# Patient Record
Sex: MALE | Race: WHITE | NOT HISPANIC OR LATINO | Employment: UNEMPLOYED | URBAN - METROPOLITAN AREA
[De-identification: names, ages, dates, MRNs, and addresses within clinical notes are randomized per-mention and may not be internally consistent; named-entity substitution may affect disease eponyms.]

---

## 2020-06-18 ENCOUNTER — COMMUNICATION - HEALTHEAST (OUTPATIENT)
Dept: BEHAVIORAL HEALTH | Facility: CLINIC | Age: 35
End: 2020-06-18

## 2020-07-01 ENCOUNTER — AMBULATORY - HEALTHEAST (OUTPATIENT)
Dept: BEHAVIORAL HEALTH | Facility: CLINIC | Age: 35
End: 2020-07-01

## 2020-07-28 ENCOUNTER — AMBULATORY - HEALTHEAST (OUTPATIENT)
Dept: BEHAVIORAL HEALTH | Facility: CLINIC | Age: 35
End: 2020-07-28

## 2020-08-01 ENCOUNTER — COMMUNICATION - HEALTHEAST (OUTPATIENT)
Dept: SCHEDULING | Facility: CLINIC | Age: 35
End: 2020-08-01

## 2020-08-24 ENCOUNTER — RECORDS - HEALTHEAST (OUTPATIENT)
Dept: ADMINISTRATIVE | Facility: OTHER | Age: 35
End: 2020-08-24

## 2020-09-30 ENCOUNTER — COMMUNICATION - HEALTHEAST (OUTPATIENT)
Dept: BEHAVIORAL HEALTH | Facility: CLINIC | Age: 35
End: 2020-09-30

## 2020-09-30 DIAGNOSIS — F29 PSYCHOSIS, UNSPECIFIED PSYCHOSIS TYPE (H): ICD-10-CM

## 2021-02-02 ENCOUNTER — HOSPITAL ENCOUNTER (EMERGENCY)
Facility: CLINIC | Age: 36
Discharge: ANOTHER HOSPITAL | End: 2021-02-03
Attending: FAMILY MEDICINE | Admitting: FAMILY MEDICINE
Payer: MEDICAID

## 2021-02-02 ENCOUNTER — HOSPITAL ENCOUNTER (EMERGENCY)
Facility: CLINIC | Age: 36
Discharge: HOME | End: 2021-02-02
Attending: FAMILY MEDICINE

## 2021-02-02 DIAGNOSIS — F15.10 METHAMPHETAMINE ABUSE (H): ICD-10-CM

## 2021-02-02 DIAGNOSIS — R44.3 HALLUCINATIONS: ICD-10-CM

## 2021-02-02 DIAGNOSIS — F20.0 PARANOID SCHIZOPHRENIA (H): ICD-10-CM

## 2021-02-02 DIAGNOSIS — R45.851 SUICIDAL IDEATION: ICD-10-CM

## 2021-02-02 LAB
AMPHETAMINES UR QL SCN: POSITIVE
BARBITURATES UR QL: NEGATIVE
BENZODIAZ UR QL: NEGATIVE
CANNABINOIDS UR QL SCN: POSITIVE
COCAINE UR QL: NEGATIVE
ETHANOL UR QL SCN: NEGATIVE
OPIATES UR QL SCN: NEGATIVE

## 2021-02-02 PROCEDURE — 99291 CRITICAL CARE FIRST HOUR: CPT | Performed by: FAMILY MEDICINE

## 2021-02-02 PROCEDURE — C9803 HOPD COVID-19 SPEC COLLECT: HCPCS | Performed by: FAMILY MEDICINE

## 2021-02-02 PROCEDURE — 99285 EMERGENCY DEPT VISIT HI MDM: CPT | Mod: 25 | Performed by: FAMILY MEDICINE

## 2021-02-02 PROCEDURE — 96372 THER/PROPH/DIAG INJ SC/IM: CPT | Performed by: FAMILY MEDICINE

## 2021-02-02 PROCEDURE — 250N000011 HC RX IP 250 OP 636: Performed by: FAMILY MEDICINE

## 2021-02-02 PROCEDURE — 90791 PSYCH DIAGNOSTIC EVALUATION: CPT

## 2021-02-02 PROCEDURE — 80307 DRUG TEST PRSMV CHEM ANLYZR: CPT | Performed by: FAMILY MEDICINE

## 2021-02-02 PROCEDURE — 80320 DRUG SCREEN QUANTALCOHOLS: CPT | Performed by: FAMILY MEDICINE

## 2021-02-02 RX ORDER — DIPHENHYDRAMINE HYDROCHLORIDE 50 MG/ML
50 INJECTION INTRAMUSCULAR; INTRAVENOUS ONCE
Status: COMPLETED | OUTPATIENT
Start: 2021-02-02 | End: 2021-02-02

## 2021-02-02 RX ORDER — OLANZAPINE 10 MG/2ML
10 INJECTION, POWDER, FOR SOLUTION INTRAMUSCULAR ONCE
Status: DISCONTINUED | OUTPATIENT
Start: 2021-02-02 | End: 2021-02-02

## 2021-02-02 RX ORDER — LORAZEPAM 2 MG/ML
1 INJECTION INTRAMUSCULAR ONCE
Status: COMPLETED | OUTPATIENT
Start: 2021-02-02 | End: 2021-02-02

## 2021-02-02 RX ADMIN — LORAZEPAM 1 MG: 2 INJECTION INTRAMUSCULAR; INTRAVENOUS at 20:39

## 2021-02-02 RX ADMIN — DIPHENHYDRAMINE HYDROCHLORIDE 50 MG: 50 INJECTION, SOLUTION INTRAMUSCULAR; INTRAVENOUS at 20:39

## 2021-02-02 ASSESSMENT — MIFFLIN-ST. JEOR: SCORE: 1057.85

## 2021-02-03 ENCOUNTER — AMBULATORY - HEALTHEAST (OUTPATIENT)
Dept: CARE COORDINATION | Facility: HOSPITAL | Age: 36
End: 2021-02-03

## 2021-02-03 VITALS
RESPIRATION RATE: 18 BRPM | OXYGEN SATURATION: 96 % | TEMPERATURE: 97.7 F | WEIGHT: 145 LBS | DIASTOLIC BLOOD PRESSURE: 80 MMHG | BODY MASS INDEX: 25.69 KG/M2 | SYSTOLIC BLOOD PRESSURE: 120 MMHG | HEIGHT: 63 IN | HEART RATE: 92 BPM

## 2021-02-03 LAB
LABORATORY COMMENT REPORT: NORMAL
SARS-COV-2 RNA RESP QL NAA+PROBE: NEGATIVE
SPECIMEN SOURCE: NORMAL

## 2021-02-03 PROCEDURE — 96372 THER/PROPH/DIAG INJ SC/IM: CPT | Performed by: FAMILY MEDICINE

## 2021-02-03 PROCEDURE — 250N000011 HC RX IP 250 OP 636: Performed by: FAMILY MEDICINE

## 2021-02-03 PROCEDURE — 87635 SARS-COV-2 COVID-19 AMP PRB: CPT | Performed by: EMERGENCY MEDICINE

## 2021-02-03 RX ORDER — LORAZEPAM 2 MG/ML
2 INJECTION INTRAMUSCULAR ONCE
Status: COMPLETED | OUTPATIENT
Start: 2021-02-03 | End: 2021-02-03

## 2021-02-03 RX ORDER — LORAZEPAM 1 MG/1
1 TABLET ORAL
Status: DISCONTINUED | OUTPATIENT
Start: 2021-02-03 | End: 2021-02-03 | Stop reason: HOSPADM

## 2021-02-03 RX ORDER — DIPHENHYDRAMINE HYDROCHLORIDE 50 MG/ML
50 INJECTION INTRAMUSCULAR; INTRAVENOUS ONCE
Status: COMPLETED | OUTPATIENT
Start: 2021-02-03 | End: 2021-02-03

## 2021-02-03 RX ADMIN — DIPHENHYDRAMINE HYDROCHLORIDE 50 MG: 50 INJECTION, SOLUTION INTRAMUSCULAR; INTRAVENOUS at 12:49

## 2021-02-03 RX ADMIN — LORAZEPAM 2 MG: 2 INJECTION INTRAMUSCULAR; INTRAVENOUS at 12:49

## 2021-02-03 NOTE — ED NOTES
Pt is currently boarding in the ED. Pt agitated when pt came to ED. Code called for pt. Pt has been calm after he received the medication  Pt was offered hygiene supplies: No   Pt was offered to ambulate on unit with staff: No  Meal tray ordered for pt: Yes.

## 2021-02-03 NOTE — ED NOTES
Within an hour after restraint an in person face to face assessment was completed at 1242, including an evaluation of the patient's immediate reaction to the intervention, behavioral assessment and review/assessment of history, drugs and medications, recent labs, etc., and behavioral condition.  The patient experienced: No adverse physical outcome from seclusion/restraint initiation.  The intervention of restraint or seclusion needs to continue.     Damian Meehan MD  02/03/21 9520

## 2021-02-03 NOTE — ED NOTES
Within an hour after restraint an in person face to face assessment was completed at 2045, including an evaluation of the patient's immediate reaction to the intervention, behavioral assessment and review/assessment of history, drugs and medications, recent labs, etc., and behavioral condition.  The patient experienced: No adverse physical outcome from seclusion/restraint initiation.  The intervention of restraint or seclusion needs to terminate.       Cristofer Gray MD  02/02/21 3777

## 2021-02-03 NOTE — ED NOTES
Writer attempted to do do Covid swab but patient refused at the moment. Will try again once the patient is awake.

## 2021-02-03 NOTE — ED PROVIDER NOTES
ED Provider Note  [facility]      History     Chief Complaint   Patient presents with     Hallucinations     Auditory hallucinations. Family states he tried to jump out of the car. Pt denies SI/HI. Writer noticed patient talking to sister on ear piece but no ear piece seen.      HPI  Anonymous Melinda Ron is a 89 year old male who presents the ER dropped off by family without other history initially noted.  Family initially stated the patient was attempting to jump out of the car coming here.  Patient reported talking to his sister via earpiece but there is no earpiece and also.  Patient does have history of tics and Tourette's but this information otherwise was not available.  Patient now presented here for evaluation agitated.  With concerns of psychoses we felt patient should at least be held here as he wanted to leave at least for further evaluation.    Further information later which was reported by patient's sister by the name of Angie with phone #[phone] offered a lot of information concern regarding patient.  She states the patient has history of schizophrenia diagnoses possibly drug-induced has history of methamphetamine use etc.  Has been hospitalized at [facility] in [place] in the past but has never been hospitalized here at [facility].  Patient is felt to not have used drugs over the last 72 hours but prior previous to that.  Patient has been noticing especially of the last 48 hours increasing agitated describing visual and auditory hallucinations.  Patient describing chainsaws along with yelling and screaming.  Continues to report suicidal ideations to his sister.  Patient is becoming more paranoid feels he drug cartel is out to get him.  He has been stopping cars up in [place] where he has been at a gas station for 48 hours extremely paranoid.  Patient continues to have auditory loose [place] feels that he is listening to people talking and  "earpiece which she does not have.  Apparently reported that he tried to jump out of the car while coming here sister state that he needed to be restrained several times for concern of jumping out etc.  With this ongoing agitated behavior she is concerned about his safety.  She wants to make sure that he is hospitalized and then afterwards they contact her to help with long-term placement issues.  She also notes increasing agitation that he continue to try to call her several/multiple times on the phone continually in an obsessive-like fashion.    Past Medical and Surgical History, Medications, Allergies, and Social History were reviewed in the electronic medical record. Review with the patient was attempted but limited due to altered mental status.       Review of Systems   Unable to perform ROS: Mental status change     A complete review of systems was attempted but limited due to altered mental status.    Physical Exam   BP: 106/83  Pulse: 114  Temp: 99.9  F (37.7  C)  Resp: 18  Height: 160 cm (5' 3\")  Weight: 65.8 kg (145 lb)  SpO2: 97 %  Physical Exam  Vitals signs and nursing note reviewed.   Constitutional:       General: He is in acute distress.      Appearance: He is well-developed. He is ill-appearing. He is not diaphoretic.      Comments: Patient agitated here in the ER does have history of tics and Tourette's per sister history.  Patient displaying some psychotic symptoms some auditory hallucinations.  Somewhat argumentative wanted to leave.  Being held here currently on a health officer hold for further evaluation.   HENT:      Head: Normocephalic and atraumatic.      Mouth/Throat:      Mouth: Mucous membranes are moist.   Eyes:      General: No scleral icterus.     Extraocular Movements: Extraocular movements intact.      Conjunctiva/sclera: Conjunctivae normal.      Pupils: Pupils are equal, round, and reactive to light.   Neck:      Musculoskeletal: Normal range of motion and neck supple. "   Cardiovascular:      Rate and Rhythm: Regular rhythm. Tachycardia present.   Pulmonary:      Effort: No respiratory distress.   Abdominal:      General: There is no distension.      Tenderness: There is no guarding.   Musculoskeletal:         General: No swelling or tenderness.   Skin:     General: Skin is warm and dry.      Capillary Refill: Capillary refill takes less than 2 seconds.      Coloration: Skin is not pale.      Findings: No rash.      Comments: Patient with multiple tattoos   Neurological:      General: No focal deficit present.      Mental Status: He is alert.      Comments: No focal findings identified.   Psychiatric:      Comments: Patient with some delusional thought pattern auditory loose [place] with underlying tics/Tourette's.  Increasing agitation noted here in the ER difficult following commands and cooperating.  Is not physically aggressive.           ED Course       Upon arrival as noted we had minimal history.  Patient agitated wanting to leave but felt at this point health officer hold is indicated.  Patient was advised to sit down he was not compliant with recommendations with security here.  Patient needed to be restrained because of increasing agitation and yelling etc.  A code 21 was called.  Patient assessed at this point patient initially was getting be given Zyprexa but states he has an allergy to this.  Patient then given 50 of Benadryl along with 1 mg Ativan IM.  Patient placed in restraints initially and then reassessed these have been removed upon further reexamination.  Able to gain more history from patient does state that he is supposed be on medications but is not currently taking them.  States has had episodes before like this patient feels very paranoid anxious etc.  No major injury identified here in the ER.    As noted patient sister then did call if you look in the HPI she gives a history concerns of his increasing paranoia agitated behavior need to be restrained some  suicidal ideations tried to jump out of the car coming here.  History of schizophrenia having auditory and visual hallucinations she feels she is unsafe at this point.  Patient's been previously admitted to [facility] in [facility] in the past.    Patient stable in the ER  Eval by our mental health .  Plan is to admit patient to MH unit when available.  72 hour hold placed.    Due to patients acute mental health decompensation requiring restraint and assessment and medication intervention and reassessment involved note Critical Care 30 minutes.      Procedures                        Results for orders placed or performed during the hospital encounter of 02/02/21   Drug abuse screen 6 urine (chem dep) ([facility])     Status: Abnormal   Result Value Ref Range    Amphetamine Qual Urine Positive (A) NEG^Negative    Barbiturates Qual Urine Negative NEG^Negative    Benzodiazepine Qual Urine Negative NEG^Negative    Cannabinoids Qual Urine Positive (A) NEG^Negative    Cocaine Qual Urine Negative NEG^Negative    Ethanol Qual Urine Negative NEG^Negative    Opiates Qualitative Urine Negative NEG^Negative     Medications   LORazepam (ATIVAN) tablet 1 mg (has no administration in time range)   diphenhydrAMINE (BENADRYL) injection 50 mg (50 mg Intramuscular Given 2/2/21 2039)   LORazepam (ATIVAN) injection 1 mg (1 mg Intramuscular Given 2/2/21 2039)        Assessments & Plan (with Medical Decision Making)  25-year-old male came to the ER by sister initially without much other information.  Patient no history known initially but was having some delusional hallucinations agitated required initial restraint as patient would not follow directions and could be redirected.  Patient given mild chemical sedation was able to get out of four-point restraints without complications.  Further formation obtained from sister very concerned patient with methamphetamine drug use history of schizophrenia not taking medications increasing  agitation suicidal ideations increasing auditory visual hallucinations at this point concern of safety issues.  Patient placed on a 72-hour hold.  Drug screen noted with THC and amphetamine.  Patient seen by her mental health  also.  Plan at this point to admit to mental health unit for decompensation of schizophrenia with underlying drug use and more clear and conde evaluation is needed.  Sister agrees patient needs to be hospitalized.           I have reviewed the nursing notes. I have reviewed the findings, diagnosis, plan and need for follow up with the patient.    New Prescriptions    No medications on file       Final diagnoses:   Paranoid schizophrenia (H)   Methamphetamine abuse (H)   Suicidal ideation   Hallucinations       --  Cristofer Gray    [facility] EMERGENCY DEPARTMENT  2/2/2021    This note was created at least in part by the use of dragon voice dictation system. Inadvertent typographical errors may still exist.  Cristofer Gray MD.    Patient evaluated in the emergency department during the COVID-19 pandemic period. Careful attention to patients safety was addressed throughout the evaluation. Evaluation and treatment management was initiated with disposition made efficiently and appropriate as possible to minimize any risk of potential exposure to patient during this evaluation.       Cristofer Gray MD  02/03/21 4934

## 2021-02-03 NOTE — ED NOTES
02/03/21 1311   Education   Discontinuation Criteria Cessation of behavior that precipitated seclusion or restraint   Criteria Explained Yes   Patient's Response VU   Pt agreed to safe behaviors once restraints are removed and was julita to safely transfer to [facility] for transport to [facility]. Pt did not want to process events leading up to seclusion.

## 2021-02-03 NOTE — ED NOTES
Patient signed out to me by previous physician.  Currently waiting placement to the floor or transfer to another facility if we cannot locate a bed here.  No issues during my shift.  Patient will be signed out to oncoming physician     Reg Brannon DO  02/03/21 0603

## 2021-02-03 NOTE — ED NOTES
Sign out Provider: [place]      Sign out Plan: Patient seen on earlier shift.  Cleared medically.  Deemed in need of psychiatric admission and placed on a 72-hour hold.  Awaiting bed placement.      Reassessment: This morning the patient is sleeping.  We will continue to monitor and treat as needed.      Disposition: Inpatient psychiatric admission.  Awaiting bed placement.       Damian Meehan MD  02/03/21 0741

## 2021-02-03 NOTE — ED NOTES
Patient going to [facility], tried to wake patient t inform about the plan of admission and have him sign the EMTALA form, but patient refused to wake up and turned back to writer. Will relay to incoming shift

## 2021-02-03 NOTE — ED NOTES
Woke patient up and informed him of pending admission to [facility]. Patient is calm and cooperative.

## 2021-02-03 NOTE — ED NOTES
02/03/21 1220   Justification   Clinical Justification All   Pt did not want to be transferred to [facility], requested to make phone call to his sister and daughter, staff did allow pt to call his sister. Pt refused to get on cart for transport. He then suddenly lunged at . Pt was taken down to ground, code 21 called. Pt was placed in 5 points restraints due to punching wall and risk for injuring himself and others.

## 2021-02-03 NOTE — SAFE
"[facility]  February 3, 2021    Hamzah denies wanting to kill himself but states his life \"sucks\" and he does not want to live as a drug addict any longer.  He says he needs treatment but if he's discharged waiting for inpatient treatment, he fears relapse.  He reports having two children, girls 15 and 16.  He believes his mental health issues are secondary to his meth addiction but he says he has done damage to himself and his relationships.   Hamzah reports doing well on his meds but stops taking them when he relapses.  He reports being clean for 8 months.  He has a girlfriend with whom he lives that is \"toxic.\"  He says he will be leaving that relationship to seek sobriety.     Hamzah states he has a court ordered intake at 9:30 am on Wednesday 2/3 at [facility]  Address: [address] [place] [address] Phone: [phone].  I am hoping that appointment can be confirmed or denied for him.  I called tonight but the  is only present in the daytime.          Current Suicidal Ideation/Self-Injurious Concerns/Methods: None - N/A    Inappropriate Sexual Behavior: Unknown    Aggression/Homicidal Ideation: None - N/A      For additional details see full DEC assessment.       ANANDA Beck      "

## 2021-02-03 NOTE — ED NOTES
Patient was dropped off by sister due to hallucinations. Patient has been restless, not tracking, refused to be seen, appears to be under influence of something. Multiple attempts to de-escalate the patient but patient adamant on leaving. Patient refused to cooperate with staff. Patient started to be more agitated and tried to runaway. Patient was pinned down on the floor, Code 21 was called, patient was placed on a backboard, moved to ED 14. Zyprexa 10 mg IM verbal order given, when writer was about to give the medicine, patient reported allergic reaction towards the medicine and requested Benadryl and Ativan instead. MD informed and agreeable to pt's request. PA's were able to de-escalate the patient and taken out of the backboard. Patient agreed to give the medicine on left and right deltoid. Patient has been cooperative since so patient was not placed on a seclusion, but kept him on a 1:1 for couple of minutes until the patient calms down. Few minutes after the medication, patient has been cooperative and able to answer triage questions. MD talked to patient, and pt was informed that he will be talking to the DEC .

## 2021-02-03 NOTE — ED NOTES
Patient with a history of decompensated schizophrenia and drug abuse, is on a 72-hour hold with a plan to be admitted to an inpatient unit.  Unfortunately were no beds available here, but a bed was located at the [facility] inpatient mental health unit.  Upon arrival of the paramedics the patient got angry, aggressive, started fighting with the guards and tried to elope.  He had to be physically restrained and was given IM Ativan and Benadryl.  We will continue to plan for inpatient admission, however the patient cannot be transported until he is more stable.     Damian Meehan MD  02/03/21 1003  Shortly after receiving IM Ativan and Benadryl the patient began some generalized twitching and shaking which appeared to be volitional.  He was able to follow commands and answer some questions while this was occurring.  When redirected the symptoms stopped, there was no postictal confusion no incontinence.  This appears to be a nonepileptic spell, likely due to the patient attempting to manipulate the situation as he has been trying to put off transferred to [facility].  He is now resting comfortably, tells me he is not injured, and we will transfer him to [facility] as per the original plan.  He appears stable for transfer.  He is on a 72-hour hold.     Damian Meehan MD  02/03/21 4595

## 2021-02-03 NOTE — ED NOTES
Pt unable to review care plan with staff. Pt was acting out d/t pts unwillingness to cooperate with a transfer to another facility. Pt is on a 72 hour hold and is being transferred because there are no beds available for him here.

## 2021-02-03 NOTE — ED NOTES
While pt is in restraints after medication administration, pt was making voluntary, twitching movement and was not verbally responding to staff. Pt was seen by MD. VS BP-120/72, P101, R16, O2sats 98% on RA. Shortly after pt talked to staff when asked if he would comply with transfer to [facility], if taken out of restraints. Pt agreed to be transferred to [facility]. Writer called [facility] and updated charge nurse, Aleah and pt's nurse, Verona about restraints.

## 2021-02-13 ENCOUNTER — COMMUNICATION - HEALTHEAST (OUTPATIENT)
Dept: SCHEDULING | Facility: CLINIC | Age: 36
End: 2021-02-13

## 2021-05-09 ENCOUNTER — TELEPHONE (OUTPATIENT)
Dept: BEHAVIORAL HEALTH | Facility: CLINIC | Age: 36
End: 2021-05-09

## 2021-05-09 ENCOUNTER — HOSPITAL ENCOUNTER (EMERGENCY)
Facility: CLINIC | Age: 36
Discharge: PSYCHIATRIC OR SUBSTANCE USE FACILITY | End: 2021-05-10
Attending: EMERGENCY MEDICINE | Admitting: EMERGENCY MEDICINE
Payer: MEDICAID

## 2021-05-09 DIAGNOSIS — R45.851 SUICIDAL IDEATION: ICD-10-CM

## 2021-05-09 DIAGNOSIS — F15.159: ICD-10-CM

## 2021-05-09 DIAGNOSIS — F06.2 PSYCHOTIC DISORDER WITH DELUSIONS DUE TO KNOWN PHYSIOLOGICAL CONDITION: ICD-10-CM

## 2021-05-09 DIAGNOSIS — Z20.822 CONTACT WITH AND (SUSPECTED) EXPOSURE TO COVID-19: ICD-10-CM

## 2021-05-09 DIAGNOSIS — F19.10 SUBSTANCE ABUSE (H): ICD-10-CM

## 2021-05-09 LAB
ALBUMIN SERPL-MCNC: 3.5 G/DL (ref 3.4–5)
ALP SERPL-CCNC: 88 U/L (ref 40–150)
ALT SERPL W P-5'-P-CCNC: 19 U/L (ref 0–70)
AMPHETAMINES UR QL SCN: POSITIVE
ANION GAP SERPL CALCULATED.3IONS-SCNC: 7 MMOL/L (ref 3–14)
AST SERPL W P-5'-P-CCNC: 17 U/L (ref 0–45)
BARBITURATES UR QL: NEGATIVE
BASOPHILS # BLD AUTO: 0 10E9/L (ref 0–0.2)
BASOPHILS NFR BLD AUTO: 0.4 %
BENZODIAZ UR QL: NEGATIVE
BILIRUB SERPL-MCNC: 0.4 MG/DL (ref 0.2–1.3)
BUN SERPL-MCNC: 18 MG/DL (ref 7–30)
CALCIUM SERPL-MCNC: 9 MG/DL (ref 8.5–10.1)
CANNABINOIDS UR QL SCN: POSITIVE
CHLORIDE SERPL-SCNC: 105 MMOL/L (ref 94–109)
CO2 SERPL-SCNC: 26 MMOL/L (ref 20–32)
COCAINE UR QL: NEGATIVE
CREAT SERPL-MCNC: 0.79 MG/DL (ref 0.66–1.25)
DIFFERENTIAL METHOD BLD: NORMAL
EOSINOPHIL # BLD AUTO: 0.1 10E9/L (ref 0–0.7)
EOSINOPHIL NFR BLD AUTO: 1.3 %
ERYTHROCYTE [DISTWIDTH] IN BLOOD BY AUTOMATED COUNT: 11.9 % (ref 10–15)
ETHANOL UR QL SCN: NEGATIVE
GFR SERPL CREATININE-BSD FRML MDRD: >90 ML/MIN/{1.73_M2}
GLUCOSE SERPL-MCNC: 125 MG/DL (ref 70–99)
HCT VFR BLD AUTO: 41.3 % (ref 40–53)
HGB BLD-MCNC: 14.6 G/DL (ref 13.3–17.7)
IMM GRANULOCYTES # BLD: 0 10E9/L (ref 0–0.4)
IMM GRANULOCYTES NFR BLD: 0.4 %
LABORATORY COMMENT REPORT: NORMAL
LYMPHOCYTES # BLD AUTO: 2 10E9/L (ref 0.8–5.3)
LYMPHOCYTES NFR BLD AUTO: 20.1 %
MCH RBC QN AUTO: 31.9 PG (ref 26.5–33)
MCHC RBC AUTO-ENTMCNC: 35.4 G/DL (ref 31.5–36.5)
MCV RBC AUTO: 90 FL (ref 78–100)
MONOCYTES # BLD AUTO: 1.2 10E9/L (ref 0–1.3)
MONOCYTES NFR BLD AUTO: 12 %
NEUTROPHILS # BLD AUTO: 6.7 10E9/L (ref 1.6–8.3)
NEUTROPHILS NFR BLD AUTO: 65.8 %
NRBC # BLD AUTO: 0 10*3/UL
NRBC BLD AUTO-RTO: 0 /100
OPIATES UR QL SCN: NEGATIVE
PLATELET # BLD AUTO: 263 10E9/L (ref 150–450)
POTASSIUM SERPL-SCNC: 3.5 MMOL/L (ref 3.4–5.3)
PROT SERPL-MCNC: 7.2 G/DL (ref 6.8–8.8)
RBC # BLD AUTO: 4.58 10E12/L (ref 4.4–5.9)
SARS-COV-2 RNA RESP QL NAA+PROBE: NEGATIVE
SODIUM SERPL-SCNC: 138 MMOL/L (ref 133–144)
SPECIMEN SOURCE: NORMAL
WBC # BLD AUTO: 10.1 10E9/L (ref 4–11)

## 2021-05-09 PROCEDURE — 250N000013 HC RX MED GY IP 250 OP 250 PS 637: Performed by: EMERGENCY MEDICINE

## 2021-05-09 PROCEDURE — 80307 DRUG TEST PRSMV CHEM ANLYZR: CPT | Performed by: EMERGENCY MEDICINE

## 2021-05-09 PROCEDURE — 99284 EMERGENCY DEPT VISIT MOD MDM: CPT | Performed by: EMERGENCY MEDICINE

## 2021-05-09 PROCEDURE — 90791 PSYCH DIAGNOSTIC EVALUATION: CPT

## 2021-05-09 PROCEDURE — 80320 DRUG SCREEN QUANTALCOHOLS: CPT | Performed by: EMERGENCY MEDICINE

## 2021-05-09 PROCEDURE — 87635 SARS-COV-2 COVID-19 AMP PRB: CPT | Performed by: EMERGENCY MEDICINE

## 2021-05-09 PROCEDURE — 99285 EMERGENCY DEPT VISIT HI MDM: CPT | Mod: 25 | Performed by: EMERGENCY MEDICINE

## 2021-05-09 PROCEDURE — 85025 COMPLETE CBC W/AUTO DIFF WBC: CPT | Performed by: EMERGENCY MEDICINE

## 2021-05-09 PROCEDURE — 80053 COMPREHEN METABOLIC PANEL: CPT | Performed by: EMERGENCY MEDICINE

## 2021-05-09 PROCEDURE — 120N000001 HC R&B MED SURG/OB

## 2021-05-09 RX ORDER — OLANZAPINE 5 MG/1
5 TABLET, ORALLY DISINTEGRATING ORAL ONCE
Status: COMPLETED | OUTPATIENT
Start: 2021-05-09 | End: 2021-05-09

## 2021-05-09 RX ADMIN — OLANZAPINE 5 MG: 5 TABLET, ORALLY DISINTEGRATING ORAL at 16:25

## 2021-05-09 ASSESSMENT — ENCOUNTER SYMPTOMS
NERVOUS/ANXIOUS: 1
ACTIVITY CHANGE: 1
SLEEP DISTURBANCE: 1
FATIGUE: 1
DECREASED CONCENTRATION: 1
DYSPHORIC MOOD: 1
MYALGIAS: 1
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
## 2021-05-09 NOTE — TELEPHONE ENCOUNTER
"S: [facility] called at 1722 to place a 36 y/o male for inpatient mental health treatment.    B: Hamzah Roberts is a 35 year old male with a PMH of paranoid schizophrenia, ADHD, psychosis, hallucinations, methamphetamine abuse and suicidal ideation who presents to the ED today complaining of methamphetamine abuse and insomnia.  Pt reports has been in and out of the hospital for a while due to his methamphetamine use.  He states he would like to get back into treatment.  He states he was recently at [facility] for 30 days before leaving a few weeks ago.  He states he has had a rule 25 evaluation done in the past and states that the treatment center he just left from would accept him back. He states he is under treatment a total of 2 times in the past. Pt states he is currently homeless and has been walking from the bus stop the bus stop to sleep.  Pt believes that the \"drug cartel\" is following him and attempting to kill him. He has, at times, believed that the drug cartel is also involved with his sister, Lissette. Pt is paranoid and describes auditory hallucinations. Pt believes that hospital physicians are also involved with the drug cartel. He endorses left lateral anterior calf pain that is worse with weightbearing.  Pt also endorses marijuana use.  He states he would like to be held here in the hospital until he can get back into his treatment center because \"I cannot do this on my own\". Pt was recently admitted to [facility] from 2/3/2021 to 2/17/2021 for psychosis and insomnia due to methamphetamine abuse.  Pt has a history of needing restraints due to violent behavior. Utox is positive for amphetamines and cannabinoids. He is negative for COVID-19. Other labs appear unremarkable. Medically cleared.    A: Voluntary.    R: Identifying placement options. Placed on work list.  Patient cleared and ready for behavioral bed placement: Yes    "
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
## 2021-05-10 ENCOUNTER — AMBULATORY - HEALTHEAST (OUTPATIENT)
Dept: CARE COORDINATION | Facility: HOSPITAL | Age: 36
End: 2021-05-10

## 2021-05-10 VITALS
TEMPERATURE: 98.4 F | RESPIRATION RATE: 16 BRPM | OXYGEN SATURATION: 95 % | HEART RATE: 77 BPM | DIASTOLIC BLOOD PRESSURE: 53 MMHG | SYSTOLIC BLOOD PRESSURE: 97 MMHG

## 2021-05-10 NOTE — ED NOTES
"ED to Behavioral Floor Handoff    SITUATION  Hamzah Roberts is a 35 year old male who speaks English and lives is homeless alone The patient arrived in the ED by walking from streets with a complaint of Addiction Problem (Used meth a few days ago, smokes. ) and Insomnia (\"I haven't slept for like 6 or 7 days and I've just been walking around.\")  .The patient's current symptoms started/worsened \"long time\" ago and during this time the symptoms have increased.   In the ED, pt was diagnosed with   Final diagnoses:   Substance abuse (H)   Suicidal ideation        Initial vitals were: BP: 132/87  Pulse: 106  Temp: 98.4  F (36.9  C)  Resp: 18  SpO2: 99 %   --------  Is the patient diabetic? No   If yes, last blood glucose? --     If yes, was this treated in the ED? --  --------  Is the patient inebriated (ETOH) No or Impaired on other substances? Yes  MSSA done? N/A  Last MSSA score: --    Were withdrawal symptoms treated? N/A  Does the patient have a seizure history? No. If yes, date of most recent seizure--  --------  Is the patient patient experiencing suicidal ideation? denies current or recent suicidal ideation     Homicidal ideation? denies current or recent homicidal ideation or behaviors.    Self-injurious behavior/urges? denies current or recent self injurious behavior or ideation.  ------  Was pt aggressive in the ED No  Was a code called No  Is the pt now cooperative? Yes  -------  Meds given in ED:   Medications   OLANZapine zydis (zyPREXA) ODT tab 5 mg (5 mg Oral Given 5/9/21 2829)      Family present during ED course? No  Family currently present? No    BACKGROUND  Does the patient have a cognitive impairment or developmental disability? No  Allergies: No Known Allergies.   Social demographics are   Social History     Socioeconomic History     Marital status: Single     Spouse name: Not on file     Number of children: Not on file     Years of education: Not on file     Highest education level: Not on file "   Occupational History     Not on file   Social Needs     Financial resource strain: Not on file     Food insecurity     Worry: Not on file     Inability: Not on file     Transportation needs     Medical: Not on file     Non-medical: Not on file   Tobacco Use     Smoking status: Current Every Day Smoker     Packs/day: 1.00     Types: Cigarettes     Smokeless tobacco: Never Used   Substance and Sexual Activity     Alcohol use: Not Currently     Drug use: Yes     Types: Methamphetamines     Sexual activity: Not Currently   Lifestyle     Physical activity     Days per week: Not on file     Minutes per session: Not on file     Stress: Not on file   Relationships     Social connections     Talks on phone: Not on file     Gets together: Not on file     Attends Gnosticist service: Not on file     Active member of club or organization: Not on file     Attends meetings of clubs or organizations: Not on file     Relationship status: Not on file     Intimate partner violence     Fear of current or ex partner: Not on file     Emotionally abused: Not on file     Physically abused: Not on file     Forced sexual activity: Not on file   Other Topics Concern     Not on file   Social History Narrative     Not on file        ASSESSMENT  Labs results   Labs Ordered and Resulted from Time of ED Arrival Up to the Time of Departure from the ED   DRUG ABUSE SCREEN 6 CHEM DEP URINE ([facility]) - Abnormal; Notable for the following components:       Result Value    Amphetamine Qual Urine Positive (*)     Cannabinoids Qual Urine Positive (*)     All other components within normal limits   COMPREHENSIVE METABOLIC PANEL - Abnormal; Notable for the following components:    Glucose 125 (*)     All other components within normal limits   SARS-COV-2 (COVID-19) VIRUS RT-PCR   CBC WITH PLATELETS DIFFERENTIAL      Imaging Studies: No results found for this or any previous visit (from the past 24 hour(s)).   Most recent vital signs /87   Pulse 106    Temp 98.4  F (36.9  C) (Oral)   Resp 18   SpO2 99%    Abnormal labs/tests/findings requiring intervention:---   Pain control: fair    Nausea control: pt had none    RECOMMENDATION  Are any infection precautions needed (MRSA, VRE, etc.)? No If yes, what infection? --  ---  Does the patient have mobility issues? independently. If yes, what device does the pt use? ---  ---  Is patient on 72 hour hold or commitment? No If on 72 hour hold, have hold and rights been given to patient? N/A  Are admitting orders written if after 10 p.m. ?N/A  Tasks needing to be completed:---     Chanda Machado RN   [facility]-- [phone] [place] [facility]   [phone] [facility]

## 2021-05-10 NOTE — TELEPHONE ENCOUNTER
R:10/cristiane    1039am-Intake spoke with provider. Provider will call intake back regarding pt placement.   1047am-Unit can not cohort pts due to pt presentation.    Intake continuing to identify placement options. Pt on worklist    R:4500/Angel  400pm: Provider accepted  418pm: Pt placed in que, unit notified, can report anytime  423pm: ED notified.

## 2021-05-11 ENCOUNTER — COMMUNICATION - HEALTHEAST (OUTPATIENT)
Dept: SCHEDULING | Facility: CLINIC | Age: 36
End: 2021-05-11

## 2021-05-12 ENCOUNTER — COMMUNICATION - HEALTHEAST (OUTPATIENT)
Dept: SCHEDULING | Facility: CLINIC | Age: 36
End: 2021-05-12

## 2021-05-29 ENCOUNTER — RECORDS - HEALTHEAST (OUTPATIENT)
Dept: ADMINISTRATIVE | Facility: CLINIC | Age: 36
End: 2021-05-29

## 2021-05-30 ENCOUNTER — RECORDS - HEALTHEAST (OUTPATIENT)
Dept: ADMINISTRATIVE | Facility: CLINIC | Age: 36
End: 2021-05-30

## 2021-05-31 ENCOUNTER — RECORDS - HEALTHEAST (OUTPATIENT)
Dept: ADMINISTRATIVE | Facility: CLINIC | Age: 36
End: 2021-05-31

## 2021-06-01 ENCOUNTER — RECORDS - HEALTHEAST (OUTPATIENT)
Dept: ADMINISTRATIVE | Facility: CLINIC | Age: 36
End: 2021-06-01

## 2021-06-04 ENCOUNTER — HOSPITAL ENCOUNTER (INPATIENT)
Facility: CLINIC | Age: 36
LOS: 1 days | Discharge: HOME | End: 2021-06-05
Attending: PSYCHIATRY & NEUROLOGY | Admitting: PSYCHIATRY & NEUROLOGY
Payer: MEDICAID

## 2021-06-04 DIAGNOSIS — F15.10 METHAMPHETAMINE ABUSE (H): ICD-10-CM

## 2021-06-04 DIAGNOSIS — F06.0 PSYCHOTIC DISORDER WITH HALLUCINATIONS DUE TO KNOWN PHYSIOLOGICAL CONDITION: ICD-10-CM

## 2021-06-04 DIAGNOSIS — F15.259 OTH STIMULANT DEPEND W STIM-INDUCE PSYCHOTIC DISORDER, UNSP (H): ICD-10-CM

## 2021-06-04 DIAGNOSIS — F60.89 OTHER SPECIFIC PERSONALITY DISORDERS (H): ICD-10-CM

## 2021-06-04 DIAGNOSIS — Z20.822 CONTACT WITH AND (SUSPECTED) EXPOSURE TO COVID-19: ICD-10-CM

## 2021-06-04 DIAGNOSIS — F22 PARANOIA (H): ICD-10-CM

## 2021-06-04 DIAGNOSIS — F20.0 PARANOID SCHIZOPHRENIA (H): ICD-10-CM

## 2021-06-04 LAB
ALCOHOL BREATH TEST: 0 (ref 0–0.01)
LABORATORY COMMENT REPORT: NORMAL
SARS-COV-2 RNA RESP QL NAA+PROBE: NEGATIVE
SPECIMEN SOURCE: NORMAL

## 2021-06-04 PROCEDURE — 99285 EMERGENCY DEPT VISIT HI MDM: CPT | Performed by: EMERGENCY MEDICINE

## 2021-06-04 PROCEDURE — 87635 SARS-COV-2 COVID-19 AMP PRB: CPT | Performed by: EMERGENCY MEDICINE

## 2021-06-04 PROCEDURE — 124N000002 HC R&B MH UMMC

## 2021-06-04 PROCEDURE — 90791 PSYCH DIAGNOSTIC EVALUATION: CPT

## 2021-06-04 PROCEDURE — 250N000013 HC RX MED GY IP 250 OP 250 PS 637: Performed by: EMERGENCY MEDICINE

## 2021-06-04 PROCEDURE — 99285 EMERGENCY DEPT VISIT HI MDM: CPT | Mod: 25 | Performed by: EMERGENCY MEDICINE

## 2021-06-04 PROCEDURE — C9803 HOPD COVID-19 SPEC COLLECT: HCPCS | Performed by: EMERGENCY MEDICINE

## 2021-06-04 PROCEDURE — 82075 ASSAY OF BREATH ETHANOL: CPT | Performed by: EMERGENCY MEDICINE

## 2021-06-04 RX ORDER — RISPERIDONE 1 MG/1
1 TABLET ORAL AT BEDTIME
COMMUNITY
End: 2021-08-17

## 2021-06-04 RX ORDER — QUETIAPINE FUMARATE 200 MG/1
200 TABLET, FILM COATED ORAL
Status: DISCONTINUED | OUTPATIENT
Start: 2021-06-04 | End: 2021-06-05 | Stop reason: HOSPADM

## 2021-06-04 RX ORDER — MAGNESIUM HYDROXIDE/ALUMINUM HYDROXICE/SIMETHICONE 120; 1200; 1200 MG/30ML; MG/30ML; MG/30ML
30 SUSPENSION ORAL EVERY 4 HOURS PRN
Status: DISCONTINUED | OUTPATIENT
Start: 2021-06-04 | End: 2021-06-05 | Stop reason: HOSPADM

## 2021-06-04 RX ORDER — BUSPIRONE HYDROCHLORIDE 5 MG/1
5 TABLET ORAL 2 TIMES DAILY
COMMUNITY
End: 2021-08-17

## 2021-06-04 RX ORDER — HYDROXYZINE HYDROCHLORIDE 50 MG/1
50 TABLET, FILM COATED ORAL 3 TIMES DAILY PRN
COMMUNITY
End: 2021-08-17

## 2021-06-04 RX ORDER — GABAPENTIN 100 MG/1
100 CAPSULE ORAL EVERY 6 HOURS PRN
Status: DISCONTINUED | OUTPATIENT
Start: 2021-06-04 | End: 2021-06-05 | Stop reason: HOSPADM

## 2021-06-04 RX ORDER — GABAPENTIN 400 MG/1
400 CAPSULE ORAL 3 TIMES DAILY
Status: DISCONTINUED | OUTPATIENT
Start: 2021-06-05 | End: 2021-06-05 | Stop reason: HOSPADM

## 2021-06-04 RX ORDER — ACETAMINOPHEN 325 MG/1
650 TABLET ORAL EVERY 4 HOURS PRN
Status: DISCONTINUED | OUTPATIENT
Start: 2021-06-04 | End: 2021-06-05 | Stop reason: HOSPADM

## 2021-06-04 RX ORDER — HYDROXYZINE HYDROCHLORIDE 50 MG/1
50 TABLET, FILM COATED ORAL 3 TIMES DAILY PRN
Status: DISCONTINUED | OUTPATIENT
Start: 2021-06-04 | End: 2021-06-05 | Stop reason: HOSPADM

## 2021-06-04 RX ORDER — GABAPENTIN 400 MG/1
400 CAPSULE ORAL 3 TIMES DAILY
COMMUNITY
End: 2021-08-17

## 2021-06-04 RX ORDER — RISPERIDONE 1 MG/1
1 TABLET ORAL AT BEDTIME
Status: DISCONTINUED | OUTPATIENT
Start: 2021-06-04 | End: 2021-06-05 | Stop reason: HOSPADM

## 2021-06-04 RX ORDER — OLANZAPINE 5 MG/1
5-10 TABLET ORAL 3 TIMES DAILY PRN
Status: DISCONTINUED | OUTPATIENT
Start: 2021-06-04 | End: 2021-06-05 | Stop reason: HOSPADM

## 2021-06-04 RX ORDER — QUETIAPINE FUMARATE 200 MG/1
200 TABLET, FILM COATED ORAL
COMMUNITY
End: 2021-08-17

## 2021-06-04 RX ORDER — BUSPIRONE HYDROCHLORIDE 5 MG/1
5 TABLET ORAL 2 TIMES DAILY
Status: DISCONTINUED | OUTPATIENT
Start: 2021-06-04 | End: 2021-06-05 | Stop reason: HOSPADM

## 2021-06-04 RX ORDER — TRAZODONE HYDROCHLORIDE 100 MG/1
100 TABLET ORAL
COMMUNITY
End: 2021-08-17

## 2021-06-04 RX ORDER — LANOLIN ALCOHOL/MO/W.PET/CERES
3 CREAM (GRAM) TOPICAL
Status: DISCONTINUED | OUTPATIENT
Start: 2021-06-04 | End: 2021-06-05 | Stop reason: HOSPADM

## 2021-06-04 RX ORDER — OLANZAPINE 10 MG/1
10 TABLET, ORALLY DISINTEGRATING ORAL ONCE
Status: COMPLETED | OUTPATIENT
Start: 2021-06-04 | End: 2021-06-04

## 2021-06-04 RX ORDER — OLANZAPINE 10 MG/2ML
5-10 INJECTION, POWDER, FOR SOLUTION INTRAMUSCULAR 3 TIMES DAILY PRN
Status: DISCONTINUED | OUTPATIENT
Start: 2021-06-04 | End: 2021-06-05 | Stop reason: HOSPADM

## 2021-06-04 RX ADMIN — OLANZAPINE 10 MG: 10 TABLET, ORALLY DISINTEGRATING ORAL at 17:24

## 2021-06-04 ASSESSMENT — ACTIVITIES OF DAILY LIVING (ADL)
HYGIENE/GROOMING: INDEPENDENT
LAUNDRY: WITH SUPERVISION
DRESS: INDEPENDENT
ORAL_HYGIENE: INDEPENDENT

## 2021-06-04 ASSESSMENT — MIFFLIN-ST. JEOR: SCORE: 1522.79

## 2021-06-04 NOTE — ED TRIAGE NOTES
Pt has been using meth and is having psychosis and wants to get into treatment to get rid of the voices.

## 2021-06-04 NOTE — ED NOTES
"Pt came to [facility], but was too paranoid to come into the doors. He became hysterical, stating \"I can't go in there, I don't want to be in there, please don't make me go through those doors.\" This RN explained that he would have a private room in [facility] compared to the main ED, but he expressed that he wouldn't be able to walk through the doors. At this time patient began to pace the hallway and was looking around where nobody was standing.This RN felt it was best to bring patient to the main ED to prevent the patient from escalating in his paranoia.   "

## 2021-06-04 NOTE — ED NOTES
Pt upon arrival at the [facility] area and refused to go in and due to the concerns of Pt talking about the voices in his head and having schiz.  Pt was placed on a hold so that he could be evaluated.

## 2021-06-04 NOTE — ED NOTES
ED to Behavioral Floor Handoff    SITUATION  Hamzah Roberts is a 35 year old male who speaks English and lives in a home with family members The patient arrived in the ED by private car from home with a complaint of Drug / Alcohol Assessment  .The patient's current symptoms started/worsened 3 month(s) ago and during this time the symptoms have increased.   In the ED, pt was diagnosed with   Final diagnoses:   Methamphetamine abuse (H)   Paranoia (H)   Paranoid schizophrenia (H)        Initial vitals were: BP: (!) 159/105  Pulse: 124  Temp: 98  F (36.7  C)  Resp: 16  SpO2: 97 %   --------  Is the patient diabetic? No   If yes, last blood glucose? --     If yes, was this treated in the ED? --  --------  Is the patient inebriated (ETOH) No or Impaired on other substances? Yes  MSSA done? N/A  Last MSSA score: --    Were withdrawal symptoms treated? N/A  Does the patient have a seizure history? No. If yes, date of most recent seizure--  --------  Is the patient patient experiencing suicidal ideation? denies current or recent suicidal ideation     Homicidal ideation? denies current or recent homicidal ideation or behaviors.    Self-injurious behavior/urges? denies current or recent self injurious behavior or ideation.  ------  Was pt aggressive in the ED No  Was a code called No  Is the pt now cooperative? Yes  -------  Meds given in ED:   Medications   OLANZapine zydis (zyPREXA) ODT tab 10 mg (10 mg Oral Given 6/4/21 1724)      Family present during ED course? No  Family currently present? No    BACKGROUND  Does the patient have a cognitive impairment or developmental disability? No  Allergies: No Known Allergies.   Social demographics are   Social History     Socioeconomic History     Marital status: Single     Spouse name: Not on file     Number of children: Not on file     Years of education: Not on file     Highest education level: Not on file   Occupational History     Not on file   Social Needs     Financial  resource strain: Not on file     Food insecurity     Worry: Not on file     Inability: Not on file     Transportation needs     Medical: Not on file     Non-medical: Not on file   Tobacco Use     Smoking status: Current Every Day Smoker     Packs/day: 1.00     Types: Cigarettes     Smokeless tobacco: Never Used   Substance and Sexual Activity     Alcohol use: Not Currently     Drug use: Yes     Types: Methamphetamines     Sexual activity: Not Currently   Lifestyle     Physical activity     Days per week: Not on file     Minutes per session: Not on file     Stress: Not on file   Relationships     Social connections     Talks on phone: Not on file     Gets together: Not on file     Attends Yarsani service: Not on file     Active member of club or organization: Not on file     Attends meetings of clubs or organizations: Not on file     Relationship status: Not on file     Intimate partner violence     Fear of current or ex partner: Not on file     Emotionally abused: Not on file     Physically abused: Not on file     Forced sexual activity: Not on file   Other Topics Concern     Not on file   Social History Narrative     Not on file        ASSESSMENT  Labs results   Labs Ordered and Resulted from Time of ED Arrival Up to the Time of Departure from the ED   ALCOHOL BREATH TEST POCT - Normal   DRUG ABUSE SCREEN 6 CHEM DEP URINE ([facility])   SARS-COV-2 (COVID-19) VIRUS RT-PCR   DOCUMENT IN LEGAL HOLD NAVIGATOR      Imaging Studies: No results found for this or any previous visit (from the past 24 hour(s)).   Most recent vital signs BP (!) 159/105   Pulse 124   Temp 98  F (36.7  C) (Oral)   Resp 16   SpO2 97%    Abnormal labs/tests/findings requiring intervention:---   Pain control: pt had none  Nausea control: pt had none    RECOMMENDATION  Are any infection precautions needed (MRSA, VRE, etc.)? No If yes, what infection? --  ---  Does the patient have mobility issues? independently. If yes, what device does the pt use?  ---  ---  Is patient on 72 hour hold or commitment? No If on 72 hour hold, have hold and rights been given to patient? N/A  Are admitting orders written if after 10 p.m. ?N/A  Tasks needing to be completed:---     Lyubov Palm, RN     [phone] [facility]

## 2021-06-04 NOTE — ED NOTES
Pt after a great deal of deliberation on his part finally was willing to go to [facility] to be seen.
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
## 2021-06-04 NOTE — ED PROVIDER NOTES
"    [facility] ([facility])  6/04/21    History     Chief Complaint   Patient presents with     Drug / Alcohol Assessment     The history is provided by the patient and medical records.   Drug / Alcohol Assessment      Hamzah Roberts is a 35 year old male medical history significant for methamphetamine abuse and schizophrenia who presents to the emergency department for psychiatric evaluation.  Patient states he has been using meth and endorses psychosis and auditory hallucinations secondary to this.  Patient states he would like to get into treatment to \"get rid of the voices\".  He states that he hears voices screaming constantly and this is causing him stress.  He denies suicidal or homicidal ideation.  He is prescribed Seroquel Resporal and has not taken this for the last 3 or 4 days.  He states that he had been staying with his brother on the couch and that other friends but then for the last couple of days was on the street and did not want to take Seroquel at night while he was sleeping at a bus stop.  He was recently admitted to [facility] with plan to go to treatment in [place] on discharge on 521 however unfortunately there was an issue with his funding not going through through [facility] and thus was discharged.  He does have history of suicidal ideation related to meth use but states he is not feeling suicidal currently.  He states he did get very paranoid when trying to go to the the behavioral health emergency department related to the voices but that he is feeling better now and is voluntary and wants to stay in the hospital to get help and go to treatment.  He states he last used meth yesterday.  He denies IV use.  He denies any chest pain, shortness of breath, fever, cough, abdominal pain, nausea, vomiting, diarrhea, or other complaints.  He denies any other illicit drug use or alcohol use.    Patient was also seen by the behavioral health , please see their " note for further details.    No past medical history on file.    No past surgical history on file.    No family history on file.    Social History     Tobacco Use     Smoking status: Current Every Day Smoker     Packs/day: 1.00     Types: Cigarettes     Smokeless tobacco: Never Used   Substance Use Topics     Alcohol use: Not Currently       No current facility-administered medications for this encounter.      Current Outpatient Medications   Medication     busPIRone (BUSPAR) 5 MG tablet     gabapentin (NEURONTIN) 400 MG capsule     hydrOXYzine (ATARAX) 50 MG tablet     QUEtiapine (SEROQUEL) 200 MG tablet     risperiDONE (RISPERDAL) 1 MG tablet     traZODone (DESYREL) 100 MG tablet      No Known Allergies      Past Medical History  No past medical history on file.  No past surgical history on file.  busPIRone (BUSPAR) 5 MG tablet  gabapentin (NEURONTIN) 400 MG capsule  hydrOXYzine (ATARAX) 50 MG tablet  QUEtiapine (SEROQUEL) 200 MG tablet  risperiDONE (RISPERDAL) 1 MG tablet  traZODone (DESYREL) 100 MG tablet      No Known Allergies  Family History  No family history on file.  Social History   Social History     Tobacco Use     Smoking status: Current Every Day Smoker     Packs/day: 1.00     Types: Cigarettes     Smokeless tobacco: Never Used   Substance Use Topics     Alcohol use: Not Currently     Drug use: Yes     Types: Methamphetamines      Past medical history, past surgical history, medications, allergies, family history, and social history were reviewed with the patient.  Has history of methamphetamine abuse as well as schizophrenia.    Review of Systems  A complete review of systems was performed with pertinent positives and negatives noted in the HPI, and all other systems negative.    Physical Exam   BP: (!) 159/105  Pulse: 124  Temp: 98  F (36.7  C)  Resp: 16  SpO2: 97 %  Physical Exam  Vitals signs reviewed.   Constitutional:       General: He is not in acute distress.     Appearance: He is  well-developed.   HENT:      Head: Normocephalic and atraumatic.      Mouth/Throat:      Mouth: Mucous membranes are moist.      Pharynx: No oropharyngeal exudate.   Eyes:      Extraocular Movements: Extraocular movements intact.      Pupils: Pupils are equal, round, and reactive to light.   Neck:      Musculoskeletal: Normal range of motion and neck supple. No neck rigidity.   Cardiovascular:      Rate and Rhythm: Regular rhythm. Tachycardia present.      Pulses: Normal pulses.      Heart sounds: Normal heart sounds. No murmur.      Comments: Mildly tachycardic  Pulmonary:      Effort: Pulmonary effort is normal. No respiratory distress.      Breath sounds: Normal breath sounds. No stridor. No wheezing or rales.   Abdominal:      General: Bowel sounds are normal. There is no distension.      Palpations: Abdomen is soft. There is no mass.      Tenderness: There is no abdominal tenderness. There is no guarding or rebound.   Musculoskeletal: Normal range of motion.   Skin:     General: Skin is warm and dry.      Capillary Refill: Capillary refill takes less than 2 seconds.   Neurological:      General: No focal deficit present.      Mental Status: He is alert and oriented to person, place, and time.      GCS: GCS eye subscore is 4. GCS verbal subscore is 5. GCS motor subscore is 6.      Cranial Nerves: No cranial nerve deficit.      Sensory: No sensory deficit.      Motor: No weakness or abnormal muscle tone.   Psychiatric:      Comments: Mildly anxious but calm and cooperative on my exam.  Had received Zyprexa and does not appear to be responding to external stimuli at this time.         ED Course      Procedures        The medical record was reviewed and interpreted.  Current labs reviewed and interpreted.       Results for orders placed or performed during the hospital encounter of 06/04/21   Asymptomatic SARS-CoV-2 COVID-19 Virus (Coronavirus) by PCR     Status: None    Specimen: Nasopharyngeal   Result Value Ref  Range    SARS-CoV-2 Virus Specimen Source Nasopharyngeal     SARS-CoV-2 PCR Result NEGATIVE     SARS-CoV-2 PCR Comment (Note)    Alcohol breath test POCT     Status: Normal   Result Value Ref Range    Alcohol Breath Test 0.00 0.00 - 0.01     Medications   OLANZapine zydis (zyPREXA) ODT tab 10 mg (10 mg Oral Given 6/4/21 1724)        Assessments & Plan (with Medical Decision Making)   Patient presents with methamphetamine abuse and history of schizophrenia not currently taking medications requesting psychiatric evaluation and methamphetamine abuse treatment.  Patient was recently admitted at [facility] and discharged on 21 May with initial plan of going into treatment for his abuse in [facility] however apparently [facility] had denied his financial assistance and thus he was discharged.  He presents here requesting help as he has been unable to stop using methamphetamine and is having worsening auditory hallucinations that are screaming and causing him distress.  He currently denies suicidal or homicidal ideation.  He was initially going to go to the behavioral emergency center however he became paranoid and anxious and did not want to walk through those doors and thus came back here.  He was feeling more comfortable here and voluntarily took Zyprexa to assist with his hallucinations and was not aggressive or disruptive here.  He was calm and cooperative and only mildly anxious on my evaluation.  He is entirely voluntary and requesting to stay in the hospital.  He was seen by the behavioral health  who agreed that he warranted inpatient psychiatric stabilization.  Again patient was voluntary and in agreement with this plan.  Asymptomatic COVID-19 PCR was negative.  Breathalyzer was 0.  Urine drug screen is pending.  He was accepted to the inpatient psychiatric floor here at [place] and was transferred to his inpatient bed in stable condition.  He denied any medical complaints and was medically stable  for admission to psychiatric services.    I have reviewed the nursing notes. I have reviewed the findings, diagnosis, plan and need for follow up with the patient.    New Prescriptions    No medications on file       Final diagnoses:   Methamphetamine abuse (H)   Paranoia (H)   Paranoid schizophrenia (H)       --  Nadira Lopez MD  [facility] EMERGENCY DEPARTMENT  6/4/2021     Nadira Lopez MD  06/04/21 2058

## 2021-06-05 VITALS
TEMPERATURE: 97.3 F | HEART RATE: 84 BPM | RESPIRATION RATE: 16 BRPM | DIASTOLIC BLOOD PRESSURE: 71 MMHG | BODY MASS INDEX: 26.67 KG/M2 | OXYGEN SATURATION: 98 % | WEIGHT: 150.5 LBS | HEIGHT: 63 IN | SYSTOLIC BLOOD PRESSURE: 109 MMHG

## 2021-06-05 PROCEDURE — 250N000013 HC RX MED GY IP 250 OP 250 PS 637: Performed by: PSYCHIATRY & NEUROLOGY

## 2021-06-05 PROCEDURE — 99236 HOSP IP/OBS SAME DATE HI 85: CPT | Performed by: NURSE PRACTITIONER

## 2021-06-05 RX ORDER — QUETIAPINE FUMARATE 50 MG/1
200 TABLET, EXTENDED RELEASE ORAL AT BEDTIME
Status: DISCONTINUED | OUTPATIENT
Start: 2021-06-05 | End: 2021-06-05 | Stop reason: HOSPADM

## 2021-06-05 RX ADMIN — BUSPIRONE HYDROCHLORIDE 5 MG: 5 TABLET ORAL at 09:08

## 2021-06-05 RX ADMIN — GABAPENTIN 400 MG: 400 CAPSULE ORAL at 09:08

## 2021-06-05 RX ADMIN — GABAPENTIN 400 MG: 400 CAPSULE ORAL at 14:04

## 2021-06-05 ASSESSMENT — ACTIVITIES OF DAILY LIVING (ADL)
HYGIENE/GROOMING: INDEPENDENT
LAUNDRY: WITH SUPERVISION
ORAL_HYGIENE: INDEPENDENT
DRESS: INDEPENDENT

## 2021-06-05 NOTE — PLAN OF CARE
Initial Psychosocial Assessment    I have reviewed the chart, met with the patient, and developed Care Plan.  Information for assessment was obtained from chart notes.  Patient was not able to be awakened for interview.    Presenting Problem:  Patient was admitted on a voluntary basis with meth use, auditory and visual hallucinations.  Also off prescribed medications for a few days prior to admission.  Per notes patient recently attempted to get funding through [facility] for CD treatment but was denied.  He was subsequently discharged from [facility] and began using again.     History of Mental Health and Chemical Dependency:  Schizophrenia, meth abuse, Tourette's syndrome.  History of admissions at [facility]. Recent admit at [facility] 5/9 to 5/21. CD treatment at [facility].     Family Description (Constellation, Family Psychiatric History):  Patient is one of 9 children.  Family history positive for substance abuse. Cousin bipolar.    Significant Life Events (Illness, Abuse, Trauma, Death):  Unable to assess    Living Situation:  Homeless, has been staying with friends/family/on the street.    Educational Background:  GED    Occupational History:  Not employed    Financial Status:  Unable to assess    Legal Issues:  History prison time; current probation [facility].    Ethnic/Cultural Issues:  [place]    Spiritual Orientation:  Taoist     Service History:  None     Social Functioning (organization, interests):  Unable to assess    Current Treatment Providers are:  LID[facility] [place]  No mental health providers.     Social Service Assessment/Plan:  Patient will be seen by the on-call psychiatric provider.  Medications will be evaluated and adjusted as appropriate.  Patient will meet with the treatment team on Monday to further coordinate plan of care.  CTC available to assist as needed to ensure appropriate aftercare is in place prior to discharge.

## 2021-06-05 NOTE — PLAN OF CARE
Problem: Mood Impairment (Psychotic Signs/Symptoms)  Goal: Improved Mood Symptoms (Psychotic Signs/Symptoms)  Outcome: No Change  In room sleeping most of day, this staff was able to get him to take medication, and brought his breakfast tray into him, but he declined any conversation.  He just now came out of room at 2;30 and requested cereal and milk.  Md notified that he was awake so they could come by to see him.  Will continue to monitor mood and safety.   Patient has become agitated and is demanding to go, he talked to MD and the plan is to discharge.  He has his phone in his pocket and is refusing to give it back. Staff is close by until we get him off the unit.

## 2021-06-05 NOTE — PROGRESS NOTES
"Patient admitted to [facility] at approximately 2100  from ER where he was seen for evaluation of psychosis. Upon arrival to the unit patient presents as pleasant and cooprative. Affect presents as blunted. Mood presents as calm. Speech presents as clear and cohearant. Endorsees \"some\" anxiety. Endorses suicidal ideation but did not specify a plan. Denies depression, SIB, and Homicidal Ideation. Denies auditory/visual hallucinations.Was cooperative with the admission process.     Medical: Patient has a history of Paranoid Schizophrenia, ADHD, psychosis, Hallucinations, Methamphetamine abuse, and suicidal ideation. Patient is Full Code. Regular Diet.     Plan: Patient is here Voluntary and on Suicide and Elopement Precautions. Admission packet given. Pharmacy med req completed (see pharmacy note for details). Care plan established. Education not established. Admission process is in process yet not competed due to patient's unwillingness to complete due to being tired. Will monitor.  "

## 2021-06-05 NOTE — PROGRESS NOTES
06/04/21 2113   Patient Belongings   Did you bring any home meds/supplements to the hospital?  No   Patient Belongings locker;sent to security per site process   Patient Belongings Put in Hospital Secure Location (Security or Locker, etc.) other (see comments)   Belongings Search Yes   Clothing Search Yes   Second Staff Robert W[place]     Locker #[id number]:  Red hat, red shoes, black shorts, black/white leather belt, Samsung smart phone, black socks    Security Envelope #[id number]:  $142.00 cash    ..A               Admission:  I am responsible for any personal items that are not sent to the safe or pharmacy.  [facility] is not responsible for loss, theft or damage of any property in my possession.    Signature:  _________________________________ Date: _______  Time: _____                                              Staff Signature:  ____________________________ Date: ________  Time: _____      2nd Staff person, if patient is unable/unwilling to sign:    Signature: ________________________________ Date: ________  Time: _____     Discharge:  [facility] has returned all of my personal belongings:    Signature: _________________________________ Date: ________  Time: _____                                          Staff Signature:  ____________________________ Date: ________  Time: _____

## 2021-06-05 NOTE — DISCHARGE INSTRUCTIONS
Behavioral Discharge Planning and Instructions    Summary: You were admitted on 6/4/2021  due to suicidal ideation and paranoia .  You were treated by provider Bambi and discharged on 6/5/2021 from station [facility]. You requested to leave and are being discharged. No follow-up appointments were made. You are getting picked up by your girlfriend.     Main Diagnosis: Paranoia, Meth Use and Schziophrenia    Health Care Follow     Attend all scheduled appointments with your outpatient providers. Call at least 24 hours in advance if you need to reschedule an appointment to ensure continued access to your outpatient providers.     Major Treatments, Procedures and Findings:  You were provided with: {Major Treatments:466102}    Symptoms to Report: {symptoms:419390}kljlkjj    Early warning signs can include: { Warning Signs:939754}    Safety and Wellness:  {Age Group Safety Wellness:100540}    Resources:   { RESOURCES MB:380137}    General Medication Instructions:   See your medication sheet(s) for instructions.   Take all medicines as directed.  Make no changes unless your doctor suggests them.   Go to all your doctor visits.  Be sure to have all your required lab tests. This way, your medicines can be refilled on time.  Do not use any drugs not prescribed by your doctor.  Avoid alcohol.    Advance Directives:   Scanned document on file with [facility]? No scanned doc  Is document scanned?    Honoring Choices Your Rights Handout:    Was more information offered?      The Treatment team has appreciated the opportunity to work with you. If you have any questions or concerns about your recent admission, you can contact the unit which can receive your call 24 hours a day, 7 days a week. They will be able to get in touch with a Provider if needed. The unit number is *** .

## 2021-06-05 NOTE — ED NOTES
Patient was reminded to provide urine for Drug Screen. Patient stated currently he has no urge to void. Drinking water provided.

## 2021-06-05 NOTE — H&P
"DATE OF ADMISSION: 6/4/2021                                     PATIENT'S [id number]   DATE OF SERVICE: 6/5/2021                                           PATIENT'S: 1985  ADMITTING PROVIDER: Brandon Reyes MD  ATTENDING PROVIDER: Tigre CACERES CNP  LEGAL STATUS:  [facility]  SOURCES OF INFORMATION: Information was obtained from the patient and available records.  CHIEF COMPLAIN:   HISTORY F PRESENT ILLNESS: Per ED note: Hamzah Roberts is a 35 year old male medical history significant for methamphetamine abuse and schizophrenia who presents to the emergency department for psychiatric evaluation.  Patient states he has been using meth and endorses psychosis and auditory hallucinations secondary to this.  Patient states he would like to get into treatment to \"get rid of the voices\".  He states that he hears voices screaming constantly and this is causing him stress.  He denies suicidal or homicidal ideation.  He is prescribed Seroquel Resporal and has not taken this for the last 3 or 4 days.  He states that he had been staying with his brother on the couch and that other friends but then for the last couple of days was on the street and did not want to take Seroquel at night while he was sleeping at a bus stop.  He was recently admitted to [facility] with plan to go to treatment in [place] on discharge on 521 however unfortunately there was an issue with his funding not going through through [facility] and thus was discharged.  He does have history of suicidal ideation related to meth use but states he is not feeling suicidal currently.  He states he did get very paranoid when trying to go to the the behavioral health emergency department related to the voices but that he is feeling better now and is voluntary and wants to stay in the hospital to get help and go to treatment.  He states he last used meth yesterday.  He denies IV use.  He denies any chest pain, shortness of breath, fever, cough, " abdominal pain, nausea, vomiting, diarrhea, or other complaints.  He denies any other illicit drug use or alcohol use. Patient was also seen by the behavioral health , please see their note for further details.   Per chart review, the patient was hospitalized twice this year in February and May.  In February, he was discharged to a  treatment in [place] but did not follow-up with further recommendations afterwards.  In May, the [facility] tried to send him to treatment again however, there was a some type of a confusion with financing the treatment and he was discharged to the streets.  He has a history of for frequent ED and admission visits.  Hamzah Roberts is a 35 year old male with history of schizophrenia and methamphetamine abuse.  Attempted to meet with the patient early in the morning however, he was completely out of it and declined to answer any questions.  I was notified that the patient is awake in early afternoon and requesting to discharge.  The patient was seen briefly as he did not want to respond to most of the questions.  States that the hospital environment is making him more anxious and agitated.  He is no longer hearing voices.  He is homeless however, will be able to stay with his girlfriend.  Denies feeling suicidal or homicidal.  Denies paranoia.  He is not depressed.  Anxiety is only related to being in the hospital.  The patient reports that he has a 3-month supply of his medications in his backpack and declined any refills.  The patient is not confused and does not appear responding to internal stimuli.  SUBSTANCE USE HISTORY:   History of abusing multiple substances however, methamphetamines appeared to be the drug of choice.  He has been in treatment in detox multiple times.  Last treatment was [place] in February of this year.    PSYCHIATRIC HISTORY:   The patient has a history of schizophrenia and methamphetamine abuse.  He has been hospitalized multiple  times.  He has been stabilized Seroquel, and risperidone.  He was taking Seroquel, risperidone, trazodone, gabapentin, BuSpar, and hydroxyzine the last time he was hospitalized which was in May of this year.  No obvious history of suicide attempts or self injury behaviors.  PAST MEDICAL HISTORY:   No past medical history on file.    No past surgical history on file.    ALLERGIES:  No Known Allergies  FAMILY HISTORY:  Declined to answer these questions.  No family history on file.    SOCIAL HISTORY: The patient is homeless.  Declined to answer my questions.  MEDICAL REVIEW OF SYSTEM: Please refer to the review of systems done by Nadira Lopez MD on 6/4/2021, which I reviewed and confirmed. The remaining 10-point systems review was negative based on my exam.   MEDICATIONS PRIOR TO ADMISSION:   Prior to Admission medications    Medication Sig Start Date End Date Taking? Authorizing Provider   busPIRone (BUSPAR) 5 MG tablet Take 5 mg by mouth 2 times daily   Yes Unknown, Entered By History   gabapentin (NEURONTIN) 400 MG capsule Take 400 mg by mouth 3 times daily   Yes Unknown, Entered By History   QUEtiapine (SEROQUEL) 200 MG tablet Take 200 mg by mouth nightly as needed   Yes Unknown, Entered By History   risperiDONE (RISPERDAL) 1 MG tablet Take 1 mg by mouth At Bedtime   Yes Unknown, Entered By History   traZODone (DESYREL) 100 MG tablet Take 100 mg by mouth nightly as needed for sleep   Yes Unknown, Entered By History   hydrOXYzine (ATARAX) 50 MG tablet Take 50 mg by mouth 3 times daily as needed for anxiety    Unknown, Entered By History     LABORATORY DATA:   Recent Results (from the past 672 hour(s))   Drug abuse screen 6 urine (chem dep)    Collection Time: 05/09/21  4:26 PM   Result Value Ref Range    Amphetamine Qual Urine Positive (A) NEG^Negative    Barbiturates Qual Urine Negative NEG^Negative    Benzodiazepine Qual Urine Negative NEG^Negative    Cannabinoids Qual Urine Positive (A) NEG^Negative     Cocaine Qual Urine Negative NEG^Negative    Ethanol Qual Urine Negative NEG^Negative    Opiates Qualitative Urine Negative NEG^Negative   CBC with platelets differential    Collection Time: 05/09/21  6:19 PM   Result Value Ref Range    WBC 10.1 4.0 - 11.0 10e9/L    RBC Count 4.58 4.4 - 5.9 10e12/L    Hemoglobin 14.6 13.3 - 17.7 g/dL    Hematocrit 41.3 40.0 - 53.0 %    MCV 90 78 - 100 fl    MCH 31.9 26.5 - 33.0 pg    MCHC 35.4 31.5 - 36.5 g/dL    RDW 11.9 10.0 - 15.0 %    Platelet Count 263 150 - 450 10e9/L    Diff Method Automated Method     % Neutrophils 65.8 %    % Lymphocytes 20.1 %    % Monocytes 12.0 %    % Eosinophils 1.3 %    % Basophils 0.4 %    % Immature Granulocytes 0.4 %    Nucleated RBCs 0 0 /100    Absolute Neutrophil 6.7 1.6 - 8.3 10e9/L    Absolute Lymphocytes 2.0 0.8 - 5.3 10e9/L    Absolute Monocytes 1.2 0.0 - 1.3 10e9/L    Absolute Eosinophils 0.1 0.0 - 0.7 10e9/L    Absolute Basophils 0.0 0.0 - 0.2 10e9/L    Abs Immature Granulocytes 0.0 0 - 0.4 10e9/L    Absolute Nucleated RBC 0.0    Comprehensive metabolic panel    Collection Time: 05/09/21  6:19 PM   Result Value Ref Range    Sodium 138 133 - 144 mmol/L    Potassium 3.5 3.4 - 5.3 mmol/L    Chloride 105 94 - 109 mmol/L    Carbon Dioxide 26 20 - 32 mmol/L    Anion Gap 7 3 - 14 mmol/L    Glucose 125 (H) 70 - 99 mg/dL    Urea Nitrogen 18 7 - 30 mg/dL    Creatinine 0.79 0.66 - 1.25 mg/dL    GFR Estimate >90 >60 mL/min/[1.73_m2]    GFR Estimate If Black >90 >60 mL/min/[1.73_m2]    Calcium 9.0 8.5 - 10.1 mg/dL    Bilirubin Total 0.4 0.2 - 1.3 mg/dL    Albumin 3.5 3.4 - 5.0 g/dL    Protein Total 7.2 6.8 - 8.8 g/dL    Alkaline Phosphatase 88 40 - 150 U/L    ALT 19 0 - 70 U/L    AST 17 0 - 45 U/L   Asymptomatic SARS-CoV-2 COVID-19 Virus (Coronavirus) by PCR    Collection Time: 05/09/21  6:20 PM    Specimen: Nasopharyngeal   Result Value Ref Range    SARS-CoV-2 Virus Specimen Source Nasopharyngeal     SARS-CoV-2 PCR Result NEGATIVE     SARS-CoV-2 PCR  "Comment (Note)    Alcohol breath test POCT    Collection Time: 06/04/21  5:37 PM   Result Value Ref Range    Alcohol Breath Test 0.00 0.00 - 0.01   Asymptomatic SARS-CoV-2 COVID-19 Virus (Coronavirus) by PCR    Collection Time: 06/04/21  5:59 PM    Specimen: Nasopharyngeal   Result Value Ref Range    SARS-CoV-2 Virus Specimen Source Nasopharyngeal     SARS-CoV-2 PCR Result NEGATIVE     SARS-CoV-2 PCR Comment (Note)      PHYSICAL EXAMINATON:   Temp: 97.3  F (36.3  C) Temp src: Tympanic BP: 109/71 Pulse: 84   Resp: 16 SpO2: 98 % O2 Device: None (Room air)    5' 3\" 150 lbs 8 oz Body mass index is 26.66 kg/m .  MENTAL STATUS EXAM: The patient is a 35 years old, petite,  male with various tattoos on his neck and arms.  He is anxious.  Wants to be discharged.  He is marginally cooperating with this interview.  Eye contact is intense, mood is anxious related to being in the hospital, speech is pressured, psychomotor behavior is positive for agitation, thought process is linear, no loose associations, thought content is negative for suicidal homicidal ideation, paranoia, delusions, auditory visual hallucinations, insight and judgment are fair, he is oriented to self, date, place, situation, attention span and concentration are fair, recent and remote memory are fair, he has no problems expressing himself, fund of knowledge is adequate for the level of education and training.      DIAGNOSIS:  Schizophrenia, per history  Rule out substance-induced mood disorder  Methamphetamine abuse, severe  PLAN AND RECOMMENDATIONS: The patient is a 35 years old, petite,  male who was admitted with psychosis in the form of auditory hallucinations, voices screaming at him.  He has been using methamphetamine daily.  The patient was initially very tired and did not want to talk to this provider however, later in the afternoon he woke up and requested to be discharged.  The patient is denying auditory visual hallucinations, " paranoia, delusions.  Denies suicidal homicidal ideation.  Denies depression.  Reports anxiety related to being in the hospital.  The patient is insisting that he discharges today.  He will stay with his girlfriend.  Reports having 3-month supply of the medications in his backpack.  There is no way to verify this information.  Again patient is marginally cooperating.  At this point, he does not meet criteria is for involuntary hospitalization and therefore will be discharged.  The patient was consulted on nature of illness and treatment options. Care was coordinated with the treatment team.  Attestation: Patient has been seen and evaluated by amanda CACERES CNP  6/5/2021  9:19 AM  This note was created with the help of Dragon dictation system. All grammatical/typing errors or context distortion are unintentional and inherent to software.

## 2021-06-05 NOTE — PHARMACY-ADMISSION MEDICATION HISTORY
Admission Medication History Completed by Pharmacy    See [facility] Admission Navigator for allergy information, preferred outpatient pharmacy, prior to admission medications and immunization status.     Medication history sources:  Discharge Summary 5/21/21 ([facility] Everywhere)    Changes made to PTA medication list (reason)  Added:   - all meds  Deleted: N/A  Changed: N/A    Additional medication history information:   - Patient not interviewed as part of this medication history. Please discuss any OTC/non-prescription medication use and last doses with the patient that may not be reflected in the list below.    Prior to Admission medications    Medication Sig Last Dose Taking? Auth Provider   busPIRone (BUSPAR) 5 MG tablet Take 5 mg by mouth 2 times daily  Yes Unknown, Entered By History   gabapentin (NEURONTIN) 400 MG capsule Take 400 mg by mouth 3 times daily  Yes Unknown, Entered By History   hydrOXYzine (ATARAX) 50 MG tablet Take 50 mg by mouth 3 times daily as needed for anxiety  Yes Unknown, Entered By History   QUEtiapine (SEROQUEL) 200 MG tablet Take 200 mg by mouth nightly as needed  Yes Unknown, Entered By History   risperiDONE (RISPERDAL) 1 MG tablet Take 1 mg by mouth At Bedtime  Yes Unknown, Entered By History   traZODone (DESYREL) 100 MG tablet Take 100 mg by mouth nightly as needed for sleep  Yes Unknown, Entered By History     Date completed: 06/04/21    Medication history completed by:   Wesley Kirby, PharmD, BCPS  General acute hospital  Emergency Department: Ascom *[id number]

## 2021-06-08 ENCOUNTER — HOSPITAL ENCOUNTER (EMERGENCY)
Facility: CLINIC | Age: 36
Discharge: HOME | End: 2021-06-08
Attending: EMERGENCY MEDICINE | Admitting: EMERGENCY MEDICINE
Payer: MEDICAID

## 2021-06-08 VITALS
DIASTOLIC BLOOD PRESSURE: 78 MMHG | TEMPERATURE: 98.3 F | HEART RATE: 76 BPM | OXYGEN SATURATION: 99 % | SYSTOLIC BLOOD PRESSURE: 114 MMHG | RESPIRATION RATE: 16 BRPM

## 2021-06-08 DIAGNOSIS — F15.10 METHAMPHETAMINE ABUSE (H): ICD-10-CM

## 2021-06-08 DIAGNOSIS — F20.0 PARANOID SCHIZOPHRENIA (H): ICD-10-CM

## 2021-06-08 PROCEDURE — 99284 EMERGENCY DEPT VISIT MOD MDM: CPT | Performed by: EMERGENCY MEDICINE

## 2021-06-08 PROCEDURE — 90791 PSYCH DIAGNOSTIC EVALUATION: CPT

## 2021-06-08 PROCEDURE — 99285 EMERGENCY DEPT VISIT HI MDM: CPT | Mod: 25 | Performed by: EMERGENCY MEDICINE

## 2021-06-08 NOTE — ED NOTES
[facility] ED Mental Health Handoff Note:       Brief HPI:  This is a 35 year old male signed out to me by Dr. Jade Dietrich.  See initial ED Provider note for full details of the presentation. Interval history is pertinent for DEC eval.   BEC asessement--Pt was seen by the DEC . No reasons for admission at this time. Recommends outpatietn rule 25.       Evaluated by mental health: Yes. The recommendation is for outpatient mental health treatment. Resources and plan given to patient.    Safety concerns: At the time I received sign out, there were no safety concerns.    Hold Status:  Active Orders   N/A            Exam:   Patient Vitals for the past 24 hrs:   BP Temp Temp src Pulse Resp SpO2   06/08/21 0904 114/78 98.3  F (36.8  C) Oral 76 16 99 %   06/08/21 0543 109/74 98  F (36.7  C) Oral 69 18 97 %           ED Course:    Medications - No data to display         There were no significant events during my shift.          Impression:    ICD-10-CM    1. Methamphetamine abuse (H)  F15.10    2. Paranoid schizophrenia (H)  F20.0        Plan:    1. Discharged.      RESULTS:   No results found for this visit on 06/08/21 (from the past 24 hour(s)).          MD Vicki Brenner Kruti Tripathi, MD  06/08/21 2172

## 2021-06-08 NOTE — ED PROVIDER NOTES
ED Provider Note  [facility]      History     Chief Complaint   Patient presents with     Mental Health Problem     has history of Meth use, pt was admitted to station 30 and wanted to go back to mental treatment.      HPI  Hamzah Roberts is a 35 year old male medical history significant for methamphetamine abuse and schizophrenia who presents to the emergency department for psychiatric evaluation.  Patient reports that he was at a detox center in [place] however was unable to sleep and was unable to eat there so left.  Patient called 911 and states that he wanted to be admitted to station 30.  Patient reports history of methamphetamine use and last use was 2 to 3 days ago.  Patient reports that he does get hallucinations on occasion, denies any specific complaints at this time.  Patient denies suicide ideation, homicide ration, or intent of self-harm.  History is limited as patient is noncooperative and declined answering any of my questions. The patient is requesting go upstairs, sleep, and will not respond to any of my questions.    Past Medical History  History reviewed. No pertinent past medical history.  History reviewed. No pertinent surgical history.  busPIRone (BUSPAR) 5 MG tablet  gabapentin (NEURONTIN) 400 MG capsule  hydrOXYzine (ATARAX) 50 MG tablet  QUEtiapine (SEROQUEL) 200 MG tablet  risperiDONE (RISPERDAL) 1 MG tablet  traZODone (DESYREL) 100 MG tablet      No Known Allergies  Family History  History reviewed. No pertinent family history.  Social History   Social History     Tobacco Use     Smoking status: Current Every Day Smoker     Packs/day: 1.00     Types: Cigarettes     Smokeless tobacco: Never Used   Substance Use Topics     Alcohol use: Not Currently     Drug use: Yes     Types: Methamphetamines      Past medical history, past surgical history, medications, allergies, family history, and social history were reviewed with the patient. No additional pertinent  items.       Review of Systems  A complete review of systems was attempted but limited due to altered mental status. Patient not willing to participate or answer questions    Physical Exam   BP: 109/74  Pulse: 69  Temp: 98  F (36.7  C)  Resp: 18  SpO2: 97 %  Physical Exam  General: Afebrile, no acute distress   HEENT: Normocephalic, atraumatic, conjunctivae normal. MMM  Neck: non-tender, supple  Cardio: regular rate. regular rhythm   Resp: Normal work of breathing, no respiratory distress, lungs clear bilaterally, no wheezing, rhonchi, rales  Chest/Back: no visual signs of trauma, no CVA tenderness   Abdomen: soft, non distension, no tenderness, no peritoneal signs   Neuro: alert and fully oriented. CN II-XII grossly intact. Grossly normal strength and sensation in all extremities.   MSK: no deformities. Normal range of motion  Integumentary/Skin: no rash visualized, normal color  Psych: anxious, irritated, does not appear to be responding to external stimuli, denies suicide ideation     ED Course      Procedures       No results found for any visits on 06/08/21.  Medications - No data to display     Assessments & Plan (with Medical Decision Making)   Hamzah Roberts is a 35 year old male medical history significant for methamphetamine abuse and schizophrenia who presents to the emergency department for psychiatric evaluation.  Upon arrival patient is anxious, mildly irritated, but otherwise well-appearing, no distress.  Patient is declining to answer any of my questions and requesting to go to station 30 and to sleep.  Does not appear to be responding to any external stimuli at this time.  Patient denies suicidal ideation.  Patient does have a history of methamphetamine use however reports her last use was 2 to 3 days ago.  At this time will continue close monitor patient, will plan for behavioral health to assess patient this morning.    Patient signed out to morning pending behavioral health assessment,  re-evaluation, and disposition.    I have reviewed the nursing notes. I have reviewed the findings, diagnosis, plan and need for follow up with the patient.    New Prescriptions    No medications on file       Final diagnoses:   Methamphetamine abuse (H)   Paranoid schizophrenia (H)       --  Jade Dietrich MD  [facility] EMERGENCY DEPARTMENT  6/8/2021     Jade Dietrich MD  06/08/21 0601

## 2021-06-08 NOTE — ED NOTES
Patient was brought in here by ambulance. During the triage, patient won't tell why he is seeking medical attention, he keeps telling he wants to go back to the psyche unit where he was last time he was admitted here. Patient denied SI and HI, neither hallucinations nor withdrawal symptoms from Meth. Patient admits last intake of Meth was 3 days ago. Patient is verbally rude to staff, yelling onto staff to get stuffs he wanted like asking for phone, demanding to get a room with an actual bed when there is no available. Offered mattress that he can lay down but he refused. Couple minutes after he is yelling to writer again for not giving him the mattress. Writer asked patient to atleast be respectful and asked nicely instead of yelling, and patient became more agitated.

## 2021-06-08 NOTE — ED NOTES
Bed: ED16B  Expected date: 6/8/21  Expected time: 5:12 AM  Means of arrival:   Comments:  [id number]  35 F  Meth use

## 2021-06-09 NOTE — PROGRESS NOTES
S: Chrissie, [facility] ED, 34/M, psychosis     B: 3rd ED visit in the last couple days - drug induced psychosis   Pt hysterically crying, people are making hand gestures/signals at him that they are after him, family is after him, crying, reports pt is feeling paranoid  Brother reports pt was hiding in closet hiding from people   Brother reports that the pt is using meth daily for the last couple years, but even when the pt is not high he is acting very paranoid   AH and VH - pt wont specify   Pt not aggressive towards anyone, but was paranoid about staying in the room, pt was restrained in the ED - pt was very distressed with multiple staff standing around him  Pt was guarded with the   Hx on 4500     Medically cleared, eating, drinking, ambulating independently   No covid symptoms, swab in process     A: 72 hour hold - already in the chart/electronic     R: 4500/Angel    519pm - Angel accepts for herself  Pt placed in que   524pm - unit charge notified, 7pm  527pm - ED notified   528pm - RISHI Dickens notified

## 2021-06-09 NOTE — TELEPHONE ENCOUNTER
S: A&R calling with a 34 yr old male @ [facility] ED with SI with intent and disorganization     B: pt is a 34 yr old male with no documented hx of MH. Bipolar does run in the family, but no interventions per . Pt was BIB sister when pt was found laying in the street trying to be hit by a car. Pt relapsed on substances and is distressed by things. Pt is tearful, tangential, disorganized and upset by his relapse. Pt is endorsing AV/VH but is not able to describe them to the . Pt is not aggressive. Pt is redirectable but disorganized per . COVID ordered. Asymptomatic.  Utox in progress    Potassium 3.3      A: emergency medical hold started 6/18/2020 @ 1104    R:  LVM @ 12:15 pm  4500/Edgar  Notified unit @ 12:20 pm  Emergency hold uploaded into epic  Disposition @ 12:35 pm

## 2021-06-10 NOTE — PROGRESS NOTES
S: Pt is a 34 yr old male in [facility] ED for psychosis report by Jannet GOMES:  reports pt is agitated and paranoid.   reports pt was crying in the ED and pacing.  Pt reports they are using things to record his thoughts and he is refusing to give a drug screen.   reports pt was given Zyprexa and Ativan and is now sleeping in the ED.  COVID ordered.     A: vol     R: 4500 / Angel

## 2021-06-14 NOTE — PROGRESS NOTES
Patient cleared and ready for behavioral bed placement: Yes  S: 35/M, [place] ED, hallucinations. DEC at 1122PM.    B: Hx of Schizophrenia and meth use. Pt reports he started using meth again 8 months ago. JOSE 72 hours ago. Pt reports AH and VH that are non-commanding. Pt paranoid and reports the drug cartel are after him. Pt reports he ran out of his meds for the last few days. Pt reports he was released from prison 2 days ago; however pt's sister reports pt got out a few weeks ago. Pt reportedly fell off his bed twice in the ED d/t his tics. Pt denies SI and HI. Pt does not want to be an addict and wants to get help. Hx of IP MH admissions.     A: 72 emergency medical hold  Pt reportedly has tics and fell off bed. Pt is ambulatory.   Asymptomatic for Covid - test ordered, not collected. Pt refusing to do Covid test.   Drug screen positive for amphetamines and cannabinoids.     R: 4500 / Angel  0222 - ED called to report that pt has been refusing Covid swab. 0442 - ED reports pt is able to ambulate independently. 0513 - On call requesting repeat of temperature d/t pt refusing Covid swab. Pt second temp readin.4. 0548 - Pt placed in queue and unit notified. Unit concerned that pt refusing covid swab and wants to speak to their manager. 0602 - charge RN called back. Unit will take reports after 8 AM. 0604 - ED notified. 0617 - Faxed clinical to unit.

## 2021-06-17 NOTE — PROGRESS NOTES
"S: [facility] called at 1722 to place a 36 y/o male for inpatient mental health treatment.     B: Hamzah Roberts is a 35 year old male with a PMH of paranoid schizophrenia, ADHD, psychosis, hallucinations, methamphetamine abuse and suicidal ideation who presents to the ED today complaining of methamphetamine abuse and insomnia.  Pt reports has been in and out of the hospital for a while due to his methamphetamine use.  He states he would like to get back into treatment.  He states he was recently at [facility] for 30 days before leaving a few weeks ago.  He states he has had a rule 25 evaluation done in the past and states that the treatment center he just left from would accept him back. He states he is under treatment a total of 2 times in the past. Pt states he is currently homeless and has been walking from the bus stop the bus stop to sleep.  Pt believes that the \"drug cartel\" is following him and attempting to kill him. He has, at times, believed that the drug cartel is also involved with his sister, Lissette. Pt is paranoid and describes auditory hallucinations. Pt believes that hospital physicians are also involved with the drug cartel. He endorses left lateral anterior calf pain that is worse with weightbearing.  Pt also endorses marijuana use.  He states he would like to be held here in the hospital until he can get back into his treatment center because \"I cannot do this on my own\". Pt was recently admitted to [facility] from 2/3/2021 to 2/17/2021 for psychosis and insomnia due to methamphetamine abuse.  Pt has a history of needing restraints due to violent behavior. Utox is positive for amphetamines and cannabinoids. He is negative for COVID-19. Other labs appear unremarkable. Medically cleared.     A: Voluntary.     R:4500/Angel  400pm-Oncall accepted  418pm-pt placed in que, unit notified, anytime   423pm-ED notified  "

## 2021-06-19 ENCOUNTER — HOSPITAL ENCOUNTER (EMERGENCY)
Dept: EMERGENCY MEDICINE | Facility: HOSPITAL | Age: 36
Discharge: ANOTHER HOSPITAL | End: 2021-06-19
Attending: EMERGENCY MEDICINE
Payer: MEDICAID

## 2021-06-19 DIAGNOSIS — F29 PSYCHOSIS, UNSPECIFIED PSYCHOSIS TYPE (H): ICD-10-CM

## 2021-06-19 LAB
ALBUMIN SERPL-MCNC: 4.6 G/DL (ref 3.5–5)
ALP SERPL-CCNC: 78 U/L (ref 45–120)
ALT SERPL W P-5'-P-CCNC: 14 U/L (ref 0–45)
ANION GAP SERPL CALCULATED.3IONS-SCNC: 11 MMOL/L (ref 5–18)
AST SERPL W P-5'-P-CCNC: 44 U/L (ref 0–40)
BASOPHILS # BLD AUTO: 0.1 THOU/UL (ref 0–0.2)
BASOPHILS NFR BLD AUTO: 1 % (ref 0–2)
BILIRUB SERPL-MCNC: 1.4 MG/DL (ref 0–1)
BUN SERPL-MCNC: 18 MG/DL (ref 8–22)
CALCIUM SERPL-MCNC: 9 MG/DL (ref 8.5–10.5)
CHLORIDE BLD-SCNC: 105 MMOL/L (ref 98–107)
CO2 SERPL-SCNC: 23 MMOL/L (ref 22–31)
CREAT SERPL-MCNC: 1.21 MG/DL (ref 0.7–1.3)
EOSINOPHIL # BLD AUTO: 0.2 THOU/UL (ref 0–0.4)
EOSINOPHIL NFR BLD AUTO: 2 % (ref 0–6)
ERYTHROCYTE [DISTWIDTH] IN BLOOD BY AUTOMATED COUNT: 12.5 % (ref 11–14.5)
GFR SERPL CREATININE-BSD FRML MDRD: >60 ML/MIN/1.73M2
GLUCOSE BLD-MCNC: 82 MG/DL (ref 70–125)
HCT VFR BLD AUTO: 44.3 % (ref 40–54)
HGB BLD-MCNC: 15.3 G/DL (ref 14–18)
IMM GRANULOCYTES # BLD: 0 THOU/UL
IMM GRANULOCYTES NFR BLD: 0 %
LYMPHOCYTES # BLD AUTO: 2.2 THOU/UL (ref 0.8–4.4)
LYMPHOCYTES NFR BLD AUTO: 25 % (ref 20–40)
MCH RBC QN AUTO: 31.4 PG (ref 27–34)
MCHC RBC AUTO-ENTMCNC: 34.5 G/DL (ref 32–36)
MCV RBC AUTO: 91 FL (ref 80–100)
MONOCYTES # BLD AUTO: 1 THOU/UL (ref 0–0.9)
MONOCYTES NFR BLD AUTO: 11 % (ref 2–10)
NEUTROPHILS # BLD AUTO: 5.5 THOU/UL (ref 2–7.7)
NEUTROPHILS NFR BLD AUTO: 62 % (ref 50–70)
PLATELET # BLD AUTO: 248 THOU/UL (ref 140–440)
PMV BLD AUTO: 9.8 FL (ref 8.5–12.5)
POTASSIUM BLD-SCNC: 3.3 MMOL/L (ref 3.5–5)
PROT SERPL-MCNC: 7.6 G/DL (ref 6–8)
RBC # BLD AUTO: 4.88 MILL/UL (ref 4.4–6.2)
SARS-COV-2 PCR RESULT-HE - HISTORICAL: NEGATIVE
SODIUM SERPL-SCNC: 139 MMOL/L (ref 136–145)
WBC: 8.9 THOU/UL (ref 4–11)

## 2021-06-20 ENCOUNTER — COMMUNICATION - HEALTHEAST (OUTPATIENT)
Dept: SCHEDULING | Facility: CLINIC | Age: 36
End: 2021-06-20

## 2021-06-20 ENCOUNTER — MEDICAL CORRESPONDENCE (OUTPATIENT)
Dept: HEALTH INFORMATION MANAGEMENT | Facility: CLINIC | Age: 36
End: 2021-06-20

## 2021-06-20 ENCOUNTER — HEALTH MAINTENANCE LETTER (OUTPATIENT)
Age: 36
End: 2021-06-20

## 2021-06-22 ENCOUNTER — COMMUNICATION - HEALTHEAST (OUTPATIENT)
Dept: SCHEDULING | Facility: CLINIC | Age: 36
End: 2021-06-22

## 2021-06-22 LAB
ATRIAL RATE - MUSE: 103 BPM
DIASTOLIC BLOOD PRESSURE - MUSE: NORMAL
INTERPRETATION ECG - MUSE: NORMAL
P AXIS - MUSE: 76 DEGREES
PR INTERVAL - MUSE: 124 MS
QRS DURATION - MUSE: 86 MS
QT - MUSE: 354 MS
QTC - MUSE: 463 MS
R AXIS - MUSE: 54 DEGREES
SYSTOLIC BLOOD PRESSURE - MUSE: NORMAL
T AXIS - MUSE: 36 DEGREES
VENTRICULAR RATE- MUSE: 103 BPM

## 2021-06-23 ENCOUNTER — MEDICAL CORRESPONDENCE (OUTPATIENT)
Dept: HEALTH INFORMATION MANAGEMENT | Facility: CLINIC | Age: 36
End: 2021-06-23

## 2021-06-24 ENCOUNTER — MEDICAL CORRESPONDENCE (OUTPATIENT)
Dept: HEALTH INFORMATION MANAGEMENT | Facility: CLINIC | Age: 36
End: 2021-06-24

## 2021-06-25 NOTE — ED NOTES
"Patient standing in room yelling at security and not following commands. Multiple verbal threats to staff \"give me one call and I will have this motherfucker full of people\". Patient took oral seroquel then punched the wall. Patient then put into 4 point restraints and given IM medication.   "

## 2021-06-25 NOTE — ED NOTES
Writer found an un-identifiable medication stuck to patient's back when transferring patient from bed to stretcher.  Medication flushed by Doris Pal RN.

## 2021-06-25 NOTE — ED TRIAGE NOTES
Patient arrives via [facility] due to overdose.  EMS reports patient found outside a home which patient has a restraining order against him.  Patient admitted to EMS he smoked meth prior to EMS arrival and aerosol can found next to him.  Patient was awake with EMS and incomprehensible speech upon arrival, but has since become alert and oriented x3.  Blood glucose  73mg/dL, sinus tachycardia, blood pressure 140/90 and 98% on RA.  
91

## 2021-06-25 NOTE — ED NOTES
Writer attempted to call report to Maria Ines Bowie RN is unavailable at this time and will call back for report.

## 2021-06-25 NOTE — ED NOTES
Pt informed of being on 72 hour hold by Dr. Black.  Pt changed into scrubs and belongings once again removed from room which pt was cooperative with.  1:1 initiated d/t pt wanting to  the hallway.  Pt easily redirectable by 1:1

## 2021-06-25 NOTE — ED NOTES
"Pt yelling and becoming more aggressive with sister in room.  I entered the room and stated that since his sister's presence was escalating his behavior we would have her leave the room.  Pt's sister started screaming and yelling at this nurse \"Its your fault he is like this, you stupid bitch, people like you are the reason he is like this.\" Pt followed sister out of the room, attempted to speak with patient about staying in room to receive help.  Pt, MD, Primary RN, Charge RN spoke with pt at ED entrance for approx 15 minutes regarding whether he wanted to stay or not.  Pt agreed to comeback in.  MD asked pt's family to leave as they were only escalating his behavior. Pt's family has left at this time.  Pt is currently back in the room and is agreeable to staying and talking to the SW.  Pt is not holdable, Security informed that if pt wants to leave again he is free to leave.    "

## 2021-06-25 NOTE — ED NOTES
Writer provided update to sister, Sharda, to notify regarding transfer to [facility]

## 2021-06-25 NOTE — ED NOTES
Patient moved to room 8, per Dr. Black, patient ok to not be on vital monitor at this time.  Patient is being cooperative, dressed in orange scrubs and belongings given to security.  Medications brought in by EMS were given to security and pharmacist notified.

## 2021-06-25 NOTE — ED NOTES
Per Dr. Black patient ok to have food and drink.  Patient given sandwich and water.  Patient requested phone in room and Dr. Black ok with him having it.  Phone placed back in room.

## 2021-06-26 NOTE — ED PROVIDER NOTES
EMERGENCY DEPARTMENT NOTE     Name: Hamzah Roberts    Age/Sex: 35 y.o. male   MRN: [id number]   Evaluation Date & Time:  6/19/2021  4:24 PM    PCP:    Provider, No Primary Care   ED Provider: Yared Black D.O.       CHIEF COMPLAINT      Chief Complaint   Patient presents with     Ingestion       DIAGNOSIS & DISPOSITION     1. Psychosis, unspecified psychosis type (H)      DISPOSITION: Transfer to [facility]/behavioral health    At the conclusion of the encounter I discussed the results of all of the tests and the disposition. The questions were answered. The patient or family acknowledged understanding and was agreeable with the care plan.    TOTAL CRITICAL CARE TIME (EXCLUDING PROCEDURES): Not applicable    PROCEDURES:       EMERGENCY DEPARTMENT COURSE/MEDICAL DECISION MAKING   4:43 PM I met with the patient to gather history and to perform my initial exam.  We discussed treatment options and the plan for care while in the Emergency Department.  4:50 PM I spoke with social work about patients case.   5:53 PM I spoke with social work about patient.   6:30 PM I spoke with social work about patient.   7:41 PM Patient refused furher oral medication, is having hallucinations, are moving him to the safe room, will administer medications as needed.   7:53 PM Patient was transferred to psychiatric room, took oral Seroquel, is reacting to internal stimuli.   7:54 PM Patient punched wall, had to be restrained, remains alert. Administered  5  Mg haldol, 50 mg benadryl, 1 ativan IM , code green conducted with physical restraints applied.  8:41 PM patient is sedated resting on the stretcher remains in four-point restraints.  Heart rate is 90 and O2 sats are 97%.  Patient has been accepted for transfer to [facility] for inpatient psychiatric care.  Patient reports auditory and possible visual hallucinations. Crisis social workers ha sinterviewed the patient and  Has been able to obtain collateral  "information from the patient's family. Patient has reportedly made statements that times he feels overwhelmed and plan was to lay down in front of traffic. Family also reports that he had auditory hallucinations to the point where he heard voices coming from the pipes and attempted to dismantle piping within the family's house.  Patient does admit to using substances but family reports that during periods of sobriety psychotic symptoms persist.  After discussion with the crisis  patient is felt to be potential danger to himself. With  this information and I placed the patient on 72-hour hold.  Patient currently remains guarded and suspicious. He has been willing to take oral Ativan, his own risperidone and will give previously prescribed Seroquel as well as gabapentin.  Patient has required redirection. I explained to the patient that were concerned about his symptoms and that he would not be able to leave and that he is on a 72-hour hold until we get formal psychiatric evaluation.  Patient remains goal oriented to receiving care and while intermittently suspicious and seeming to be responding to some internal stimuli he is currently agreeable to inpatient psychiatric admission once we find a facility for transfer.  35 y.o. male with relevant past history of psychosis and substance abuse with methamphetamine who presents to the emergency department for evaluation after patient was noted to be outside of his father's home and poorly responsive.  Vital signs:/57 (Patient Position: Lying)   Pulse 90   Temp 98.2  F (36.8  C) (Oral)   Resp 14   Ht 5' 2\" (1.575 m)   SpO2 97%   BMI 26.81 kg/m    Pertinent physical exam findings:  General: Alert, oriented to person place and time.   Cardiac: Regular rate and rhythm S1-S2,no murmur or rub  Pulmonary: Lungs are clear to auscultation bilaterally with equal breath sounds  Neuro: Cranial nerves III through XII intact, 5 out of 5 motor strength upper and " lower extremity  Psychiatric: Patient has moderate psychomotor agitation.  He reports auditory hallucinations.  On initial exam he was cooperative but somewhat guarded and suspicious.  Diagnostic studies:  Imaging:None  Lab: Comprehensive metabolic profile and CBC within normal limits.  Screening COVID-19 negative  Interventions: Oral Lorazepam, Seroquel.  During with physical restraints, IM Haldol, Benadryl, lorazepam  Medical decision making: Crisis  was able to interview the patient as above.  Family had serious concerns about the patient's escalating psychosis.  Patient was placed on a 72-hour hold and this was discussed with the patient.  The patient was initially able to be redirected and take oral meds but progressed to responding to visual and auditory hallucinations to the point where he was refusing redirection.  After continued attempts to give the patient oral medications he had one outburst where he struck a wall and a code green was called and he was placed in four-point restraints and received IM medications.  Since that time the patient has been resting on the stretcher.  Patient's vital signs remained stable.  He has been accepted for transfer to [facility] inpatient psychiatric  ED INTERVENTIONS     Medications   nicotine 21 mg/24 hr 1 patch (NICODERM CQ) (1 patch Transdermal Patch/Med Removed 6/20/21 1900)   LORazepam tablet 1 mg (ATIVAN) (1 mg Oral Given 6/19/21 1749)   QUEtiapine tablet 100 mg (SEROquel) (100 mg Oral Given 6/19/21 1951)   gabapentin capsule 600 mg (NEURONTIN) (600 mg Oral Given 6/19/21 1911)   haloperidol lactate injection 5 mg (HALDOL) (5 mg Intramuscular Given 6/19/21 1955)   LORazepam injection 1 mg (ATIVAN) (1 mg Intramuscular Given 6/19/21 1955)   diphenhydrAMINE injection 50 mg (BENADRYL) (50 mg Intramuscular Given 6/19/21 1955)       DISCHARGE MEDICATIONS        Medication List      ASK your doctor about these medications    busPIRone 5 MG  tablet  Commonly known as: BUSPAR  Take 1 tablet (5 mg total) by mouth 2 (two) times a day.     gabapentin 400 MG capsule  Commonly known as: NEURONTIN  Take 1 capsule (400 mg total) by mouth 3 (three) times a day.     hydrOXYzine pamoate 50 MG capsule  Commonly known as: VISTARIL  Take 1 capsule (50 mg total) by mouth 3 (three) times a day as needed for anxiety.     QUEtiapine 200 MG tablet  Commonly known as: SEROquel  Take 1 tablet (200 mg total) by mouth at bedtime as needed.     risperiDONE 1 MG tablet  Commonly known as: RisperDAL  Take 1 tablet (1 mg total) by mouth at bedtime.     traZODone 100 MG tablet  Commonly known as: DESYREL  Take 1 tablet (100 mg total) by mouth at bedtime as needed, may repeat once for sleep.              INFORMATION SOURCE AND LIMITATIONS    History/Exam limitations: N/A  Patient information was obtained from: Patient and sister  Use of : N/A    HISTORY OF PRESENT ILLNESS   Hamzah Roberts male with a relevant past history of methamphetamine abuse, paranoia, drug induced schizophrenia, substance abuse, suicidal ideation, and psychosis, who presents to this ED by EMS for evaluation of overdose.    Patient reports that this is the same thing that keeps happening to him as he has a history of hospitalizations for this. Patient reports using methamphetamine today, but no huffing or anything else but meth. He notes that he is hearing voices and noises. He was at his dads today when this happened, he has no where to live at the moment. He used to be at a treatment and he wants to go back. He is not trying to hurt himself today. Patient denies having any fever, cough, vomiting, diarrhea, or any other complaints at this time.         REVIEW OF SYSTEMS:   Constitutional: Negative for  fever.   HENT: Negative for URI symptoms or sore throat.    Eyes: Negative for visual disturbance.   Cardiac: Negative for  chest pain,palpitations, near syncope or syncope  Respiratory: Negative for  cough and shortness of breath.    Gastrointestinal: Negative for abdominal pain, nausea, vomiting, constipation, diarrhea, rectal bleeding or melena.  Genitourinary: Negative for dysuria, flank pain and hematuria.   Musculoskeletal: Negative for back pain.   Skin: Negative for  rash  Neurological: Negative for dizziness, headache, syncope, speech difficulty, unilateral weakness or imbalance with walking.   Hematological: Negative for adenopathy. Does not bruise/bleed easily.   Psychiatric/Behavioral: Negative for confusion. Positive for hearing voices      PATIENT HISTORY   History reviewed. No pertinent past medical history.  Patient Active Problem List   Diagnosis     Substance-induced psychotic disorder (H)     Psychosis, atypical (H)     Methamphetamine dependence, continuous (H)     Psychosis, unspecified psychosis type (H)     Schizoaffective disorder, depressive type (H)     Left ankle pain, unspecified chronicity     Schizoaffective disorder, bipolar type (H)     History reviewed. No pertinent surgical history.  History reviewed. No pertinent family history.  Social History     Socioeconomic History     Marital status: Single     Spouse name: None     Number of children: None     Years of education: None     Highest education level: None   Occupational History     None   Social Needs     Financial resource strain: None     Food insecurity     Worry: None     Inability: None     Transportation needs     Medical: None     Non-medical: None   Tobacco Use     Smoking status: Unknown If Ever Smoked   Substance and Sexual Activity     Alcohol use: Never     Frequency: Never     Drug use: Yes     Types: Methamphetamines, Marijuana     Sexual activity: None   Lifestyle     Physical activity     Days per week: None     Minutes per session: None     Stress: None   Relationships     Social connections     Talks on phone: None     Gets together: None     Attends Gnosticist service: None     Active member of club or  organization: None     Attends meetings of clubs or organizations: None     Relationship status: None     Intimate partner violence     Fear of current or ex partner: None     Emotionally abused: None     Physically abused: None     Forced sexual activity: None   Other Topics Concern     None   Social History Narrative    Aug 2020: Patient admits to meth use.     Allergies   Allergen Reactions     Morphine Sulfate Shortness Of Breath         OUTPATIENT MEDICATIONS     No current facility-administered medications on file prior to encounter.      Current Outpatient Medications on File Prior to Encounter   Medication Sig     busPIRone (BUSPAR) 5 MG tablet Take 1 tablet (5 mg total) by mouth 2 (two) times a day.     gabapentin (NEURONTIN) 400 MG capsule Take 1 capsule (400 mg total) by mouth 3 (three) times a day.     hydrOXYzine pamoate (VISTARIL) 50 MG capsule Take 1 capsule (50 mg total) by mouth 3 (three) times a day as needed for anxiety.     QUEtiapine (SEROQUEL) 200 MG tablet Take 1 tablet (200 mg total) by mouth at bedtime as needed.     risperiDONE (RISPERDAL) 1 MG tablet Take 1 tablet (1 mg total) by mouth at bedtime.     traZODone (DESYREL) 100 MG tablet Take 1 tablet (100 mg total) by mouth at bedtime as needed, may repeat once for sleep.         PHYSICAL EXAM     Vitals:    06/19/21 1854 06/19/21 1855 06/19/21 2030 06/19/21 2031   BP:  121/65 105/57 105/57   Patient Position:    Lying   Pulse: (!) 101  90    Resp:       Temp:       TempSrc:       SpO2: 98%  97%    Height:           Physical Exam   Constitutional: Oriented to person, place, and time. Appears well-developed and well-nourished.   HEENT:    Head: Atraumatic.    Nose: Nose normal.    Mouth/Throat: Oropharynx is clear and moist.    Eyes: EOM are normal. Pupils are equal, round, and reactive to light.   Neck: Normal range of motion. Neck supple.   Cardiovascular: Normal rate, regular rhythm and normal heart sounds.    Pulmonary/Chest: Normal  effort  and breath sounds normal.   Abdominal: Soft. Bowel sounds are normal.   Musculoskeletal: Normal range of motion.   Neurological: Alert and oriented to person, place, and time. Normal strength.No sensory deficit. No cranial nerve deficit . Coordination serial fingers, finger-to-nose normal and gait normal.   Skin: Skin is warm and dry.   Psychiatric: Alert and oriented x3.  Patient reports auditory hallucinations and during ER course in the ER also having visual hallucinations.  Patient had moderate psychomotor agitation throughout ED stay which escalated.  She had verbalized potential suicidal ideation to family and has been intermittently threatening    DIAGNOSTICS    LABORATORY FINDINGS (REVIEWED AND INTERPRETED):  Labs Reviewed   COMPREHENSIVE METABOLIC PANEL - Abnormal; Notable for the following components:       Result Value    Potassium 3.3 (*)     Bilirubin, Total 1.4 (*)     AST 44 (*)     All other components within normal limits    Narrative:     Fasting Glucose reference range is 70-99 mg/dL per  American Diabetes Association (ADA) guidelines.   HM1 (CBC WITH DIFF) - Abnormal; Notable for the following components:    Monocytes % 11 (*)     Monocytes Absolute 1.0 (*)     All other components within normal limits   SARS-COV-2 (COVID-19)-PCR - Normal   DRUG ABUSE 1+, URINE   HM1(CBC WITH DIFFERENTIAL)    Narrative:     The following orders were created for panel order HM1(CBC and Differential).  Procedure                               Abnormality         Status                     ---------                               -----------         ------                     HM1 (CBC with Diff)[[id number]]          Abnormal            Final result                 Please view results for these tests on the individual orders.   RAINBOW DRAW    Narrative:     The following orders were created for panel order [place] Draw.  Procedure                               Abnormality         Status                      ---------                               -----------         ------                     Light Blue Top[[id number]]                                                              Light Green Top[[id number]]                                  In process                 Red Top[[id number]]                                                                     Lavender Top[[id number]]                                     In process                 Green LA[[id number]]                                                                      Please view results for these tests on the individual orders.   GREEN TOP(LI HEP 4ML)   LAVENDER TOP(EDTA 4ML)         IMAGING (REVIEWED AND INTERPRETED):  None        I, Isaac Jade, am serving as a scribe to document services personally performed by Yared Black D.O., based on my observation and the provider s statements to me.    I, Yared Black D.O., attest that Isaac Jade is acting in a scribe capacity, has observed my performance of the services and has documented them in accordance with my direction.    Yared Black D.O.  EMERGENCY MEDICINE   06/19/21  [facility] EMERGENCY DEPARTMENT  [address]  [facility] [address]  Dept: [phone]  Loc: [phone]     Yared Black DO  06/19/21 2050

## 2021-06-28 ENCOUNTER — MEDICAL CORRESPONDENCE (OUTPATIENT)
Dept: HEALTH INFORMATION MANAGEMENT | Facility: CLINIC | Age: 36
End: 2021-06-28

## 2021-06-29 ENCOUNTER — MEDICAL CORRESPONDENCE (OUTPATIENT)
Dept: HEALTH INFORMATION MANAGEMENT | Facility: CLINIC | Age: 36
End: 2021-06-29

## 2021-07-04 NOTE — CONSULTS
Consults by Inocente Walsh LPCC at 6/19/2021  7:00 PM     Author: Inocente Walsh LPCC Service: Behavioral Author Type: [facility]    Filed: 6/19/2021  7:29 PM Date of Service: 6/19/2021  7:00 PM Status: Cosign Needed    : Inocetne Walsh LPCC ([facility]) Cosign Required: Yes     Consult Orders    1. Inpatient consult to Psychiatry Reason for Consult? 72 hour hold; Did you contact the consulting MD? No; Consult priority: Tomorrow (routine); Communication for MD: No phone communication necessary for now [[id number]] ordered by Yared Black DO at 06/19/21 1846           Summary: DEC Assessment         Crisis Social Work Assessment         DATE OF SERVICE      6/19/2021    Pt consented to video consult.   Order received: 5:04 PM  Consult started:   5:35 PM  Patients Originating Emergency Department: [facility] ED 12       PRESENTING PROBLEM / PERTINENT HISTORY AND COLLATERAL     Hamzah Roberts is a 35 y.o. male being seen by Crisis Social Work through tele-medicine video consult. Patient is a 35-year-old male with a history of schizoaffective disorder, schizophrenia, and substance abuse. Patient arrived to the ED via EMS today after he was found near his family's (his father's home) relatively incoherent and laying on the lawn. Patient admitted to smoking methamphetamine prior to arriving to his family's home. Patient reported a history of and current psychotic symptoms, including paranoia and auditory hallucinations. Patient denied suicidal or homicidal ideation, however. Clinician also spoke with patient's sister, Angie Roberts, who stated patient exhibits paranoia and psychosis even when he is not suing substances. She stated patient recently took the pipes apart at his father's home because he believed they were speaking to him. She also stated patient often believes he is being followed by the cartel and that he or his family members will be killed by them. Per patient's sister, patient recently  "attempted to lay down in the street and mentioned that he \"is sick of it all and can't stand the voices any longer.\" Patient recently participated in a MICD program at [facility] and would ultimately like to return there as he believes it was helpful and only left \"early\" because he missed his family. Patient reported he feels messed up and stated he needs help. He was calm and cooperative during this assessment but will be placed on a psychiatric hold as he was uncooperative earlier and had eloped to the parking lot, delaying the assessment start time and other treatment in the ED.    Current Symptoms:    Depression Symptoms: Sad, depressed mood, Feelings of helplessness, Impaired concentration/decision making         Psychosis Symptoms:   Yes Patient reported current psychotic symptoms including paranoia, delusions, and auditory hallucinations    Anxiety Symptoms: Generalized    If any symptoms, how difficult have these symptoms made it for you to work, take care of things at home, or get along with other people?   Very difficult         SUBSTANCE USE HISTORY:     Is there a history of, or current substance use? Yes  Substance name(s): Current methamphetamine use. UA pending to determine other substances currently used. Multiple substance use suspected.   frequency: Unknown  Quantity: Unknown  Duration: Unknown  last use: Today  Method: Smoke/inhalation    During RN assessment patient  reports current drug use. Drugs: Methamphetamines and Marijuana. He reports that he does not drink alcohol.    Previous Treatment: Yes: April 2021 at [facility]--patient discharged himself early because he missed his family, reportedly  Drug screen/BAL/Breathalyzer completed (date/results):   pending         PSYCHIATRIC HISTORY AND CURRENT CARE:     Is there a history of mental health concerns?  Yes  History of Commitment: None reported by patient   History of Psychiatric Hospitalizations (dates):  " "Per patient's sister, he has had ED visits but no psych admissions  History of PHP/Day Treatment/Outpatient Group Therapy (dates): None reported by patient   History of ECT (dates): None reported by patient   Current psychotropic medications:  see medication list    Psychiatrist:  No  Therapist:  No  :  No  ARMHS:  No  ACT Team:  No         MEDICAL HISTORY AND CURRENT MEDICAL CARE:     Hx of falls (per patient report) : No  Hx of seizure (per patient report) : No  Health Problems/Concerns: None reported by patient   Recent Changes in medications:   no recent changes in medications  Medication Compliant (If not, is the patient off all or csome of medications; how long without;issues effecting compliance):It is unknown if patient is currently medication compliant as he was not able to report what he is prescribed and neither was his sister  Providers: Patient Care Team:  Provider, No Primary Care as PCP - General          FAMILY HISTORY:     MI/CD Family History:  Yes         SOCIAL HISTORY:     Living Situation:   Living Arrangements: Homeless  Type of Residence: Homeless               Marital Status: not   Children: Yes  Number of Children (ages): 16 and 15--Both live with their mother; patient has no custody \"because I am on drugs\"  Lives With Patient: No  Who is caring for children?: Their mother  Abuse History:  None reported by patient   Nondenominational Beliefs:   none reported by patient  Education: high school diploma / GED  Employment: Unemployed  Income: Patient reportedly receives unemployment income                       Additional Legal Issues: yes Patient is currently on probation in [facility] and may also have outstanding warrants per sister  Culture: Cultural Impact on Health and Healthcare include: Patient utilizes none reported by patient for mental and physical health needs.     Community/Family Significant Supports/Legal Guardian/POA:  Support System: Patient has supportive " family members, including his sister and his father. They are desperate to get patient professional help, however    Patient identified Strengths and Effective Coping Skills: (What is going well?)  Patient is future-oriented and wants to get help. He has health insurance and income through unemployment. Patient has support from his family    Extended Emergency Contact Information  Primary Emergency Contact: GILBERTO DOWNEY  Mobile Phone: [phone]  Relation: Sibling         MENTAL STATUS EXAM AND RISK ASSESSMENT:     Risk Assessment  Attempted suicide within last 30 days?: No         Attempting or threatening suicide/self harm?: No    Expressing suicidal/self harm thoughts without intent?: No    Do you have a plan?: No    Hx of Suicidal Attempts?: No         Current self Injurious behavior?: No    Hx of Self injurious behavior?: No    Current plan to harm another?: No         Do you have access to weapons?: No    Assaultive behavior: no problem                 Suicidal Risk Factors: Male, Depressive symptoms, Isolated/lack of support, Active substance abuse, Psychosis  Active cutting or other SIB in department: No  Aggressive/assaultive behavior: No  Sexually inappropriate behavior or sexual vulnerability: No  Elopement risk: Yes: Patient eloped to the parking lot during his ED visit, per ED MD    1A. Over the past 2 weeks, have you had thoughts of killing yourself?  No  1B. Have you ever attempted to kill yourself and, if yes, when did this last happen? No  2. Recent or current suicide plan? No  3. Recent or current intent to act on ideation? No  4. Lifetime psychiatric hospitalization? No  5. Pattern of excessive substance use?  Yes: Methamphetamine use  6. Current irritability, agitation, or aggression? Yes: Patient was agitated earlier in the ED when his sister was present    Total Score: 2/6   Critical Review items-     Current Attempt? No   Suicide Plan Present? No   Intent Present? No        Conclude risk  level based on highest category endorsed in any row (mild/moderate/High)? Mild    Mental Status Exam:  Appearance : Appropriate  Motor Activity:  Within Normal Limits  Eye Contact: Variable  Orientation Person: Yes  Orientation Situation: Yes  Orientation Place: Yes  Orientation Time: Yes  Memory Recent: Variable  Memory Remote: Variable  Mood : Anxious, Irritable  Affect: Flat  Thought Content: Hallucinations-Auditory, Paranoia  Fund of Knowledge: Appropriate  Thought Form: Alert, Delusional, Hallucinations  Attention Span: Fair  Behavior Toward Examiner/Psychomotor Behavior : Cooperative, Appropriate to situation, Fidgety  Speech Content: Fluent  Speech Rate/Production: Normal  Speech Volume: Normal  Judgement: Impairment Moderate  Estimated Intelligence:  Average   Insight: Adequate        ASSESSMENT AND DIAGNOSIS     Clinical Summary (explains the provisional diagnostic hypothesis including your justification or reasons for applying the diagnosis: Patient has historical diagnoses including schizoaffective and schizophrenia, and history of drug use (stimulant use confirmed)   Diagnosis: Othr specified schizophrenia spectrum and other psychotic disorder; Other stimulant use, with stimulant-induced psychotic disorder, with hallucinations  Diagnostic Code(s): F28; F15.951       PLAN     Level of Care Summary:  Recommended level of care: Inpatient MH, followed by inpatient MICD treatment  Does the patient agree with the recommended level of care?    Yes  ED Physician consulted Name:  Dr. Yared Black, DO  ED Physician concurs with disposition:   Yes  Psychiatrist consulted?consult not required  Central Intake Notified (time/date): Yes      Final disposition:  Inpatient   If Inpatient, is patient admitted voluntary?  No    Patient aware of potential for transfer if there are not appropriate placement: Yes    Follow-Up Plan:  Plan provided to the patient/parent/guardian: Yes  Resources provided?  No  Appointments  pending or scheduled (provider, date, time location, etc.): NA  Legal Status: 72 hour hold,   Issue 6/19/21,    Expires 6/24/21                   SOURCES     Referral Information:  Referral Source: Medics  Referral Name: Patient arrived to the ED via EMS after being found outside of a home for which he has a restraining ordier against        Patient Identified Liabilities: Substance use/Relapse, Homelessness    Psychotherapy techniques and/or interventions used:  Assess dimensions of crisis, apply solution-focused therapy to address current crisis, identify additional supports and alternative coping skills, and establish a safety plan.     Rationale for intervention/technique: Used to improve patient safety, draw on patient's strengths, assess resources, and facilitate return to normal functioning.     Collateral Information/Sources:  Collateral Information: Yes  Collateral Sources: Medical Records  Details (name and phone number of collateral contact and information provided): Angie Roberts (sister)--([phone])       SIGNATURE     Cumulative face to face time with patient in minutes: 20  Consult Duration: 25  (including time spent obtaining collateral, consulting with providers, next level of care planning and time spent with patient)  Type of Assessment: Brief  Performed and Documented by: FLAQUITO Grande   6/19/2021 7:00 PM

## 2021-07-06 VITALS — BODY MASS INDEX: 27.53 KG/M2 | HEIGHT: 62 IN

## 2021-08-17 ENCOUNTER — HOSPITAL ENCOUNTER (EMERGENCY)
Facility: HOSPITAL | Age: 36
Discharge: HOME | End: 2021-08-17
Attending: EMERGENCY MEDICINE | Admitting: EMERGENCY MEDICINE
Payer: MEDICAID

## 2021-08-17 VITALS
DIASTOLIC BLOOD PRESSURE: 77 MMHG | BODY MASS INDEX: 26.75 KG/M2 | SYSTOLIC BLOOD PRESSURE: 120 MMHG | RESPIRATION RATE: 16 BRPM | HEIGHT: 63 IN | WEIGHT: 151 LBS | TEMPERATURE: 97 F | OXYGEN SATURATION: 100 % | HEART RATE: 92 BPM

## 2021-08-17 DIAGNOSIS — F20.0 PARANOID SCHIZOPHRENIA, CHRONIC CONDITION (H): ICD-10-CM

## 2021-08-17 PROCEDURE — 99283 EMERGENCY DEPT VISIT LOW MDM: CPT

## 2021-08-17 RX ORDER — RISPERIDONE 1 MG/1
1 TABLET ORAL AT BEDTIME
Qty: 15 TABLET | Refills: 0 | Status: SHIPPED | OUTPATIENT
Start: 2021-08-17 | End: 2022-11-18

## 2021-08-17 RX ORDER — GABAPENTIN 400 MG/1
400 CAPSULE ORAL 3 TIMES DAILY
Qty: 45 CAPSULE | Refills: 0 | Status: SHIPPED | OUTPATIENT
Start: 2021-08-17 | End: 2022-11-18

## 2021-08-17 RX ORDER — RISPERIDONE 0.5 MG/1
1 TABLET, ORALLY DISINTEGRATING ORAL ONCE
Status: DISCONTINUED | OUTPATIENT
Start: 2021-08-17 | End: 2021-08-17 | Stop reason: HOSPADM

## 2021-08-17 RX ORDER — BUSPIRONE HYDROCHLORIDE 5 MG/1
5 TABLET ORAL ONCE
Status: DISCONTINUED | OUTPATIENT
Start: 2021-08-17 | End: 2021-08-17 | Stop reason: HOSPADM

## 2021-08-17 RX ORDER — QUETIAPINE FUMARATE 200 MG/1
200 TABLET, FILM COATED ORAL
Qty: 15 TABLET | Refills: 0 | Status: SHIPPED | OUTPATIENT
Start: 2021-08-17 | End: 2022-11-29

## 2021-08-17 RX ORDER — TRAZODONE HYDROCHLORIDE 100 MG/1
100 TABLET ORAL
Qty: 15 TABLET | Refills: 0 | Status: SHIPPED | OUTPATIENT
Start: 2021-08-17 | End: 2022-11-18

## 2021-08-17 RX ORDER — HYDROXYZINE HYDROCHLORIDE 50 MG/1
50 TABLET, FILM COATED ORAL 3 TIMES DAILY PRN
Qty: 15 TABLET | Refills: 0 | Status: SHIPPED | OUTPATIENT
Start: 2021-08-17 | End: 2022-11-18

## 2021-08-17 RX ORDER — BUSPIRONE HYDROCHLORIDE 5 MG/1
5 TABLET ORAL 2 TIMES DAILY
Qty: 30 TABLET | Refills: 0 | Status: SHIPPED | OUTPATIENT
Start: 2021-08-17 | End: 2022-11-18

## 2021-08-17 RX ORDER — OLANZAPINE 10 MG/1
10 TABLET, ORALLY DISINTEGRATING ORAL ONCE
Status: DISCONTINUED | OUTPATIENT
Start: 2021-08-17 | End: 2021-08-17 | Stop reason: HOSPADM

## 2021-08-17 ASSESSMENT — MIFFLIN-ST. JEOR: SCORE: 1525.06

## 2021-08-17 ASSESSMENT — ENCOUNTER SYMPTOMS: HALLUCINATIONS: 0
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
## 2021-08-17 NOTE — DISCHARGE INSTRUCTIONS
Chemical Dependency Treatment     [facility]  [email or web address]  [phone] or [phone]  [address] [place] [address]   Services include screening assessments, medicallysupervised detoxification, inpatient and outpatient evaluation and referral, combined inpatient to outpatient treatment sequences, family counseling and aftercare.      [place] [facility]   www.[place].org  [phone]  [address] [facility]   Serves adults and teens (minimum age is 16); has short-term treatment and a long-term recovery program; uses 12-step, cognitive behavioral therapy, motivational enhancement; faithbased, and recoverymanagement; high school on-site and [employer] programming available      [facility]   www.Earn [employer] Play/psy/[facility]  [phone]  [facility]  [facility]     [facility]   www.Circa  [phone]  [facility] 105[facility]     [facility] (Inpatient & Outpatient)  [email or web address]  [phone]  Phone consultation available 24/7  In-person Assessments  [address] [place] [address], [facility] [address] [address]  [id number] [place] [address] [id number] [place] [address] (Inpatient & Outpatient)  http://[email or web address].org/mental-health-addiction/about.html  [employer], [place]  Contact  for Chemical Health Evaluation, [phone]  Funded by: Rule 25 in [facility]. MedicalAssistance (MA) providers of [facility], [employer], Blue Cross, [employer], [employer], Medicare A&B. Private insurance reviewed case by case.     [facility] In[facility] & Outpatient)  http://[employer]/  [phone]  [address]  [facility]  http://[email or web address]//specialties/mental-health/adap.html  [phone]  [facility] [place]  [address] [facility], [address]  [place] [address]  Assessments are available during the day and on a walk-in basis Monday-Friday starting at 8am.     [facility]  [phone]  [email or web address]  [address] [place] [address]     [facility] (Residential & Outpatient)  [email or web address]://[email or web address]/  Women s Programs: [phone], [address], [place] [address]     [facility] (Does Rule 25 Assessments)  http://www.[email or web address].org/  [phone]  [address], [address], [place] [address]     [facility]  http://www.[employer]/  [phone]  [address] [place] [address]  [address] [place] [address]  Rule 25 Assessments, Substance Abuse Assessments, Men s & Women s Programs     [facility]  [email or web address]://[email or web address]/our-services/drug-alcohol-treatment  [phone], [address], [place] [address]  [phone], [address], [place], [place] [address] [phone], [address] [facility], [address], [place], [place] [address] [phone], [address], [address], ([facility]), [place], [place] [id number] [phone], [address] [address] [place] [address]     National[employer] on Drug Abuse  https://www.drugabuse.gov/nidamed-medical-health-professionals

## 2021-08-17 NOTE — ED TRIAGE NOTES
Pt states he left treatment, states he is committed friends say he is hearing voices. Pt states he left his meds at the treatment facility. States he needs meds, denies suicidal ideation.

## 2021-08-17 NOTE — ED NOTES
"Writer went to patient's room to discharge the patient. Patient states \"you guys cannot discharge me\" Charge nurse and md notified.patient still refused to leave the room after being discharged. Refusing to take prescriptions. Security called to escort the patient out.  "

## 2021-10-11 ENCOUNTER — HEALTH MAINTENANCE LETTER (OUTPATIENT)
Age: 36
End: 2021-10-11

## 2022-07-17 ENCOUNTER — HEALTH MAINTENANCE LETTER (OUTPATIENT)
Age: 37
End: 2022-07-17

## 2022-09-25 ENCOUNTER — HEALTH MAINTENANCE LETTER (OUTPATIENT)
Age: 37
End: 2022-09-25

## 2022-11-17 ENCOUNTER — HOSPITAL ENCOUNTER (EMERGENCY)
Facility: HOSPITAL | Age: 37
Discharge: HOME | End: 2022-11-19
Attending: STUDENT IN AN ORGANIZED HEALTH CARE EDUCATION/TRAINING PROGRAM | Admitting: STUDENT IN AN ORGANIZED HEALTH CARE EDUCATION/TRAINING PROGRAM
Payer: COMMERCIAL

## 2022-11-17 DIAGNOSIS — R44.3 HALLUCINATIONS: ICD-10-CM

## 2022-11-17 DIAGNOSIS — Z76.0 ENCOUNTER FOR MEDICATION REFILL: ICD-10-CM

## 2022-11-17 DIAGNOSIS — F19.10 POLYSUBSTANCE ABUSE (H): ICD-10-CM

## 2022-11-17 PROBLEM — M25.572 LEFT ANKLE PAIN, UNSPECIFIED CHRONICITY: Status: ACTIVE | Noted: 2022-11-17

## 2022-11-17 PROBLEM — A60.00 RECURRENT GENITAL HERPES: Status: ACTIVE | Noted: 2022-07-29

## 2022-11-17 PROBLEM — F39 EPISODIC MOOD DISORDER (H): Status: ACTIVE | Noted: 2021-06-20

## 2022-11-17 PROBLEM — F29 PSYCHOSIS, ATYPICAL (H): Status: ACTIVE | Noted: 2020-07-29

## 2022-11-17 PROBLEM — F15.94 AMPHETAMINE-INDUCED MOOD DISORDER (H): Status: ACTIVE | Noted: 2021-06-20

## 2022-11-17 PROBLEM — F41.9 ANXIETY: Status: ACTIVE | Noted: 2022-01-03

## 2022-11-17 PROBLEM — K21.9 GASTROESOPHAGEAL REFLUX DISEASE: Status: ACTIVE | Noted: 2022-01-03

## 2022-11-17 PROBLEM — F15.950 AMPHETAMINE AND PSYCHOSTIMULANT-INDUCED PSYCHOSIS WITH DELUSIONS (H): Status: ACTIVE | Noted: 2021-06-20

## 2022-11-17 PROCEDURE — 99285 EMERGENCY DEPT VISIT HI MDM: CPT | Mod: 25

## 2022-11-17 PROCEDURE — C9803 HOPD COVID-19 SPEC COLLECT: HCPCS

## 2022-11-17 PROCEDURE — 250N000013 HC RX MED GY IP 250 OP 250 PS 637: Performed by: STUDENT IN AN ORGANIZED HEALTH CARE EDUCATION/TRAINING PROGRAM

## 2022-11-17 RX ORDER — OLANZAPINE 5 MG/1
10 TABLET ORAL ONCE
Status: COMPLETED | OUTPATIENT
Start: 2022-11-17 | End: 2022-11-17

## 2022-11-17 RX ADMIN — OLANZAPINE 10 MG: 5 TABLET, FILM COATED ORAL at 14:19

## 2022-11-17 ASSESSMENT — ACTIVITIES OF DAILY LIVING (ADL)
ADLS_ACUITY_SCORE: 35

## 2022-11-17 NOTE — ED TRIAGE NOTES
"Patient presents here for evaluation of hallucinations and hearing voices over the past month. He denies suicidal thoughts or thoughts of harming others. He does have a history of  Schizophrenia. Patient states that he ran out of his medications \"a few days ago\" .      "

## 2022-11-17 NOTE — ED NOTES
Security here and removed his belongings. Pt. Allowed to keep his zip up sweatshirt on.  Pt. wanded by security.

## 2022-11-17 NOTE — ED PROVIDER NOTES
Emergency Department Encounter         FINAL IMPRESSION:  Schizophrenia        ED COURSE AND MEDICAL DECISION MAKING       ED Course as of 11/22/22 0550   Thu Nov 17, 2022   9883 37-year male history of schizophrenia, has been admitted last year for delusions and hallucinations, here with his girlfriend with concerns of persisting hallucinations and voices.  Patient here requesting medications stating that he ran out of his previous prescription and intermittently forgets.  Denies any SI or HI.  Overall is calm in the room.  Intermittently making eye contact.  Does not seem to be responding to any internal stimuli.  Does not seem to be aggressive.  Physical examination otherwise unremarkable.  Patient reports burning with urination intermittently.  States few weeks ago he had some bilateral chest discomfort after coughing however that went away and denies any chest pain or shortness of breath at this time.  Patient declining any blood work at this time.  States he will give a urine sample.  Otherwise vitals are stable.  Patient is requesting Zyprexa orally.  Medications ordered.   2209 Sign out: h/o schizophrenia. Voluntary but holdable.    -Discussed case with patient's sister who is concerned about patient's decompensation.  Currently patient does have a commitment however according to the sister's commitment paperwork was lost in transition.  And currently has no active paperwork although he should according to her.  At this point we will keep patient.  She states that patient has been unable to function as an outpatient.  Is a difficulty doing ADLs because of his worsening hallucinations.      2:09 PM I met with the patient to gather history and to perform my initial exam. I discussed the plan for care while in the Emergency Department.   2:17 PM Checked in on and updated patient.   8:10 PM Spoke with DEC.     Medical Decision Making    Supplemental history from: N/A    External Record(s) Reviewed:  "N/A    Differential Diagnosis: See [facility] charting for differential considered.     I performed an independent interpretation of the: N/A    Discussed with radiology regarding test interpretation: N/A    Discussion of management with another provider: See chart documentation, if applicable    The following testing was considered but ultimately not selected: None    I considered prescription management with: N/A    The patient's care impacted: None    Consideration of Admission/Observation: Admission considered: pending work up/clinical reassessment by oncoming ED provider    Care significantly affected by Social Determinants of Health including: N/A      At the conclusion of the encounter I discussed the results of all the tests and the disposition. The questions were answered. The patient or family acknowledged understanding and was agreeable with the care plan.      MEDICATIONS GIVEN IN THE EMERGENCY DEPARTMENT:  Medications   OLANZapine (zyPREXA) tablet 10 mg (has no administration in time range)       NEW PRESCRIPTIONS STARTED AT TODAY'S ED VISIT:  New Prescriptions    No medications on file       HPI     Patient information obtained from: Patient    Use of Interpretor: N/A     Hamzah Roberts is a 37 year old male with a pertinent history of schizophrenia who presents to this ED by walk-in for evaluation of hallucinations.     Patient endorses having hallucinations and hearing voices over the past month. He notes he normally takes risperidone, but ran out of his medication a few days ago, and also forgets to take it. He states these kinds of episodes have happened before, and he has a history of schizophrenia, but his symptoms seem worse this time. Patient notes he would like to be inpatient. Along with the hallucinations, patient endorses experience sore ribs like he \"got punched\", which have gotten better, as well as coughing a lot, and difficulty urinating. Patient denies chest pain, shortness of breath, " vomiting, or any other complaints at this time.     REVIEW OF SYSTEMS:  Review of Systems   Constitutional: Negative for fever, malaise  HEENT: Negative runny nose, sore throat, ear pain, neck pain  Respiratory: Negative for shortness of breath, cough, congestion  Cardiovascular: Negative for chest pain, leg edema  Gastrointestinal: Negative for abdominal distention, abdominal pain, constipation, vomiting, nausea, diarrhea  Genitourinary: Negative for dysuria and hematuria.   Integument: Negative for rash, skin breakdown  Neurological: Negative for paresthesias, weakness, headache.  Musculoskeletal: Negative for joint pain, joint swelling  Psychology: Positive for hallucinations      All other systems reviewed and are negative.          MEDICAL HISTORY     No past medical history on file.    No past surgical history on file.    Social History     Tobacco Use     Smoking status: Unknown   Substance Use Topics     Alcohol use: Never     Drug use: Yes     Types: Methamphetamines, Marijuana       busPIRone (BUSPAR) 5 MG tablet  gabapentin (NEURONTIN) 400 MG capsule  hydrOXYzine (ATARAX) 50 MG tablet  QUEtiapine (SEROQUEL) 200 MG tablet  risperiDONE (RISPERDAL) 1 MG tablet  traZODone (DESYREL) 100 MG tablet            PHYSICAL EXAM     /74   Pulse 103   Temp 98.4  F (36.9  C)   Resp 16   Wt 71.7 kg (158 lb)   SpO2 98%   BMI 27.99 kg/m        PHYSICAL EXAM:     General: Patient appears well, nontoxic. Overall is calm in the room.  Intermittently making eye contact.  Does not seem to be responding to any internal stimuli.  Does not seem to be aggressive.    HEENT: Moist mucous membranes, no tongue swelling.  No head trauma.  No midline neck pain.  Cardiovascular: Normal rate, normal rhythm, no extremity edema.  No appreciable murmur.  Respiratory: No signs of respiratory distress, lungs are clear to auscultation bilaterally with no wheezes rhonchi or rales.  Abdominal: Soft, nontender, nondistended, no palpable  masses, no guarding, no rebound  Musculoskeletal: Full range of motion of joints, no deformities appreciated.  Neurological: Alert and oriented, grossly neurologically intact.  Psychological: Denies any SI or HI.  Integument: No rashes appreciated      RESULTS       Labs Ordered and Resulted from Time of ED Arrival to Time of ED Departure - No data to display    No orders to display         PROCEDURES:  Procedures:  Procedures       I, Nicolasa Martinez am serving as a scribe to document services personally performed by Poncho Sanchez DO, based on my observations and the provider's statements to me.  I, Poncho Sanchez DO, attest that Nicolasa Martinez is acting in a scribe capacity, has observed my performance of the services and has documented them in accordance with my direction.    Poncho Sanchez DO  Emergency Medicine  [facility] EMERGENCY DEPARTMENT     Poncho Sanchez DO  11/22/22 0568

## 2022-11-18 ENCOUNTER — TELEPHONE (OUTPATIENT)
Dept: BEHAVIORAL HEALTH | Facility: CLINIC | Age: 37
End: 2022-11-18

## 2022-11-18 VITALS
WEIGHT: 158 LBS | RESPIRATION RATE: 18 BRPM | OXYGEN SATURATION: 99 % | HEART RATE: 89 BPM | TEMPERATURE: 98.4 F | DIASTOLIC BLOOD PRESSURE: 72 MMHG | BODY MASS INDEX: 27.99 KG/M2 | SYSTOLIC BLOOD PRESSURE: 116 MMHG

## 2022-11-18 PROBLEM — R44.3 HALLUCINATIONS: Status: ACTIVE | Noted: 2022-11-18

## 2022-11-18 PROBLEM — Z76.0 ENCOUNTER FOR MEDICATION REFILL: Status: ACTIVE | Noted: 2022-11-18

## 2022-11-18 LAB
AMPHETAMINES UR QL SCN: ABNORMAL
ANION GAP SERPL CALCULATED.3IONS-SCNC: 11 MMOL/L (ref 7–15)
BARBITURATES UR QL SCN: ABNORMAL
BASOPHILS # BLD AUTO: 0 10E3/UL (ref 0–0.2)
BASOPHILS NFR BLD AUTO: 0 %
BENZODIAZ UR QL SCN: ABNORMAL
BUN SERPL-MCNC: 11.8 MG/DL (ref 6–20)
BZE UR QL SCN: ABNORMAL
CALCIUM SERPL-MCNC: 8.8 MG/DL (ref 8.6–10)
CANNABINOIDS UR QL SCN: ABNORMAL
CHLORIDE SERPL-SCNC: 102 MMOL/L (ref 98–107)
CREAT SERPL-MCNC: 0.96 MG/DL (ref 0.67–1.17)
DEPRECATED HCO3 PLAS-SCNC: 24 MMOL/L (ref 22–29)
EOSINOPHIL # BLD AUTO: 0 10E3/UL (ref 0–0.7)
EOSINOPHIL NFR BLD AUTO: 1 %
ERYTHROCYTE [DISTWIDTH] IN BLOOD BY AUTOMATED COUNT: 11.9 % (ref 10–15)
ETHANOL SERPL-MCNC: <0.01 G/DL
GFR SERPL CREATININE-BSD FRML MDRD: >90 ML/MIN/1.73M2
GLUCOSE SERPL-MCNC: 194 MG/DL (ref 70–99)
HCT VFR BLD AUTO: 46.4 % (ref 40–53)
HGB BLD-MCNC: 16.1 G/DL (ref 13.3–17.7)
IMM GRANULOCYTES # BLD: 0 10E3/UL
IMM GRANULOCYTES NFR BLD: 0 %
LYMPHOCYTES # BLD AUTO: 1.9 10E3/UL (ref 0.8–5.3)
LYMPHOCYTES NFR BLD AUTO: 27 %
MCH RBC QN AUTO: 32.1 PG (ref 26.5–33)
MCHC RBC AUTO-ENTMCNC: 34.7 G/DL (ref 31.5–36.5)
MCV RBC AUTO: 92 FL (ref 78–100)
MONOCYTES # BLD AUTO: 0.4 10E3/UL (ref 0–1.3)
MONOCYTES NFR BLD AUTO: 5 %
NEUTROPHILS # BLD AUTO: 4.6 10E3/UL (ref 1.6–8.3)
NEUTROPHILS NFR BLD AUTO: 67 %
NRBC # BLD AUTO: 0 10E3/UL
NRBC BLD AUTO-RTO: 0 /100
OPIATES UR QL SCN: ABNORMAL
PCP QUAL URINE (ROCHE): ABNORMAL
PLATELET # BLD AUTO: 238 10E3/UL (ref 150–450)
POTASSIUM SERPL-SCNC: 3.9 MMOL/L (ref 3.4–5.3)
RBC # BLD AUTO: 5.02 10E6/UL (ref 4.4–5.9)
SARS-COV-2 RNA RESP QL NAA+PROBE: NEGATIVE
SODIUM SERPL-SCNC: 137 MMOL/L (ref 136–145)
WBC # BLD AUTO: 6.9 10E3/UL (ref 4–11)

## 2022-11-18 PROCEDURE — 250N000013 HC RX MED GY IP 250 OP 250 PS 637: Performed by: EMERGENCY MEDICINE

## 2022-11-18 PROCEDURE — U0005 INFEC AGEN DETEC AMPLI PROBE: HCPCS | Performed by: EMERGENCY MEDICINE

## 2022-11-18 PROCEDURE — 36415 COLL VENOUS BLD VENIPUNCTURE: CPT | Performed by: EMERGENCY MEDICINE

## 2022-11-18 PROCEDURE — 80307 DRUG TEST PRSMV CHEM ANLYZR: CPT | Performed by: EMERGENCY MEDICINE

## 2022-11-18 PROCEDURE — 80048 BASIC METABOLIC PNL TOTAL CA: CPT | Performed by: EMERGENCY MEDICINE

## 2022-11-18 PROCEDURE — 82077 ASSAY SPEC XCP UR&BREATH IA: CPT | Performed by: EMERGENCY MEDICINE

## 2022-11-18 PROCEDURE — 90791 PSYCH DIAGNOSTIC EVALUATION: CPT

## 2022-11-18 PROCEDURE — 85025 COMPLETE CBC W/AUTO DIFF WBC: CPT | Performed by: EMERGENCY MEDICINE

## 2022-11-18 RX ORDER — BUSPIRONE HYDROCHLORIDE 5 MG/1
5 TABLET ORAL ONCE
Status: DISCONTINUED | OUTPATIENT
Start: 2022-11-18 | End: 2022-11-19 | Stop reason: HOSPADM

## 2022-11-18 RX ORDER — QUETIAPINE FUMARATE 200 MG/1
200 TABLET, FILM COATED ORAL
Qty: 15 TABLET | Refills: 0 | Status: ON HOLD | OUTPATIENT
Start: 2022-11-18 | End: 2022-12-01

## 2022-11-18 RX ORDER — RISPERIDONE 3 MG/1
TABLET ORAL
COMMUNITY
Start: 2022-10-12 | End: 2022-11-29

## 2022-11-18 RX ORDER — OLANZAPINE 10 MG/2ML
10 INJECTION, POWDER, FOR SOLUTION INTRAMUSCULAR 2 TIMES DAILY PRN
Status: DISCONTINUED | OUTPATIENT
Start: 2022-11-18 | End: 2022-11-19 | Stop reason: HOSPADM

## 2022-11-18 RX ORDER — RISPERIDONE 3 MG/1
6 TABLET ORAL AT BEDTIME
Qty: 30 TABLET | Refills: 0 | Status: ON HOLD | OUTPATIENT
Start: 2022-11-18 | End: 2022-12-01

## 2022-11-18 RX ORDER — HYDROXYZINE HYDROCHLORIDE 25 MG/1
25 TABLET, FILM COATED ORAL
Status: DISCONTINUED | OUTPATIENT
Start: 2022-11-18 | End: 2022-11-19 | Stop reason: HOSPADM

## 2022-11-18 RX ORDER — BUSPIRONE HYDROCHLORIDE 5 MG/1
5 TABLET ORAL 2 TIMES DAILY
Status: DISCONTINUED | OUTPATIENT
Start: 2022-11-18 | End: 2022-11-18

## 2022-11-18 RX ORDER — RISPERIDONE 1 MG/1
1 TABLET ORAL ONCE
Status: COMPLETED | OUTPATIENT
Start: 2022-11-18 | End: 2022-11-18

## 2022-11-18 RX ORDER — LORAZEPAM 0.5 MG/1
1 TABLET ORAL EVERY 8 HOURS PRN
Status: DISCONTINUED | OUTPATIENT
Start: 2022-11-18 | End: 2022-11-19 | Stop reason: HOSPADM

## 2022-11-18 RX ADMIN — RISPERIDONE 1 MG: 1 TABLET ORAL at 09:23

## 2022-11-18 ASSESSMENT — ACTIVITIES OF DAILY LIVING (ADL)
ADLS_ACUITY_SCORE: 35

## 2022-11-18 NOTE — CONSULTS
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 ([facility])  [facility] connects people seeking recovery to resources that help foster and sustain long-term recovery. Whether you are seeking resources for treatment, transportation, housing, job training, education, health care or other pathways to recovery, [facility] is a great place to start.  Phone: 271.414.7158. www.minnesotaPoetica.org (Great listing of all types of recovery and non-recovery related resources)     Alcoholics Anonymous  Phone: -586-ALCOHOL  Website: HTTP://WWW.AA.ORG/  [facility] [place] (806-298-5177 or http://a[place].org)  [facility] [place] (644-182-7507 or www.aastpaul.org)     Narcotics Anonymous  Phone: 324.186.4517  Website: www.[employer]Former[employer]i).Therma-Wave.     [facility]  [address] [place] [address], [address], [place], [place],  Phone: [phone]  Drop-in Hours:  Monday-Friday 9-11:30 am. By appointment at other times.  Provides: [facility] is a drop-in center on the [place] that provides a safe space for individuals who are homeless and have a history of chemical use. Sobriety is not a requirement but drugs and alcohol are not allowed on the property.  Services: Non-clients can access drop-in services such as Recovery and Harm Reduction Groups, referrals to case management, community activities, shower facilities, and a pool table. Individuals who are homeless and have chemical health needs may be eligible for enrollment into [facility]'s case management program. Clients and  work together to access benefits, treatment, health care, shelter, and external housing resources.     For shelter tonight, start with Adult [facility]ter [facility] Overnight Shelter: [phone]  *Phone lines are closed between 5:30-7:30 pm     [address] (enter on the [address] side near the [facility] corner)  Walk-in Hours: 9 am - 5:30 pm Monday-Friday and 1 pm - 5:30 pm Saturday and Sunday     [facility]  [phone]  [address] [facility]  [phone]  [address] [facility]  [phone]  [address] [facility]  [phone]  [address] [facility]  [phone]  [address] [place] [address]     To start making a plan for a more long-term solution, contact the Coordinated Entry Homeless Assistance Program:     Coordinated Entry Homeless Assistance  [facility]  [address] [place] [address]  [phone]  Open Wednesdays and Thursdays 8 am-2 pm  The Coordinated Entry System is the [facility]'s approach to organizing and providing housing services for people experiencing homelessness in [facility].  Because housing resources are limited, this process is designed to ensure  that individuals and families with the highest vulnerability, service needs, and length of homelessness receive top priority in housing placement.     Assessment changes due to COVID-19 virus     [facility] family assessors will now be conducting assessments by phone. If you are working with a family staying in a place other than a [facility] shelter and is eligible for Coordinated Entry, continue to contact [facility]  at [phone] to set up an assessment.     For single adults, [facility] will be suspending drop-in hours at [facility]. To have a Coordinated Entry assessment completed, call the [facility]' certified assessors: Ashutosh at [phone] or Sherry at [phone] directly to set up a time to meet     Call 211 at any time on any day for questions about services and resources available to you.        Family Shelters     [facility] Shelter Team  [phone]  [address] [facility], Floors 5 & 6               Youth, families & disabled adults     Hours: Mon-Fri 8:00 am-4:30 pm.  After-Hours Shelter Team  [address]        Drop-Ins     [facility]  [phone]  [address] [place] [address] ([facility])               Showers/Laundry (8-11am)               Health Care               Employment Services               Breakfast (7-8 am)               Lunch (11:30am-12:30pm)     Hours: Mon-Sat: Summer 7am-3pm; Winter 7am-4pm.        [facility] [phone]  [address] [place] [address]  Hours: Mon, Wed and Fri 9:00-11:30 am        Clothing     [facility]  [phone]  [address] [place] [address]  Hours: M-Th 10-11:30am & 1:30-3:30pm; Sat/Sun 9:30-10:30 am        Free Meals & Showers     [facility]  [phone]  [address] [place] [address]  Dinner: Mon-Fri 5:30-6:30pm (No showers)     [facility]  [phone]  Meals ([address]): Women & Children M-F 8:30-9am; Everyone: M-F 12-1pm; Sat/Sun, 10:30-11:30  "am  Showers ([address]): Mon-Fri 9-10 am     [facility]  [phone]  [address]  Dinner: Thurs - Mon 6 pm  Showers: Daily 8 - 4:30 pm     Health Care     [facility]  [phone]  Clinics are in 11 [place] shelters/drop-in centers.  Hours: Mon-Fri at varying sites. Call for sites/times. No insurance or appts required to receive care.  Clinic sites: Adult [facility], [facility], [facility], [facility], [facility], [facility], [facility], [facility], [facility], [facility]         Crisis Lines  Crisis Text Line  Text 025370  You will be connected with a trained live crisis counselor to provide support.     Por omar, texto  FAISAL a 129197 o texto a 442-AYUDAME en WhatsApp     [facility] (LGBTQ Youth Crisis Line)  5.536.514.2661  text START to 203-706        Community Resources  Fast Tracker  Linking people to mental health and substance use disorder resources  fastLitehouseckiFulfillmentn.org      [place] [facility] Warm Line  Peer to peer support  Monday thru Saturday, 12 pm to 10 pm  559.480.7902 or 1.499.802.2800  Text \"Support\" to 15987     National [employer] Mental Illness (HERO)  712.326.4265 or 1.888.HERO.HELPS        Mental Health Apps  My3  https://myRetroficiencypp.org/     VirtualHopeBox  https://Rhythm NewMedia.org/apps/virtual-hope-box/        Additional Information  Today you were seen by a licensed mental health professional through Triage and Transition services, [facility] ([facility])  for a crisis assessment in the Emergency Department at [facility].  It is recommended that you follow up with your established providers (psychiatrist, mental health therapist, and/or primary care doctor - as relevant) as soon as possible. Coordinators from P will be calling you in the next 24-48 hours to ensure that you have the resources you need.  You can " also contact [facility] coordinators directly at [phone]. You may have been scheduled for or offered an appointment with a mental health provider. [facility] maintains an extensive network of licensed behavioral health providers to connect patients with the services they need.  We do not charge providers a fee to participate in our referral network.  We match patients with providers based on a patient's specific needs, insurance coverage, and location.  Our first effort will be to refer you to a provider within your care system, and will utilize providers outside your care system as needed.

## 2022-11-18 NOTE — ED NOTES
[facility] ED Mental Health Handoff Note:     Assuming care from: Dr Poncho Sanchez    Brief HPI: 37 year old male signed out to me by the above provider. See initial ED Provider note for full details of the presentation.   In brief, patient initially presented with persisting hallucinations and voices.  Patient was requesting medications stating that he ran out of his previous prescription and intermittently forgets.  Denies any SI or HI. Patient reported burning with urination intermittently.  Stated a few weeks ago he had some bilateral chest discomfort after coughing, however, that went away and he denies any chest pain or shortness of breath at this time.     Home meds reviewed and ordered/administered: No ---pharmacy to review  Medically stable for inpatient mental health admission: Yes.  Evaluated by mental health: No, DEC still to see  Safety concerns: At the time I received sign out, there were no safety concerns.    Hold Status:  Active Orders   N/A       Labs/Imaging:   Results for orders placed or performed during the hospital encounter of 11/17/22 (from the past 24 hour(s))   CBC with platelets differential     Status: None ()    Collection Time: 11/17/22  1:20 PM    Narrative    The following orders were created for panel order CBC with platelets differential.  Procedure                               Abnormality         Status                     ---------                               -----------         ------                     CBC with platelets and d...[[id number]]                                                   Please view results for these tests on the individual orders.   Urine Drugs of Abuse Screen     Status: None ()    Collection Time: 11/17/22  1:20 PM    Narrative    The following orders were created for panel order Urine Drugs of Abuse Screen.  Procedure                               Abnormality         Status                     ---------                               -----------          ------                     Drug abuse screen 77 uri...[[id number]]                                                   Please view results for these tests on the individual orders.         ED Meds:  Medications   OLANZapine (zyPREXA) tablet 10 mg (10 mg Oral Given 11/17/22 3198)     No orders to display       ED Course:  Met with patient and he is in agreement to meet with DEC. He is refusing lab work. Hasn't given urine sample.  There were no significant events during my shift.    Patient was signed out to the oncoming provider, Dr. Jeremy Gray at shift change    Impression:    ICD-10-CM    1. Hallucinations  R44.3           Plan:    1. Awaiting mental health evaluation/recommendations.    Rachna Chester MD  [facility] EMERGENCY DEPARTMENT  [address] [place] [id number] [phone]                   Rachna Chester MD  11/18/22 2866

## 2022-11-18 NOTE — ED NOTES
"Writer spoke to pt regarding discharge plan, pt requested to speak to provider to find out \"how that's possible\". MD informed and plans to speak to pt.  "

## 2022-11-18 NOTE — ED NOTES
Pt asked girlfriend to come visit. Writer informed pt that he is not able to have visitors at this time per MD. Pt unhappy about this. Continues to make phone calls.

## 2022-11-18 NOTE — ED NOTES
He is rousable but not waking up enough to hold a productive conversation. [facility] has returned the call and since I could not wake enough to speak to them the plan is to repage them when he is awake. He is refusing blood draw and meds at this time. I did advise the patient we need to start working on a plan for him. He needs to wake up more so we can talk abut what is going on with him today. Advised him I would come back into the room is a little bit to talk again. ED provider made aware.

## 2022-11-18 NOTE — ED NOTES
Pt became agitated on phone with girlfriend asking if she could give him a place to stay upon discharge, then told MD that he did not tell DEC that he refused admission. Now would like admission. DEC ipad set up in room for re-eval.
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
## 2022-11-18 NOTE — CONSULTS
11/17/2022  Hamazh Roberts 1985     Writer consulted with ED nurse, Mart,  on this date at 5:23 AM. It was determined that pt would not benefit from assessment at this time due to Pt unable able to participate in assessment at this time as he was given olanzapine 10 mgs and is currently sleeping. Nurse and [facility] agreed to allow pt to sleep.    ED will resubmit for DEC assessment once he is able to wake on his own and pt has been re evaluated to determine if a DEC is needed at that time.    Rachna Winslow, [facility], [facility]

## 2022-11-18 NOTE — DISCHARGE INSTRUCTIONS
Aftercare Plan  If I am feeling unsafe or I am in a crisis, I will: reach out to my girlfriend and Counts include 234 beds at the [facility] crisis line for their support.  Contact my established care providers   Call the National Suicide Prevention Lifeline: 988  Go to the nearest emergency room   Call 911     Kvngr encourage Pt to take his medications consistently as prescribed and keep all of scheduled appointments with his outpatient service providers.  Kvngr recommended Pt to engage in new outpatient psychiatry for medication management and individual therapy service to improve his coping skills.  Pt would also benefit from another CD Assessment/treatment service.   was not able to schedule Pt's new outpatient mental health appointments at this time. DEC Coordinator will contact Pt within next 1 or 2 business days to ensure coordination of care and provide assistance with appointments.      Warning signs that I or other people might notice when a crisis is developing for me: increased depression, isolation, feeling hopeless, worthless, racing thoughts, panic attacks, anxiety, paranoia, auditory hallucination, using substance, not taking medications, disrupted sleep and appetite.    Things I am able to do on my own to cope or help me feel better: taking medications, exercising, taking walks, spending time with my girlfriend, listening to music, deep breathing exercise, meditation and positive affirmations.     Things that I am able to do with others to cope or help me better: spending time with my girlfriend.    Things I can use or do for distraction: taking medications, exercising, taking walks, spending time with my girlfriend, listening to music, deep breathing exercise, meditation and positive affirmations.     Changes I can make to support my mental health and wellness: stop using meth and THC.  Being with sober people and sober environment to promote sobriety, taking psychiatric medications consistently.  Developing meaningful daily  routine and structure to stay busy.  Practicing good self-care skills, including getting adequate sleep/rest, healthy diet and regular exercise.  Joining a peer support group through [facility] [place] to increase support system.    [facility] (National Hessel on Mental Illness) improves the lives of children and adults with mental illnesses and their families by providing free classes on mental illnesses and support groups for adults with mental illnesses, parents and family members. For more information:  Phone: [phone]  Toll free: 4-702-BRGV-HELPS  Website: www.namihelps.orghttp://www.namPremier Health Miami Valley Hospital Northps.org/     People in my life that I can ask for help: my girlfriend and [facility] crisis line.    Your [facility] has a mental health crisis team you can call 24/7: [facility] Mobile Crisis  [phone] (adults)  [phone] (children)    Other things that are important when I'm in crisis: support from my girlfriend.  National Suicide Prevention Lifeline at 988  Throughout  [place]: call **CRISIS (**786209)  Crisis Text Line: is available for free, 24/7 by texting [place] to 777798    Additional resources and information: Below is a list of FREE Mental Health Options in the List of hospitals in [place] Area:    [facility] ([facility])  Serves those in emotional crisis with 24-hour, seven-day-a-week crisis counseling, assessment, referral, and medication management.   Suicidal: [phone] Consultation: [phone]  [address] [place] [address] 24/7 Crisis Intervention Center     Walk-in Counseling Center  [phone]  Serves those in need of free outpatient mental health care  Hours: Mon, Wed, Fri 1-3pm; Mon-Thurs 6:30-8:30pm    [facility]  [address] [place] [address]  [phone]     Substance Use Disorder Direct Access Resources    It is recommended that you abstain from all mood altering chemicals. Please contact the sober support hotline (908-571-2267) as needed; phones are answered 24 hours  a day, 7 days a week.    To access substance use treatment you must have a comprehensive assessment completed to begin any treatment program.     If uninsured, please contact your county of residence for eligibility screen to substance use disorder evaluation and treatment:    [place] - [phone]   [place] - [phone]   [place] - [phone]   [place] - [phone]   [place] - [phone]   [place] - [phone]   [facility] [phone]   [place] - [phone]     If you have private insurance, call the customer service number on the back of your insurance card to find an in-network substance abuse use disorder assessment. The ideal provider will be a treatment facility, licensed in the [facility].     Community NURIS Evaluations: Clients may call their county for a full list of providers - Availability and services listed belo are subject to change, please call the provider to confirm    [facility]  [phone]  [address] [place] [address], [address]  *Please call the above number to schedule a comprehensive assessment for determination of level of care needs. In person and virtual appointments available Mon-Fri.    [facility], [address], First Floor, [address], [place] [address] (next to the outpatient lab)    Phone: [phone]   Provides bridging services to people with Opiate Use Disorders (OUD) seeking care. This is a front door to Medication Assisted Treatments (MAT), ages 16+  Walk In hours: Monday-Friday 9:00am-3:00pm    [facility]  [phone]  Walk in Assessments: Mon-Friday 7a-1:45p  [address], [place], [address]    [facility] - [facility]  Central Access [phone]  [address] [place] [address], [address]  *by appointment only    [facility]  [phone] (phone consultation available 24/7)  Locations in: [place], [facility], and [place], [place]  Indian virtual IOP  [phone] or visit Gregor.[employer]/VIANEY   Also offers LGBTQ programming     [facility]  [phone]  [address] [facility]  [place], [place], [address]  *Currently only offered via telehealth - call to set up an appointment    [facility]  [address] [place], [place], [address]  [facility] Recovery Program  For more information to to make a referral call:  [phone]  Walk-in on   9-11 a.m.    [facility]  [phone]  [address] [place], [place], [address]  *available by appointments only    [facility]  [phone]  [address] [place], [place], [address]  *available by appointment only    [facility]  [phone]  [address] [place], [place], [address]  *walk in assessments available M-F starting at 7 am.    [facility]  [phone]  Locations: [facility], [facility], and [place]  *Walk in assessments availble M-F starting at 8 am -virtual only    [facility]  [phone]  [address] [place] [address]    [facility]  Virtual + Locations: [facility], [place], [place], [facility], [facility], [place], [place]   [phone]  *available by appointment only    [facility]  [phone]  [address]  [place], [address]    [facility])  [phone]  [address] [place], [place], [address]  *available by appointment    [facility]  [phone]  [address] [place], [place], [address]  *walk ins available M-TH from 9-3    [facility]  MAT program: [phone]  [address] [place], [place], [address]    [place]  [phone]  Same day substance use disorder assessments are available Monday - Friday, via walk-in or by appointment at the [place] location.  [address] [facility], [address], [place] [address]     [facility] - adolescent  and adult SUDs services  [phone]  Offer services Monday through Friday, as well as evening hours Monday through Thursday. Normally, a first appointment will be scheduled within one week  [email or web address]  Locations all over [place]    If you are intoxicated, you may be required to detox at a detox facility before starting treatment. The following are detox facilities that you can self present to. All detox facilities are able to help you complete an assessment prior to discharge if you choose:    [facility]: Arrive at a [facility] for immediate medical evaluation    [facility]: [address] [place], [place], [address].         [phone]    [facility] [place]: [address] [place], [place], [address]  [phone]     [facility] ([facility]): [address] [place], [place], [address] [phone]     [facility]: [address] [place], [place], [address], [phone]         Ways to help cope with sobriety:    -- Take prescribed medicines as scheduled  -- Keep follow-up appointments  -- Talk to others about your concerns  -- Get regular exercise  -- Practice deep breathing skills  -- Eat a healthy diet  -- Use community resources, including hotline numbers, [facility] crisis and support meetings  -- Stay sober and avoid places/people/things associated with substance use  --Maintain a daily schedule/routine  --Get at least 7-8 hours of sleep per night  --Create a list 10--20 healthy activities that you can do that are enjoyable and do not involve substance use  --Create daily goals (approx. 1-4 goals) per day and work to achieve them throughout the day.       Free Resources:    [place] Recovery Connection (MRC)  [facility] connects people seeking recovery to resources that help foster and sustain long-term recovery. Whether you are seeking resources for treatment, transportation, housing, job  training, education, health care or other pathways to recovery, [facility] is a great place to start.  Phone: 918.824.1741. www.[place]Lingvist.org (Great listing of all types of recovery and non-recovery related resources)    Alcoholics Anonymous  Phone: 3-239-ALCOHOL  Website: HTTP://WWW.AA.ORG/  AA [place] (383-835-0488 or http://aamin[place].org)  AA [place] (864-623-0820 or www.aastpaul.org)     Narcotics Anonymous  Phone: 240.467.8830  Website: www.Mob Science.Vaccinogen.    [employer] [address], [place], [place],  Phone: [phone]  Drop-in Hours: Monday-Friday 9-11:30 am. By appointment at other times.  Provides: [facility] is a drop-in center on the [place] side of [place] that provides a safe space for individuals who are homeless and have a history of chemical use. Sobriety is not a requirement but drugs and alcohol are not allowed on the property.  Services: Non-clients can access drop-in services such as Recovery and Harm Reduction Groups, referrals to case management, community activities, shower facilities, and a pool table. Individuals who are homeless and have chemical health needs may be eligible for enrollment into [facility]'s case management program. Clients and  work together to access benefits, treatment, health care, shelter, and external housing resources.    [facility], start with Adult Shelter Connect Overnight Shelter: [phone]  *Phone lines are closed between 5:30-7:30 pm    [address] (enter on the [address] side near the [address] corner)  Walk-in Hours: 9 am - 5:30 pm Monday-Friday and 1 pm - 5:30 pm Saturday and Sunday    [facility]  [phone] [address] [facility]  [phone]  [facility] Shelter  [phone]  [address] [place] [address]  [phone]  [facility]  [facility]  [phone]  [address] [place] [address]    To start making a plan for a more long-term solution, contact the Coordinated Entry Homeless Assistance Program:    Coordinated Entry Homeless Assistance  [facility]  [address] [address]  [phone]  Open Wednesdays and Thursdays 8 am-2 pm  The Coordinated Entry System is the [facility]'s approach to organizing and providing housing services for people experiencing homelessness in [facility].  Because housing resources are limited, this process is designed to ensure that individuals and families with the highest vulnerability, service needs, and length of homelessness receive top priority in housing placement.    Assessment changes due to COVID-19 virus    [facility] family assessors will now be conducting assessments by phone. If you are working with a family staying in a place other than a Counts include 234 beds at the [facility] shelter and is eligible for Coordinated Entry, continue to contact [facility]  at [phone] to set up an assessment.    For single adults, [facility] will be suspending drop-in hours at [facility]. To have a Coordinated Entry assessment completed, call the [facility] Services' certified assessors: Ashutosh at [phone] or Sherry at [phone] directly to set up a time to meet    Call 211 at any time on any day for questions about services and resources available to you.      Family Shelters    [facility] Shelter Team  [phone]  [address] [place] [address], Floors 5 & 6              Youth, families & disabled adults    Hours: Mon-Fri 8:00 am-4:30 pm.  After-Hours Shelter Team  211      Drop-Ins    [facility]  [phone]  [address] [place] [address] ([facility])              Showers/Laundry (8-11am)              Health Care              Employment Services              Breakfast (7-8 am)              Lunch  "(11:30am-12:30pm)    Hours: Mon-Sat: Summer 7am-3pm; Winter 7am-4pm.      [facility] [phone]  [address] [place] [address]  Hours: Mon, Wed and Fri 9:00-11:30 am      Clothing    [facility]  [phone]  [address]  Hours: M-Th 10-11:30am & 1:30-3:30pm; Sat/Sun 9:30-10:30 am      Free Meals & Showers    [facility]  [phone]  [address] [place] [address]  Dinner: Mon-Fri 5:30-6:30pm (No showers)    [place]  [phone]  Meals ([address]): Women & Children M-F 8:30-9am; Everyone: M-F 12-1pm; Sat/Sun, 10:30-11:30 am  Showers ([address]): Mon-Fri 9-10 am    [facility]  [phone]  [address]  Dinner: Thurs - Mon 6 pm  Showers: Daily 8 - 4:30 pm    Health Care    [facility] Care for the Homeless  [phone]  Clinics are in [place] shelters/drop-in centers.  Hours: Mon-Fri at varying sites. Call for sites/times. No insurance or appts required to receive care.  Clinic sites: Adult [facility], [facility], [facility], [facility], [facility], [facility], [facility], [facility], [facility]       Crisis Lines  Crisis Text Line  Text 847471  You will be connected with a trained live crisis counselor to provide support.    Por espanol, texto  FAISAL a 669123 o texto a 442-AYUDAME en WhatsApp    [facility] (LGBTQ Youth Crisis Line)  4.199.262.3614  text START to 158-751      Community Resources  Fast Tracker  Linking people to mental health and substance use disorder resources  fasttrackermn.org     [place] Mental Health Warm Line  Peer to peer support  Monday thru Saturday, 12 pm to 10 pm  348.106.6073 or 1.995.449.3217  Text \"Support\" to [address]    National [place] on Mental Illness (HERO)  135.986.2739 or 1.888.HERO.HELPS      Mental Health Apps  My3  https://myCollabFinderpp.org/    VirtualPipeliner CRMeBox  " https://Aspectiva/apps/virtual-hope-box/      Additional Information  Today you were seen by a licensed mental health professional through Triage and Transition services, Behavioral Healthcare Providers (P)  for a crisis assessment in the Emergency Department at [facility].  It is recommended that you follow up with your established providers (psychiatrist, mental health therapist, and/or primary care doctor - as relevant) as soon as possible. Coordinators from [facility] will be calling you in the next 24-48 hours to ensure that you have the resources you need.  You can also contact [facility] coordinators directly at [phone]. You may have been scheduled for or offered an appointment with a mental health provider. [facility] maintains an extensive network of licensed behavioral health providers to connect patients with the services they need.  We do not charge providers a fee to participate in our referral network.  We match patients with providers based on a patient's specific needs, insurance coverage, and location.  Our first effort will be to refer you to a provider within your care system, and will utilize providers outside your care system as needed.

## 2022-11-18 NOTE — ED NOTES
Pt gave permission for girlfriend to get info. Writer gave update to girlfriend re plan of care/admission.

## 2022-11-18 NOTE — ED NOTES
"[facility] attempted to meet with Patient, he was seeping and needed the RN to wake him up and ask him to sit up. He refused to remove his holloway. Patient appeared very tired and when asked about why he was so tired he reported \"I get like this when im not using\". Patient reported that stopped his MH medication 2 days and has no more left. He denied SI/SIB and reported hearing voices. He was unable to give details regarding the voices and needed to be re-woken up several times.     [facility] spoke with Doctor Ohl who agreed to let Patient sleep and request another DEC when Patient is more awake.     LYN Covarrubias, LICSW, [facility]  DEC - Triage & Transition Services  Callback: [phone]    "

## 2022-11-18 NOTE — ED NOTES
DEC paged for assessment, ipad set up in room. Pt advised DEC will call in to talk to him, did not respond verbally to this information.

## 2022-11-18 NOTE — PHARMACY-ADMISSION MEDICATION HISTORY
Pharmacy Note - Admission Medication History    Pertinent Provider Information: Reviewed patient's med list per provider's request. Reviewed fill history and updated list based on recent fill history. Tried to speak with patient- patient refused to speak with writer.      ______________________________________________________________________    Prior To Admission (PTA) med list completed and updated in EMR.       PTA Med List   Medication Sig Last Dose     QUEtiapine (SEROQUEL) 200 MG tablet Take 1 tablet (200 mg) by mouth nightly as needed Unknown     risperiDONE (RISPERDAL) 3 MG tablet TAKE 2 TABLETS BY MOUTH DAILY AT BEDTIME Unknown at -       Information source(s): [facility]  Method of interview communication: N/A, tried to speak with patient, but would not acknowledge writer    Summary of Changes to PTA Med List  New: none  Discontinued: buspar, gabapentin, hydroxyzine, trazodone  Changed: updated risperidone strength/instructions      In the past week, patient estimated taking medication this percent of the time:  Unknown. Patient refused to speak to writer.     Allergies were reviewed, assessed, and updated with the patient.      Patient does not use any multi-dose medications prior to admission.    The information provided in this note is only as accurate as the sources available at the time of the update(s).    Thank you for the opportunity to participate in the care of this patient.    LYUDMILA VALDERRAMA RPH  11/18/2022 11:48 AM

## 2022-11-18 NOTE — PLAN OF CARE
Hamzah Roberts  November 18, 2022  Plan of Care Hand-off Note     Patient Care Path: Inpatient mental health    Plan for Care:     Pt has a history of schizophrenia, depression, anxiety and substance abuse.  Pt brought to the ER yesterday by his girlfriend due to worsening of auditory hallucination and running out of his psychiatric medications.  Pt also reported he has been using meth and THC daily.  Pt spent overnight in the ER and he appeared to be at baseline as he was able to sleep, eat and take medications in the ER.  Pt endorsed increased depression, anxiety, disrupted sleep and appetite.  Pt endorsed auditory hallucination and paranoia.  Pt denied having commanding auditory hallucination and visual hallucination.  Pt denied having suicidal and homicidal ideations.  Writer recommended Pt to engage in new outpatient psychiatry for medication management and individual therapy service.  Pt would also benefit from another CD Assessment/treatment.  Pt changed his mind and refused to be discharged back to his girlfriend's place as he will likely to further decompensate with acute psychosis.  Pt requested inpatient service for further mental health stabilization, safety assessment and medication management.  Dr. Thomas reviewed and agreed with inpatient service disposition.  Extended care service will follow up with Pt while on boarding.    Critical Safety Issues: None reported.  Pt denied having suicidal, homicidal ideations and commanding auditory hallucination.    Overview:  This patient is a child/adolescent: No    This patient has additional special visitor precautions: No    Legal Status: Voluntary    Contacts:   renanfrienIrma bill: Contact [phone]    Updated Attending Provider and Pt's girlfriendIrma  regarding plan of care.    ANANDA Mcneal

## 2022-11-18 NOTE — ED NOTES
Pt back to lying down in bed, covered head to toe with blanket. Writer reapproached for bloodwork, pt continues to refuse.

## 2022-11-18 NOTE — ED NOTES
Writer spoke with girlfriend Irma on phone who was asking to speak with pt. Pt was currently speaking with DEC so writer assured Irma that her message will be passed to pt. Irma's number: [phone]

## 2022-11-18 NOTE — ED NOTES
Dr. Sanchez updated on patient refusing all comfort measures and being uncooperative and very withdrawn.  In addition, writer notified Dr. Sanchez of [facility] for home medications to be ordered.

## 2022-11-18 NOTE — ED NOTES
[facility] ED Mental Health Handoff Note:     Assuming care from: Dr Yared Thomas    Brief HPI: 37 year old male signed out to me by the above provider. See initial ED Provider note for full details of the presentation.   In brief, patient has a history of schizophrenia and reports worsening auditory and visual hallucinations over the past month.    Home meds reviewed and ordered/administered: No  Medically stable for inpatient mental health admission: Yes.  Evaluated by mental health: Yes. The recommendation is for inpatient mental health treatment. Bed search in process  Safety concerns: At the time I received sign out, there were no safety concerns.    Hold Status:  Active Orders   N/A         Labs/Imaging:   Results for orders placed or performed during the hospital encounter of 11/17/22 (from the past 24 hour(s))   Urine Drugs of Abuse Screen     Status: Abnormal    Collection Time: 11/18/22 10:25 AM    Narrative    The following orders were created for panel order Urine Drugs of Abuse Screen.  Procedure                               Abnormality         Status                     ---------                               -----------         ------                     Drug abuse screen 77 uri...[[id number]]  Abnormal            Final result                 Please view results for these tests on the individual orders.   Drug abuse screen 77 urine (FL, RH, SH)     Status: Abnormal    Collection Time: 11/18/22 10:25 AM   Result Value Ref Range    Amphetamines Urine Screen Positive (A) Screen Negative    Barbituates Urine Screen Negative Screen Negative    Benzodiazepine Urine Screen Negative Screen Negative    Cannabinoids Urine Screen Positive (A) Screen Negative    Opiates Urine Screen Negative Screen Negative    PCP Urine Screen Negative Screen Negative    Cocaine Urine Screen Negative Screen Negative   Asymptomatic COVID-19 Virus (Coronavirus) by PCR Nasopharyngeal     Status: Normal    Collection Time:  11/18/22 12:13 PM    Specimen: Nasopharyngeal; Swab   Result Value Ref Range    SARS CoV2 PCR Negative Negative    Narrative    Testing was performed using the Xpert Xpress SARS-CoV-2 Assay on the Cepheid Gene-Xpert Instrument Systems. Additional information about this Emergency Use Authorization (EUA) assay can be found via the Lab Guide. This test should be ordered for the detection of SARS-CoV-2 in individuals who meet SARS-CoV-2 clinical and/or epidemiological criteria as well as from individuals without symptoms or other reasons to suspect COVID-19. Test performance for asymptomatic patients has only been established in anterior nasal swab specimens. This test is for in vitro diagnostic use under the FDA EUA for laboratories certified under CLIA to perform high complexity testing. This test has not been FDA cleared or approved. A negative result does not rule out the presence of PCR inhibitors in the specimen or target RNA concentration below the limit of detection for the assay. The possibility of a false negative should be considered if the patient's recent exposure or clinical presentation suggests COVID-19.. This test was validated by the [facility]. This laboratory is certified under the Clinical Laboratory Improvement Amendments (CLIA) as qualified to perform high complexity laboratory testing.     CBC with platelets differential     Status: None    Collection Time: 11/18/22 12:25 PM    Narrative    The following orders were created for panel order CBC with platelets differential.  Procedure                               Abnormality         Status                     ---------                               -----------         ------                     CBC with platelets and d...[[id number]]                      Final result                 Please view results for these tests on the individual orders.   Basic metabolic panel     Status: Abnormal    Collection Time:  11/18/22 12:25 PM   Result Value Ref Range    Sodium 137 136 - 145 mmol/L    Potassium 3.9 3.4 - 5.3 mmol/L    Chloride 102 98 - 107 mmol/L    Carbon Dioxide (CO2) 24 22 - 29 mmol/L    Anion Gap 11 7 - 15 mmol/L    Urea Nitrogen 11.8 6.0 - 20.0 mg/dL    Creatinine 0.96 0.67 - 1.17 mg/dL    Calcium 8.8 8.6 - 10.0 mg/dL    Glucose 194 (H) 70 - 99 mg/dL    GFR Estimate >90 >60 mL/min/1.73m2   Ethyl Alcohol Level     Status: Normal    Collection Time: 11/18/22 12:25 PM   Result Value Ref Range    Alcohol ethyl <0.01 <=0.01 g/dL   CBC with platelets and differential     Status: None    Collection Time: 11/18/22 12:25 PM   Result Value Ref Range    WBC Count 6.9 4.0 - 11.0 10e3/uL    RBC Count 5.02 4.40 - 5.90 10e6/uL    Hemoglobin 16.1 13.3 - 17.7 g/dL    Hematocrit 46.4 40.0 - 53.0 %    MCV 92 78 - 100 fL    MCH 32.1 26.5 - 33.0 pg    MCHC 34.7 31.5 - 36.5 g/dL    RDW 11.9 10.0 - 15.0 %    Platelet Count 238 150 - 450 10e3/uL    % Neutrophils 67 %    % Lymphocytes 27 %    % Monocytes 5 %    % Eosinophils 1 %    % Basophils 0 %    % Immature Granulocytes 0 %    NRBCs per 100 WBC 0 <1 /100    Absolute Neutrophils 4.6 1.6 - 8.3 10e3/uL    Absolute Lymphocytes 1.9 0.8 - 5.3 10e3/uL    Absolute Monocytes 0.4 0.0 - 1.3 10e3/uL    Absolute Eosinophils 0.0 0.0 - 0.7 10e3/uL    Absolute Basophils 0.0 0.0 - 0.2 10e3/uL    Absolute Immature Granulocytes 0.0 <=0.4 10e3/uL    Absolute NRBCs 0.0 10e3/uL         ED Meds:  Medications   LORazepam (ATIVAN) tablet 1 mg (has no administration in time range)   OLANZapine (zyPREXA) injection 10 mg (has no administration in time range)   melatonin tablet 3 mg (has no administration in time range)   hydrOXYzine (ATARAX) tablet 25 mg (has no administration in time range)   gabapentin (NEURONTIN) capsule 400 mg (400 mg Oral Not Given 11/18/22 0926)   busPIRone (BUSPAR) tablet 5 mg (5 mg Oral Not Given 11/18/22 0926)   OLANZapine (zyPREXA) tablet 10 mg (10 mg Oral Given 11/17/22 8079)    risperiDONE (risperDAL) tablet 1 mg (1 mg Oral Given 11/18/22 0923)     No orders to display       ED Course:  There were no significant events during my shift.       Patient was signed out to the oncoming provider, Dr. Jeanette Zimmerman at shift change    Impression:    ICD-10-CM    1. Hallucinations  R44.3       2. Polysubstance abuse (H)  F19.10       3. Encounter for medication refill  Z76.0           Plan:    1. Awaiting inpatient mental health admission/transfer.      I, Marquis Jimenez, am serving as a scribe to document services personally performed by Dr. Anni Mello, based on my observations and the provider's statements to me.  I, Anni Mello DO, attest that Marquis Jimenez is acting in a scribe capacity, has observed my performance of the services and has documented them in accordance with my direction.     Anni Mello DO  [facility] EMERGENCY DEPARTMENT  [address] [place] [id number] [phone]                   Anni Mello DO  11/18/22 9084

## 2022-11-18 NOTE — ED NOTES
Patient accepted offer for food and drink.  Patient provided turkey sandwich, apple sauce, jello and orange juice.

## 2022-11-18 NOTE — ED NOTES
[facility] ED Mental Health Handoff Note:     Assuming care from: Dr Jeremy Gray    Brief HPI: 37 year old male signed out to me by the above provider. See initial ED Provider note for full details of the presentation.   In brief, patient has a history of schizophrenia and has been admitted for delusions and hallucinations in the past. He presents currently with auditory and visual hallucinations that have been ongoing for the past month. He ran out of his medication Risperidone. He is requesting a refill on his medications. Denies SI or HI.     Home meds reviewed and ordered/administered: No  Medically stable for inpatient mental health admission: No. Awaiting lab results.  Evaluated by mental health: No. Awaiting clinical sobriety before evaluation.  Safety concerns: At the time I received sign out, there were no safety concerns.    Hold Status:  Active Orders   N/A     Voluntary      Labs/Imaging:   Results for orders placed or performed during the hospital encounter of 11/17/22 (from the past 24 hour(s))   CBC with platelets differential     Status: None ()    Collection Time: 11/17/22  1:20 PM    Narrative    The following orders were created for panel order CBC with platelets differential.  Procedure                               Abnormality         Status                     ---------                               -----------         ------                     CBC with platelets and d...[[id number]]                                                   Please view results for these tests on the individual orders.   Urine Drugs of Abuse Screen     Status: None ()    Collection Time: 11/17/22  1:20 PM    Narrative    The following orders were created for panel order Urine Drugs of Abuse Screen.  Procedure                               Abnormality         Status                     ---------                               -----------         ------                     Drug abuse screen 77 uri...[[id number]]                                                    Please view results for these tests on the individual orders.         ED Meds:  Medications   LORazepam (ATIVAN) tablet 1 mg (has no administration in time range)   OLANZapine (zyPREXA) injection 10 mg (has no administration in time range)   melatonin tablet 3 mg (has no administration in time range)   hydrOXYzine (ATARAX) tablet 25 mg (has no administration in time range)   gabapentin (NEURONTIN) capsule 400 mg (has no administration in time range)   risperiDONE (risperDAL) tablet 1 mg (has no administration in time range)   busPIRone (BUSPAR) tablet 5 mg (has no administration in time range)   OLANZapine (zyPREXA) tablet 10 mg (10 mg Oral Given 11/17/22 1419)     No orders to display       ED Course:  ED Course as of 11/18/22 0717   Thu Nov 17, 2022   1415 37-year male history of schizophrenia, has been admitted last year for delusions and hallucinations, here with his girlfriend with concerns of persisting hallucinations and voices.  Patient here requesting medications stating that he ran out of his previous prescription and intermittently forgets.  Denies any SI or HI.  Overall is calm in the room.  Intermittently making eye contact.  Does not seem to be responding to any internal stimuli.  Does not seem to be aggressive.  Physical examination otherwise unremarkable.  Patient reports burning with urination intermittently.  States few weeks ago he had some bilateral chest discomfort after coughing however that went away and denies any chest pain or shortness of breath at this time.  Patient declining any blood work at this time.  States he will give a urine sample.  Otherwise vitals are stable.  Patient is requesting Zyprexa orally.  Medications ordered.   2209 Sign out: h/o schizophrenia. Voluntary but holdable.                Impression:    ICD-10-CM    1. Hallucinations  R44.3       2 substance-induced mood disorder  3 polysubstance abuse    Plan:    1. Awaiting  evaluation by DEC.  Blood work not drawn.  9:03 AM.  Nurse instructed to draw blood work.  9:30 AM.  Patient refusing blood draws.  Did take respite all.  Nurse instructed to call back for evaluation  11:10 AM.  Patient discussed with DEC.  DEC reports patient simply ran out of his medications.  Does report increased hallucinations but noncommand hallucinations.  Admits to continued methamphetamine and marijuana use.  Declines services presently.  Just wishing to have his medications refilled.  We will verify medications with pharmacy.  11:54 AM.  Patient reports he does not wish to be discharged and wishes to have inpatient therapy.  Patient was noted to have a disagreement with his girlfriend on the phone just prior to this report.  12 PM.  Discussed with DEC .  He will look for inpatient bed.  3:39 PM.  Patient discussed with Dr. Mello at end of shift.  Patient given absence of suicidality and other threats patient is entirely voluntary.  If he should decide to leave I think would be reasonable to allow him to depart with prescriptions.  Yared Tohmas MD  [facility] EMERGENCY DEPARTMENT  [address] [place] [id number] [phone]        Current Discharge Medication List      START taking these medications    Details   !! QUEtiapine (SEROQUEL) 200 MG tablet Take 1 tablet (200 mg) by mouth nightly as needed  Qty: 15 tablet, Refills: 0      !! risperiDONE (RISPERDAL) 3 MG tablet Take 2 tablets (6 mg) by mouth At Bedtime for 15 days  Qty: 30 tablet, Refills: 0       !! - Potential duplicate medications found. Please discuss with provider.                 Yared Thomas MD  11/18/22 1120       Yared Thomas MD  11/18/22 3722

## 2022-11-18 NOTE — ED NOTES
Patient offered toileting, food and water every two hours and has declined all comfort measures.  Dr. Chester updated, no new orders at this time.

## 2022-11-18 NOTE — ED NOTES
[facility] ED Mental Health Handoff Note:     Assuming care from: Dr Rachna Chester    Brief HPI: 37 year old male signed out to me by the above provider. See initial ED Provider note for full details of the presentation.   In brief, patient initially presented with persisting hallucinations and voices.  Patient was requesting medications stating that he ran out of his previous prescription and intermittently forgets.  Denies any SI or HI. Patient reported burning with urination intermittently.  Stated a few weeks ago he had some bilateral chest discomfort after coughing, however, that went away and he denies any chest pain or shortness of breath at this time.     Home meds reviewed and ordered/administered: Yes  Medically stable for inpatient mental health admission: Yes.  Evaluated by mental health: No. Awaiting clinical sobriety before evaluation.  Safety concerns: At the time I received sign out, there were no safety concerns.    Hold Status:  Active Orders   N/A       Labs/Imaging:   Results for orders placed or performed during the hospital encounter of 11/17/22 (from the past 24 hour(s))   CBC with platelets differential     Status: None ()    Collection Time: 11/17/22  1:20 PM    Narrative    The following orders were created for panel order CBC with platelets differential.  Procedure                               Abnormality         Status                     ---------                               -----------         ------                     CBC with platelets and d...[[id number]]                                                   Please view results for these tests on the individual orders.   Urine Drugs of Abuse Screen     Status: None ()    Collection Time: 11/17/22  1:20 PM    Narrative    The following orders were created for panel order Urine Drugs of Abuse Screen.  Procedure                               Abnormality         Status                     ---------                                -----------         ------                     Drug abuse screen 77 uri...[[id number]]                                                   Please view results for these tests on the individual orders.         ED Meds:  Medications   LORazepam (ATIVAN) tablet 1 mg (has no administration in time range)   OLANZapine (zyPREXA) injection 10 mg (has no administration in time range)   melatonin tablet 3 mg (has no administration in time range)   hydrOXYzine (ATARAX) tablet 25 mg (has no administration in time range)   gabapentin (NEURONTIN) capsule 400 mg (has no administration in time range)   risperiDONE (risperDAL) tablet 1 mg (has no administration in time range)   busPIRone (BUSPAR) tablet 5 mg (has no administration in time range)   OLANZapine (zyPREXA) tablet 10 mg (10 mg Oral Given 11/17/22 1419)     No orders to display       ED Course:  ED Course as of 11/18/22 0618   Thu Nov 17, 2022   1415 37-year male history of schizophrenia, has been admitted last year for delusions and hallucinations, here with his girlfriend with concerns of persisting hallucinations and voices.  Patient here requesting medications stating that he ran out of his previous prescription and intermittently forgets.  Denies any SI or HI.  Overall is calm in the room.  Intermittently making eye contact.  Does not seem to be responding to any internal stimuli.  Does not seem to be aggressive.  Physical examination otherwise unremarkable.  Patient reports burning with urination intermittently.  States few weeks ago he had some bilateral chest discomfort after coughing however that went away and denies any chest pain or shortness of breath at this time.  Patient declining any blood work at this time.  States he will give a urine sample.  Otherwise vitals are stable.  Patient is requesting Zyprexa orally.  Medications ordered.   2209 Sign out: h/o schizophrenia. Voluntary but holdable.        There were no significant events during my shift.  However  patient still very sleepy and groggy after initial medications and coming down from suspected stimulus likely.  Patient still awaiting mental health assessment with the DEC .    Patient was signed out to the oncoming provider, Dr. Yared Thomas at shift change    Impression:    ICD-10-CM    1. Hallucinations  R44.3           Plan:    1. Awaiting mental health evaluation/recommendations.    Al Gray MD  [facility] EMERGENCY DEPARTMENT  [address] [place] [id number] [phone]                   Al Gray MD  11/18/22 0618

## 2022-11-18 NOTE — PROGRESS NOTES
Spoke with nurse to inform family that they can bring in his CPAP unit from home.  RT can assist in the setup, if needed.    Hector Vega, RT  11/18/2022    
axilla

## 2022-11-18 NOTE — ED NOTES
Pt took risperdal which he says is his only home med. Refused buspirone and gabapentin. Also refused labs, states he may allow staff to draw them later. Writer advised that we also need urine sample, pt states he does not need to urinate at this time.

## 2022-11-19 ENCOUNTER — TELEPHONE (OUTPATIENT)
Dept: BEHAVIORAL HEALTH | Facility: CLINIC | Age: 37
End: 2022-11-19

## 2022-11-19 ASSESSMENT — ACTIVITIES OF DAILY LIVING (ADL)
ADLS_ACUITY_SCORE: 35
ADLS_ACUITY_SCORE: 35

## 2022-11-19 NOTE — ED NOTES
"[facility] ED Mental Health Handoff Note:     Assuming care from: Dr Anni Mello    Brief HPI: 37 year old male signed out to me by the above provider. See initial ED Provider note for full details of the presentation.   In brief, patient is schizophrenic. DEC assessed and recommended inpatient admission. Patient is not holdable.     Home meds reviewed and ordered/administered: {Yes and No:160278}  Medically stable for inpatient mental health admission: {Yes or No:991477}.  Evaluated by mental health: {Yes/No Plan:820887}  Safety concerns: At the time I received sign out, {safety:567981}.    Hold Status:  Active Orders   N/A       {Select text if pediatric:015017::\" \"}    Labs/Imaging:   Results for orders placed or performed during the hospital encounter of 11/17/22 (from the past 24 hour(s))   Urine Drugs of Abuse Screen     Status: Abnormal    Collection Time: 11/18/22 10:25 AM    Narrative    The following orders were created for panel order Urine Drugs of Abuse Screen.  Procedure                               Abnormality         Status                     ---------                               -----------         ------                     Drug abuse screen 77 uri...[[id number]]  Abnormal            Final result                 Please view results for these tests on the individual orders.   Drug abuse screen 77 urine (FL, RH, SH)     Status: Abnormal    Collection Time: 11/18/22 10:25 AM   Result Value Ref Range    Amphetamines Urine Screen Positive (A) Screen Negative    Barbituates Urine Screen Negative Screen Negative    Benzodiazepine Urine Screen Negative Screen Negative    Cannabinoids Urine Screen Positive (A) Screen Negative    Opiates Urine Screen Negative Screen Negative    PCP Urine Screen Negative Screen Negative    Cocaine Urine Screen Negative Screen Negative   Asymptomatic COVID-19 Virus (Coronavirus) by PCR Nasopharyngeal     Status: Normal    Collection Time: 11/18/22 12:13 PM    " Specimen: Nasopharyngeal; Swab   Result Value Ref Range    SARS CoV2 PCR Negative Negative    Narrative    Testing was performed using the Xpert Xpress SARS-CoV-2 Assay on the Cepheid Gene-Xpert Instrument Systems. Additional information about this Emergency Use Authorization (EUA) assay can be found via the Lab Guide. This test should be ordered for the detection of SARS-CoV-2 in individuals who meet SARS-CoV-2 clinical and/or epidemiological criteria as well as from individuals without symptoms or other reasons to suspect COVID-19. Test performance for asymptomatic patients has only been established in anterior nasal swab specimens. This test is for in vitro diagnostic use under the FDA EUA for laboratories certified under CLIA to perform high complexity testing. This test has not been FDA cleared or approved. A negative result does not rule out the presence of PCR inhibitors in the specimen or target RNA concentration below the limit of detection for the assay. The possibility of a false negative should be considered if the patient's recent exposure or clinical presentation suggests COVID-19.. This test was validated by the [facility]. This laboratory is certified under the Clinical Laboratory Improvement Amendments (CLIA) as qualified to perform high complexity laboratory testing.     CBC with platelets differential     Status: None    Collection Time: 11/18/22 12:25 PM    Narrative    The following orders were created for panel order CBC with platelets differential.  Procedure                               Abnormality         Status                     ---------                               -----------         ------                     CBC with platelets and d...[[id number]]                      Final result                 Please view results for these tests on the individual orders.   Basic metabolic panel     Status: Abnormal    Collection Time: 11/18/22 12:25 PM    Result Value Ref Range    Sodium 137 136 - 145 mmol/L    Potassium 3.9 3.4 - 5.3 mmol/L    Chloride 102 98 - 107 mmol/L    Carbon Dioxide (CO2) 24 22 - 29 mmol/L    Anion Gap 11 7 - 15 mmol/L    Urea Nitrogen 11.8 6.0 - 20.0 mg/dL    Creatinine 0.96 0.67 - 1.17 mg/dL    Calcium 8.8 8.6 - 10.0 mg/dL    Glucose 194 (H) 70 - 99 mg/dL    GFR Estimate >90 >60 mL/min/1.73m2   Ethyl Alcohol Level     Status: Normal    Collection Time: 11/18/22 12:25 PM   Result Value Ref Range    Alcohol ethyl <0.01 <=0.01 g/dL   CBC with platelets and differential     Status: None    Collection Time: 11/18/22 12:25 PM   Result Value Ref Range    WBC Count 6.9 4.0 - 11.0 10e3/uL    RBC Count 5.02 4.40 - 5.90 10e6/uL    Hemoglobin 16.1 13.3 - 17.7 g/dL    Hematocrit 46.4 40.0 - 53.0 %    MCV 92 78 - 100 fL    MCH 32.1 26.5 - 33.0 pg    MCHC 34.7 31.5 - 36.5 g/dL    RDW 11.9 10.0 - 15.0 %    Platelet Count 238 150 - 450 10e3/uL    % Neutrophils 67 %    % Lymphocytes 27 %    % Monocytes 5 %    % Eosinophils 1 %    % Basophils 0 %    % Immature Granulocytes 0 %    NRBCs per 100 WBC 0 <1 /100    Absolute Neutrophils 4.6 1.6 - 8.3 10e3/uL    Absolute Lymphocytes 1.9 0.8 - 5.3 10e3/uL    Absolute Monocytes 0.4 0.0 - 1.3 10e3/uL    Absolute Eosinophils 0.0 0.0 - 0.7 10e3/uL    Absolute Basophils 0.0 0.0 - 0.2 10e3/uL    Absolute Immature Granulocytes 0.0 <=0.4 10e3/uL    Absolute NRBCs 0.0 10e3/uL         ED Meds:  Medications   LORazepam (ATIVAN) tablet 1 mg (has no administration in time range)   OLANZapine (zyPREXA) injection 10 mg (has no administration in time range)   melatonin tablet 3 mg (has no administration in time range)   hydrOXYzine (ATARAX) tablet 25 mg (has no administration in time range)   gabapentin (NEURONTIN) capsule 400 mg (400 mg Oral Not Given 11/18/22 0926)   busPIRone (BUSPAR) tablet 5 mg (5 mg Oral Not Given 11/18/22 0926)   OLANZapine (zyPREXA) tablet 10 mg (10 mg Oral Given 11/17/22 1419)   risperiDONE (risperDAL)  "tablet 1 mg (1 mg Oral Given 11/18/22 0923)     No orders to display       ED Course:  ED Course as of 11/18/22 2358   Thu Nov 17, 2022   1415 37-year male history of schizophrenia, has been admitted last year for delusions and hallucinations, here with his girlfriend with concerns of persisting hallucinations and voices.  Patient here requesting medications stating that he ran out of his previous prescription and intermittently forgets.  Denies any SI or HI.  Overall is calm in the room.  Intermittently making eye contact.  Does not seem to be responding to any internal stimuli.  Does not seem to be aggressive.  Physical examination otherwise unremarkable.  Patient reports burning with urination intermittently.  States few weeks ago he had some bilateral chest discomfort after coughing however that went away and denies any chest pain or shortness of breath at this time.  Patient declining any blood work at this time.  States he will give a urine sample.  Otherwise vitals are stable.  Patient is requesting Zyprexa orally.  Medications ordered.   2209 Sign out: h/o schizophrenia. Voluntary but holdable.        There {were/were no:403361} significant events during my shift.    Patient was signed out to the oncoming provider,  {[facility] ED Physican:415312} at shift change    Impression:    ICD-10-CM    1. Hallucinations  R44.3       2. Polysubstance abuse (H)  F19.10       3. Encounter for medication refill  Z76.0           Plan:    1. {Plan:810628::\"Awaiting mental health evaluation/recommendations.\"}    Jeanette Zimmerman MD  [facility] EMERGENCY DEPARTMENT  [address] [place] [id number] [phone]                "

## 2022-11-19 NOTE — ED NOTES
Patient girlfriend called and stated her phone was off and that she will come pick him up. RN asked her if she felt safe doing so and she said yes.

## 2022-11-19 NOTE — ED NOTES
RN went over discharge paper work and resources for patient as well has his prescriptions. Patient states he has no questions. Patient left in stable condition, calm and cooperative.

## 2022-11-19 NOTE — TELEPHONE ENCOUNTER
Bed search update (metro) @3:30pm     [facility] @ cap  [facility]: @ cap per website      [facility]: @ cap per website      [facility]: @ cap per website      [facility]: @ cap per website      [facility]: @ cap per website      [facility]: @ cap per website  [facility]: @ cap per website     Pt remains on work list until appropriate placement is available

## 2022-11-19 NOTE — TELEPHONE ENCOUNTER
Bed search update (metro) @ 02:05:     [facility] @ cap  [facility]: @ cap per website      [facility]: @ cap per website      [facility]: @ cap per website      [facility]: @ cap per website      [facility]: @ cap per website      [facility]: @ cap per website  [facility]: @ cap per website     Pt remains on work list until appropriate placement is available

## 2022-11-19 NOTE — ED NOTES
RN got vitals on patient and asked him what was going on with his gf and he had called her a couple times. He states she is going to come get him. MD states he is free to go if he has a ride.

## 2022-11-20 NOTE — TELEPHONE ENCOUNTER
R: The pt is currently in the [facility] ER awaiting voluntary placement for metro preference.     Intake Evening Bed Search (Done at 6:19 PM)    [facility] is posting 0 beds.   [facility] is posting 0 beds.  [facility] is posting 0 beds.  [facility] is posting 0 beds.  [facility] is posting 0 beds.  [facility] is posting 0 beds.  [facility] is posting 0 beds.  [facility] is posting 0 beds. Low acuity.    [facility] and [place] location at capacity. The pt remains on the waitlist. Intake continues to identify appropriate bed placement.

## 2022-11-27 ENCOUNTER — HOSPITAL ENCOUNTER (EMERGENCY)
Facility: HOSPITAL | Age: 37
Discharge: PSYCHIATRIC OR SUBSTANCE USE FACILITY | End: 2022-11-30
Attending: EMERGENCY MEDICINE | Admitting: EMERGENCY MEDICINE
Payer: COMMERCIAL

## 2022-11-27 DIAGNOSIS — R45.851 SUICIDAL IDEATION: ICD-10-CM

## 2022-11-27 DIAGNOSIS — R44.0 AUDITORY HALLUCINATIONS: ICD-10-CM

## 2022-11-27 PROCEDURE — C9803 HOPD COVID-19 SPEC COLLECT: HCPCS

## 2022-11-27 PROCEDURE — 99285 EMERGENCY DEPT VISIT HI MDM: CPT | Mod: 25

## 2022-11-27 ASSESSMENT — COLUMBIA-SUICIDE SEVERITY RATING SCALE - C-SSRS
ATTEMPT SINCE LAST CONTACT: NO
6. HAVE YOU EVER DONE ANYTHING, STARTED TO DO ANYTHING, OR PREPARED TO DO ANYTHING TO END YOUR LIFE?: NO
5. HAVE YOU STARTED TO WORK OUT OR WORKED OUT THE DETAILS OF HOW TO KILL YOURSELF? DO YOU INTEND TO CARRY OUT THIS PLAN?: NO
TOTAL  NUMBER OF INTERRUPTED ATTEMPTS SINCE LAST CONTACT: NO
1. SINCE LAST CONTACT, HAVE YOU WISHED YOU WERE DEAD OR WISHED YOU COULD GO TO SLEEP AND NOT WAKE UP?: YES
SUICIDE, SINCE LAST CONTACT: NO
TOTAL  NUMBER OF ABORTED OR SELF INTERRUPTED ATTEMPTS SINCE LAST CONTACT: NO
2. HAVE YOU ACTUALLY HAD ANY THOUGHTS OF KILLING YOURSELF?: YES
REASONS FOR IDEATION SINCE LAST CONTACT: COMPLETELY TO END OR STOP THE PAIN (YOU COULDN'T GO ON LIVING WITH THE PAIN OR HOW YOU WERE FEELING)

## 2022-11-27 ASSESSMENT — ACTIVITIES OF DAILY LIVING (ADL)
ADLS_ACUITY_SCORE: 35
ADLS_ACUITY_SCORE: 35

## 2022-11-27 NOTE — ED PROVIDER NOTES
"  Emergency Department Encounter     Evaluation Date & Time:   No admission date for patient encounter.    CHIEF COMPLAINT:  Mental Health Problem      Triage Note:Patient is here for a mental health evaluation.  States he is having auditory hallucinations.  Has been for a year.  Juan being homicidal or suicidal. Was here a week ago.  Is taking his meds \"off and on\". Talks about getting discharged early and without the proper medications.             Impression and Plan       FINAL IMPRESSION:    ICD-10-CM    1. Auditory hallucinations  R44.0       2. Suicidal ideation  R45.851             ED COURSE & MEDICAL DECISION MAKIN:48 PM: I met with the patient to gather history and to perform my initial exam. We discussed plans for the ED course, including diagnostic testing and treatment.     37 year old male, history of paranoid schizophrenia with hallucinations, psychosis and polysubstance abuse (methamphetamine and marijuana), who presents for evaluation of auditory hallucinations that have been ongoing for \"a long time\", which he describes as sounding like someone screaming. He intermittently takes his medications and has been off of them recently as he ran out. He has not seen his psychiatrist for a few months.     He denies any suicidal or homicidal ideation to me.     Admits to methamphetamine use - last used 2 days ago. Denies alcohol use.    UA / UTox ordered, however not yet collected.     DEC consulted, however they are backed up and will not be able to assess the patient for several hours. Patient signed out to Dr. Hernandez at shift change pending DEC assessment.  Given no suicidal or homicidal ideation, I do not think the patient is holdable at this time.       At the conclusion of the encounter I discussed the results of all the tests and the disposition. The questions were answered. The patient acknowledged understanding and was agreeable with the care plan.    MEDICATIONS GIVEN IN THE EMERGENCY " "DEPARTMENT:  Medications   OLANZapine zydis (zyPREXA) ODT tab 10 mg (10 mg Oral Not Given 11/28/22 0046)       NEW PRESCRIPTIONS STARTED AT TODAY'S ED VISIT:  New Prescriptions    No medications on file       HPI     HPI     Hamzah Roberts is a 37 year old male, history of paranoid schizophrenia with hallucinations, psychosis and polysubstance abuse (methamphetamine and marijuana), who presents to this ED via walk-in for evaluation of mental health issue.     Patient reports auditory hallucinations that have been ongoing for \"a long time\" that have become progressively worse. The hallucinations are \"hard for him to describe\" but notes it sounds like screaming. Denies hearing voices telling him to do certain things. Patient has a psychiatrist whom he has saw a few months ago with no upcoming appointment. Patient endorses using meth, his last use two days ago. Currently, patient is living with girlfriend. He denies any suicidal or homicidal ideation to me.     He lives with his girlfriend.    He otherwise reports BL lower rib pain that has been intermittent for the past few weeks and occurs only after drinking cold water. He otherwise denies chest pain, shortness of breath, abdominal pain, N/V/D, cough, fever or other concerns.    SHx: Denies alcohol use.     Per chart review, patient was seen at [facility] ED for mental health issues of auditory hallucinations. Physical examination otherwise unremarkable. Given Zyprexa orally and discharged after DEC assessment.       REVIEW OF SYSTEMS:  All other systems reviewed and are negative.      Medical History     No past medical history on file.    No past surgical history on file.    No family history on file.    Social History     Tobacco Use     Smoking status: Every Day     Packs/day: 0.50     Types: Cigarettes   Substance Use Topics     Alcohol use: Never     Drug use: Yes     Types: Methamphetamines, Marijuana       QUEtiapine (SEROQUEL) 200 MG tablet  QUEtiapine " "(SEROQUEL) 200 MG tablet  risperiDONE (RISPERDAL) 3 MG tablet  risperiDONE (RISPERDAL) 3 MG tablet        Physical Exam     First Vitals:  Patient Vitals for the past 24 hrs:   BP Temp Temp src Pulse Resp SpO2 Height Weight   11/27/22 2248 -- -- -- -- -- 99 % -- --   11/27/22 2247 110/62 -- -- 85 -- 99 % -- --   11/27/22 2104 110/68 -- -- -- -- -- -- --   11/27/22 2103 -- -- -- 92 -- 97 % -- --   11/27/22 1629 132/72 97.8  F (36.6  C) Oral 92 16 98 % 1.651 m (5' 5\") 72.6 kg (160 lb)       PHYSICAL EXAM:   Physical Exam    GENERAL: Awake, alert.  In no acute distress.   HEENT: Normocephalic, atraumatic. Pupils equal, round and reactive. Conjunctiva normal.   NECK: No stridor.  PULMONARY: Symmetrical breath sounds without distress.  Lungs clear to auscultation bilaterally without wheezes, rhonchi or rales.  CARDIO: Regular rate and rhythm.  No significant murmur, rub or gallop.    ABDOMINAL: Abdomen soft, non-distended and non-tender to palpation.    EXTREMITIES: No lower extremity swelling or edema.      NEURO: Alert and oriented to person, place and time.  Cranial nerves grossly intact.  No focal motor deficit.  PSYCH: Normal mood and affect; answers questions appropriately. Makes good eye contact.   SKIN: No rashes.     I, Sabrina Pozo, am serving as a scribe to document services personally performed by Ivis Umaña MD based on my observation and the provider's statements to me. I, Ivis Umaña MD attest that Sabrina Pozo is acting in a scribe capacity, has observed my performance of the services and has documented them in accordance with my direction.    Ivis Umaña MD  Emergency Medicine  [facility] EMERGENCY DEPARTMENT           Ivis Umaña MD  11/28/22 0951    "

## 2022-11-27 NOTE — ED TRIAGE NOTES
"Patient is here for a mental health evaluation.  States he is having auditory hallucinations.  Has been for a year.  Juan being homicidal or suicidal. Was here a week ago.  Is taking his meds \"off and on\". Talks about getting discharged early and without the proper medications.       "

## 2022-11-28 LAB
AMPHETAMINES UR QL SCN: ABNORMAL
BARBITURATES UR QL SCN: ABNORMAL
BENZODIAZ UR QL SCN: ABNORMAL
BZE UR QL SCN: ABNORMAL
CANNABINOIDS UR QL SCN: ABNORMAL
OPIATES UR QL SCN: ABNORMAL
PCP QUAL URINE (ROCHE): ABNORMAL
SARS-COV-2 RNA RESP QL NAA+PROBE: NEGATIVE

## 2022-11-28 PROCEDURE — U0005 INFEC AGEN DETEC AMPLI PROBE: HCPCS | Performed by: EMERGENCY MEDICINE

## 2022-11-28 PROCEDURE — 90791 PSYCH DIAGNOSTIC EVALUATION: CPT

## 2022-11-28 PROCEDURE — 80307 DRUG TEST PRSMV CHEM ANLYZR: CPT | Performed by: EMERGENCY MEDICINE

## 2022-11-28 PROCEDURE — 250N000013 HC RX MED GY IP 250 OP 250 PS 637: Performed by: EMERGENCY MEDICINE

## 2022-11-28 RX ORDER — OLANZAPINE 10 MG/1
10 TABLET, ORALLY DISINTEGRATING ORAL AT BEDTIME
Status: DISCONTINUED | OUTPATIENT
Start: 2022-11-28 | End: 2022-11-28

## 2022-11-28 RX ORDER — LORAZEPAM 0.5 MG/1
1 TABLET ORAL ONCE
Status: COMPLETED | OUTPATIENT
Start: 2022-11-28 | End: 2022-11-28

## 2022-11-28 RX ORDER — OLANZAPINE 10 MG/1
10 TABLET, ORALLY DISINTEGRATING ORAL ONCE
Status: COMPLETED | OUTPATIENT
Start: 2022-11-28 | End: 2022-11-28

## 2022-11-28 RX ADMIN — LORAZEPAM 1 MG: 0.5 TABLET ORAL at 17:12

## 2022-11-28 RX ADMIN — OLANZAPINE 10 MG: 10 TABLET, ORALLY DISINTEGRATING ORAL at 17:12

## 2022-11-28 ASSESSMENT — ACTIVITIES OF DAILY LIVING (ADL)
ADLS_ACUITY_SCORE: 35

## 2022-11-28 NOTE — ED NOTES
Problem: Hemodialysis  Goal: Dialysis: Safe, effective, and comfortable hemodialysis treatment (Hemodialysis nurse only)  Outcome: Outcome Met, Continue evaluating goal progress toward completion  Note: Pt tolerated HD tx well. 4L removed of 2-4L ordered over 3.5 hour ordered tx time. Pt remained hemodynamically stable w/o interventions. 0 complications.   Goal: Dialysis: Free of complications related to initiation/termination of dialysis (Hemodialysis nurse only)  Outcome: Outcome Met, Continue evaluating goal progress toward completion  Note: Cannulated LUE AVG using 15 g needles w/o incident, bruit/thrill present. Ran at 400 BFR t/o tx w/o issues. Less than 10 min to hemostasis.       DEC called to check in with pt via ipad, pt refused and states he will check in later. Pt sleeping in bed.

## 2022-11-28 NOTE — ED NOTES
"Triage & Transition Services, Extended Care     Hamzah Ambrociope  November 28, 2022    Hamzah is followed related to Placement delay: 17+ hours awaiting IP MH Bed. Please see initial DEC Crisis Assessment completed for complete assessment information. Medical record is reviewed.  . Additional notes include auditory hallucination that are stressful and suicidal ideation.      There are not significant status changes.     Recommend to continue care coordination with ED staff and [facility]      Case Management:  Left message for VANESSA Strange [phone].  Pt is under commitment  [phone] - Spoke with Pete, nurse, she reports he has been sleeping most of the day, he did eat breakfast.  Will set up Ipad for writer to visit with pt.    [id number] Called back talked to nurse, Pete, pt doesn't want to talk now, call back later.         Plan:  Pt was assessed at the ED on 11/18/22 for auditory hallucinations and presents to the ED today with an increase in auditory hallucinations with suicidal ideation.  Pt reports that he is constantly hearing a voice screaming \"help\" and he said he only has relief when he is sleeping.  He denies command hallucinations. His coping strategy of listening to music has not been helping. Pt denies a plan or intent of suicide however stated that he is having suicidal ideation frequently and it is intense, and difficult to manage. Pt reports using methamphetamine almost daily. He has a diagnosis of schizophrenia and substance use disorder.  Pt states he is on a MI commitment that was extended for one year.  Pt reports that he forgets to take his medication sometimes and he also said he does not like his medication because he thinks it makes him tired. Pt reports he has not been following up with his psychiatrist and therapist because he gets scared to go places. He has a history of paranoia.  Pt has unstable housing at this time and very limited social support. Pt is " somewhat irritable.    Plan for Care reviewed with Assigned Medical Provider? No. Provider, Elver Fox MD, response: no change did not talk to MD    Extended Care will follow and meet with patient/family/care team as able or requested.     BRIJESH Nguyen  [facility], Extended Care   [phone]

## 2022-11-28 NOTE — ED NOTES
"ED Behavioral Health Patient Transition of Care Note    Assuming care from:  Britton Ruiz MD    Brief history:  Patient reports auditory hallucinations that have been ongoing for \"a long time\" that have become progressively worse. The hallucinations are \"hard for him to describe\" but notes it sounds like screaming. Denies hearing voices telling him to do certain things. Patient has a psychiatrist whom he has saw a few months ago with no upcoming appointment. Patient endorses using meth, his last use two days ago. Currently, patient is living with girlfriend. He denies any suicidal or homicidal ideation to me.      He lives with his girlfriend.     He otherwise reports BL lower rib pain that has been intermittent for the past few weeks and occurs only after drinking cold water. He otherwise denies chest pain, shortness of breath, abdominal pain, N/V/D, cough, fever or other concerns.     SHx: Denies alcohol use.      Per chart review, patient was seen at [facility] ED for mental health issues of auditory hallucinations. Physical examination otherwise unremarkable. Given Zyprexa orally and discharged after DEC assessment.     Follow up or pending management:    Pending inpatient psychiatric bed    Has SW seen the patient:  yes    Is the patient on a hold:  [facility]    Current plan for disposition:  SW attempting to find a bed    Safety concerns:    Patient given Ativan and Zydis at 512 PM    Significant events during shift:      ED Course as of 11/28/22 2306   Mon Nov 28, 2022   1622 I was notified by nursing that patient woke up and is now agitated and demanding medications.  He was upset that we allowed him to sleep.  Zydus was ordered earlier.  Per chart review he refused it.  Zydis and Ativan orally ordered     Signed out to Dr. Mello pending inpatient psychiatric transfer      1. Auditory hallucinations    2. Suicidal ideation         Elver Ortega MD  11/28/22 2306    "

## 2022-11-28 NOTE — PLAN OF CARE
"Hamzah Roberts  November 28, 2022  Plan of Care Hand-off Note     Patient Care Path: Inpatient Mental Health    Plan for Care:     Pt was assessed at the ED on 11/18/22 for auditory hallucinations and presents to the ED today with an increase in auditory hallucinations with suicidal ideation.  Pt reports that he is constantly hearing a voice screaming \"help\" and he said he only has relief when he is sleeping.  He denies command hallucinations. His coping strategy of listening to music has not been helping. Pt denies a plan or intent of suicide however stated that he is having suicidal ideation frequently and it is intense, and difficult to manage. Pt reports using methamphetamine almost daily. He has a diagnosis of schizophrenia and substance use disorder.  Pt states he is on a MI commitment that was extended for one year.  Pt reports that he forgets to take his medication sometimes and he also said he does not like his medication because he thinks it makes him tired. Pt reports he has not been following up with his psychiatrist and therapist because he gets scared to go places. He has a history of paranoia.  Pt has unstable housing at this time and very limited social support. Pt is somewhat irritable and the hallucinations are stressful.      Critical Safety Issues: auditory hallucinations that are stressful and very frequent, suicidal ideation, substance abuse, pt is not following up with outpatient providers stating he is scared to go places, history of paranoia.  Records note pt is currently on probation for an unknown reason. Pt reports he is on a MI commitment. He forgets to take his medication and also does not like his medication because he thinks it makes him tired.      Overview:  This patient is a child/adolescent: No    This patient has additional special visitor precautions: No    Legal Status: Voluntary    Contacts:   Angie Roberts (Sister)   [phone] (Mobile)    Psychiatry Consult:  Adult " Psychiatry Consult requested related to mental health symptoms . Psychiatry IP Consult Order Placed: Yes    Updated Attending Provider regarding plan of care.    ANANDA Quinn

## 2022-11-28 NOTE — ED NOTES
[facility] ED Mental Health Handoff Note:     Assuming care from: Dr Ivis Umaña    Brief HPI: 37 year old male signed out to me by the above provider. See initial ED Provider note for full details of the presentation.   In brief, patient presents with progressively worsening auditory hallucinations that sounds like screaming. Endorses using meth, most recently 2 days ago.      Home meds reviewed and ordered/administered: Yes  Medically stable for inpatient mental health admission: Yes.  Evaluated by mental health: No. Patient is clinically sober and awaiting evaluation for disposition.  Safety concerns: At the time I received sign out, there were no safety concerns.    Hold Status:  Active Orders   N/A         Labs/Imaging:   No results found for this visit on 11/27/22 (from the past 24 hour(s)).      ED Meds:  Medications   OLANZapine zydis (zyPREXA) ODT tab 10 mg (10 mg Oral Not Given 11/28/22 0046)     No orders to display       ED Course:   Patient refusing COVID swab.  Plan for admission.    12:00 AM Spoke with DEC .   12:52 AM Patient is refusing Xyprexa and COVID-19 test.     There were no significant events during my shift.    Patient was signed out to the oncoming provider, Dr. Anni Mello at shift change    Impression:    ICD-10-CM    1. Auditory hallucinations  R44.0       2. Suicidal ideation  R45.851           Plan:    1. Awaiting inpatient mental health admission/transfer.    Elizabeth Hernandez M.D.   [facility] EMERGENCY DEPARTMENT  [address] [place] [id number]  [phone]                   Elizabeth Hernandez MD  11/28/22 0127

## 2022-11-28 NOTE — ED NOTES
Pt asking for Ativan again. States he is supposed to take it but doesn't;t have any. It is not on his current med list.

## 2022-11-28 NOTE — ED NOTES
[facility] ED Mental Health Handoff Note:     Assuming care from: Dr Anni Mello    Brief HPI: 37 year old male signed out to me by the above provider. See initial ED Provider note for full details of the presentation.   In brief, patient with progressively worsening auditory hallucinations that sounds like screaming. Endorses using meth, most recently 2 days ago. Has suicidal ideation without a plan.     Home meds reviewed and ordered/administered: Yes  Medically stable for inpatient mental health admission: Yes.  Evaluated by mental health: Yes. The recommendation is for inpatient mental health treatment. Bed search in process  Safety concerns: At the time I received sign out, there were no safety concerns.    Hold Status:  Active Orders   N/A           Labs/Imaging:   Results for orders placed or performed during the hospital encounter of 11/27/22 (from the past 24 hour(s))   Drug abuse screen 77 urine ([facility] ONLY)     Status: Abnormal    Collection Time: 11/28/22 10:34 AM   Result Value Ref Range    Amphetamines Urine Screen Positive (A) Screen Negative    Barbituates Urine Screen Negative Screen Negative    Benzodiazepine Urine Screen Negative Screen Negative    Cannabinoids Urine Screen Positive (A) Screen Negative    Opiates Urine Screen Negative Screen Negative    PCP Urine Screen Negative Screen Negative    Cocaine Urine Screen Negative Screen Negative   Asymptomatic COVID-19 Virus (Coronavirus) by PCR Nasopharyngeal     Status: Normal    Collection Time: 11/28/22 10:34 AM    Specimen: Nasopharyngeal; Swab   Result Value Ref Range    SARS CoV2 PCR Negative Negative    Narrative    Testing was performed using the Xpert Xpress SARS-CoV-2 Assay on the Cepheid Gene-Xpert Instrument Systems. Additional information about this Emergency Use Authorization (EUA) assay can be found via the Lab Guide. This test should be ordered for the detection of SARS-CoV-2 in individuals who meet SARS-CoV-2 clinical and/or  epidemiological criteria as well as from individuals without symptoms or other reasons to suspect COVID-19. Test performance for asymptomatic patients has only been established in anterior nasal swab specimens. This test is for in vitro diagnostic use under the FDA EUA for laboratories certified under CLIA to perform high complexity testing. This test has not been FDA cleared or approved. A negative result does not rule out the presence of PCR inhibitors in the specimen or target RNA concentration below the limit of detection for the assay. The possibility of a false negative should be considered if the patient's recent exposure or clinical presentation suggests COVID-19.. This test was validated by the [facility]. This laboratory is certified under the Clinical Laboratory Improvement Amendments (CLIA) as qualified to perform high complexity laboratory testing.           ED Meds:  Medications   OLANZapine zydis (zyPREXA) ODT tab 10 mg (10 mg Oral Not Given 11/28/22 0046)     No orders to display       ED Course:       There were no significant events during my shift.    Patient was signed out to the oncoming provider, Dr. William Ortega at shift change    Impression:    ICD-10-CM    1. Auditory hallucinations  R44.0       2. Suicidal ideation  R45.851           Plan:    1. Awaiting inpatient mental health admission/transfer.      I, Lien Loaiza, am serving as a scribe to document services personally performed by Dr. Ruiz based on my observation and the provider's statements to me. I, Britton Ruiz MD, attest that Lien Loaiza is acting in a scribe capacity, has observed my performance of the services and has documented them in accordance with my direction.       Britton Ruiz MD  [facility] EMERGENCY DEPARTMENT  [address] [place] [id number]  [phone]                   Britton Ruiz MD  11/28/22 5305

## 2022-11-28 NOTE — ED NOTES
Patient refusing Covid swab at this time. Patient also refusing Zyprexa- and requests ativan. Dr. Hernandez updated.
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
## 2022-11-28 NOTE — ED NOTES
[facility] ED Mental Health Handoff Note:     Assuming care from: Dr Shasta Hernandez    Brief HPI: 37 year old male signed out to me by the above provider. See initial ED Provider note for full details of the presentation.   In brief, patient presents with progressively worsening auditory hallucinations that sounds like screaming. Endorses using meth, most recently 2 days ago.       I, Eunice Burt, am serving as a scribe to document services personally performed by Dr. Mello, based on my observations and the provider's statements to me.  I, Dr. Mello, attest that Eunice Burt is acting in a scribe capacity, has observed my performance of the services and has documented them in accordance with my direction.      Home meds reviewed and ordered/administered: Yes  Medically stable for inpatient mental health admission: Yes.  Evaluated by mental health: Yes. The recommendation is for inpatient mental health treatment. Bed search in process  Safety concerns: At the time I received sign out, there were no safety concerns.    Hold Status:  Active Orders   N/A         Labs/Imaging:   No results found for this visit on 11/27/22 (from the past 24 hour(s)).      ED Meds:  Medications   OLANZapine zydis (zyPREXA) ODT tab 10 mg (10 mg Oral Not Given 11/28/22 0046)     No orders to display       ED Course:       4:40 AM Patient signed out to me by Dr. Hernandez.     There were no significant events during my shift.    Patient was signed out to the oncoming provider, Dr. Channing Ruiz at shift change    Impression:    ICD-10-CM    1. Auditory hallucinations  R44.0       2. Suicidal ideation  R45.851           Plan:    1. Awaiting inpatient mental health admission/transfer.      Anni Mello DO  [facility] EMERGENCY DEPARTMENT  [facility] [address]  [phone]                   Anni Mello DO  11/28/22 0804

## 2022-11-29 ENCOUNTER — TELEPHONE (OUTPATIENT)
Dept: BEHAVIORAL HEALTH | Facility: CLINIC | Age: 37
End: 2022-11-29

## 2022-11-29 PROCEDURE — 250N000013 HC RX MED GY IP 250 OP 250 PS 637: Performed by: EMERGENCY MEDICINE

## 2022-11-29 RX ORDER — LORAZEPAM 0.5 MG/1
1 TABLET ORAL ONCE
Status: COMPLETED | OUTPATIENT
Start: 2022-11-29 | End: 2022-11-29

## 2022-11-29 RX ADMIN — LORAZEPAM 1 MG: 0.5 TABLET ORAL at 14:58

## 2022-11-29 ASSESSMENT — ACTIVITIES OF DAILY LIVING (ADL)
ADLS_ACUITY_SCORE: 35

## 2022-11-29 NOTE — ED NOTES
"  Triage & Transition Services, Extended Care     Hamzah Ambrociope  November 29, 2022    Hamzah is followed related to Placement delay: 40+ hours awaiting IP MH Bed. Please see initial DEC Crisis Assessment completed for complete assessment information. Medical record is reviewed.   Additional notes include  auditory hallucination that are stressful and suicidal ideation    There are not significant status changes.     Recommend  to continue care coordination with ED staff as needed    Case Management:    0819 Spoke with [facility] -  Cynthiaaminah Gonzalez works for  [employer] [phone] Called placed to  [employer] Cynthia Lisa left message asked to call back asap.  0911-0924 Spoke with , Cynthia Gonzalez  [phone], [email or web address]  She reports Hamzah has been off his medication for quite some time.  He needs to be stabilized back on his medication and continue with sobriety.  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  If he wants something he is very persistent, he called her yesterday, she will call him today.  He is having VH/AH can be paranoid.  His family has cut him off.    [id number] Called placed to [facility] spoke with Joycelyn and was transferred to [facility] - she reports pt is sleeping, she will get him up when his food is delivered and call writer to see him.    [id number] Spoke with [facility] nurse, she reports he is refusing, irritating and swearing.  He was accepted at [facility] and will be discharging.        Plan:  Accepted at [facility] by Dr. Fitzgerald, will be discharging today per nurseCriss    Pt was assessed at the ED on 11/18/22 for auditory hallucinations and presents to the ED today with an increase in auditory hallucinations with suicidal ideation.  Pt reports that he is constantly hearing a voice screaming \"help\" and he said he only has relief when he is sleeping.  He " denies command hallucinations. His coping strategy of listening to music has not been helping. Pt denies a plan or intent of suicide however stated that he is having suicidal ideation frequently and it is intense, and difficult to manage. Pt reports using methamphetamine almost daily. He has a diagnosis of schizophrenia and substance use disorder.  Pt states he is on a MI commitment that was extended for one year.  Pt reports that he forgets to take his medication sometimes and he also said he does not like his medication because he thinks it makes him tired. Pt reports he has not been following up with his psychiatrist and therapist because he gets scared to go places. He has a history of paranoia.  Pt has unstable housing at this time and very limited social support. Pt is somewhat irritable.     Plan for Care reviewed with Assigned Medical Provider? No. Provider,Britton Ruiz MD, response: no change did not talk to MD           Extended Care will follow and meet with patient/family/care team as able or requested.     BRIJESH Nguyen  [facility], Extended Care   [phone]

## 2022-11-29 NOTE — ED NOTES
"[facility] ED Mental Health Handoff Note:     Assuming care from: Dr William Ortega    Brief HPI: 37 year old male signed out to me by the above provider. See initial ED Provider note for full details of the presentation.   In brief, patient reported auditory hallucinations that have been ongoing for \"a long time\". Hallucinations have been progressively worsening. Patient has a hard time describing these hallucinations but notes that they sound like screaming. Denies hearing voices telling him to do certain things. He has a psychiatrist whom he last saw a few months ago with no future appointments. Patient endorses methamphetamine use ~2 days ago. Denies suicidal ideation or homicidal ideation.    Home meds reviewed and ordered/administered: Yes  Medically stable for inpatient mental health admission: Yes.  Evaluated by mental health: Yes. The recommendation is for inpatient mental health treatment. Bed search in process  Safety concerns: At the time I received sign out, there were no safety concerns.    Hold Status:  Active Orders   N/A       Labs/Imaging:   Results for orders placed or performed during the hospital encounter of 11/27/22 (from the past 24 hour(s))   Drug abuse screen 77 urine ([facility] ONLY)     Status: Abnormal    Collection Time: 11/28/22 10:34 AM   Result Value Ref Range    Amphetamines Urine Screen Positive (A) Screen Negative    Barbituates Urine Screen Negative Screen Negative    Benzodiazepine Urine Screen Negative Screen Negative    Cannabinoids Urine Screen Positive (A) Screen Negative    Opiates Urine Screen Negative Screen Negative    PCP Urine Screen Negative Screen Negative    Cocaine Urine Screen Negative Screen Negative   Asymptomatic COVID-19 Virus (Coronavirus) by PCR Nasopharyngeal     Status: Normal    Collection Time: 11/28/22 10:34 AM    Specimen: Nasopharyngeal; Swab   Result Value Ref Range    SARS CoV2 PCR Negative Negative    Narrative    Testing was performed using the Xpert " Xpress SARS-CoV-2 Assay on the Cepheid Gene[employer] Instrument Systems. Additional information about this Emergency Use Authorization (EUA) assay can be found via the Lab Guide. This test should be ordered for the detection of SARS-CoV-2 in individuals who meet SARS-CoV-2 clinical and/or epidemiological criteria as well as from individuals without symptoms or other reasons to suspect COVID-19. Test performance for asymptomatic patients has only been established in anterior nasal swab specimens. This test is for in vitro diagnostic use under the FDA EUA for laboratories certified under CLIA to perform high complexity testing. This test has not been FDA cleared or approved. A negative result does not rule out the presence of PCR inhibitors in the specimen or target RNA concentration below the limit of detection for the assay. The possibility of a false negative should be considered if the patient's recent exposure or clinical presentation suggests COVID-19.. This test was validated by the [facility]. This laboratory is certified under the Clinical Laboratory Improvement Amendments (CLIA) as qualified to perform high complexity laboratory testing.           ED Meds:  Medications   LORazepam (ATIVAN) tablet 1 mg (1 mg Oral Given 11/28/22 1712)   OLANZapine zydis (zyPREXA) ODT tab 10 mg (10 mg Oral Given 11/28/22 1712)     No orders to display       ED Course:  ED Course as of 11/29/22 0036   Mon Nov 28, 2022   1622 I was notified by nursing that patient woke up and is now agitated and demanding medications.  He was upset that we allowed him to sleep.  Zydus was ordered earlier.  Per chart review he refused it.  Zydis and Ativan orally ordered       There were no significant events during my shift.    Patient was signed out to the oncoming provider, Dr. Channing Ruiz at shift change    Impression:    ICD-10-CM    1. Auditory hallucinations  R44.0       2. Suicidal ideation  R45.851            Plan:    1. Awaiting inpatient mental health admission/transfer.      I, Romina Santiago, am serving as a scribe to document services personally performed by Anni Mello DO based on my observation and the provider's statements to me. I, Anni Mello, attest that Romina Santiago is acting in a scribe capacity, has observed my performance of the services and has documented them in accordance with my direction.    Anni Mello DO  Emergency Medicine  [facility] EMERGENCY DEPARTMENT  [address] [place] [address]  [phone]           Anni Mello DO  11/29/22 0700

## 2022-11-29 NOTE — ED NOTES
[facility] [address] [place] [address]. [phone]

## 2022-11-29 NOTE — ED NOTES
Pt sleeping off and on this am and when woke was angry and disrespectful in language. Very irritated.

## 2022-11-29 NOTE — ED NOTES
Pt calling girlfriend chun to bring his stuff up here. And per her report she has a meeting to attend and is unable to bring stuff at this time. Pt supposedly has threatened his girlfriend and is continuing to be disrespectful to staff.   Security brought back to hear pt comments and to assist if escalated. Informed pt to please not threaten people one the phone as we do remove phones from the room if needed to prevent unsafe situations.   Pt did not seem to grasp everything in the conversation. Gave pt ativan oral about 15 minutes prior.

## 2022-11-29 NOTE — ED NOTES
[facility] ED Mental Health Handoff Note:     Assuming care from: Dr Anni Mello    Brief HPI: 37 year old male signed out to me by the above provider. See initial ED Provider note for full details of the presentation. In brief, patient presenting with auditory hallucinations, intermittent suicidal ideations and command hallucinations.  Did use methamphetamine about 2 days prior to arrival.  Had denied active SI or HI on arrival.    Home meds reviewed and ordered/administered: No  Medically stable for inpatient mental health admission: Yes.  Evaluated by mental health: Yes. The recommendation is for inpatient mental health treatment. Bed search in process  Safety concerns: At the time I received sign out, there were no safety concerns.    Hold Status:  Active Orders   N/A             ED Meds:  Medications   LORazepam (ATIVAN) tablet 1 mg (1 mg Oral Given 11/28/22 1712)   OLANZapine zydis (zyPREXA) ODT tab 10 mg (10 mg Oral Given 11/28/22 1712)   LORazepam (ATIVAN) tablet 1 mg (1 mg Oral Given 11/29/22 1458)     No orders to display       ED Course:      There were no significant events during my shift.    Patient was signed out to the oncoming provider, Dr. Yared Thomas at shift change    Impression:    ICD-10-CM    1. Auditory hallucinations  R44.0       2. Suicidal ideation  R45.851           Plan:    1. Awaiting mental health evaluation/recommendations.    Britton Ruiz MD  [facility] EMERGENCY DEPARTMENT  [address] [place] [address]  [phone]                   Britton Ruiz MD  11/29/22 4314

## 2022-11-29 NOTE — TELEPHONE ENCOUNTER
10:14a Intake received call from [facility] (Jan) informing pt has been accepted by Provider ELISA (Eboni).

## 2022-11-29 NOTE — ED NOTES
[facility] ED Mental Health Handoff Note:     Assuming care from: Dr Channing Ruiz    Brief HPI: 37 year old male signed out to me by the above provider. See initial ED Provider note for full details of the presentation.   In brief, patient presenting with auditory hallucinations, intermittent suicidal ideations and command hallucinations. Did use methamphetamine about 2 days prior to arrival. Had denied active SI or HI on arrival.     Home meds reviewed and ordered/administered: No  Medically stable for inpatient mental health admission: Yes.  Evaluated by mental health: Yes. The recommendation is for inpatient mental health treatment. Bed search in process  Safety concerns: At the time I received sign out, there were no safety concerns.    Hold Status:  Active Orders   N/A            Labs/Imaging:   No results found for this visit on 11/27/22 (from the past 24 hour(s)).      ED Meds:  Medications   LORazepam (ATIVAN) tablet 1 mg (1 mg Oral Given 11/28/22 1712)   OLANZapine zydis (zyPREXA) ODT tab 10 mg (10 mg Oral Given 11/28/22 1712)   LORazepam (ATIVAN) tablet 1 mg (1 mg Oral Given 11/29/22 1458)     No orders to display       ED Course:  ED Course as of 11/29/22 1529   Mon Nov 28, 2022   1622 I was notified by nursing that patient woke up and is now agitated and demanding medications.  He was upset that we allowed him to sleep.  Zydus was ordered earlier.  Per chart review he refused it.  Zydis and Ativan orally ordered       There were significant events during my shift.  11 PM.  Patient transferred from 1 room to another getting very agitated.  Able to be calm and now resting quietly.  Still awaiting transfer.  Transfer likely delayed secondary to weather issues.  Patient was signed out to the oncoming provider, Dr. Eric Cortés at shift change    Impression:    ICD-10-CM    1. Auditory hallucinations  R44.0       2. Suicidal ideation  R45.851       3.  Substance-induced mood disorder    Plan:     1. Awaiting inpatient mental health admission/transfer.    Yared Thomas MD  [facility] EMERGENCY DEPARTMENT  [facility] [address] [phone]                   Yared Thomas MD  11/29/22 2237

## 2022-11-30 ENCOUNTER — HOSPITAL ENCOUNTER (INPATIENT)
Facility: HOSPITAL | Age: 37
LOS: 6 days | Discharge: NURSING, REHAB OR LONG-TERM CARE | End: 2022-12-06
Attending: STUDENT IN AN ORGANIZED HEALTH CARE EDUCATION/TRAINING PROGRAM | Admitting: STUDENT IN AN ORGANIZED HEALTH CARE EDUCATION/TRAINING PROGRAM
Payer: COMMERCIAL

## 2022-11-30 VITALS
BODY MASS INDEX: 26.66 KG/M2 | OXYGEN SATURATION: 100 % | DIASTOLIC BLOOD PRESSURE: 81 MMHG | HEART RATE: 100 BPM | WEIGHT: 160 LBS | SYSTOLIC BLOOD PRESSURE: 119 MMHG | HEIGHT: 65 IN | TEMPERATURE: 98.8 F | RESPIRATION RATE: 16 BRPM

## 2022-11-30 DIAGNOSIS — F25.1 SCHIZOAFFECTIVE DISORDER, DEPRESSIVE TYPE (H): Primary | ICD-10-CM

## 2022-11-30 PROBLEM — F15.951 AMPHETAMINE-INDUCED PSYCHOTIC DISORDER WITH HALLUCINATIONS (H): Status: ACTIVE | Noted: 2021-06-20

## 2022-11-30 PROBLEM — F15.21: Status: ACTIVE | Noted: 2021-11-03

## 2022-11-30 LAB
HOLD SPECIMEN: NORMAL
TROPONIN T SERPL HS-MCNC: <6 NG/L

## 2022-11-30 PROCEDURE — 250N000013 HC RX MED GY IP 250 OP 250 PS 637: Performed by: STUDENT IN AN ORGANIZED HEALTH CARE EDUCATION/TRAINING PROGRAM

## 2022-11-30 PROCEDURE — 93010 ELECTROCARDIOGRAM REPORT: CPT | Performed by: INTERNAL MEDICINE

## 2022-11-30 PROCEDURE — 250N000013 HC RX MED GY IP 250 OP 250 PS 637: Performed by: NURSE PRACTITIONER

## 2022-11-30 PROCEDURE — 99223 1ST HOSP IP/OBS HIGH 75: CPT | Performed by: NURSE PRACTITIONER

## 2022-11-30 PROCEDURE — 36415 COLL VENOUS BLD VENIPUNCTURE: CPT | Performed by: NURSE PRACTITIONER

## 2022-11-30 PROCEDURE — 93005 ELECTROCARDIOGRAM TRACING: CPT

## 2022-11-30 PROCEDURE — 124N000001 HC R&B MH

## 2022-11-30 PROCEDURE — 999N000157 HC STATISTIC RCP TIME EA 10 MIN

## 2022-11-30 PROCEDURE — 84484 ASSAY OF TROPONIN QUANT: CPT | Performed by: NURSE PRACTITIONER

## 2022-11-30 RX ORDER — OLANZAPINE 10 MG/2ML
10 INJECTION, POWDER, FOR SOLUTION INTRAMUSCULAR 3 TIMES DAILY PRN
Status: DISCONTINUED | OUTPATIENT
Start: 2022-11-30 | End: 2022-12-06 | Stop reason: HOSPADM

## 2022-11-30 RX ORDER — LORAZEPAM 1 MG/1
2 TABLET ORAL ONCE
Status: COMPLETED | OUTPATIENT
Start: 2022-11-30 | End: 2022-11-30

## 2022-11-30 RX ORDER — MAGNESIUM HYDROXIDE/ALUMINUM HYDROXICE/SIMETHICONE 120; 1200; 1200 MG/30ML; MG/30ML; MG/30ML
30 SUSPENSION ORAL EVERY 4 HOURS PRN
Status: DISCONTINUED | OUTPATIENT
Start: 2022-11-30 | End: 2022-12-06 | Stop reason: HOSPADM

## 2022-11-30 RX ORDER — LANOLIN ALCOHOL/MO/W.PET/CERES
3 CREAM (GRAM) TOPICAL
Status: DISCONTINUED | OUTPATIENT
Start: 2022-11-30 | End: 2022-12-06 | Stop reason: HOSPADM

## 2022-11-30 RX ORDER — HYDROXYZINE HYDROCHLORIDE 25 MG/1
25 TABLET, FILM COATED ORAL EVERY 4 HOURS PRN
Status: DISCONTINUED | OUTPATIENT
Start: 2022-11-30 | End: 2022-12-01

## 2022-11-30 RX ORDER — ACETAMINOPHEN 325 MG/1
650 TABLET ORAL EVERY 4 HOURS PRN
Status: DISCONTINUED | OUTPATIENT
Start: 2022-11-30 | End: 2022-12-06 | Stop reason: HOSPADM

## 2022-11-30 RX ORDER — LORAZEPAM 0.5 MG/1
1 TABLET ORAL ONCE
Status: COMPLETED | OUTPATIENT
Start: 2022-11-30 | End: 2022-11-30

## 2022-11-30 RX ORDER — OLANZAPINE 10 MG/1
10 TABLET ORAL 3 TIMES DAILY PRN
Status: DISCONTINUED | OUTPATIENT
Start: 2022-11-30 | End: 2022-12-06 | Stop reason: HOSPADM

## 2022-11-30 RX ADMIN — HYDROXYZINE HYDROCHLORIDE 25 MG: 25 TABLET ORAL at 16:39

## 2022-11-30 RX ADMIN — OLANZAPINE 10 MG: 10 TABLET, FILM COATED ORAL at 16:39

## 2022-11-30 RX ADMIN — LORAZEPAM 1 MG: 0.5 TABLET ORAL at 12:34

## 2022-11-30 RX ADMIN — LORAZEPAM 2 MG: 1 TABLET ORAL at 16:51

## 2022-11-30 ASSESSMENT — ACTIVITIES OF DAILY LIVING (ADL)
DIFFICULTY_EATING/SWALLOWING: NO
ADLS_ACUITY_SCORE: 35
ADLS_ACUITY_SCORE: 28
ADLS_ACUITY_SCORE: 29
FALL_HISTORY_WITHIN_LAST_SIX_MONTHS: NO
ADLS_ACUITY_SCORE: 35
ADLS_ACUITY_SCORE: 45
CONCENTRATING,_REMEMBERING_OR_MAKING_DECISIONS_DIFFICULTY: NO
ADLS_ACUITY_SCORE: 29
ADLS_ACUITY_SCORE: 35
TOILETING_ISSUES: NO
CHANGE_IN_FUNCTIONAL_STATUS_SINCE_ONSET_OF_CURRENT_ILLNESS/INJURY: NO
ADLS_ACUITY_SCORE: 35
WALKING_OR_CLIMBING_STAIRS_DIFFICULTY: NO
ADLS_ACUITY_SCORE: 35
DRESSING/BATHING_DIFFICULTY: NO
DOING_ERRANDS_INDEPENDENTLY_DIFFICULTY: NO
WEAR_GLASSES_OR_BLIND: NO
ADLS_ACUITY_SCORE: 35

## 2022-11-30 NOTE — LETTER
[facility]  [address] [place] [address]  Phone: [phone]  Fax: [phone]    12/02/22    Hamzah Roberts  [address] [place] [address] [address]  [facility] [address]      To whom it may concern:     To help improve Mr. Roberts's current condition, I am asking that olanzapine and invega be added to the current Son order. The current Son order has two medications: risperdal and seroquel. I would like to keep those two medications and add the olanzapine and invega    Sincerely,    Joanne Dial NP

## 2022-11-30 NOTE — ED NOTES
"Triage & Transition Services, Extended Care     Hamzah REESE Armando  November 30, 2022    Hamzah is followed related to Placement delay: 63+ hours awaiting IP MH Bed, pt was accepted at [facility] 11/29 due to weather conditions still awaiting EMS for transport. Please see initial DEC Crisis Assessment completed for complete assessment information. Medical record is reviewed.   Additional notes include:  auditory hallucination that are stressful and suicidal ideation    There are not significant status changes.     Recommend  to continue care coordination with ED Staff and SW as needed.      Case Management:    Cynthia BLACKWELL  [phone], [email or web address]  [phone] Spoke with nurse, Aaron, pt continues to be irritable and refusing services, nurse will call if pt is willing to talk to writer.    1216 Aaron from intake - transport should be there between  today.      Plan:  Accepted at [facility] by Dr. Fitzgerald, awaiting EMS to transfer, delay due to weather.       Pt was assessed at the ED on 11/18/22 for auditory hallucinations and presents to the ED today with an increase in auditory hallucinations with suicidal ideation.  Pt reports that he is constantly hearing a voice screaming \"help\" and he said he only has relief when he is sleeping.  He denies command hallucinations. His coping strategy of listening to music has not been helping. Pt denies a plan or intent of suicide however stated that he is having suicidal ideation frequently and it is intense, and difficult to manage. Pt reports using methamphetamine almost daily. He has a diagnosis of schizophrenia and substance use disorder.  Pt states he is on a MI commitment that was extended for one year.  Pt reports that he forgets to take his medication sometimes and he also said he does not like his medication because he thinks it makes him tired. Pt reports he has not been following up with his psychiatrist and therapist because he gets scared to go places. He " has a history of paranoia.  Pt has unstable housing at this time and very limited social support. Pt is somewhat irritable      Extended Care will follow and meet with patient/family/care team as able or requested.     BRIJESH Nguyen  [facility], Extended Care   [phone]/Direct [phone]

## 2022-11-30 NOTE — ED NOTES
Pt woke up to let him know EMS was on their way for transport. Pt got up and sat at edge of bed. Pt verbalized being upset about how he has been treated. Pt walked to the bathroom. Soda and water given to pt. Pts belongings obtained from security. Pt allowed RN to get a set of vitals prior to leaving.     [place] and snacks sent with medics for pt during long transport.     As pt was getting ready to leave and was sitting on stretcher, he verbalized feeling anxious and requested ativan. MD alerted and order for PO ativan placed.

## 2022-11-30 NOTE — PHARMACY-ADMISSION MEDICATION HISTORY
Pharmacy Note - Admission Medication History    Pertinent Provider Information: Pt sleeping upon interview and didn't open eyes while talking. The list below has been filled recently through insurance, however pt also mentions taking Zyprexa at bedtime but is unable to tell a dose, nor is there any fills through insurance. He also mentions that he takes Ativan prn however the last prescription was filled in May for 14 day supply.     ______________________________________________________________________    Prior To Admission (PTA) med list completed and updated in EMR.       PTA Med List   Medication Sig Last Dose     QUEtiapine (SEROQUEL) 200 MG tablet Take 1 tablet (200 mg) by mouth nightly as needed Unknown     risperiDONE (RISPERDAL) 3 MG tablet Take 2 tablets (6 mg) by mouth At Bedtime for 15 days Unknown       [facility](s[facility]  Method of interview communication: in-person    Summary of Changes to PTA Med List  New: -  Discontinued: -  Changed: -    Patient was asked about OTC/herbal products specifically.  PTA med list reflects this.    In the past week, patient estimated taking medication this percent of the time:  greater than 90%.    Allergies were reviewed, assessed, and updated with the patient.      Patient does not use any multi-dose medications prior to admission.    The information provided in this note is only as accurate as the sources available at the time of the update(s).    Thank you for the opportunity to participate in the care of this patient.    Aarti Lazo, PharmD  11/29/2022 8:41 PM

## 2022-11-30 NOTE — PLAN OF CARE
ADMISSION NOTE    Reason for admission SI voices.  Safety concerns Pt tried to hide a contraband Vape pen on admit.  Risk for or history of violence no.   Full skin assessment: yes    Patient arrived on unit from [facility] accompanied by [place] EMT's on 11/30/2022  1607  Status on arrival: Argumentative and not compliant with wanding.  /97   Pulse 108   Temp 99.1  F (37.3  C) (Temporal)   Resp 18   SpO2 98%   Patient given tour of unit and Welcome to  unit papers given to patient, wanding completed, belongings inventoried, and admission assessment completed.   Patient's legal status on arrival is [place]entary. Appropriate legal rights discussed with and copy given to patient. Patient Bill of Rights discussed with and copy given to patient.   Patient denies SI, HI, and thoughts of self harm and contracts for safety while on unit.      Maycol Wilson RN  11/30/2022  5:41 PM

## 2022-11-30 NOTE — ED NOTES
Transport called to inform that the road conditions are not adequate for transport at this time. They will reassess road conditions in the morning at about 6am and decide from there. We should expect a call back with an update on transport sometime after 6am.

## 2022-11-30 NOTE — ED NOTES
[facility] ED Mental Health Handoff Note:     Assuming care from: Dr Eric Cortés    Brief HPI: 37 year old male signed out to me by the above provider. See initial ED Provider note for full details of the presentation.   In brief, patient presenting with auditory hallucinations, intermittent suicidal ideations and command hallucinations. Did use methamphetamine about 2 days prior to arrival. Had denied active SI or HI on arrival.     Home meds reviewed and ordered/administered: Yes  Medically stable for inpatient mental health admission: Yes.  Evaluated by mental health: Yes. The recommendation is for inpatient mental health treatment. Bed search in process  Safety concerns: At the time I received sign out, there were no safety concerns.    Hold Status:  Active Orders   N/A     Patient transferred to [facility].  Given 1 mg of Ativan for some anxiety prior to the trip    Labs/Imaging:   No results found for this visit on 11/27/22 (from the past 24 hour(s)).      ED Meds:  Medications   LORazepam (ATIVAN) tablet 1 mg (1 mg Oral Given 11/28/22 1712)   OLANZapine zydis (zyPREXA) ODT tab 10 mg (10 mg Oral Given 11/28/22 1712)   LORazepam (ATIVAN) tablet 1 mg (1 mg Oral Given 11/29/22 1458)     No orders to display           There were no significant events during my shift.        Impression:    ICD-10-CM    1. Auditory hallucinations  R44.0       2. Suicidal ideation  R45.851           Plan:    1. Admitted/transferred to inpatient mental health bed.    I, Lien Loaiza, am serving as a scribe to document services personally performed by Dr. Sanchez based on my observation and the provider's statements to me. I, Poncho Sanchez, DO, attest that Lien Loaiza is acting in a scribe capacity, has observed my performance of the services and has documented them in accordance with my direction.       Lien FONTENOT [facility] EMERGENCY DEPARTMENT  [facility] [facility]  [address]  [phone]                   Ohl, Poncho Hutton,   11/30/22 1234

## 2022-12-01 PROCEDURE — 124N000001 HC R&B MH

## 2022-12-01 PROCEDURE — 250N000013 HC RX MED GY IP 250 OP 250 PS 637: Performed by: NURSE PRACTITIONER

## 2022-12-01 PROCEDURE — 99223 1ST HOSP IP/OBS HIGH 75: CPT | Mod: AI | Performed by: NURSE PRACTITIONER

## 2022-12-01 RX ORDER — LORAZEPAM 0.5 MG/1
0.5 TABLET ORAL DAILY PRN
Status: ON HOLD | COMMUNITY
End: 2022-12-05

## 2022-12-01 RX ORDER — VALACYCLOVIR HYDROCHLORIDE 500 MG/1
500 TABLET, FILM COATED ORAL DAILY
Status: ON HOLD | COMMUNITY
End: 2022-12-05

## 2022-12-01 RX ORDER — LORAZEPAM 1 MG/1
1 TABLET ORAL 2 TIMES DAILY PRN
Status: DISCONTINUED | OUTPATIENT
Start: 2022-12-01 | End: 2022-12-01

## 2022-12-01 RX ORDER — LORAZEPAM 1 MG/1
1-2 TABLET ORAL 3 TIMES DAILY PRN
Status: DISCONTINUED | OUTPATIENT
Start: 2022-12-01 | End: 2022-12-06 | Stop reason: HOSPADM

## 2022-12-01 RX ORDER — OLANZAPINE 7.5 MG/1
15 TABLET, FILM COATED ORAL AT BEDTIME
Status: DISCONTINUED | OUTPATIENT
Start: 2022-12-01 | End: 2022-12-02

## 2022-12-01 RX ORDER — LORAZEPAM 1 MG/1
2 TABLET ORAL ONCE
Status: COMPLETED | OUTPATIENT
Start: 2022-12-01 | End: 2022-12-01

## 2022-12-01 RX ORDER — GABAPENTIN 600 MG/1
600 TABLET ORAL 3 TIMES DAILY
Status: ON HOLD | COMMUNITY
End: 2022-12-05

## 2022-12-01 RX ORDER — QUETIAPINE FUMARATE 200 MG/1
200 TABLET, FILM COATED ORAL AT BEDTIME
Status: ON HOLD | COMMUNITY
End: 2022-12-05

## 2022-12-01 RX ORDER — GABAPENTIN 600 MG/1
600 TABLET ORAL 3 TIMES DAILY
Status: ON HOLD | COMMUNITY
End: 2022-12-01

## 2022-12-01 RX ORDER — LORAZEPAM 2 MG/ML
1-2 INJECTION INTRAMUSCULAR 3 TIMES DAILY PRN
Status: DISCONTINUED | OUTPATIENT
Start: 2022-12-01 | End: 2022-12-06 | Stop reason: HOSPADM

## 2022-12-01 RX ORDER — RISPERIDONE 3 MG/1
6 TABLET ORAL AT BEDTIME
Status: ON HOLD | COMMUNITY
End: 2022-12-05

## 2022-12-01 RX ADMIN — LORAZEPAM 1 MG: 1 TABLET ORAL at 12:24

## 2022-12-01 RX ADMIN — LORAZEPAM 2 MG: 1 TABLET ORAL at 13:21

## 2022-12-01 RX ADMIN — MELATONIN 3 MG: 3 TAB ORAL at 19:43

## 2022-12-01 RX ADMIN — OLANZAPINE 10 MG: 10 TABLET, FILM COATED ORAL at 16:30

## 2022-12-01 RX ADMIN — LORAZEPAM 2 MG: 1 TABLET ORAL at 17:32

## 2022-12-01 RX ADMIN — HYDROXYZINE HYDROCHLORIDE 25 MG: 25 TABLET ORAL at 16:30

## 2022-12-01 RX ADMIN — OLANZAPINE 15 MG: 7.5 TABLET, FILM COATED ORAL at 19:42

## 2022-12-01 ASSESSMENT — ACTIVITIES OF DAILY LIVING (ADL)
ADLS_ACUITY_SCORE: 29
ADLS_ACUITY_SCORE: 29
ORAL_HYGIENE: INDEPENDENT
ADLS_ACUITY_SCORE: 29
HYGIENE/GROOMING: INDEPENDENT
ADLS_ACUITY_SCORE: 29
ADLS_ACUITY_SCORE: 29
LAUNDRY: UNABLE TO COMPLETE
ADLS_ACUITY_SCORE: 29
DRESS: INDEPENDENT;SCRUBS (BEHAVIORAL HEALTH)
ADLS_ACUITY_SCORE: 29

## 2022-12-01 NOTE — H&P
"[facility] PSYCHIATRY   HISTORY AND PHYSICAL     ADMISSION DATA     Hamzah Roberts MRN# [id number]   Age: 37 year old YOB: 1985     Date of Admission: 11/30/2022  Primary Physician: No Ref-Primary, Physician   : Cynthia Gonzalez [facility]     CHIEF COMPLAINT   \" I do not be here, I wanted to go to treatment.\"       HISTORY OF PRESENT ILLNESS     He was brought to the emergency room by his girlfriend due to increased auditory hallucinations.  He told the emergency room he ran out of his medications though he tells me that he has been taking Risperdal and Ativan and has only not been taking the Seroquel.  He states he does not like how  tired the Seroquel makes him feel the next day.  He states he would prefer to take Zyprexa over Risperdal or Seroquel and that it helps him feel much calmer.  He states that he is under a civil commitment for mental health and tells me that he follows up with his medication prescriber regularly though I have heard that he does not follow-up well with his medication provider and has not seen him since May.  He appeared a bit fearful and preoccupied when I met with him he was also bit agitated that he was on a locked psychiatric unit and felt he did not need to be here.  After our  spoke with his county  it was found that his county  is revoking his commitment.  I did tell him that his  was revoking his commitment and he did not become severely agitated and threatening though quite irritable though did request as needed medications.  I was not aware until later this evening that he was on a Son petition which only includes Risperdal and Seroquel.  He was not on a long-acting Risperdal injection from my understanding    He does tell me that he has been experiencing hallucinations for a couple of years.  He states that even when he has had periods of being clean the hallucinations are still " "present though not near as bad.  He does admit to smoking methamphetamine recently and states he wants to stop and is wanting to check himself into treatment.  He denies having any thoughts to harm himself or others.  He did use meth 2 days prior to arrival. Ativan and zyprxa were given in the ER.  He had been in the emergency room a week ago for hallucinations and discharged.  He reported to the emergency room having auditory hallucinations for at least 1 year which he states sound like screaming though denies any command type of hallucinations.  He does hear voices screaming help.  He was not having any suicidal or homicidal ideation upon admission or while in the emergency room.  He did tell the DEC  that the voices were so bad he is \"getting sick of being alive\"    Pt has a [facility]  and is on a MI commitment.  He reports being on a MI commitment for over a year, stating that his commitment was extended.  He said he last talked with his  a few days ago on the phone.   Pt reports he has not completed a rule 25 assessment recently and said his last one was 6 months ago.        PSYCHIATRIC HISTORY      He has had 7 DEC assessment since February 2021.  He is under a civil commitment which was extended for 1 year.  His  is through [facility]       SUBSTANCE USE HISTORY     He has been to chemical dependency treatment multiple times.  His last chemical dependency treatment was last year at [facility].  I do believe he completed that treatment    His commitment is only for mental illness not chemical dependency.  Unsure what legal problems he has or has had in the past though I understand he is on probation I am not sure for what           SOCIAL HISTORY         Lives with his girlfriend in [place] area.  Unsure how long they have been together.       FAMILY HISTORY   No family history on file.       No family history of mental illness "     PAST MEDICAL HISTORY   No past medical history on file.    No past surgical history on file.    Morphine sulfate [morphine]     MEDICATIONS   Prior to Admission medications    Medication Sig Start Date End Date Taking? Authorizing Provider   QUEtiapine (SEROQUEL) 200 MG tablet Take 1 tablet (200 mg) by mouth nightly as needed 11/18/22   Yared Thomas MD   risperiDONE (RISPERDAL) 3 MG tablet Take 2 tablets (6 mg) by mouth At Bedtime for 15 days 11/18/22 12/3/22  Yared Thomas MD        PHYSICAL EXAM/ROS     I have reviewed the physical exam as documented by Candace Traore CNP  and agree with findings and assessment and have no additional findings to add at this time. The review of systems is negative other than noted in the HPI.       LABS   Recent Results (from the past 24 hour(s))   Troponin T, High Sensitivity    Collection Time: 11/30/22  6:32 PM   Result Value Ref Range    Troponin T, High Sensitivity <6 <=22 ng/L   Extra Purple Top Tube    Collection Time: 11/30/22  6:32 PM   Result Value Ref Range    Hold Specimen OK          MENTAL STATUS EXAM   Vitals: /97   Pulse 108   Temp 99.1  F (37.3  C) (Temporal)   Resp 18   SpO2 98%   MSE/PSYCH  PSYCHIATRIC EXAM  /97   Pulse 108   Temp 99.1  F (37.3  C) (Temporal)   Resp 18   SpO2 98%   -Appearance/Behavior: flat apathetic  -Motor: intact  -Gait: intact  -Abnormal involuntary movements: none  -Mood: sad and a bit fearful  -Affect: sad and restricted  -Speech: regular though monotone somewhat delayed  -Thought process/associations: Logical and Goal directed.  -Thought content: Does have significant auditory hallucinations screaming at him  -Perceptual disturbances: No hallucinations..              -Suicidal/Homicidal Ideation: passive suicidal thoughts.  -Judgment: poor  -Insight: poor  *Orientation: time, place and person.  *Memory: intact  *Attention: fair  *Language: fluent, no aphasias, able to repeat phrases and name objects. Vocab  intact.  *Fund of information: appropriate for education  *Cognitive functioning estimate: 0 - independent.           ASSESSMENT     This is a 37 year old male with a PMH of a civil commitment for mental illness that was extended for 1 year and is now presenting to the emergency room with methamphetamine in his system and not following up with his psychiatric care.  His [facility]  is revoking his commitment.  He is a bit irritable and was hoping he could be at home while waiting for treatment as he does agree that he does need treatment.  He is willing to take a psychiatric medications though states he prefers to be on Zyprexa over Risperdal as it helps him feel much calmer and sleep better.  I will addend his Son       DIAGNOSIS     Schizophrenia       PLAN     Location: Unit 5  Legal Status: 72-hour hold: Awaiting revocation papers  Safety Assessment:    Behavioral Orders   Procedures     Code 1 - Restrict to Unit     Routine Programming     As clinically indicated     Status 15     Every 15 minutes.      PTA medications held:     Stop Risperdal and Seroquel    PTA medications continued/changed:     -Stop Risperdal and Seroquel    New medications initiated:     -Start Zyprexa 15 mg nightly   As needed Ativan 1 to 2 mg 3 times daily as needed for severe agitation    Programming: Patient will be treated in a therapeutic milieu with appropriate individual and group therapies. Education will be provided on diagnoses, medications, and treatments.     Medical diagnoses:  Per medicine    Consult: None needed today    Tests: None needed today    Anticipated LOS: 5 days or more.  Needs go [id number] to treatment  Disposition: Needs to go [id number] to treatment       ATTESTATION      Joanne Dial NP

## 2022-12-01 NOTE — PROGRESS NOTES
"SW met with the patient. The patient refused answering questions. And said \" Why do I need to answer these stupid fucking questions, I want the fuck out of here.\"     The following information was provided from past medical notes.     Social Service Psychosocial Assessment  Presenting Problem:   The patient is a 37 year old male is under a [facility]/CD commitment and was admitted for increased hallucinations and paranoia.   Patient is his own guardian.  Marital Status:   Not marred   Spouse / Children:   Not  / The patient said, \" A few.\"   Psychiatric TX HX:   The   Suicide Risk Assessment:  The patient denies SI for admit, denies SI hx, denies SI for today.   Access to Lethal Means (explain):   The patient denies access to guns.   Family Psych HX:  Unknown  A & Ox:   X 3  Medication Adherence:   Unknown - please refer to H&P  Medical Issues:   Unknown - please refer to H&P    Visual -Motor Functioning:   Good, no issues noticed at this time.   Communication Skills /Needs:   Good, no issues noticed at this time.   Ethnicity:   White     Spirituality/Mandaen Affiliation:   Unknown   Clergy Request:   No   History:   Unknown patient did not answer the question   Living Situation: Pt has a history of being homeless but he has been staying at his girlfriend's place in [place], [place]   ADL s:  Independently   Education:  Dropped out of school and earned a GED  Financial Situation:   Unknown patient did not answer the question   Occupation:  Unknown patient did not answer the question   Leisure & Recreation:  Unknown patient did not answer the question   Childhood History:   Pt shared his parents were  and has some siblings.  \"Pt is the youngest of 9 children and has only 2 sisters. Mother lives in [place] and father in .   Trauma Abuse HX:   Unknown patient did not answer the question     Relationship / Sexuality:   Unknown patient did not answer the question. Patient stated he has agirl " friend.     Substance Use/ Abuse:  He did use meth 2 days prior to arrival.  Chemical Dependency Treatment HX:     Legal Issues:    Pt reported currently on probation but declined to share further details.  Lengthy legal system involvement dating back to age 15.   Significant Life Events:   Legal issues, being under commitment.   Strengths:   Is in a safe place, under commitment, has a    Challenges /Limitation:   Current MH, not cooperating,   Patient Support Contact (Include name, relationship, number, and summary of conversation):   Has sister on FOUZ[place] Patient also added girl friend.   Interventions:       Vulnerable Adult/Child Report NA    Community-Based Programs - Needs     Medical/Dental Care Needs     Home Care NA     CD Evaluation/Rule 25/Aftercare Needs under a MICD commitment.     Medication Management - Needs     Individual Therapy Would benefit     Clergy Request NA    Housing/Placement Needs     Case Management - Cynthia Gonzalez - [phone]    Insurance Coverage - [facility]     Financial Assistance Would benefit     Commit/Son Screening Under [facility]D commitment [facility]    Suicide Risk Assessment - The patient denies SI for admit, denies SI hx, denies SI for today.     High Risk Safety Plan Talk to supports; Call crisis lines; Go to local ER if feeling suicidal.  LIANA Cannon  12/1/2022  9:26 AM

## 2022-12-01 NOTE — PLAN OF CARE
Problem: Thought Process Alteration  Goal: Optimal Thought Clarity  Description: Pt will have a reduction of voices by discharge.  Outcome: Progressing    Pt denied voices currently though is paranoid    Problem: Suicide Risk  Goal: Absence of Self-Harm  Description: Pt will have a reduction of SI by discharge.  Outcome: Progressing     Pt denies SI     Problem: Adult Behavioral Health Plan of Care  Goal: Patient-Specific Goal (Individualization)  Description: Pt will be compliant with treatment team recommendations during hospitalization.   Pt will report adequate amounts of sleep on NOC shift (5-8 hours.)  Pt will participate in greater than 50% of daily groups.  Pt may not wean from the MHICU until evaluated by treatment team Thursday 11/30/2022 2109 by Maycol Wilson RN  Outcome: Progressing     Goal Outcome Evaluation:     1530 Face to face rounding complete.  PT introduced to nursing for the shift.     Pt was very agitated on admission with having to be watched when changing into hospital scrubs.  He went into the bathroom and was observed by the UA taking an object from his pocket and holding it in his hand.  The metal detecting wand picked this object up during the scan.  PT was asked to stand up to be checked with the wand again and refused a first.  Security was present and the charge nurse.  Pt did comply after yelling and demanding to sign out of the unit.  He handed over a vape pen to security. He signed a 12 hour notice to leave.  Pt was given PRN Zyprexa and Atarax for his agitation.  Pt's provider was called and he was also given PRN Ativan 2 mg about a half hour later.  Pt had a good response from the PRN medications and calmed down a great deal about an hour later.  He was cooperative with the EKG and blood draw shortly after.  Pt told me that he came here thinking that he was going to CD treatment.  He agreed to rescind the 12 hour notice and talk to  In the morning.  He slept he rest of the  shift.     2300 Face to face end of shift report communicated to Night shift RN's along with Pt's fall risk.

## 2022-12-01 NOTE — PLAN OF CARE
"Face to face end of shift report communicated to oncoming shift.     Irma Pierre RN  12/1/2022  3:35 PM    Problem: Adult Behavioral Health Plan of Care  Goal: Patient-Specific Goal (Individualization)  Description: Pt will be compliant with treatment team recommendations during hospitalization.   Pt will report adequate amounts of sleep on NOC shift (5-8 hours.)  Pt will participate in greater than 50% of daily groups.  Pt may not wean from the MHICU until evaluated by treatment team Thursday  Outcome: Not Progressing  Note: Brought patient breakfast tray.  Asked patient to fill out lunch menu, patient replies and states \"I'm not going to be here any ways\"   Patient does not participate in nursing assessment at this time.    1140:  Patient knocking on nurses door stating \"I'd like to get out of here\"  \"can I talk to someone that can get this going?\"  Gestures to ear to request to use the phone.  Informed patient he would need to wait until after lunch, patient become angry and walks away.      Patient does not communicate with staff when asked questions regarding assessment \"what does it matter\" is response.      PRN:  Ativan 1 mg @ 1224 for increasing anxiety and agitation.   Patient refuses to fill out dinner menu.  Pacing in the MHICU hallway.    Asked patient if this writer could get him anything else at this time, patient replies \"yes out of here\"   1321: one time dose of Ativan 2 mg given for patient's agitation and provider will be informing patient of 72 hour hold. No issues with giving the medication.  1346:  Gina Barnett called to unit for security presence to inform patient he is on a 72 hour hold.   No issues when patient informed of hold.  Patient remains in bed sleeping the remainder of this shift.      Goal Outcome Evaluation:                        "

## 2022-12-01 NOTE — PLAN OF CARE
Problem: Adult Behavioral Health Plan of Care  Goal: Patient-Specific Goal (Individualization)  Description: Pt will be compliant with treatment team recommendations during hospitalization.   Pt will report adequate amounts of sleep on NOC shift (5-8 hours.)  Pt will participate in greater than 50% of daily groups.  Pt may not wean from the MHICU until evaluated by treatment team Thursday  Outcome: Progressing    Face to face end of shift report received from evening shift RN. Rounding completed. Pt observed in their own bed, with eyes closed, in right side-lying position, and with rested breathing. Will continue to support the needs of the patient. Face to face end of shift report to be given to day shift RN.     Pt slept approximately 5.5 hours.      Problem: Thought Process Alteration  Goal: Optimal Thought Clarity  Outcome: Progressing     Problem: Suicide Risk  Goal: Absence of Self-Harm  Description: Pt will have a reduction of SI by discharge.  Outcome: Progressing -- Pt remained free of self injury and harm throughout the duration of this shift.

## 2022-12-01 NOTE — PROGRESS NOTES
11/30/22 1839   Patient Belongings   Did you bring any home meds/supplements to the hospital?  No   Patient Belongings locker;sent to security per site process   Patient Belongings Put in Hospital Secure Location (Security or Locker, etc.) clothing;shoes;wallet;other (see comments)  (Running shoes, ORANGE VAPE & TORCH, hat, loofa, toiletry bag with 2 electric ian, hygiene items, nail clippers, winter jacket, belt, socks, 5 underwear, ear plugs, 13 tops, 7 bottoms.)   Belongings Search Yes   Clothing Search Yes   Second Staff Eric   List items sent to safe: Wallet, [place] id card, bam debit card, [place] ebt card, ucare card, paypal debit card.    All other belongings put in assigned cubby in belongings room.       I have reviewed my belongings list on admission and verify that it is correct.     Patient signature_______________________________    Second staff witness (if patient unable to sign) ______________________________       I have received all my belongings at discharge.    Patient signature________________________________    Marleni  11/30/2022  6:42 PM

## 2022-12-01 NOTE — DISCHARGE INSTRUCTIONS
Behavioral Discharge Planning and Instructions    Summary: The patient is a 37 year old male is under a [facility] MI/CD commitment and was admitted for increased hallucinations and paranoia.    Main Diagnosis: Increased hallucinations and paranoia.     Health Care Follow-up:     RENEE Ohara [facility] Team 3   C [phone]  Fax: [phone]  [email or web address]    [address], [place] [address]   [phone]    Attend all scheduled appointments with your outpatient providers. Call at least 24 hours in advance if you need to reschedule an appointment to ensure continued access to your outpatient providers.     Major Treatments, Procedures and Findings:  You were provided with: {Major Treatments:990007}    Symptoms to Report: {symptoms:398305}    Early warning signs can include: { Warning Signs:567272}    Safety and Wellness:  {Age Group Safety Wellness:603219}    Resources:   { RESOURCES MB:541071}    General Medication Instructions:   See your medication sheet(s) for instructions.   Take all medicines as directed.  Make no changes unless your doctor suggests them.   Go to all your doctor visits.  Be sure to have all your required lab tests. This way, your medicines can be refilled on time.  Do not use any drugs not prescribed by your doctor.  Avoid alcohol.    Advance Directives:   Scanned document on file with [employer]? No scanned doc  Is document scanned? Pt states no documents  Honoring Choices Your Rights Handout: Informed and given  Was more information offered? Materials given    The Treatment team has appreciated the opportunity to work with you. If you have any questions or concerns about your recent admission, you can contact the unit which can receive your call 24 hours a day, 7 days a week. They will be able to get in touch with a Provider if needed. The unit number is [id number].    [place] Area:  [facility]  "stabilization housing- [phone]  [facility] Crisis Line: 6-069-815-7035  [facility]: 232-8529  Sexual Assault Program of [facility]: [phone] or 1-977.741.9669  [place] Battered Women's Program: 3-820-100-0505 Ext: 279       Calls answered Mon-Fri-8:00 am--4:30 pm    [place]:  [facility]: 5-653-460-7678  [facility] - 2-391-572-2012  [facility] first call for help: 6-009-943-5755  [facility] Crisis Center:  ([phone]) [phone]    [place]:  Warm Line: 1-483.907.7841       Calls answered Tuesday--Saturday 4:00 pm--10:00 pm  [facility] Crisis Line - [phone]  [place] [facility] [phone]    [place] Statewide:  [place] Crisis and Referral Services: 3-699-978-4423  National Suicide Prevention Lifeline: 0-892-978-TALK (8467)   - cfs0xtvk- Text \"Life\" to 27217  First Call for Help: 2-1-1  [facility] Helpline- 1-614-SOTN-HELP   Crisis Text Line: Text  [place]  to 025104   "

## 2022-12-01 NOTE — CARE PLAN
Prior to Admission Medication Reconciliation:     Medications added:   [] None  [x] As listed below:    Lorazepam    Omeprazole    valtrex    Medications deleted:   [x] None    Medications marked for review/removal by attending:  [] None  [x] As listed below:    Gabapentin 600 mg TID- pt reports discontinued by health care provider, still active with behavioral health provider (pt has not seen since May) and active at [facility]. Marked for review by attending.     Changes made to existing medications:   [] None  [x] Updated time of day, strengths and frequencies to most current.     seroquel from PRN to scheduled    Updated risperdal    Pertinent notes/medications patient takes different than prescribed:     Pt reports the only 2 medications he has been consistently taking are lorazepam (last filled 5/23/22 #14 tabs- 0 refills) and risperdal. Both are still active with his behavioral health provider (but has not been seen since May)       Pt reports he doesn't take seroquel consistently because it makes him drowsy       Reports he is supposed to be on prilosec and valtrex but has not been taking them recently.     Last times/dates taken verified with patient:  [x] Yes- completed myself  [] Did not review with patient. Rx verification only. Review completed by nursing.    [] Nurse completed no changes made (double checked entries)  [] Unable to review with patient at this time:    Allergy review:    [x]Did not review: reviewed by nursing  []Allergies reviewed and updated if needed.     Medication reconciliation sources:   [x]Patient  []Patient family member/emergency contact: **  [][facility] Report Review  []Epic Chart Review  [x]Care Everywhere review: [facility]  []Pharmacy med list/phone call: **  [x]Outside meds dispense report:   []Fill dates reflect compliancy. No concerns.  [x]Fill dates do not reflect compliancy on the following medications:     Omeprazole- not taking recently- out    Gabapentin-not  taking    Valtrex- not taking recently- out    Lorazepam- only given 14 tabs but reports still has  []HomeCare medlist, Nursing home or Assisted Living MAR:  [x]Behavioral Health Provider: [facility] [phone] (Sivlerio Smith)    Hasn't been seen since May 2022    gabapentin 600 mg TID  Risperdal 2 mg daily     omeprazole 20 mg daily    seroquel 200 mg at bedtime     lorazapem 0.5 mg PRN (14 ds)    wellbutrin stopped- snorting  []Other: **    Pharmacy desired at discharge:    Is patient on coumadin?   [x]No  []Yes      Fill dates and reported compliancy:  [x] Fill dates reflect reported compliancy.    [] Not applicable. Patient is not taking any maintenance medications at this time.   [] Fill dates do not coincide with compliancy, but patient reports compliancy.    [] Did not review with patient. Cannot assess.     Historian accuracy:  [x] Excellent- alert and oriented, understands why meds were prescribed and how to take, able to answer specifics  [] Good- alert and oriented, understands why meds were prescribed and how to take, some confusion   [] Fair- alert and oriented, doesn't know medications without list, cannot answer specifics about medications, but has a decent process for which to take at home  [] Poor- does not know medications, may not have a process to take at home, may be cognitively unable to review at this time  []Medication management done by family member or facility, no concerns about historian accuracy.   [] Did not review with patient. Cannot assess.     Medication Management:  [x] Manages meds independently  [] Family member/ other party manages meds/assists:  [] Meds managed by staff at facility  [] Meds set up by home care, family/other party helps administer  [] Meds set up by home care, self administers  [] Did not review with patient. Cannot assess.     Other medications aside from PTA:  [x] Denies taking any other medications aside from those listed in PTA meds, this includes  over-the-counter vitamins, supplements and analgesics.       Tania Domingo on 12/1/2022 at 9:10 AM       Notifying appropriate party of changes/additions/discrepancies:  []No pertinent changes made, notification not necessary.   [] Notified attending provider via text page/phone call/sticky note or other:  [] Notified other:  [x] Medications have not been reconciled by a provider yet, notification not necessary  [] Pt is not admitted to floor yet or patient is boarding, PTA meds completed before admission.     Medications Prior to Admission   Medication Sig Dispense Refill Last Dose     gabapentin (NEURONTIN) 600 MG tablet Take 600 mg by mouth 3 times daily        LORazepam (ATIVAN) 0.5 MG tablet Take 0.5 mg by mouth daily as needed (severe anxiety)   Past Week     omeprazole (PRILOSEC) 20 MG DR capsule Take 20 mg by mouth daily   More than a month     QUEtiapine (SEROQUEL) 200 MG tablet Take 200 mg by mouth At Bedtime   More than a month     risperiDONE (RISPERDAL) 3 MG tablet Take 6 mg by mouth At Bedtime   Past Week     valACYclovir (VALTREX) 500 MG tablet Take 500 mg by mouth daily   More than a month

## 2022-12-01 NOTE — PROGRESS NOTES
"    RISHI called the patient's  Cynthia and left a phone message that patient was here on the unit and is requesting to leave.     RISHI called the Coverage Case [facility] line for Mental Health Resources contracted by [facility] and spoke to a Teri. Teri stated she will be messaging the  assigned and their supervisor. Teri asked, \" What is a revocation? I just see in the system that he was recommitted.\" RISHI explained that the patient wants to leave and the hospital needs to know what the  is planning. Teri again stated she will have the  Cynthia or her supervisor call SW. Teri also updated the SW that the patient has [employer] subscriber # [phone] and no longer has [place] MA.     RISHI called admitting and updated them of the patient's insurance.       The patient's  called and requested the hospital to put the patient on a 72 hour hold because they are revoking.   "

## 2022-12-01 NOTE — H&P
[facility]    History and Physical  Medical Services       Date of Admission:  11/30/2022  Date of Service (when I saw the patient): 11/30/22    Assessment & Plan     Active Medical Problems:    Methamphetamine use disorder, severe (H)- UDS positive amphetamine. Pt reports daily Methamphetamine use.     Chest pain- pt reports intermittent chest pain, especially when he wakes in the mornings x 2 months. He denies being seen for this. He denies any chest pain currently and reports he last experienced the chest pain 2 days ago. When asked where the pain was he points to his bilateral lower rib area. He denies any sob or any other symptoms. Vitals stable. Chart review reveals he did complain of the same pain the ED as well. Stat EKG showed NSR, normal EKG. Vent rate 83, QT//385. Troponin negative. Nursing to monitor for new or worsening symptoms and consult as needed.     Pt medically stable, no acute medical concerns. Chronic medical problems stable. Will sign off. Please consult for any new medical issues or concerns.           Code Status: Full Code    Jeri Traore CNP    Primary Care Physician   Physician No Ref-Primary    Chief Complaint   Psych evaluation     History is obtained from the patient and medical chart     History of Present Illness   (per ED) Hamzah Roberts is a 37 year old male, history of paranoid schizophrenia with hallucinations, psychosis and polysubstance abuse (methamphetamine and marijuana), who presents to this ED via walk-in for evaluation of mental health issue.     Past Medical History    I have reviewed this patient's medical history and updated it with pertinent information if needed.   No past medical history on file.    Past Surgical History   I have reviewed this patient's surgical history and updated it with pertinent information if needed.  No past surgical history on file.    Prior to Admission Medications   Prior to Admission Medications   Prescriptions Last Dose  Informant Patient Reported? Taking?   QUEtiapine (SEROQUEL) 200 MG tablet   No No   Sig: Take 1 tablet (200 mg) by mouth nightly as needed   risperiDONE (RISPERDAL) 3 MG tablet   No No   Sig: Take 2 tablets (6 mg) by mouth At Bedtime for 15 days      Facility-Administered Medications: None     Allergies   Allergies   Allergen Reactions     Morphine Sulfate [Morphine] Shortness Of Breath       Social History   I have reviewed this patient's social history and updated it with pertinent information if needed. Hamzah Roberts  reports that he has been smoking cigarettes. He has been smoking an average of .5 packs per day. He does not have any smokeless tobacco history on file. He reports current drug use. Drugs: Methamphetamines and Marijuana. He reports that he does not drink alcohol.    Family History   I have reviewed this patient's family history and updated it with pertinent information if needed.   No family history on file.    Review of Systems   CONSTITUTIONAL:  negative  EYES:  negative  HEENT:  negative  RESPIRATORY:  negative  CARDIOVASCULAR:  Negative except intermittent chest pain   GASTROINTESTINAL:  negative  GENITOURINARY:  negative  INTEGUMENT/BREAST:  negative  HEMATOLOGIC/LYMPHATIC:  negative  ALLERGIC/IMMUNOLOGIC:  negative  ENDOCRINE:  negative  MUSCULOSKELETAL:  negative  NEUROLOGICAL:  negative    Physical Exam   Temp: 99.1  F (37.3  C) Temp src: Temporal BP: 118/97 Pulse: 108   Resp: 18 SpO2: 98 % O2 Device: None (Room air)    Vital Signs with Ranges  Temp:  [99.1  F (37.3  C)] 99.1  F (37.3  C)  Pulse:  [100-108] 108  Resp:  [16-18] 18  BP: (118-119)/(81-97) 118/97  SpO2:  [98 %-100 %] 98 %  0 lbs 0 oz    Constitutional: awake, alert, cooperative, no apparent distress, and appears stated age, disheveled, vitals stable   Eyes: Lids and lashes normal, pupils equal, round and reactive to light, extra ocular muscles intact, sclera clear, conjunctiva normal  ENT: Normocephalic, without obvious  abnormality, atraumatic, external ears without lesions, oral pharynx with moist mucous membranes, no erythema or exudates, gums normal   Hematologic / Lymphatic: no cervical lymphadenopathy  Respiratory: No increased work of breathing, good air exchange, clear to auscultation bilaterally, no crackles or wheezing  Cardiovascular: Normal apical impulse, regular rate and rhythm, normal S1 and S2, no S3 or S4, and no murmur noted  GI: normal bowel sounds, soft, non-distended, non-tender, no masses palpated, no hepatosplenomegally  Genitounirinary: deferred  Skin: normal skin color, texture, turgor, no redness, warmth, or swelling and no rashes  Musculoskeletal: There is no redness, warmth, or swelling of the joints.  Full range of motion noted.   Neurologic: Awake, alert, oriented to name, place and time.  Neuropsychiatric: General: normal, calm and fair eye contact    Data   Data reviewed today:   No lab results found in last 7 days.    No results found for this or any previous visit (from the past 24 hour(s)).

## 2022-12-02 PROCEDURE — 124N000001 HC R&B MH

## 2022-12-02 PROCEDURE — 250N000013 HC RX MED GY IP 250 OP 250 PS 637: Performed by: NURSE PRACTITIONER

## 2022-12-02 PROCEDURE — 99232 SBSQ HOSP IP/OBS MODERATE 35: CPT | Performed by: NURSE PRACTITIONER

## 2022-12-02 RX ORDER — OLANZAPINE 10 MG/1
20 TABLET ORAL AT BEDTIME
Status: DISCONTINUED | OUTPATIENT
Start: 2022-12-02 | End: 2022-12-06 | Stop reason: HOSPADM

## 2022-12-02 RX ADMIN — MELATONIN 3 MG: 3 TAB ORAL at 21:03

## 2022-12-02 RX ADMIN — LORAZEPAM 2 MG: 1 TABLET ORAL at 13:29

## 2022-12-02 RX ADMIN — LORAZEPAM 2 MG: 1 TABLET ORAL at 17:35

## 2022-12-02 RX ADMIN — OLANZAPINE 10 MG: 10 TABLET, FILM COATED ORAL at 16:18

## 2022-12-02 RX ADMIN — OLANZAPINE 20 MG: 10 TABLET, FILM COATED ORAL at 21:03

## 2022-12-02 ASSESSMENT — ACTIVITIES OF DAILY LIVING (ADL)
ADLS_ACUITY_SCORE: 29
ADLS_ACUITY_SCORE: 29
DRESS: SCRUBS (BEHAVIORAL HEALTH);INDEPENDENT
ADLS_ACUITY_SCORE: 29
ORAL_HYGIENE: INDEPENDENT
ADLS_ACUITY_SCORE: 29
HYGIENE/GROOMING: INDEPENDENT
ADLS_ACUITY_SCORE: 29
LAUNDRY: UNABLE TO COMPLETE

## 2022-12-02 NOTE — PROGRESS NOTES
"[facility] PSYCHIATRY  PROGRESS NOTE     SUBJECTIVE      yoav did not want to speak much initally today though when I returned to speak with him later he was more cooperative. He is still upset he was sent here and not treatment. I did tell him that the next step would be to have him get a rule25 and go to treatment. He tells me that he wanted to go to [facility] through [facility] and they do not require rule 25. I did call this facility and gave him the number to call after. He was more pleasant after this discussion. He appears a bit preoccupied still and doesn't want to answer many questions other than \"theyre still there\" regarding the voices. He does state they do not go away completely.   He continues to state he feels zyprexa is more helpful than risperdal and I did tell him I would have that added to his bower.      MEDICATIONS   Scheduled Meds:    OLANZapine  15 mg Oral At Bedtime     PRN Meds:.acetaminophen, alum & mag hydroxide-simethicone, LORazepam **OR** LORazepam, melatonin, nicotine, OLANZapine **OR** OLANZapine     ALLERGIES   Allergies   Allergen Reactions     Morphine Sulfate [Morphine] Shortness Of Breath        MENTAL STATUS EXAM   Vitals: /97   Pulse 108   Temp 99.1  F (37.3  C) (Temporal)   Resp 18   SpO2 98%     Appearance: Alert, oriented, dressed in hospital scrubs  Attitude: Cooperative   Eye Contact: Fair  Mood: \"Depressed\"  Affect: Restricted range of affect, mood congruent  Speech: Slight reduction in rate. Normal rhythm   Psychomotor Behavior: No tremor, rigidity, akathisia, or psychomotor retardation    Thought Process: Logical, goal directed   Associations: No loose associations   Thought Content: Passive SI. No SIB. Denies AVH. No evidence of delusional thought  Insight: Fair   Judgment: Fair  Oriented to: Person, place, and time  Attention Span and Concentration: Intact  Recent and Remote Memory: Intact  Language: English with appropriate syntax and vocabulary  Fund of " Knowledge: Average  Muscle Strength and Tone: Grossly normal  Gait and Station: Grossly normal       LABS   No results found for this or any previous visit (from the past 24 hour(s)).      IMPRESSION     This is a 37 year old male with a PMH of a civil commitment for mental illness that was extended for 1 year and is now presenting to the emergency room with methamphetamine in his system and not following up with his psychiatric care.  His [facility]  is revoking his commitment.  He is a bit irritable and was hoping he could be at home while waiting for treatment as he does agree that he does need treatment.  He is willing to take a psychiatric medications though states he prefers to be on Zyprexa over Risperdal as it helps him feel much calmer and sleep better.  I will addend his Bower       DIAGNOSES       Schizophrenia       PLAN     Location: Unit 5  Legal Status: Orders Placed This Encounter      Voluntary      Emergency Hospitalization Hold (72 Hr Hold)    Safety Assessment:    Behavioral Orders   Procedures     Code 1 - Restrict to Unit     Routine Programming     As clinically indicated     Status 15     Every 15 minutes.      PTA medications continued/changed:     -    New medications tried and stopped:     -stopped rispedal and seroquel     New medications initiated:     zyprexa    Today's Changes:    -letter was sent to court ot addend bower  -increase hs zyprexa to 20 mg    -asked for invega and zyprexa to be added to bower.       Programming: Patient will be treated in a therapeutic milieu with appropriate individual and group therapies. Education will be provided on diagnoses, medications, and treatments.     Medical diagnoses:  Per medicine    Consult: None    Labs none needed today    Anticipated LOS: likely 5 days or more. Will need to go door to door to treatment.   Disposition: will need to go door to door to cd treatment.        TREATMENT TEAM CARE PLAN     Progress: Continued  symptoms.    Continued Stay Criteria/Rationale: Continued symptoms without sufficient improvement/resolution.    Medical/Physical: See above.    Precautions: See above.     Plan: Continue inpatient care with unit support and medication management.    Rationale for change in precautions or plan: NA due to no change.    Participants: Joanne Dial NP, Nursing, SW, OT.    The patient's care was discussed with the treatment team and chart notes were reviewed.       ATTESTATION    Joanne Dial NP

## 2022-12-02 NOTE — PLAN OF CARE
"Face to face shift report received from Kenji GARNICA. Rounding completed, pt observed in MHICU at the start of the shift.    Patient withdrawn to room majority of the day. Not weaning or attending groups at this time. Alert and making needs known. Quiet with staff during conversation but pleasant otherwise. Compliant with nursing assessment. Denies anxiety, depression, hallucinations, SI/HI, and pain this shift. Ate 25% of breakfast and 75% of lunch. Patient frustrated about being here vs treatment. Irritable in the afternoon while making phone calls. Patient became dismissive with staff stating \"You can get me paperwork to leave because that lady isn't my counselor anymore.\" Patient shortly after requested and received Ativan 2 mg at 1329 for increased anxiety and agitation. Patient appeared in a better mood when returned with medication and was polite with nursing staff. Has been laying in bed remainder of the afternoon. Revocation paperwork was faxed to the unit and placed in his chart. No paranoid statements or behaviors throughout the shift.    Problem: Adult Behavioral Health Plan of Care  Goal: Patient-Specific Goal (Individualization)  Description: Pt will be compliant with treatment team recommendations during hospitalization.   Pt will report adequate amounts of sleep on NOC shift (5-8 hours.)  Pt will participate in greater than 50% of daily groups.  Pt may not wean from the MHICU until evaluated by treatment team.  Outcome: Progressing     Problem: Thought Process Alteration  Goal: Optimal Thought Clarity  Description: Patient will have clear linear conversations by discharge.  Outcome: Progressing     Problem: Suicide Risk  Goal: Absence of Self-Harm  Description: Pt will have a reduction of SI by discharge.  Outcome: Progressing    Face to face report will be communicated to oncoming RN.    Apurva Quinones RN  12/2/2022                          "

## 2022-12-02 NOTE — PLAN OF CARE
Problem: Adult Behavioral Health Plan of Care  Goal: Patient-Specific Goal (Individualization)  Description: Pt will be compliant with treatment team recommendations during hospitalization.   Pt will report adequate amounts of sleep on NOC shift (5-8 hours.)  Pt will participate in greater than 50% of daily groups.  Pt may not wean from the MHICU until evaluated by treatment team Thursday  Outcome: Progressing   Goal Outcome Evaluation:       Face to face shift report received from RN. Rounding completed, pt observed.Client rested 7 hours with eyes closed and respirations noted.

## 2022-12-02 NOTE — PLAN OF CARE
Problem: Suicide Risk  Goal: Absence of Self-Harm  Description: Pt will have a reduction of SI by discharge.  Outcome: Progressing    Pt denies SI     Problem: Thought Process Alteration  Goal: Optimal Thought Clarity  Outcome: Progressing    Pt's thinking was logical and organized though he is impulsive and seems confused at times     Problem: Adult Behavioral Health Plan of Care  Goal: Patient-Specific Goal (Individualization)  Description: Pt will be compliant with treatment team recommendations during hospitalization.   Pt will report adequate amounts of sleep on NOC shift (5-8 hours.)  Pt will participate in greater than 50% of daily groups.  Pt may not wean from the MHICU until evaluated by treatment team Friday  Outcome: Progressing   Goal Outcome Evaluation:    Plan of Care Reviewed With: patient      1530 Face face rounding complete.   Pt introduced to nursing for the shift.    Pt was agitated at the beginning of the shift demanding Ativan and use of the cordless phone.  Pt was given Zyprexa 10 and Atarax 25 which he took with some encouragement. He responded well to soft kind encouragement of good behavior.  He wants to have good behavior so that he can get out of her and go to treatment as soon as possible. He is very quick to anger and is very demanding at times. Pt's provider was contacted and the PRN dose of Ativan was adjusted.  He was given 2 mg of Ativan at 1732 for his anxiety and agitation.  He had a good response from these PRN's.  He was very calm and polite during all interactions the rest of the evening.  Pt did not have any signs of hallucinations and though does appear paranoid at times.  His thinking seem very concrete.    2300 Face to face end of shift report communicated to Night shift RN's along with Pt's fall risk.     Maycol Wilson RN  12/1/2022

## 2022-12-03 PROCEDURE — 250N000013 HC RX MED GY IP 250 OP 250 PS 637: Performed by: NURSE PRACTITIONER

## 2022-12-03 PROCEDURE — 124N000001 HC R&B MH

## 2022-12-03 PROCEDURE — 99232 SBSQ HOSP IP/OBS MODERATE 35: CPT | Performed by: NURSE PRACTITIONER

## 2022-12-03 RX ORDER — POLYETHYLENE GLYCOL 3350 17 G
2 POWDER IN PACKET (EA) ORAL
Status: DISCONTINUED | OUTPATIENT
Start: 2022-12-03 | End: 2022-12-05

## 2022-12-03 RX ADMIN — LORAZEPAM 2 MG: 1 TABLET ORAL at 12:15

## 2022-12-03 RX ADMIN — OLANZAPINE 10 MG: 10 TABLET, FILM COATED ORAL at 14:29

## 2022-12-03 RX ADMIN — OLANZAPINE 20 MG: 10 TABLET, FILM COATED ORAL at 21:11

## 2022-12-03 RX ADMIN — NICOTINE POLACRILEX 2 MG: 2 GUM, CHEWING BUCCAL at 17:52

## 2022-12-03 RX ADMIN — LORAZEPAM 2 MG: 1 TABLET ORAL at 00:49

## 2022-12-03 RX ADMIN — MELATONIN 3 MG: 3 TAB ORAL at 21:11

## 2022-12-03 RX ADMIN — LORAZEPAM 2 MG: 1 TABLET ORAL at 17:56

## 2022-12-03 ASSESSMENT — ACTIVITIES OF DAILY LIVING (ADL)
ADLS_ACUITY_SCORE: 29

## 2022-12-03 NOTE — PLAN OF CARE
"Face to face shift report received from Amelia GARNICA. Rounding completed, pt observed sleeping at the start of the shift.    Patient in bed majority of the day. Did not wean or attend any groups this shift. Alert and responding appropriately. Pleasant during conversation with nursing staff. Ate 50% of breakfast and 100% of lunch. Compliant with nursing assessment. Denies anxiety but states he is feeling depressed today. Reports having auditory hallucinations but does not elaborate. When asked if they have improved since he's been here patient responded \"Not really.\" Requested and received Ativan 2 mg at 1215 for anxiety and feeling on edge. Patient spent remainder of afternoon withdrawn to room. Patient approached nursing staff politely requesting more medication. Offered and accepted Zyprexa 10 mg at 1429.    Problem: Adult Behavioral Health Plan of Care  Goal: Patient-Specific Goal (Individualization)  Description: Pt will be compliant with treatment team recommendations during hospitalization.   Pt will report adequate amounts of sleep on NOC shift (5-8 hours.)  Pt will participate in greater than 50% of daily groups.  Pt may not wean from the MHICU until evaluated by treatment team.  Pt may wean from the MHICU as long as his behavior is appropriate to be evaluated by treatment team daily    Outcome: Progressing     Problem: Thought Process Alteration  Goal: Optimal Thought Clarity  Description: Patient will have clear linear conversations by discharge.  Outcome: Progressing     Problem: Suicide Risk  Goal: Absence of Self-Harm  Description: Pt will have a reduction of SI by discharge.  Outcome: Progressing    Face to face report will be communicated to oncoming DANIKA.    Apurva Quinones RN  12/3/2022                   "

## 2022-12-03 NOTE — PLAN OF CARE
Problem: Thought Process Alteration  Goal: Optimal Thought Clarity  Description: Patient will have clear linear conversations by discharge.  Outcome: Progressing    Pt's thinking is logical, concrete, and organized.     Problem: Adult Behavioral Health Plan of Care  Goal: Patient-Specific Goal (Individualization)  Description: Pt will be compliant with treatment team recommendations during hospitalization.   Pt will report adequate amounts of sleep on NOC shift (5-8 hours.)  Pt will participate in greater than 50% of daily groups.  Pt may wean from the MHICU as long as his behavior is appropriate to be evaluated by treatment team daily  Outcome: Progressing   Goal Outcome Evaluation:    Plan of Care Reviewed With: patient      1530 Face to face rounding complete.  Pt introduce to nursing for the shift.    Pt was up and active about half of the shift.  Pt was given the number to his  and the [facility] in [facility].  He weaned to the open unit to make these calls and was very appropriate. He asked for PRN Ativan for feeling agitated about not hearing back from his  early in the shift and was given PRN Zyprexa due to the Ativan being given less than 3 hours before. He had a good response from the Zyprexa.  Pt was given PRN Ativan after supper for his anxiety and agitation.  He showered this evening and came out to use the phone one more time.  He told me that he still hears voices but they are not as loud or bothersome.    2300 Face to face end of shift report communicated to Night shift RN's along with Pt's fall risk.     Maycol Wilson RN  12/2/2022  10:02 PM

## 2022-12-03 NOTE — PROGRESS NOTES
"[facility] PSYCHIATRY  PROGRESS NOTE     SUBJECTIVE        yoav slept through the night.  He has been taking his medications.  I was told by nursing that he was quite tired today and has not been talking much.  I asked him if he is feeling oversedated from the Zyprexa and he shook his head no.  He states he is still upset that he is here and is just hoping to get into treatment as soon as possible.  I asked him if he felt like he would be stable enough to participate in a group program for treatment if 1 were to take him this upcoming week and he stated that he felt the hallucinations and other symptoms were stable enough to participate.  He states he does not want to do the programming through our mental health unit\" but I will participate in chemical dependency treatment\"     MEDICATIONS   Scheduled Meds:    OLANZapine  20 mg Oral At Bedtime     PRN Meds:.acetaminophen, alum & mag hydroxide-simethicone, LORazepam **OR** LORazepam, melatonin, nicotine, OLANZapine **OR** OLANZapine     ALLERGIES   Allergies   Allergen Reactions     Morphine Sulfate [Morphine] Shortness Of Breath        MENTAL STATUS EXAM   Vitals: /77   Pulse 95   Temp 98.6  F (37  C) (Temporal)   Resp 18   SpO2 97%     Appearance: Alert, oriented, dressed in hospital scrubs  Attitude: Cooperative   Eye Contact: Fair  Mood: \"Depressed\"  Affect: Restricted range of affect, mood congruent  Speech: Slight reduction in rate. Normal rhythm   Psychomotor Behavior: No tremor, rigidity, akathisia, or psychomotor retardation    Thought Process: Logical, goal directed   Associations: No loose associations   Thought Content: Passive SI. No SIB.  Still has some auditory hallucinations no evidence of delusional thought  Insight: Fair   Judgment: Fair  Oriented to: Person, place, and time  Attention Span and Concentration: Intact  Recent and Remote Memory: Intact  Language: English with appropriate syntax and vocabulary  Fund of Knowledge: " Average  Muscle Strength and Tone: Grossly normal  Gait and Station: Grossly normal       LABS   No results found for this or any previous visit (from the past 24 hour(s)).      IMPRESSION     This is a 37 year old male with a PMH of a civil commitment for mental illness that was extended for 1 year and is now presenting to the emergency room with methamphetamine in his system and not following up with his psychiatric care.  His [facility]  is revoking his commitment.  He is a bit irritable and was hoping he could be at home while waiting for treatment as he does agree that he does need treatment.  He is willing to take a psychiatric medications though states he prefers to be on Zyprexa over Risperdal as it helps him feel much calmer and sleep better.  I will addend his Son       DIAGNOSES       Schizophrenia       PLAN     Location: Unit 5  Legal Status: Orders Placed This Encounter      Voluntary      Emergency Hospitalization Hold (72 Hr Hold)    Safety Assessment:    Behavioral Orders   Procedures     Code 1 - Restrict to Unit     Routine Programming     As clinically indicated     Status 15     Every 15 minutes.      PTA medications continued/changed:     -    New medications tried and stopped:     -stopped rispedal and seroquel     New medications initiated:     zyprexa    Today's Changes:    No changes to current medications      Programming: Patient will be treated in a therapeutic milieu with appropriate individual and group therapies. Education will be provided on diagnoses, medications, and treatments.     Medical diagnoses:  Per medicine    Consult: None    Labs none needed today    Anticipated LOS: likely 5 days or more. Will need to go door to door to treatment.   Disposition: will need to go door to door to cd treatment.        TREATMENT TEAM CARE PLAN     Progress: Continued symptoms.    Continued Stay Criteria/Rationale: Continued symptoms without sufficient  improvement/resolution.    Medical/Physical: See above.    Precautions: See above.     Plan: Continue inpatient care with unit support and medication management.    Rationale for change in precautions or plan: NA due to no change.    Participants: Joanne Dial NP, Nursing, SW, OT.    The patient's care was discussed with the treatment team and chart notes were reviewed.       ATTESTATION    Joanne Dial NP

## 2022-12-03 NOTE — PLAN OF CARE
Problem: Thought Process Alteration  Goal: Optimal Thought Clarity  Description: Patient will have clear linear conversations by discharge.  Outcome: Progressing     Problem: Adult Behavioral Health Plan of Care  Goal: Patient-Specific Goal (Individualization)  Description: Pt will be compliant with treatment team recommendations during hospitalization.   Pt will report adequate amounts of sleep on NOC shift (5-8 hours.)  Pt will participate in greater than 50% of daily groups.  Pt may not wean from the MHICU until evaluated by treatment team.  Pt may wean from the MHICU as long as his behavior is appropriate to be evaluated by treatment team daily    Outcome: Progressing     Patient awake at 0045.  Attempted to change patients room but patient became irritable and threatening to elope if he had a roommate.  Patient remains in MHICU for safety.  Given 1 mg Ativan at 0049.  Patient asleep by 0130 and has remained asleep all night.  No complaints of pain.  Will continue to monitor.  Face to face end of shift report communicated to day shift RN.     Amelia Marti RN  12/3/2022  6:31 AM

## 2022-12-04 PROCEDURE — 250N000013 HC RX MED GY IP 250 OP 250 PS 637: Performed by: NURSE PRACTITIONER

## 2022-12-04 PROCEDURE — 124N000001 HC R&B MH

## 2022-12-04 RX ADMIN — NICOTINE POLACRILEX 2 MG: 2 LOZENGE ORAL at 18:12

## 2022-12-04 RX ADMIN — LORAZEPAM 2 MG: 1 TABLET ORAL at 16:55

## 2022-12-04 RX ADMIN — MELATONIN 3 MG: 3 TAB ORAL at 20:28

## 2022-12-04 RX ADMIN — OLANZAPINE 20 MG: 10 TABLET, FILM COATED ORAL at 20:28

## 2022-12-04 ASSESSMENT — ACTIVITIES OF DAILY LIVING (ADL)
ADLS_ACUITY_SCORE: 29

## 2022-12-04 NOTE — PLAN OF CARE
Problem: Suicidal Behavior  Goal: Suicidal Behavior is Absent or Managed  Outcome: Progressing    Pt denies SI     Problem: Thought Process Alteration  Goal: Optimal Thought Clarity  Description: Patient will have clear linear conversations by discharge.  Outcome: Progressing    Pt continues to hear voices and appears preoccupied at times     Problem: Adult Behavioral Health Plan of Care  Goal: Patient-Specific Goal (Individualization)  Description: Pt will be compliant with treatment team recommendations during hospitalization.   Pt will report adequate amounts of sleep on NOC shift (5-8 hours.)  Pt will participate in greater than 50% of daily groups.  Pt may not wean from the MHICU until evaluated by treatment team.  Pt may wean from the MHICU as long as his behavior is appropriate to be evaluated by treatment team daily    Outcome: Progressing   Goal Outcome Evaluation:    Plan of Care Reviewed With: (P) patient             1530 Face to face rounding complete.  Pt introduced to nursing for the shift.    Pt weaned out of the MHICU some of the evening. He had a video visit with his girlfriend after supper.  Pt was very irritable and restless at times.  He appropriately asked for PRN medications when he was feeling upset.   He says that he is struggling with being in the hospital and wants to get out of here as soon as possible to treatment. He was given PRN Ativan for his agitation at 1730.    2330 Face to face end of shift report communicated to Night shift RN's along with Pt's fall risk.     Maycol Wilson RN  12/3/2022  11:10 PM

## 2022-12-04 NOTE — PLAN OF CARE
Problem: Adult Behavioral Health Plan of Care  Goal: Patient-Specific Goal (Individualization)  Description: Pt will be compliant with treatment team recommendations during hospitalization.   Pt will report adequate amounts of sleep on NOC shift (5-8 hours.)  Pt will participate in greater than 50% of daily groups.  Pt may not wean from the MHICU until evaluated by treatment team.  Pt may wean from the MHICU as long as his behavior is appropriate to be evaluated by treatment team daily  Outcome: Progressing     Problem: Thought Process Alteration  Goal: Optimal Thought Clarity  Description: Patient will have clear linear conversations by discharge.  Outcome: Progressing     Problem: Suicide Risk  Goal: Absence of Self-Harm  Description: Pt will have a reduction of SI by discharge.  Outcome: Progressing     Problem: Suicidal Behavior  Goal: Suicidal Behavior is Absent or Managed  Outcome: Progressing     Face to face shift report received from DANIKA Severino. Rounding completed, pt observed laying in bed in MHICU, appeared to be sleeping.    Patient appeared to be sleeping for approximately 7 hours since 2315.    Patient had no reported or observed suicidal behavior or self harm this shift.      Patient had no reported or observed hallucinations.    Patient sleeping, AM vitals held at this time.     Face to face report will be communicated to oncoming RN.    Chelsea Riggs RN  12/4/2022  6:27 AM

## 2022-12-05 PROCEDURE — 124N000001 HC R&B MH

## 2022-12-05 PROCEDURE — 99232 SBSQ HOSP IP/OBS MODERATE 35: CPT | Performed by: NURSE PRACTITIONER

## 2022-12-05 PROCEDURE — 250N000013 HC RX MED GY IP 250 OP 250 PS 637: Performed by: NURSE PRACTITIONER

## 2022-12-05 RX ORDER — OLANZAPINE 20 MG/1
20 TABLET ORAL AT BEDTIME
Qty: 30 TABLET | Refills: 0 | Status: SHIPPED | OUTPATIENT
Start: 2022-12-05 | End: 2023-01-24

## 2022-12-05 RX ORDER — LORAZEPAM 1 MG/1
1 TABLET ORAL DAILY PRN
Qty: 30 TABLET | Refills: 0 | Status: SHIPPED | OUTPATIENT
Start: 2022-12-05 | End: 2023-01-25

## 2022-12-05 RX ADMIN — LORAZEPAM 2 MG: 1 TABLET ORAL at 16:13

## 2022-12-05 RX ADMIN — NICOTINE POLACRILEX 2 MG: 2 LOZENGE ORAL at 12:51

## 2022-12-05 RX ADMIN — NICOTINE POLACRILEX 4 MG: 4 LOZENGE ORAL at 18:23

## 2022-12-05 RX ADMIN — OLANZAPINE 20 MG: 10 TABLET, FILM COATED ORAL at 21:20

## 2022-12-05 RX ADMIN — ALUMINUM HYDROXIDE, MAGNESIUM HYDROXIDE, AND SIMETHICONE 30 ML: 200; 200; 20 SUSPENSION ORAL at 21:45

## 2022-12-05 RX ADMIN — NICOTINE POLACRILEX 4 MG: 4 LOZENGE ORAL at 19:33

## 2022-12-05 RX ADMIN — NICOTINE POLACRILEX 4 MG: 4 LOZENGE ORAL at 21:22

## 2022-12-05 RX ADMIN — MELATONIN 3 MG: 3 TAB ORAL at 22:41

## 2022-12-05 RX ADMIN — LORAZEPAM 2 MG: 1 TABLET ORAL at 12:39

## 2022-12-05 RX ADMIN — LORAZEPAM 2 MG: 1 TABLET ORAL at 19:36

## 2022-12-05 RX ADMIN — NICOTINE POLACRILEX 4 MG: 4 GUM, CHEWING BUCCAL at 14:25

## 2022-12-05 ASSESSMENT — ACTIVITIES OF DAILY LIVING (ADL)
ADLS_ACUITY_SCORE: 29
ADLS_ACUITY_SCORE: 31
ORAL_HYGIENE: INDEPENDENT
ADLS_ACUITY_SCORE: 29
ADLS_ACUITY_SCORE: 31
LAUNDRY: UNABLE TO COMPLETE
ADLS_ACUITY_SCORE: 29
HYGIENE/GROOMING: INDEPENDENT
ADLS_ACUITY_SCORE: 29
ADLS_ACUITY_SCORE: 31
ADLS_ACUITY_SCORE: 31
ADLS_ACUITY_SCORE: 29
ADLS_ACUITY_SCORE: 31
ADLS_ACUITY_SCORE: 29
ADLS_ACUITY_SCORE: 29
DRESS: SCRUBS (BEHAVIORAL HEALTH);INDEPENDENT

## 2022-12-05 NOTE — PLAN OF CARE
Face to face end of shift report received from Chelsea GARCIA RN. Rounding completed and patient observed lying in bed with eyes closed, breathing regular and unlabored.     Goal Outcome Evaluation: Patient slept all morning other than waking up for meals. He declined needing any PRNs.     15:15 Update: Face to face end of shift report communicated to the charge nurse. This writer will continue to provide nursing services for patient throughout the next shift. Rounding completed and patient observed.    20:00 Update: Patient denied depression and SI/HI and specifically hallucinations. He endorsed high anxiety and requested 2mg Ativan at 16:55. He was irritable and verbalized his frustration. He was upset that staff was not adequately meeting his needs. He was allowed to weaned but reminded to keep his emotions calm and he agreed he could do that. He is clean and neatly dressed. Patient attends groups and interacts with peers using appropriate boundaries. He denied pain and denied needing any other PRNs but asked for melatonin at HS to help him sleep. Patient was awake all shift. He took meds without complaint.    Face to face end of shift report communicated to oncmiko RN.            Problem: Adult Behavioral Health Plan of Care  Goal: Patient-Specific Goal (Individualization)  Description: Pt will be compliant with treatment team recommendations during hospitalization.   Pt will report adequate amounts of sleep on NOC shift (5-8 hours.)  Pt will participate in greater than 50% of daily groups.  Pt may not wean from the MHICU until evaluated by treatment team.  Pt may wean from the MHICU as long as his behavior is appropriate to be evaluated by treatment team daily    Outcome: Progressing     Problem: Thought Process Alteration  Goal: Optimal Thought Clarity  Description: Patient will have clear linear conversations by discharge.  Outcome: Progressing     Problem: Suicide Risk  Goal: Absence of Self-Harm  Description: Pt  will have a reduction of SI by discharge.  Outcome: Progressing

## 2022-12-05 NOTE — PLAN OF CARE
Face to face shift report received from nurse. Rounding completed, pt observed.      Problem: Adult Behavioral Health Plan of Care  Goal: Patient-Specific Goal (Individualization)  Description: Pt will be compliant with treatment team recommendations during hospitalization.   Pt will report adequate amounts of sleep on NOC shift (5-8 hours.)  Pt will participate in greater than 50% of daily groups.  Pt may not wean from the MHICU until evaluated by treatment team.  Pt may wean from the MHICU as long as his behavior is appropriate to be evaluated by treatment team daily  Outcome: Progressing  Patient has been in bed sleeping the most of the morning.  Patient ate lunch and came out to the lounge to make a couple of phone calls. Patient spent most of the afternoon in the lounge, patient has been happy and joking with staff. Patient has a ride at 0830 tomorrow by [employer] transit.     Problem: Suicide Risk  Goal: Absence of Self-Harm  Description: Pt will have a reduction of SI by discharge.  Outcome: Progressing     Problem: Suicidal Behavior  Goal: Suicidal Behavior is Absent or Managed  Outcome: Progressing     Face to face report will be communicated to oncoming RN.    Nikolai Whittington RN  12/5/2022

## 2022-12-05 NOTE — PROGRESS NOTES
The patient was acceped to [facility] tomorrow.    RISHI called and confirmed with [facility] that the patient was accepted.      RISHI reviewed the PD with the patient and he signed it.    RISHI called [facility] and they scheduled transport with [facility]. They will pick the patient up 12/06/2022 @ 8:30 am.     RISHI emailed a copy of PD to the patient's .

## 2022-12-05 NOTE — PLAN OF CARE
Problem: Adult Behavioral Health Plan of Care  Goal: Patient-Specific Goal (Individualization)  Description: Pt will be compliant with treatment team recommendations during hospitalization.   Pt will report adequate amounts of sleep on NOC shift (5-8 hours.)  Pt will participate in greater than 50% of daily groups.  Pt may not wean from the MHICU until evaluated by treatment team.  Pt may wean from the MHICU as long as his behavior is appropriate to be evaluated by treatment team daily  Outcome: Progressing     Problem: Thought Process Alteration  Goal: Optimal Thought Clarity  Description: Patient will have clear linear conversations by discharge.  Outcome: Progressing     Problem: Suicide Risk  Goal: Absence of Self-Harm  Description: Pt will have a reduction of SI by discharge.  Outcome: Progressing     Problem: Suicidal Behavior  Goal: Suicidal Behavior is Absent or Managed  Outcome: Progressing     Face to face shift report received from DANIKA Cifuentes. Rounding completed, pt observed laying in bed in MHICU, appeared to be sleeping.    Patient appeared to be sleeping for approximately 8.25 hours since 2200 last shift.    Patient had no reported or observed suicidal behavior or self harm this shift.      Patient refused morning vitals.    Face to face report will be communicated to oncoming RN.    Chelsea Riggs RN  12/5/2022  6:33 AM

## 2022-12-05 NOTE — PROGRESS NOTES
[facility] PSYCHIATRY  PROGRESS NOTE     SUBJECTIVE   jaris is much brighter today.  He is easy to engage with in conversation he initiates conversation.  He is laughing and smiling appropriately and quite social.  He states he feels much better on the Zyprexa.  He denies having any current hallucinations and he does not appear to be preoccupied by hallucinations any longer.  He denies any suicidal ideation.  He states he is hoping to get into treatment as soon as possible and states that this specific facility does have current openings and he is going to be calling them shortly.     MEDICATIONS   Scheduled Meds:    OLANZapine  20 mg Oral At Bedtime     PRN Meds:.acetaminophen, alum & mag hydroxide-simethicone, LORazepam **OR** LORazepam, melatonin, nicotine, OLANZapine **OR** OLANZapine     ALLERGIES   Allergies   Allergen Reactions     Morphine Sulfate [Morphine] Shortness Of Breath        MENTAL STATUS EXAM   Vitals: /77   Pulse 95   Temp 98.6  F (37  C) (Temporal)   Resp 18   SpO2 97%     Appearance: Alert, oriented, dressed in hospital scrubs  Attitude: Cooperative   Eye Contact: Intact  Mood: Brighter  Affect: Congruent with mood no longer restricted  Speech: Normal rate tone and rhythm. Normal rhythm   Psychomotor Behavior: No tremor, rigidity, akathisia, or psychomotor retardation    Thought Process: Logical, goal directed   Associations: No loose associations   Thought Content: Passive SI. No SIB.  Denies auditory hallucinations currently   Insight: Fair   Judgment: Fair  Oriented to: Person, place, and time  Attention Span and Concentration: Intact  Recent and Remote Memory: Intact  Language: English with appropriate syntax and vocabulary  Fund of Knowledge: Average  Muscle Strength and Tone: Grossly normal  Gait and Station: Grossly normal       LABS   No results found for this or any previous visit (from the past 24 hour(s)).      IMPRESSION     This is a 37 year old male with a PMH of a  civil commitment for mental illness that was extended for 1 year and is now presenting to the emergency room with methamphetamine in his system and not following up with his psychiatric care.  His county  is revoking his commitment.  He is a bit irritable and was hoping he could be at home while waiting for treatment as he does agree that he does need treatment.  He is willing to take a psychiatric medications though states he prefers to be on Zyprexa over Risperdal as it helps him feel much calmer and sleep better.  I will addend his Huy       DIAGNOSES       Schizophrenia       PLAN     Location: Unit 5  Legal Status: Orders Placed This Encounter      Voluntary      Emergency Hospitalization Hold (72 Hr Hold)    Safety Assessment:    Behavioral Orders   Procedures     Code 1 - Restrict to Unit     Routine Programming     As clinically indicated     Status 15     Every 15 minutes.      PTA medications continued/changed:     -    New medications tried and stopped:     -stopped rispedal and seroquel     New medications initiated:     zyprexa: A letter was sent to the courts to addend Huy to add Zyprexa    Today's Changes:    No changes to current medications  Continue Zyprexa    Programming: Patient will be treated in a therapeutic milieu with appropriate individual and group therapies. Education will be provided on diagnoses, medications, and treatments.     Medical diagnoses:  Per medicine    Consult: None    Labs none needed today    Anticipated LOS: likely 5 days or more. Will need to go door to door to treatment.   Disposition: will need to go door to door to cd treatment.        TREATMENT TEAM CARE PLAN     Progress: Is improving though needs to go [id number] to inpatient treatment to prevent relapse and recidivism    Continued Stay Criteria/Rationale: Continued symptoms without sufficient improvement/resolution.    Medical/Physical: See above.    Precautions: See above.     Plan: Continue  inpatient care with unit support and medication management.    Rationale for change in precautions or plan: NA due to no change.    Participants: Joanne Dial NP, Nursing, SW, OT.    The patient's care was discussed with the treatment team and chart notes were reviewed.       ATTESTATION    Joanne Dial NP

## 2022-12-06 VITALS
RESPIRATION RATE: 20 BRPM | OXYGEN SATURATION: 98 % | DIASTOLIC BLOOD PRESSURE: 81 MMHG | HEART RATE: 112 BPM | SYSTOLIC BLOOD PRESSURE: 132 MMHG | TEMPERATURE: 98.4 F

## 2022-12-06 PROCEDURE — 250N000013 HC RX MED GY IP 250 OP 250 PS 637: Performed by: NURSE PRACTITIONER

## 2022-12-06 PROCEDURE — 99239 HOSP IP/OBS DSCHRG MGMT >30: CPT | Performed by: NURSE PRACTITIONER

## 2022-12-06 RX ADMIN — LORAZEPAM 2 MG: 1 TABLET ORAL at 08:06

## 2022-12-06 ASSESSMENT — ACTIVITIES OF DAILY LIVING (ADL)
ADLS_ACUITY_SCORE: 31

## 2022-12-06 NOTE — PLAN OF CARE
Face to face shift report received from nurse. Rounding completed, pt observed.    Problem: Adult Behavioral Health Plan of Care  Goal: Plan of Care Review  Outcome: Adequate for Care Transition     Problem: Adult Behavioral Health Plan of Care  Goal: Patient-Specific Goal (Individualization)  Description: Pt will be compliant with treatment team recommendations during hospitalization.   Pt will report adequate amounts of sleep on NOC shift (5-8 hours.)  Pt will participate in greater than 50% of daily groups.  Pt may not wean from the MHICU until evaluated by treatment team.  Pt may wean from the MHICU as long as his behavior is appropriate to be evaluated by treatment team daily  Outcome: Adequate for Care Transition  Pt has been in bed with eyes closed and regular respirations observed all night. Will continue to monitor.  Patient slept 7 hours.     Problem: Adult Behavioral Health Plan of Care  Goal: Individualized Daily Interaction Plan (IDIP)  Outcome: Adequate for Care Transition     Problem: Adult Behavioral Health Plan of Care  Goal: Adheres to Safety Considerations for Self and Others  Outcome: Adequate for Care Transition     Problem: Adult Behavioral Health Plan of Care  Goal: Absence of New-Onset Illness or Injury  Outcome: Adequate for Care Transition     Problem: Adult Behavioral Health Plan of Care  Goal: Optimized Coping Skills in Response to Life Stressors  Outcome: Adequate for Care Transition     Problem: Adult Behavioral Health Plan of Care  Goal: Develops/Participates in Therapeutic [place] to Support Successful Transition  Outcome: Adequate for Care Transition     Problem: Thought Process Alteration  Goal: Optimal Thought Clarity  Description: Patient will have clear linear conversations by discharge.  Outcome: Adequate for Care Transition     Problem: Suicide Risk  Goal: Absence of Self-Harm  Description: Pt will have a reduction of SI by discharge.  Outcome: Adequate for Care Transition      Problem: Suicidal Behavior  Goal: Suicidal Behavior is Absent or Managed  Outcome: Adequate for Care Transition     Discharge Note    Patient Discharged to short-term care facility on 12/6/2022 0840 AM via [facility] accompanied by .     Patient informed of discharge instructions in AVS. patient verbalizes understanding and denies having any questions pertaining to AVS. Patient stable at time of discharge. Patient denies SI, HI, and thoughts of self harm at time of discharge. All personal belongings returned to patient. Discharge prescriptions sent to [facility] via electronic communication. Psych evaluation, history and physical, AVS, and discharge summary faxed to next level of care- [facility].     Nikolai Whittington RN  12/6/2022  10:55 AM

## 2022-12-06 NOTE — DISCHARGE SUMMARY
[facility] PSYCHIATRY  DISCHARGE SUMMARY     DISCHARGE DATA     Hamzah Roberts MRN# [id number]   Age: 37 year old YOB: 1985     Date of Admission:  11/30/2022  Date of Discharge:  December 6, 2022  Discharge Provider:  Joanne Dial NP       REASON FOR ADMISSION     He was brought to the emergency room by his girlfriend due to increased auditory hallucinations.  He told the emergency room he ran out of his medications though he tells me that he has been taking Risperdal and Ativan and has only not been taking the Seroquel.  He states he does not like how  tired the Seroquel makes him feel the next day.  He states he would prefer to take Zyprexa over Risperdal or Seroquel and that it helps him feel much calmer.  He states that he is under a civil commitment for mental health and tells me that he follows up with his medication prescriber regularly though I have heard that he does not follow-up well with his medication provider and has not seen him since May.  He appeared a bit fearful and preoccupied when I met with him he was also bit agitated that he was on a locked psychiatric unit and felt he did not need to be here.  After our  spoke with his county  it was found that his county  is revoking his commitment.  I did tell him that his  was revoking his commitment and he did not become severely agitated and threatening though quite irritable though did request as needed medications.  I was not aware until later this evening that he was on a Son petition which only includes Risperdal and Seroquel.  He was not on a long-acting Risperdal injection from my understanding     He does tell me that he has been experiencing hallucinations for a couple of years.  He states that even when he has had periods of being clean the hallucinations are still present though not near as bad.  He does admit to smoking methamphetamine recently and states he  "wants to stop and is wanting to check himself into treatment.  He denies having any thoughts to harm himself or others.  He did use meth 2 days prior to arrival. Ativan and zyprxa were given in the ER.  He had been in the emergency room a week ago for hallucinations and discharged.  He reported to the emergency room having auditory hallucinations for at least 1 year which he states sound like screaming though denies any command type of hallucinations.  He does hear voices screaming help.  He was not having any suicidal or homicidal ideation upon admission or while in the emergency room.  He did tell the DEC  that the voices were so bad he is \"getting sick of being alive\"     Pt has a [facility]  and is on a MI commitment.  He reports being on a MI commitment for over a year, stating that his commitment was extended.  He said he last talked with his  a few days ago on the phone.   Pt reports he has not completed a rule 25 assessment recently and said his last one was 6 months ago.          DISCHARGE DIAGNOSIS   Schizophrenia vs schizoaffective disorder.  Methamphetamine use disorder, severe           CONSULTS     None needed while here       HOSPITAL COURSE     Legal status: Orders Placed This Encounter      Voluntary      Emergency Hospitalization Hold (72 Hr Hold)    Patient was admitted to unit 5 due to the aforementioned presentation. The patient was placed under 15 minute checks to ensure patient safety. The patient participated in unit programming and groups as able.  Prior to admission Hamzah Roberts was taking risperdal and serpquel though was using methamphetamines and likely had limited compliance with his substance use. He had been trying to get into treatment on his own days prior to going to the ER. He was initally a bit irritable on the unit though was not aggressive. He stated he wanted to go to treatment not \"the psych wolff\".  He stated zyprexa helped " him far more than risperdal or seroquel and he was started on zyprexa. It was not on his bower so I did ask the courts to addend the bower to add zyprexa. He did very well with the zyprexa. No side effects. Slept very well. The following medication changes were made  during hospitalization:     With these changes and supports the patient noticed improvement in their symptoms and felt sufficiently ready for discharge. As a result, Hamzah Roberts was discharged. At the time of discharge, Hamzah Roberts was determined to not be a danger to self or others. At the current time of discharge, the patient does not meet criteria for involuntary hospitalization. On the day of discharge, the patient reports that they do not have suicidal or homicidal ideation. Steps taken to minimize risk include: assessing patient s behavior and thought process daily during hospital stay, discharging patient with adequate plan for follow up for mental and physical health and discussing safety plan of returning to the hospital should the patient ever have thoughts of harming themselves or others. Therefore, based on all available evidence including the factors cited above, the patient does not appear to be at imminent risk for self-harm, and is appropriate for outpatient level of care. However, if patient uses substances or is medication non-adherent, their risk of decompensation and SI will be elevated. This was discussed with the patient.        DISCHARGE MEDICATIONS     Current Discharge Medication List      START taking these medications    Details   OLANZapine (ZYPREXA) 20 MG tablet Take 1 tablet (20 mg) by mouth At Bedtime  Qty: 30 tablet, Refills: 0    Associated Diagnoses: Schizoaffective disorder, depressive type (H)         CONTINUE these medications which have CHANGED    Details   LORazepam (ATIVAN) 1 MG tablet Take 1 tablet (1 mg) by mouth daily as needed for anxiety  Qty: 30 tablet, Refills: 0    Associated Diagnoses:  "Schizoaffective disorder, depressive type (H)         STOP taking these medications       gabapentin (NEURONTIN) 600 MG tablet Comments:   Reason for Stopping:         omeprazole (PRILOSEC) 20 MG DR capsule Comments:   Reason for Stopping:         QUEtiapine (SEROQUEL) 200 MG tablet Comments:   Reason for Stopping:         risperiDONE (RISPERDAL) 3 MG tablet Comments:   Reason for Stopping:         valACYclovir (VALTREX) 500 MG tablet Comments:   Reason for Stopping:                      MENTAL STATUS EXAM     Vitals: /81   Pulse 112   Temp 98.4  F (36.9  C) (Temporal)   Resp 20   SpO2 98%     Appearance: Alert, oriented, dressed in hospital scrubs, appears stated age   Attitude: Cooperative   Eye Contact: Good  Mood: \"Better\"  Affect: Full range of affect  Speech: Normal rate and rhythm   Psychomotor Behavior: No tremor, rigidity, or psychomotor abnormality   Thought Process: Logical, goal directed   Associations: No loose associations   Thought Content: Denies SI or plan. No SIB. Denies A/V hallucinations. No evidence of delusional thought.  Insight: Good  Judgment: Good  Oriented to: Person, place, and time  Attention Span and Concentration: Intact  Recent and Remote Memory: Intact  Language: English with appropriate syntax and vocabulary  Fund of Knowledge: Average  Muscle Strength and Tone: Grossly normal  Gait and Station: Grossly normal       DISCHARGE PLAN     1.  Education given regarding diagnostic and treatment options with risks, benefits and alternatives with adequate verbalization of understanding.  2.  Discharge to mi/cd treatment. Is under commitment and Son. Upon detailed review of risk factors, patient amenable for release.   3.  Continue aforementioned medications and associated medication changes with follow-up by outpatient provider.  4.  Crisis management planning in place.    5.  Nursing and  to review further discharge recommendations.   6.  Patient is being " discharged to home with the following appointments as detailed below.           DISCHARGE SERVICES PROVIDED     40 minutes spent on discharge services, including:  Final examination of patient.  Review and discussion of hospital stay.  Instructions for continued outpatient care/goals.  Preparation of discharge records.  Preparation of medications refills and new prescriptions.  Preparation of applicable referral forms.        ATTESTATION     Joanne Dial NP    This note was created with help of Dragon dictation system. Grammatical / typing errors are not intentional.

## 2022-12-06 NOTE — PLAN OF CARE
Face to face report received from Lindsey GARNICA. Pt. Observed.     Problem: Adult Behavioral Health Plan of Care  Goal: Patient-Specific Goal (Individualization)  Description: Pt will be compliant with treatment team recommendations during hospitalization.   Pt will report adequate amounts of sleep on NOC shift (5-8 hours.)  Pt will participate in greater than 50% of daily groups.  Pt may not wean from the MHICU until evaluated by treatment team.  Pt may wean from the MHICU as long as his behavior is appropriate to be evaluated by treatment team daily  Outcome: Progressing  Pt. Denies HI, SI, depression, and hallucinations. Pt. Reporting anxiety, requesting PRN. PRN ativan 2 mg po administered @ 1613 and 1936 per pt. Request. Medication warning about administration to early. Per pharmacist medication can be given as ordered. Pt. Showing no s/s of drowsiness. VS WDL. Medication effective. Pt. In agreement to update staff to thoughts feelings of wanting to harm self or others. Pt. Cooperative with medications and nursing assessment.     Pt. Requesting to be woke @ 0700 tomorrow morning. Will pass on to next shift. Early breakfast and bag lunch ordered for discharge. Pt. Will  his medications @ [facility] at discharge.     PRN Melatonin 3 mg po administered @ 2241 per pt. Request for sleep.     PRN MAALOX administered per pt. Request for indigestion with effective results.      Face to face end of shift report communicated to oncoming RN.     Mariella Ashley RN  12/5/2022  6:36 PM

## 2022-12-29 ENCOUNTER — HOSPITAL ENCOUNTER (EMERGENCY)
Facility: HOSPITAL | Age: 37
Discharge: HOME | End: 2022-12-29
Attending: EMERGENCY MEDICINE | Admitting: EMERGENCY MEDICINE
Payer: COMMERCIAL

## 2022-12-29 VITALS
DIASTOLIC BLOOD PRESSURE: 89 MMHG | OXYGEN SATURATION: 98 % | HEART RATE: 79 BPM | BODY MASS INDEX: 26.29 KG/M2 | SYSTOLIC BLOOD PRESSURE: 133 MMHG | RESPIRATION RATE: 16 BRPM | WEIGHT: 158 LBS | TEMPERATURE: 97.9 F

## 2022-12-29 DIAGNOSIS — F19.10 DRUG ABUSE (H): ICD-10-CM

## 2022-12-29 DIAGNOSIS — Z65.8 SOCIAL DISCORD: ICD-10-CM

## 2022-12-29 PROCEDURE — 99285 EMERGENCY DEPT VISIT HI MDM: CPT

## 2022-12-29 NOTE — ED PROVIDER NOTES
EMERGENCY DEPARTMENT ENCOUNTER      NAME: Hamzah Roberts  AGE: 37 year old male  YOB: 1985  MRN: [id number]  EVALUATION DATE & TIME: No admission date for patient encounter.    PCP: No Ref-Primary, Physician    ED PROVIDER: Kate Sanches M.D.      Chief Complaint   Patient presents with     Psychiatric Evaluation         FINAL IMPRESSION:  1. Drug abuse (H)    2. Social discord          ED COURSE & MEDICAL DECISION MAKING:    ED Course as of 12/29/22 1604   Thu Dec 29, 2022   1402 Patient s/p recent drug abuse, seen by [facility] with return to [facility], called 911 and family from there because he is concernedthat in the future he could make poor choices that could eventually lead to younger death, but no suicidal or homicidal ideations reassuringly and neurovascularly intact at this time, slightly anxious when awakened, repeatedly self removing mask, but then cooperative/calmed and notes he sometimes doesn't feel like there is high quality to his current life in the setting of lifestyle choices BUT hasn't yet changed choices, amenable to going to shelter from here and given extensive resources yesterday following full evaluation yesterday. Patient discharged after being provided with extensive anticipatory guidance and given return precautions, importance of PMD follow-up emphasized.        Pertinent Labs & Imaging studies reviewed. (See chart for details)    N95 worn  A face shield was worn also  COVID PPE    Medical Decision Making    History:    Supplemental history from: Documented in HPI, if applicable    External Record(s) reviewed: Documented in HPI, if applicable.    Work Up:    Chart documentation includes differential considered and any EKGs or imaging independently interpreted by provider.    In additional to work up documented, I considered the following work up: See chart documentation, if applicable.    External consultation:    Discussion of management with another provider: See chart  documentation, if applicable    Complicating factors:    Care impacted by chronic illness: N/A    Care affected by social determinants of health: N/A    Disposition considerations: Discharge. No recommendations on prescription strength medication(s). N/A.        At the conclusion of the encounter I discussed the results of all of the tests and the disposition. The questions were answered. The patient or family acknowledged understanding and was agreeable with the care plan.     MEDICATIONS GIVEN IN THE EMERGENCY:  Medications - No data to display    NEW PRESCRIPTIONS STARTED AT TODAY'S ER VISIT  Discharge Medication List as of 12/29/2022  2:03 PM             =================================================================    HPI      Hamzah Roberts is a 37 year old male with PMHx of extensive psychiatric disorders with polysubstance abuse who presents to the ED today via EMS from [facility] Emergency Room for psychiatric evaluation.       Per Chart Review: Patient was recently hospitalized from 11/30 to 12/6 at [facility]. Since then he has not been taking any of his medications. On 12/27/2022, patient's sister brought him into [facility] as she was concerned that patient was having significant amount of manic behavior. Patient had recent meth and other drug use. Of note, patient is on a MI/CD commitment, [facility] and it appeared as if his commitment was revoked on 12/2, however, patient was hospitalized at [facility] at this time so likely was provisionally discharged on 12/6 (this was not reflected on the court web site). Patient was to remain in [facility] ED in order to contact his care manager for further discusion and evaluation before his final disposition was to be determined; however, patient decided not to stay overnight and to walk around outside.     At present: Patient presents to [facility] ED for further life choice consultations. After walking around overnight (12/28-12/29), patient walked back to  [facility] ED and expressed his frustration with the system. Upon entering the room, provider noted that the patient engaged in some conversation but did not offer much information without specific prompting. It was deemed that the patient was not an acute threat to himself/others but he does not seem to thriving. The plan was for further social work evaluation but shortly after being roomed, patient suddenly got up and walked out. There were attempts to talk with patient as he was leaving but he would not stop. It was deemed that the patient did not meet the criterias for 72 hour hold, so he was allowed to walk to the exit. Patient then decided to call 911 to look for another ED that might be able to answer how to prevent his poor choice in housing and drug abuse. Patient denies any current suicidal ideation but expresses concern over his lower quality of life due to his choices.     REVIEW OF SYSTEMS   All other systems reviewed and are negative except as noted above in HPI.    PAST MEDICAL HISTORY:  History reviewed. No pertinent past medical history.    PAST SURGICAL HISTORY:  History reviewed. No pertinent surgical history.    CURRENT MEDICATIONS:    LORazepam (ATIVAN) 1 MG tablet  OLANZapine (ZYPREXA) 20 MG tablet        ALLERGIES:  Allergies   Allergen Reactions     Morphine Sulfate [Morphine] Shortness Of Breath       FAMILY HISTORY:  History reviewed. No pertinent family history.    SOCIAL HISTORY:   Social History     Socioeconomic History     Marital status: Single   Tobacco Use     Smoking status: Every Day     Packs/day: 0.50     Types: Cigarettes   Substance and Sexual Activity     Alcohol use: Never     Drug use: Yes     Types: Methamphetamines, Marijuana     Sexual activity: Not Currently   Social History Narrative    Aug 2020: Patient admits to meth use.       VITALS:  Patient Vitals for the past 24 hrs:   BP Temp Pulse Resp SpO2 Weight   12/29/22 1147 133/89 97.9  F (36.6  C) 79 16 98 % 71.7 kg (158  lb)       PHYSICAL EXAM    GENERAL: Awake, alert.  In no acute distress.   HEENT: Normocephalic, atraumatic.  Pupils equal, round and reactive.  Conjunctiva normal.  EOMI.  NECK: No stridor or apparent deformity.  PULMONARY: Symmetrical breath sounds without distress.  Lungs clear to auscultation bilaterally without wheezes, rhonchi or rales.  CARDIO: Regular rate and rhythm.  No significant murmur, rub or gallop.  Radial pulses strong and symmetrical.  ABDOMINAL: Abdomen soft, non-distended and non-tender to palpation.  No CVAT, no palpable hepatosplenomegaly.  EXTREMITIES: No lower extremity swelling or edema.    NEURO: Alert and oriented to person, place and time.  Cranial nerves grossly intact.  No focal motor deficit.  PSYCH: Normal mood and affect  SKIN: No rashes        I, Rossi Peralta, am serving as a scribe to document services personally performed by Dr. Kate Sanches based on my observation and the provider's statements to me. Kate MEYER MD attest that Rossi Peralta is acting in a scribe capacity, has observed my performance of the services and has documented them in accordance with my direction.     Kate Sanches MD  12/29/22 2619

## 2022-12-29 NOTE — ED NOTES
"ED Triage Provider Note  [facility] EMERGENCY DEPARTMENT  Encounter Date: Dec 29, 2022    History:  No chief complaint on file.    Hamzah Roberts is a 37 year old male who presents to the ED by EMS.  Pt was at [facility] recently for \"psychosis\" with meth use.  Pt wandering along highway all last night, brought in by EMS after he went back to [facility], used phone and called sister who then had him call 911 to be brought here. Pt reports feeling hopeless/helpless and feelings of not wanting to be alive, but denies any specific plan/intent to harm himself or others.  Hears nonspecific voices screaming at him.  Admits to meth use, but not in a couple days. Pt here for \"help\".  He was seen at [facility] ED yesterday.    Review of Systems:  +depression    Exam:  There were no vitals taken for this visit.  General: No acute distress. Appears stated age.   Cardio: normal rate, extremities well perfused  Resp: Normal work of breathing, grossly normal respiratory rate  Neuro: Alert. Facial movement grossly symmetric. Grossly intact strength.   Psych: +depressed mood    Medical Decision Making:  Patient arriving to the ED with problem as above. A medical screening exam was performed. Initial differential diagnosis includes but not limited to psychosis, meth abuse, depression, homelessness.    Urine drug screen, DEC orders initiated from Triage. The patient is most appropriate to return to the waiting room. No suicidal intent/plan, ok to wait in WR until ED bed available.      Mani Leigh MD  12/29/2022 at 11:43 AM     Mani Leigh MD  12/29/22 1154    "

## 2022-12-29 NOTE — ED TRIAGE NOTES
"Pt arrives per medics who reports that pt was seen and discharged from [facility] ED last night. Pt reports that they set up a bed for him at the [facility], but when he got there , \"there was no bed\" he then went back to [facility] ED , he \"used their phone\" called his sister , who called the medics to bring him here. Her name is Angie [phone].  Pt is cam and cooperative he speaks in quiet tones and states recently lost housing  And \"walked away from treatment 2 weeks ago\" . He requests \" a regular room\"  When asked if he was suicidal he states \"I don't want to be alive anymore\" he denies a plan to harm himself , and has no thoughts of harming others.       "

## 2023-01-23 ENCOUNTER — HOSPITAL ENCOUNTER (EMERGENCY)
Facility: HOSPITAL | Age: 38
Discharge: NURSING, REHAB OR LONG-TERM CARE | End: 2023-01-25
Attending: EMERGENCY MEDICINE | Admitting: EMERGENCY MEDICINE
Payer: COMMERCIAL

## 2023-01-23 DIAGNOSIS — F20.9 SCHIZOPHRENIA, UNSPECIFIED TYPE (H): ICD-10-CM

## 2023-01-23 DIAGNOSIS — F19.20 CHEMICAL DEPENDENCY (H): ICD-10-CM

## 2023-01-23 DIAGNOSIS — F25.1 SCHIZOAFFECTIVE DISORDER, DEPRESSIVE TYPE (H): ICD-10-CM

## 2023-01-23 LAB
AMPHETAMINES UR QL SCN: ABNORMAL
BARBITURATES UR QL SCN: ABNORMAL
BENZODIAZ UR QL SCN: ABNORMAL
BZE UR QL SCN: ABNORMAL
CANNABINOIDS UR QL SCN: ABNORMAL
OPIATES UR QL SCN: ABNORMAL
PCP QUAL URINE (ROCHE): ABNORMAL

## 2023-01-23 PROCEDURE — 80307 DRUG TEST PRSMV CHEM ANLYZR: CPT | Performed by: EMERGENCY MEDICINE

## 2023-01-23 PROCEDURE — 250N000013 HC RX MED GY IP 250 OP 250 PS 637: Performed by: EMERGENCY MEDICINE

## 2023-01-23 PROCEDURE — 99285 EMERGENCY DEPT VISIT HI MDM: CPT | Mod: 25

## 2023-01-23 RX ORDER — OLANZAPINE 10 MG/1
10 TABLET, ORALLY DISINTEGRATING ORAL AT BEDTIME
Status: DISCONTINUED | OUTPATIENT
Start: 2023-01-23 | End: 2023-01-25 | Stop reason: HOSPADM

## 2023-01-23 RX ADMIN — OLANZAPINE 10 MG: 10 TABLET, ORALLY DISINTEGRATING ORAL at 23:03

## 2023-01-23 ASSESSMENT — ACTIVITIES OF DAILY LIVING (ADL): ADLS_ACUITY_SCORE: 33

## 2023-01-24 VITALS
DIASTOLIC BLOOD PRESSURE: 71 MMHG | HEART RATE: 83 BPM | WEIGHT: 166.5 LBS | RESPIRATION RATE: 18 BRPM | OXYGEN SATURATION: 99 % | TEMPERATURE: 97.9 F | BODY MASS INDEX: 27.71 KG/M2 | SYSTOLIC BLOOD PRESSURE: 119 MMHG

## 2023-01-24 PROCEDURE — 90791 PSYCH DIAGNOSTIC EVALUATION: CPT

## 2023-01-24 PROCEDURE — 250N000013 HC RX MED GY IP 250 OP 250 PS 637: Performed by: EMERGENCY MEDICINE

## 2023-01-24 RX ORDER — HALOPERIDOL 5 MG/ML
5 INJECTION INTRAMUSCULAR ONCE
Status: COMPLETED | OUTPATIENT
Start: 2023-01-24 | End: 2023-01-24

## 2023-01-24 RX ORDER — OLANZAPINE 5 MG/1
5 TABLET, ORALLY DISINTEGRATING ORAL ONCE
Status: COMPLETED | OUTPATIENT
Start: 2023-01-24 | End: 2023-01-24

## 2023-01-24 RX ORDER — OLANZAPINE 5 MG/1
5 TABLET, ORALLY DISINTEGRATING ORAL AT BEDTIME
Status: DISCONTINUED | OUTPATIENT
Start: 2023-01-24 | End: 2023-01-24

## 2023-01-24 RX ORDER — OLANZAPINE 10 MG/1
10 TABLET ORAL AT BEDTIME
COMMUNITY
End: 2023-01-25

## 2023-01-24 RX ORDER — LORAZEPAM 2 MG/ML
2 INJECTION INTRAMUSCULAR ONCE
Status: COMPLETED | OUTPATIENT
Start: 2023-01-24 | End: 2023-01-24

## 2023-01-24 RX ORDER — HYDROXYZINE HYDROCHLORIDE 25 MG/1
50 TABLET, FILM COATED ORAL ONCE
Status: COMPLETED | OUTPATIENT
Start: 2023-01-24 | End: 2023-01-24

## 2023-01-24 RX ORDER — OLANZAPINE 5 MG/1
10 TABLET ORAL AT BEDTIME
Status: DISCONTINUED | OUTPATIENT
Start: 2023-01-24 | End: 2023-01-24

## 2023-01-24 RX ORDER — LORAZEPAM 0.5 MG/1
1 TABLET ORAL DAILY PRN
Status: DISCONTINUED | OUTPATIENT
Start: 2023-01-24 | End: 2023-01-25 | Stop reason: HOSPADM

## 2023-01-24 RX ORDER — DIPHENHYDRAMINE HYDROCHLORIDE 50 MG/ML
50 INJECTION INTRAMUSCULAR; INTRAVENOUS ONCE
Status: COMPLETED | OUTPATIENT
Start: 2023-01-24 | End: 2023-01-24

## 2023-01-24 RX ADMIN — LORAZEPAM 1 MG: 0.5 TABLET ORAL at 17:54

## 2023-01-24 RX ADMIN — OLANZAPINE 10 MG: 10 TABLET, ORALLY DISINTEGRATING ORAL at 21:56

## 2023-01-24 RX ADMIN — NICOTINE POLACRILEX 2 MG: 2 GUM, CHEWING ORAL at 17:54

## 2023-01-24 RX ADMIN — HYDROXYZINE HYDROCHLORIDE 50 MG: 25 TABLET ORAL at 20:32

## 2023-01-24 RX ADMIN — NICOTINE POLACRILEX 2 MG: 2 GUM, CHEWING BUCCAL at 10:56

## 2023-01-24 RX ADMIN — OLANZAPINE 5 MG: 5 TABLET, ORALLY DISINTEGRATING ORAL at 11:52

## 2023-01-24 ASSESSMENT — ACTIVITIES OF DAILY LIVING (ADL)
ADLS_ACUITY_SCORE: 35

## 2023-01-24 ASSESSMENT — COLUMBIA-SUICIDE SEVERITY RATING SCALE - C-SSRS
6. HAVE YOU EVER DONE ANYTHING, STARTED TO DO ANYTHING, OR PREPARED TO DO ANYTHING TO END YOUR LIFE?: NO
1. IN THE PAST MONTH, HAVE YOU WISHED YOU WERE DEAD OR WISHED YOU COULD GO TO SLEEP AND NOT WAKE UP?: NO
ATTEMPT LIFETIME: NO
REASONS FOR IDEATION PAST MONTH: MOSTLY TO END OR STOP THE PAIN (YOU COULDN'T GO ON LIVING WITH THE PAIN OR HOW YOU WERE FEELING)
TOTAL  NUMBER OF ABORTED OR SELF INTERRUPTED ATTEMPTS LIFETIME: NO
2. HAVE YOU ACTUALLY HAD ANY THOUGHTS OF KILLING YOURSELF?: NO
1. HAVE YOU WISHED YOU WERE DEAD OR WISHED YOU COULD GO TO SLEEP AND NOT WAKE UP?: YES
TOTAL  NUMBER OF INTERRUPTED ATTEMPTS LIFETIME: NO
REASONS FOR IDEATION LIFETIME: MOSTLY TO END OR STOP THE PAIN (YOU COULDN'T GO ON LIVING WITH THE PAIN OR HOW YOU WERE FEELING)

## 2023-01-24 NOTE — ED TRIAGE NOTES
Patient comes in seeking help with mental health. Patient is soft spoken and sad in triage, reporting that he left treatment for meth a couple days ago and has been on the streets since. He has used once since than. He reports that he hasn't slept and is needing help. Pupils dilated in triage. Patient report no SI and no aggressive behavior or feelings.

## 2023-01-24 NOTE — ED NOTES
DEC attempted to assess patient at this time but was unable to due to patient unwilling to wake up. Patient threw the blankets over his face and his sweatshirt over his face and shook his head that he did not want to wake. Patient did say earlier he had been up for over 24hrs. An order will have to be put in again once patient is awake and no longer withdrawing.

## 2023-01-24 NOTE — ED NOTES
"Ashutosh Wood RN, charge nurse, called police department again approximately 1132 and was told \"we'll get there when we can\"   "

## 2023-01-24 NOTE — ED NOTES
Patient has remained calm and cooperative. Writer has callied Dec to inquire about Crisis bed update several times with no answer.

## 2023-01-24 NOTE — ED NOTES
Patient continues to come out of room to use phone at nurse's station. He is accusing staff of taking a paper from his room that has two phone numbers on it. He remains agitated.

## 2023-01-24 NOTE — ED NOTES
3:28 PM - Patient signed out to me by Dr. Mello at routine shift change. 37 year old male with history of meth abuse who initially presented belligerent; given Zyprexa and calm since. Medically cleared. Seen by DEC and does not require inpatient psych admission; DEC seeking crisis bed which patient is agreeable with. Not holdable from psych standpoint. Plan is discharge to crisis bed once found. Please see prior notes for details.      ED Course as of 01/24/23 2251 Tue Jan 24, 2023   0047 Sign out received: 36 y/o M--has been off of his psychiatric medications and has been using methamphetamine. Got zyprexa. Is holdable. DEC assessment pending.   0434 Patient too sleepy/not cooperating with DEC assessment, they will need to meet with him again   1955 Someone from DEC called back and stated she was unsure on what was going on. States she will contact someone who does and call back.       10:51 PM - Patient signed out to Dr. Ansari at routine shift change. Plan at this time is awaiting DEC callback about crisis bed placement.    --------------------------------------------------------------------------------   --------------------------------------------------------------------------------     IMPRESSION  1. Chemical dependency (H)    2. Schizophrenia, unspecified type (H)        PLAN  - awaiting crisis bed placement per ASHLEIGH Bejarano MD  01/24/23  Emergency Medicine  [facility] EMERGENCY DEPARTMENT  [facility] [facility] [id number] [phone]  Dept: [phone]     Aníbal Bejarano MD  01/24/23 225

## 2023-01-24 NOTE — ED PROVIDER NOTES
EMERGENCY DEPARTMENT ENCOUNTER            IMPRESSION:  Chemical dependency  Mental illness      MEDICAL DECISION MAKING:  Patient here for chemical dependency and mental health crisis.  He has a history of methamphetamine abuse and psychosis.  He reports ongoing methamphetamine abuse.  He has been noncompliant with his psychiatric medication.  He was seen earlier today at [facility] and left prior to completion.    On exam he has normal vital signs.  He is awake and alert and coherent.    Urine drug screen shows presence of THC methamphetamine    He was given therapeutic medication    I reviewed the encounter from [facility] this evening.  Patient may be under commitment.  I have placed him on a hold for now due to his substance intoxication and need for possible inpatient services    Consult for social work crisis in the morning          =================================================================  CHIEF COMPLAINT:  Chief Complaint   Patient presents with     Mental Health Problem     Insomnia         HPI  Hamzah Roberts is a 37 year old male with a history of SI, paranoid schizophrenia, poly substance abuse, methamphetamine abuse, anxiety, episodic mood disorder, hallucination, and psychosis, who presents to the ED by private vehicle for evaluation of mental health problem.    Per chart review, the patient was seen earlier today at  ED for crisis evaluation. Patient stated that he has been off his mental health medications and has an appointment with psychiatrist in 3 weeks. Patient has been trying to get a sooner appointment but hasn't been able to. He stated that he gets hallucinations whenever he is off his medications. Patient added that the voices he hears aren't commands and he denies any SI/HI or any other medical complaints at this time. He stated that he is living in a sober house and wants to go back, but requested refills for his ativan, zyprexa and risperidone and stated that the  "zyprexa is most important for him to get refilled as it helps most with hallucinations.  Patient had reassuring vitals. Labs, imaging, and further workup were not emergently indicated. Planned evaluation was  consultation, and if stable and appropriate for discharge, planned on refilling zyprexa and risperidone so patient can discharge back to sober house. Patient left before consultation.    Here in the ED, the patient reports that he is \"fine.\" The patient states that he is \"not sure what is wrong\" and that \"it's a never-ending cycle.\" Patient states that he \"wants help [through] a program and can't just leave.\" After the patient left , he was able to touch bases with his sister. He otherwise denies any other symptoms or complaints at this time.     I, Klaus Yost am serving as a scribe to document services personally performed by Dr. Eric Cortés MD, based on my observation and the provider's statements to me. I, Dr. Eric Cortés MD attest that Klaus Yost is acting in a scribe capacity, has observed my performance of the services and has documented them in accordance with my direction.      REVIEW OF SYSTEMS   Constitutional: Denies fever, chills, unintentional weight loss or fatigue   Eyes: Denies visual changes or discharge    HENT: Denies sore throat, ear pain or neck pain  Respiratory: Denies cough or shortness of breath    Cardiovascular: Denies chest pain, palpitations or leg swelling  GI: Denies abdominal pain, nausea, vomiting, or dark, bloody stools.    : Denies hematuria, dysuria, or flank pain  Musculoskeletal: Denies any new back pain or new muscle/joint pains  Skin: Denies rash or wound  Neurologic: Denies current headache, new weakness, focal weakness, or sensory changes    Lymphatic: Denies swollen glands    Psychiatric: Positive for hallucinations. Denies depression, suicidal ideation or homicidal ideation.    Remainder of systems reviewed, unless noted in HPI all others " negative.      PAST MEDICAL HISTORY:  History reviewed. No pertinent past medical history.    PAST SURGICAL HISTORY:  History reviewed. No pertinent surgical history.      CURRENT MEDICATIONS:    LORazepam (ATIVAN) 1 MG tablet  OLANZapine (ZYPREXA) 20 MG tablet        ALLERGIES:  Allergies   Allergen Reactions     Morphine Sulfate [Morphine] Shortness Of Breath       FAMILY HISTORY:  History reviewed. No pertinent family history.    SOCIAL HISTORY:   Social History     Socioeconomic History     Marital status: Single   Tobacco Use     Smoking status: Every Day     Packs/day: 0.50     Types: Cigarettes   Substance and Sexual Activity     Alcohol use: Never     Drug use: Yes     Types: Methamphetamines, Marijuana     Sexual activity: Not Currently   Social History Narrative    Aug 2020: Patient admits to meth use.       PHYSICAL EXAM:    /66   Pulse 81   Temp 97.9  F (36.6  C) (Temporal)   Resp 16   Wt 75.5 kg (166 lb 8 oz)   SpO2 98%   BMI 27.71 kg/m      Constitutional: He is awake and alert, cooperative  Head: Normocephalic, atraumatic.  ENT: Mucous membranes moist. Posterior oropharynx appears normal.  Eyes: Pupils dilated  Neck: No lymphadenopathy, no stridor, supple, no soft tissue swelling  Chest: No tenderness   Respiratory: Respirations even, unlabored. Lungs clear to ascultation bilaterally, in no acute respiratory distress.  Cardiovascular: Regular rate and rhythm.+2 radial pulses, equal bilaterally.  No murmurs.   GI: Abdomen soft, non-tender to palpation in all 4 quadrants. No guarding or rebound. Bowel sounds intact on all 4 quadrants.   Back: No CVA tenderness.    Musculoskeletal: Moves all 4 extremities equally, strength symmetrical on bilateral uppers and lowers.  No peripheral edema  Integument: Warm, dry. No rash. No bruising or petechiae.  Lymphatic: No cervical lymphadenopathy  Neurologic: Alert & oriented x 3. Normal speech.  Psychiatric: Anxious slightly agitated    ED  COURSE:    10:22 PM I met with the patient, obtained history, performed an initial exam, and discussed options and plan for diagnostics and treatment here in the ED. PPE worn: surgical mask, gloves         Medical Decision Making    History:    Supplemental history from: Documented in [facility], if applicable.    External Record(s) reviewed: External medical records care everywhere    Work Up:    Chart documentation includes differential considered and any EKGs or imaging independently interpreted by provider.  Laboratory, EKG, radiology independently reviewed        External consultation:    Discussion of management with another provider: Reviewed care everywhere notes    Complicating factors:    Care impacted by chronic illness: Mental Health     Care affected by social determinants of health: Access to care    Disposition considerations: Patient will be held overnight until social work evaluation         LAB:  Laboratory results were independently reviewed and interpreted  Results for orders placed or performed during the hospital encounter of 01/23/23   Drug abuse screen 77 urine (FL, RH, SH)   Result Value Ref Range    Amphetamines Urine Screen Positive (A) Screen Negative    Barbituates Urine Screen Negative Screen Negative    Benzodiazepine Urine Screen Negative Screen Negative    Cannabinoids Urine Screen Positive (A) Screen Negative    Opiates Urine Screen Negative Screen Negative    PCP Urine Screen Negative Screen Negative    Cocaine Urine Screen Negative Screen Negative           MEDICATIONS GIVEN IN THE EMERGENCY:  Medications   OLANZapine zydis (zyPREXA) ODT tab 10 mg (10 mg Oral Given 1/23/23 5514)           NEW PRESCRIPTIONS STARTED AT TODAY'S ER VISIT:  New Prescriptions    No medications on file          FINAL DIAGNOSIS:    ICD-10-CM    1. Chemical dependency (H)  F19.20       2. Schizophrenia, unspecified type (H)  F20.9                At the conclusion of the encounter I discussed the results of all of  the tests and the disposition. The questions were answered. The patient or family acknowledged understanding and was agreeable with the care plan.     NAME: Hamzah Roberts  AGE: 37 year old male  YOB: 1985  MRN: [id number]  EVALUATION DATE & TIME: No admission date for patient encounter.    PCP: Tierra Ref-Primary, Physician    ED PROVIDER: Eric Cortés M.D.      BETSY, Klaus Yost, am serving as a scribe to document services personally performed by Dr. Eric Cortés based on my observation and the provider's statements to me. I, Eric Cortés MD attest that Klaus Yost is acting in a scribe capacity, has observed my performance of the services and has documented them in accordance with my direction.    Eric Cortés M.D.  Emergency Medicine  [facility] EMERGENCY DEPARTMENT  [facility] [facility] [address]  [phone]  Dept: [phone]  1/23/2023       Eric Cortés MD  01/23/23 7707

## 2023-01-24 NOTE — PHARMACY-ADMISSION MEDICATION HISTORY
Pharmacy Note - Admission Medication History    Pertinent Provider Information: pt has fill hx of risperidone 6 mg at bedtime, Seroquel 200 mg at bedtime, gabapentin 600 mg three times daily and omeprazole 20 mg daily, but pt states he is not taking and only takes Zyprexa and Ativan     ______________________________________________________________________    Prior To Admission (PTA) med list completed and updated in EMR.       PTA Med List   Medication Sig Last Dose     LORazepam (ATIVAN) 1 MG tablet Take 1 tablet (1 mg) by mouth daily as needed for anxiety (Patient taking differently: Take 1 mg by mouth 3 times daily) Past Week     OLANZapine (ZYPREXA) 10 MG tablet Take 10 mg by mouth At Bedtime Past Week       Information source(s): Patient, Clinic records and [facility]  Method of interview communication: in-person    Summary of Changes to PTA Med List  New: none  Discontinued: none  Changed: Zyprexa from 20 mg to 10 mg, Ativan from daily prn to three times daily    Patient was asked about OTC/herbal products specifically.  PTA med list reflects this.    In the past week, patient estimated taking medication this percent of the time:  50-90% due to other.    Allergies were reviewed, assessed, and updated with the patient.      Patient does not use any multi-dose medications prior to admission.    The information provided in this note is only as accurate as the sources available at the time of the update(s).    Thank you for the opportunity to participate in the care of this patient.    Melissa Moreno [facility]  1/24/2023 2:35 PM

## 2023-01-24 NOTE — ED NOTES
Patient was told he can not use the phone at this time. Patient has a history of using the phone impulsively and frequently. Will reassess privileges at a later time.

## 2023-01-24 NOTE — ED NOTES
Lace up ankle brace  Continue to weight bear as tolerated  Continue range of motion  Ice and elevate as needed  Tylenol or Motrin for pain  Follow up in 8 weeks There are now four law enforcement officers in the ED lobby. DEC assessment has ended. Awaiting recommendations.

## 2023-01-24 NOTE — ED NOTES
"Patient has been more cooperative since he took the Zyprexa. He has had a couple sandwiches to eat and has been using the phone to request a crisis bed himself. Patient is aware that Dec  Has been searching for a bed as well.    Dec called and asked writer to have the patient sign a consent for records to be released to [facility]. Writer entered patients room to ask him if he was willing to sign the paper. Patient got slightly upset stating \"I don't know why I have ti sign anything. I have someone telling me they have a bed for me, I'm leaving.\" Writer told patient to \"let me know if you change your mind and want to sign it so they can review you.\"   Patient still in room. Writer is unclear of what his plan is, if he will wait or go to a center of his choice.     "

## 2023-01-24 NOTE — ED NOTES
"Writer checked patient vitals at 1023 am. Patient got up went to the bathroom and became very agitated. Writer informed MD and MD spoke with patient. Patient became very agitated towards the MD and security was called for standby. Patient was upset with two of the security officers and was being confrontational towards them.  Dre (supervirsor of security) was able to talk the patient down and has a very good rapport with the patient.      Security and extra staff outside of patients room from 11:00 am until 1245 pm        1245- Patient offered Ativan- patient became upset asking \"why would I take more medication when you just gave me some? I don't know why that is even okay.\" Writer updated patient that we are waiting for a crisis bed if he wants to wait.         1246 - 1:1 discontinued per MD   "

## 2023-01-24 NOTE — ED NOTES
"[facility] ED Mental Health Handoff Note:     Assuming care from: Dr Eric Cortés    Brief HPI: 37 year old male signed out to me by the above provider. See initial ED Provider note for full details of the presentation.   In brief, patient comes into ED for evaluation of mental health problem. Patient was seen at  ED earlier yesterday and states he has been off his mental health medications and endorses hallucinations since being off. He has been trying to schedule an appointment with his psychiatrist but the earliest is in 3 weeks. He denies suicidal ideation and homicidal ideation. Since discharge from  ED, patient states that he is feeling \"fine\" and would like to get some help through a \"program\" and \"can't just leave.\" There were no other concerns/complaints at this time.    Home meds reviewed and ordered/administered: Yes  Medically stable for inpatient mental health admission: Yes.  Evaluated by mental health: No. Patient is clinically sober and awaiting evaluation for disposition.  Safety concerns: At the time I received sign out, there were no safety concerns.    Hold Status:  Active Orders   N/A         Labs/Imaging:   Results for orders placed or performed during the hospital encounter of 01/23/23 (from the past 24 hour(s))   Urine Drugs of Abuse Screen     Status: Abnormal    Collection Time: 01/23/23 10:45 PM    Narrative    The following orders were created for panel order Urine Drugs of Abuse Screen.  Procedure                               Abnormality         Status                     ---------                               -----------         ------                     Drug abuse screen 77 uri...[[id number]]  Abnormal            Final result                 Please view results for these tests on the individual orders.   Drug abuse screen 77 urine (FL, RH, SH)     Status: Abnormal    Collection Time: 01/23/23 10:45 PM   Result Value Ref Range    Amphetamines Urine Screen Positive (A) Screen " Negative    Barbituates Urine Screen Negative Screen Negative    Benzodiazepine Urine Screen Negative Screen Negative    Cannabinoids Urine Screen Positive (A) Screen Negative    Opiates Urine Screen Negative Screen Negative    PCP Urine Screen Negative Screen Negative    Cocaine Urine Screen Negative Screen Negative         ED Meds:  Medications   OLANZapine zydis (zyPREXA) ODT tab 10 mg (10 mg Oral Given 1/23/23 2303)     No orders to display       ED Course:  ED Course as of 01/24/23 0611   Tue Jan 24, 2023   0047 Sign out received: 36 y/o M--has been off of his psychiatric medications and has been using methamphetamine. Got zyprexa. Is holdable. DEC assessment pending.   0434 Patient too sleepy/not cooperating with DEC assessment, they will need to meet with him again     12:47 Patient was signed out to me by Dr. Cortés.  4:34 AM Nursing staff updated me. Patient refuses to get up to get a DEC assessment.  7:00 AM Patient signed out to oncoming provider    Patient was signed out to the oncoming provider, Dr. Anni Mello at shift change    Impression:    ICD-10-CM    1. Chemical dependency (H)  F19.20       2. Schizophrenia, unspecified type (H)  F20.9           Plan:    1. Awaiting mental health evaluation/recommendations.    I, Cindi Peralta, am serving as a scribe to document services personally performed by Rachna Chester M.D., based on my observations and the provider's statements to me.  I, Rachna Chester M.D., attest that Cindi Peralta is acting in a scribe capacity, has observed my performance of the services and has documented them in accordance with my direction.     Rachna Chester M.D.  [facility] EMERGENCY DEPARTMENT  [address] [place] [id number]  [phone]                 Rachna Chester MD  01/24/23 0611

## 2023-01-24 NOTE — ED NOTES
Per DEC  and Dr. Mello, patient will not be placed on a hold. Law enforcement officers have left ED.

## 2023-01-24 NOTE — PROGRESS NOTES
DEC  attempted to meet with patient for an assessment.  On admission, patient reported not having slept for three days.  Unfortunately, an  was not available for several hours, during which time patient received Zydis 10 mg and fell asleep.  Writer and ED RN attempted to rouse patient for interview, however he refused.  The plan is for ED staff to notify DEC when patient wakes and is able to participate in assessment.      Kristy Drake, MS, LP, LMHP
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
## 2023-01-24 NOTE — ED NOTES
DEC assessment concluded. Awaiting recommendations. Patient remains agitated and continues to pace, entering and leaving room. Staff and security standing by for safety reasons.
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
## 2023-01-25 RX ORDER — OLANZAPINE 10 MG/1
10 TABLET ORAL AT BEDTIME
Qty: 10 TABLET | Refills: 0 | Status: SHIPPED | OUTPATIENT
Start: 2023-01-25 | End: 2023-01-26

## 2023-01-25 RX ORDER — LORAZEPAM 1 MG/1
1 TABLET ORAL 3 TIMES DAILY PRN
Qty: 30 TABLET | Refills: 0 | Status: SHIPPED | OUTPATIENT
Start: 2023-01-25 | End: 2023-02-04

## 2023-01-25 ASSESSMENT — ACTIVITIES OF DAILY LIVING (ADL)
ADLS_ACUITY_SCORE: 35

## 2023-01-25 NOTE — ED NOTES
Patient ambulatory in room.Out to nurses station requesting sandwich and pop.States that [facility] staff states he can come for admit there at 1100 am tomorrow.Plan for patient to stay in ED overnight until placement available but not holdable if opts to leave.

## 2023-01-25 NOTE — ED NOTES
Triage & Transition Services, Extended Care     Hamzah Ambrociope  January 25, 2023    Hamzah is followed related to care coordination to support with discharge to crisis residence. Please see initial DEC Crisis Assessment for complete assessment information. Medical record is reviewed.     Writer spoke with bedside nurse who confirmed pt has been accepted to [facility] with an intake scheduled for 11am. She denied any additional supports needed from extended care at this time.    Writer transferred after care plan completed by initial  Seble Thompson to [facility] and added additional substance use resources.      Plan:  Pt will discharge to [facility].     Plan for Care reviewed with bedside nurse    Extended Care will follow and meet with patient/family/care team as able or requested.     Cristina Ugalde, [facility], Extended Care   [phone]

## 2023-01-25 NOTE — ED NOTES
Pt pacing in tse frequently. Pt asked for ativan to help calm him down. Spoke with MD. Atarax ordered.

## 2023-01-25 NOTE — ED NOTES
[facility] called and confirmed that they can take pt around 1100 today and would like pt to be there around 1100. Will notify MD.

## 2023-01-25 NOTE — ED NOTES
Pt sleeping. Spoke with staff at [facility]. MD to print and will fax Rx to be filled to be delivered 10 day supply.

## 2023-01-25 NOTE — ED NOTES
"ED Behavioral Health Patient Transition of Care Note    Assuming care from:  Aníbal Bejarano MD    Brief history:  history of meth abuse who initially presented belligerent.  Given Zyprexa while in ED and she is now calm. Patient was seen earlier at [facility] for crisis evaluation.      I, Surekha [place] Israel, am serving as a scribe to document services personally performed by Lazaro Ansari MD, based on my observations and the provider's statements to me.  I, Lazaro Ansari MD, attest that Surekha Wood is acting in a scribe capacity, has observed my performance of the services and has documented them in accordance with my direction.      Any outstanding medical concerns:  {Blank multiple:501901::\"No, medically stable for BH admission\",\"awaiting lab results - ***\",\"awaiting imaging results - ***\",\"reevaluate after treatment - ***\",\"risk for withdrawal syndrome\"}    Has SW seen the patient:  {Blank single:63200::\"yes\",\"no\"}    Is the patient on a hold:  pt not holdable    Current plan for disposition:  {Blank single:09775::\"Awaiting SW evaluation\",\"SW attempting to find a bed\",\"SW unable to find a bed tonight, will try in the morning\",\"SW unable to find a bed, will try in the morning and will need psych NP consult tomorrow\",\"Bed secured, awaiting transportation\"}    Safety concerns:  {Blank multiple:225709::\"No, pt has been cooperative\",\"pt has been chemically restrained with ***\",\"pt has been in physical restraints\",\"pt has been aggressive or combative\"}    Dietary restrictions:  {Blank single:77640::\"None, pt can eat and drink ad lalitha\"}    Have daily medications been ordered:  {Blank single:06768::\"yes, these include ***\",\"no daily meds needed\",\"daily meds have not been ordered because***\"}    Significant events during shift:  ***    ED Course as of 01/25/23 0252 Tue Jan 24, 2023 0047 Sign out received: 36 y/o M--has been off of his psychiatric medications and has been using methamphetamine. Got zyprexa. Is " holdable. DEC assessment pending.   0434 Patient too sleepy/not cooperating with DEC assessment, they will need to meet with him again   1955 Someone from DEC called back and stated she was unsure on what was going on. States she will contact someone who does and call back.       1. Chemical dependency (H)    2. Schizophrenia, unspecified type (H)      Lazaro Ansari M.D.  Emergency Medicine  [facility] EMERGENCY DEPARTMENT  [facility] [facility] [id number] [phone]  Dept: [phone]  1/23/2023

## 2023-01-25 NOTE — ED NOTES
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      See initial ED Provider note for full details of the presentation.  In brief, patient comes into ED for evaluation of a mental health problem. Patient states he has been off his mental health medications and endorses hallucinations since being off. He has been trying to schedule an appointment with his psychiatrist but the earliest is in 3 weeks. He also endorses ongoing methamphetamine abuse.    Home meds reviewed and ordered/administered: Yes  Medically stable for inpatient mental health admission: Yes.  Evaluated by mental health: Yes. The recommendation is for inpatient mental health treatment. Bed search in process  Safety concerns: At the time I received sign out, the patient had been aggressive/combative/agitated, but has calmed    Hold Status:  Active Orders   N/A       Labs/Imaging:   No results found for this visit on 01/23/23 (from the past 24 hour(s)).      ED Meds:  Medications   OLANZapine zydis (zyPREXA) ODT tab 10 mg (10 mg Oral Given 1/24/23 2156)   nicotine (NICORETTE) gum 2 mg (2 mg Buccal Given 1/24/23 1754)   LORazepam (ATIVAN) tablet 1 mg (1 mg Oral Given 1/24/23 1754)   nicotine (NICORETTE) gum 2 mg (2 mg Buccal Given 1/24/23 1056)   OLANZapine zydis (zyPREXA) ODT tab 5 mg (5 mg Oral Given 1/24/23 1152)   diphenhydrAMINE (BENADRYL) injection 50 mg (50 mg Intramuscular Not Given 1/24/23 1317)   LORazepam (ATIVAN) injection 2 mg (2 mg Intramuscular Not Given 1/24/23 1318)   haloperidol lactate (HALDOL) injection 5 mg (5 mg Intramuscular Not Given 1/24/23 1318)   hydrOXYzine (ATARAX) tablet 50 mg (50 mg Oral Given 1/24/23 2032)     No orders to display       ED Course:  ED Course as of 01/25/23 1045   Tue Jan 24, 2023    0047 Sign out received: 38 y/o M--has been off of his psychiatric medications and has been using methamphetamine. Got zyprexa. Is holdable. DEC assessment pending.   0434 Patient too sleepy/not cooperating with DEC assessment, they will need to meet with him again   1955 Someone from DEC called back and stated she was unsure on what was going on. States she will contact someone who does and call back.       There were no significant events during my shift.  The patient was ultimately discharged to the ED at [facility] where he has an outpatient crisis bed waiting for him.  10 days worth of his PRN Ativan and nightly olanzapine were faxed to the facility.        Impression:    ICD-10-CM    1. Chemical dependency (H)  F19.20       2. Schizophrenia, unspecified type (H)  F20.9       3. Schizoaffective disorder, depressive type (H)  F25.1 LORazepam (ATIVAN) 1 MG tablet          Plan:    1. Discharge    Anni Mello DO  [facility] EMERGENCY DEPARTMENT  [address] [place] [id number] [phone]                   Anni Mello DO  01/25/23 0532

## 2023-01-25 NOTE — ED NOTES
[facility] Transferred call into Pt room for admit screening.

## 2023-01-25 NOTE — DISCHARGE INSTRUCTIONS
Coordinators from [facility] will be calling within two business days to ensure  that you have the resources you may need or provide assistance with scheduling (Phone number: [phone].).       Aftercare Plan  If I am feeling unsafe or I am in a crisis, I will:   Contact my established care providers   Call the National Suicide Prevention Lifeline: 225  Go to the nearest emergency room   Call 911      Warning signs that I or other people might notice when a crisis is developing for me: urges to use methamphetamine, agitated and irritable, conflict with others     Things I am able to do on my own to cope or help me feel better: smoke a cigarette, deep breaths, listen to music      Things that I am able to do with others to cope or help me better: talk to others and go for a walk. Avoid others who use.      Things I can use or do for distraction: Listen to music, sing, smoke a cigarette, watch television      Changes I can make to support my mental health and wellness: Avoid use of mood altering substances. Take medications as they are prescribed. Trust providers.     Ways to help cope with sobriety:    -- Take prescribed medicines as scheduled  -- Keep follow-up appointments  -- Talk to others about your concerns  -- Get regular exercise  -- Practice deep breathing skills  -- Eat a healthy diet  -- Use community resources, including hotline numbers, [facility] crisis and support meetings  -- Stay sober and avoid places/people/things associated with substance use  --Maintain a daily schedule/routine  --Get at least 7-8 hours of sleep per night  --Create a list 10--20 healthy activities that you can do that are enjoyable and do not involve substance use  --Create daily goals (approx. 1-4 goals) per day and work to achieve them throughout the day.     People in my life that I can ask for help: Cynthia Gonzalez       Your [facility] has a mental health crisis team you can call 24/7: Lev  "[facility] Crisis  [phone] (adults)  [phone] (children)     Other things that are important when I'm in crisis: My sobriety, my family, my future      Additional resources and information:           Crisis Lines  Crisis Text Line  Text 298922  You will be connected with a trained live crisis counselor to provide support.     Por espanol, texto  FAISAL a 464623 o texto a 442-AYUDAME en WhatsApp     The Gerard Project (LGBTQ Youth Crisis Line)  6.015.169.3662  text START to 286-958        [employer]  Fast Tracker  Linking people to mental health and substance use disorder resources  Gini.Rupeetalk      [place] Mental Health Warm Line  Peer to peer support  Monday thru Saturday, 12 pm to 10 pm  296.031.2741 or 2.191.989.2231  Text \"Support\" to 13793     National [place] on Mental Illness (HERO)  583.310.2220 or 1.888.HERO.HELPS        Mental Health Apps  My3  https://myFarmDroppp.org/     VirtualHopeBox  https://Subject Company/apps/virtual-hope-box/        Additional Information  Today you were seen by a licensed mental health professional through Triage and Transition services, [facility] ([facility])  for a crisis assessment in the Emergency Department at [facility].  It is recommended that you follow up with your established providers (psychiatrist, mental health therapist, and/or primary care doctor - as relevant) as soon as possible. Coordinators from [facility] will be calling you in the next 24-48 hours to ensure that you have the resources you need.  You can also contact [facility] coordinators directly at [phone]. You may have been scheduled for or offered an appointment with a mental health provider. [facility] maintains an extensive network of licensed behavioral health providers to connect patients with the services they need.  We do not charge providers a fee to participate in our referral network.  We match patients with providers based on a patient's specific " needs, insurance coverage, and location.  Our first effort will be to refer you to a provider within your care system, and will utilize providers outside your care system as needed.         Community NURIS Evaluations: Clients may call their county for a full list of providers - Availability and services listed belo are subject to change, please call the provider to confirm    [facility]  [phone]  [facility] [place], [place], [address]  *Please call the above number to schedule a comprehensive assessment for determination of level of care needs. In person and virtual appointments available Mon-Fri.    [facility], [address] [place] [address], First Floor, [address], [place] [address] (next to the outpatient lab)    Phone: [phone]   Provides bridging services to people with Opiate Use Disorders (OUD) seeking care. This is a front door to Medication Assisted Treatments (MAT), ages 16+  Walk In hours: Monday-Friday 9:00am-3:00pm    [facility]  [phone]  Walk in Assessments: Mon-Friday 7a-1:45p  [address], [place], [address]    [facility]  Central Access [phone]  [address] [place] [address], [address]  *by appointment only    [facility]  [phone] (phone consultation available )  Locations in: [place], [place], [facility], and [place], [place]  [place] Islander virtual IOP [phone] or visit [email or web address]   Also offers LGBTQ programming     [facility]  [phone]  [address]  [place], [place], [address]  *Currently only offered via telehealth - call to set up an appointment    [facility] Mental [facility]  [address] [place], [place], [address]  [facility]  For more information to to make a referral call:  [phone]  Walk-in on   9-11 a.m.    [facility]  [phone]  [address] [facility]  [place], [address]  *available by appointments only    [facility] [place] [facility] specific  [phone]  [address] [place], [place], [address]  *available by appointment only    [facility]  [phone]  [address] [place], [place], [address]  *walk in assessments available M-F starting at 7 am.    [facility] Addiction [facility]  [phone]  Locations: [facility], [facility], and [place]  *Walk in assessments availble M-F starting at 8 am -virtual only    [facility]  [phone]  [address] [place] [address]    [facility]  Virtual + Locations: [place], [place], [place], [place], [facility], [facility], [place], [place]   [phone]  *available by appointment only    [facility]  [phone]  [address]  [place], [place], [address]    [facility]  [phone]  [address], [place], [address]  *available by appointment    [facility]  [phone]  [address] [place], [place], [address]  *walk ins available M-TH from 9-3    [facility] program: [phone]  [address] [place], [place], [address]    [place]  [phone]  Same day substance use disorder assessments are available Monday - Friday, via walk-in or by appointment at the [place] location.  [address], [address], [place] [address]     [facility] - adolescent and adult SUDs services  [phone]  Offer services Monday through Friday, as well as evening hours Monday through Thursday. Normally, a first appointment will be scheduled within one week  https://[email or web address]/our-services/drug-alcohol-treatment  Locations all over [place]     Free Resources:    [place] Recovery Connection (MRC)  [facility] connects people seeking recovery to resources that help foster and sustain long-term recovery. Whether you are seeking resources for treatment, transportation, housing, job training,  education, health care or other pathways to recovery, [facility] is a great place to start.  Phone: 240.941.9215. www.[place]VisualShare.org (Great listing of all types of recovery and non-recovery related resources)    Alcoholics Anonymous  Phone: 8-358-ALCOHOL  Website: HTTP://WWW.AA.ORG/  [facility] [place] (221-979-2388 or http://aaShoeDazzle.org)  [facility] [place] (891-127-6250 or www.aastpaul.org)     Narcotics Anonymous  Phone: 611.748.7047  Website: www.[employer].DOCUSYS.    [employer] [address], [place], [place],  Phone: [phone]  Drop-in Hours: Monday-Friday 9-11:30 am. By appointment at other times.  Provides: [facility] is a drop-in center on the [place] side of [place] that provides a safe space for individuals who are homeless and have a history of chemical use. Sobriety is not a requirement but drugs and alcohol are not allowed on the property.  Services: Non-clients can access drop-in services such as Recovery and Harm Reduction Groups, referrals to case management, community activities, shower facilities, and a pool table. Individuals who are homeless and have chemical health needs may be eligible for enrollment into [employer]'s case management program. Clients and  work together to access benefits, treatment, health care, shelter, and external housing resources.

## 2023-01-26 ENCOUNTER — TELEPHONE (OUTPATIENT)
Dept: EMERGENCY MEDICINE | Facility: HOSPITAL | Age: 38
End: 2023-01-26
Payer: COMMERCIAL

## 2023-01-26 RX ORDER — OLANZAPINE 10 MG/1
10 TABLET ORAL AT BEDTIME
Qty: 10 TABLET | Refills: 0 | Status: ON HOLD | OUTPATIENT
Start: 2023-01-26 | End: 2023-04-07

## 2023-01-27 NOTE — ED NOTES
Was called by the patient's crisis center, patient was discharged yesterday, he did get some of his prescriptions from discharge, however they are missing his Zyprexa.  I did review the note from ED providers yesterday, we will [facility] a [facility] for the [facility] for [facility]rexa.  They do have some knee that is able to pick this prescription up for him.  Prescription should have been sent to the pharmacy.  Discussed with crisis housing staff, they are agreeable with this plan.     Etienne Hayden PA-C  01/26/23 1814

## 2023-03-21 ENCOUNTER — HOSPITAL ENCOUNTER (EMERGENCY)
Facility: HOSPITAL | Age: 38
Discharge: PSYCHIATRIC OR SUBSTANCE USE FACILITY | End: 2023-03-28
Attending: EMERGENCY MEDICINE | Admitting: EMERGENCY MEDICINE
Payer: COMMERCIAL

## 2023-03-21 ENCOUNTER — TELEPHONE (OUTPATIENT)
Dept: BEHAVIORAL HEALTH | Facility: CLINIC | Age: 38
End: 2023-03-21

## 2023-03-21 DIAGNOSIS — R45.851 SUICIDAL IDEATION: ICD-10-CM

## 2023-03-21 PROCEDURE — 99285 EMERGENCY DEPT VISIT HI MDM: CPT | Mod: 25

## 2023-03-21 PROCEDURE — 250N000013 HC RX MED GY IP 250 OP 250 PS 637: Performed by: EMERGENCY MEDICINE

## 2023-03-21 PROCEDURE — C9803 HOPD COVID-19 SPEC COLLECT: HCPCS

## 2023-03-21 PROCEDURE — 90791 PSYCH DIAGNOSTIC EVALUATION: CPT

## 2023-03-21 RX ORDER — BACLOFEN 10 MG/1
10 TABLET ORAL 3 TIMES DAILY
Status: ON HOLD | COMMUNITY
Start: 2023-02-16 | End: 2023-04-07

## 2023-03-21 RX ORDER — HYDROXYZINE HYDROCHLORIDE 25 MG/1
50 TABLET, FILM COATED ORAL ONCE
Status: COMPLETED | OUTPATIENT
Start: 2023-03-21 | End: 2023-03-21

## 2023-03-21 RX ORDER — QUETIAPINE FUMARATE 200 MG/1
200 TABLET, FILM COATED ORAL AT BEDTIME
Status: ON HOLD | COMMUNITY
Start: 2023-01-11 | End: 2023-04-07

## 2023-03-21 RX ORDER — NALTREXONE HYDROCHLORIDE 50 MG/1
50 TABLET, FILM COATED ORAL DAILY
Status: ON HOLD | COMMUNITY
Start: 2023-03-01 | End: 2023-04-07

## 2023-03-21 RX ORDER — HYDROXYZINE PAMOATE 50 MG/1
50 CAPSULE ORAL 3 TIMES DAILY PRN
Status: ON HOLD | COMMUNITY
Start: 2023-03-01 | End: 2023-04-07

## 2023-03-21 RX ORDER — BUPROPION HYDROCHLORIDE 150 MG/1
1 TABLET ORAL
COMMUNITY
Start: 2022-04-13 | End: 2023-03-25

## 2023-03-21 RX ORDER — RISPERIDONE 3 MG/1
2 TABLET ORAL AT BEDTIME
Status: ON HOLD | COMMUNITY
Start: 2022-12-19 | End: 2023-04-07

## 2023-03-21 RX ORDER — OLANZAPINE 5 MG/1
5 TABLET, ORALLY DISINTEGRATING ORAL ONCE
Status: COMPLETED | OUTPATIENT
Start: 2023-03-21 | End: 2023-03-21

## 2023-03-21 RX ORDER — ZIPRASIDONE MESYLATE 20 MG/ML
20 INJECTION, POWDER, LYOPHILIZED, FOR SOLUTION INTRAMUSCULAR ONCE
Status: COMPLETED | OUTPATIENT
Start: 2023-03-21 | End: 2023-03-22

## 2023-03-21 RX ORDER — LORAZEPAM 0.5 MG/1
1 TABLET ORAL ONCE
Status: COMPLETED | OUTPATIENT
Start: 2023-03-21 | End: 2023-03-21

## 2023-03-21 RX ADMIN — HYDROXYZINE HYDROCHLORIDE 50 MG: 25 TABLET, FILM COATED ORAL at 21:46

## 2023-03-21 RX ADMIN — LORAZEPAM 1 MG: 0.5 TABLET ORAL at 21:47

## 2023-03-21 RX ADMIN — OLANZAPINE 5 MG: 5 TABLET, ORALLY DISINTEGRATING ORAL at 21:46

## 2023-03-21 ASSESSMENT — COLUMBIA-SUICIDE SEVERITY RATING SCALE - C-SSRS
1. SINCE LAST CONTACT, HAVE YOU WISHED YOU WERE DEAD OR WISHED YOU COULD GO TO SLEEP AND NOT WAKE UP?: YES
TOTAL  NUMBER OF INTERRUPTED ATTEMPTS SINCE LAST CONTACT: NO
2. HAVE YOU ACTUALLY HAD ANY THOUGHTS OF KILLING YOURSELF?: NO
TOTAL  NUMBER OF ABORTED OR SELF INTERRUPTED ATTEMPTS SINCE LAST CONTACT: NO
6. HAVE YOU EVER DONE ANYTHING, STARTED TO DO ANYTHING, OR PREPARED TO DO ANYTHING TO END YOUR LIFE?: NO
SUICIDE, SINCE LAST CONTACT: NO
ATTEMPT SINCE LAST CONTACT: NO
REASONS FOR IDEATION SINCE LAST CONTACT: COMPLETELY TO END OR STOP THE PAIN (YOU COULDN'T GO ON LIVING WITH THE PAIN OR HOW YOU WERE FEELING)

## 2023-03-21 ASSESSMENT — ENCOUNTER SYMPTOMS: HALLUCINATIONS: 1

## 2023-03-21 ASSESSMENT — ACTIVITIES OF DAILY LIVING (ADL)
ADLS_ACUITY_SCORE: 35

## 2023-03-21 NOTE — ED TRIAGE NOTES
Patient arrives to ED states he is feeling suicidal x 2 days, reports hearing voices.  Denies plan.  Has been out of his medications x 2 weeks.

## 2023-03-21 NOTE — ED PROVIDER NOTES
EMERGENCY DEPARTMENT ENCOUNTER     NAME: Hamzah Roberts   AGE: 37 year old male   YOB: 1985   MRN: [id number]   EVALUATION DATE & TIME: No admission date for patient encounter.   PCP: No Ref-Primary, Physician     Chief Complaint   Patient presents with     Suicidal   :    FINAL IMPRESSION       1. Suicidal ideation           ED COURSE & MEDICAL DECISION MAKING      5:38 PM I met with patient for initial interview and encounter. PPE worn includes N95 mask.  8:15 PM I spoke to DEC  regarding patient evaluation. Patient is suitable for admission.     Pertinent Labs & Imaging studies reviewed. (See chart for details)   37 year old male  presents to the Emergency Department for evaluation of suicidal thoughts after he states that he has been off of his meds for approximately 2 weeks. Initial Vitals Reviewed. Initial exam notable for patient is irritable but denies any plan.  He also admits to hearing voices.  It did take a lot of discussion to get him to comply with the rules here in the ED and he is very argumentative.  I had him assessed by the DEC team and they were actually able to figure out that he is currently in the [facility] program and has been decompensating, not taking meds, and today he barricaded himself in his room and they had to call the police to get him out.  Because of all of this, they are not comfortable taking him back until he is stabilized and he is voluntary for admission and the DEC team is recommending admission.  He will remain here in the emergency department until psychiatric placement has been secured.           At the conclusion of the encounter I discussed the results of all of the tests and the disposition. The questions were answered. The patient or family acknowledged understanding and was agreeable with the care plan.     Medical Decision Making    History:    Supplemental history from: Documented in chart, if applicable and N/A, DEC    External Record(s) reviewed:  "Documented in chart, if applicable. and Outpatient Record: History, recent ED visits with similar complaints    Work Up:    Chart documentation includes differential considered and any EKGs or imaging independently interpreted by provider, where specified.    In additional to work up documented, I considered the following work up: Documented in chart, if applicable.    External consultation:    Discussion of management with another provider: Other: DEC    Complicating factors:    Care impacted by chronic illness: Mental Health    Care affected by social determinants of health: Alcohol Abuse and/or Recreational Drug Use    Disposition considerations: Admit.       0 minutes critical care time, see procedure note below for details if relevant    MEDICATIONS GIVEN IN THE EMERGENCY:   Medications - No data to display   NEW PRESCRIPTIONS STARTED AT TODAY'S ER VISIT   New Prescriptions    No medications on file     ================================================================   HISTORY OF PRESENT ILLNESS       Patient information was obtained from: Patient    Use of Intrepreter: N/A   Hamzah Roberts is a 37 year old male with history of polysubstance abuse, psychosis, paranoid schizophrenia, schizoaffective disorder, who presents via walk-in for evaluation of suicidal ideation, medication refill.    Patient reports he has been feeling suicidal and hearing voices in his head for the past few days. He denies any specific plan with this, and states he'd \"just rather not be around.\" He states that he ran out of his Zyprexa, Ativan, Hydroxyzine, and Seroquel 2 weeks ago. He notes that he was told by his pharmacy that these medications would be ready by yesterday, but this was not the case. He has not tried to contact his [facility] clinic who prescribes these medications. Patient denies any other complaints.     ================================================================    REVIEW OF SYSTEMS       Review of Systems "   Psychiatric/Behavioral: Positive for hallucinations (auditory) and suicidal ideas.   All other systems reviewed and are negative.      PAST HISTORY     PAST MEDICAL HISTORY:   History reviewed. No pertinent past medical history.   PAST SURGICAL HISTORY:   History reviewed. No pertinent surgical history.   CURRENT MEDICATIONS:   OLANZapine (ZYPREXA) 10 MG tablet      ALLERGIES:   Allergies   Allergen Reactions     Morphine Sulfate [Morphine] Shortness Of Breath      FAMILY HISTORY:   History reviewed. No pertinent family history.   SOCIAL HISTORY:   Social History     Socioeconomic History     Marital status: Single   Tobacco Use     Smoking status: Every Day     Packs/day: 0.50     Types: Cigarettes   Substance and Sexual Activity     Alcohol use: Never     Drug use: Yes     Types: Methamphetamines, Marijuana     Sexual activity: Not Currently   Social History Narrative    Aug 2020: Patient admits to meth use.        VITALS  Patient Vitals for the past 24 hrs:   BP Pulse Resp SpO2 Weight   03/21/23 1737 (!) 137/90 100 18 97 % 76.2 kg (168 lb)        ================================================================    PHYSICAL EXAM     VITAL SIGNS: BP (!) 137/90   Pulse 100   Resp 18   Wt 76.2 kg (168 lb)   SpO2 97%   BMI 27.96 kg/m     Constitutional:  Awake, no acute distress   HENT:  Atraumatic, oropharynx without exudate or erythema, membranes moist  Lymph:  No adenopathy  Eyes: EOM intact, PERRL, no injection  Neck: Supple  Respiratory:  Clear to auscultation bilaterally, no wheezes or crackles   Cardiovascular:  Regular rate and rhythm, single S1 and S2   GI:  Soft, nontender, nondistended, no rebound or guarding   Musculoskeletal:  Moves all extremities, no lower extremity edema, no deformities    Skin:  Warm, dry  Neurologic:  Alert and oriented x3, no focal deficits noted       ================================================================  LAB       All pertinent labs reviewed and interpreted.    Labs Ordered and Resulted from Time of ED Arrival to Time of ED Departure - No data to display     ===============================================================  RADIOLOGY       Reviewed all pertinent imaging. Please see official radiology report.   No orders to display         ================================================================  EKG         I have independently reviewed and interpreted the EKG(s) documented above.     ================================================================  PROCEDURES         I, Juan Carlos Mckeon, am serving as a scribe to document services personally performed by Dr. Servin based on my observation and the provider's statements to me. I, Victorina Servin MD attest that Juan Carlos Mckeon is acting in a scribe capacity, has observed my performance of the services and has documented them in accordance with my direction.   Victorina Servin M.D.   Emergency Medicine   [facility] EMERGENCY DEPARTMENT  [address] [place] [id number]  [phone]  Dept: [phone]      Victorina Servin MD  03/21/23 2030

## 2023-03-22 ENCOUNTER — TELEPHONE (OUTPATIENT)
Dept: BEHAVIORAL HEALTH | Facility: CLINIC | Age: 38
End: 2023-03-22

## 2023-03-22 PROCEDURE — 80307 DRUG TEST PRSMV CHEM ANLYZR: CPT | Performed by: EMERGENCY MEDICINE

## 2023-03-22 PROCEDURE — 250N000013 HC RX MED GY IP 250 OP 250 PS 637: Performed by: EMERGENCY MEDICINE

## 2023-03-22 RX ORDER — LORAZEPAM 0.5 MG/1
1 TABLET ORAL ONCE
Status: COMPLETED | OUTPATIENT
Start: 2023-03-22 | End: 2023-03-22

## 2023-03-22 RX ADMIN — LORAZEPAM 1 MG: 0.5 TABLET ORAL at 20:53

## 2023-03-22 ASSESSMENT — ACTIVITIES OF DAILY LIVING (ADL)
ADLS_ACUITY_SCORE: 35

## 2023-03-22 NOTE — ED NOTES
"Patient left room began walking down tse.  Writer (patient observer) approached patient who was at the ice/ water machine.  Patient says \"What are you doing?\"  Patient was informed that if he needed ice or water patient should verbally make needs known and staff could assist.  Patient then said  \"What is your fucking problem man?  I have been here 20 times and No one has ever followed me around.  DOMINGA Security stopped and spoke with patient as well as nurse and writer.  Patient was informed that he is not able to just leave room, patients door should remained closed due to the privacy of other patients, patient also informed that he will not be able to close/ lock the bathroom door fully should that arise.  Patient then focused attention on writer and stated \"Man you on a power trip, why you got a fucking problem with me.\"  Writer left the situation in effort to calm patient.   and Nurse continued speaking with patient.    "

## 2023-03-22 NOTE — ED NOTES
Pt up to BR with writer. Pt was calm/cooperative. Eating meal at bedside. DEC contacted for follow up with patient. Approx DEC time for assessment will be at 1600. Order placed

## 2023-03-22 NOTE — ED NOTES
Triage & Transition Services, Extended [facility]     Hamzah Roberts  March 22, 2023    Hamzah is followed related to Long wait time for admission: boarding for 12+ hours . Please see initial DEC Crisis Assessment completed for complete assessment information. Medical record is reviewed. Pt has a history of MI/CD concerns with possible active substance use. Pt has had concerns with ideations and psychosis, currently. While patient is in the ED, care team is working towards Learn and Demonstrate at Le[employer] One Skill Focused on Crisis Stabilization. Additional notes include Pt is currently under a Civil Commitment with [facility].  Pt was [facility] Discharged from [facility].     There are not significant status changes. Per report from nurse, pt has slept throughout the entire night and day and continues to sleep, without being able to awaken to meet with Extended Care LMHP.     Recommend for continuation of admission as there are no updates that would alter disposition at this time from ED. Plan is to continue care coordination with ED, continue to see if pt is willing to meet with EC while waiting for admission. Hopes of coordinating with [facility]  if pt is open to such coordination. FOUZIA was faxed to ED for [facility], for pt to sign when awake and willing.     Plan:  Inpatient Mental Health: Pt will continue to be placed on waitlist until further observation, collateral or reassessment should alter disposition. Nurse encouraged to call Extended [facility] back, should pt be willing to engage with  for therapeutic touch point. Provider aware to reach out for reassessment or EC needs as appropriate.     Plan for Care reviewed with Assigned Medical Provider? Yes Provider, Ashutosh Groves, response: Agreement on plan for continuation of admission unless reassessment proves otherwise. Is aware of wait times and pt not participating in offered EC Services at time of interaction.     Extended Care will follow and meet  with patient/family/care team as able or requested.     Rosangela Stewart, [facility], [facility]   [phone]

## 2023-03-22 NOTE — ED NOTES
DEC called for pt update. Attempts to wake pt. Pt rolled over in bed refusing to wake up for assessment. DEC  notified, will attempt again when pt is awake.

## 2023-03-22 NOTE — ED NOTES
Resting on cot, no visible signs of distress.  Symmetrical chest rise. 1:1 video monitoring continues

## 2023-03-22 NOTE — PHARMACY-ADMISSION MEDICATION HISTORY
Pharmacy Note - Admission Medication History    Pertinent Provider Information:   -Difficulty obtaining information from patient, fixated on if I had seen a specific person he showed me a picture of on his phone. Difficulty focusing.   -Stated he was supposed to be taking baclofen, zyprexa, omeprazole, seroquel, wellbutrin, hydroxyzine  -Unclear what dose of wellbutrin should be. Pt stated 150 mg once a day, but prescription is 60 150 mg tablets for 30 day supply.  -Stated he was not taking risperdal or naltrexone, although recent fills this month for both.   -Unable to see any progress notes on what medications should be, but likely pt has not been taking medications as prescribed and has not taken any in a few weeks.     -Spoke with facility pt has been staying at. They do handle medications, but per staff, pt had no medications at facility.  ______________________________________________________________________    Prior To Admission (PTA) med list completed and updated in EMR.       PTA Med List   Medication Sig Note Last Dose     baclofen (LIORESAL) 10 MG tablet Take 10 mg by mouth 3 times daily  Past Month     buPROPion (WELLBUTRIN XL) 150 MG 24 hr tablet Take 1 tablet by mouth daily at 2 pm 3/21/2023: Appears to be for 2 tablets daily but pt thinks he is taking 1 tablet daily More than a month     hydrOXYzine (VISTARIL) 50 MG capsule Take 50 mg by mouth 3 times daily as needed  Unknown     OLANZapine (ZYPREXA) 10 MG tablet Take 1 tablet (10 mg) by mouth At Bedtime  More than a month     omeprazole (PRILOSEC) 20 MG DR capsule Take 20 mg by mouth daily  More than a month     QUEtiapine (SEROQUEL) 200 MG tablet Take 200 mg by mouth At Bedtime  More than a month       Information source(s): Patient and CareEverywhere/SureScripts  Method of interview communication: in-person    Summary of Changes to PTA Med List  New: -  Discontinued: -  Changed: -    Patient was asked about OTC/herbal products specifically.  PTA  med list reflects this.    In the past week, patient estimated taking medication this percent of the time:  less than 50% due to running out and other.    Medication Affordability:Unable to assess     Allergies were reviewed, assessed, and updated with the patient.      Patient does not use any multi-dose medications prior to admission.    The information provided in this note is only as accurate as the sources available at the time of the update(s).    Thank you for the opportunity to participate in the care of this patient.    Jany Fritz, PharmD, BCPS 03/21/23 9:44 PM

## 2023-03-22 NOTE — ED NOTES
"[facility] ED Mental Health Handoff Note:     Assuming care from: Dr Ashutosh Grvoes    Brief HPI: 37 year old male signed out to me by the above provider. See initial ED Provider note for full details of the presentation.   In brief, patient presented with with suicidal ideation. Patient reports he has been feeling suicidal and hearing voices in his head for the past few days. He denies any specific plan with this, and states he'd \"just rather not be around.\" He states that he ran out of his Zyprexa, Ativan, Hydroxyzine, and Seroquel 2 weeks ago. He notes that he was told by his pharmacy that these medications would be ready by yesterday, but this was not the case. He has not tried to contact his [facility] clinic who prescribes these medications. Patient denies any other complaints.      Patient was asleep and unable to be assessed up until ~2:00PM this afternoon. Will have DEC assess the patient soon. Likely admit.    Medically stable for inpatient mental health admission: Yes.  Evaluated by mental health: No. Patient is clinically sober and awaiting evaluation for disposition.  Safety concerns: At the time I received sign out, there were no safety concerns.    Hold Status:  Active Orders   N/A         Labs/Imaging:   Results for orders placed or performed during the hospital encounter of 03/21/23 (from the past 24 hour(s))   [facility] (DEC) Assessment Consult Order:     Status: None ()    Collection Time: 03/22/23  2:34 PM    Marcus Segura. FLAQUITO Almanzar     3/22/2023  3:52 PM  DEC Consult Order placed. DEC assessment completed by Fern Espinosa on 3/21 at 7:02p. Consult acknowledged and completed at   that time.     Additional DEC Consult Order placed today to be closed. Extended   Care is following today; reference Rosangela Stewart's note from   3/22 at 12:02p.     Extended Care can be reached at [phone].    Jenelle Velazquez [facility]     Urine Drugs of Abuse Screen     Status: Abnormal "    Collection Time: 03/22/23  8:55 PM    Narrative    The following orders were created for panel order Urine Drugs of Abuse Screen.  Procedure                               Abnormality         Status                     ---------                               -----------         ------                     Drug abuse screen 77 uri...[[id number]]  Abnormal            Final result                 Please view results for these tests on the individual orders.   Drug abuse screen 77 urine (FL, RH, SH)     Status: Abnormal    Collection Time: 03/22/23  8:55 PM   Result Value Ref Range    Amphetamines Urine Screen Positive (A) Screen Negative    Barbituates Urine Screen Negative Screen Negative    Benzodiazepine Urine Screen Negative Screen Negative    Cannabinoids Urine Screen Negative Screen Negative    Opiates Urine Screen Negative Screen Negative    PCP Urine Screen Negative Screen Negative    Cocaine Urine Screen Negative Screen Negative         ED Meds:  Medications   OLANZapine zydis (zyPREXA) ODT tab 5 mg (5 mg Oral $Given 3/21/23 2146)   LORazepam (ATIVAN) tablet 1 mg (1 mg Oral $Given 3/21/23 2147)   hydrOXYzine (ATARAX) tablet 50 mg (50 mg Oral $Given 3/21/23 2146)   ziprasidone (GEODON) injection 20 mg (20 mg Intramuscular Not Given 3/22/23 2052)   LORazepam (ATIVAN) tablet 1 mg (1 mg Oral $Given 3/22/23 2053)     No orders to display       ED Course:  ED Course as of 03/22/23 2131   Wed Mar 22, 2023   2043 Extended care called.  There apparently are numerous people on their wait list and he will not be assessed all night tonight.  Patient requests Ativan to help relax overnight.       There were no significant events during my shift.    Patient was signed out to the oncoming provider, Dr. Rachna Chester at shift change    Impression:    ICD-10-CM    1. Suicidal ideation  R45.851           Plan:    1. Awaiting mental health evaluation/recommendations.    Sabiha Berrios MD  [facility]  EMERGENCY DEPARTMENT  [address] [place] [id number]  [phone]                   Sabiha Berrios MD  03/22/23 [id number]

## 2023-03-22 NOTE — TELEPHONE ENCOUNTER
R:  No appropriate [facility] beds available.      [facility] Access Inpatient Bed Call Log 3/22/23 @6:10 PM           Intake has called facilities that have not updated their bed status within the last 12 hours.            Adults:           [facility] is posting 0 beds.      [facility] is posting 0 [phone]      [facility] is posting 0 beds. [phone]     [facility] is posting 0 beds. [phone] - 8:15am spoke to [place]. Call after 9am.     [facility] is posting 0 beds. [phone]     [facility] is posting 0 beds. [phone]     [facility] is posting 0 beds. [phone]     [facility] is posting 0 beds. [phone]     [facility] is posting 0 beds. [phone]     Pt remains on work list pending appropriate bed availability.

## 2023-03-22 NOTE — TELEPHONE ENCOUNTER
S: [facility] ED , DEC  Fern calling at 8:15 pm  about a 37 year old/Male presenting with AH, meth use and SI        B: Pt arrived via Police. Presenting problem, stressors: Pt was bib police from his [facility] facility where he had barricaded himself in his room.  Pt reports alicia AH.  IRTS reports pt has been off his meds for several weeks.    Pt affect in ED: Calm  Pt Dx: Schizophrenia and Substance Use Disorder: meth  Previous [facility] hx? Yes: Nov 2022    Pt endorses SI, no plan   Hx of suicide attempt? No  Pt denies SIB  Pt denies HI   Pt endorses auditory hallucinations .     Hx of aggression/violence, sexual offences, legal concerns, or Epic care plan? describe: no  Current concerns for aggression this visit? No  Does pt have a history of Civil Commitment? Yes, most recent commitment Currently MICD   Is Pt their own guardian? Yes    Pt is prescribed medication. Is patient medication compliant? No  Pt endorses OP services: Psychiatrist and County   CD concerns: Actively using/consuming meth  Acute or chronic medical concerns: none  Does Pt present with specific needs, assistive devices, or exclusionary criteria? None      Pt is ambulatory  Pt is able to perform ADLs independently      A: Pt to be reviewed for IP admission. Pt is Voluntary  Preferred placement: [facility]    COVID:Ordered, not yet collected  Utox: Ordered, not yet collected   CMP: N/A  CBC: N/A  HCG: N/A    R: Patient cleared and ready for behavioral bed placement: Yes  Pt placed on IP worklist? Yes

## 2023-03-22 NOTE — ED NOTES
Pt sleeping after medications, prior to sleeping pt did ask to speak to staff to apologize for his behavior previously.

## 2023-03-22 NOTE — ED NOTES
Pts food tray delivered at this time. RN stated that pt had already eaten another tray and was asleep. Tray placed back into cabinet.

## 2023-03-22 NOTE — CONSULTS
DEC Consult Order placed. DEC assessment completed by Fern Espinosa on 3/21 at 7:02p. Consult acknowledged and completed at that time.     Additional DEC Consult Order placed today to be closed. Extended Care is following today; reference Rosangela Stewart's note from 3/22 at 12:02p.     Extended Care can be reached at [phone].    Jenelle Velazquez [facility]

## 2023-03-22 NOTE — ED NOTES
Patient report provided and care resumed for pt. Pt resting on cot, symmetrical chest rise note, pt repositions self freely in bed. Pt remains on 1:1 video monitoring per policy
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
## 2023-03-22 NOTE — TELEPHONE ENCOUNTER
[facility] Access Inpatient Bed Call Log 3/22/2023 12:48 AM    Facilities that have not updated websites in the last 12 hours have been called.       Adults:    [facility] is posting 0 beds.  Negative covid.    [facility] is posting 0 beds. Negative covid.    [facility] is posting 0 beds. 72HH preferred.     [facility] is posting 0 beds.     [facility] is posting 0 beds. Negative covid.    [facility] is posting 0 beds.     [facility] is posting 0 beds. Negative covid. *Low acuity*       [facility] is posting 2 beds. Mixed unit 12+. Low acuity only. Negative covid.     [facility] is positing 0 beds. No aggression.  Negative covid.     [facility] is posting 0 beds.  Negative covid.     [facility] is posting 0 beds. Low acuity only.  Negative covid.     [facility] is posting 0 beds. Negative covid.    [facility] is posting 0 beds. Low acuity. Negative covid.     [facility] is posting 0 beds. 72 HH hold preferred. Low acuity Only. - 12:33am Per Vandana, call back in the AM.     [facility] is posting 1 beds. Low acuity. Negative covid.     [facility] is posting 4 beds. Negative covid. Vol only, No Hx of aggression, violence, or assault. No sexual offenders. No 72 HH holds.        [facility] is posting 2 beds. Negative covid. (Must have the cognitive ability to do programming. No aggressive or violent behavior or recent HX in the last 2 yrs. MH must be primary.)      [facility] is posting 0 beds. Negative Covid. Low acuity only. Violence and aggression capped.       [facility] is posting 0 beds. Low acuity, Negative Covid. - 12:35am Per Sharmin, they are capped.     [facility] is posting 0 beds. Covid Negative. Vol only. Combined adolescent and adult unit. No aggressive or violent behavior. No registered sex offenders. - 12:27am No answer.      [facility] is posting 8 beds. No covid test required. Per  policy, Intake does not present pt s on a 72HH to [facility].      [facility] is posting 0 beds.  Negative covid. (No lines, drains, or tubes (oxygen, CPAP, IV, etc.) - 12:38am Per Ava, they are reviewing.     Pt remains on work list pending appropriate bed availability.

## 2023-03-22 NOTE — ED NOTES
Extended care contacted for patient eval. Message left for staff to call ER back with time for Ext Care consult.

## 2023-03-22 NOTE — PLAN OF CARE
Hamzah REESE Armando  March 21, 2023  Plan of Care Hand-off Note     Patient Care Path: Inpatient Mental Health    Plan for Care:     It is the recommendation of this clinician that pt admit to IP MH for safety and stabilization. Pt displays the following risk factors that support IP admission: Worsening psychotic symptoms including paranoia, auditory hallucinations with suicidal ideaiton. Pt is unable to engage in safety planning to mitigate risk level in a non-secure setting. Lower levels of care have not been successful in mitigating risk. Due to this IP is the least restrictive option of care for pt. Pt should remain in IP until deemed safe to return to the community and engage in OP MH supports. Pt will need assistance establishing OP MH services prior to discharge.  [facility] who reports they would be open to patient returning to their care when he is less symptomatic and able to engage with their programming.    Critical Safety Issues: None    Overview:  This patient is a child/adolescent: No    This patient has additional special visitor precautions: No    Legal Status: Voluntary    Contacts:   None    Psychiatry Consult:  Psychiatry Consult not requested because requires stabilization     Updated Attending Provider regarding plan of care.    Fern White, YOVANYSW
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
## 2023-03-23 ENCOUNTER — TELEPHONE (OUTPATIENT)
Dept: BEHAVIORAL HEALTH | Facility: CLINIC | Age: 38
End: 2023-03-23

## 2023-03-23 LAB — SARS-COV-2 RNA RESP QL NAA+PROBE: NEGATIVE

## 2023-03-23 PROCEDURE — U0003 INFECTIOUS AGENT DETECTION BY NUCLEIC ACID (DNA OR RNA); SEVERE ACUTE RESPIRATORY SYNDROME CORONAVIRUS 2 (SARS-COV-2) (CORONAVIRUS DISEASE [COVID-19]), AMPLIFIED PROBE TECHNIQUE, MAKING USE OF HIGH THROUGHPUT TECHNOLOGIES AS DESCRIBED BY CMS-2020-01-R: HCPCS | Performed by: EMERGENCY MEDICINE

## 2023-03-23 PROCEDURE — 250N000013 HC RX MED GY IP 250 OP 250 PS 637: Performed by: EMERGENCY MEDICINE

## 2023-03-23 PROCEDURE — 250N000013 HC RX MED GY IP 250 OP 250 PS 637: Performed by: STUDENT IN AN ORGANIZED HEALTH CARE EDUCATION/TRAINING PROGRAM

## 2023-03-23 RX ORDER — LORAZEPAM 0.5 MG/1
1 TABLET ORAL ONCE
Status: COMPLETED | OUTPATIENT
Start: 2023-03-23 | End: 2023-03-23

## 2023-03-23 RX ADMIN — NICOTINE POLACRILEX 4 MG: 4 LOZENGE ORAL at 18:31

## 2023-03-23 RX ADMIN — LORAZEPAM 1 MG: 0.5 TABLET ORAL at 19:40

## 2023-03-23 RX ADMIN — LORAZEPAM 1 MG: 0.5 TABLET ORAL at 04:18

## 2023-03-23 ASSESSMENT — ACTIVITIES OF DAILY LIVING (ADL)
ADLS_ACUITY_SCORE: 35

## 2023-03-23 NOTE — TELEPHONE ENCOUNTER
[facility] Access Inpatient Bed Call Log 3/23/2023 2:08 AM    Facilities that have not updated websites in the last 12 hours have been called.       Adults:    [facility] is posting 0 beds.  Negative covid.    [facility] is posting 0 beds. Negative covid.    [facility] is posting 0 beds. 72HH preferred. - 2:11 AM Per [facility], they are capped.      [facility] is posting 0 beds.     [facility] is posting 0 beds. Negative covid.    [facility] is posting 0 beds.     [facility] is posting 0 beds. Negative covid. *Low acuity*       [facility] is posting 2 beds. Mixed unit 12+. Low acuity only. Negative covid.     [facility] is positing 0 beds. No aggression.  Negative covid.     [facility] is posting 0 beds.  Negative covid.     [facility] is posting 1 beds. Low acuity only.  Negative covid.     [facility] is posting 0 beds. Negative covid.    [facility] is posting 0 beds. Low acuity. Negative covid.     [facility] hospital is posting 0 beds. 72 HH hold preferred. Low acuity Only. - 2:13AM Per [place], they are capped. Call after 10am.      [facility] is posting 0 beds. Low acuity. Negative covid.     [facility] is posting 4 beds. Negative covid. Vol only, No Hx of aggression, violence, or assault. No sexual offenders. No 72 HH holds.        [facility] is posting 0 beds. Negative covid. (Must have the cognitive ability to do programming. No aggressive or violent behavior or recent HX in the last 2 yrs. MH must be primary.)      [facility] is posting 0 beds. Negative Covid. Low acuity only. Violence and aggression capped.       [facility] is posting 0 beds. Low acuity, Negative Covid.    [facility] is posting 0 beds. Covid Negative. Vol only. Combined adolescent and adult unit. No aggressive or violent behavior. No registered sex offenders.     [facility] is posting 6 beds. No covid test required. Per policy,  Intake does not present pt s on a 72HH to [facility].      [facility] is posting 3 beds.  Negative covid. (No lines, drains, or tubes (oxygen, CPAP, IV, etc.)     Pt remains on work list pending appropriate bed availability.

## 2023-03-23 NOTE — ED NOTES
"[facility] ED Mental Health Handoff Note:     Assuming care from: Dr Rachna Chester    Brief HPI: 37 year old male signed out to me by the above provider. See initial ED Provider note for full details of the presentation.   In brief, patient presented with auditory hallucinations and suicidal ideation    \"Patient reports he has been feeling suicidal and hearing voices in his head for the past few days. He denies any specific plan with this, and states he'd \"just rather not be around.\" He states that he ran out of his Zyprexa, Ativan, Hydroxyzine, and Seroquel 2 weeks ago. He notes that he was told by his pharmacy that these medications would be ready by yesterday, but this was not the case. He has not tried to contact his [facility] clinic who prescribes these medications. Patient denies any other complaints.\"    Home meds reviewed and ordered/administered: Yes  Medically stable for inpatient mental health admission: Yes.  Evaluated by mental health: Yes. The recommendation is for inpatient mental health treatment. Bed search in process  Safety concerns: At the time I received sign out, there were no safety concerns.    Hold Status:  Active Orders   N/A       Labs/Imaging:   Results for orders placed or performed during the hospital encounter of 03/21/23 (from the past 24 hour(s))   Diagnostic Evaluation Center (DEC) Assessment Consult Order:     Status: None ()    Collection Time: 03/22/23  2:34 PM    Marcus Segura. FLAQUITO Almanzar     3/22/2023  3:52 PM  DEC Consult Order placed. DEC assessment completed by Fern Espinosa on 3/21 at 7:02p. Consult acknowledged and completed at   that time.     Additional DEC Consult Order placed today to be closed. Extended   Care is following today; reference Rosangela Stewart's note from   3/22 at 12:02p.     Extended Care can be reached at [phone].    FLAQUITO Santacruz     Urine Drugs of Abuse Screen     Status: Abnormal    Collection Time: 03/22/23  8:55 PM    " Narrative    The following orders were created for panel order Urine Drugs of Abuse Screen.  Procedure                               Abnormality         Status                     ---------                               -----------         ------                     Drug abuse screen 77 uri...[[id number]]  Abnormal            Final result                 Please view results for these tests on the individual orders.   Drug abuse screen 77 urine (FL, RH, SH)     Status: Abnormal    Collection Time: 03/22/23  8:55 PM   Result Value Ref Range    Amphetamines Urine Screen Positive (A) Screen Negative    Barbituates Urine Screen Negative Screen Negative    Benzodiazepine Urine Screen Negative Screen Negative    Cannabinoids Urine Screen Negative Screen Negative    Opiates Urine Screen Negative Screen Negative    PCP Urine Screen Negative Screen Negative    Cocaine Urine Screen Negative Screen Negative   Asymptomatic COVID-19 Virus (Coronavirus) by PCR Nasopharyngeal     Status: Normal    Collection Time: 03/23/23  4:09 AM    Specimen: Nasopharyngeal; Swab   Result Value Ref Range    SARS CoV2 PCR Negative Negative    Narrative    Testing was performed using the Xpert Xpress SARS-CoV-2 Assay on the Cepheid Gene-Xpert Instrument Systems. Additional information about this Emergency Use Authorization (EUA) assay can be found via the Lab Guide. This test should be ordered for the detection of SARS-CoV-2 in individuals who meet SARS-CoV-2 clinical and/or epidemiological criteria as well as from individuals without symptoms or other reasons to suspect COVID-19. Test performance for asymptomatic patients has only been established in anterior nasal swab specimens. This test is for in vitro diagnostic use under the FDA EUA for laboratories certified under CLIA to perform high complexity testing. This test has not been FDA cleared or approved. A negative result does not rule out the presence of PCR inhibitors in the specimen or  target RNA concentration below the limit of detection for the assay. The possibility of a false negative should be considered if the patient's recent exposure or clinical presentation suggests COVID-19.. This test was validated by the [facility]. This laboratory is certified under the Clinical Laboratory Improvement Amendments (CLIA) as qualified to perform high complexity laboratory testing.           ED Meds:  Medications   OLANZapine zydis (zyPREXA) ODT tab 5 mg (5 mg Oral $Given 3/21/23 2146)   LORazepam (ATIVAN) tablet 1 mg (1 mg Oral $Given 3/21/23 2147)   hydrOXYzine (ATARAX) tablet 50 mg (50 mg Oral $Given 3/21/23 2146)   ziprasidone (GEODON) injection 20 mg (20 mg Intramuscular Not Given 3/22/23 2052)   LORazepam (ATIVAN) tablet 1 mg (1 mg Oral $Given 3/22/23 2053)   LORazepam (ATIVAN) tablet 1 mg (1 mg Oral $Given 3/23/23 0418)     No orders to display       ED Course:  ED Course as of 03/23/23 0505   Wed Mar 22, 2023   2043 Extended care called.  There apparently are numerous people on their wait list and he will not be assessed all night tonight.  Patient requests Ativan to help relax overnight.       There were no significant events during my shift.    Patient was signed out to the oncoming provider,  . at shift change    Impression:    ICD-10-CM    1. Suicidal ideation  R45.851           Plan:    1. Awaiting inpatient mental health admission/transfer.    Victorina Servin MD  [facility] EMERGENCY DEPARTMENT  [address] [place] [id number]  [phone]                   Victorina Servin MD  03/23/23 0512

## 2023-03-23 NOTE — ED NOTES
"Triage & Transition Services, Extended Care     Hamzah REESE [facility]  March 23, 2023    Hamzah is followed related to Placement delay: boarding for IP MH admission. Please see initial DEC Crisis Assessment completed for complete assessment information. Medical record is reviewed. While patient is in the ED, care team is working towards Learn and Demonstrate at Least One Skill Focused on Crisis Stabilization.     There are not significant status changes.     Writer spoke to DANIKA Walker who reports due to anxiety patient was up a majority of the night only going to sleep around 4 AM. Writer will connect with patient later in the day.    2:49 PM Writer did meet with patient briefly. Patient reports, \"I already met with people, Why am I doing this again?\" Writer introduced himself and explained roll. Patient was pleasant in the interaction; however reports he continues to feel the same way as when he came in. Patient does ask writer if there is an update on placement.       Plan:  Inpatient Mental Health: Patient continues to voluntarily await [facility] admission for ongoing paranoia, SI, and AH.    Plan for Care reviewed with Assigned Medical Provider? Yes. Provider, DANIKA Walker, response: in agreement with writer checking back on patient later in the day.    Extended Care will follow and meet with patient/family/care team as able or requested.     Marcus Peoples  [facility], Extended Care   [phone]          "

## 2023-03-23 NOTE — TELEPHONE ENCOUNTER
R: The patient is currently in the [facility] ED awaiting placement. Patient is voluntary for metro only    Intake afternoon Bed Search (Done at 2:33 PM) Facilities that have not updated their [place] MH access site in the last 12 hours have been contacted for bed availability.     [facility] is posting 0 beds.   [facility] is posting 0 beds.  [facility] is posting 0 beds.  [facility] is posting 0 beds.  [facility] is posting 0 beds.  [facility] is posting 0 beds.  [facility] is posting 0 beds.  [facility] is posting 0 beds. Low acuity.    [facility] at capacity. The patient remains on the waitlist. Intake continues to identify appropriate bed placement.

## 2023-03-23 NOTE — ED NOTES
[facility] ED Mental Health Handoff Note:     Assuming care from: Dr Kate Sanches    Brief HPI: 37 year old male signed out to me by the above provider. See initial ED Provider note for full details of the presentation.   In brief, patient has been having suicidal thoughts and auditory hallucinations. He does not have a plan to follow through regarding with suicide. He ran out of his his Zyprexa, Ativan, Hydroxyzine, and Seroquel 2 weeks ago and tried to pick it up, but the prescriptions were not refilled yet.     Home meds reviewed and ordered/administered: Yes  Medically stable for inpatient mental health admission: Yes.  Evaluated by mental health: No. Patient is clinically sober and awaiting evaluation for disposition.  Safety concerns: At the time I received sign out, there were no safety concerns.    Hold Status:  Active Orders   N/A       Labs/Imaging:   Results for orders placed or performed during the hospital encounter of 03/21/23 (from the past 24 hour(s))   Diagnostic Evaluation Center (DEC) Assessment Consult Order:     Status: None ()    Collection Time: 03/22/23  2:34 PM    Narrative    Marcus FONTENOT. FLAQUITO Almanzar     3/22/2023  3:52 PM  DEC Consult Order placed. DEC assessment completed by Fern Espinosa on 3/21 at 7:02p. Consult acknowledged and completed at   that time.     Additional DEC Consult Order placed today to be closed. Extended   Care is following today; reference Rosangela Stewart's note from   3/22 at 12:02p.     Extended Care can be reached at [phone].    [facility]quez [facility]     Urine Drugs of Abuse Screen     Status: Abnormal    Collection Time: 03/22/23  8:55 PM    Narrative    The following orders were created for panel order Urine Drugs of Abuse Screen.  Procedure                               Abnormality         Status                     ---------                               -----------         ------                     Drug abuse screen 77 uri...[[id number]]  Abnormal             Final result                 Please view results for these tests on the individual orders.   Drug abuse screen 77 urine (FL, RH, SH)     Status: Abnormal    Collection Time: 03/22/23  8:55 PM   Result Value Ref Range    Amphetamines Urine Screen Positive (A) Screen Negative    Barbituates Urine Screen Negative Screen Negative    Benzodiazepine Urine Screen Negative Screen Negative    Cannabinoids Urine Screen Negative Screen Negative    Opiates Urine Screen Negative Screen Negative    PCP Urine Screen Negative Screen Negative    Cocaine Urine Screen Negative Screen Negative   Asymptomatic COVID-19 Virus (Coronavirus) by PCR Nasopharyngeal     Status: Normal    Collection Time: 03/23/23  4:09 AM    Specimen: Nasopharyngeal; Swab   Result Value Ref Range    SARS CoV2 PCR Negative Negative    Narrative    Testing was performed using the Xpert Xpress SARS-CoV-2 Assay on the Cepheid Gene-Xpert Instrument Systems. Additional information about this Emergency Use Authorization (EUA) assay can be found via the Lab Guide. This test should be ordered for the detection of SARS-CoV-2 in individuals who meet SARS-CoV-2 clinical and/or epidemiological criteria as well as from individuals without symptoms or other reasons to suspect COVID-19. Test performance for asymptomatic patients has only been established in anterior nasal swab specimens. This test is for in vitro diagnostic use under the FDA EUA for laboratories certified under CLIA to perform high complexity testing. This test has not been FDA cleared or approved. A negative result does not rule out the presence of PCR inhibitors in the specimen or target RNA concentration below the limit of detection for the assay. The possibility of a false negative should be considered if the patient's recent exposure or clinical presentation suggests COVID-19.. This test was validated by the [facility]. This laboratory is certified under the  Clinical Laboratory Improvement Amendments (CLIA) as qualified to perform high complexity laboratory testing.           ED Meds:  Medications   OLANZapine zydis (zyPREXA) ODT tab 5 mg (5 mg Oral $Given 3/21/23 2146)   LORazepam (ATIVAN) tablet 1 mg (1 mg Oral $Given 3/21/23 2147)   hydrOXYzine (ATARAX) tablet 50 mg (50 mg Oral $Given 3/21/23 2146)   ziprasidone (GEODON) injection 20 mg (20 mg Intramuscular Not Given 3/22/23 2052)   LORazepam (ATIVAN) tablet 1 mg (1 mg Oral $Given 3/22/23 2053)   LORazepam (ATIVAN) tablet 1 mg (1 mg Oral $Given 3/23/23 0418)     No orders to display       ED Course:  ED Course as of 03/23/23 1433   Wed Mar 22, 2023 2043 Extended care called.  There apparently are numerous people on their wait list and he will not be assessed all night tonight.  Patient requests Ativan to help relax overnight.       There were no significant events during my shift.    Patient was signed out to the oncoming provider  at shift change    Impression:    ICD-10-CM    1. Suicidal ideation  R45.851           Plan:    1. Awaiting inpatient mental health admission/transfer.    I, Mg Mosquera, am serving as a scribe to document services personally performed by Dr. Isiah Soliman based on my observation and the provider's statements to me. IIsiah M.D. attest that Mg Mosquera is acting in a scribe capacity, has observed my performance of the services and has documented them in accordance with my direction.        Isiah Soliman M.D.  Emergency Medicine  [facility] EMERGENCY DEPARTMENT  [facility] [facility] [id number] [phone]  Dept: [phone]                   Isiah Soliman MD  03/23/23 2035

## 2023-03-23 NOTE — ED NOTES
"[facility] ED Mental Health Handoff Note:     Assuming care from: Dr Victorina Servin    Brief HPI: 37 year old male signed out to me by the above provider. See initial ED Provider note for full details of the presentation.   In brief, patient is a 37 year old male with history of polysubstance abuse, psychosis, paranoid schizophrenia, schizoaffective disorder, who presents via walk-in for evaluation of suicidal ideation, medication refill.     Patient reports he has been feeling suicidal and hearing voices in his head for the past few days. He denies any specific plan with this, and states he'd \"just rather not be around.\" He states that he ran out of his Zyprexa, Ativan, Hydroxyzine, and Seroquel 2 weeks ago. He notes that he was told by his pharmacy that these medications would be ready by yesterday, but this was not the case. He has not tried to contact his [facility] clinic who prescribes these medications. Patient denies any other complaints.      Home meds reviewed and ordered/administered: Yes  Medically stable for inpatient mental health admission: Yes.  Evaluated by mental health: Yes. The recommendation is for inpatient mental health treatment. Bed search in process  Safety concerns: At the time I received sign out, there were no safety concerns.    Hold Status:  Active Orders   N/A     Labs/Imaging:   Results for orders placed or performed during the hospital encounter of 03/21/23 (from the past 24 hour(s))   Diagnostic Evaluation Center (DEC) Assessment Consult Order:     Status: None ()    Collection Time: 03/22/23  2:34 PM    Pauline Segura [facility]     3/22/2023  3:52 PM  DEC Consult Order placed. DEC assessment completed by Fern Espinosa on 3/21 at 7:02p. Consult acknowledged and completed at   that time.     Additional DEC Consult Order placed today to be closed. Extended   Care is following today; reference Rosangela Stewart's note from   3/22 at 12:02p.     Extended Care can be " reached at [phone].    Jenelle Velazquez, [facility]     Urine Drugs of Abuse Screen     Status: Abnormal    Collection Time: 03/22/23  8:55 PM    Narrative    The following orders were created for panel order Urine Drugs of Abuse Screen.  Procedure                               Abnormality         Status                     ---------                               -----------         ------                     Drug abuse screen 77 uri...[[id number]]  Abnormal            Final result                 Please view results for these tests on the individual orders.   Drug abuse screen 77 urine (FL, RH, SH)     Status: Abnormal    Collection Time: 03/22/23  8:55 PM   Result Value Ref Range    Amphetamines Urine Screen Positive (A) Screen Negative    Barbituates Urine Screen Negative Screen Negative    Benzodiazepine Urine Screen Negative Screen Negative    Cannabinoids Urine Screen Negative Screen Negative    Opiates Urine Screen Negative Screen Negative    PCP Urine Screen Negative Screen Negative    Cocaine Urine Screen Negative Screen Negative   Asymptomatic COVID-19 Virus (Coronavirus) by PCR Nasopharyngeal     Status: Normal    Collection Time: 03/23/23  4:09 AM    Specimen: Nasopharyngeal; Swab   Result Value Ref Range    SARS CoV2 PCR Negative Negative    Narrative    Testing was performed using the Xpert Xpress SARS-CoV-2 Assay on the Cepheid Gene-Xpert Instrument Systems. Additional information about this Emergency Use Authorization (EUA) assay can be found via the Lab Guide. This test should be ordered for the detection of SARS-CoV-2 in individuals who meet SARS-CoV-2 clinical and/or epidemiological criteria as well as from individuals without symptoms or other reasons to suspect COVID-19. Test performance for asymptomatic patients has only been established in anterior nasal swab specimens. This test is for in vitro diagnostic use under the FDA EUA for laboratories certified under CLIA to perform high complexity  testing. This test has not been FDA cleared or approved. A negative result does not rule out the presence of PCR inhibitors in the specimen or target RNA concentration below the limit of detection for the assay. The possibility of a false negative should be considered if the patient's recent exposure or clinical presentation suggests COVID-19.. This test was validated by the [facility]. This laboratory is certified under the Clinical Laboratory Improvement Amendments (CLIA) as qualified to perform high complexity laboratory testing.           ED Meds:  Medications   OLANZapine zydis (zyPREXA) ODT tab 5 mg (5 mg Oral $Given 3/21/23 2146)   LORazepam (ATIVAN) tablet 1 mg (1 mg Oral $Given 3/21/23 2147)   hydrOXYzine (ATARAX) tablet 50 mg (50 mg Oral $Given 3/21/23 2146)   ziprasidone (GEODON) injection 20 mg (20 mg Intramuscular Not Given 3/22/23 2052)   LORazepam (ATIVAN) tablet 1 mg (1 mg Oral $Given 3/22/23 2053)   LORazepam (ATIVAN) tablet 1 mg (1 mg Oral $Given 3/23/23 0418)     No orders to display       ED Course:  ED Course as of 03/23/23 0604   Wed Mar 22, 2023   2043 Extended care called.  There apparently are numerous people on their wait list and he will not be assessed all night tonight.  Patient requests Ativan to help relax overnight.       There were no significant events during my shift.    Patient was signed out to the oncoming provider, Dr. Isiah Soliman at shift change    Impression:    ICD-10-CM    1. Suicidal ideation  R45.851           Plan:    1. Awaiting inpatient mental health admission/transfer.    Kate Sanches M.D.  [facility] EMERGENCY DEPARTMENT  [facility] [facility] [id number] [phone]                   Kate Sanches MD  03/23/23 6006

## 2023-03-23 NOTE — ED NOTES
"[facility] ED Mental Health Handoff Note:     Assuming care from: Dr Sabiha Berrios    Brief HPI: 37 year old male signed out to me by the above provider. See initial ED Provider note for full details of the presentation.   In brief, patient patient presented with with suicidal ideation. Patient reports he has been feeling suicidal and hearing voices in his head for the past few days. He denies any specific plan with this, and states he'd \"just rather not be around.\"    Medically stable for inpatient mental health admission: Yes.  Evaluated by mental health: Was evaluated 3/21/23 and they recommended admission.   Safety concerns: At the time I received sign out, there were no safety concerns.    Hold Status:  Active Orders   N/A       Labs/Imaging:   Results for orders placed or performed during the hospital encounter of 03/21/23 (from the past 24 hour(s))   Diagnostic Evaluation Center (DEC) Assessment Consult Order:     Status: None ()    Collection Time: 03/22/23  2:34 PM    Narrative    Marcus FONTENOT. FLAQUITO Almanzar     3/22/2023  3:52 PM  DEC Consult Order placed. DEC assessment completed by Fern Espinosa on 3/21 at 7:02p. Consult acknowledged and completed at   that time.     Additional DEC Consult Order placed today to be closed. Extended   Care is following today; reference Rosangela Stewart's note from   3/22 at 12:02p.     Extended Care can be reached at [phone].    FLAQUITO Santacruz     Urine Drugs of Abuse Screen     Status: Abnormal    Collection Time: 03/22/23  8:55 PM    Narrative    The following orders were created for panel order Urine Drugs of Abuse Screen.  Procedure                               Abnormality         Status                     ---------                               -----------         ------                     Drug abuse screen 77 uri...[[id number]]  Abnormal            Final result                 Please view results for these tests on the individual orders.   Drug abuse " screen 77 urine (FL, RH, SH)     Status: Abnormal    Collection Time: 03/22/23  8:55 PM   Result Value Ref Range    Amphetamines Urine Screen Positive (A) Screen Negative    Barbituates Urine Screen Negative Screen Negative    Benzodiazepine Urine Screen Negative Screen Negative    Cannabinoids Urine Screen Negative Screen Negative    Opiates Urine Screen Negative Screen Negative    PCP Urine Screen Negative Screen Negative    Cocaine Urine Screen Negative Screen Negative         ED Meds:  Medications   OLANZapine zydis (zyPREXA) ODT tab 5 mg (5 mg Oral $Given 3/21/23 2146)   LORazepam (ATIVAN) tablet 1 mg (1 mg Oral $Given 3/21/23 2147)   hydrOXYzine (ATARAX) tablet 50 mg (50 mg Oral $Given 3/21/23 2146)   ziprasidone (GEODON) injection 20 mg (20 mg Intramuscular Not Given 3/22/23 2052)   LORazepam (ATIVAN) tablet 1 mg (1 mg Oral $Given 3/22/23 2053)     No orders to display       ED Course:    There were no significant events during my shift.  I was signed out and told that Extended Care needs to evaluate him. I do see DEC evaluated him on 3/21/23 and they recommended admission at that time. He is also on the list to be reevaluated.    Patient was signed out to the oncoming provider, Dr. Victorina Boss at shift change    Impression:    ICD-10-CM    1. Suicidal ideation  R45.851           Plan:    1. Awaiting mental health admission/transfer      I, Amanuel Villavicencio, am serving as a scribe to document services personally performed by Dr. Rachna Chester based on my observation and the provider's statements to me. I, Rachna Chester MD attest that Amanuel Villavicencio is acting in a scribe capacity, has observed my performance of the services and has documented them in accordance with my direction.    Rachna Chester MD  [facility] EMERGENCY DEPARTMENT  [facility] [facility] [id number] [phone]                   Rachna Chester MD  03/23/23 8597       Rachna Chester MD  03/23/23 8983

## 2023-03-24 PROCEDURE — 250N000013 HC RX MED GY IP 250 OP 250 PS 637: Performed by: EMERGENCY MEDICINE

## 2023-03-24 RX ORDER — OLANZAPINE 5 MG/1
10 TABLET ORAL AT BEDTIME
Status: DISCONTINUED | OUTPATIENT
Start: 2023-03-25 | End: 2023-03-26

## 2023-03-24 RX ORDER — BUPROPION HYDROCHLORIDE 150 MG/1
150 TABLET ORAL DAILY
Status: DISCONTINUED | OUTPATIENT
Start: 2023-03-24 | End: 2023-03-25

## 2023-03-24 RX ORDER — QUETIAPINE FUMARATE 50 MG/1
200 TABLET, EXTENDED RELEASE ORAL AT BEDTIME
Status: DISCONTINUED | OUTPATIENT
Start: 2023-03-25 | End: 2023-03-27

## 2023-03-24 RX ORDER — LORAZEPAM 0.5 MG/1
1 TABLET ORAL ONCE
Status: COMPLETED | OUTPATIENT
Start: 2023-03-24 | End: 2023-03-24

## 2023-03-24 RX ADMIN — NICOTINE POLACRILEX 4 MG: 4 LOZENGE ORAL at 15:11

## 2023-03-24 RX ADMIN — LORAZEPAM 1 MG: 0.5 TABLET ORAL at 16:03

## 2023-03-24 RX ADMIN — NICOTINE POLACRILEX 4 MG: 4 LOZENGE ORAL at 18:30

## 2023-03-24 RX ADMIN — NICOTINE POLACRILEX 4 MG: 4 LOZENGE ORAL at 22:22

## 2023-03-24 RX ADMIN — BUPROPION HYDROCHLORIDE 150 MG: 150 TABLET, FILM COATED, EXTENDED RELEASE ORAL at 20:52

## 2023-03-24 RX ADMIN — NICOTINE POLACRILEX 4 MG: 4 LOZENGE ORAL at 20:15

## 2023-03-24 ASSESSMENT — ACTIVITIES OF DAILY LIVING (ADL)
ADLS_ACUITY_SCORE: 35

## 2023-03-24 NOTE — ED NOTES
"Pt is having a visitor who he called on one of his multiple phones he has been allowed to previously have in his room. Pt also repeatedly requests a nicotine lasagne, this RN has reinforced that he can have the lasagne every 2 hours so he can have one around 1710 next. Then pt tried to argue that we only gave him 1 mg and this RN confirmed and double checked that pt has been getting 4 mg every 2 hrs since his arrival. This RN spoke with physician and charge nurse and pt is not supposed to have any cell phones in his room with him and per reports he has multiple phones in room with him at this time. Pt endorses that he \"has been able to have his phone, and wander the halls, and go to the restroom all without supervision. Again this RN apologized for the miscommunication but per policy and for the pt's safety those things are not allowed. This RN was instructed to \"let the pt calm down and let the ativan kick in, RN has also been accused of escalating pt when all this RN has done is enforce policy.   "

## 2023-03-24 NOTE — ED NOTES
"[facility] ED Mental Health Handoff Note:     Assuming care from: Dr Kate Sanches    Brief HPI: 37 year old male signed out to me by the above provider. See initial ED Provider note for full details of the presentation.     In brief, patient presented with auditory hallucinations and suicidal ideation. He denies any specific suicidal plan and states that he would \"just rather not be around.\" He reports that he ran out of his Zyprexa, Ativan, Hydroxyzine and Seroquel 2 weeks ago. He notes that he was told by his pharmacy that these medications would be ready by yesterday, but this was not the case. He has not tried to contact his [facility] clinic who prescribes these medications. Patient denies any other complaints.     Home meds reviewed and ordered/administered: No, however I did order them  Medically stable for inpatient mental health admission: Yes.  Evaluated by mental health: Yes. The recommendation is for inpatient mental health treatment. Bed search in process  Safety concerns: At the time I received sign out, there were no safety concerns.    Hold Status:  Active Orders   N/A     Voluntary, but holdable      ED Meds:  Medications   nicotine (NICORETTE) lozenge 4 mg (4 mg Buccal $Given 3/24/23 2222)   buPROPion (WELLBUTRIN XL) 24 hr tablet 150 mg (150 mg Oral $Given 3/24/23 2052)   QUEtiapine (SEROquel XR) 24 hr tablet 200 mg (has no administration in time range)   OLANZapine (zyPREXA) tablet 10 mg (has no administration in time range)   risperiDONE (risperDAL) tablet 3 mg (has no administration in time range)   OLANZapine zydis (zyPREXA) ODT tab 5 mg (5 mg Oral $Given 3/21/23 2146)   LORazepam (ATIVAN) tablet 1 mg (1 mg Oral $Given 3/21/23 2147)   hydrOXYzine (ATARAX) tablet 50 mg (50 mg Oral $Given 3/21/23 2146)   ziprasidone (GEODON) injection 20 mg (20 mg Intramuscular Not Given 3/22/23 2052)   LORazepam (ATIVAN) tablet 1 mg (1 mg Oral $Given 3/22/23 2053)   LORazepam (ATIVAN) tablet 1 mg (1 mg Oral " $Given 3/23/23 5692)   LORazepam (ATIVAN) tablet 1 mg (1 mg Oral $Given 3/23/23 1940)   LORazepam (ATIVAN) tablet 1 mg (1 mg Oral $Given 3/24/23 1603)     No orders to display       ED Course:  ED Course as of 03/25/23 0844   Wed Mar 22, 2023   2043 Extended care called.  There apparently are numerous people on their wait list and he will not be assessed all night tonight.  Patient requests Ativan to help relax overnight.       There were no significant events during my shift.    Patient was signed out to the oncoming provider, Dr. Elena Contreras at shift change    Impression:    ICD-10-CM    1. Suicidal ideation  R45.851           Plan:    1. Awaiting inpatient mental health admission/transfer.    IRichard, am serving as a scribe to document services personally performed by Dr. Umaña, Ivis Desouza, * based on my observation and the provider's statements to me. I, Ivis Umaña, * attest that Richard Jones is acting in a scribe capacity, has observed my performance of the services and has documented them in accordance with my direction.     Ivis Umaña MD   [facility] EMERGENCY DEPARTMENT  [facility] [facility] [address] [phone]                   Ivis Umaña MD  03/25/23 0844

## 2023-03-24 NOTE — ED NOTES
"[facility] ED Mental Health Handoff Note:     Assuming care from: Dr Victorina Servin    Brief HPI: 37 year old male signed out to me by the above provider. See initial ED Provider note for full details of the presentation.   In brief, patient presented with auditory hallucinations and suicidal ideation     \"Patient reports he has been feeling suicidal and hearing voices in his head for the past few days. He denies any specific plan with this, and states he'd \"just rather not be around.\" He states that he ran out of his Zyprexa, Ativan, Hydroxyzine, and Seroquel 2 weeks ago. He notes that he was told by his pharmacy that these medications would be ready by yesterday, but this was not the case. He has not tried to contact his [facility] clinic who prescribes these medications. Patient denies any other complaints.\"     Home meds reviewed and ordered/administered: Yes  Medically stable for inpatient mental health admission: Yes.  Evaluated by mental health: Yes. The recommendation is for inpatient mental health treatment. Bed search in process  Safety concerns: At the time I received sign out, there were no safety concerns.    Hold Status:  Active Orders   N/A           Labs/Imaging:   No results found for this visit on 03/21/23 (from the past 24 hour(s)).      ED Meds:  Medications   nicotine (NICORETTE) lozenge 4 mg (4 mg Buccal $Given 3/23/23 1831)   OLANZapine zydis (zyPREXA) ODT tab 5 mg (5 mg Oral $Given 3/21/23 2146)   LORazepam (ATIVAN) tablet 1 mg (1 mg Oral $Given 3/21/23 2147)   hydrOXYzine (ATARAX) tablet 50 mg (50 mg Oral $Given 3/21/23 2146)   ziprasidone (GEODON) injection 20 mg (20 mg Intramuscular Not Given 3/22/23 2052)   LORazepam (ATIVAN) tablet 1 mg (1 mg Oral $Given 3/22/23 2053)   LORazepam (ATIVAN) tablet 1 mg (1 mg Oral $Given 3/23/23 0418)   LORazepam (ATIVAN) tablet 1 mg (1 mg Oral $Given 3/23/23 1940)     No orders to display       ED Course:  ED Course as of 03/24/23 1419   Wed Mar 22, 2023 "   2043 Extended care called.  There apparently are numerous people on their wait list and he will not be assessed all night tonight.  Patient requests Ativan to help relax overnight.       There were no significant events during my shift.    Patient was signed out to the oncoming provider, Dr. Ivis Umaña at shift change    Impression:    ICD-10-CM    1. Suicidal ideation  R45.851           Plan:    1. Awaiting inpatient mental health admission/transfer.    I, Lien Loaiza, am serving as a scribe to document services personally performed by Dr. Sanches based on my observation and the provider's statements to me. I, Kate Sanches MD, attest that Lien Loaiza is acting in a scribe capacity, has observed my performance of the services and has documented them in accordance with my direction.     Kate Sanches MD  [facility] EMERGENCY DEPARTMENT  [address] [place] [id number]  [phone]                   Kate Sanches MD  03/24/23 6862

## 2023-03-24 NOTE — ED NOTES
Pt was upset as nurse provided one to one supervision to the restroom, pt then slammed the door in RN's face and wouldn't unlock the door. Rn just waited quietly outside the bathroom door. Pt expressed they have been letting him use the restroom all day by himself. This RN apologized for the miscommunication but that the supervision is required per policy. Pt then asked for a Razor to shave, RN declined for safety reasons at this time.

## 2023-03-24 NOTE — ED NOTES
[facility] ED Mental Health Handoff Note:     Assuming care from: Dr Isiah Soliman    Brief HPI: 37 year old male signed out to me by the above provider. See initial ED Provider note for full details of the presentation.   In brief, patient presented with suicidal ideation without plan and auditory hallucinations. Also ran out of his medications: Zyprexa, Ativan, hydroxyzine, and Seroquel a couple weeks ago.      Medically stable for inpatient mental health admission: Yes.  Evaluated by mental health: Yes. The recommendation is for inpatient mental health treatment. Bed search in process  Safety concerns: At the time I received sign out, there were no safety concerns.    Hold Status:  Active Orders   N/A       Labs/Imaging:   Results for orders placed or performed during the hospital encounter of 03/21/23 (from the past 24 hour(s))   Asymptomatic COVID-19 Virus (Coronavirus) by PCR Nasopharyngeal     Status: Normal    Collection Time: 03/23/23  4:09 AM    Specimen: Nasopharyngeal; Swab   Result Value Ref Range    SARS CoV2 PCR Negative Negative    Narrative    Testing was performed using the Xpert Xpress SARS-CoV-2 Assay on the Cepheid Gene-Xpert Instrument Systems. Additional information about this Emergency Use Authorization (EUA) assay can be found via the Lab Guide. This test should be ordered for the detection of SARS-CoV-2 in individuals who meet SARS-CoV-2 clinical and/or epidemiological criteria as well as from individuals without symptoms or other reasons to suspect COVID-19. Test performance for asymptomatic patients has only been established in anterior nasal swab specimens. This test is for in vitro diagnostic use under the FDA EUA for laboratories certified under CLIA to perform high complexity testing. This test has not been FDA cleared or approved. A negative result does not rule out the presence of PCR inhibitors in the specimen or target RNA concentration below the limit of detection for  the assay. The possibility of a false negative should be considered if the patient's recent exposure or clinical presentation suggests COVID-19.. This test was validated by the [facility]. This laboratory is certified under the Clinical Laboratory Improvement Amendments (CLIA) as qualified to perform high complexity laboratory testing.           ED Meds:  Medications   nicotine (NICORETTE) lozenge 4 mg (4 mg Buccal $Given 3/23/23 1831)   OLANZapine zydis (zyPREXA) ODT tab 5 mg (5 mg Oral $Given 3/21/23 2146)   LORazepam (ATIVAN) tablet 1 mg (1 mg Oral $Given 3/21/23 2147)   hydrOXYzine (ATARAX) tablet 50 mg (50 mg Oral $Given 3/21/23 2146)   ziprasidone (GEODON) injection 20 mg (20 mg Intramuscular Not Given 3/22/23 2052)   LORazepam (ATIVAN) tablet 1 mg (1 mg Oral $Given 3/22/23 2053)   LORazepam (ATIVAN) tablet 1 mg (1 mg Oral $Given 3/23/23 0418)   LORazepam (ATIVAN) tablet 1 mg (1 mg Oral $Given 3/23/23 1940)     No orders to display       ED Course:  ED Course as of 03/23/23 2314   Wed Mar 22, 2023   2043 Extended care called.  There apparently are numerous people on their wait list and he will not be assessed all night tonight.  Patient requests Ativan to help relax overnight.       There were no significant events during my shift.    Patient was signed out to the oncoming provider, Dr. Victorina Boss at shift change    Impression:    ICD-10-CM    1. Suicidal ideation  R45.851           Plan:    1. Awaiting inpatient mental health admission/transfer.    Rachna Castillo MD  [facility] EMERGENCY DEPARTMENT  [facility] [facility] [address] [phone]                   Rachna Chester MD  03/24/23 5453

## 2023-03-24 NOTE — ED NOTES
Pt has four cell phones with him, 3 are charging at the desk right now while one remains with pt in room.

## 2023-03-24 NOTE — TELEPHONE ENCOUNTER
[facility] Access Inpatient Bed Call Log 3/24/2023 2:02 AM    Facilities that have not updated websites in the last 12 hours have been called.       Adults:    [facility] is posting 0 beds.  Negative covid.    [facility] is posting 0 beds. Negative covid.    [facility] is posting 0 beds. 72HH preferred.     [facility] is posting 0 beds.     [facility] is posting 0 beds. Negative covid.    [facility] is posting 0 beds.     [facility] is posting 0 beds. Negative covid. *Low acuity*       [facility] is posting 0 beds. Mixed unit 12+. Low acuity only. Negative covid.     [facility] is positing 0 beds. No aggression.  Negative covid.     [facility] is posting 0 beds.  Negative covid.     [facility] is posting 0 beds. Low acuity only.  Negative covid.     [facility] is posting 0 beds. Negative covid.    [facility] is posting 0 beds. Low acuity. Negative covid.     [facility] is posting 0 beds. 72 HH hold preferred. Low acuity Only.     [facility] is posting 0 beds. Low acuity. Negative covid.     [facility] is posting 1 beds. Negative covid. Vol only, No Hx of aggression, violence, or assault. No sexual offenders. No 72 HH holds.        [facility] is posting 4 beds. Negative covid. (Must have the cognitive ability to do programming. No aggressive or violent behavior or recent HX in the last 2 yrs. MH must be primary.)      [facility] is posting 0 beds. Negative Covid. Low acuity only. Violence and aggression capped.       [facility] is posting 0 beds. Low acuity, Negative Covid.    [facility] is posting 0 beds. Covid Negative. Vol only. Combined adolescent and adult unit. No aggressive or violent behavior. No registered sex offenders.     [facility] is posting 8 beds. No covid test required. Per policy, Intake does not present pt s on a 72HH to [facility].      [facility] is posting 3 beds.   Negative covid. (No lines, drains, or tubes (oxygen, CPAP, IV, etc.)     Pt remains on work list pending appropriate bed availability.

## 2023-03-24 NOTE — TELEPHONE ENCOUNTER
Updated Bed Search @ 1:44 PM  Per chart review, intake can look in the metro for placement    [facility] has 0 appropriate beds available. Phone: [phone]  [facility] posting 0 available beds. Phone: [phone]  [facility] posting 0 available beds. Phone: [phone]  [facility] posting 0 available beds. Phone: [phone]  [facility] posting 0 available beds. Phone: [phone]  [facility] posting 0 available beds. Phone:[phone]  [facility] posting 0 available beds. Phone: [phone]. Negative covid required.       Pt remains on waitlist pending an available bed

## 2023-03-25 ENCOUNTER — TELEPHONE (OUTPATIENT)
Dept: BEHAVIORAL HEALTH | Facility: CLINIC | Age: 38
End: 2023-03-25

## 2023-03-25 PROCEDURE — 250N000013 HC RX MED GY IP 250 OP 250 PS 637: Performed by: EMERGENCY MEDICINE

## 2023-03-25 RX ORDER — IBUPROFEN 600 MG/1
600 TABLET, FILM COATED ORAL ONCE
Status: COMPLETED | OUTPATIENT
Start: 2023-03-25 | End: 2023-03-25

## 2023-03-25 RX ORDER — LORAZEPAM 0.5 MG/1
1 TABLET ORAL ONCE
Status: COMPLETED | OUTPATIENT
Start: 2023-03-25 | End: 2023-03-25

## 2023-03-25 RX ORDER — BUPROPION HYDROCHLORIDE 150 MG/1
150 TABLET, EXTENDED RELEASE ORAL 2 TIMES DAILY
Status: ON HOLD | COMMUNITY
End: 2023-04-07

## 2023-03-25 RX ORDER — OLANZAPINE 10 MG/1
10 TABLET, ORALLY DISINTEGRATING ORAL AT BEDTIME
Status: DISCONTINUED | OUTPATIENT
Start: 2023-03-25 | End: 2023-03-26

## 2023-03-25 RX ORDER — BUPROPION HYDROCHLORIDE 150 MG/1
150 TABLET, EXTENDED RELEASE ORAL 2 TIMES DAILY
Status: DISCONTINUED | OUTPATIENT
Start: 2023-03-25 | End: 2023-03-28 | Stop reason: HOSPADM

## 2023-03-25 RX ADMIN — NICOTINE POLACRILEX 4 MG: 4 LOZENGE ORAL at 17:43

## 2023-03-25 RX ADMIN — LORAZEPAM 1 MG: 0.5 TABLET ORAL at 17:26

## 2023-03-25 RX ADMIN — BUPROPION HYDROCHLORIDE 150 MG: 150 TABLET, FILM COATED, EXTENDED RELEASE ORAL at 12:37

## 2023-03-25 RX ADMIN — NICOTINE POLACRILEX 4 MG: 4 LOZENGE ORAL at 16:05

## 2023-03-25 RX ADMIN — IBUPROFEN 600 MG: 600 TABLET, FILM COATED ORAL at 19:37

## 2023-03-25 RX ADMIN — NICOTINE POLACRILEX 4 MG: 4 LOZENGE ORAL at 21:43

## 2023-03-25 RX ADMIN — OLANZAPINE 10 MG: 10 TABLET, ORALLY DISINTEGRATING ORAL at 19:53

## 2023-03-25 RX ADMIN — LORAZEPAM 1 MG: 0.5 TABLET ORAL at 11:54

## 2023-03-25 RX ADMIN — NICOTINE POLACRILEX 4 MG: 4 LOZENGE ORAL at 11:35

## 2023-03-25 RX ADMIN — NICOTINE POLACRILEX 4 MG: 4 LOZENGE ORAL at 14:24

## 2023-03-25 RX ADMIN — BUPROPION HYDROCHLORIDE 150 MG: 150 TABLET, FILM COATED, EXTENDED RELEASE ORAL at 19:37

## 2023-03-25 ASSESSMENT — ACTIVITIES OF DAILY LIVING (ADL)
ADLS_ACUITY_SCORE: 35

## 2023-03-25 NOTE — TELEPHONE ENCOUNTER
Updated Bed Search @ 7:31 AM  Per chart review, intake can look in the metro for placement     [facility] has 0 appropriate beds available. Phone: [phone]  [facility] posting 0 available beds. Phone: [phone] per [facility] no beds today, working on their own waitlist   [facility] posting 0 available beds. Phone: [phone]  [facility] posting 0 available beds. Phone: [phone] per [facility], no beds at this time   [facility] posting 0 available beds. Phone: [phone]  [facility] posting 0 available beds. Phone:[phone]  [facility] posting 0 available beds. Phone: [phone]. Negative covid required.     Pt remains on work list pending appropriate bed placement.

## 2023-03-25 NOTE — TELEPHONE ENCOUNTER
No appropriate beds are currently available within the  system. Bed search update @ 1AM:       [facility]: @ cap per website  [facility]: @ cap per website  [facility]: @ cap per website  [facility]: @ cap per website  [facility]: @ cap per website  [facility]: @ cap per website  [facility]: @ cap per website    Pt remains on work list until appropriate placement is available

## 2023-03-25 NOTE — ED NOTES
"[facility] ED Mental Health Handoff Note:     Assuming care from: Dr Elena Contreras   11:38 AM Nurse informed that the patient wants Antivin, will updated DEC.   11:38 AM Paged Dec  12:17 PM Spoke with DEC who reports still awaiting inpatient bed.   12:51 PM DEC formal consult for the patient.     Brief HPI: 37 year old male signed out to me by the above provider. See initial ED Provider note for full details of the presentation.   In brief, patient with suicidal ideation. \"Patient reports he has been feeling suicidal and hearing voices in his head for the past few days. He denies any specific plan with this, and states he'd \"just rather not be around.\" He states that he ran out of his Zyprexa, Ativan, Hydroxyzine, and Seroquel 2 weeks ago. He notes that he was told by his pharmacy that these medications would be ready by yesterday, but this was not the case. He has not tried to contact his [facility] clinic who prescribes these medications. Patient denies any other complaints.\"      Patient is currently voluntary, but holdable.     Home meds reviewed and ordered/administered: Yes  Medically stable for inpatient mental health admission: Yes.  Evaluated by mental health: Yes. The recommendation is for inpatient mental health treatment. Bed search in process  Safety concerns: At the time I received sign out, there were no safety concerns.    Hold Status:  Active Orders   N/A       Labs/Imaging:   No results found for this visit on 03/21/23 (from the past 24 hour(s)).      ED Meds:  Medications   nicotine (NICORETTE) lozenge 4 mg (4 mg Buccal $Given 3/25/23 8047)   buPROPion (WELLBUTRIN XL) 24 hr tablet 150 mg (150 mg Oral $Given 3/25/23 1237)   QUEtiapine (SEROquel XR) 24 hr tablet 200 mg (has no administration in time range)   OLANZapine (zyPREXA) tablet 10 mg (has no administration in time range)   risperiDONE (risperDAL) tablet 3 mg (has no administration in time range)   OLANZapine zydis (zyPREXA) ODT tab " 5 mg (5 mg Oral $Given 3/21/23 2146)   LORazepam (ATIVAN) tablet 1 mg (1 mg Oral $Given 3/21/23 2147)   hydrOXYzine (ATARAX) tablet 50 mg (50 mg Oral $Given 3/21/23 2146)   ziprasidone (GEODON) injection 20 mg (20 mg Intramuscular Not Given 3/22/23 2052)   LORazepam (ATIVAN) tablet 1 mg (1 mg Oral $Given 3/22/23 2053)   LORazepam (ATIVAN) tablet 1 mg (1 mg Oral $Given 3/23/23 0418)   LORazepam (ATIVAN) tablet 1 mg (1 mg Oral $Given 3/23/23 1940)   LORazepam (ATIVAN) tablet 1 mg (1 mg Oral $Given 3/24/23 1603)   LORazepam (ATIVAN) tablet 1 mg (1 mg Oral $Given 3/25/23 1154)   LORazepam (ATIVAN) tablet 1 mg (1 mg Oral $Given 3/25/23 1726)     No orders to display       ED Course:    There were no significant events during my shift. Patient has remained cooperative    Patient was signed out to the oncoming provider, Dr. Ivis Umaña at shift change    Impression:    ICD-10-CM    1. Suicidal ideation  R45.851           Plan:    1. Awaiting inpatient mental health admission/transfer.      I, Gail Warren, am serving as a scribe to document services personally performed by Miriam Meeks M.D, based on my observations and the provider's statements to me.  I, Miriam Meeks M.D, attest that Gail Warren is acting in a scribe capacity, has observed my performance of the services and has documented them in accordance with my direction.      Miriam Meeks M.D   [facility] EMERGENCY DEPARTMENT  [address] [place] [id number] [phone]                   Miriam Meeks MD  03/25/23 7384       Miriam Meeks MD  03/25/23 9707

## 2023-03-25 NOTE — ED NOTES
"[facility] ED Mental Health Handoff Note:     Assuming care from: Dr Miriam Meeks    Brief HPI: 37 year old male signed out to me by the above provider. See initial ED Provider note for full details of the presentation.     In brief, patient presented with auditory hallucinations and suicidal ideation. He denies any specific suicidal plan and states that he would \"just rather not be around.\" He reports that he ran out of his Zyprexa, Ativan, Hydroxyzine and Seroquel 2 weeks ago. He notes that he was told by his pharmacy that these medications would be ready by yesterday, but this was not the case. He has not tried to contact his [facility] clinic who prescribes these medications. Patient denies any other complaints.       Home meds reviewed and ordered/administered: Yes  Medically stable for inpatient mental health admission: Yes.  Evaluated by mental health: Yes. The recommendation is for inpatient mental health treatment. Bed search in process  Safety concerns: At the time I received sign out, there were no safety concerns.    Hold Status:  Active Orders   N/A       ED Meds:  Medications   nicotine (NICORETTE) lozenge 4 mg (4 mg Buccal $Given 3/25/23 1424)   buPROPion (WELLBUTRIN XL) 24 hr tablet 150 mg (150 mg Oral $Given 3/25/23 1237)   QUEtiapine (SEROquel XR) 24 hr tablet 200 mg (has no administration in time range)   OLANZapine (zyPREXA) tablet 10 mg (has no administration in time range)   risperiDONE (risperDAL) tablet 3 mg (has no administration in time range)   OLANZapine zydis (zyPREXA) ODT tab 5 mg (5 mg Oral $Given 3/21/23 2146)   LORazepam (ATIVAN) tablet 1 mg (1 mg Oral $Given 3/21/23 2147)   hydrOXYzine (ATARAX) tablet 50 mg (50 mg Oral $Given 3/21/23 2146)   ziprasidone (GEODON) injection 20 mg (20 mg Intramuscular Not Given 3/22/23 2052)   LORazepam (ATIVAN) tablet 1 mg (1 mg Oral $Given 3/22/23 2053)   LORazepam (ATIVAN) tablet 1 mg (1 mg Oral $Given 3/23/23 0418)   LORazepam (ATIVAN) tablet 1 " mg (1 mg Oral $Given 3/23/23 1940)   LORazepam (ATIVAN) tablet 1 mg (1 mg Oral $Given 3/24/23 1603)   LORazepam (ATIVAN) tablet 1 mg (1 mg Oral $Given 3/25/23 1154)     No orders to display       2:02 PM Patient was signed out to me by Dr. Meeks.  5:25 PM I reevaluated patient and told patient that I placed DEC assessment. Patient is agitated.  7:34 PM Nursing staff updated me. Patient states that he would like ibuprofen.  9:39 PM Rechecked and updated patient.    Patient intermittently agitated during shift requiring po medications and de-escalation several times, however did not necessitate a [employer] or security involvement.    Patient was signed out to the oncoming provider, Dr. Elena Contreras at shift change    Impression:    ICD-10-CM    1. Suicidal ideation  R45.851           Plan:    1. Awaiting inpatient mental health admission/transfer.      I, Cindi Peralta, am serving as a scribe to document services personally performed by Ivis Umaña MD, based on my observations and the provider's statements to me.  I, Ivis Umaña MD, attest that Cindi Peralta is acting in a scribe capacity, has observed my performance of the services and has documented them in accordance with my direction.       Ivis Umaña MD  [facility] EMERGENCY DEPARTMENT  [address] [place] [id number]  [phone]                 Ivis Umaña MD  03/26/23 0813

## 2023-03-25 NOTE — ED NOTES
He has woken up and wants his medications. He seems a bit agitated but is cooperative with in it.

## 2023-03-25 NOTE — ED NOTES
I have had Hamzah for 16 hours today. He has done very well for me. We have built a good rapport. This has been threatened a few times by other people on staff here threatening him with security officers. He is feeling the need to get out of the small room where he is confined and only allowed out for the restroom or a shower. He is terribly frustrated with the entire DEC process. They are failing him at this point. DEC assessors have made promises they have broken. Such as today saying they wanted to talk to him about placement. And he was very willing to talk to them. And then never called. Then finally calling after 30 minutes to say they would talk to him in 2-3 hours. That was (at the time of this witting) more than five hours ago.

## 2023-03-25 NOTE — ED NOTES
"[facility] ED Mental Health Handoff Note:     Assuming care from: Dr Ivis Umaña    Brief HPI: 37 year old male signed out to me by the above provider. See initial ED Provider note for full details of the presentation.   In brief, patient presents with suicidal ideation. \"Patient reports he has been feeling suicidal and hearing voices in his head for the past few days. He denies any specific plan with this, and states he'd \"just rather not be around.\" He states that he ran out of his Zyprexa, Ativan, Hydroxyzine, and Seroquel 2 weeks ago. He notes that he was told by his pharmacy that these medications would be ready by yesterday, but this was not the case. He has not tried to contact his [facility] clinic who prescribes these medications. Patient denies any other complaints.\"      Patient is currently voluntary, but is holdable.       Home meds reviewed and ordered/administered: Yes  Medically stable for inpatient mental health admission: Yes.  Evaluated by mental health: Yes. The recommendation is for inpatient mental health treatment. Bed search in process  Safety concerns: At the time I received sign out, there were no safety concerns.    Hold Status:  voluntary but holdable    Labs/Imaging:   No results found for this visit on 03/21/23 (from the past 24 hour(s)).      ED Meds:  Medications   nicotine (NICORETTE) lozenge 4 mg (4 mg Buccal $Given 3/24/23 2222)   buPROPion (WELLBUTRIN XL) 24 hr tablet 150 mg (150 mg Oral $Given 3/24/23 2052)   QUEtiapine (SEROquel XR) 24 hr tablet 200 mg (has no administration in time range)   OLANZapine (zyPREXA) tablet 10 mg (has no administration in time range)   risperiDONE (risperDAL) tablet 3 mg (has no administration in time range)   OLANZapine zydis (zyPREXA) ODT tab 5 mg (5 mg Oral $Given 3/21/23 2146)   LORazepam (ATIVAN) tablet 1 mg (1 mg Oral $Given 3/21/23 2147)   hydrOXYzine (ATARAX) tablet 50 mg (50 mg Oral $Given 3/21/23 2146)   ziprasidone (GEODON) injection " 20 mg (20 mg Intramuscular Not Given 3/22/23 2052)   LORazepam (ATIVAN) tablet 1 mg (1 mg Oral $Given 3/22/23 2053)   LORazepam (ATIVAN) tablet 1 mg (1 mg Oral $Given 3/23/23 0418)   LORazepam (ATIVAN) tablet 1 mg (1 mg Oral $Given 3/23/23 1940)   LORazepam (ATIVAN) tablet 1 mg (1 mg Oral $Given 3/24/23 1603)     No orders to display       ED Course:  ED Course as of 03/24/23 2239   Wed Mar 22, 2023   2043 Extended care called.  There apparently are numerous people on their wait list and he will not be assessed all night tonight.  Patient requests Ativan to help relax overnight.       2230 Patient is signed out to me by Dr. Salas pending bed placement.      There were no significant events during my shift.    Patient was signed out to the oncoming provider, Dr. Hernandez at shift change    Impression:    ICD-10-CM    1. Suicidal ideation  R45.851           Plan:    1. Awaiting inpatient mental health admission/transfer.    Elena Contreras MD  [facility] EMERGENCY DEPARTMENT  [address] [place] [id number]  [phone]                   Elena Contreras MD  03/25/23 0054

## 2023-03-26 ENCOUNTER — TELEPHONE (OUTPATIENT)
Dept: BEHAVIORAL HEALTH | Facility: CLINIC | Age: 38
End: 2023-03-26

## 2023-03-26 PROCEDURE — 250N000013 HC RX MED GY IP 250 OP 250 PS 637: Performed by: EMERGENCY MEDICINE

## 2023-03-26 RX ORDER — OLANZAPINE 5 MG/1
10 TABLET ORAL AT BEDTIME
Status: DISCONTINUED | OUTPATIENT
Start: 2023-03-27 | End: 2023-03-28 | Stop reason: HOSPADM

## 2023-03-26 RX ORDER — LORAZEPAM 0.5 MG/1
1 TABLET ORAL ONCE
Status: COMPLETED | OUTPATIENT
Start: 2023-03-26 | End: 2023-03-26

## 2023-03-26 RX ORDER — OLANZAPINE 10 MG/1
10 TABLET, ORALLY DISINTEGRATING ORAL AT BEDTIME
Status: DISCONTINUED | OUTPATIENT
Start: 2023-03-27 | End: 2023-03-28 | Stop reason: HOSPADM

## 2023-03-26 RX ORDER — POLYETHYLENE GLYCOL 3350 17 G
2 POWDER IN PACKET (EA) ORAL
Status: DISCONTINUED | OUTPATIENT
Start: 2023-03-26 | End: 2023-03-26 | Stop reason: ALTCHOICE

## 2023-03-26 RX ADMIN — NICOTINE POLACRILEX 4 MG: 4 LOZENGE ORAL at 15:50

## 2023-03-26 RX ADMIN — BUPROPION HYDROCHLORIDE 150 MG: 150 TABLET, FILM COATED, EXTENDED RELEASE ORAL at 20:08

## 2023-03-26 RX ADMIN — OLANZAPINE 10 MG: 10 TABLET, ORALLY DISINTEGRATING ORAL at 21:08

## 2023-03-26 RX ADMIN — BUPROPION HYDROCHLORIDE 150 MG: 150 TABLET, FILM COATED, EXTENDED RELEASE ORAL at 12:40

## 2023-03-26 RX ADMIN — LORAZEPAM 1 MG: 0.5 TABLET ORAL at 21:38

## 2023-03-26 RX ADMIN — NICOTINE POLACRILEX 4 MG: 4 LOZENGE ORAL at 20:08

## 2023-03-26 RX ADMIN — NICOTINE POLACRILEX 4 MG: 4 LOZENGE ORAL at 13:08

## 2023-03-26 RX ADMIN — QUETIAPINE FUMARATE 200 MG: 50 TABLET, EXTENDED RELEASE ORAL at 21:03

## 2023-03-26 RX ADMIN — NICOTINE POLACRILEX 4 MG: 4 LOZENGE ORAL at 22:26

## 2023-03-26 RX ADMIN — NICOTINE POLACRILEX 4 MG: 4 LOZENGE ORAL at 18:04

## 2023-03-26 RX ADMIN — LORAZEPAM 1 MG: 0.5 TABLET ORAL at 15:50

## 2023-03-26 ASSESSMENT — ACTIVITIES OF DAILY LIVING (ADL)
ADLS_ACUITY_SCORE: 35

## 2023-03-26 NOTE — TELEPHONE ENCOUNTER
R:  No Beds within [place]  Bed Search completed @ 8:30am  Pt willing only to go within the [place] to other facilities -     [facility] is  0 beds.      [facility] is posting 0 [phone]      [facility] ([place], [facility], [facility], [place], and [place]) has posting 0 beds. [phone]     [facility] is posting 0 beds. [phone] [place] - NO BEDS TODAY @ 8:34am     [facility] is posting 0 beds. [phone]     [facility] per NOEL - has 1 F Shared bed only.     Pt will remain on adult worklist pending bed availability

## 2023-03-26 NOTE — ED NOTES
Pt requesting ativan. Pt was educated that last dose was given at around 5:30pm this evening, MD updated. Provider ordered Zyprexa. Pt requesting to use shower. Pt received shower this afternoon at 1 pm. Pt still requesting to take another shower. Pt educated that attendant must be in bathroom to ensure pt safety. Pt became upset and refused to take the shower. Charge RN educated pt, pt became agitated and yelled at Charge RN. DEC called RN and and provided update that they will not talk with pt tonight. They state that pt will be seen in the morning. Pt refusing vitals at this time.

## 2023-03-26 NOTE — ED NOTES
Pt has been going in and out bathroom. This writer inform pt not to close the door but pt does not care.

## 2023-03-26 NOTE — ED NOTES
Pt still upset with staff regarding shower. Pt educated of facility policies, Charge RN spoke with pt. Provider updated.

## 2023-03-26 NOTE — ED NOTES
Pt and writer had discussion about medications. Pt stated he did not want to take Risperdal at bedtime. Sates he has taken it before and it doesn't help him. Agrees to take Seroquel. Pt is cooperative at this time.

## 2023-03-26 NOTE — TELEPHONE ENCOUNTER
R:  No appropriate beds within [place].  Bed Search update within [place] 9:20PM    [facility]: @ cap per website- Per Ravi, try back tomorrow.  [facility]: @ cap per website- Per Karma, try back tomorrow after 10AM.  [facility]: @ cap per website- Per Nadira, no beds available.  [facility]: @ cap per website- Per Karma, no beds.  [facility]: @ cap per website  [facility]: @ cap per website- Per Karma, no beds.  [facility]: @ cap per website  [facility]:  @ cap per website       Pt remains on PPS work list awaiting appropriate placement.

## 2023-03-26 NOTE — ED NOTES
"Patient getting increasingly agitated. Reports that he is frustrated that he is stuck here when \"I was told days ago that there is a place that will accept me in.\" He also stated \"I am going to call for a ride to come pick me up and I don't care if security tries to stop me. I'm getting out of here\"   Reassured patient that we are attempting to find a suitable place for him to be.   "

## 2023-03-26 NOTE — PROGRESS NOTES
"  Triage & Transition Services, Extended Care     Care Coordination Progress Note    Patient: Hamzah goes by \"Hamzah,\" uses he/him pronouns  Date of Service: March 26, 2023  Site of Service: [facility] ED    Individuals Present: Hamzah & Ksenia Singh    Session start: 4:05P  Session end: 4:11P  Session duration in minutes: 6min  Patient was seen virtually ([employer] cart or other teleconferencing device).     Hamzah is being followed by Extended Care. Please see full DEC Assessment done by Fern Espinosa on 03/21/2023 for further detail.     Details of Therapeutic Interaction:   Writer introduced self and role. Pt expressed frustration with long wait time for IP placement. Pt stated that he was bored and tired of looking at his phone. Pt requested to be placed in another room with a TV. Writer notified DANIKA Akins. Pt declined other distraction activities at this time. Pt shared feeling suicidal and stress related to his children and another one the way.    Therapeutic Goals Addressed During Session:   Provided active listening    Plan:  Recommended by [facility] for Inpatient Mental Health: Patient continues to voluntarily await inpatient mental health admission for ongoing paranoia, SI, and AH.       Ksenia Singh 3/26/2023 4:24 PM  Extended Care Coordinator- [phone]            "

## 2023-03-26 NOTE — TELEPHONE ENCOUNTER
R:  No appropriate beds within [place].  Bed Search update within [place] 6:30PM    [facility]: @ cap per website- Per Ravi, try back tomorrow.  [facility]: @ cap per website- Per Carlito, try back tomorrow after 10AM.  [facility]: @ cap per website- Per Cliff, no beds available.  [facility]: @ cap per website- Per Carlito, no beds.  [facility]: @ cap per website  [facility]: @ cap per website- Per Carlito, no beds.  [facility]: @ cap per website  [facility]:  @ cap per website- Per Carlito, no beds       Pt remains on PPS work list awaiting appropriate placement.

## 2023-03-26 NOTE — ED NOTES
"[facility] ED Mental Health Handoff Note:     Assuming care from: Dr Ivis Umaña    Brief HPI: 37 year old male signed out to me by the above provider. See initial ED Provider note for full details of the presentation.   In brief, patient presented with auditory hallucinations and suicidal ideation. He denies any specific suicidal plan and states that he would \"just rather not be around.\" He reports that he ran out of his Zyprexa, Ativan, Hydroxyzine and Seroquel 2 weeks ago. He notes that he was told by his pharmacy that these medications would be ready by yesterday, but this was not the case. He has not tried to contact his [facility] clinic who prescribes these medications. Patient denies any other complaints.      Home meds reviewed and ordered/administered: Yes  Medically stable for inpatient mental health admission: Yes.  Evaluated by mental health: Yes. The recommendation is for inpatient mental health treatment. Bed search in process  Safety concerns: At the time I received sign out, there were no safety concerns.    Hold Status:  voluntary but holdable    Labs/Imaging:   No results found for this visit on 03/21/23 (from the past 24 hour(s)).      ED Meds:  Medications   nicotine (NICORETTE) lozenge 4 mg (4 mg Buccal $Given 3/25/23 2143)   QUEtiapine (SEROquel XR) 24 hr tablet 200 mg (200 mg Oral Not Given 3/25/23 2133)   OLANZapine (zyPREXA) tablet 10 mg (10 mg Oral Not Given 3/25/23 2132)   risperiDONE (risperDAL) tablet 3 mg (3 mg Oral Not Given 3/25/23 2132)   buPROPion (WELLBUTRIN SR) 12 hr tablet 150 mg (150 mg Oral $Given 3/25/23 1937)   OLANZapine zydis (zyPREXA) ODT tab 10 mg (10 mg Oral $Given 3/25/23 1953)   OLANZapine zydis (zyPREXA) ODT tab 5 mg (5 mg Oral $Given 3/21/23 2146)   LORazepam (ATIVAN) tablet 1 mg (1 mg Oral $Given 3/21/23 2147)   hydrOXYzine (ATARAX) tablet 50 mg (50 mg Oral $Given 3/21/23 2146)   ziprasidone (GEODON) injection 20 mg (20 mg Intramuscular Not Given 3/22/23 2052) "   LORazepam (ATIVAN) tablet 1 mg (1 mg Oral $Given 3/22/23 2053)   LORazepam (ATIVAN) tablet 1 mg (1 mg Oral $Given 3/23/23 0418)   LORazepam (ATIVAN) tablet 1 mg (1 mg Oral $Given 3/23/23 1940)   LORazepam (ATIVAN) tablet 1 mg (1 mg Oral $Given 3/24/23 1603)   LORazepam (ATIVAN) tablet 1 mg (1 mg Oral $Given 3/25/23 1154)   LORazepam (ATIVAN) tablet 1 mg (1 mg Oral $Given 3/25/23 1726)   ibuprofen (ADVIL/MOTRIN) tablet 600 mg (600 mg Oral $Given 3/25/23 1937)     No orders to display       ED Course:  ED Course as of 03/25/23 2209   Wed Mar 22, 2023   2043 Extended care called.  There apparently are numerous people on their wait list and he will not be assessed all night tonight.  Patient requests Ativan to help relax overnight.       There were no significant events during my shift.    Patient was signed out to the oncoming provider, Dr. Hernandez at shift change    Impression:    ICD-10-CM    1. Suicidal ideation  R45.851           Plan:    1. Awaiting inpatient mental health admission/transfer.    Elena Contreras MD  [facility] EMERGENCY DEPARTMENT  [address] [place] [id number]  [phone]                   Elena Contreras MD  03/26/23 6062

## 2023-03-26 NOTE — CONSULTS
DEC Consult Order placed. DEC assessment completed by Fern Elizabeth on 3/21/23 at 7:02pm. Consult acknowledged and completed.     Extended Care is now following this patient and can be reached at [phone]. See most recent note from Marcus Peoples on 3/23/23 at 12:00pm.    Jenelle Velazquez, [facility]

## 2023-03-26 NOTE — ED NOTES
"Pt came out of the room and ask for something to eat. This writer give pt a turkey sandwich, ice cream sandwich and a can of lime twist. After eating pt came out of the room again asking if this writer knows \"what is going on\". This writer informed pt of 1:1 role and will check with RN. RN notify.  "

## 2023-03-26 NOTE — TELEPHONE ENCOUNTER
No appropriate beds are currently available within the  system. Bed search update ([place]) @ 12AM:        [facility]: @ cap per website  [facility]: @ cap per website  [facility]: @ cap per website. Per previous call mendy/ Armando @ 5:20PM they are full and don t expect any openings until Monday  [facility]: @ cap per website  [facility]: @ cap per website  [facility]: @ cap per website  [facility]: @ cap per website    Pt remains on work list until appropriate placement is available

## 2023-03-27 ENCOUNTER — TELEPHONE (OUTPATIENT)
Dept: BEHAVIORAL HEALTH | Facility: CLINIC | Age: 38
End: 2023-03-27

## 2023-03-27 VITALS
WEIGHT: 168 LBS | OXYGEN SATURATION: 98 % | HEART RATE: 94 BPM | SYSTOLIC BLOOD PRESSURE: 137 MMHG | RESPIRATION RATE: 16 BRPM | DIASTOLIC BLOOD PRESSURE: 76 MMHG | TEMPERATURE: 98.2 F | BODY MASS INDEX: 27.96 KG/M2

## 2023-03-27 PROCEDURE — 250N000013 HC RX MED GY IP 250 OP 250 PS 637: Performed by: EMERGENCY MEDICINE

## 2023-03-27 RX ORDER — OLANZAPINE 5 MG/1
5 TABLET, ORALLY DISINTEGRATING ORAL
Status: DISCONTINUED | OUTPATIENT
Start: 2023-03-27 | End: 2023-03-28 | Stop reason: HOSPADM

## 2023-03-27 RX ORDER — LORAZEPAM 0.5 MG/1
1 TABLET ORAL
Status: COMPLETED | OUTPATIENT
Start: 2023-03-27 | End: 2023-03-27

## 2023-03-27 RX ORDER — QUETIAPINE FUMARATE 100 MG/1
200 TABLET, FILM COATED ORAL AT BEDTIME
Status: DISCONTINUED | OUTPATIENT
Start: 2023-03-27 | End: 2023-03-28 | Stop reason: HOSPADM

## 2023-03-27 RX ADMIN — NICOTINE POLACRILEX 4 MG: 4 LOZENGE ORAL at 18:42

## 2023-03-27 RX ADMIN — OLANZAPINE 10 MG: 5 TABLET, FILM COATED ORAL at 22:12

## 2023-03-27 RX ADMIN — LORAZEPAM 1 MG: 0.5 TABLET ORAL at 16:53

## 2023-03-27 RX ADMIN — NICOTINE POLACRILEX 4 MG: 4 LOZENGE ORAL at 16:32

## 2023-03-27 RX ADMIN — BUPROPION HYDROCHLORIDE 150 MG: 150 TABLET, FILM COATED, EXTENDED RELEASE ORAL at 16:32

## 2023-03-27 RX ADMIN — NICOTINE POLACRILEX 4 MG: 4 LOZENGE ORAL at 20:30

## 2023-03-27 ASSESSMENT — ACTIVITIES OF DAILY LIVING (ADL)
ADLS_ACUITY_SCORE: 35

## 2023-03-27 NOTE — TELEPHONE ENCOUNTER
R:  No beds available within [facility] to review for IPMH.   Pt willing to also go anywhere in metro -  Bed search completed @ 7:30am  & 1pm    [facility] is posting 0 beds.  Negative covid.    [facility] [place], [place]) is posting 0 beds. Negative covid.    [facility] is posting 0 beds. [facility] preferred.     [facility] is posting 0 beds.     Pt remains on work list pending appropriate bed availability.

## 2023-03-27 NOTE — ED NOTES
"Triage & Transition Services, Extended Care     Therapy Progress Note    Patient: Hamzah goes by \"Hamzah,\" uses he/him pronouns  Date of Service: March 27, 2023  Site of Service: [facility] ED  Patient was seen virtually (Lateral SV cart or other teleconferencing device).     Presenting problem:   Hamzah is followed related to Long wait time for admission: boarding for IP MH placement. Please see initial DEC/LMHP Crisis Assessment completed by Fern Espinosa for complete assessment information. Notable concerns include Worsening psychotic symptoms including paranoia, auditory hallucinations with suicidal ideaiton.     Individuals Present: Bernydania & Marcus Peoples    Session start: 4:15 PM  Session end: 4:21 PM  Session duration in minutes: 6    Current Presentation:   Patient was polite, but minimally responsive. Patient asks writer why he is still waiting for a bed, and reports his auditory hallucinations are getting worse. He declines to talk about them at this time; however does report \"I want to get on a unit and out of this room\". Patient continues to report SI. Throughout the conversation he appears to be looking around the room at times. Patient does ask writer if and when he will have a bed, Writer did respond that we continue to search for placement.     Mental Status Exam:   Appearance: awake, alert  Behavior: Cooperative and Pleasant  Eye Contact: intense  Mood: Depressed  Affect: mood congruent  Speech: slowed short answers  Psychomotor Behavior: facial tic   Thought Process:  linear  Associations: no loose associations  Thought Content: passive suicidal ideation present and auditory hallucinations present  Insight: limited  Judgement: variable  Oriented to: time, person, and place  Attention Span and Concentration: fair  Recent and Remote Memory: fair    Diagnosis:   295.90  (F20.9) Schizophrenia - by hx  Substance-Related & Addictive Disorders Stimulant Use Disorder:  ., Specify current " severity:  Moderate  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    Treatment Objective(s) Addressed:   The focus of this session was on rapport building and orienting the patient to therapy.     Progress Towards Goals:   Patient reports worsening symptoms. Patients symptoms continue to impair his ability to engage fully in therapy.      General Recommendations:   Continue to monitor for harm. Consider: Provide the pt with options to provide a sense of control. Try to tell the pt what they can do instead of what they can't do and Allow family calls/visits    Plan:   Inpatient Mental Health: Patient continues to endorse AH, SI and worsening symptoms. Patient appears somewhat suspicious and paranoid, but continues to be in [facility].      Plan for Care reviewed with Assigned Medical Provider? Yes. Provider, Dr Yanes, response: christine Peoples   Licensed Mental Health Professional (LMHP), [facility]  [phone]

## 2023-03-27 NOTE — ED NOTES
During report from pervious nurse, she stated that the patient has 3 phones that have not been removed from him. The patient has been in the er for 122 hrs so far and the phones have not been an issue. He will be allowed to keep them as long as they do not become an issue. He has also requested a visitor. I talked with the MD and charge and it was decided that he could have a visitor provided that person is searched by security before entering the room.

## 2023-03-27 NOTE — PROGRESS NOTES
Pt ambulated to restroom, then asked for his meds, 2 ice cream sandwiches and a soda; currently talking with DEC via ipad with his phone in hand;  Pt increasingly fidgeting, using his phone    Gave pt wellbutrin and nicotine lozenge; pt requesting ativan and zyprexa; pt using phone, figeting

## 2023-03-27 NOTE — ED NOTES
[facility] ED Mental Health Handoff Note:     Assuming care from: Dr Mariusz Hernandez    Brief HPI: 37 year old male signed out to me by the above provider. See initial ED Provider note for full details of the presentation.   In brief, patient presented for suicidal ideation and auditory hallucinations a couple of days prior to presenting to the ED. No specific plan for suicide.    Home meds reviewed and ordered/administered: Yes  Medically stable for inpatient mental health admission: Yes.  Evaluated by mental health: Yes. The recommendation is for inpatient mental health treatment. Bed search in process  Safety concerns: At the time I received sign out, there were no safety concerns.    Hold Status:  Active Orders   N/A         Labs/Imaging:   No results found for this visit on 03/21/23 (from the past 24 hour(s)).      ED Meds:  Medications   nicotine (NICORETTE) lozenge 4 mg (4 mg Buccal $Given 3/26/23 2226)   QUEtiapine (SEROquel XR) 24 hr tablet 200 mg (200 mg Oral $Given 3/26/23 2103)   risperiDONE (risperDAL) tablet 3 mg (3 mg Oral Not Given 3/26/23 2106)   buPROPion (WELLBUTRIN SR) 12 hr tablet 150 mg (150 mg Oral $Given 3/26/23 2008)   OLANZapine (zyPREXA) tablet 10 mg (has no administration in time range)     Or   OLANZapine zydis (zyPREXA) ODT tab 10 mg (has no administration in time range)   OLANZapine zydis (zyPREXA) ODT tab 5 mg (5 mg Oral $Given 3/21/23 2146)   LORazepam (ATIVAN) tablet 1 mg (1 mg Oral $Given 3/21/23 2147)   hydrOXYzine (ATARAX) tablet 50 mg (50 mg Oral $Given 3/21/23 2146)   ziprasidone (GEODON) injection 20 mg (20 mg Intramuscular Not Given 3/22/23 2052)   LORazepam (ATIVAN) tablet 1 mg (1 mg Oral $Given 3/22/23 2053)   LORazepam (ATIVAN) tablet 1 mg (1 mg Oral $Given 3/23/23 0418)   LORazepam (ATIVAN) tablet 1 mg (1 mg Oral $Given 3/23/23 1940)   LORazepam (ATIVAN) tablet 1 mg (1 mg Oral $Given 3/24/23 7032)   LORazepam (ATIVAN) tablet 1 mg (1 mg Oral $Given 3/25/23 4880)    LORazepam (ATIVAN) tablet 1 mg (1 mg Oral $Given 3/25/23 1726)   ibuprofen (ADVIL/MOTRIN) tablet 600 mg (600 mg Oral $Given 3/25/23 1937)   LORazepam (ATIVAN) tablet 1 mg (1 mg Oral $Given 3/26/23 1550)   LORazepam (ATIVAN) tablet 1 mg (1 mg Oral $Given 3/26/23 2138)     No orders to display       ED Course:  There were no significant events during my shift.    Patient was signed out to the oncoming provider, Dr. Isiah Soliman at shift change    Impression:    ICD-10-CM    1. Suicidal ideation  R45.851           Plan:    1. Awaiting inpatient mental health admission/transfer.      I, Marquis Jimenez, am serving as a scribe to document services personally performed by Marquis Jimenez, based on my observations and the provider's statements to me.  I, Marquis Jimenez, attest that Marquis Jimenez is acting in a scribe capacity, has observed my performance of the services and has documented them in accordance with my direction.     Parisa Jauregui MD  [facility] EMERGENCY DEPARTMENT  [address] [place] [id number] [phone]                   Parisa Jauregui MD  03/27/23 0617

## 2023-03-27 NOTE — ED NOTES
[facility] ED Mental Health Handoff Note:     Assuming care from: Dr Parisa Jauregui    Brief HPI: 37 year old male signed out to me by the above provider. See initial ED Provider note for full details of the presentation.     In brief, patient presented for suicidal ideation and auditory hallucinations a couple of days prior to presenting to the ED. No specific plan for suicide.    Home meds reviewed and ordered/administered: Yes  Medically stable for inpatient mental health admission: Yes.  Evaluated by mental health: Yes. The recommendation is for inpatient mental health treatment. Bed search in process  Safety concerns: At the time I received sign out, there were no safety concerns.    Hold Status:  Active Orders   N/A         Labs/Imaging:   No results found for this visit on 03/21/23 (from the past 24 hour(s)).      ED Meds:  Medications   nicotine (NICORETTE) lozenge 4 mg (4 mg Buccal $Given 3/26/23 2226)   QUEtiapine (SEROquel XR) 24 hr tablet 200 mg (200 mg Oral $Given 3/26/23 2103)   risperiDONE (risperDAL) tablet 3 mg (3 mg Oral Not Given 3/26/23 2106)   buPROPion (WELLBUTRIN SR) 12 hr tablet 150 mg (150 mg Oral $Given 3/26/23 2008)   OLANZapine (zyPREXA) tablet 10 mg (has no administration in time range)     Or   OLANZapine zydis (zyPREXA) ODT tab 10 mg (has no administration in time range)   OLANZapine zydis (zyPREXA) ODT tab 5 mg (5 mg Oral $Given 3/21/23 2146)   LORazepam (ATIVAN) tablet 1 mg (1 mg Oral $Given 3/21/23 2147)   hydrOXYzine (ATARAX) tablet 50 mg (50 mg Oral $Given 3/21/23 2146)   ziprasidone (GEODON) injection 20 mg (20 mg Intramuscular Not Given 3/22/23 2052)   LORazepam (ATIVAN) tablet 1 mg (1 mg Oral $Given 3/22/23 2053)   LORazepam (ATIVAN) tablet 1 mg (1 mg Oral $Given 3/23/23 0418)   LORazepam (ATIVAN) tablet 1 mg (1 mg Oral $Given 3/23/23 1940)   LORazepam (ATIVAN) tablet 1 mg (1 mg Oral $Given 3/24/23 8422)   LORazepam (ATIVAN) tablet 1 mg (1 mg Oral $Given 3/25/23 7267)    LORazepam (ATIVAN) tablet 1 mg (1 mg Oral $Given 3/25/23 1726)   ibuprofen (ADVIL/MOTRIN) tablet 600 mg (600 mg Oral $Given 3/25/23 1937)   LORazepam (ATIVAN) tablet 1 mg (1 mg Oral $Given 3/26/23 1550)   LORazepam (ATIVAN) tablet 1 mg (1 mg Oral $Given 3/26/23 2138)     No orders to display       ED Course:  ED Course as of 03/27/23 0743   Wed Mar 22, 2023   2043 Extended care called.  There apparently are numerous people on their wait list and he will not be assessed all night tonight.  Patient requests Ativan to help relax overnight.       There were no significant events during my shift.    Patient was signed out to the oncoming provider, Dr. Sabiha Berrios at shift change    Impression:    ICD-10-CM    1. Suicidal ideation  R45.851           Plan:    1. Awaiting inpatient mental health admission/transfer.    I, Josh Lauren, am serving as a scribe to document services personally performed by Isiah Soliman MD, based on my observations and the provider's statements to me.  I, Isiah Soliman MD, attest that Josh Lauren is acting in a scribe capacity, has observed my performance of the services and has documented them in accordance with my direction.     Isiah Soliman MD  [facility] EMERGENCY DEPARTMENT  [address] [place] [id number]  [phone]                   Isiah Soliman MD  03/27/23 9330

## 2023-03-27 NOTE — ED NOTES
Patient has been calm and cooperative. He has been up to eat and to the restroom. Declined vitals and medications asking if he can do it in a little bit.

## 2023-03-27 NOTE — ED NOTES
Patient requested his medications and something to help relax. He is calm and cooperative. Vital signs obtained.

## 2023-03-27 NOTE — ED NOTES
The patient has requested to shower. He has informed me that he was allowed to shower. With the aid outside the bathroom knocking every minute. I checked with charge and this is true. A male aid has been found who will get the shower supplies and be the one to one during the shower.

## 2023-03-27 NOTE — ED NOTES
"[facility] ED Mental Health Handoff Note:     Assuming care from: Dr Isiah Soliman    Brief HPI: 37 year old male signed out to me by the above provider. See initial ED Provider note for full details of the presentation.   In brief, patient presented with auditory hallucinations and suicidal ideation. He denies any specific suicidal plan and states that he would \"just rather not be around.\" He reports that he ran out of his Zyprexa, Ativan, Hydroxyzine and Seroquel 2 weeks ago. He notes that he was told by his pharmacy that these medications would be ready by yesterday, but this was not the case. He has not tried to contact his [facility] clinic who prescribes these medications. Patient denies any other complaints.       Home meds reviewed and ordered/administered: Yes  Medically stable for inpatient mental health admission: Yes.  Evaluated by mental health: Yes. The recommendation is for inpatient mental health treatment. Bed search in process  Safety concerns: At the time I received sign out, there were no safety concerns.    Hold Status:  Active Orders   N/A       Labs/Imaging:   No results found for this visit on 03/21/23 (from the past 24 hour(s)).      ED Meds:  Medications   nicotine (NICORETTE) lozenge 4 mg (4 mg Buccal $Given 3/27/23 2030)   risperiDONE (risperDAL) tablet 3 mg (3 mg Oral Not Given 3/26/23 2106)   buPROPion (WELLBUTRIN SR) 12 hr tablet 150 mg (0 mg Oral Hold 3/27/23 1655)   OLANZapine (zyPREXA) tablet 10 mg (has no administration in time range)     Or   OLANZapine zydis (zyPREXA) ODT tab 10 mg (has no administration in time range)   QUEtiapine (SEROquel) tablet 200 mg (has no administration in time range)   OLANZapine zydis (zyPREXA) ODT tab 5 mg (has no administration in time range)   OLANZapine zydis (zyPREXA) ODT tab 5 mg (5 mg Oral $Given 3/21/23 2146)   LORazepam (ATIVAN) tablet 1 mg (1 mg Oral $Given 3/21/23 2147)   hydrOXYzine (ATARAX) tablet 50 mg (50 mg Oral $Given 3/21/23 " 2146)   ziprasidone (GEODON) injection 20 mg (20 mg Intramuscular Not Given 3/22/23 2052)   LORazepam (ATIVAN) tablet 1 mg (1 mg Oral $Given 3/22/23 2053)   LORazepam (ATIVAN) tablet 1 mg (1 mg Oral $Given 3/23/23 0418)   LORazepam (ATIVAN) tablet 1 mg (1 mg Oral $Given 3/23/23 1940)   LORazepam (ATIVAN) tablet 1 mg (1 mg Oral $Given 3/24/23 1603)   LORazepam (ATIVAN) tablet 1 mg (1 mg Oral $Given 3/25/23 1154)   LORazepam (ATIVAN) tablet 1 mg (1 mg Oral $Given 3/25/23 1726)   ibuprofen (ADVIL/MOTRIN) tablet 600 mg (600 mg Oral $Given 3/25/23 1937)   LORazepam (ATIVAN) tablet 1 mg (1 mg Oral $Given 3/26/23 1550)   LORazepam (ATIVAN) tablet 1 mg (1 mg Oral $Given 3/26/23 2138)   LORazepam (ATIVAN) tablet 1 mg (1 mg Oral $Given 3/27/23 1653)     No orders to display       ED Course:  ED Course as of 03/27/23 2203   Wed Mar 22, 2023   2043 Extended care called.  There apparently are numerous people on their wait list and he will not be assessed all night tonight.  Patient requests Ativan to help relax overnight.   Mon Mar 27, 2023   1622 Spoke w extended care - presenting him to 3A. Pt states sx worse while waiting. Still c/o internal stimuli.         There were no significant events during my shift.    Patient was signed out to the oncoming provider, Dr. Jeremy Gray at shift change    Impression:    ICD-10-CM    1. Suicidal ideation  R45.851           Plan:    1. Awaiting inpatient mental health admission/transfer.    Sabiha Berrios MD  [facility] EMERGENCY DEPARTMENT  [address] [place] [id number] [phone]                   Sabiha Berrios MD  03/27/23 2203

## 2023-03-27 NOTE — TELEPHONE ENCOUNTER
No appropriate beds are currently available within the  system. Bed search update (metro) @ 11:45PM:    [facility]: @ cap per website  [facility]: @ cap per website  [facility]: @ cap per website. Per previous call made by this writer @ 4:22PM, Sasha reported they are full tonight  [facility]: @ cap per website  [facility]: @ cap per website  [facility]: @ cap per website  [facility]: @ cap per website    Pt remains on work list until appropriate placement is available

## 2023-03-27 NOTE — ED NOTES
Patient sleeping. Non labored respirations noted.1:1 staff     Per night nurse, patient has 3 phones in his possession. He is able to keep these unless they become a problem.

## 2023-03-27 NOTE — ED NOTES
"[facility] ED Mental Health Handoff Note:     Assuming care from: Dr Miriam Meeks    Brief HPI: 37 year old male signed out to me by the above provider. See initial ED Provider note for full details of the presentation.     In brief, patient presented with auditory hallucinations and suicidal ideation. He denies any specific suicidal plan and states that he would \"just rather not be around.\" He reports that he ran out of his Zyprexa, Ativan, Hydroxyzine and Seroquel 2 weeks ago. He notes that he was told by his pharmacy that these medications would be ready by yesterday, but this was not the case. He has not tried to contact his [facility] clinic who prescribes these medications. Patient denies any other complaints.       Home meds reviewed and ordered/administered: Yes  Medically stable for inpatient mental health admission: Yes.  Evaluated by mental health: Yes. The recommendation is for inpatient mental health treatment. Bed search in process  Safety concerns: At the time I received sign out, there were no safety concerns.    Hold Status:  Active Orders   N/A       ED Meds:  Medications   nicotine (NICORETTE) lozenge 4 mg (4 mg Buccal $Given 3/25/23 1424)   buPROPion (WELLBUTRIN XL) 24 hr tablet 150 mg (150 mg Oral $Given 3/25/23 1237)   QUEtiapine (SEROquel XR) 24 hr tablet 200 mg (has no administration in time range)   OLANZapine (zyPREXA) tablet 10 mg (has no administration in time range)   risperiDONE (risperDAL) tablet 3 mg (has no administration in time range)   OLANZapine zydis (zyPREXA) ODT tab 5 mg (5 mg Oral $Given 3/21/23 2146)   LORazepam (ATIVAN) tablet 1 mg (1 mg Oral $Given 3/21/23 2147)   hydrOXYzine (ATARAX) tablet 50 mg (50 mg Oral $Given 3/21/23 2146)   ziprasidone (GEODON) injection 20 mg (20 mg Intramuscular Not Given 3/22/23 2052)   LORazepam (ATIVAN) tablet 1 mg (1 mg Oral $Given 3/22/23 2053)   LORazepam (ATIVAN) tablet 1 mg (1 mg Oral $Given 3/23/23 0418)   LORazepam (ATIVAN) tablet 1 " mg (1 mg Oral $Given 3/23/23 1940)   LORazepam (ATIVAN) tablet 1 mg (1 mg Oral $Given 3/24/23 1603)   LORazepam (ATIVAN) tablet 1 mg (1 mg Oral $Given 3/25/23 1154)     No orders to display       DEC continues to looks for inpatient mental health bed.     Patient given po Ativan and nicotine lozenge - no significant events during shift.     Patient was signed out to the oncoming provider, Dr. Mairusz Hernandez at shift change.    Impression:    ICD-10-CM    1. Suicidal ideation  R45.851           Plan:    1. Awaiting inpatient mental health admission/transfer.       Ivis Umaña MD  03/27/23 0846

## 2023-03-28 ENCOUNTER — HOSPITAL ENCOUNTER (INPATIENT)
Facility: CLINIC | Age: 38
LOS: 13 days | Discharge: PSYCHIATRIC OR SUBSTANCE USE FACILITY | End: 2023-04-10
Attending: PSYCHIATRY & NEUROLOGY | Admitting: PSYCHIATRY & NEUROLOGY
Payer: COMMERCIAL

## 2023-03-28 DIAGNOSIS — L02.419 CELLULITIS AND ABSCESS OF LEG: ICD-10-CM

## 2023-03-28 DIAGNOSIS — K21.9 GASTROESOPHAGEAL REFLUX DISEASE WITHOUT ESOPHAGITIS: ICD-10-CM

## 2023-03-28 DIAGNOSIS — F25.9 SCHIZOAFFECTIVE DISORDER, CHRONIC CONDITION WITH ACUTE EXACERBATION (H): Primary | ICD-10-CM

## 2023-03-28 DIAGNOSIS — F15.951 AMPHETAMINE-INDUCED PSYCHOTIC DISORDER WITH HALLUCINATIONS (H): ICD-10-CM

## 2023-03-28 DIAGNOSIS — L03.119 CELLULITIS AND ABSCESS OF LEG: ICD-10-CM

## 2023-03-28 DIAGNOSIS — F15.20 METHAMPHETAMINE USE DISORDER, SEVERE (H): ICD-10-CM

## 2023-03-28 PROCEDURE — 250N000013 HC RX MED GY IP 250 OP 250 PS 637: Performed by: PSYCHIATRY & NEUROLOGY

## 2023-03-28 PROCEDURE — 250N000013 HC RX MED GY IP 250 OP 250 PS 637: Performed by: EMERGENCY MEDICINE

## 2023-03-28 PROCEDURE — G0177 OPPS/PHP; TRAIN & EDUC SERV: HCPCS

## 2023-03-28 PROCEDURE — 99221 1ST HOSP IP/OBS SF/LOW 40: CPT | Mod: AI | Performed by: PSYCHIATRY & NEUROLOGY

## 2023-03-28 PROCEDURE — 124N000002 HC R&B MH UMMC

## 2023-03-28 RX ORDER — LANOLIN ALCOHOL/MO/W.PET/CERES
3 CREAM (GRAM) TOPICAL
Status: DISCONTINUED | OUTPATIENT
Start: 2023-03-28 | End: 2023-04-10 | Stop reason: HOSPADM

## 2023-03-28 RX ORDER — MAGNESIUM HYDROXIDE/ALUMINUM HYDROXICE/SIMETHICONE 120; 1200; 1200 MG/30ML; MG/30ML; MG/30ML
30 SUSPENSION ORAL EVERY 4 HOURS PRN
Status: DISCONTINUED | OUTPATIENT
Start: 2023-03-28 | End: 2023-04-10 | Stop reason: HOSPADM

## 2023-03-28 RX ORDER — ACETAMINOPHEN 325 MG/1
650 TABLET ORAL EVERY 4 HOURS PRN
Status: DISCONTINUED | OUTPATIENT
Start: 2023-03-28 | End: 2023-04-10 | Stop reason: HOSPADM

## 2023-03-28 RX ORDER — HYDROXYZINE HYDROCHLORIDE 50 MG/1
50 TABLET, FILM COATED ORAL 3 TIMES DAILY PRN
Status: DISCONTINUED | OUTPATIENT
Start: 2023-03-28 | End: 2023-04-10 | Stop reason: HOSPADM

## 2023-03-28 RX ORDER — AMOXICILLIN 250 MG
1 CAPSULE ORAL 2 TIMES DAILY PRN
Status: DISCONTINUED | OUTPATIENT
Start: 2023-03-28 | End: 2023-04-10 | Stop reason: HOSPADM

## 2023-03-28 RX ORDER — BUPROPION HYDROCHLORIDE 150 MG/1
150 TABLET, EXTENDED RELEASE ORAL 2 TIMES DAILY
Status: DISCONTINUED | OUTPATIENT
Start: 2023-03-28 | End: 2023-03-31

## 2023-03-28 RX ORDER — OLANZAPINE 10 MG/1
10 TABLET ORAL AT BEDTIME
Status: DISCONTINUED | OUTPATIENT
Start: 2023-03-28 | End: 2023-04-10 | Stop reason: HOSPADM

## 2023-03-28 RX ORDER — LORAZEPAM 1 MG/1
1 TABLET ORAL DAILY PRN
Status: DISCONTINUED | OUTPATIENT
Start: 2023-03-28 | End: 2023-03-30

## 2023-03-28 RX ORDER — OLANZAPINE 10 MG/2ML
10 INJECTION, POWDER, FOR SOLUTION INTRAMUSCULAR 3 TIMES DAILY PRN
Status: DISCONTINUED | OUTPATIENT
Start: 2023-03-28 | End: 2023-04-10 | Stop reason: HOSPADM

## 2023-03-28 RX ORDER — OLANZAPINE 10 MG/1
10 TABLET ORAL 3 TIMES DAILY PRN
Status: DISCONTINUED | OUTPATIENT
Start: 2023-03-28 | End: 2023-04-10 | Stop reason: HOSPADM

## 2023-03-28 RX ORDER — QUETIAPINE FUMARATE 200 MG/1
200 TABLET, FILM COATED ORAL AT BEDTIME
Status: DISCONTINUED | OUTPATIENT
Start: 2023-03-28 | End: 2023-03-29

## 2023-03-28 RX ADMIN — BUPROPION HYDROCHLORIDE 150 MG: 150 TABLET, EXTENDED RELEASE ORAL at 11:54

## 2023-03-28 RX ADMIN — BUPROPION HYDROCHLORIDE 150 MG: 150 TABLET, FILM COATED, EXTENDED RELEASE ORAL at 00:58

## 2023-03-28 RX ADMIN — NICOTINE POLACRILEX 4 MG: 4 LOZENGE ORAL at 21:06

## 2023-03-28 RX ADMIN — OLANZAPINE 10 MG: 10 TABLET, FILM COATED ORAL at 19:00

## 2023-03-28 RX ADMIN — LORAZEPAM 1 MG: 1 TABLET ORAL at 12:48

## 2023-03-28 RX ADMIN — QUETIAPINE FUMARATE 200 MG: 100 TABLET ORAL at 00:59

## 2023-03-28 RX ADMIN — QUETIAPINE FUMARATE 200 MG: 200 TABLET ORAL at 21:21

## 2023-03-28 RX ADMIN — NICOTINE POLACRILEX 4 MG: 4 LOZENGE ORAL at 12:48

## 2023-03-28 RX ADMIN — NICOTINE POLACRILEX 4 MG: 4 LOZENGE ORAL at 13:53

## 2023-03-28 RX ADMIN — NICOTINE POLACRILEX 4 MG: 4 LOZENGE ORAL at 19:00

## 2023-03-28 RX ADMIN — NICOTINE POLACRILEX 4 MG: 4 LOZENGE ORAL at 15:20

## 2023-03-28 RX ADMIN — NICOTINE POLACRILEX 4 MG: 4 LOZENGE ORAL at 17:54

## 2023-03-28 RX ADMIN — NICOTINE POLACRILEX 4 MG: 4 LOZENGE ORAL at 00:55

## 2023-03-28 RX ADMIN — BUPROPION HYDROCHLORIDE 150 MG: 150 TABLET, EXTENDED RELEASE ORAL at 19:00

## 2023-03-28 RX ADMIN — OLANZAPINE 10 MG: 10 TABLET, FILM COATED ORAL at 21:06

## 2023-03-28 RX ADMIN — NICOTINE POLACRILEX 4 MG: 4 LOZENGE ORAL at 20:01

## 2023-03-28 ASSESSMENT — ACTIVITIES OF DAILY LIVING (ADL)
FALL_HISTORY_WITHIN_LAST_SIX_MONTHS: NO
DOING_ERRANDS_INDEPENDENTLY_DIFFICULTY: NO
DRESS: SCRUBS (BEHAVIORAL HEALTH)
ADLS_ACUITY_SCORE: 28
HYGIENE/GROOMING: INDEPENDENT
WEAR_GLASSES_OR_BLIND: NO
ADLS_ACUITY_SCORE: 28
HYGIENE/GROOMING: INDEPENDENT
ADLS_ACUITY_SCORE: 28
ORAL_HYGIENE: INDEPENDENT
DRESSING/BATHING_DIFFICULTY: NO
DIFFICULTY_EATING/SWALLOWING: NO
DIFFICULTY_COMMUNICATING: NO
TOILETING_ISSUES: NO
WALKING_OR_CLIMBING_STAIRS_DIFFICULTY: NO
ADLS_ACUITY_SCORE: 28
ADLS_ACUITY_SCORE: 28
ORAL_HYGIENE: INDEPENDENT
DRESS: SCRUBS (BEHAVIORAL HEALTH);INDEPENDENT
ADLS_ACUITY_SCORE: 28
CONCENTRATING,_REMEMBERING_OR_MAKING_DECISIONS_DIFFICULTY: NO
ADLS_ACUITY_SCORE: 28
ADLS_ACUITY_SCORE: 28
ADLS_ACUITY_SCORE: 35
ADLS_ACUITY_SCORE: 28
HEARING_DIFFICULTY_OR_DEAF: NO
CHANGE_IN_FUNCTIONAL_STATUS_SINCE_ONSET_OF_CURRENT_ILLNESS/INJURY: NO

## 2023-03-28 NOTE — ED NOTES
"[facility] ED Mental Health Handoff Note:     Assuming care from: Dr Sabiha Berrios    Brief HPI: 37 year old male signed out to me by the above provider. See initial ED Provider note for full details of the presentation.   In brief, patient presented for suicidal ideation     \"Patient reports he has been feeling suicidal and hearing voices in his head for the past few days. He denies any specific plan with this, and states he'd \"just rather not be around.\" He states that he ran out of his Zyprexa, Ativan, Hydroxyzine, and Seroquel 2 weeks ago. He notes that he was told by his pharmacy that these medications would be ready by yesterday, but this was not the case. He has not tried to contact his [facility] clinic who prescribes these medications. Patient denies any other complaints.\"    See Dr. Flowers's note from 3/21 for full HPI    Home meds reviewed and ordered/administered: Yes  Medically stable for inpatient mental health admission: Yes.  Evaluated by mental health: Yes. The recommendation is for inpatient mental health treatment. Bed search in process  Safety concerns: At the time I received sign out, there were no safety concerns.    Hold Status:  Active Orders   Legal    Health Officer Authority to Detain (MARCIE)     Frequency: Effective Now     Start Date/Time: 03/27/23 2245      Number of Occurrences: Until Specified     Labs/Imaging:   No results found for this visit on 03/21/23 (from the past 24 hour(s)).    ED Meds:  Medications   nicotine (NICORETTE) lozenge 4 mg (4 mg Buccal $Given 3/28/23 0055)   risperiDONE (risperDAL) tablet 3 mg (3 mg Oral Not Given 3/28/23 0057)   buPROPion (WELLBUTRIN SR) 12 hr tablet 150 mg (150 mg Oral $Given 3/28/23 0058)   OLANZapine (zyPREXA) tablet 10 mg (10 mg Oral $Given 3/27/23 2212)     Or   OLANZapine zydis (zyPREXA) ODT tab 10 mg ( Oral See Alternative 3/27/23 2212)   QUEtiapine (SEROquel) tablet 200 mg (200 mg Oral $Given 3/28/23 0059)   OLANZapine zydis (zyPREXA) " ODT tab 5 mg (has no administration in time range)   OLANZapine zydis (zyPREXA) ODT tab 5 mg (5 mg Oral $Given 3/21/23 2146)   LORazepam (ATIVAN) tablet 1 mg (1 mg Oral $Given 3/21/23 2147)   hydrOXYzine (ATARAX) tablet 50 mg (50 mg Oral $Given 3/21/23 2146)   ziprasidone (GEODON) injection 20 mg (20 mg Intramuscular Not Given 3/22/23 2052)   LORazepam (ATIVAN) tablet 1 mg (1 mg Oral $Given 3/22/23 2053)   LORazepam (ATIVAN) tablet 1 mg (1 mg Oral $Given 3/23/23 0418)   LORazepam (ATIVAN) tablet 1 mg (1 mg Oral $Given 3/23/23 1940)   LORazepam (ATIVAN) tablet 1 mg (1 mg Oral $Given 3/24/23 1603)   LORazepam (ATIVAN) tablet 1 mg (1 mg Oral $Given 3/25/23 1154)   LORazepam (ATIVAN) tablet 1 mg (1 mg Oral $Given 3/25/23 1726)   ibuprofen (ADVIL/MOTRIN) tablet 600 mg (600 mg Oral $Given 3/25/23 1937)   LORazepam (ATIVAN) tablet 1 mg (1 mg Oral $Given 3/26/23 1550)   LORazepam (ATIVAN) tablet 1 mg (1 mg Oral $Given 3/26/23 2138)   LORazepam (ATIVAN) tablet 1 mg (1 mg Oral $Given 3/27/23 1653)     No orders to display     ED Course:  ED Course as of 03/28/23 0125   Wed Mar 22, 2023   2043 Extended care called.  There apparently are numerous people on their wait list and he will not be assessed all night tonight.  Patient requests Ativan to help relax overnight.   Mon Mar 27, 2023   1622 Spoke w extended care - presenting him to 3A. Pt states sx worse while waiting. Still c/o internal stimuli.       10:07 PM The patient has been accepted by Dr. Gore at [facility] station 12    12:54 AM Transport is here for the patient    There were no significant events during my shift.  Impression:    ICD-10-CM    1. Suicidal ideation  R45.851           Plan:    1. Awaiting inpatient mental health admission/transfer.    Al Gray MD  [facility] EMERGENCY DEPARTMENT  [facility] [facility] [id number] [phone]                   Al Gray MD  03/28/23 0129

## 2023-03-28 NOTE — ED NOTES
"Pt's brother \"Guru\" is here to visit pt. Visitors belongings were searched by security prior to being let back into pt room. Awaiting transfer crew, for transportation to [place].  "

## 2023-03-28 NOTE — PLAN OF CARE
"  Problem: Adult Behavioral Health Plan of Care  Goal: Develops/Participates in Therapeutic [place] to Support Successful Transition  Intervention: Foster Therapeutic [place]  Recent Flowsheet Documentation  Taken 3/28/2023 1315 by Kate Senior RN  Trust Relationship/Rapport:    care explained    choices provided    emotional support provided    empathic listening provided    questions answered    questions encouraged    reassurance provided    thoughts/feelings acknowledged   Goal Outcome Evaluation:    Plan of Care Reviewed With: patient      Pt is new to the unit as of early this morning. He came in on Night shift. He is pleasant and appropriate. He slept in unitl approx lunch. Writer attempted to wake the pt around 1030, and he did not get up.  Pt denied all mental health symptoms, including AVH and did not appear to be responding. He did request an ativan 1248 for a \"painic attack\". He stated that when he woke up he felt all out of sorts, and needed the med to help him calm down.  He did not eat breakfast, and ate all of his lunch, he took his only scheduled med without issue, requested PRN nicotine, and had no other acute physical or behavioral concerns this shift.       "

## 2023-03-28 NOTE — PROGRESS NOTES
Pr requesting nicotine lozenge, wellbutrin and decided he did want seroquel after refusing it earlier this hour; report given to Lara KUMAR RN

## 2023-03-28 NOTE — CARE CONFERENCE
Initial Psychosocial Assessment    I have reviewed the chart, met with the patient, and developed Care Plan.  Information for assessment was obtained from:     Patient: On the unit    Presenting Problem:  Per Chart Review-Patient was Increasingly psychotic this past week with diagnoses of schizophrenia with meth use.  Hearing voices and at times having suicidal thoughts with no plan. Intermittent paranoia. Patient was staying at an [facility]RTs where he barricaded himself in his room on 3/21 prior to the police being called to bring him to the ER. Patient reports he was brought to the ED due to his meth use.    History of Mental Health and Chemical Dependency:  Patient reports recent meth use and a 9 year history of meth use. Per chart review patient has mental health history of schizophrenia. Patient is currently on MICD [facility].    Family Description (Constellation, Family Psychiatric History):  Patient denies family psychiatric history    Significant Life Events (Illness, Abuse, Trauma, Death):  Patient denies any significant life events. Patient reports most of his family does not communicate with him due to his drug use which causes him to feel lonely and depressed. Patient reports his daughters don't talk to him.     Living Situation:  Patient reports he was living in an [facility] facility.     Educational Background:  Patient report he attending college studying [employer] and did not complete his degree.    Occupational History:  Patient reports he is unemployed.    Financial Status:  Patient reports he is receiving government assistance.    Legal Issues:  Patient denies any current legal issues.    Ethnic/Cultural Issues:  Patient reports being Native/    Spiritual Orientation:  Patient reports he is spiritual and believes in a higher power     Service History:  Patient denies  history    Social Functioning (organization, interests):  Patient reports he used to enjoy  [employer], hockey, and inline skating.    Current Treatment Providers are:  Patient denies having a current treatment team     Social Service Assessment/Plan:  Patient will need MICD Treatment and psychiatry appointment

## 2023-03-28 NOTE — PLAN OF CARE
03/28/23 1603   Group Therapy Session   Group Attendance attended group session   Total Time (minutes) 60   Total # Attendees 4   Group Type psychoeducation   Group Topic Covered coping skills/lifestyle management;structured socialization   Group Session Detail topic group   Patient Response/Contribution cooperative with task     Pt readily attended self awareness discussion group with invitation.  Pt completed worksheet, shared answers, though opted not to elaborate much.  Pt reports he is hopeful to transfer to a CD treatment program soon.  Regarding activity, pt described himself as: challenged, confused, and strong.  Reports he is grateful for: air, medications, and life.  Pt acknowledges he has trust issues, and doesn't trust many people, though didn't want to discuss.
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
## 2023-03-28 NOTE — PLAN OF CARE
A               Admission:  I am responsible for any personal items that are not sent to the safe or pharmacy.  [facility] is not responsible for loss, theft or damage of any property in my possession.    Signature:  _________________________________ Date: _______  Time: _____                                              Staff Signature:  ____________________________ Date: ________  Time: _____      2nd Staff person, if patient is unable/unwilling to sign:    Signature: ________________________________ Date: ________  Time: _____     Discharge:  [facility] has returned all of my personal belongings:    Signature: _________________________________ Date: ________  Time: _____                                          Staff Signature:  ____________________________ Date: ________  Time: _____     [employer]woods nataliya mossin johnson sweat shirt  Cell phone- samsung black screen cracked  Cell phone-samsung black screen cracked  Cell phone- abe device blue screen cracked  2 cell phone chargers  Electric toothbrush  Toothpaste  Sanya waltersister  Walsh t-shirt  Nike black and orange shoes  Jeans  Belt  Lighter  3 bus tokens

## 2023-03-28 NOTE — TELEPHONE ENCOUNTER
"R: Patient cleared and ready for behavioral bed placement: Yes    8:30pm Intake called [facility] to have the charge nurse review for admission.     9:06pm Intake received a call from [facility] charge reporting they will be able to admit this pt.     9:08pm Intake messaged Dr. Rodriguez.     9:10pm Intake received a call from Dr. Rodriguez requesting intake to follow-up with the pt's nurse to inform them that the pt will not be able to have their three phones on the unit.     9:12pm Intake called [facility] and spoke with the pt's nurse regarding the pt not being able to have their three phones on the unit. The nurse spoke with the pt and he reports that he is aware of this rule and will be fine with storing it away.     9:22pm Intake received a message from Dr. Rodriguez \"[9:22 PM] Rosa Rodriguez, [phone] accepted to 12[facility] [/Sofía].\"    9:29pm Intake called [facility]. Nurse report: Anytime.     9:30pm Intake called [facility] and provided placement information to [facility].   "

## 2023-03-28 NOTE — PHARMACY-ADMISSION MEDICATION HISTORY
Please see Admission Medication History note completed by a pharmacist on 03/21/2023 under previous encounter at [facility] for information regarding prior to admission medications.    Warren XieD, [facility]  Behavioral Health Pharmacist  [facility] Ascom: *[phone] or [phone]

## 2023-03-29 PROCEDURE — 124N000002 HC R&B MH UMMC

## 2023-03-29 PROCEDURE — G0177 OPPS/PHP; TRAIN & EDUC SERV: HCPCS

## 2023-03-29 PROCEDURE — 250N000013 HC RX MED GY IP 250 OP 250 PS 637: Performed by: PSYCHIATRY & NEUROLOGY

## 2023-03-29 PROCEDURE — 250N000013 HC RX MED GY IP 250 OP 250 PS 637

## 2023-03-29 PROCEDURE — 99232 SBSQ HOSP IP/OBS MODERATE 35: CPT | Mod: GC | Performed by: PSYCHIATRY & NEUROLOGY

## 2023-03-29 RX ADMIN — BUPROPION HYDROCHLORIDE 150 MG: 150 TABLET, EXTENDED RELEASE ORAL at 19:06

## 2023-03-29 RX ADMIN — NICOTINE POLACRILEX 4 MG: 4 LOZENGE ORAL at 12:35

## 2023-03-29 RX ADMIN — NICOTINE POLACRILEX 4 MG: 4 LOZENGE ORAL at 20:02

## 2023-03-29 RX ADMIN — OLANZAPINE 10 MG: 10 TABLET, FILM COATED ORAL at 20:13

## 2023-03-29 RX ADMIN — NICOTINE POLACRILEX 4 MG: 4 LOZENGE ORAL at 15:48

## 2023-03-29 RX ADMIN — LORAZEPAM 1 MG: 1 TABLET ORAL at 13:59

## 2023-03-29 RX ADMIN — NICOTINE POLACRILEX 4 MG: 4 LOZENGE ORAL at 17:56

## 2023-03-29 RX ADMIN — BUPROPION HYDROCHLORIDE 150 MG: 150 TABLET, EXTENDED RELEASE ORAL at 11:06

## 2023-03-29 RX ADMIN — NICOTINE POLACRILEX 4 MG: 4 LOZENGE ORAL at 13:59

## 2023-03-29 RX ADMIN — NICOTINE POLACRILEX 4 MG: 4 LOZENGE ORAL at 16:53

## 2023-03-29 RX ADMIN — QUETIAPINE 300 MG: 200 TABLET ORAL at 20:13

## 2023-03-29 RX ADMIN — OLANZAPINE 10 MG: 10 TABLET, FILM COATED ORAL at 16:53

## 2023-03-29 RX ADMIN — HYDROXYZINE HYDROCHLORIDE 50 MG: 50 TABLET, FILM COATED ORAL at 16:53

## 2023-03-29 ASSESSMENT — ACTIVITIES OF DAILY LIVING (ADL)
ADLS_ACUITY_SCORE: 28
ADLS_ACUITY_SCORE: 28
HYGIENE/GROOMING: INDEPENDENT;SHOWER
ORAL_HYGIENE: INDEPENDENT
HYGIENE/GROOMING: INDEPENDENT
ADLS_ACUITY_SCORE: 28
DRESS: INDEPENDENT
ADLS_ACUITY_SCORE: 28
DRESS: SCRUBS (BEHAVIORAL HEALTH);INDEPENDENT
LAUNDRY: WITH SUPERVISION
ORAL_HYGIENE: INDEPENDENT
ADLS_ACUITY_SCORE: 28

## 2023-03-29 NOTE — PROGRESS NOTES
----------------------------------------------------------------------------------------------------------  [facility]  Psychiatric Progress Note  Hospital Day #1     Subjective   The patient's care was discussed with the treatment team and chart notes were reviewed.     Sleep: 7 hours (03/29/23 0600)  Scheduled meds: adherent  PRN meds: None    Per Staff report: He had some drug seeking behaviors asking for Ativan 3 times a day. He already has a PRN once a day for 10mg order from the ED.     Patient interview:  Patient was interviewed in his room. He denied to talk to the author in the morning. I came back in the afternoon and we talked then. He denied any psychiatric symptoms for now but he endorsed he had AH and SI when he came to ED last week due to meth use. He states that he doesn't like to be back to [facility] and he likes to follow his treatment with [facility] was graduated after 8 month last year. He mentioned his  in the [facility] is Cynthia Piedra and Silverio Crenshaw (?) was his psychiatrist who they have not since 5-6 months ago.    He denies any current AH/VH/SI/HI and defines his goal to be admitted to [facility].     The risks, benefits, alternatives, and side effects of treatments including no treatment have been discussed and are understood by the patient and other caregivers.    Review of systems:  Complete psychiatric ROS is negative unless otherwise noted above.      Objective   BP (!) 142/81 (Patient Position: Chair)   Pulse 96   Temp 97.5  F (36.4  C) (Oral)   Resp 16   Wt 75.3 kg (166 lb)   SpO2 96%   BMI 27.62 kg/m    Weight is 166 lbs 0 oz  Body mass index is 27.62 kg/m .  Psychiatric Examination:  Appearance:  awake, alert, adequately groomed and dressed in hospital scrubs  Muscle Strength and Tone: normal  Gait and Station: Normal  Behavior (Psychomotor):  no evidence of tardive dyskinesia, dystonia, or tics  Eye  Contact:  good   Speech:  clear, coherent  Mood:  better  Affect:  mood congruent but restricted  Attitude:  guarded and somewhat cooperative while mildly paranoid  Thought Process:  logical, linear and goal oriented  Thought Content:  no evidence of suicidal ideation or homicidal ideation  Associations:  no loose associations  Insight:  limited  Judgment:  fair  Oriented to:  time, person, and place  Attention Span and Concentration:  fair  Recent and Remote Memory:  fair  Language: Fluent in English with appropriate syntax and vocabulary.  Fund of Knowledge: low-normal    No results found for this or any previous visit (from the past 24 hour(s)).     Assessment             Hamzah Roberts is a 37 year old  male with a past psychiatric history of schizophrenia with paranoia, schizoaffective disorder, TBI, stimulant use disorder admitted to the Unit 12 from [facility] ED via walk in on 03/28/2023 (was in ED since 3/21) due to concern for SI, out of control behaviors, aggression and psychosis in the context of medication non-adherence and substance use.    Hospital course: Hamzah Roberts was admitted to station 12 as a voluntary patient is treated in the therapeutic milieu with appropriate individual and group therapies.       Medical course: He was medically cleared by the ED prior to admission to the unit.    PTA Medications (Continued):      Medication Sig Note Last Dose     baclofen (LIORESAL) 10 MG tablet Take 10 mg by mouth 3 times daily   Past Month     buPROPion (WELLBUTRIN XL) 150 MG 24 hr tablet Take 1 tablet by mouth daily at 2 pm 3/21/2023: Appears to be for 2 tablets daily but pt thinks he is taking 1 tablet daily More than a month     hydrOXYzine (VISTARIL) 50 MG capsule Take 50 mg by mouth 3 times daily as needed   Unknown     OLANZapine (ZYPREXA) 10 MG tablet Take 1 tablet (10 mg) by mouth At Bedtime   More than a month     omeprazole (PRILOSEC) 20 MG DR capsule Take 20 mg by mouth daily   More  than a month     QUEtiapine (SEROQUEL) 200 MG tablet Take 200 mg by mouth At Bedtime   More than a month       Plan     Update  He is voluntary here, but he has a MICD commitment that is not revoked yet.  Per nursing, His county  was not informed about this admission.  He had three antipsychotics in his PTA when he was admitted and he reported he was taking two of them (not taking risperidone). There is still confusion on his PTA regimen.     Today's Changes:   Seroquel increase to 300mg at bedtime  CD Consult    Principal Diagnosis:   Drug induced psychosis, severe    Secondary psychiatric diagnoses of concern this admission:  Schizophrenia     Psychotropic medications  Modifications  Seroquel increase to 300mg QHS    Continue  PTA meds    Legal Status: Voluntary    Safety Assessment:   Behavioral Orders   Procedures     Cheeking Precautions (behavioral units)     Code 1 - Restrict to Unit     Routine Programming     As clinically indicated     Status 15     Every 15 minutes.     Suicide precautions     Patients on Suicide Precautions should have a Combination Diet ordered that includes a Diet selection(s) AND a Behavioral Tray selection for Safe Tray - with utensils, or Safe Tray - NO utensils          Disposition: TBD, pending clinical stabilization, medication optimization, and formulation of a safe discharge plan.     Patient seen and discussed with my attending physician, Dr. Sampson who is in agreement with my assessment and plan.    Scott Barrientos MD  PGY-2 Psychiatry Resident    Attending Attestation:

## 2023-03-29 NOTE — PROGRESS NOTES
CLINICAL NUTRITION SERVICES - BRIEF NOTE     Nutrition Prescription    RECOMMENDATIONS FOR MDs/PROVIDERS TO ORDER:  Could consider daily MVI to optimize nutrition status.    RD to sign off at this time. Please consult RD if nutrition concerns arise.          REASON FOR ASSESSMENT  Hamzah Roberts is a/an 37 year old male assessed by the dietitian for Admission Nutrition Risk Screen for positive : unsure weight loss, no decreased appetite     EVALUATION OF THE PROGRESS TOWARD GOALS   Diet: Regular     FINDINGS   Per chart review, patient continues to endorse no decreases or changes in his appetite. He reports some intermittently poor oral intakes PTA related to anxiety but he is tolerating adequate oral intakes on the unit without concern.     Weights:  Wt Readings from Last 15 Encounters:   03/28/23 75.3 kg (166 lb)   03/21/23 76.2 kg (168 lb)   01/23/23 75.5 kg (166 lb 8 oz)   12/29/22 71.7 kg (158 lb)   11/27/22 72.6 kg (160 lb)   11/17/22 71.7 kg (158 lb)   08/17/21 68.5 kg (151 lb)   06/04/21 68.3 kg (150 lb 8 oz)   02/02/21 65.8 kg (145 lb)   No weight loss noted.    Labs reviewed. Meds reviewed.     Svitlana Kern, MPH, RD, LD  Behavioral Health Dietitian   Pager: [phone]  Weekend/holiday pager: [phone]

## 2023-03-29 NOTE — PLAN OF CARE
Problem: Adult Behavioral Health Plan of Care  Goal: Develops/Participates in Therapeutic [place] to Support Successful Transition  Intervention: [facility] Therapeutic [place]  Recent Flowsheet Documentation  Taken 3/29/2023 1115 by Kate Senior RN  Trust Relationship/Rapport:    care explained    choices provided    emotional support provided    empathic listening provided    questions answered    questions encouraged    reassurance provided    thoughts/feelings acknowledged   Goal Outcome Evaluation:    Plan of Care Reviewed With: patient      Pt had an uneventful shift. He remained mostly isolative in his room. He came out to request PRN medications and make needs know. He presents as anxious with consistent mood. He initially denied all mental health symptoms, AVH , and wasn't responding. At 1400 he came to the med window requesting PRN Ativan for 7/10 anxiety. Pt then went to lay down in his room to watch TV.   Pt took a shower this shift, ate and drank well, denied pain, and had maintained appropriate behavioral control.   Pt had no other acute physical or behavioral concerns this shift.   /86 (BP Location: Left arm, Patient Position: Sitting, Cuff Size: Adult Regular)   Pulse 104   Temp 98.4  F (36.9  C) (Temporal)   Resp 18   Wt 75.3 kg (166 lb)   SpO2 98%   BMI 27.62 kg/m

## 2023-03-29 NOTE — PLAN OF CARE
Goal Outcome Evaluation:    Plan of Care Reviewed With: patient        Problem: Adult Behavioral Health Plan of Care  Goal: Develops/Participates in Therapeutic [place] to Support Successful Transition  Intervention: [facility] Therapeutic [place]  Recent Flowsheet Documentation  Taken 3/28/2023 1900 by Kate Senior RN  Trust Relationship/Rapport:    care explained    choices provided    emotional support provided    empathic listening provided    questions answered    questions encouraged    reassurance provided    thoughts/feelings acknowledged      Pt had a good shift. He denied depression, SI/HI, AVH and did not appear to be responding. Pt did endorse anxiety several times, and requested ativan along with it. He stated that he takes Ativan 3x daily. Writer expressed that there was no order, and his PTA meds did not reflect his requests.   He did take Zyprexa PRN @2106 for 6/10 anxiety. He took all other scheduled meds, and requested PRN nicotine X3 this shift.   Pt ate and drank well, was interacting with peers and staff. He was walking around with another pt on POD 1 most of the shift. They would listen to each other when one was making a request, and the other would interject to try and staff split and get their demands/requests met.   Writer quickly let the pt know that the behavior was inappropriate.   Pt does have unusual facial movements. He did not acknowledge the writer asking why he was having the facial movements. It does not appear to be uncomfortable.   Pt had no other acute physical or behavioral concerns this shift.

## 2023-03-29 NOTE — PLAN OF CARE
Problem: Sleep Disturbance  Goal: Adequate Sleep/Rest  Outcome: Progressing   Goal Outcome Evaluation:    Pt appeared to sleep 7 hours overnight with no problems observed during safety checks.     No concerns reported.     No prns administered.     Will continue to monitor and assess pt safety, mental status, and any medical concerns.

## 2023-03-30 PROCEDURE — 250N000013 HC RX MED GY IP 250 OP 250 PS 637: Performed by: PSYCHIATRY & NEUROLOGY

## 2023-03-30 PROCEDURE — 250N000013 HC RX MED GY IP 250 OP 250 PS 637

## 2023-03-30 PROCEDURE — 99232 SBSQ HOSP IP/OBS MODERATE 35: CPT | Performed by: PSYCHIATRY & NEUROLOGY

## 2023-03-30 PROCEDURE — 124N000002 HC R&B MH UMMC

## 2023-03-30 PROCEDURE — H0001 ALCOHOL AND/OR DRUG ASSESS: HCPCS

## 2023-03-30 RX ORDER — LORAZEPAM 0.5 MG/1
0.5 TABLET ORAL DAILY PRN
Status: DISCONTINUED | OUTPATIENT
Start: 2023-03-30 | End: 2023-04-10 | Stop reason: HOSPADM

## 2023-03-30 RX ADMIN — NICOTINE POLACRILEX 4 MG: 4 LOZENGE ORAL at 19:19

## 2023-03-30 RX ADMIN — OLANZAPINE 10 MG: 10 TABLET, FILM COATED ORAL at 19:19

## 2023-03-30 RX ADMIN — BUPROPION HYDROCHLORIDE 150 MG: 150 TABLET, EXTENDED RELEASE ORAL at 10:53

## 2023-03-30 RX ADMIN — NICOTINE POLACRILEX 4 MG: 4 LOZENGE ORAL at 17:15

## 2023-03-30 RX ADMIN — LORAZEPAM 1 MG: 1 TABLET ORAL at 12:09

## 2023-03-30 RX ADMIN — NICOTINE POLACRILEX 4 MG: 4 LOZENGE ORAL at 11:19

## 2023-03-30 RX ADMIN — NICOTINE POLACRILEX 4 MG: 4 LOZENGE ORAL at 14:25

## 2023-03-30 RX ADMIN — ALUMINUM HYDROXIDE, MAGNESIUM HYDROXIDE, AND DIMETHICONE 30 ML: 200; 20; 200 SUSPENSION ORAL at 11:16

## 2023-03-30 RX ADMIN — NICOTINE POLACRILEX 4 MG: 4 LOZENGE ORAL at 13:45

## 2023-03-30 RX ADMIN — QUETIAPINE 300 MG: 200 TABLET ORAL at 19:19

## 2023-03-30 RX ADMIN — BUPROPION HYDROCHLORIDE 150 MG: 150 TABLET, EXTENDED RELEASE ORAL at 17:00

## 2023-03-30 RX ADMIN — NICOTINE POLACRILEX 4 MG: 4 LOZENGE ORAL at 12:45

## 2023-03-30 ASSESSMENT — ACTIVITIES OF DAILY LIVING (ADL)
ADLS_ACUITY_SCORE: 28
HYGIENE/GROOMING: INDEPENDENT
ADLS_ACUITY_SCORE: 28
ADLS_ACUITY_SCORE: 28
ORAL_HYGIENE: INDEPENDENT
DRESS: INDEPENDENT
ADLS_ACUITY_SCORE: 28

## 2023-03-30 NOTE — PLAN OF CARE
"Occupational therapy group note     03/30/23 1600   Group Therapy Session   Group Attendance attended group session   Time Session Began 1600   Time Session Ended 1645   Total Time (minutes) 35   Total # Attendees 2   Group Type psychotherapeutic   Group Topic Covered coping skills/lifestyle management;leisure exploration/use of leisure time;relaxation techniques   Group Session Detail OT Group: paint by sticker activity for healthy leisure exploration, coping with symptoms through distraction, strategies for relaxation, fine motor, concentration, attention to detail, and opportunity to socialize with peers.   Patient Response/Contribution cooperative with task   Patient Participation Detail Patient was pleasant and independently completed one project  with good attention to detail and requested to start another, expressing he enjoyed this novel activity. He was polite and friendly and shared about the upcoming birth of his second daughter, which he is excited about. Patient stated twice, \"I'm thankful to be alive today, that's something.\"                    "

## 2023-03-30 NOTE — PROGRESS NOTES
----------------------------------------------------------------------------------------------------------  [facility]  Psychiatric Progress Note  Hospital Day #2     Subjective   The patient's care was discussed with the treatment team and chart notes were reviewed.     Sleep: 6.75 hours (03/30/23 0600)  Scheduled meds: adherent  PRN meds: None    Per Staff report: Patient had uneventful night, observed sleeping during safety checks with no complaints of pain, anxiety or discomfort.  Patient was cooperative with safety checks with no demonstrated behavioral disturbance, no outburst of anger, irritability, paranoia or physical/verbal aggression targeted at staff or peers. Patient endorses no SI/HI, A/VH or SIB. No symptoms of ronnie or psychosis reported or observed. Overall, patient achieved 6.75 hours of sleep Will continue to monitor and follow plan of care.     Patient interview:  Patient was interviewed in his room with Dr Gore. He denied any psychiatric symptoms for now but he endorsed he had AH and SI when he came to ED last week due to meth use. He states that he doesn't like to be back to [facility] and he likes to follow his treatment with [facility] where he was graduated after 8 month last year. He denies any current AH/VH/SI/HI and defines his goal to be admitted to [facility].     He was agreeable to reduce/stop Ativan to be able to get discharged to [facility] faster. He was informed about the potential for Wellbutrin to increase his psychosis risk, but he was thinking the benefit of this medication in giving him energy overweigsh the risk.      The risks, benefits, alternatives, and side effects of treatments including no treatment have been discussed and are understood by the patient and other caregivers.    Review of systems:  Complete psychiatric ROS is negative unless otherwise noted above.      Objective   /79 (BP Location: Left arm, Patient  "Position: Sitting, Cuff Size: Adult Regular)   Pulse 100   Temp 98.9  F (37.2  C) (Temporal)   Resp 18   Wt 75.3 kg (166 lb)   SpO2 97%   BMI 27.62 kg/m    Weight is 166 lbs 0 oz  Body mass index is 27.62 kg/m .  Psychiatric Examination:  Appearance:  awake, alert, adequately groomed and dressed in hospital scrubs  Muscle Strength and Tone: normal  Gait and Station: Normal  Behavior (Psychomotor):  no evidence of tardive dyskinesia, dystonia, or tics  Eye Contact:  good   Speech:  clear, coherent  Mood: \"I'm feeling better\"  Affect:  mood congruent but restricted  Attitude:  guarded and somewhat cooperative while mildly paranoid  Thought Process:  logical, linear and goal oriented  Thought Content:  no evidence of suicidal ideation or homicidal ideation  Associations:  no loose associations  Insight:  limited  Judgment:  fair  Oriented to:  time, person, and place  Attention Span and Concentration:  fair  Recent and Remote Memory:  fair  Language: Fluent in English with appropriate syntax and vocabulary.  Fund of Knowledge: low-normal    No results found for this or any previous visit (from the past 24 hour(s)).     Assessment             Hamzah Roberts is a 37 year old  male with a past psychiatric history of schizophrenia with paranoia, schizoaffective disorder, TBI, stimulant use disorder admitted to the Unit 12 from [facility] ED via walk in on 03/28/2023 (was in ED since 3/21) due to concern for SI, out of control behaviors, aggression and psychosis in the context of medication non-adherence and substance use.    Hospital course: Hamzah Roberts was admitted to station 12 as a voluntary patient is treated in the therapeutic milieu with appropriate individual and group therapies.       Medical course: He was medically cleared by the ED prior to admission to the unit.    PTA Medications (Continued):      Medication Sig Note Last Dose     baclofen (LIORESAL) 10 MG tablet Take 10 mg by mouth 3 times " daily   Past Month     buPROPion (WELLBUTRIN XL) 150 MG 24 hr tablet Take 1 tablet by mouth daily at 2 pm 3/21/2023: Appears to be for 2 tablets daily but pt thinks he is taking 1 tablet daily More than a month     hydrOXYzine (VISTARIL) 50 MG capsule Take 50 mg by mouth 3 times daily as needed   Unknown     OLANZapine (ZYPREXA) 10 MG tablet Take 1 tablet (10 mg) by mouth At Bedtime   More than a month     omeprazole (PRILOSEC) 20 MG DR capsule Take 20 mg by mouth daily   More than a month     QUEtiapine (SEROQUEL) 200 MG tablet Take 200 mg by mouth At Bedtime   More than a month       Plan     Update  He is voluntary here, but he has a MICD commitment that is not revoked yet.    He had three antipsychotics in his PTA when he was admitted and he reported he was taking two of them (not taking risperidone). There is still confusion on his PTA regimen. The goal is to get him to Seroquel monotherapy in the outpatient setting    Today's Changes:   Ativan decrease to 0.5mg QDay PRN  CD Consult    Principal Diagnosis:   Drug induced psychosis, severe    Secondary psychiatric diagnoses of concern this admission:  Schizophrenia     Psychotropic medications  Modifications  Seroquel increase to 300mg QHS    Continue  PTA meds    Legal Status: Voluntary but he has a MICD commitment with Son that is not revoked.      Safety Assessment:   Behavioral Orders   Procedures     [place] Precautions (behavioral units)     Code 1 - Restrict to Unit     Routine Programming     As clinically indicated     Status 15     Every 15 minutes.     Suicide precautions     Patients on Suicide Precautions should have a Combination Diet ordered that includes a Diet selection(s) AND a Behavioral Tray selection for Safe Tray - with utensils, or Safe Tray - NO utensils          Disposition: TBD, pending clinical stabilization, medication optimization, and formulation of a safe discharge plan.     Patient seen and discussed with my attending  physician, Dr. Gore who is in agreement with my assessment and plan.    Scott Barrientos MD  PGY-2 Psychiatry Resident    Attending Attestation:

## 2023-03-30 NOTE — PLAN OF CARE
Assessment/Intervention/Current Symptoms and Care Coordination    Team meeting  Patient was bright, and eager to go to treatment.        Discharge Plan or Goal  CD treatment            Barriers to Discharge   Symptom stabilization/Medication stabilitzation      Referral Status  CD referrals placed for [facility], [facility]        Legal Status  Full commitment no Jonny

## 2023-03-30 NOTE — PLAN OF CARE
Goal Outcome Evaluation:    Plan of Care Reviewed With: patient      Problem: Adult Behavioral Health Plan of Care  Goal: Plan of Care Review  Outcome: Progressing  Flowsheets (Taken 3/30/2023 1200)  Patient Agreement with Plan of Care: agrees  Pt was visible on the unit and social with peers and staff. Pt was medication compliant and he denied side effects. He had adequate intake both meals and fluids. He denied depression and anxiety earlier, but he late stated that he feels agitated and he was medicated with his prn ativan per his request, saying it is the only one that works. Dose has been changed to .5mg now.   He offered no other complaints. Will continue with plan of care.

## 2023-03-30 NOTE — PLAN OF CARE
Problem: Suicidal Behavior  Goal: Suicidal Behavior is Absent or Managed  Outcome: Adequate for Care Transition     Problem: Adult Behavioral Health Plan of Care  Goal: Optimized Coping Skills in Response to Life Stressors  Outcome: Adequate for Care Transition   Goal Outcome Evaluation:       Patient had flat affect. Mood was calm and cooperative at the early hours of the shift. Later patient's mood was elevated, anxious, and depressed. Patient socialized with selected peers, and paced on the hallway. Did not participate in group activities. Patient complained of anxiety 8/10 x 2 and was noted agitated. Denied pain, depression, covid 19 symptoms, SI/HI/SIB/AH/VH, and contracted for safety. Was given Zyprexa 10 mg for agitation, Hydroxyzine 50 mg for anxiety, Nicotine 4 mg for craving, and scheduled medications. Patient was given his time time medications early per patient request. Had good appetite. Will continue to monitor patient.
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
## 2023-03-30 NOTE — PLAN OF CARE
Problem: Adult Behavioral Health Plan of Care  Goal: Absence of New-Onset Illness or Injury  Outcome: Progressing  Note: Patient reports no new onset illness or injury     Problem: Sleep Disturbance  Goal: Adequate Sleep/Rest  Outcome: Progressing  Note: Patient seems to have slept minimally this night   Goal Outcome Evaluation:          Patient had uneventful night, observed sleeping during safety checks with no complaints of pain, anxiety or discomfort.  Patient was cooperative with safety checks with no demonstrated behavioral disturbance, no outburst of anger, irritability, paranoia or physical/verbal aggression targeted at staff or peers. Patient endorses no SI/HI, A/VH or SIB. No symptoms of ronnie or psychosis reported or observed. Overall, patient achieved 6.75 hours of sleep Will continue to monitor and follow plan of care.               Morgan Lopez DNP, RN.

## 2023-03-30 NOTE — CONSULTS
3/30/2023    CD consult completed.  Will send out referrals. ROIs emailed to BRITTANY Schmidt.     Recommendations:   1. Abstain from all non-prescribed mood-altering substances  2. Take all medications as prescribed  3. Enter and complete a residential or IOP with lodging treatment program  4. Follow all recommendations upon discharge from treatment. Recommendations may include, but are not limited to: extended treatment, outpatient treatment and/or sober housing.  5. Follow all recommendations/conditions of [facility] commitment  6. Follow all recommendations of your medical and mental health providers     Clinical Substantiation: Patient has unstable housing and lacks a sober living environment. Patient lacks long-term sober maintenance skills. Patient has dual issues of mental health and substance abuse, in which his mental health symptoms are exacerbated by his substance abuse. Patient has been in [facility] treatments and IRTS facilities on and off since his commitment began.      Referrals/Alternatives:  [facility] Team for Referrals  Phone: [phone] or [phone]  Fax: [phone]  Admissions email: [email or web address]  - pt open to all options     [facility] (IOP with lodging)  Phone: [phone]  Fax: [phone] [address] [place] [address], [address]  [place] [address]  ?[address]  [place] [address]  [email or web address]     NURIS consult completed by: She [facility] in [place].  Phone Number: [phone]  E-mail Address: [email or web address] Mental Health and Addiction Services Evaluation Department  [address] [place] [address]     *Due to regulation of Title 42 of the Code of Federal Regulations (CFR) Part 2: Confidentiality laws apply to this note and the information wherein.  Thus, this note cannot be copy and pasted into any other health care staff's note nor can it be included in general  medical records sent to ANY outside agency without the patient's written consent.

## 2023-03-31 PROCEDURE — 99233 SBSQ HOSP IP/OBS HIGH 50: CPT | Performed by: PSYCHIATRY & NEUROLOGY

## 2023-03-31 PROCEDURE — 250N000013 HC RX MED GY IP 250 OP 250 PS 637

## 2023-03-31 PROCEDURE — 124N000002 HC R&B MH UMMC

## 2023-03-31 PROCEDURE — 250N000013 HC RX MED GY IP 250 OP 250 PS 637: Performed by: PSYCHIATRY & NEUROLOGY

## 2023-03-31 RX ADMIN — NICOTINE POLACRILEX 4 MG: 4 LOZENGE ORAL at 18:51

## 2023-03-31 RX ADMIN — NICOTINE POLACRILEX 4 MG: 4 LOZENGE ORAL at 16:10

## 2023-03-31 RX ADMIN — BUPROPION HYDROCHLORIDE 150 MG: 150 TABLET, EXTENDED RELEASE ORAL at 08:11

## 2023-03-31 RX ADMIN — MELATONIN TAB 3 MG 3 MG: 3 TAB at 23:01

## 2023-03-31 RX ADMIN — NICOTINE POLACRILEX 4 MG: 4 LOZENGE ORAL at 20:04

## 2023-03-31 RX ADMIN — NICOTINE POLACRILEX 4 MG: 4 LOZENGE ORAL at 12:29

## 2023-03-31 RX ADMIN — LORAZEPAM 0.5 MG: 0.5 TABLET ORAL at 16:10

## 2023-03-31 RX ADMIN — OLANZAPINE 10 MG: 10 TABLET, FILM COATED ORAL at 19:23

## 2023-03-31 RX ADMIN — QUETIAPINE 300 MG: 200 TABLET ORAL at 19:23

## 2023-03-31 RX ADMIN — NICOTINE POLACRILEX 4 MG: 4 LOZENGE ORAL at 10:17

## 2023-03-31 RX ADMIN — HYDROXYZINE HYDROCHLORIDE 50 MG: 50 TABLET, FILM COATED ORAL at 12:29

## 2023-03-31 ASSESSMENT — ACTIVITIES OF DAILY LIVING (ADL)
ORAL_HYGIENE: INDEPENDENT
ADLS_ACUITY_SCORE: 28
ADLS_ACUITY_SCORE: 28
HYGIENE/GROOMING: INDEPENDENT
ADLS_ACUITY_SCORE: 28
DRESS: SCRUBS (BEHAVIORAL HEALTH);INDEPENDENT
HYGIENE/GROOMING: INDEPENDENT
ADLS_ACUITY_SCORE: 28
DRESS: SCRUBS (BEHAVIORAL HEALTH);INDEPENDENT
ADLS_ACUITY_SCORE: 28
ORAL_HYGIENE: INDEPENDENT

## 2023-03-31 NOTE — PLAN OF CARE
Assessment/Intervention/Current Symptoms and Care Coordination     Team meeting  Patient appears anxious and ready to hear from treatment facilities         Discharge Plan or Goal  CD treatment         Barriers to Discharge   Symptom stabilization/Medication stabilitzation        Referral Status  CD referrals placed for [facility], [facility]         Legal Status  Full commitment no Jonny  CD referral sent to [facility] awaiting results

## 2023-03-31 NOTE — PLAN OF CARE
Problem: Adult Behavioral Health Plan of Care  Goal: Adheres to Safety Considerations for Self and Others  Outcome: Progressing  Intervention: Develop and Maintain Individualized Safety Plan  Recent Flowsheet Documentation  Taken 3/31/2023 1012 by Mary Kaufman RN  Safety Measures:    suicide assessment completed    suicide check-in completed    safety rounds completed   Goal Outcome Evaluation:    Plan of Care Reviewed With: patient       Patient was visible in the lounge,      Patient was visible in the lounge interacting with other peers and pacing the hallway. Patient presets with a fla affect, mood appeared tensed. Patient endorsed anxiety PRN Hydroxyzine was given, patient denied depression, SI, HI, SIB, hallucinations. Patient denied pain, VSS. Patient was encouraged to take a shower but declined. Denied any concerns with bowel and bladder, had an adequate intake for food and fluids. Patient initially expressed the desire to leave the hospital, the 12-hour intent form was given to the patient to complete and the patient rescinded his desire to leave the hospital. Patient has since not talked about wanting to leave the hospital. Patient has been calm and cooperative with care. Medication compliant, patient has requested and received Nicotine lozenges this shift. Will continue with the plan of care.

## 2023-03-31 NOTE — PLAN OF CARE
Problem: Sleep Disturbance  Goal: Adequate Sleep/Rest  Outcome: Progressing   Goal Outcome Evaluation  Note: Patient appears to be sleeping uneventfully this night    Problem: Adult Behavioral Health Plan of Care  Goal: Absence of New-Onset Illness or Injury  Outcome: Progressing  Note: Patient reports no new onset illness or injury   Goal Outcome Evaluation:      Patient slept for 6.25 hours with no new issues reported. Patient did not complaints of pain, or anxiety and requests for no prn medications. Patient was cooperative with safety checks, demonstrates no behavioral dysregulation. Patient endorses no SI/HI, A/VH or SIB. No evidence of withdrawal or respiratory distress, no symptoms of ronnie or psychosis reported or observed.         Morgan Lopez, DNP, RN

## 2023-03-31 NOTE — PROGRESS NOTES
[facility], [place]   Psychiatric Progress Note  Hospital Day: 3        Interim History:   The patient's care was discussed with the treatment team during the daily team meeting and/or staff's chart notes were reviewed.  Staff report patient is cooperative while isolative. Patient did not report any acute medical concerns or side effects. No behavioral issues overnight, including violent or aggressive behaviors. Patient did not require seclusion or restraints. Patient is not exhibiting signs/sx of psychosis or ronnie. Patient did not endorse suicidal ideation. Patient did not endorse HI. Patient is medication adherent. Patient is attending groups. Patient is sleeping well. Patient is eating adequately. Patient is attending to ADLs.    Upon interview, the patient was calm and cooperative while guarded and mildly paranoid. He reports he is only waiting to be transferred to a CD program. We discussed with him our concerns about Wellbutrin and his history of abusing it and having an overdose and seizure. He denies any interest to abuse Wellbutrin again and insists to keep the medications. We shared our concerns and suggested him to discuss this with his CD team in the future. He asked to sign a 12 hours letter of intent and leave the hospital. We provided him with the letter but he ended up not to sign the letter and he decided to stay. We contacted his  in the county to check if they want to revoke his PD in case he wants to leave, but we have not been able to talk with the . We will follow this tomorrow. We decided to put him on hold in case he wants to leave during the weekend. We will communicate this with the weekend team.     He continues to have a tic in his lower jaw which he reports being a chronic issue. We will monitor this.    Suicidal ideation: denies current or recent suicidal ideation or behaviors.    Homicidal ideation: denies current or recent homicidal  ideation or behaviors.    Psychotic symptoms: Patient denies AH, VH, paranoia, delusions.     Medication side effects reported: No significant side effects.    Acute medical concerns: none    Patient had no further questions or concerns.           Medications:       OLANZapine  10 mg Oral At Bedtime     QUEtiapine  300 mg Oral At Bedtime          Allergies:     Allergies   Allergen Reactions     Morphine Sulfate [Morphine] Shortness Of Breath          Labs:   No results found for this or any previous visit (from the past 24 hour(s)).       Psychiatric Examination:     /83 (BP Location: Left arm, Patient Position: Sitting, Cuff Size: Adult Regular)   Pulse 101   Temp 98.1  F (36.7  C) (Oral)   Resp 18   Wt 75.3 kg (166 lb)   SpO2 96%   BMI 27.62 kg/m    Weight is 166 lbs 0 oz  Body mass index is 27.62 kg/m .    Weight over time:  Vitals:    03/28/23 0151   Weight: 75.3 kg (166 lb)       Orthostatic Vitals     None            Cardiometabolic risk assessment. 03/31/23      Reviewed patient profile for cardiometabolic risk factors    Date taken /Value  REFERENCE RANGE   Abdominal Obesity  (Waist Circumference)   See nursing flowsheet Women ?35 in (88 cm)   Men ?40 in (102 cm)      Triglycerides  Triglycerides   Date Value Ref Range Status   07/07/2021 340 (H) <=149 mg/dL Final       ?150 mg/dL (1.7 mmol/L) or current treatment for elevated triglycerides   HDL cholesterol  Direct Measure HDL   Date Value Ref Range Status   07/07/2021 36 (L) >=40 mg/dL Final   ]   Women <50 mg/dL (1.3 mmol/L) in women or current treatment for low HDL cholesterol  Men <40 mg/dL (1 mmol/L) in men or current treatment for low HDL cholesterol     Fasting plasma glucose (FPG) Lab Results   Component Value Date     11/18/2022    GLC 82 06/19/2021     05/09/2021      FPG ?100 mg/dL (5.6 mmol/L) or treatment for elevated blood glucose   Blood pressure  BP Readings from Last 3 Encounters:   03/31/23 134/83   03/27/23  "137/76   01/24/23 119/71    Blood pressure ?130/85 mmHg or treatment for elevated blood pressure   Family History  See family history     Mental Status Exam:  Appearance:  awake, alert, adequately groomed and dressed in hospital scrubs  Muscle Strength and Tone: normal  Gait and Station: Normal  Behavior (Psychomotor):  no evidence of tardive dyskinesia, dystonia, or tics  Eye Contact:  good   Speech:  clear, coherent  Mood: \"I'm feeling better and want to leave\"  Affect:  mood congruent but restricted  Attitude:  guarded and somewhat cooperative while mildly paranoid  Thought Process:  logical, linear and goal oriented  Thought Content:  no evidence of suicidal ideation or homicidal ideation  Associations:  no loose associations  Insight:  limited  Judgment:  fair  Oriented to:  time, person, and place  Attention Span and Concentration:  fair  Recent and Remote Memory:  fair  Language: Fluent in English with appropriate syntax and vocabulary.  Fund of Knowledge: low-normal           Precautions:     Behavioral Orders   Procedures     Cheeking Precautions (behavioral units)     Code 1 - Restrict to Unit     Routine Programming     As clinically indicated     Status 15     Every 15 minutes.     Suicide precautions     Patients on Suicide Precautions should have a Combination Diet ordered that includes a Diet selection(s) AND a Behavioral Tray selection for Safe Tray - with utensils, or Safe Tray - NO utensils            Diagnoses:     Data Unavailable         Assessment & Plan:     Assessment and hospital summary:     Hamzah Roberts is a 37 year old  male with a past psychiatric history of schizophrenia with paranoia, schizoaffective disorder, TBI, stimulant use disorder admitted to the Unit 12 from [facility] ED via walk in on 03/28/2023 (was in ED since 3/21) due to concern for SI, out of control behaviors, aggression and psychosis in the context of medication non-adherence and substance use.     Hospital " course: Hamzah Roberts was admitted to station 12 as a voluntary patient is treated in the therapeutic milieu with appropriate individual and group therapies.         Medical course: He was medically cleared by the ED prior to admission to the unit.     PTA Medications (Continued):              Medication Sig Note Last Dose     baclofen (LIORESAL) 10 MG tablet Take 10 mg by mouth 3 times daily   Past Month            hydrOXYzine (VISTARIL) 50 MG capsule Take 50 mg by mouth 3 times daily as needed   Unknown     OLANZapine (ZYPREXA) 10 MG tablet Take 1 tablet (10 mg) by mouth At Bedtime   More than a month     omeprazole (PRILOSEC) 20 MG DR capsule Take 20 mg by mouth daily   More than a month     QUEtiapine (SEROQUEL) 200 MG tablet Take 200 mg by mouth At Bedtime   More than a month      Today's Changes:  Stop Wellbutrin     Target psychiatric symptoms and interventions:  Risks, benefits, and alternatives discussed at length with patient.     Acute Medical Problems and Treatments:  Acute medical concerns:  - No acute medical concerns    Pertinent labs/imaging:  None    Behavioral/Psychological/Social:  - Encourage unit programming    Safety:  - Continue precautions as noted above  - Status 15 minute checks    Legal Status: voluntary    Disposition Plan   Reason for ongoing admission: Awaiting for CD admission while he is still risk for psychosis and suicide  Discharge location: Chemical dependency treatment facility  Discharge Medications: not ordered  Follow-up Appointments: not scheduled    Entered by: Scott Barrientos MD on 3/31/2023 at 4:27 PM     I saw the patient with my attending Dr Gore and she agrees with my assessments and plans.     Scott Barrientos MD, PGY2,  Psychiatry Resident

## 2023-04-01 PROCEDURE — 124N000002 HC R&B MH UMMC

## 2023-04-01 PROCEDURE — 250N000013 HC RX MED GY IP 250 OP 250 PS 637: Performed by: PSYCHIATRY & NEUROLOGY

## 2023-04-01 PROCEDURE — 250N000013 HC RX MED GY IP 250 OP 250 PS 637

## 2023-04-01 RX ADMIN — NICOTINE POLACRILEX 4 MG: 4 LOZENGE ORAL at 20:46

## 2023-04-01 RX ADMIN — MELATONIN TAB 3 MG 3 MG: 3 TAB at 19:24

## 2023-04-01 RX ADMIN — NICOTINE POLACRILEX 4 MG: 4 LOZENGE ORAL at 09:06

## 2023-04-01 RX ADMIN — QUETIAPINE 300 MG: 200 TABLET ORAL at 19:25

## 2023-04-01 RX ADMIN — HYDROXYZINE HYDROCHLORIDE 50 MG: 50 TABLET, FILM COATED ORAL at 19:05

## 2023-04-01 RX ADMIN — NICOTINE POLACRILEX 4 MG: 4 LOZENGE ORAL at 13:34

## 2023-04-01 RX ADMIN — NICOTINE POLACRILEX 4 MG: 4 LOZENGE ORAL at 17:31

## 2023-04-01 RX ADMIN — OLANZAPINE 10 MG: 10 TABLET, FILM COATED ORAL at 19:24

## 2023-04-01 RX ADMIN — LORAZEPAM 0.5 MG: 0.5 TABLET ORAL at 12:34

## 2023-04-01 RX ADMIN — NICOTINE POLACRILEX 4 MG: 4 LOZENGE ORAL at 12:34

## 2023-04-01 RX ADMIN — NICOTINE POLACRILEX 4 MG: 4 LOZENGE ORAL at 16:29

## 2023-04-01 RX ADMIN — NICOTINE POLACRILEX 4 MG: 4 LOZENGE ORAL at 19:05

## 2023-04-01 RX ADMIN — NICOTINE POLACRILEX 4 MG: 4 LOZENGE ORAL at 14:45

## 2023-04-01 ASSESSMENT — ACTIVITIES OF DAILY LIVING (ADL)
ADLS_ACUITY_SCORE: 28
DRESS: INDEPENDENT
ADLS_ACUITY_SCORE: 28
HYGIENE/GROOMING: INDEPENDENT
ADLS_ACUITY_SCORE: 28
ORAL_HYGIENE: INDEPENDENT
ADLS_ACUITY_SCORE: 28
LAUNDRY: WITH SUPERVISION
ORAL_HYGIENE: INDEPENDENT
ADLS_ACUITY_SCORE: 28
ADLS_ACUITY_SCORE: 28
DRESS: SCRUBS (BEHAVIORAL HEALTH)
ADLS_ACUITY_SCORE: 28
HYGIENE/GROOMING: INDEPENDENT
ADLS_ACUITY_SCORE: 28
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
## 2023-04-01 NOTE — PROGRESS NOTES
04/01/23 1840   Group Therapy Session   Group Attendance attended group session   Time Session Began 1615   Time Session Ended 1700   Total Time (minutes) 30   Total # Attendees 4   Group Type task skill   Group Session Detail Education provided on the importance of engaging in meaningful activity in the context of caring for one s mental health with hand on Sand Art project for creative expression, sequencing, new learning, following directions, attention to detail, frustration tolerance, FM skills, coping, reality-based activity, mood stabilization, encouraging daily structure, improving self-esteem, and expanding social skills.   Patient Participation Detail Pt was social with peers, a bit guarded with staff, but less so as group progressed and some rapport was built.  Pt worked steadily on his project while present despite the considerable distraction of a peer who talked incessantly on the outskirts of group.  Peer was making multiple incendiary remarks in an attempt to egg on staff or peers.  Pt was able to remain calm and ignore it.  He did have a measured conversation with another peer about how immature this behavior was.  Pt made an error on his project, but was able to accept it and agreed he would learn from his mistake for next time.  Pt left group early as he no longer wanted to have to listen to the ranting peer.  No charge.

## 2023-04-01 NOTE — PLAN OF CARE
Problem: Adult Behavioral Health Plan of Care  Goal: Absence of New-Onset Illness or Injury  Outcome: Progressing  Note: Patient reports no new-onset illness or injury     Problem: Sleep Disturbance  Goal: Adequate Sleep/Rest  Outcome: Progressing  Note: Patient seems to be sleeping during safety checks   Goal Outcome Evaluation:        Patient had unremarkable night. Slept moderately well with no concerns or issue requiring immediate attention. Patient achieves 5 hours of sleep with no prn medications given. No negative behavior manifested. Patient endorses no SI/HI, A/VH or SIB. Will continue to monitor.                  Morgan Lopez DNP, RN

## 2023-04-01 NOTE — PLAN OF CARE
Problem: Adult Behavioral Health Plan of Care  Goal: Adheres to Safety Considerations for Self and Others  3/31/2023 2228 by Mary Kaufman RN  Outcome: Progressing  3/31/2023 1306 by Mary Kaufman RN  Outcome: Progressing  Intervention: Develop and Maintain Individualized Safety Plan  Recent Flowsheet Documentation  Taken 3/31/2023 1804 by Mary Kaufman RN  Safety Measures:    suicide assessment completed    suicide check-in completed    safety rounds completed  Taken 3/31/2023 1012 by Mary Kaufman RN  Safety Measures:    suicide assessment completed    suicide check-in completed    safety rounds completed   Goal Outcome Evaluation:    Plan of Care Reviewed With: patient          Patient was visible in the milieu interacting with select peer, pacing the hallway. Patient presents with a flat affect, mood appears tensed, cooperative with care. Patient endorsed anxiety PRN Ativan 0.5mg was given with effect. Patient denied depression, SI, HI, SIB, hallucinations, denied racing or intrusive thoughts. Patient denied pain. PRN Nicotine gums were given this shift. Patient has a limited insight to his mental illness. Hygiene is fair, patient was encouraged to take a shower after shaving his beards but declined. Patient had an adequate intake for food and fluids, denied any concerns with bowel and bladder. Patient was medication compliant. Will continue with plan of care.

## 2023-04-02 PROCEDURE — 250N000013 HC RX MED GY IP 250 OP 250 PS 637: Performed by: PSYCHIATRY & NEUROLOGY

## 2023-04-02 PROCEDURE — G0177 OPPS/PHP; TRAIN & EDUC SERV: HCPCS

## 2023-04-02 PROCEDURE — 124N000002 HC R&B MH UMMC

## 2023-04-02 PROCEDURE — 250N000013 HC RX MED GY IP 250 OP 250 PS 637

## 2023-04-02 RX ADMIN — NICOTINE POLACRILEX 4 MG: 4 GUM, CHEWING BUCCAL at 18:15

## 2023-04-02 RX ADMIN — OLANZAPINE 10 MG: 10 TABLET, FILM COATED ORAL at 17:16

## 2023-04-02 RX ADMIN — NICOTINE POLACRILEX 4 MG: 4 GUM, CHEWING BUCCAL at 14:57

## 2023-04-02 RX ADMIN — NICOTINE POLACRILEX 4 MG: 4 LOZENGE ORAL at 08:51

## 2023-04-02 RX ADMIN — MELATONIN TAB 3 MG 3 MG: 3 TAB at 20:09

## 2023-04-02 RX ADMIN — NICOTINE POLACRILEX 4 MG: 4 LOZENGE ORAL at 16:08

## 2023-04-02 RX ADMIN — ALUMINUM HYDROXIDE, MAGNESIUM HYDROXIDE, AND DIMETHICONE 30 ML: 200; 20; 200 SUSPENSION ORAL at 02:24

## 2023-04-02 RX ADMIN — LORAZEPAM 0.5 MG: 0.5 TABLET ORAL at 11:28

## 2023-04-02 RX ADMIN — OLANZAPINE 10 MG: 10 TABLET, FILM COATED ORAL at 19:00

## 2023-04-02 RX ADMIN — HYDROXYZINE HYDROCHLORIDE 50 MG: 50 TABLET, FILM COATED ORAL at 13:55

## 2023-04-02 RX ADMIN — NICOTINE POLACRILEX 4 MG: 4 LOZENGE ORAL at 13:55

## 2023-04-02 RX ADMIN — NICOTINE POLACRILEX 4 MG: 4 LOZENGE ORAL at 19:34

## 2023-04-02 RX ADMIN — QUETIAPINE 300 MG: 200 TABLET ORAL at 19:00

## 2023-04-02 RX ADMIN — NICOTINE POLACRILEX 4 MG: 4 LOZENGE ORAL at 12:40

## 2023-04-02 RX ADMIN — NICOTINE POLACRILEX 4 MG: 4 LOZENGE ORAL at 11:50

## 2023-04-02 ASSESSMENT — ACTIVITIES OF DAILY LIVING (ADL)
ADLS_ACUITY_SCORE: 28
ORAL_HYGIENE: INDEPENDENT
HYGIENE/GROOMING: INDEPENDENT
ADLS_ACUITY_SCORE: 28
ORAL_HYGIENE: INDEPENDENT
ADLS_ACUITY_SCORE: 28
DRESS: INDEPENDENT
LAUNDRY: WITH SUPERVISION
ADLS_ACUITY_SCORE: 28
HYGIENE/GROOMING: INDEPENDENT
ADLS_ACUITY_SCORE: 28
DRESS: INDEPENDENT

## 2023-04-02 NOTE — PLAN OF CARE
[facility] Occupational Therapy Group Intervention Note     04/02/23 1707   Group Therapy Session   Group Attendance attended group session   Total Time (minutes) 45   Group Type task skill;recreation   Group Topic Covered coping skills/lifestyle management;relapse prevention;leisure exploration/use of leisure time;structured socialization   Group Session Detail OT: Leisure exploration offered for increased intrinsic motivation to engage in social non-obligatory occupations, challenge new learning, sequencing, problem solving, and retention via a cognitive group game.    Patient Response/Contribution cooperative with task;organized;listened actively;offered helpful suggestions to peers   Patient Participation Detail Congruent affect. Able to learn and follow novel game instructions. Fully engaged in discussion. Helpful to peers as needed.      Doris Franklin, OT on 4/2/2023 at 5:08 PM
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
## 2023-04-02 NOTE — PLAN OF CARE
"  Problem: Adult Behavioral Health Plan of Care  Goal: Plan of Care Review  Outcome: Progressing   Goal Outcome Evaluation:       Pt.appeared to have a calmer evening compared to day shift. He was out and visible in the milieu interacting appropriately with staff and peers. There was no behavioral outbursts. Attended and participated in unit group therapy. Affect was labile. Described his mood as \"mellow\". Denied SI/SIB/hallucinations.Compliant with his scheduled medications and care. Hamzah did not require prn medication or seclusion/restraint to manage behavior. Will continue to monitor.                "
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
## 2023-04-03 PROCEDURE — G0177 OPPS/PHP; TRAIN & EDUC SERV: HCPCS

## 2023-04-03 PROCEDURE — 250N000013 HC RX MED GY IP 250 OP 250 PS 637

## 2023-04-03 PROCEDURE — 124N000002 HC R&B MH UMMC

## 2023-04-03 PROCEDURE — 250N000013 HC RX MED GY IP 250 OP 250 PS 637: Performed by: PSYCHIATRY & NEUROLOGY

## 2023-04-03 PROCEDURE — 99232 SBSQ HOSP IP/OBS MODERATE 35: CPT | Performed by: PSYCHIATRY & NEUROLOGY

## 2023-04-03 RX ORDER — BENZTROPINE MESYLATE 0.5 MG/1
0.5 TABLET ORAL 2 TIMES DAILY
Status: DISCONTINUED | OUTPATIENT
Start: 2023-04-03 | End: 2023-04-10 | Stop reason: HOSPADM

## 2023-04-03 RX ADMIN — ALUMINUM HYDROXIDE, MAGNESIUM HYDROXIDE, AND DIMETHICONE 30 ML: 200; 20; 200 SUSPENSION ORAL at 18:10

## 2023-04-03 RX ADMIN — NICOTINE POLACRILEX 4 MG: 4 LOZENGE ORAL at 12:15

## 2023-04-03 RX ADMIN — NICOTINE POLACRILEX 4 MG: 4 LOZENGE ORAL at 17:41

## 2023-04-03 RX ADMIN — LORAZEPAM 0.5 MG: 0.5 TABLET ORAL at 13:16

## 2023-04-03 RX ADMIN — NICOTINE POLACRILEX 4 MG: 4 LOZENGE ORAL at 14:58

## 2023-04-03 RX ADMIN — MELATONIN TAB 3 MG 3 MG: 3 TAB at 19:11

## 2023-04-03 RX ADMIN — BENZTROPINE MESYLATE 0.5 MG: 0.5 TABLET ORAL at 19:10

## 2023-04-03 RX ADMIN — ALUMINUM HYDROXIDE, MAGNESIUM HYDROXIDE, AND DIMETHICONE 30 ML: 200; 20; 200 SUSPENSION ORAL at 02:07

## 2023-04-03 RX ADMIN — NICOTINE POLACRILEX 4 MG: 4 LOZENGE ORAL at 13:16

## 2023-04-03 RX ADMIN — OLANZAPINE 10 MG: 10 TABLET, FILM COATED ORAL at 19:10

## 2023-04-03 RX ADMIN — ALUMINUM HYDROXIDE, MAGNESIUM HYDROXIDE, AND DIMETHICONE 30 ML: 200; 20; 200 SUSPENSION ORAL at 10:58

## 2023-04-03 RX ADMIN — NICOTINE POLACRILEX 4 MG: 4 LOZENGE ORAL at 16:36

## 2023-04-03 RX ADMIN — QUETIAPINE 300 MG: 200 TABLET ORAL at 19:10

## 2023-04-03 RX ADMIN — NICOTINE POLACRILEX 4 MG: 4 LOZENGE ORAL at 10:40

## 2023-04-03 ASSESSMENT — ACTIVITIES OF DAILY LIVING (ADL)
ADLS_ACUITY_SCORE: 28
HYGIENE/GROOMING: INDEPENDENT
ADLS_ACUITY_SCORE: 28
ADLS_ACUITY_SCORE: 28
ORAL_HYGIENE: INDEPENDENT
ADLS_ACUITY_SCORE: 28
ADLS_ACUITY_SCORE: 28
HYGIENE/GROOMING: INDEPENDENT
ADLS_ACUITY_SCORE: 28
ORAL_HYGIENE: INDEPENDENT
ADLS_ACUITY_SCORE: 28
LAUNDRY: UNABLE TO COMPLETE
ADLS_ACUITY_SCORE: 28
LAUNDRY: UNABLE TO COMPLETE
DRESS: INDEPENDENT
ADLS_ACUITY_SCORE: 28
DRESS: SCRUBS (BEHAVIORAL HEALTH);INDEPENDENT

## 2023-04-03 NOTE — PLAN OF CARE
Neutral mood and affect. Voiced preoccupation with discharge. Called [facility] plus, stated he wished to go there. However, has previously stated that he needs out of town placement since he has walked away from CD treatment 4 times within the last year.   And, actually, was accepted by [facility] for admission 4/10/23.   Has cravings but denies nightmares. (Is pregnant gf still using?)    Denied suicidal ideation and thought disturbances. Future oriented and wishes to return to working as a .    (Described as committed 2020, 2021, 2022- is he currently on a SOC?)   Noted to have a tic disorder (6/19/21) with head and neck movements.  Denied side effects from medications.     Asked several staff in turn for the same thing after getting a no from the first, second, and third.    Appearance:  a bit disheveled  Body Language: open  Attitude: cooperative  Mood: euthymic  Affect:congruent  Thought Process: intact  Thought content: future  Suicidal/homicidal:denies/denies  Knowledge,Insight,Judgement: intact/fair/fair  Attention/Concentration: intact  Memory:Recent- intact                Remote-intact  Speech: WNL rate, volume, prosody  Orientation: x 4  Psychomotor:WNL  Sleep/Activity level: WNL  Motivation:  good  Behaviour: staff splitting    Plan: Monitor and document mood and behaviour, thought process and content. Establish and maintain therapeutic relationship. Educate about diagnoses, medications, treatment, legal status, plan of care. Address preexisting and concurrent medical concerns.     P:Substance abuse  G:Harm reduction  O: sobriety

## 2023-04-03 NOTE — PROGRESS NOTES
"[facility], [place]   Psychiatric Progress Note  Hospital Day: 6        Interim History:   The patient's care was discussed with the treatment team during the daily team meeting and/or staff's chart notes were reviewed.  Staff report patient is cooperative while isolative. Patient did not report any acute medical concerns or side effects. No behavioral issues overnight, including violent or aggressive behaviors. Patient did not require seclusion or restraints. Patient is not exhibiting signs/sx of psychosis or ronnie. Patient did not endorse suicidal ideation. Patient did not endorse HI. Patient is medication adherent. Patient is attending groups. Patient is sleeping well. Patient is eating adequately. Patient is attending to ADLs.    Per nursing report today in the morning: \"patient was out at at about 0207 requested for Pepto Bismol for heart burns, received Maalox as Pepto Bismol was not ordered. Maalox was effective as patient had no further complaints. Patient was cooperative with no outburst of anger, paranoia, physical or verbal aggression toward staff or peers. Patient endorses no SI/HI, A/VH or SIB. Patient had a total of 6.5 hours of sleep.\"    Upon interview, the patient was calm and cooperative while smiling. He reports he is only waiting to be transferred to a CD program. He continues to have a tic in his lower jaw which he reports being a chronic issue. He is willing to try Cogentin.    Suicidal ideation: denies current or recent suicidal ideation or behaviors.    Homicidal ideation: denies current or recent homicidal ideation or behaviors.    Psychotic symptoms: Patient denies AH, VH, paranoia, delusions.     Medication side effects reported: No significant side effects.    Acute medical concerns: none    Patient had no further questions or concerns.           Medications:       OLANZapine  10 mg Oral At Bedtime     QUEtiapine  300 mg Oral At Bedtime          Allergies: "     Allergies   Allergen Reactions     Morphine Sulfate [Morphine] Shortness Of Breath          Labs:   No results found for this or any previous visit (from the past 24 hour(s)).       Psychiatric Examination:     /59 (BP Location: Left arm, Patient Position: Sitting)   Pulse 105   Temp 98.3  F (36.8  C) (Temporal)   Resp 16   Wt 75.3 kg (166 lb)   SpO2 95%   BMI 27.62 kg/m    Weight is 166 lbs 0 oz  Body mass index is 27.62 kg/m .    Weight over time:  Vitals:    03/28/23 0151   Weight: 75.3 kg (166 lb)       Orthostatic Vitals     None            Cardiometabolic risk assessment. 03/31/23      Reviewed patient profile for cardiometabolic risk factors    Date taken /Value  REFERENCE RANGE   Abdominal Obesity  (Waist Circumference)   See nursing flowsheet Women ?35 in (88 cm)   Men ?40 in (102 cm)      Triglycerides  Triglycerides   Date Value Ref Range Status   07/07/2021 340 (H) <=149 mg/dL Final       ?150 mg/dL (1.7 mmol/L) or current treatment for elevated triglycerides   HDL cholesterol  Direct Measure HDL   Date Value Ref Range Status   07/07/2021 36 (L) >=40 mg/dL Final   ]   Women <50 mg/dL (1.3 mmol/L) in women or current treatment for low HDL cholesterol  Men <40 mg/dL (1 mmol/L) in men or current treatment for low HDL cholesterol     Fasting plasma glucose (FPG) Lab Results   Component Value Date     11/18/2022    GLC 82 06/19/2021     05/09/2021      FPG ?100 mg/dL (5.6 mmol/L) or treatment for elevated blood glucose   Blood pressure  BP Readings from Last 3 Encounters:   04/02/23 108/59   03/27/23 137/76   01/24/23 119/71    Blood pressure ?130/85 mmHg or treatment for elevated blood pressure   Family History  See family history     Mental Status Exam:  Appearance:  awake, alert, adequately groomed and dressed in hospital scrubs  Muscle Strength and Tone: normal  Gait and Station: Normal  Behavior (Psychomotor):  no evidence of tardive dyskinesia, dystonia, or tics  Eye  "Contact:  good   Speech:  clear, coherent  Mood: \"I'm feeling better and want to leave\"  Affect:  mood congruent but restricted  Attitude:  guarded and somewhat cooperative while mildly paranoid  Thought Process:  logical, linear and goal oriented  Thought Content:  no evidence of suicidal ideation or homicidal ideation  Associations:  no loose associations  Insight:  limited  Judgment:  fair  Oriented to:  time, person, and place  Attention Span and Concentration:  fair  Recent and Remote Memory:  fair  Language: Fluent in English with appropriate syntax and vocabulary.  Fund of Knowledge: low-normal           Precautions:     Behavioral Orders   Procedures     Cheeking Precautions (behavioral units)     Code 1 - Restrict to Unit     Routine Programming     As clinically indicated     Status 15     Every 15 minutes.     Suicide precautions     Patients on Suicide Precautions should have a Combination Diet ordered that includes a Diet selection(s) AND a Behavioral Tray selection for Safe Tray - with utensils, or Safe Tray - NO utensils            Diagnoses:     Data Unavailable         Assessment & Plan:     Assessment and hospital summary:     Hamzah Roberts is a 37 year old  male with a past psychiatric history of schizophrenia with paranoia, schizoaffective disorder, TBI, stimulant use disorder admitted to the Unit 12 from [facility] ED via walk in on 03/28/2023 (was in ED since 3/21) due to concern for SI, out of control behaviors, aggression and psychosis in the context of medication non-adherence and substance use.     Hospital course: Hamzah Roberts was admitted to station 12 as a voluntary patient is treated in the therapeutic milieu with appropriate individual and group therapies. His symptoms are improving and he is now waiting to be admitted to a CD program.     Medical course: He was medically cleared by the ED prior to admission to the unit.     PTA Medications (Continued):              Medication " Sig Note Last Dose     baclofen (LIORESAL) 10 MG tablet Take 10 mg by mouth 3 times daily   Past Month            hydrOXYzine (VISTARIL) 50 MG capsule Take 50 mg by mouth 3 times daily as needed   Unknown     OLANZapine (ZYPREXA) 10 MG tablet Take 1 tablet (10 mg) by mouth At Bedtime   More than a month     omeprazole (PRILOSEC) 20 MG DR capsule Take 20 mg by mouth daily   More than a month     QUEtiapine (SEROQUEL) 200 MG tablet Take 200 mg by mouth At Bedtime   More than a month      Today's Changes:  None    Target psychiatric symptoms and interventions:  Risks, benefits, and alternatives discussed at length with patient.     Acute Medical Problems and Treatments:  Acute medical concerns:  - No acute medical concerns    Pertinent labs/imaging:  None    Behavioral/Psychological/Social:  - Encourage unit programming    Safety:  - Continue precautions as noted above  - Status 15 minute checks    Legal Status: voluntary    Disposition Plan   Reason for ongoing admission: Awaiting for CD admission while he is still risk for psychosis and suicide  Discharge location: Chemical dependency treatment facility  Discharge Medications: not ordered  Follow-up Appointments: not scheduled    Entered by: Scott Barrientos MD on 3/31/2023 at 10:10 AM     I discussed the patient with my attending Dr Gore and she agrees with my assessments and plans.     Scott Barrientos MD, PGY2,  Psychiatry Resident

## 2023-04-03 NOTE — PLAN OF CARE
Goal Outcome Evaluation:    Plan of Care Reviewed With: patient          Problem: Adult Behavioral Health Plan of Care  Goal: Plan of Care Review  4/2/2023 1955 by Janes Decker, RN  Outcome: Progressing  4/2/2023 1925 by Janes Decker, RN  Outcome: Progressing  Flowsheets (Taken 4/2/2023 1725)  Patient Agreement with Plan of Care: agrees     Pt observed in the milieu, socialized with selected peers watched TV and also paced the hallway with headphone listening to music. Pt also engaged in art work by coloring. Pt denies all mental health psych symptoms and contracted for safety. Per charge nurse pt attempted received something from another peer which seem like E cigarette but both room search and no cigarette found. Pt presented with fair brighter affect, calm and polite but mildly tensed. Pt requested for prn zyprexa because he felt tensed and angry and pt remained calm after zyprexa. Pt speech clear, linear, and good eye contact. Good appetite, voiding freely and no BM issue per pt. Even breathing pattern and pt vital stable. /59 (BP Location: Left arm, Patient Position: Sitting)   Pulse 105   Temp 98.3  F (36.8  C) (Temporal)   Resp 16   Wt 75.3 kg (166 lb)   SpO2 95%   BMI 27.62 kg/m

## 2023-04-03 NOTE — PLAN OF CARE
Problem: Plan of Care - These are the overarching goals to be used throughout the patient stay.    Goal: Optimal Comfort and Wellbeing  Intervention: Provide Person-Centered Care  Recent Flowsheet Documentation  Taken 4/3/2023 1100 by Juan Carlos Nunez Relationship/Rapport:    care explained    choices provided    questions answered    questions encouraged    thoughts/feelings acknowledged    empathic listening provided     Problem: Adult Behavioral Health Plan of Care  Goal: Develops/Participates in Therapeutic [place] to Support Successful Transition  Intervention: Foster Therapeutic [place]  Recent Flowsheet Documentation  Taken 4/3/2023 1100 by Juan Carlos Nunez Relationship/Rapport:    care explained    choices provided    questions answered    questions encouraged    thoughts/feelings acknowledged    empathic listening provided   Goal Outcome Evaluation:    Plan of Care Reviewed With: patient        /59 (BP Location: Left arm, Patient Position: Sitting)   Pulse 105   Temp 98.3  F (36.8  C) (Temporal)   Resp 16   Wt 75.3 kg (166 lb)   SpO2 95%   BMI 27.62 kg/m      Pt was observed resting in his room, listening to music in headphones in the hallway, and participating in AM group in the lounge. Pt was having positive peer interactions today.   Pt asked for and received PRN Maalox for heartburn, Ativan for 4/10 anxiety, and a nicorette lozenge. Pt denied HI/SI/SIB and AVH, and denied depression. Pt reported no other medical concerns or discomforts. No psychotic symptoms observed this shift, no aggressive behaviors to staff or peers.      New order for Cogentin 0.5 mg BID added, starts at 2000 tonight.

## 2023-04-03 NOTE — PLAN OF CARE
Assessment/Intervention/Current Symptoms and Care Coordination     Team meeting  Patient appears anxious and ready to hear from treatment facilities         Discharge Plan or Goal  CD treatment         Barriers to Discharge   Symptom stabilization/Medication stabilitzation        Referral Status  CD referrals placed for [facility], [facility]         Legal Status  Full commitment no [facility]  CD referral sent to [facility] awaiting results    Updated progress notes sent to [facility] 4/3/23  Per Cari [phone] pt was accepted earliest opening 4/10/23 will call back w/ time.

## 2023-04-03 NOTE — PLAN OF CARE
Problem: Adult Behavioral Health Plan of Care  Goal: Absence of New-Onset Illness or Injury  Outcome: Progressing  Note: Patient reports no new-onset illness or injury     Problem: Sleep Disturbance  Goal: Adequate Sleep/Rest  Outcome: Progressing  Note: Patient seems to be sleeping uneventfully during safety checks     Problem: Plan of Care - These are the overarching goals to be used throughout the patient stay.    Goal: Absence of Hospital-Acquired Illness or Injury  Outcome: Progressing  Note: Patient reports no ALEXANDER   Goal Outcome Evaluation:       Patient was out at at about 0207 requested for Pepto Bismol for heart burns, received Maalox as Pepto Bismol was not ordered. Maalox was effective as patient had no further complaints. Patient was cooperative with no outburst of anger, paranoia, physical or verbal aggression toward staff or peers. Patient endorses no SI/HI, A/VH or SIB. Patient had a total of 6.5 hours of sleep. Will continue to monitor and follow plan of care.           Morgan Lopez DNP, RN

## 2023-04-03 NOTE — PLAN OF CARE
"   04/03/23 1549   Group Therapy Session   Group Attendance attended group session   Total Time (minutes) 90   Total # Attendees 2,1   Group Type psychoeducation   Group Topic Covered coping skills/lifestyle management;leisure exploration/use of leisure time;emotions/expression;relapse prevention;structured socialization   Group Session Detail topic group, OT clinic   Patient Response/Contribution cooperative with task;listened actively     Pt attended the morning goal planning group which included a review of progress.  While some questions were more indepth and allowed for more reflection, pt was somewhat superficial as he mainly focused all goals on discharge and going to CD tx.  Pt did share motivation for tx to be out of the city so \"I can't just walk away, I've done that 4 times since last January\".  Pt shared a big motivation for tx is to be sober and stable for the birth of his daughter, expected April 29th.    Pt attended the next group, OT clinic, was the only person present.  Requested to paint a wood plaque and spent time carefully painting outlines, etc.  Had two areas left that were unpainted, though pt stated this was all he wanted to paint, and stated it was complete.  Knowing he was not being discharge today, it was left in cupboard for tomorrow.                      "

## 2023-04-04 PROCEDURE — 250N000013 HC RX MED GY IP 250 OP 250 PS 637

## 2023-04-04 PROCEDURE — G0177 OPPS/PHP; TRAIN & EDUC SERV: HCPCS

## 2023-04-04 PROCEDURE — H2032 ACTIVITY THERAPY, PER 15 MIN: HCPCS

## 2023-04-04 PROCEDURE — 250N000013 HC RX MED GY IP 250 OP 250 PS 637: Performed by: PSYCHIATRY & NEUROLOGY

## 2023-04-04 PROCEDURE — 124N000002 HC R&B MH UMMC

## 2023-04-04 RX ADMIN — BENZTROPINE MESYLATE 0.5 MG: 0.5 TABLET ORAL at 11:14

## 2023-04-04 RX ADMIN — NICOTINE POLACRILEX 4 MG: 4 LOZENGE ORAL at 19:22

## 2023-04-04 RX ADMIN — NICOTINE POLACRILEX 4 MG: 4 LOZENGE ORAL at 18:24

## 2023-04-04 RX ADMIN — NICOTINE POLACRILEX 4 MG: 4 LOZENGE ORAL at 17:44

## 2023-04-04 RX ADMIN — QUETIAPINE 300 MG: 200 TABLET ORAL at 19:21

## 2023-04-04 RX ADMIN — ALUMINUM HYDROXIDE, MAGNESIUM HYDROXIDE, AND DIMETHICONE 30 ML: 200; 20; 200 SUSPENSION ORAL at 11:32

## 2023-04-04 RX ADMIN — LORAZEPAM 0.5 MG: 0.5 TABLET ORAL at 17:44

## 2023-04-04 RX ADMIN — NICOTINE POLACRILEX 4 MG: 4 LOZENGE ORAL at 11:32

## 2023-04-04 RX ADMIN — NICOTINE POLACRILEX 4 MG: 4 LOZENGE ORAL at 16:16

## 2023-04-04 RX ADMIN — OLANZAPINE 10 MG: 10 TABLET, FILM COATED ORAL at 19:21

## 2023-04-04 RX ADMIN — NICOTINE POLACRILEX 4 MG: 4 LOZENGE ORAL at 14:14

## 2023-04-04 RX ADMIN — ALUMINUM HYDROXIDE, MAGNESIUM HYDROXIDE, AND DIMETHICONE 30 ML: 200; 20; 200 SUSPENSION ORAL at 19:37

## 2023-04-04 RX ADMIN — BENZTROPINE MESYLATE 0.5 MG: 0.5 TABLET ORAL at 19:22

## 2023-04-04 RX ADMIN — NICOTINE POLACRILEX 4 MG: 4 LOZENGE ORAL at 12:31

## 2023-04-04 RX ADMIN — MELATONIN TAB 3 MG 3 MG: 3 TAB at 19:22

## 2023-04-04 ASSESSMENT — ACTIVITIES OF DAILY LIVING (ADL)
ADLS_ACUITY_SCORE: 28
DRESS: INDEPENDENT
HYGIENE/GROOMING: INDEPENDENT
DRESS: SCRUBS (BEHAVIORAL HEALTH);INDEPENDENT
ADLS_ACUITY_SCORE: 28
ADLS_ACUITY_SCORE: 28
ORAL_HYGIENE: INDEPENDENT
ADLS_ACUITY_SCORE: 28
ORAL_HYGIENE: INDEPENDENT
LAUNDRY: UNABLE TO COMPLETE
ADLS_ACUITY_SCORE: 28
HYGIENE/GROOMING: INDEPENDENT
ADLS_ACUITY_SCORE: 28

## 2023-04-04 NOTE — PLAN OF CARE
Problem: Sleep Disturbance  Goal: Adequate Sleep/Rest  Outcome: Progressing  Note: Patient observed sleeping during safety roumds     Problem: Plan of Care - These are the overarching goals to be used throughout the patient stay.    Goal: Absence of Hospital-Acquired Illness or Injury  Outcome: Progressing  Note: Patient reports no ALEXANDER     Problem: Excessive Substance Use  Goal: Improved Behavioral Control (Excessive Substance Use)  Outcome: Progressing  Note: Patient demonstrates no behavioral dyscontrol   Goal Outcome Evaluation:        Patient slept uneventfully through the shift, made no complaints and requested for no prn medications. Safety checks successfully completed and all precautions in place. Patient manifests no negative behavior, endorses no SI/HI, A/VH or SIB. No symptoms of ronnie or psychosis reported or observed. Overall, patient achieved about 6 hours of good quality sleep.           Morgan Lopez DNP, RN

## 2023-04-04 NOTE — PROGRESS NOTES
04/04/23 1800   Group Therapy Session   Time Session Began 1615   Time Session Ended 1705   Total Time (minutes) 20   Total # Attendees 7   Group Type expressive therapy   Group Topic Covered coping skills/lifestyle management;relationship   Group Session Detail Attachment Styles in Songs   Patient Response/Contribution cooperative with task   Patient Participation Detail Engaged in identifying different relationship attachment styles (dependent, interdependent, independent) from a playlist of popular songs, and the qualities of each based on a handout given.  Discussed which category they felt each samaniego fell into and why.      Hamzah brightened significantly when a song he requested about relationships was played.  His whole affect and upper body engaged, raising his arms to the words, singing along, and making eye contact with others.  He stayed for a portion of session.         Right sided numbness since 11am

## 2023-04-04 NOTE — PLAN OF CARE
Problem: Adult Behavioral Health Plan of Care  Goal: Develops/Participates in Therapeutic [place] to Support Successful Transition  Intervention: Foster Therapeutic [place]  Recent Flowsheet Documentation  Taken 4/4/2023 1105 by Kate Senior RN  Trust Relationship/Rapport:   care explained   choices provided   emotional support provided   empathic listening provided   questions answered   questions encouraged   reassurance provided   thoughts/feelings acknowledged   Goal Outcome Evaluation:    Plan of Care Reviewed With: patient      Pt had an uneventful shift. He appears calm with a flat affect. He denied all mental health symptoms including AVH, and did not appear to be responding. He slept in today. He took scheduled cogentin without issue. He requested PRN nicotine x3, and Maaloxx.   Pt maintained appropriate behavioral control with no outbursts of aggression or agitation. He interacted with peers and staff, attended group, and had no other acute behavioral concerns this shift.   /76 (BP Location: Left arm, Patient Position: Sitting, Cuff Size: Adult Regular)   Pulse 100   Temp 98.4  F (36.9  C) (Temporal)   Resp 16   Wt 75.3 kg (166 lb)   SpO2 97%   BMI 27.62 kg/m

## 2023-04-05 PROCEDURE — 250N000013 HC RX MED GY IP 250 OP 250 PS 637

## 2023-04-05 PROCEDURE — 124N000002 HC R&B MH UMMC

## 2023-04-05 PROCEDURE — 250N000013 HC RX MED GY IP 250 OP 250 PS 637: Performed by: PSYCHIATRY & NEUROLOGY

## 2023-04-05 PROCEDURE — G0177 OPPS/PHP; TRAIN & EDUC SERV: HCPCS

## 2023-04-05 PROCEDURE — 99231 SBSQ HOSP IP/OBS SF/LOW 25: CPT | Performed by: PSYCHIATRY & NEUROLOGY

## 2023-04-05 RX ORDER — PANTOPRAZOLE SODIUM 40 MG/1
40 TABLET, DELAYED RELEASE ORAL
Status: DISCONTINUED | OUTPATIENT
Start: 2023-04-06 | End: 2023-04-10 | Stop reason: HOSPADM

## 2023-04-05 RX ORDER — CALCIUM CARBONATE 500 MG/1
1000 TABLET, CHEWABLE ORAL 2 TIMES DAILY PRN
Status: DISCONTINUED | OUTPATIENT
Start: 2023-04-05 | End: 2023-04-10 | Stop reason: HOSPADM

## 2023-04-05 RX ADMIN — BENZTROPINE MESYLATE 0.5 MG: 0.5 TABLET ORAL at 18:52

## 2023-04-05 RX ADMIN — NICOTINE POLACRILEX 4 MG: 4 LOZENGE ORAL at 13:50

## 2023-04-05 RX ADMIN — NICOTINE POLACRILEX 4 MG: 4 LOZENGE ORAL at 14:45

## 2023-04-05 RX ADMIN — NICOTINE POLACRILEX 4 MG: 4 LOZENGE ORAL at 10:36

## 2023-04-05 RX ADMIN — ALUMINUM HYDROXIDE, MAGNESIUM HYDROXIDE, AND DIMETHICONE 30 ML: 200; 20; 200 SUSPENSION ORAL at 19:52

## 2023-04-05 RX ADMIN — BENZTROPINE MESYLATE 0.5 MG: 0.5 TABLET ORAL at 10:36

## 2023-04-05 RX ADMIN — NICOTINE POLACRILEX 4 MG: 4 GUM, CHEWING BUCCAL at 19:47

## 2023-04-05 RX ADMIN — NICOTINE POLACRILEX 4 MG: 4 GUM, CHEWING BUCCAL at 20:38

## 2023-04-05 RX ADMIN — NICOTINE POLACRILEX 4 MG: 4 LOZENGE ORAL at 15:46

## 2023-04-05 RX ADMIN — ALUMINUM HYDROXIDE, MAGNESIUM HYDROXIDE, AND DIMETHICONE 30 ML: 200; 20; 200 SUSPENSION ORAL at 02:55

## 2023-04-05 RX ADMIN — MELATONIN TAB 3 MG 3 MG: 3 TAB at 18:52

## 2023-04-05 RX ADMIN — LORAZEPAM 0.5 MG: 0.5 TABLET ORAL at 18:17

## 2023-04-05 RX ADMIN — ALUMINUM HYDROXIDE, MAGNESIUM HYDROXIDE, AND DIMETHICONE 30 ML: 200; 20; 200 SUSPENSION ORAL at 14:45

## 2023-04-05 RX ADMIN — QUETIAPINE 300 MG: 200 TABLET ORAL at 18:53

## 2023-04-05 RX ADMIN — OLANZAPINE 10 MG: 10 TABLET, FILM COATED ORAL at 18:52

## 2023-04-05 RX ADMIN — NICOTINE POLACRILEX 4 MG: 4 LOZENGE ORAL at 12:00

## 2023-04-05 RX ADMIN — ALUMINUM HYDROXIDE, MAGNESIUM HYDROXIDE, AND DIMETHICONE 30 ML: 200; 20; 200 SUSPENSION ORAL at 10:36

## 2023-04-05 RX ADMIN — NICOTINE POLACRILEX 4 MG: 4 LOZENGE ORAL at 17:19

## 2023-04-05 RX ADMIN — NICOTINE POLACRILEX 4 MG: 4 LOZENGE ORAL at 18:53

## 2023-04-05 ASSESSMENT — ACTIVITIES OF DAILY LIVING (ADL)
ADLS_ACUITY_SCORE: 28
LAUNDRY: UNABLE TO COMPLETE
ADLS_ACUITY_SCORE: 28
HYGIENE/GROOMING: INDEPENDENT
ADLS_ACUITY_SCORE: 28
ADLS_ACUITY_SCORE: 28
ORAL_HYGIENE: INDEPENDENT
ADLS_ACUITY_SCORE: 28
DRESS: SCRUBS (BEHAVIORAL HEALTH);INDEPENDENT
ADLS_ACUITY_SCORE: 28
HYGIENE/GROOMING: INDEPENDENT
ORAL_HYGIENE: INDEPENDENT
DRESS: SCRUBS (BEHAVIORAL HEALTH);INDEPENDENT
ADLS_ACUITY_SCORE: 28

## 2023-04-05 NOTE — PLAN OF CARE
Problem: Adult Behavioral Health Plan of Care  Goal: Adheres to Safety Considerations for Self and Others  Outcome: Progressing     Problem: Adult Behavioral Health Plan of Care  Goal: Develops/Participates in Therapeutic Rye to Support Successful Transition  Intervention: Foster Therapeutic Rye  Recent Flowsheet Documentation  Taken 4/5/2023 1100 by Juan Carlos Nunez Relationship/Rapport:    care explained    choices provided    questions encouraged    thoughts/feelings acknowledged    empathic listening provided     Problem: Excessive Substance Use  Goal: Improved Behavioral Control (Excessive Substance Use)  Outcome: Progressing     Goal Outcome Evaluation:    Plan of Care Reviewed With: patient        /85 (BP Location: Left arm)   Pulse 102   Temp 98.5  F (36.9  C) (Oral)   Resp 16   Wt 75.3 kg (166 lb)   SpO2 96%   BMI 27.62 kg/m      Pt was seen with a calm affect today, resting in his room, using the phone, and spending time in the milieu. Pt is having positive and appropriate interactions with staff and peers this shift. Pt denies mental health symptoms today, including HI/SI/SIB and AVH, and no responding to internal stimuli was observed. Pt was playing ping-pong with a peer in the lounge in the afternoon.    Pt ate and drank well today.   Pt received Maalox PRN twice for indigestion - after breakfast and after lunch, and pt reported relief. Pt also received nicotine lozenge PRN.            Subjective   Patient ID: Dari is a 43 year old female.    Dari Declined a chaperone.    grossly intact.      Extraocular Movements: Extraocular movements intact.   Neck:      Thyroid: No thyroid mass or thyromegaly.   Pulmonary:      Effort: Pulmonary effort is normal. No respiratory distress.   Abdominal:      Palpations: There is no hepatomegaly.   Musculoskeletal:      Cervical back: Neck supple.   Neurological:      General: No focal deficit present.      Mental Status: She is alert and oriented to person, place, and time.   Skin:     General: Skin is warm and dry.   Psychiatric:         Attention and Perception: Attention normal.         Mood and Affect: Mood normal.         Speech: Speech normal.         Behavior: Behavior is cooperative.         Thought Content: Thought content normal.   Vitals and nursing note reviewed.           Assessment   Problem List Items Addressed This Visit     None      Visit Diagnoses     Abnormal uterine bleeding    -  Primary    Spotting between menses        Infertility management        S/P dilation and curettage            Will give short course of doxycycline to treat for possible endometritis  Start letrazole with next menses if no spontaneous pregnancy with this next menses        During this visit, we discussed: Electronic Health Record  Total time spent with patient:  30 mins

## 2023-04-05 NOTE — PLAN OF CARE
Pt asleep at start of shift.     Breathing quiet and unlabored when sleeping.     Pt had no c/o pain  during the HS.     0255 Patient requested and was given Maalox for heartburn. Appeared effective as patient returned to sleep.    Appears to have slept 5.5 hours.     Goal Outcome Evaluation:  Problem: Sleep Disturbance  Goal: Adequate Sleep/Rest  Outcome: Progressing

## 2023-04-05 NOTE — PROGRESS NOTES
[facility], [place]   Psychiatric Progress Note  Hospital Day: 8        Interim History:   The patient's care was discussed with the treatment team during the daily team meeting and/or staff's chart notes were reviewed.  Staff report patient is cooperative while isolative. Patient did not report any acute medical concerns or side effects. No behavioral issues overnight, including violent or aggressive behaviors. Patient did not require seclusion or restraints. Patient is not exhibiting signs/sx of psychosis or ronnie. Patient did not endorse suicidal ideation. Patient did not endorse HI. Patient is medication adherent. Patient is attending groups. Patient is sleeping well. Patient is eating adequately. Patient is attending to ADLs. He was accepted at [facility].       Upon interview, the patient said that he is doing well. Feeling stable and ready for discharge. Willing to wait until bed becomes available. He is in agreement with plan to discharge door to door.     Suicidal ideation: denies current or recent suicidal ideation or behaviors.    Homicidal ideation: denies current or recent homicidal ideation or behaviors.    Psychotic symptoms: Patient denies AH, VH, paranoia, delusions.     Medication side effects reported: No significant side effects.    Acute medical concerns: none    Patient had no further questions or concerns.           Medications:       benztropine  0.5 mg Oral BID     OLANZapine  10 mg Oral At Bedtime     QUEtiapine  300 mg Oral At Bedtime          Allergies:     Allergies   Allergen Reactions     Morphine Sulfate [Morphine] Shortness Of Breath          Labs:   No results found for this or any previous visit (from the past 24 hour(s)).       Psychiatric Examination:     /85 (BP Location: Left arm)   Pulse 102   Temp 98.5  F (36.9  C) (Oral)   Resp 16   Wt 75.3 kg (166 lb)   SpO2 96%   BMI 27.62 kg/m    Weight is 166 lbs 0 oz  Body mass index is 27.62  "kg/m .    Weight over time:  Vitals:    03/28/23 0151   Weight: 75.3 kg (166 lb)       Orthostatic Vitals     None            Cardiometabolic risk assessment. 03/31/23      Reviewed patient profile for cardiometabolic risk factors    Date taken /Value  REFERENCE RANGE   Abdominal Obesity  (Waist Circumference)   See nursing flowsheet Women ?35 in (88 cm)   Men ?40 in (102 cm)      Triglycerides  Triglycerides   Date Value Ref Range Status   07/07/2021 340 (H) <=149 mg/dL Final       ?150 mg/dL (1.7 mmol/L) or current treatment for elevated triglycerides   HDL cholesterol  Direct Measure HDL   Date Value Ref Range Status   07/07/2021 36 (L) >=40 mg/dL Final   ]   Women <50 mg/dL (1.3 mmol/L) in women or current treatment for low HDL cholesterol  Men <40 mg/dL (1 mmol/L) in men or current treatment for low HDL cholesterol     Fasting plasma glucose (FPG) Lab Results   Component Value Date     11/18/2022    GLC 82 06/19/2021     05/09/2021      FPG ?100 mg/dL (5.6 mmol/L) or treatment for elevated blood glucose   Blood pressure  BP Readings from Last 3 Encounters:   04/04/23 123/85   03/27/23 137/76   01/24/23 119/71    Blood pressure ?130/85 mmHg or treatment for elevated blood pressure   Family History  See family history     Mental Status Exam:  Appearance:  awake, alert, adequately groomed and dressed in hospital scrubs  Muscle Strength and Tone: normal  Gait and Station: Normal  Behavior (Psychomotor):  no evidence of tardive dyskinesia, dystonia, or tics  Eye Contact:  good   Speech:  clear, coherent  Mood: \"good\"  Affect:  mood congruent but restricted, did smile on occasion  Attitude: cooperative, pleasant  Thought Process:  logical, linear and goal oriented  Thought Content:  no evidence of suicidal ideation or homicidal ideation  Associations:  no loose associations  Insight:  fair  Judgment:  fair  Oriented to:  time, person, and place  Attention Span and Concentration:  fair  Recent and Remote " Memory:  fair  Language: Fluent in English with appropriate syntax and vocabulary.  Fund of Knowledge: low-normal           Precautions:     Behavioral Orders   Procedures     Cheeking Precautions (behavioral units)     Code 1 - Restrict to Unit     Routine Programming     As clinically indicated     Status 15     Every 15 minutes.     Suicide precautions     Patients on Suicide Precautions should have a Combination Diet ordered that includes a Diet selection(s) AND a Behavioral Tray selection for Safe Tray - with utensils, or Safe Tray - NO utensils            Diagnoses:     Schizoaffective Disorder, unspecified type  Methamphetamine use disorder, severe  TBI         Assessment & Plan:     Assessment and hospital summary:  Hamzah Roberts is a 37 year old  male with a past psychiatric history of schizophrenia with paranoia, schizoaffective disorder, TBI, stimulant use disorder admitted to the Unit 12 from [facility] ED via walk in on 03/28/2023 (was in ED since 3/21) due to concern for SI, out of control behaviors, aggression and psychosis in the context of medication non-adherence and substance use.     Hospital course: Hamzah Roberts was admitted to station 12 as a voluntary patient is treated in the therapeutic milieu with appropriate individual and group therapies. His symptoms are improving and he is now waiting to be admitted to a CD program. Wellbutrin was discontinued due to history of abuse via snorting, resulting in overdose and seizure. Patient was very upset about discontinuation and demanded discharge, but did not sign 12 hour intent to leave and later agreed to remain hospitalized on voluntary basis and discharge to CD treatment [id number].      Medical course: He was medically cleared by the ED prior to admission to the unit.     PTA Medications (Continued):              Medication Sig Note Last Dose     baclofen (LIORESAL) 10 MG tablet Take 10 mg by mouth 3 times daily   Past Month             hydrOXYzine (VISTARIL) 50 MG capsule Take 50 mg by mouth 3 times daily as needed   Unknown     OLANZapine (ZYPREXA) 10 MG tablet Take 1 tablet (10 mg) by mouth At Bedtime   More than a month     omeprazole (PRILOSEC) 20 MG DR capsule Take 20 mg by mouth daily   More than a month     QUEtiapine (SEROQUEL) 200 MG tablet Take 200 mg by mouth At Bedtime   More than a month      Today's Changes:  None    Target psychiatric symptoms and interventions:  Risks, benefits, and alternatives discussed at length with patient.     Acute Medical Problems and Treatments:  Acute medical concerns:  - No acute medical concerns    Pertinent labs/imaging:  None    Behavioral/Psychological/Social:  - Encourage unit programming    Safety:  - Continue precautions as noted above  - Status 15 minute checks    Legal Status: voluntary    Disposition Plan   Reason for ongoing admission: Likely at baseline. Awaiting CD placement and pt is high risk for relapse.   Discharge location: Chemical dependency treatment facility  Discharge Medications: not ordered  Follow-up Appointments: not scheduled    Entered by: Kandi Gore MD on 4/5/2023 at 10:06 AM     Rosario Gore MD  [facility]

## 2023-04-05 NOTE — PLAN OF CARE
04/05/23 1412   Group Therapy Session   Group Attendance attended group session   Time Session Began 1300   Time Session Ended 1345   Total Time (minutes) 30 (No Charge)   Total # Attendees 5   Group Type Occupational Therapy   Group Topic Covered balanced lifestyle;cognitive activities;coping skills/lifestyle management;leisure exploration/use of leisure time;relaxation techniques;self-care activities;structured socialization   Group Session Detail OT Leisure Group   Patient Participation Detail Intervention: Leisure Group with 4 peers.    Patient Response: Pt participated in a group dice activity for leisure exploration and participation as a healthy coping skill, socialization, problem solving and sequencing. Discussed how participation in leisure activities can be used as a healthy coping skill in symptom management and a strategy to reduce stress. Was an active participant in the activity for the time spend in group, leaving for period of time to meet with the provider, returning afterwards to continue participating. Was easily able to  on rules and strategy of the activity, offered others helpful tips/advice on their turn if they wanted it.     Mood/Affect: Pleasant       Plan: Patient encouraged to maintain attendance for continued ongoing support in working towards occupational therapy goals to support overall treatment/care.

## 2023-04-05 NOTE — PLAN OF CARE
"Problem: Adult Behavioral Health Plan of Care  Goal: Adheres to Safety Considerations for Self and Others  Intervention: Develop and Maintain Individualized Safety Plan  Recent Flowsheet Documentation  Taken 4/4/2023 2000 by Jarad Vela RN  Safety Measures:   clinical history reviewed   environmental rounds completed   suicide check-in completed   Goal Outcome Evaluation:    The patient was out and about on the unit and appropriately engaged with his peers and staff. His affect was restricted, and he denied all psych symptoms, but anxiety rated as 4/10, which he attributed to \"being stuck here.\" He was observed calling various facilities to make arrangements to transition thereupon discharge. However, he has been accepted at another facility and is tentatively scheduled to be discharged to that facility next week. He ate dinner and snacks and took his scheduled medications and PRN Ativan, Melatonin, and Maalox.    "

## 2023-04-06 PROCEDURE — 250N000013 HC RX MED GY IP 250 OP 250 PS 637: Performed by: PSYCHIATRY & NEUROLOGY

## 2023-04-06 PROCEDURE — G0177 OPPS/PHP; TRAIN & EDUC SERV: HCPCS

## 2023-04-06 PROCEDURE — 124N000002 HC R&B MH UMMC

## 2023-04-06 PROCEDURE — 250N000013 HC RX MED GY IP 250 OP 250 PS 637

## 2023-04-06 PROCEDURE — 99232 SBSQ HOSP IP/OBS MODERATE 35: CPT | Performed by: PSYCHIATRY & NEUROLOGY

## 2023-04-06 RX ADMIN — BENZTROPINE MESYLATE 0.5 MG: 0.5 TABLET ORAL at 08:18

## 2023-04-06 RX ADMIN — NICOTINE POLACRILEX 4 MG: 4 LOZENGE ORAL at 20:05

## 2023-04-06 RX ADMIN — LORAZEPAM 0.5 MG: 0.5 TABLET ORAL at 10:25

## 2023-04-06 RX ADMIN — OLANZAPINE 10 MG: 10 TABLET, FILM COATED ORAL at 19:46

## 2023-04-06 RX ADMIN — NICOTINE POLACRILEX 4 MG: 4 LOZENGE ORAL at 17:50

## 2023-04-06 RX ADMIN — NICOTINE POLACRILEX 4 MG: 4 LOZENGE ORAL at 10:31

## 2023-04-06 RX ADMIN — QUETIAPINE 300 MG: 200 TABLET ORAL at 19:45

## 2023-04-06 RX ADMIN — MELATONIN TAB 3 MG 3 MG: 3 TAB at 19:46

## 2023-04-06 RX ADMIN — NICOTINE POLACRILEX 4 MG: 4 LOZENGE ORAL at 15:17

## 2023-04-06 RX ADMIN — BENZTROPINE MESYLATE 0.5 MG: 0.5 TABLET ORAL at 19:46

## 2023-04-06 RX ADMIN — NICOTINE POLACRILEX 4 MG: 4 LOZENGE ORAL at 12:44

## 2023-04-06 RX ADMIN — HYDROXYZINE HYDROCHLORIDE 50 MG: 50 TABLET, FILM COATED ORAL at 15:40

## 2023-04-06 RX ADMIN — NICOTINE POLACRILEX 4 MG: 4 LOZENGE ORAL at 16:48

## 2023-04-06 RX ADMIN — NICOTINE POLACRILEX 4 MG: 4 LOZENGE ORAL at 08:18

## 2023-04-06 RX ADMIN — PANTOPRAZOLE SODIUM 40 MG: 40 TABLET, DELAYED RELEASE ORAL at 08:18

## 2023-04-06 RX ADMIN — ALUMINUM HYDROXIDE, MAGNESIUM HYDROXIDE, AND DIMETHICONE 30 ML: 200; 20; 200 SUSPENSION ORAL at 00:44

## 2023-04-06 ASSESSMENT — ACTIVITIES OF DAILY LIVING (ADL)
ADLS_ACUITY_SCORE: 28
ADLS_ACUITY_SCORE: 28
LAUNDRY: UNABLE TO COMPLETE
ADLS_ACUITY_SCORE: 28
ORAL_HYGIENE: INDEPENDENT
HYGIENE/GROOMING: INDEPENDENT
ADLS_ACUITY_SCORE: 28
DRESS: INDEPENDENT
ORAL_HYGIENE: INDEPENDENT
ADLS_ACUITY_SCORE: 28
ADLS_ACUITY_SCORE: 28
DRESS: INDEPENDENT
HYGIENE/GROOMING: INDEPENDENT
ADLS_ACUITY_SCORE: 28
LAUNDRY: UNABLE TO COMPLETE
ADLS_ACUITY_SCORE: 28

## 2023-04-06 NOTE — PLAN OF CARE
Problem: Adult Behavioral Health Plan of Care  Goal: Individualized Daily Interaction Plan (IDIP)  Outcome: Progressing   Genreal shift Report:        Patent came to the nursing station and requested for  PRN Hydroxyzine, rates his anxiety 6/10. Patient states stressors are being in the hospital but looking forward to discharging on Monday. Patient denies having any thoughts of harming himself or others, but does endorse having anxiety. Patient has been out on the unit waking, pacing and socializing with peers. Patient is currently in his room and appears to resting.

## 2023-04-06 NOTE — DISCHARGE INSTRUCTIONS
Behavioral Discharge Planning and Instructions    Summary: You were admitted on 3/28/2023  due to  psychosis, Methamphetamine Use Disorder, Suicidal Ideations, and Agressive Behaviors.  You were treated by Dr.Allison Gore and discharged on 4/10/23 from [facility] to Substance Abuse Treatment Program [facility]    Main Diagnosis: Schizoaffective Disorder, unspecified type, Methamphetamine use disorder, severe TBI    Health Care Follow-up:   [facility]-[address] [place] [address].   Phone: [phone]     Per Chris she will schedule appt. For follow up care for groin abscess.       Appointment Date/Time: 4/25/23 @9:10 am and 5/30/23 @3:00 pm  Psychiatrist/Primary Care Giver: Kendrick Thomas   Address: [address], [place] [address]     Phone Number: [phone]      Attend all scheduled appointments with your outpatient providers. Call at least 24 hours in advance if you need to reschedule an appointment to ensure continued access to your outpatient providers.     Major Treatments, Procedures and Findings:  You were provided with: a psychiatric assessment, assessed for medical stability, medication evaluation and/or management, group therapy, family therapy, individual therapy, CD evaluation/assessment, milieu management, and medical interventions    Symptoms to Report: feeling more aggressive, increased confusion, losing more sleep, mood getting worse, or thoughts of suicide    Early warning signs can include: increased depression or anxiety sleep disturbances increased thoughts or behaviors of suicide or self-harm  increased unusual thinking, such as paranoia or hearing voices    Safety and Wellness:  Take all medicines as directed.  Make no changes unless your doctor suggests them.  Follow treatment recommendations.  Refrain from alcohol and non-prescribed drugs.  If there is a concern for safety, call 911.    Resources:   Crisis Intervention: 251.140.4923 or  "722.274.5659 (TTY: 679.231.4511).  Call anytime for help.  National [place] on Mental Illness (www.[place].org): 104.612.5633 or 005-200-0823.  Suicide Awareness Voices of Education (SAVE) (www.save.org): 604-819-NWEQ (2781)  National Suicide Prevention Line (www.mentalhealthmn.org): 714-416-RVYW (4778)  Mental Health Consumer/Survivor Network of [place] (www.mhcsn.net): 249.181.7897 or 884-853-8294  Mental Health Association of [place] (www.mentalhealth.org): 849.872.4018 or 363-826-5066  Self- Management and Recovery Training., SMART-- Toll free: 439.140.9686  www.[employer]  Text 4 Life: txt \"LIFE\" to 65204 for immediate support and crisis intervention  Crisis text line: Text \"[place]\" to 848016. Free, confidential, 24/7.  Crisis Intervention: 108.793.7556 or 357-132-0831. Call anytime for help.   [facility] and [facility] Mobile Crisis Response Team (CRT):  [phone] or 802-133-5244     General Medication Instructions:   See your medication sheet(s) for instructions.   Take all medicines as directed.  Make no changes unless your doctor suggests them.   Go to all your doctor visits.  Be sure to have all your required lab tests. This way, your medicines can be refilled on time.  Do not use any drugs not prescribed by your doctor.  Avoid alcohol.    Advance Directives:   Scanned document on file with [employer]? No scanned doc  Is document scanned? Pt unable to confirm  Honoring Choices Your Rights Handout: Informed and given  Was more information offered? Materials given    The Treatment team has appreciated the opportunity to work with you. If you have any questions or concerns about your recent admission, you can contact the unit which can receive your call 24 hours a day, 7 days a week. They will be able to get in touch with a Provider if needed. The unit number is [phone] .     "

## 2023-04-06 NOTE — PROGRESS NOTES
"[facility] [place]   Psychiatric Progress Note  Hospital Day: 9        Interim History:   The patient's care was discussed with the treatment team during the daily team meeting and/or staff's chart notes were reviewed.  Staff report patient is cooperative while isolative. Patient did not report any acute medical concerns or side effects with exception of acid reflux that awakens him from sleep. No behavioral issues overnight, including violent or aggressive behaviors. Patient did not require seclusion or restraints. Patient is not exhibiting signs/sx of psychosis or ronnie. Patient did not endorse suicidal ideation. Patient did not endorse HI. Patient is medication adherent. Patient is attending groups. Patient is sleeping well. Patient is eating adequately. Patient is attending to ADLs. He was accepted at [facility].     Upon interview, the patient said that he is doing well. Feeling stable and ready for discharge. Willing to wait until bed becomes available. He is in agreement with plan to discharge door to door.     Suicidal ideation: denies current or recent suicidal ideation or behaviors.    Homicidal ideation: denies current or recent homicidal ideation or behaviors.    Psychotic symptoms: Patient denies AH, VH, paranoia, delusions.     Medication side effects reported: No significant side effects.    Acute medical concerns: YES, patient reports worsening acid reflux; \"I wake up in the middle of the night like gasping and feeling like I just swallowed a whole bottle of hot sauce.\"     Patient had no further questions or concerns.           Medications:       benztropine  0.5 mg Oral BID     OLANZapine  10 mg Oral At Bedtime     pantoprazole  40 mg Oral QAM AC     QUEtiapine  300 mg Oral At Bedtime          Allergies:     Allergies   Allergen Reactions     Morphine Sulfate [Morphine] Shortness Of Breath          Labs:   No results found for this or any previous visit (from the past 24 " "hour(s)).       Psychiatric Examination:     /85 (BP Location: Left arm)   Pulse 102   Temp 98.5  F (36.9  C) (Oral)   Resp 16   Wt 75.3 kg (166 lb)   SpO2 96%   BMI 27.62 kg/m    Weight is 166 lbs 0 oz  Body mass index is 27.62 kg/m .    Weight over time:  Vitals:    03/28/23 0151   Weight: 75.3 kg (166 lb)       Orthostatic Vitals     None            Cardiometabolic risk assessment. 03/31/23      Reviewed patient profile for cardiometabolic risk factors    Date taken /Value  REFERENCE RANGE   Abdominal Obesity  (Waist Circumference)   See nursing flowsheet Women ?35 in (88 cm)   Men ?40 in (102 cm)      Triglycerides  Triglycerides   Date Value Ref Range Status   07/07/2021 340 (H) <=149 mg/dL Final       ?150 mg/dL (1.7 mmol/L) or current treatment for elevated triglycerides   HDL cholesterol  Direct Measure HDL   Date Value Ref Range Status   07/07/2021 36 (L) >=40 mg/dL Final   ]   Women <50 mg/dL (1.3 mmol/L) in women or current treatment for low HDL cholesterol  Men <40 mg/dL (1 mmol/L) in men or current treatment for low HDL cholesterol     Fasting plasma glucose (FPG) Lab Results   Component Value Date     11/18/2022    GLC 82 06/19/2021     05/09/2021      FPG ?100 mg/dL (5.6 mmol/L) or treatment for elevated blood glucose   Blood pressure  BP Readings from Last 3 Encounters:   04/04/23 123/85   03/27/23 137/76   01/24/23 119/71    Blood pressure ?130/85 mmHg or treatment for elevated blood pressure   Family History  See family history     Mental Status Exam:  Appearance:  awake, alert, adequately groomed and dressed in hospital scrubs  Muscle Strength and Tone: normal  Gait and Station: Normal  Behavior (Psychomotor):  no evidence of tardive dyskinesia, dystonia, or tics  Eye Contact:  good   Speech:  clear, coherent  Mood: \"good\"  Affect:  mood congruent but restricted, did smile on occasion  Attitude: cooperative, pleasant  Thought Process:  logical, linear and goal " oriented  Thought Content:  no evidence of suicidal ideation or homicidal ideation  Associations:  no loose associations  Insight:  fair  Judgment:  fair  Oriented to:  time, person, and place  Attention Span and Concentration:  fair  Recent and Remote Memory:  fair  Language: Fluent in English with appropriate syntax and vocabulary.  Fund of Knowledge: low-normal           Precautions:     Behavioral Orders   Procedures     Cheeking Precautions (behavioral units)     Code 1 - Restrict to Unit     Routine Programming     As clinically indicated     Status 15     Every 15 minutes.     Suicide precautions     Patients on Suicide Precautions should have a Combination Diet ordered that includes a Diet selection(s) AND a Behavioral Tray selection for Safe Tray - with utensils, or Safe Tray - NO utensils            Diagnoses:     Schizoaffective Disorder, unspecified type  Methamphetamine use disorder, severe  TBI         Assessment & Plan:     Assessment and hospital summary:  Hamzah Roberts is a 37 year old  male with a past psychiatric history of schizophrenia with paranoia, schizoaffective disorder, TBI, stimulant use disorder admitted to the Unit 12 from [facility] ED via walk in on 03/28/2023 (was in ED since 3/21) due to concern for SI, out of control behaviors, aggression and psychosis in the context of medication non-adherence and substance use.     Hospital course: Hamzah Roberts was admitted to station 12 as a voluntary patient is treated in the therapeutic milieu with appropriate individual and group therapies. His symptoms are improving and he is now waiting to be admitted to a CD program. Wellbutrin was discontinued due to history of abuse via snorting, resulting in overdose and seizure. Patient was very upset about discontinuation and demanded discharge, but did not sign 12 hour intent to leave and later agreed to remain hospitalized on voluntary basis and discharge to CD treatment [id number].       Medical course: He was medically cleared by the ED prior to admission to the unit.     PTA Medications (Continued):              Medication Sig Note Last Dose     baclofen (LIORESAL) 10 MG tablet Take 10 mg by mouth 3 times daily   Past Month            hydrOXYzine (VISTARIL) 50 MG capsule Take 50 mg by mouth 3 times daily as needed   Unknown     OLANZapine (ZYPREXA) 10 MG tablet Take 1 tablet (10 mg) by mouth At Bedtime   More than a month     omeprazole (PRILOSEC) 20 MG DR capsule Take 20 mg by mouth daily   More than a month     QUEtiapine (SEROQUEL) 200 MG tablet Take 200 mg by mouth At Bedtime   More than a month      Today's Changes:  -Start Tums BID prn  -Start Omeprazole 20 mg daily  -Advised to avoid spicy foods    Target psychiatric symptoms and interventions:  Risks, benefits, and alternatives discussed at length with patient.     Acute Medical Problems and Treatments:  Acute medical concerns:  GERD sx  - PPI  - tums BID prn    Pertinent labs/imaging:  None    Behavioral/Psychological/Social:  - Encourage unit programming    Safety:  - Continue precautions as noted above  - Status 15 minute checks    Legal Status: voluntary but under full commitment without Son. PD has NOT been revoked as patient is willing to engage in treatment.     Disposition Plan   Reason for ongoing admission: Likely at baseline. Awaiting CD placement and pt is high risk for relapse.   Discharge location: [facility] facility, Discharge to [facility] on Monday.   Discharge Medications: not ordered  Follow-up Appointments: not scheduled    Entered by: Kandi Gore MD on 4/6/2023 at 8:37 AM     Rosario Gore MD  [facility] Psychiatry

## 2023-04-06 NOTE — PLAN OF CARE
[facility] Occupational Therapy Group Intervention Note     04/06/23 1656   Group Therapy Session   Group Attendance attended group session   Time Session Began 1600   Time Session Ended 1645   Total Time (minutes) 45   Group Type task skill;recreation   Group Topic Covered coping skills/lifestyle management;relapse prevention;leisure exploration/use of leisure time;structured socialization   Group Session Detail OT: Leisure exploration offered for increased intrinsic motivation to engage in social non-obligatory occupations, challenge new learning, sequencing, problem solving, and retention via a cognitive group game.    Patient Response/Contribution cooperative with task;listened actively;organized   Patient Participation Detail Congruent - bright affect. Able to learn and follow novel game instructions. Helpful to nearby peers and attentive to detail throughout the game. Reportedly is looking forward to discharge on Monday and engaging in healthy leisure games there.      Doris Franklin, OT on 4/6/2023 at 4:56 PM

## 2023-04-06 NOTE — PLAN OF CARE
04/06/23 1509   Group Therapy Session   Group Attendance attended group session   Time Session Began 1030   Time Session Ended 1115   Total Time (minutes) 25   Total # Attendees 3   Group Type task skill   Group Topic Covered coping skills/lifestyle management;leisure exploration/use of leisure time;structured socialization   Group Session Detail leisure group   Patient Response/Contribution cooperative with task     Pt participated in morning active leisure group, playing a gross motor toss game which he enjoyed.  Pt remained focused, and had a good sense of humor joking around appropriately with writer and peers.  Pt discussed looking forward to discharge and being able to exercise at treatment center.

## 2023-04-06 NOTE — PLAN OF CARE
Nursing Assessment    Recent Vitals: B/P: 140/81, P: 87    Hours of sleep at night: 7    General Shift Summary  Patient has been present in the milieu, social with peers, partakes in groups, and presents with a flat affect but brightens upon approach. Behavior has been appropriate. Speech is normal rate and volume. Patient denied depression, HI/SI/AVH/SIB. Incite and judgement are fair. He is eating well. Hygiene is good.    Patient was medication compliant with no voiced side effects. Received PRN Nicotine a few times and PRN Ativan for anxiety rated 5/10.    Plan is to discharge Monday to [facility].    Elmira Boyd, DANIKA MSN

## 2023-04-06 NOTE — PLAN OF CARE
"  Problem: Adult Behavioral Health Plan of Care  Goal: Develops/Participates in Therapeutic Adrian to Support Successful Transition  Intervention: Foster Therapeutic Adrian  Recent Flowsheet Documentation  Taken 4/5/2023 1900 by Kate Senior RN  Trust Relationship/Rapport:   care explained   choices provided   questions encouraged   thoughts/feelings acknowledged   empathic listening provided   Goal Outcome Evaluation:    Plan of Care Reviewed With: patient      Pt had a good shift. He denied all mental health symptoms including AVH, and did not appear to be responding. He maintained appropriate behavioral control, and interacted [place] and [place] [facility]  He had c/o heartburn and requested Maalox. He stated that \"he wakes up every night with heartburn that is so bad, he has to catch his breath and it takes him 30 seconds\" . He stated that he used to take omeprazole for his heartburn. He didn't know the dosage or frequency, but did know that he took the medication.   Pt ate and drank well, was med complaint, and had no other acute physical or behavioral concerns this shift.     "

## 2023-04-06 NOTE — PLAN OF CARE
Assessment/Intervention/Current Symptoms and Care Coordination     Team meeting  Patient appears motivated for treatment and eager to discharge          Discharge Plan or Goal  CD treatment         Barriers to Discharge   Symptom stabilization/Medication stabilitzation        Referral Status  CD referrals placed for [facility], [facility]         Legal Status  Full commitment tamy Fuller     Updated progress notes sent to [facility] 4/3/23  [phone] pt was accepted earliest opening 4/10/23 awaiting  time

## 2023-04-06 NOTE — PLAN OF CARE
Problem: Adult Behavioral Health Plan of Care  Goal: Absence of New-Onset Illness or Injury  Outcome: Progressing  Note: Patient reports no new-onset illness or injury     Problem: Sleep Disturbance  Goal: Adequate Sleep/Rest  Outcome: Progressing  Note: Patient has been sleeping well this night     Problem: Plan of Care - These are the overarching goals to be used throughout the patient stay.    Goal: Absence of Hospital-Acquired Illness or Injury  Outcome: Progressing  Note: Patient reports no ALEXANDER   Goal Outcome Evaluation:           Patient appears sleeping most of the night with no complaints of pain or anxiety and thus received no prn medications, cooperative with safety checks, manifests no untoward or negative behavior through out the night. Patient endorses no SI/HI, A/VH or SIB. Overall, patient archives 7 hours of uninterrupted sleep. Will continue to monitor and follow plan or care.               Morgan Lopez DNP, RN

## 2023-04-06 NOTE — PLAN OF CARE
Occupational Therapy Group Note:     04/06/23 1057   Group Therapy Session   Group Attendance attended group session   Time Session Began 1125   Time Session Ended 1210   Total Time (minutes) 45   Total # Attendees 3   Group Type life skill   Group Topic Covered balanced lifestyle;coping skills/lifestyle management;structured socialization   Group Session Detail Movement game & discussion   Patient Response/Contribution cooperative with task;discussed personal experience with topic;expressed understanding of topic   Patient Participation Detail Pt actively participated in a mental health management group with a focus on coping through movement to facilitate education and stress management related to exercise via a group game. Pt engaged in 100% of the movements and stretches, and appeared motivated by the more challenging exercises. Shared that he feels most motivated to exercise early in the morning, and that he has been doing push ups in his room throughout his hospitalization to stay active. Cooperative and engaged. Pleasant in interactions.

## 2023-04-07 ENCOUNTER — APPOINTMENT (OUTPATIENT)
Dept: ULTRASOUND IMAGING | Facility: CLINIC | Age: 38
End: 2023-04-07
Payer: COMMERCIAL

## 2023-04-07 LAB
ALBUMIN SERPL BCG-MCNC: 4.4 G/DL (ref 3.5–5.2)
ALP SERPL-CCNC: 80 U/L (ref 40–129)
ALT SERPL W P-5'-P-CCNC: 28 U/L (ref 10–50)
ANION GAP SERPL CALCULATED.3IONS-SCNC: 15 MMOL/L (ref 7–15)
AST SERPL W P-5'-P-CCNC: 19 U/L (ref 10–50)
BASOPHILS # BLD AUTO: 0 10E3/UL (ref 0–0.2)
BASOPHILS NFR BLD AUTO: 0 %
BILIRUB SERPL-MCNC: 0.4 MG/DL
BUN SERPL-MCNC: 15.5 MG/DL (ref 6–20)
CALCIUM SERPL-MCNC: 9.9 MG/DL (ref 8.6–10)
CHLORIDE SERPL-SCNC: 100 MMOL/L (ref 98–107)
CREAT SERPL-MCNC: 0.89 MG/DL (ref 0.67–1.17)
CRP SERPL-MCNC: 49.89 MG/L
DEPRECATED HCO3 PLAS-SCNC: 22 MMOL/L (ref 22–29)
EOSINOPHIL # BLD AUTO: 0.1 10E3/UL (ref 0–0.7)
EOSINOPHIL NFR BLD AUTO: 0 %
ERYTHROCYTE [DISTWIDTH] IN BLOOD BY AUTOMATED COUNT: 12.8 % (ref 10–15)
GFR SERPL CREATININE-BSD FRML MDRD: >90 ML/MIN/1.73M2
GLUCOSE SERPL-MCNC: 101 MG/DL (ref 70–99)
HCT VFR BLD AUTO: 43.1 % (ref 40–53)
HGB BLD-MCNC: 14.9 G/DL (ref 13.3–17.7)
IMM GRANULOCYTES # BLD: 0.1 10E3/UL
IMM GRANULOCYTES NFR BLD: 0 %
LYMPHOCYTES # BLD AUTO: 2.1 10E3/UL (ref 0.8–5.3)
LYMPHOCYTES NFR BLD AUTO: 14 %
MCH RBC QN AUTO: 31.5 PG (ref 26.5–33)
MCHC RBC AUTO-ENTMCNC: 34.6 G/DL (ref 31.5–36.5)
MCV RBC AUTO: 91 FL (ref 78–100)
MONOCYTES # BLD AUTO: 1.4 10E3/UL (ref 0–1.3)
MONOCYTES NFR BLD AUTO: 9 %
NEUTROPHILS # BLD AUTO: 11.4 10E3/UL (ref 1.6–8.3)
NEUTROPHILS NFR BLD AUTO: 77 %
NRBC # BLD AUTO: 0 10E3/UL
NRBC BLD AUTO-RTO: 0 /100
PLATELET # BLD AUTO: 220 10E3/UL (ref 150–450)
POTASSIUM SERPL-SCNC: 4 MMOL/L (ref 3.4–5.3)
PROT SERPL-MCNC: 7.3 G/DL (ref 6.4–8.3)
RBC # BLD AUTO: 4.73 10E6/UL (ref 4.4–5.9)
SODIUM SERPL-SCNC: 137 MMOL/L (ref 136–145)
WBC # BLD AUTO: 15 10E3/UL (ref 4–11)

## 2023-04-07 PROCEDURE — 250N000013 HC RX MED GY IP 250 OP 250 PS 637

## 2023-04-07 PROCEDURE — 76882 US LMTD JT/FCL EVL NVASC XTR: CPT | Mod: RT

## 2023-04-07 PROCEDURE — 124N000002 HC R&B MH UMMC

## 2023-04-07 PROCEDURE — 250N000013 HC RX MED GY IP 250 OP 250 PS 637: Performed by: PSYCHIATRY & NEUROLOGY

## 2023-04-07 PROCEDURE — G0177 OPPS/PHP; TRAIN & EDUC SERV: HCPCS

## 2023-04-07 PROCEDURE — 36415 COLL VENOUS BLD VENIPUNCTURE: CPT

## 2023-04-07 PROCEDURE — 99232 SBSQ HOSP IP/OBS MODERATE 35: CPT | Performed by: PSYCHIATRY & NEUROLOGY

## 2023-04-07 PROCEDURE — 76882 US LMTD JT/FCL EVL NVASC XTR: CPT | Mod: 26 | Performed by: RADIOLOGY

## 2023-04-07 PROCEDURE — 99222 1ST HOSP IP/OBS MODERATE 55: CPT

## 2023-04-07 PROCEDURE — 80053 COMPREHEN METABOLIC PANEL: CPT

## 2023-04-07 PROCEDURE — 85025 COMPLETE CBC W/AUTO DIFF WBC: CPT

## 2023-04-07 PROCEDURE — 86140 C-REACTIVE PROTEIN: CPT

## 2023-04-07 RX ORDER — NALTREXONE HYDROCHLORIDE 50 MG/1
50 TABLET, FILM COATED ORAL DAILY
Qty: 30 TABLET | Refills: 0 | Status: SHIPPED | OUTPATIENT
Start: 2023-04-07 | End: 2023-11-05

## 2023-04-07 RX ORDER — NALTREXONE HYDROCHLORIDE 50 MG/1
50 TABLET, FILM COATED ORAL DAILY
Status: DISCONTINUED | OUTPATIENT
Start: 2023-04-07 | End: 2023-04-10 | Stop reason: HOSPADM

## 2023-04-07 RX ORDER — QUETIAPINE FUMARATE 300 MG/1
300 TABLET, FILM COATED ORAL AT BEDTIME
Qty: 30 TABLET | Refills: 1 | Status: SHIPPED | OUTPATIENT
Start: 2023-04-07 | End: 2023-11-05

## 2023-04-07 RX ORDER — OLANZAPINE 10 MG/1
10 TABLET ORAL AT BEDTIME
Qty: 30 TABLET | Refills: 1 | Status: SHIPPED | OUTPATIENT
Start: 2023-04-07 | End: 2023-11-05

## 2023-04-07 RX ORDER — BENZTROPINE MESYLATE 0.5 MG/1
0.5 TABLET ORAL 2 TIMES DAILY
Qty: 60 TABLET | Refills: 1 | Status: SHIPPED | OUTPATIENT
Start: 2023-04-07 | End: 2023-11-05

## 2023-04-07 RX ORDER — IBUPROFEN 200 MG
400 TABLET ORAL EVERY 6 HOURS PRN
Status: DISCONTINUED | OUTPATIENT
Start: 2023-04-07 | End: 2023-04-10 | Stop reason: HOSPADM

## 2023-04-07 RX ORDER — LANOLIN ALCOHOL/MO/W.PET/CERES
3 CREAM (GRAM) TOPICAL
Qty: 30 TABLET | Refills: 1 | Status: SHIPPED | OUTPATIENT
Start: 2023-04-07 | End: 2023-11-05

## 2023-04-07 RX ORDER — HYDROXYZINE PAMOATE 50 MG/1
50 CAPSULE ORAL 3 TIMES DAILY PRN
Qty: 90 CAPSULE | Refills: 1 | Status: SHIPPED | OUTPATIENT
Start: 2023-04-07 | End: 2023-11-05

## 2023-04-07 RX ADMIN — NICOTINE POLACRILEX 4 MG: 4 LOZENGE ORAL at 12:38

## 2023-04-07 RX ADMIN — BENZTROPINE MESYLATE 0.5 MG: 0.5 TABLET ORAL at 20:31

## 2023-04-07 RX ADMIN — NICOTINE POLACRILEX 4 MG: 4 LOZENGE ORAL at 07:41

## 2023-04-07 RX ADMIN — PANTOPRAZOLE SODIUM 40 MG: 40 TABLET, DELAYED RELEASE ORAL at 07:41

## 2023-04-07 RX ADMIN — BENZTROPINE MESYLATE 0.5 MG: 0.5 TABLET ORAL at 07:41

## 2023-04-07 RX ADMIN — CEPHALEXIN 250 MG: 250 CAPSULE ORAL at 14:52

## 2023-04-07 RX ADMIN — NICOTINE POLACRILEX 4 MG: 4 LOZENGE ORAL at 18:18

## 2023-04-07 RX ADMIN — NICOTINE POLACRILEX 4 MG: 4 LOZENGE ORAL at 10:24

## 2023-04-07 RX ADMIN — LORAZEPAM 0.5 MG: 0.5 TABLET ORAL at 16:29

## 2023-04-07 RX ADMIN — ACETAMINOPHEN 650 MG: 325 TABLET, FILM COATED ORAL at 13:33

## 2023-04-07 RX ADMIN — NICOTINE POLACRILEX 4 MG: 4 LOZENGE ORAL at 16:29

## 2023-04-07 RX ADMIN — NALTREXONE HYDROCHLORIDE 50 MG: 50 TABLET, FILM COATED ORAL at 13:33

## 2023-04-07 RX ADMIN — NICOTINE POLACRILEX 4 MG: 4 LOZENGE ORAL at 14:56

## 2023-04-07 RX ADMIN — NICOTINE POLACRILEX 4 MG: 4 LOZENGE ORAL at 08:43

## 2023-04-07 RX ADMIN — OLANZAPINE 10 MG: 10 TABLET, FILM COATED ORAL at 20:31

## 2023-04-07 RX ADMIN — CEPHALEXIN 250 MG: 250 CAPSULE ORAL at 18:01

## 2023-04-07 RX ADMIN — NICOTINE POLACRILEX 4 MG: 4 LOZENGE ORAL at 19:54

## 2023-04-07 RX ADMIN — QUETIAPINE 300 MG: 200 TABLET ORAL at 20:31

## 2023-04-07 RX ADMIN — NICOTINE POLACRILEX 4 MG: 4 LOZENGE ORAL at 13:33

## 2023-04-07 ASSESSMENT — ACTIVITIES OF DAILY LIVING (ADL)
ADLS_ACUITY_SCORE: 28
HYGIENE/GROOMING: INDEPENDENT
ORAL_HYGIENE: INDEPENDENT
LAUNDRY: UNABLE TO COMPLETE
ADLS_ACUITY_SCORE: 28
DRESS: INDEPENDENT
ADLS_ACUITY_SCORE: 28

## 2023-04-07 NOTE — PLAN OF CARE
04/07/23 1510   Group Therapy Session   Group Attendance attended group session   Total Time (minutes) 100   Total # Attendees 3,4   Group Type psychoeducation;recreation   Group Topic Covered coping skills/lifestyle management;relapse prevention;leisure exploration/use of leisure time;structured socialization   Group Session Detail topic group, leisure group   Patient Response/Contribution able to recall/repeat info presented;cooperative with task;expressed understanding of topic     Topic Group:  Pt attended the OT discussion group which involved reading a selected quote, discussing it's meaning, and relating it to personal life experience or situation.  Pt was focused and attentive in group.  Pts accurately interpreted selected quotes and discussed not comparing self to others and not feeling inferior to others.  Pt shares he has 1 month of sobriety today, and is glad he is going straight to treatment from the hospital.    Leisure Group:  Pt demonstrated good problem solving with a more complex visual spatial game that he just learned today.  Friendly conversation with peers.

## 2023-04-07 NOTE — PLAN OF CARE
Assessment/Intervention/Current Symptoms and Care Coordination     Team meeting  Patient appears motivated for treatment and eager to discharge          Discharge Plan or Goal  CD treatment         Barriers to Discharge   Symptom stabilization/Medication stabilitzation        Referral Status  CD referrals placed for [facility], [facility]         Legal Status  Full commitment no Jonny     Updated progress notes sent to [facility] 4/3/23  [facility] [phone] pt was accepted earliest opening 4/10/23 awaiting  time.   Psychiatry scheduled with [facility] 4/25/23 9:10am, 5/30/23 @3pm

## 2023-04-07 NOTE — PLAN OF CARE
"  Problem: Excessive Substance Use  Goal: Improved Behavioral Control (Excessive Substance Use)  Outcome: Progressing     Problem: Excessive Substance Use  Goal: Increased Participation and Engagement (Excessive Substance Use)  Outcome: Progressing     Problem: Adult Behavioral Health Plan of Care  Goal: Adheres to Safety Considerations for Self and Others  Outcome: Progressing  Intervention: Develop and Maintain Individualized Safety Plan  Recent Flowsheet Documentation  Taken 4/7/2023 0753 by Juan Carlos Nunez  Safety Measures:    clinical history reviewed    environmental rounds completed    suicide check-in completed   Goal Outcome Evaluation:    Plan of Care Reviewed With: patient      /77 (Patient Position: Sitting)   Pulse 100   Temp 98.1  F (36.7  C) (Temporal)   Resp 16   Wt 75.6 kg (166 lb 11.2 oz)   SpO2 96%   BMI 27.74 kg/m        Pt presented with a calm and pleasant affect this shift, and was observed attending groups, socializing, and eating in the lounge. Pt denied mental health symptoms including SI/SIB/HI and AVH, and was not observed to be responding to internal stimuli. Pt stated \"I'm grateful to be alive\" and expressed enthusiasm regarding discharge on Monday, 4/10. Pt was having positive interactions with staff and peers today.    This morning, pt noticed and reported a painful red raised lesion on the right pelvis/groin area. Pt reported that this was not present last night. Pt rated 6/10 pain on the lesion if he touches it. IM consult was requested and pt was seen by NP. Pt went down for an ultrasound at 1400. Pt refused an ice pack and prn tylenol for the pain. Lab came in PM for blood draw, see results. Cephalexin 250 mg was ordered and administered today.     Pt reported that scheduled pantoprazole was helping his indigestion symptoms \"a lot\". He ate and drank well at breakfast and lunch today.    Pt was compliant with scheduled medications and requested a few PRN nicotine " lozenges.

## 2023-04-07 NOTE — INTERIM SUMMARY
Brief Orthopedic Update:     Patient chart reviewed.  Per chart review and conversation between our RN and requesting provider, patient developed a new bump in the right anterior groin overnight.  There is surrounding erythema, tenderness, and possible purulence underneath the skin.  Ultrasound imaging was obtained which reveals a complex fluid collection.  Inflammatory markers are elevated with a CRP of 50.  Patient has been afebrile, vital signs stable.  No concerns for sepsis at this time.      Orthopedics team does not cover abscesses of the groin or abdomen or chest. I discussed with bedside RN regarding consult.  He stated that at this time, there is no in-house provider for this unit.  As this patient is currently stable and there is no urgent intervention for anything to happen tonight at this time, he stated that he would relay the message to the primary team in the morning that this is a consult better suited for either general surgery or possibly urology for appropriate evaluation and care.  I did offer to call the cross cover provider, with which the nurse stated that this would not be necessary.      I can confirm tomorrow that the appropriate team was consulted.  Orthopedics consult canceled.  Orthopedics will sign off at this time.

## 2023-04-07 NOTE — PROGRESS NOTES
[facility], [place]   Psychiatric Progress Note  Hospital Day: 10        Interim History:   The patient's care was discussed with the treatment team during the daily team meeting and/or staff's chart notes were reviewed.  Staff report patient is cooperative while isolative. Patient reported right groin pain and lesion. No behavioral issues overnight, including violent or aggressive behaviors. Patient did not require seclusion or restraints. Patient is not exhibiting signs/sx of psychosis or ronnie. Patient did not endorse suicidal ideation. Patient did not endorse HI. Patient is medication adherent. Patient is attending groups. Patient is sleeping well. Patient is eating adequately. Patient is attending to ADLs. He was accepted at [facility].     Upon interview, the patient said that he is doing well. Feeling stable and ready for discharge. Willing to wait until bed becomes available. He is in agreement with plan to discharge door to door. Reporting groin pain r/t lesion. Feels naltrexone may have been helpful for cravings in the past. Willing to restart. Discussed R/B/A.     Suicidal ideation: denies current or recent suicidal ideation or behaviors.    Homicidal ideation: denies current or recent homicidal ideation or behaviors.    Psychotic symptoms: Patient denies AH, VH, paranoia, delusions.     Medication side effects reported: No significant side effects.    Acute medical concerns: YES, pain in groin.    Patient had no further questions or concerns.           Medications:       benztropine  0.5 mg Oral BID     OLANZapine  10 mg Oral At Bedtime     pantoprazole  40 mg Oral QAM AC     QUEtiapine  300 mg Oral At Bedtime          Allergies:     Allergies   Allergen Reactions     Morphine Sulfate [Morphine] Shortness Of Breath          Labs:   No results found for this or any previous visit (from the past 24 hour(s)).       Psychiatric Examination:     /86   Pulse 103   Temp 97.4  " F (36.3  C) (Temporal)   Resp 16   Wt 75.6 kg (166 lb 11.2 oz)   SpO2 96%   BMI 27.74 kg/m    Weight is 166 lbs 11.2 oz  Body mass index is 27.74 kg/m .    Weight over time:  Vitals:    03/28/23 0151 04/06/23 0700   Weight: 75.3 kg (166 lb) 75.6 kg (166 lb 11.2 oz)       Orthostatic Vitals     None            Cardiometabolic risk assessment. 03/31/23      Reviewed patient profile for cardiometabolic risk factors    Date taken /Value  REFERENCE RANGE   Abdominal Obesity  (Waist Circumference)   See nursing flowsheet Women ?35 in (88 cm)   Men ?40 in (102 cm)      Triglycerides  Triglycerides   Date Value Ref Range Status   07/07/2021 340 (H) <=149 mg/dL Final       ?150 mg/dL (1.7 mmol/L) or current treatment for elevated triglycerides   HDL cholesterol  Direct Measure HDL   Date Value Ref Range Status   07/07/2021 36 (L) >=40 mg/dL Final   ]   Women <50 mg/dL (1.3 mmol/L) in women or current treatment for low HDL cholesterol  Men <40 mg/dL (1 mmol/L) in men or current treatment for low HDL cholesterol     Fasting plasma glucose (FPG) Lab Results   Component Value Date     11/18/2022    GLC 82 06/19/2021     05/09/2021      FPG ?100 mg/dL (5.6 mmol/L) or treatment for elevated blood glucose   Blood pressure  BP Readings from Last 3 Encounters:   04/06/23 128/86   03/27/23 137/76   01/24/23 119/71    Blood pressure ?130/85 mmHg or treatment for elevated blood pressure   Family History  See family history     Mental Status Exam:  Appearance:  awake, alert, adequately groomed and dressed in hospital scrubs  Muscle Strength and Tone: normal  Gait and Station: Normal  Behavior (Psychomotor):  no evidence of tardive dyskinesia, dystonia, or tics  Eye Contact:  good   Speech:  clear, coherent  Mood: \"good\"  Affect:  mood congruent, smiling at appropriate times  Attitude: cooperative, pleasant  Thought Process:  logical, linear and goal oriented  Thought Content:  no evidence of suicidal ideation or " homicidal ideation  Associations:  no loose associations  Insight:  fair  Judgment:  fair  Oriented to:  time, person, and place  Attention Span and Concentration:  fair  Recent and Remote Memory:  fair  Language: Fluent in English with appropriate syntax and vocabulary.  Fund of Knowledge: low-normal           Precautions:     Behavioral Orders   Procedures     Cheeking Precautions (behavioral units)     Code 1 - Restrict to Unit     Routine Programming     As clinically indicated     Status 15     Every 15 minutes.     Suicide precautions     Patients on Suicide Precautions should have a Combination Diet ordered that includes a Diet selection(s) AND a Behavioral Tray selection for Safe Tray - with utensils, or Safe Tray - NO utensils            Diagnoses:     Schizoaffective Disorder, unspecified type  Methamphetamine use disorder, severe  TBI  Skin and soft tissue infection         Assessment & Plan:     Assessment and hospital summary:  Hamzah Roberts is a 37 year old  male with a past psychiatric history of schizophrenia with paranoia, schizoaffective disorder, TBI, stimulant use disorder admitted to the Unit 12 from [facility] ED via walk in on 03/28/2023 (was in ED since 3/21) due to concern for SI, out of control behaviors, aggression and psychosis in the context of medication non-adherence and substance use.     Hospital course: Hamzah Roberts was admitted to station 12 as a voluntary patient is treated in the therapeutic milieu with appropriate individual and group therapies. His symptoms are improving and he is now waiting to be admitted to a CD program. Wellbutrin was discontinued due to history of abuse via snorting, resulting in overdose and seizure. Patient was very upset about discontinuation and demanded discharge, but did not sign 12 hour intent to leave and later agreed to remain hospitalized on voluntary basis and discharge to CD treatment [id number].      Medical course: He was medically  cleared by the ED prior to admission to the unit.     PTA Medications (Continued):              Medication Sig Note Last Dose     baclofen (LIORESAL) 10 MG tablet Take 10 mg by mouth 3 times daily   Past Month            hydrOXYzine (VISTARIL) 50 MG capsule Take 50 mg by mouth 3 times daily as needed   Unknown     OLANZapine (ZYPREXA) 10 MG tablet Take 1 tablet (10 mg) by mouth At Bedtime   More than a month     omeprazole (PRILOSEC) 20 MG DR capsule Take 20 mg by mouth daily   More than a month     QUEtiapine (SEROQUEL) 200 MG tablet Take 200 mg by mouth At Bedtime   More than a month      Today's Changes:  -IM consult placed for groin pain  -Discharge medications ordered  -Initiated naltrexone 50 mg daily    Target psychiatric symptoms and interventions:  Risks, benefits, and alternatives discussed at length with patient.     Acute Medical Problems and Treatments:  Acute medical concerns:  GERD sx  - PPI  - tums BID prn    Soft tissue infection:  - IM consult placed  Per IM note dated 47/23:  #Skin and soft tissue infection  Patient states he woke up this morning and noticed a hard, red, painful bump in his right groin.  States this is the first time he ever noticed anything abnormal.  He denies any injury and has not injected any type medicine or drug into that area.  Patient denies fever, chills, myalgias.  Has never had an episode like this before.  On exam, area very erythematous, firm, and hot with a white tip.  -Nonvascular ultrasound right groin  -CBC, CMP, CRP  -start cephalexin 500 mg every 6 hours for 5 days while work up pending  -ibuprofen 400 mg every 6 hours PRN pain  -acetaminophen 650 mg every 6 hours PRN pain or fever     Medicine will continue to follow along for results.  Recommendations relayed to primary team via this progress note.  Thank you for the opportunity to be involved in this patient's care.    Pertinent labs/imaging:  None    Behavioral/Psychological/Social:  - Encourage unit  programming    Safety:  - Continue precautions as noted above  - Status 15 minute checks    Legal Status: voluntary but under full commitment without Son. PD has NOT been revoked as patient is willing to engage in treatment.     Disposition Plan   Reason for ongoing admission: Likely at baseline. Awaiting CD placement and pt is high risk for relapse.   Discharge location: Chemical dependency treatment facility, Discharge to [facility] on Monday.   Discharge Medications: ORDERED  Follow-up Appointments: not scheduled    Entered by: Kandi Gore MD on 4/7/2023 at 8:12 AM     Rosario Gore MD  [facility] Psychiatry

## 2023-04-07 NOTE — PROGRESS NOTES
Called and let Ultrasound know that the Code 2 was ordered, and he is available to go. They stated they will call back and did @8451.

## 2023-04-07 NOTE — PLAN OF CARE
Assessment/Intervention/Current Symptoms and Care Coordination     Team meeting  Patient appears motivated for treatment and eager to discharge          Discharge Plan or Goal  CD treatment         Barriers to Discharge   Symptom stabilization/Medication stabilitzation        Referral Status  CD referrals placed for [facility]         Legal Status  Full commitment tamy Fuller     Updated progress notes sent to [facility] 4/3/23  Per [facility] [phone] pt was accepted earliest opening 4/10/23 awaiting  time  Psychiatry scheduled with Magi and Crystal

## 2023-04-07 NOTE — PLAN OF CARE
Problem: Sleep Disturbance  Goal: Adequate Sleep/Rest  Outcome: Progressing  Note: Patient observed sleeping nicely during safety checks     Problem: Plan of Care - These are the overarching goals to be used throughout the patient stay.    Goal: Absence of Hospital-Acquired Illness or Injury  Outcome: Progressing  Note: Patient reports no ALEXANDER     Problem: Excessive Substance Use  Goal: Improved Behavioral Control (Excessive Substance Use)  Outcome: Progressing  Note: Patient demonstrates no behavioral dyscontrol   Goal Outcome Evaluation:         Patient slept 6.75 hours, presents no complaints and made no requests  Cooperative with cares, safety checks successfully completed, all precautions in place. No behavioral concern at this time. Continue to monitor.             Morgan Lopez DNP, RN

## 2023-04-07 NOTE — CONSULTS
"  [facility]  Internal Medicine Consult     Hamzah Roberts MRN# [id number]   Age: 37 year old YOB: 1985     Date of Admission: 3/28/2023  Date of Consult: 4/7/2023    Primary Care Provider: No Ref-Primary, Physician    Requesting Service: Behavioral Health - Rosa Rodriguez MD  Reason for Consult: General Medical Evaluation      SUBJECTIVE   CC:   \"I have a painful bump in my right groin\"   Assessment and Plan/Recommendations:     Hamzah Roberts is a 37 year old man with a history of schizophrenia with paranoia, schizoaffective disorder, TBI, stimulant use disorder. Admitted to [facility] [place] for management of SI, out of control behaviors, aggression and psychosis. Medicine consulted 4/7/22 for possible abscess, soft tissue infection.     #Skin and soft tissue infection  Patient states he woke up this morning and noticed a hard, red, painful bump in his right groin.  States this is the first time he ever noticed anything abnormal.  He denies any injury and has not injected any type medicine or drug into that area.  Patient denies fever, chills, myalgias.  Has never had an episode like this before.  On exam, area very erythematous, firm, and hot with a white tip.  -Nonvascular ultrasound right groin  -CBC, CMP, CRP  -start cephalexin 500 mg every 6 hours for 5 days while work up pending  -ibuprofen 400 mg every 6 hours PRN pain  -acetaminophen 650 mg every 6 hours PRN pain or fever            Medicine will continue to follow along for results.  Recommendations relayed to primary team via this progress note.  Thank you for the opportunity to be involved in this patient's care.      MORRIS Casillas CNP  Internal Medicine ELISA Hospitalist  Page job code [id number] (3B), [phone] (3A), or [phone] ([facility] and )  Text paging via [employer] is appreciated  April 7, 2023         HPI:     Hamzah Roberts is a 37 year old man with a history of schizophrenia with paranoia, schizoaffective disorder, TBI, " stimulant use disorder. Admitted to [facility] for management of SI, out of control behaviors, aggression and psychosis. Medicine consulted 4/7/22 for possible abscess, soft tissue infection.   Patient states he woke up this morning and noticed a hard, red, painful bump in his right groin.  States this is the first time he ever noticed anything abnormal.  He denies any injury and has not injected any type medicine or drug into that area.  Patient would also like STI testing. Denies new sexual relations or new partners. Has been monogamous with the same partner for last 9 years.  Do not see a need to test for STIs with patient being asymptomatic at this time.  If patient would like to pursue STI evaluation can do it as outpatient.       Past Medical History:     Past Medical History:   Diagnosis Date     Chemical dependency (H)         Reviewed and updated in [employer].     Past Surgical History:    No past surgical history on file.      Social History:     Social History     Tobacco Use     Smoking status: Every Day     Packs/day: 0.50     Types: Cigarettes   Substance Use Topics     Alcohol use: Never     Drug use: Yes     Types: Methamphetamines, Marijuana        Family History:   No family history on file.      Allergies:     Allergies   Allergen Reactions     Morphine Sulfate [Morphine] Shortness Of Breath         Medications:   Reviewed. Please see MAR     Review of Systems:   10 point ROS of systems including Constitutional, Eyes, Respiratory, Cardiovascular, Gastroenterology, Genitourinary, Integumentary, Muscularskeletal, Psychiatric were all negative except for pertinent positives noted in my HPI.    OBJECTIVE   Physical Exam:   Vitals were reviewed  Blood pressure 125/77, pulse 100, temperature 98.1  F (36.7  C), temperature source Temporal, resp. rate 16, weight 75.6 kg (166 lb 11.2 oz), SpO2 96 %.  General: Alert and oriented x3, well appearing   HEENT: Anicteric sclera, MMM  Cardiovascular: RRR, S1S2. No murmur  noted  Lungs: CTAB without wheezing or crackles   GI: Abdomen soft, non-tender with bowel sounds present. No guarding or rebound   Vascular: No peripheral edema, distal pulses palpable  Neurologic: No focal deficits, CN II-XII grossly intact  Skin: Erythematous, firm, warm to touch abscess right groin         Data:        Lab Results   Component Value Date     11/18/2022     05/09/2021    Lab Results   Component Value Date    CHLORIDE 102 11/18/2022    CHLORIDE 105 06/19/2021    CHLORIDE 105 05/09/2021    Lab Results   Component Value Date    BUN 11.8 11/18/2022    BUN 18 06/19/2021    BUN 18 05/09/2021      Lab Results   Component Value Date    POTASSIUM 3.9 11/18/2022    POTASSIUM 3.3 06/19/2021    POTASSIUM 3.5 05/09/2021    Lab Results   Component Value Date    CO2 24 11/18/2022    CO2 23 06/19/2021    CO2 26 05/09/2021    Lab Results   Component Value Date    CR 0.96 11/18/2022    CR 0.79 05/09/2021        Lab Results   Component Value Date    WBC 6.9 11/18/2022    HGB 16.1 11/18/2022    HCT 46.4 11/18/2022    MCV 92 11/18/2022     11/18/2022     Lab Results   Component Value Date    WBC 6.9 11/18/2022

## 2023-04-08 PROCEDURE — 250N000013 HC RX MED GY IP 250 OP 250 PS 637: Performed by: PSYCHIATRY & NEUROLOGY

## 2023-04-08 PROCEDURE — 250N000013 HC RX MED GY IP 250 OP 250 PS 637

## 2023-04-08 PROCEDURE — 124N000002 HC R&B MH UMMC

## 2023-04-08 RX ORDER — CEPHALEXIN 500 MG/1
500 CAPSULE ORAL EVERY 6 HOURS SCHEDULED
Status: DISCONTINUED | OUTPATIENT
Start: 2023-04-08 | End: 2023-04-10 | Stop reason: HOSPADM

## 2023-04-08 RX ADMIN — NALTREXONE HYDROCHLORIDE 50 MG: 50 TABLET, FILM COATED ORAL at 08:55

## 2023-04-08 RX ADMIN — OLANZAPINE 10 MG: 10 TABLET, FILM COATED ORAL at 19:29

## 2023-04-08 RX ADMIN — CEPHALEXIN 500 MG: 500 CAPSULE ORAL at 17:34

## 2023-04-08 RX ADMIN — CEPHALEXIN 500 MG: 500 CAPSULE ORAL at 23:55

## 2023-04-08 RX ADMIN — LORAZEPAM 0.5 MG: 0.5 TABLET ORAL at 09:34

## 2023-04-08 RX ADMIN — QUETIAPINE 300 MG: 200 TABLET ORAL at 19:29

## 2023-04-08 RX ADMIN — NICOTINE POLACRILEX 4 MG: 4 LOZENGE ORAL at 17:37

## 2023-04-08 RX ADMIN — CEPHALEXIN 500 MG: 500 CAPSULE ORAL at 11:54

## 2023-04-08 RX ADMIN — NICOTINE POLACRILEX 4 MG: 4 LOZENGE ORAL at 09:34

## 2023-04-08 RX ADMIN — MELATONIN TAB 3 MG 3 MG: 3 TAB at 19:32

## 2023-04-08 RX ADMIN — NICOTINE POLACRILEX 4 MG: 4 LOZENGE ORAL at 13:20

## 2023-04-08 RX ADMIN — PANTOPRAZOLE SODIUM 40 MG: 40 TABLET, DELAYED RELEASE ORAL at 08:55

## 2023-04-08 RX ADMIN — BENZTROPINE MESYLATE 0.5 MG: 0.5 TABLET ORAL at 08:55

## 2023-04-08 RX ADMIN — NICOTINE POLACRILEX 4 MG: 4 LOZENGE ORAL at 18:45

## 2023-04-08 RX ADMIN — BENZTROPINE MESYLATE 0.5 MG: 0.5 TABLET ORAL at 19:29

## 2023-04-08 RX ADMIN — CEPHALEXIN 250 MG: 250 CAPSULE ORAL at 06:15

## 2023-04-08 ASSESSMENT — ACTIVITIES OF DAILY LIVING (ADL)
LAUNDRY: UNABLE TO COMPLETE
ORAL_HYGIENE: INDEPENDENT
ADLS_ACUITY_SCORE: 28
ORAL_HYGIENE: INDEPENDENT
ADLS_ACUITY_SCORE: 28
DRESS: SCRUBS (BEHAVIORAL HEALTH);INDEPENDENT
ADLS_ACUITY_SCORE: 28
ADLS_ACUITY_SCORE: 28
HYGIENE/GROOMING: INDEPENDENT
ADLS_ACUITY_SCORE: 28
DRESS: SCRUBS (BEHAVIORAL HEALTH);INDEPENDENT
HYGIENE/GROOMING: INDEPENDENT

## 2023-04-08 NOTE — PLAN OF CARE
"  Problem: Adult Behavioral Health Plan of Care  Goal: Plan of Care Review  Outcome: Progressing   Goal Outcome Evaluation:    Hamzah spent most of this evening socializing with staff and peers in the milieu. His affect was bright and friendly. Pt was polite and cooperative with their treatment plan and participated in an occupational therapy group this afternoon.     Pt reported a painful red raised lesion on the right pelvis/groin area and was started on Keflex 250 mg every six hours. The first dose was given at 2:52 PM, and the next dose was scheduled for 6:00 PM. The RN writer contacted the pharmacist regarding the medication schedule, and per the pharmacist's instructions, it's okay to give the 6:00 PM dose as scheduled. Pt denies any side effects of the antibiotic and tolerates the medication well. His vital signs are stable. Pt also had an ultrasound done this afternoon.    An orthopedic consult was placed, and the orthopedist informed this writer that the orthopedic team does not cover abscesses of the groin, abdomen, or chest. The RN writer attempted to contact the internal medicine department, but there was no response. The day shift RN tomorrow will communicate with internal medicine regarding this orthopedic decision.    Pt's visitors appeared to bring contraband for Hamzah. A psych associate observed a visitor passing something to Hamzah secretly, and Hamzah was also observed sharing the  items with the Pt in room 142. According to the psych associate's note, \"Patient (room 144) had a visitor this evening. He was excited to receive him. They both entered room 144. The visitor moved around the room and then bent over the nightstand. In less than 6 minutes, he was told to leave because he had his cell phone. Patient (room 144) was unhappy that the visitor was told to leave, and the visitor promised to be back tomorrow. Patient (room 144) asked to use the bathroom immediately after the visitor left. Meanwhile, " "Patient (room 142) was pacing the hallway waiting for Patient (room 144) to leave the bathroom. Once patient (room 144) left the bathroom, he briefly entered his room, came out, and walked along with Patient (room 142) with his right hand in a fist. I saw Patient (room 144) pass something (suspected to be contraband) to Patient (room 142) as they walked towards the entry door. This was less than 15 minutes after Patient's (room 144) visitor had left the wolff. Patient (room 142) put what he received in his cup. Patient (room 142) also immediately entered his bathroom. RN notified.\"    This RN writer spoke with the Patient regarding his visitors bringing contraband to the unit, which he denied. Pt agreed to a room search, and nothing was found. The on-call MD and ANS were notified. The on-call MD ordered that no visitors are allowed until further notice and ordered a urine drug test, which the Patient refused.                   "

## 2023-04-08 NOTE — PROGRESS NOTES
Raised lesion on right pelvis is improving: pt reports that it is softer, no longer causes pain, and is observed to be less red, and not growing. Pt is taking scheduled abx and verbalized understanding that he will be continuing taking that after discharge. IM consult informed over phone.

## 2023-04-08 NOTE — PROGRESS NOTES
04/07/23 1900   Group Therapy Session   Group Attendance attended group session   Time Session Began 1615   Time Session Ended 1705   Total Time (minutes) 20   Total # Attendees 4   Group Type recreation   Group Topic Covered leisure exploration/use of leisure time   Group Session Detail TR leisure group   Patient Response/Contribution cooperative with task   Patient Participation Detail Pt attended the structured Therapeutic Recreation group, participating in a group activity. Pt participated in group discussion, leisure participation, and social engagement to gain self-esteem, manage behaviors, improve social skills, decrease isolation, and reduce anxiety/depression.   Pt entered group late and seemed hesitant to join group at first. Pt passively participated from the periphery, but then joined at the table. Pt remained focused and engaged throughout his time in group. Pt was sociable and was appropriate with interactions with the others in group. Pt was an active participant, contributing to the clues and descriptions throughout the activity.

## 2023-04-08 NOTE — PROGRESS NOTES
"Writer observed pt's visitor to be using a cell phone in pt's room. Writer told the visitor that he could not use his cell phone on the secure unit and would need to exit the unit and place his cell phone in the visitor locker. He stated he was ready to leave anyway and was cooperative with ending his call and writer walked him off the unit.   When writer re entered the unit from walking the visitor off the unit, patient was talking to another patient (SP) and telling the other pt that his visitor would be back tomorrow. This visitor and the other pt (SP) had shook hands and spoke on the unit, which was redirected immediately.   Writer asked the patient if his visitor gave or planned to give another patient anything, which he defensively denied.   Shortly afterwards, another staff reported to nursing staff that he had observed a handoff between the patient and another patient (SP) shortly after the visitor had left. Please see separate note.  Both patient's nurses and the charge nurse informed. Environmental checks conducted. Patient stated his visitor, \"cousin Yoni\" uses substances, but denied he brought anything into the unit.   "

## 2023-04-08 NOTE — PLAN OF CARE
"  Problem: Adult Behavioral Health Plan of Care  Goal: Develops/Participates in Therapeutic [place] to Support Successful Transition  Outcome: Progressing  Flowsheets (Taken 4/8/2023 1258)  Develops/Participates in Therapeutic [place] to Support Successful Transition: making progress toward outcome  Intervention: Foster Therapeutic [place]  Recent Flowsheet Documentation  Taken 4/8/2023 1100 by Juan Carlos Nunez Relationship/Rapport:    choices provided    care explained    empathic listening provided    questions encouraged    questions answered    thoughts/feelings acknowledged   Goal Outcome Evaluation:    Plan of Care Reviewed With: patient        BP (!) 143/92 (BP Location: Right arm, Patient Position: Sitting, Cuff Size: Adult Regular)   Pulse 116   Temp 97.2  F (36.2  C) (Temporal)   Resp 16   Wt 75.6 kg (166 lb 11.2 oz)   SpO2 97%   BMI 27.74 kg/m      Pt presented as elevated and slightly agitated, but calm. Pt was observed socializing with peers and staff in the lounge, and drawing/coloring in his room when the lounge was closed in AM. He stayed to his room the rest of the shift. Pt denied mental health symptoms, including SI/SIB/HI and AVH. Pt stated that he felt \"aggravated\" this morning because he was no longer allowed visitors after yesterday PM shift.     This AM, pt gave his RN a white tablet that he said was Wellbutrin, which was disposed of. Pt reflected on how he abused it in the past. Pt had increased BP (143/92) and HR (116 BPM) this morning, provider notified for HR > 110 per orders.    Pt spent a lot of time in the am with peer (142), both speaking loudly. Lounge eventually got very loud and was closed down, pt was agreeable to go to his room and draw/color.     Pt's lesion appears to be improving, see latest progress note for detail.    Pt ate and drank well during breakfast and lunch and continues to report no heartburn, stating that the pantoprazole has been helping. Pt is " compliant with scheduled medications and is able to make needs known to staff.

## 2023-04-08 NOTE — PLAN OF CARE
Problem: Sleep Disturbance  Goal: Adequate Sleep/Rest  Outcome: Progressing  Note: Patient observed sleeping during safety checks     Problem: Excessive Substance Use  Goal: Improved Behavioral Control (Excessive Substance Use)  Outcome: Progressing  Note: Patient manifests no behavioral dyscontrol   Goal Outcome Evaluation:         Patient slept for 3 hours, neither complaints of pain, anxiety or discomfort nor requested prn medications or food. Patient was cooperative with cares, safety checks successfully completed, all precautions in place. No behavioral concern at this time. Patient is adjusting well to the unit situation.           Morgan Lopez DNP, RN

## 2023-04-08 NOTE — PROVIDER NOTIFICATION
Patients appeared to be sharing contraband.     Patient (room 144) had a visitor this evening. He was excited to receive him. They both entered the room 144.  The visitor moved around the room and then bent over the night stand. In less than 6 minutes he was told to leave because had had his cell phone. Patient (room 144)  was unhappy that the visitor was told to leave and visitor promised to be back tomorrow.     Patient (room 144) asked  to use the bathroom immediately after the visitor left. Meanwhile patient ([id number]) was pacing the hallway waiting for patient ([id number]) to leave the bathroom. Once patient (room 144) left the bathroom he entered his room briefly, came out and walked along with patient (room 142) with his right hand in a fist. I saw patient (room 144) pass something (suspected to be contraband) to patient (room 142) as they walked towards the entry door.  This was less than 15 minutes after patient's (room 144) visitor had left the wolff. Patient (room 142) put what he received in his cup. Patient (room 142) also immediately entered his bathroom.  RN notified.

## 2023-04-09 VITALS
TEMPERATURE: 97.3 F | BODY MASS INDEX: 27.74 KG/M2 | DIASTOLIC BLOOD PRESSURE: 73 MMHG | SYSTOLIC BLOOD PRESSURE: 113 MMHG | HEART RATE: 83 BPM | WEIGHT: 166.7 LBS | OXYGEN SATURATION: 97 % | RESPIRATION RATE: 16 BRPM

## 2023-04-09 PROCEDURE — 250N000013 HC RX MED GY IP 250 OP 250 PS 637: Performed by: PSYCHIATRY & NEUROLOGY

## 2023-04-09 PROCEDURE — 250N000013 HC RX MED GY IP 250 OP 250 PS 637

## 2023-04-09 PROCEDURE — 124N000002 HC R&B MH UMMC

## 2023-04-09 PROCEDURE — 90853 GROUP PSYCHOTHERAPY: CPT

## 2023-04-09 PROCEDURE — 99232 SBSQ HOSP IP/OBS MODERATE 35: CPT

## 2023-04-09 PROCEDURE — 99252 IP/OBS CONSLTJ NEW/EST SF 35: CPT | Performed by: SURGERY

## 2023-04-09 RX ORDER — CEPHALEXIN 500 MG/1
500 CAPSULE ORAL EVERY 6 HOURS
Qty: 20 CAPSULE | Refills: 0 | Status: SHIPPED | OUTPATIENT
Start: 2023-04-09 | End: 2023-11-05

## 2023-04-09 RX ADMIN — NICOTINE POLACRILEX 4 MG: 4 LOZENGE ORAL at 08:13

## 2023-04-09 RX ADMIN — QUETIAPINE 300 MG: 200 TABLET ORAL at 19:10

## 2023-04-09 RX ADMIN — OLANZAPINE 10 MG: 10 TABLET, FILM COATED ORAL at 19:10

## 2023-04-09 RX ADMIN — CEPHALEXIN 500 MG: 500 CAPSULE ORAL at 12:16

## 2023-04-09 RX ADMIN — NICOTINE POLACRILEX 4 MG: 4 LOZENGE ORAL at 16:01

## 2023-04-09 RX ADMIN — NICOTINE POLACRILEX 4 MG: 4 LOZENGE ORAL at 17:39

## 2023-04-09 RX ADMIN — CEPHALEXIN 500 MG: 500 CAPSULE ORAL at 05:54

## 2023-04-09 RX ADMIN — CEPHALEXIN 500 MG: 500 CAPSULE ORAL at 17:39

## 2023-04-09 RX ADMIN — MELATONIN TAB 3 MG 3 MG: 3 TAB at 19:13

## 2023-04-09 RX ADMIN — NICOTINE POLACRILEX 4 MG: 4 GUM, CHEWING BUCCAL at 13:27

## 2023-04-09 RX ADMIN — NICOTINE POLACRILEX 4 MG: 4 LOZENGE ORAL at 10:03

## 2023-04-09 RX ADMIN — BENZTROPINE MESYLATE 0.5 MG: 0.5 TABLET ORAL at 08:13

## 2023-04-09 RX ADMIN — NICOTINE POLACRILEX 4 MG: 4 LOZENGE ORAL at 12:15

## 2023-04-09 RX ADMIN — NALTREXONE HYDROCHLORIDE 50 MG: 50 TABLET, FILM COATED ORAL at 08:13

## 2023-04-09 RX ADMIN — BENZTROPINE MESYLATE 0.5 MG: 0.5 TABLET ORAL at 19:10

## 2023-04-09 RX ADMIN — CEPHALEXIN 500 MG: 500 CAPSULE ORAL at 23:53

## 2023-04-09 RX ADMIN — NICOTINE POLACRILEX 4 MG: 4 LOZENGE ORAL at 13:24

## 2023-04-09 RX ADMIN — PANTOPRAZOLE SODIUM 40 MG: 40 TABLET, DELAYED RELEASE ORAL at 08:13

## 2023-04-09 ASSESSMENT — ACTIVITIES OF DAILY LIVING (ADL)
ORAL_HYGIENE: INDEPENDENT
ADLS_ACUITY_SCORE: 28
DRESS: INDEPENDENT
ADLS_ACUITY_SCORE: 28
HYGIENE/GROOMING: INDEPENDENT
ADLS_ACUITY_SCORE: 28
LAUNDRY: UNABLE TO COMPLETE
ADLS_ACUITY_SCORE: 28

## 2023-04-09 NOTE — PROGRESS NOTES
"Pt approached writer stating that his abscess had \"burst in the night and had blood on his pants\".   Pt took a shower and changed his clothes. Writer and a male RN went into the room and went into the pt room, and observed the area. His pants appeared to have fluid near the abscess area, and when area was observed, it was reddened, and a small area was opened and red. He stated that it was not painful.   Writer contacted the IM NP Adelina REDDY and let her know that it had opened. She stated she will be coming to see the pt on this shift to assess it.   "

## 2023-04-09 NOTE — CONSULTS
General surgery was consulted for right groin abscess. This has been bothering for a few days, became quite raised yesterday. Started draining overnight, and is smaller today. Some pain and tenderness in area. Has never had an issue with right groin before. No fevers/chills. No symptoms other than immediate area of the abscess. Ultrasound a couple of days ago showed possible beginning abscess.  pmh - healthy.  psh - left groin hernia with mesh in 2006.   No bleeding issues.  VSS- afebrile.  On exam, there is a raised ridge of about 4 cm in length which is mostly erythematous with a central opening that is draining purulent fluid. He is slightly tender when this is palpated. I did squeeze as much purulence out as I could.    A/p - draining abscess in right groin. I discussed with him his options. The standard of care is to open this up which is most likely a bedside procedure. This would be local anesthetic and then the wound would be drained, probed to ensure adequate drainage, and then packed. Patient made it clear he was not interested in this option. He would like to wait and see. I discussed with him that the risks of inadequate drainage are that the abscess may not improve, may fester and slowly enlarge, and in the worst case scenario, may lead to a life threatening infection. I strongly encouraged him to squeeze his abscess to help with drainage on a daily basis. I also strongly encouraged him to have this reassessed by a medical professional no later than 2-3 days from now for followup(He is being discharged from [address] [place] [address] to rehab in [place] tomorrow). I also talked to him about the possibility of it not getting worse, but still persisting, and he should not let that fester for long without being assessed. He voiced understanding of recommendations. He knows to look out for worsening redness, pain, fevers/chills, or any other new symptoms which should not be ignored.  He should continue on antibiotics.

## 2023-04-09 NOTE — PLAN OF CARE
Problem: Adult Behavioral Health Plan of Care  Goal: Plan of Care Review  Outcome: Progressing  Flowsheets (Taken 4/8/2023 1703)  Patient Agreement with Plan of Care: agrees   Goal Outcome Evaluation:    Plan of Care Reviewed With: patient           Hamzah napped at the start of the shift. He went in and out of his room at will and interacted with his peers in a friendly manner. He was not hyperactive; he sat intermittently in the lounge and watched TV. He declared that he was doing OK during the routine check-in. When asked how he was doing, he stated in one breath; I am OK. I am not suicidal, I don't have pain or depression, and I do not hear voices or see people, but I have anxiety at 5/10. Pt denied poor appetite but refused his supper. He is using the restroom without difficulty and has no sleeping issues. He took his medications, melatonin, and nicotine lozenges, and had no adverse effects. He did not Cheek his meds. He had a typical appearance and a calm demeanor despite reporting anxiety. He showered. His right groin abscess/lesion is intact and pain-free but reddened and warm.

## 2023-04-09 NOTE — PROGRESS NOTES
Internal Medicine Progress Note      Assessment and plan:  Hamzah Roberts is a 37 year old male with a history of schizophrenia, paranoia, schizoaffective disorder, TBI, stimulant use disorder.  Medicine following for abscess in right groin.    #Skin and soft tissue infection  #Abscess  Sudden onset of right groin abscess overnight 4/7.  Ultrasound showed beginning of abscess formation.  CRP elevated to 50.  Started cephalexin.  Consulted Ortho and they do not drain abscess in that body part.  Abscess enlarging and now has an open area with discharge and blood.  General surgery consult placed and saw patient today.  General surgery wanted to open up abscess and drain at bedside.  Patient declined this option.  He would like to monitor abscess on his own.  General surgery discussed risks including systemic infection that could be life-threatening.  -Attempt to squeeze abscess to encourage drainage on a daily basis  -Cephalexin 500 mg every 6 hours for 7 days total.  Ordered navigator for discharge.  -Follow-up for evaluation in 2 to 3 days  -Seek medical attention sooner if if abscess continues to get bigger, more painful and if patient develops fever, chills, myalgias or other signs of infection.        Objective:  BP (!) 154/83 (BP Location: Left arm, Patient Position: Sitting, Cuff Size: Adult Regular)   Pulse 102   Temp 98.2  F (36.8  C) (Temporal)   Resp 16   Wt 75.6 kg (166 lb 11.2 oz)   SpO2 95%   BMI 27.74 kg/m      Vitals signs reviewed and noted    GENERAL: Alert and oriented x3  HEENT: Anicteric sclera. MMM  CV: RRR, S1, S2. No murmur noted  RESPIRATORY: Effort normal on RA. Lungs CTAB with no wheezing or rales  GI: Abdomen soft, non-tender with active bowel sounds. No rebound or guarding  NEUROLOGICAL: No focal deficits. Moves all extremities  EXTREMITIES: No peripheral edema. Intact bilateral pedal pulses  SKIN: No jaundice, no rash    Pertinent labs and procedures were reviewed     Subjective:      Met with patient in room.  Reassessment of abscess shows enlargement and no drainage of pus and blood.  Unable to manipulate drainage when squeezed and extremely painful for patient.  Discussed general surgery consult.  Discussed antibiotic course and duration.    Currently, medically stable and internal medicine will sign off. Please contact if future questions or concerns arise. Thank you for the opportunity to be a part of this patient's care.    MORRIS Casillas CNP  Internal Medicine ELISA Hospitalist  Page job code [id number] (3B), [phone] (3A), or [phone] ([facility] and 4A)  Text paging via [employer] is appreciated  April 9, 2023

## 2023-04-09 NOTE — PLAN OF CARE
Problem: Adult Behavioral Health Plan of Care  Goal: Absence of New-Onset Illness or Injury  Outcome: Progressing   Goal Outcome Evaluation:     Pt a wake x2 briefly otherwise appeared to be a sleep at all other safety checks.  Pt slept  6.5 hours.  He was awaken for his antibiotic x 2.

## 2023-04-10 PROCEDURE — 250N000013 HC RX MED GY IP 250 OP 250 PS 637: Performed by: PSYCHIATRY & NEUROLOGY

## 2023-04-10 PROCEDURE — 250N000013 HC RX MED GY IP 250 OP 250 PS 637

## 2023-04-10 PROCEDURE — 99239 HOSP IP/OBS DSCHRG MGMT >30: CPT | Performed by: PSYCHIATRY & NEUROLOGY

## 2023-04-10 RX ADMIN — CEPHALEXIN 500 MG: 500 CAPSULE ORAL at 06:14

## 2023-04-10 RX ADMIN — BENZTROPINE MESYLATE 0.5 MG: 0.5 TABLET ORAL at 07:56

## 2023-04-10 RX ADMIN — PANTOPRAZOLE SODIUM 40 MG: 40 TABLET, DELAYED RELEASE ORAL at 07:56

## 2023-04-10 RX ADMIN — NALTREXONE HYDROCHLORIDE 50 MG: 50 TABLET, FILM COATED ORAL at 07:56

## 2023-04-10 RX ADMIN — NICOTINE POLACRILEX 4 MG: 4 LOZENGE ORAL at 08:06

## 2023-04-10 ASSESSMENT — ACTIVITIES OF DAILY LIVING (ADL)
ADLS_ACUITY_SCORE: 28
LAUNDRY: UNABLE TO COMPLETE
DRESS: INDEPENDENT;STREET CLOTHES
ORAL_HYGIENE: INDEPENDENT
ADLS_ACUITY_SCORE: 28
HYGIENE/GROOMING: INDEPENDENT

## 2023-04-10 NOTE — PROGRESS NOTES
"   04/09/23 2200   Group Therapy Session   Group Attendance attended group session   Time Session Began 1615   Time Session Ended 1700   Total Time (minutes) 45   Total # Attendees 4   Group Type psychotherapeutic   Group Topic Covered leisure exploration/use of leisure time   Group Session Detail Paint/ Process   Patient Response/Contribution cooperative with task;discussed personal experience with topic   Patient Participation Detail mood good     Pt did a very nice painting of an [employer] medicine wheel and bear claw. He was proud of his art and the cultural symbolism. He did say \" this world is evil, more evil than good.\"  "

## 2023-04-10 NOTE — PROVIDER NOTIFICATION
04/10/23 1149   C-SSRS (Daily/Shift Screen)   Q2 Suicidal Thoughts (Since Last Contact) no   Q3 Have you been thinking about how you might do this? no   Q4 Suicidal Intent without Specific Plan no   Q5 Suicide Intent with Specific Plan no   Q6 Suicide Behavior no   Level of Risk per Screen low risk     Denies and access to weapons or firearms

## 2023-04-10 NOTE — PLAN OF CARE
Pt was admitted on 3/28/23 for hallucinations and SI. Pt improved and stabilized over hospitalization course. Pt developed an abscess on right pelvis on 4/7 and was seen by IM nurse practitioner, received an ultrasound and started on abx treatment. Follow up was coordinated with  treatment facility.     Pt received AVS and discharge medications, including keflex abx.    Pt discharged in stable condition today 4/10 @ 1010 to [facility] with their transport in front of hospital. Pt belongings were loaded into transport van, and  stated writer and preceptor were OK to leave pt with him, as they were waiting for a COVID test to develop before driving.
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
## 2023-04-10 NOTE — PLAN OF CARE
"  Problem: Adult Behavioral Health Plan of Care  Goal: Plan of Care Review  Outcome: Progressing   Goal Outcome Evaluation:    Hamzah spent most of this evening socializing with staff and peers in the milieu. His affects was bright and friendly. Pt was polite and cooperative with his treatment plan. Pt is fully alert and oriented to name, place, and time. Pt denies SI, SIB, HI, and thoughts of hurting others. Pt denies A/V hallucinations, depression, and anxiety.    According to the day shift nurse, \"General surgeon assessed pt groin abscess. Pt declined I&D, choosing to continue with abx and observation. Borders marked with skin marker. Dressing placed over wound\". Pt endorsed some pain and tenderness, which are manageable, and denied pain medication. Pt took his scheduled antibiotic without any issues. His vital signs are stable, 97.3, 16, 83, and 113/73.     Pt is discharging tomorrow around 9 am, and his discharge medications are in the med room. Pt's friend brought his belonging and was placed in the exam room; inventory of the belonging was not taken as pt is discharging tomorrow at 9 am.                    "

## 2023-04-10 NOTE — PLAN OF CARE
Goal Outcome Evaluation:      Pt a wake x1 briefly otherwise appeared to be a sleep at all other safety checks.  Pt slept 6.5 hours.

## 2023-04-12 NOTE — PROGRESS NOTES
4/12/2023    Received a VM from [facility] at 4:45pm on 4/11 that pt had left treatment AMA.     She Young [facility]  [email or web address]  Direct phone: [phone]

## 2023-04-28 PROBLEM — S09.90XS MOOD DISORDER DUE TO OLD HEAD INJURY: Status: ACTIVE | Noted: 2023-04-10

## 2023-04-28 PROBLEM — F06.30 MOOD DISORDER DUE TO OLD HEAD INJURY: Status: ACTIVE | Noted: 2023-04-10

## 2023-08-05 ENCOUNTER — HEALTH MAINTENANCE LETTER (OUTPATIENT)
Age: 38
End: 2023-08-05

## 2023-11-03 ENCOUNTER — HOSPITAL ENCOUNTER (OUTPATIENT)
Facility: CLINIC | Age: 38
Setting detail: OBSERVATION
Discharge: PSYCHIATRIC OR SUBSTANCE USE FACILITY | End: 2023-11-06
Attending: EMERGENCY MEDICINE | Admitting: EMERGENCY MEDICINE
Payer: COMMERCIAL

## 2023-11-03 DIAGNOSIS — F06.30 MOOD DISORDER DUE TO OLD HEAD INJURY: Primary | ICD-10-CM

## 2023-11-03 DIAGNOSIS — F19.951 SUBSTANCE-INDUCED PSYCHOTIC DISORDER WITH HALLUCINATIONS (H): ICD-10-CM

## 2023-11-03 DIAGNOSIS — S09.90XS MOOD DISORDER DUE TO OLD HEAD INJURY: Primary | ICD-10-CM

## 2023-11-03 DIAGNOSIS — R44.0 AUDITORY HALLUCINATIONS: ICD-10-CM

## 2023-11-03 DIAGNOSIS — F25.1 SCHIZOAFFECTIVE DISORDER, DEPRESSIVE TYPE (H): ICD-10-CM

## 2023-11-03 DIAGNOSIS — R41.82 ALTERED MENTAL STATUS, UNSPECIFIED ALTERED MENTAL STATUS TYPE: ICD-10-CM

## 2023-11-03 PROCEDURE — 99285 EMERGENCY DEPT VISIT HI MDM: CPT | Mod: 25 | Performed by: EMERGENCY MEDICINE

## 2023-11-04 PROBLEM — F19.951 SUBSTANCE-INDUCED PSYCHOTIC DISORDER WITH HALLUCINATIONS (H): Status: ACTIVE | Noted: 2023-11-04

## 2023-11-04 PROBLEM — R44.0 AUDITORY HALLUCINATIONS: Status: ACTIVE | Noted: 2023-11-04

## 2023-11-04 PROBLEM — R41.82 ALTERED MENTAL STATUS, UNSPECIFIED ALTERED MENTAL STATUS TYPE: Status: ACTIVE | Noted: 2023-11-04

## 2023-11-04 LAB
AMPHETAMINES UR QL SCN: ABNORMAL
BARBITURATES UR QL SCN: ABNORMAL
BENZODIAZ UR QL SCN: ABNORMAL
BZE UR QL SCN: ABNORMAL
CANNABINOIDS UR QL SCN: ABNORMAL
FENTANYL UR QL: ABNORMAL
OPIATES UR QL SCN: ABNORMAL
PCP QUAL URINE (ROCHE): ABNORMAL

## 2023-11-04 PROCEDURE — 99223 1ST HOSP IP/OBS HIGH 75: CPT | Performed by: EMERGENCY MEDICINE

## 2023-11-04 PROCEDURE — G0378 HOSPITAL OBSERVATION PER HR: HCPCS

## 2023-11-04 PROCEDURE — 250N000013 HC RX MED GY IP 250 OP 250 PS 637: Performed by: EMERGENCY MEDICINE

## 2023-11-04 PROCEDURE — 80307 DRUG TEST PRSMV CHEM ANLYZR: CPT | Performed by: EMERGENCY MEDICINE

## 2023-11-04 RX ORDER — OLANZAPINE 10 MG/1
10 TABLET, ORALLY DISINTEGRATING ORAL ONCE
Status: COMPLETED | OUTPATIENT
Start: 2023-11-04 | End: 2023-11-04

## 2023-11-04 RX ORDER — LANOLIN ALCOHOL/MO/W.PET/CERES
3 CREAM (GRAM) TOPICAL
Status: DISCONTINUED | OUTPATIENT
Start: 2023-11-04 | End: 2023-11-06 | Stop reason: HOSPADM

## 2023-11-04 RX ORDER — ACETAMINOPHEN 325 MG/1
650 TABLET ORAL EVERY 4 HOURS PRN
Status: DISCONTINUED | OUTPATIENT
Start: 2023-11-04 | End: 2023-11-06 | Stop reason: HOSPADM

## 2023-11-04 RX ORDER — OLANZAPINE 10 MG/1
10 TABLET, ORALLY DISINTEGRATING ORAL 2 TIMES DAILY PRN
Status: DISCONTINUED | OUTPATIENT
Start: 2023-11-04 | End: 2023-11-06 | Stop reason: HOSPADM

## 2023-11-04 RX ORDER — HYDROXYZINE HYDROCHLORIDE 25 MG/1
25 TABLET, FILM COATED ORAL EVERY 4 HOURS PRN
Status: DISCONTINUED | OUTPATIENT
Start: 2023-11-04 | End: 2023-11-06 | Stop reason: HOSPADM

## 2023-11-04 RX ORDER — IBUPROFEN 600 MG/1
600 TABLET, FILM COATED ORAL EVERY 6 HOURS PRN
Status: DISCONTINUED | OUTPATIENT
Start: 2023-11-04 | End: 2023-11-06 | Stop reason: HOSPADM

## 2023-11-04 RX ADMIN — OLANZAPINE 10 MG: 10 TABLET, ORALLY DISINTEGRATING ORAL at 01:02

## 2023-11-04 ASSESSMENT — ACTIVITIES OF DAILY LIVING (ADL)
ADLS_ACUITY_SCORE: 35

## 2023-11-04 NOTE — ED NOTES
Pt sleeping since start of shift. Pt does not awaken for breakfast. Pt still asleep; even chest rise and body movements noted. No safety concerns.

## 2023-11-04 NOTE — ED NOTES
Hamzah Roberts  November 4, 2023  Plan of Care Hand-off Note     Patient Care Path: observation     (Patient scheduled to start substance abuse treatment on 11/6/2023 @ [facility] program)     Plan for Care:   Patient presents with psychosis secondary to methamphetamine use. Patient experiences hallucinations and delusions, which are distressing to him. He has been using methamphetamine at least daily for the past week. He struggles to maintain his sobriety. He was sober for six months while in substance abuse treatment until 9/9/2023. He moved away from [place] in order to start a new life and manage a sober life style, but he relapsed shortly afterwards to meth.     The patient denies any suicidal ideation. He has mental health issues including depression, anxiety and being worried about the psychotic symptoms he is experiencing.  He stopped taking prescribed medications 5 months ago. He denies any self-harm. He denies homicidal ideation. He is currently under a civil commitment.     He has a sober friend. He has an updated substance abuse assessment and scheduled to start treatment on Monday, 11/6/2023. As part of his admission to treatment, the psychotic symptoms must be cleared and he needs to be sober. He feels unsafe in the community and is at risk of using methamphetamine if being discharged tonight. In consultation with Dr Heller, the patient will be on observation for a re-evaluation of symptoms of psychosis and methamphetamine within the next 24 hours.    Identified Goals and Safety Issues:  Patient is actively psychotic. He needs safety, stabilization and further monitoring of the psychosis.     Overview: Patient to remain on observation to monitor the improvement of psychotic symptoms     Legal Status:  Patient is under a civil commitment.     Psychiatry Consult:  N.A.        Updated   regarding plan of care.  DANIKA Hanley MSW, LICSW, Psychotherapist

## 2023-11-04 NOTE — ED TRIAGE NOTES
Triage Assessment (Adult)       Row Name 11/03/23 2331          Triage Assessment    Airway WDL WDL        Respiratory WDL    Respiratory WDL WDL        Skin Circulation/Temperature WDL    Skin Circulation/Temperature WDL WDL

## 2023-11-04 NOTE — ED NOTES
Writer attempted to meet with pt. Pt was sleeping in milieu and would not verbally respond to writer.

## 2023-11-04 NOTE — CONSULTS
"Diagnostic Evaluation Consultation  Crisis Assessment    Patient Name: Hamzah Roberts  Age:  38 year old  Legal Sex: male  Gender Identity: male  Pronouns:   Race: White  Ethnicity: Not  or   Language: English      Patient was assessed: In person      Patient location: [facility] EMERGENCY DEPARTMENT                             [facility]    Referral Data and Chief Complaint  Hamzah Roberts presents to the ED with family/friends. Patient is presenting to the ED for the following concerns: Significant behavioral change, Substance use, Depression.   Factors that make the mental health crisis life threatening or complex are:  Patient was brought to the ED by a friend. They are concerned that the patient has relapsed to methamphetamine, after being sober for 6 months. The patient completed 6 months of inpatient treatment on Sept 9, 2023. He felt good and moved to [place] in order to make a fresh start.     Things did not work out and he relapsed to meth. He has been increasingly psychotic this past week. He has not been sleeping for several days, and had been walking around the city for the past 12 hours. He also has been walking in the direction of traffic coming towards him. He reports that everything looks strange to him, like fast and strange looking/moving objects, both people and cars. He reports hearing voices all the time in the form of screams, saying \"you are killing them\". He places his ears agaist the khan and the floors to check where the voices are coming from. The patient is exhausted by the persistent voices, and unable to stop them. He denies any suicidal thoughts nor self-harm. He denies homicidal ideation. His family does not want him to come over due to his substance use. He was prescribed medications but stopped taking them 5 months ago. He is currently under a civil commitment for mental illness and substance abuse. Patient states \"my substance use \"has been bad lately\", " "and \"I don't even know why I used again\".     Patient recently had an updated Rule 25 assessment and was accepted into [facility] to start on Monday, Nov 6, 2023. He needs be stable from the psychosis and methamphetamine in order to start the program on Monday..      Informed Consent and Assessment Methods  Explained the crisis assessment process, including applicable information disclosures and limits to confidentiality, assessed understanding of the process, and obtained consent to proceed with the assessment.  Assessment methods included conducting a formal interview with patient, review of medical records, collaboration with medical staff, and obtaining relevant collateral information from family and community providers when available.  : done     Patient response to interventions: eager to participate  Coping skills were attempted to reduce the crisis:  Patient cooperated with his friend and came to the ED     History of the Crisis   Patient reportedly has a lengthy hx of substance abuse. Main drug is methamphetamine, with a prior hx of marijuana. Patient has a current legal charge of theft and a past charge of 3rd degree bodily harm/assault. He is currently on probation. 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 He is unclear why he quit the job. Patient has a lengthy hx of substance abuse treatment, including twice being at [facility], [facility] (most recent in 2023), [facility], [facility], [facility] and [facility]. Patient reportedly has sober support but looses the persons when he starts using again.    Brief Psychosocial History  Family:  Single, Children no  Support System:  Parent(s) (Sober friend, Damian)  Employment Status:  unemployed  Source of Income:  disability  Financial Environmental Concerns:  unable to afford rent/mortgage, unemployed  Current Hobbies:  television/movies/videos  Barriers in Personal Life:  other (see comments) (Substance " use)    Significant Clinical History  Current Anxiety Symptoms:  obsessions/compulsions, anxious  Current Depression/Trauma:  apathy, withdrawl/isolation, negativistic, impaired decision making, hopelessness, helplessness  Current Somatic Symptoms:  racing thoughts, anxious  Current Psychosis/Thought Disturbance:  impulsive, auditory hallucinations, visual hallucinations, high risk behavior  Current Eating Symptoms:     Chemical Use History:  Alcohol: None  Benzodiazepines: None  Opiates: None  Cocaine: None  Marijuana: None  Other Use: Methamphetamines  Last Use:: 11/04/23  Withdrawal Symptoms: Hallucinations   Past diagnosis:  Substance Use Disorder, Schizophrenia  Family history:  No known history of mental health or chemical health concerns  Past treatment:  Inpatient Hospitalization, Civil Commitment, Probation/Court ordered treatment, Individual therapy, Primary Care, Supportive Living Environment (group home, longterm house, etc)  Details of most recent treatment:  Completed [facility] treatment on 9/9/2023  Other relevant history:  Patient is exhausted by the psychosis.       Collateral Information  Is there collateral information: Yes     Collateral information name, relationship, phone number:  Angie Roberts (Sister)  [phone]    What happened today: Sister heard he was having an episde, at a gas station for hours and staring at people. Sister was not able to find him and did not hear from him for a few days. He gets drug induced psychosis and was sober for 1 year. To sister's knowledge, he has not been using for 1 week but was sober for 1 year before that. He would call saying darnell is after him. It takes awhile of him being sober to come back down. He is usually not as paranoid, relax and makes more sense when he is sober. He usually takes his meds but when he goes off them. When he is reaching out, he knows things are not okay. Sister doesn't know what kind of outpatient he has in place becaus he was  sober and doing well for so long. He thinks its the girls he was around and drugs him with [facility], that was how it all started for him. He went to detox the other week recognized that he was using drugs and cleared out. He tried to  front of traffic in the past and past attempts before. No safety concerns at this time but that he is paranoid someone is after him. He does not have a place to stay, [facility] would not take him if he is using substances and actively psychosis.     What is different about patient's functioning: Patient has been sober for 1 year.     Concern about alcohol/drug use:  Yes    What do you think the patient needs:  substance use treatment     Has patient made comments about wanting to kill themselves/others: no    If d/c is recommended, can they take part in safety/aftercare planning: Yes, Damian agreed [phone]       Additional collateral information:        Risk Assessment  Carroll Suicide Severity Rating Scale Full Clinical Version:  11/03/2023     Carroll Suicide Severity Rating Scale Recent:   Suicidal Ideation (Recent)  Q1 Wished to be Dead (Past Month): no  Q2 Suicidal Thoughts (Past Month): no  Intensity of Ideation (Recent)  Description of Most Severe Ideation (Past 1 Month): n.a.  Frequency (Past 1 Month):  (n.a.)  Duration (Past 1 Month):  (n.a.)  Controllability (Past 1 Month):  (n.a.)  Deterrents (Past 1 Month): Does not apply  Reasons for Ideation (Past 1 Month): Does not apply  Suicidal Behavior (Recent)  Actual Attempt (Past 3 Months): No  Total Number of Actual Attempts (Past 3 Months): 0  Has subject engaged in non-suicidal self-injurious behavior? (Past 3 Months): No  Interrupted Attempts (Past 3 Months): No  Total Number of Interrupted Attempts (Past 3 Months): 0  Aborted or Self-Interrupted Attempt (Past 3 Months): No  Total Number of Aborted or Self-Interrupted Attempts (Past 3 Months): 0  Preparatory Acts or Behavior (Past 3 Months): No  Total Number of  Preparatory Acts (Past 3 Months): 0    Environmental or Psychosocial Events: legal issues such as DWI, DUI, lawsuit, CPS involvement, etc., work or task failure, challenging interpersonal relationships, geographic isolation from supports, helplessness/hopelessness, impulsivity/recklessness, unemployment/underemployment, social isolation  Protective Factors: Protective Factors: sense of importance of health and wellness, supportive ongoing medical and mental health care relationships, help seeking    Does the patient have thoughts of harming others? Feels Like Hurting Others: no  Previous Attempt to Hurt Others: yes  Violence Threats in Past 6 Months: n.a.  Current Violence Plan or Thoughts: n.a.  Duty to warn initiated: no    Is the patient engaging in sexually inappropriate behavior?           Mental Status Exam   Affect: Appropriate  Appearance: Appropriate  Attention Span/Concentration: Attentive  Eye Contact: Engaged    Fund of Knowledge: Appropriate   Language /Speech Content: Fluent  Language /Speech Volume: Normal  Language /Speech Rate/Productions: Normal  Recent Memory: Intact  Remote Memory: Intact  Mood: Anxious, Normal  Orientation to Person: Yes   Orientation to Place: Yes  Orientation to Time of Day: Yes  Orientation to Date: Yes     Situation (Do they understand why they are here?): Yes  Psychomotor Behavior: Normal  Thought Content: Delusions, Hallucinations  Thought Form: Flight of Ideas    Medication  Psychotropic medications:   Medication Orders - Psychiatric (From admission, onward)      Start     Dose/Rate Route Frequency Ordered Stop    11/04/23 0040  OLANZapine zydis (zyPREXA) ODT tab 10 mg         10 mg Oral 2 TIMES DAILY PRN 11/04/23 0040      11/04/23 0040  hydrOXYzine (ATARAX) tablet 25 mg         25 mg Oral EVERY 4 HOURS PRN 11/04/23 0040               Current Care Team  Patient Care Team:  No Ref-Primary, Physician as PCP - Brandon Gunn as Nursing Assistant    Diagnosis  Patient  Active Problem List   Diagnosis Code    Suicidal ideation R45.851    Polysubstance abuse (H) F19.10    Paranoid schizophrenia (H) F20.0    Paranoia (H) F22    Methamphetamine abuse (H) F15.10    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    Amphetamine use disorder, severe, 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    Anxiety F41.9    Episodic mood disorder (H24) F39    Gastroesophageal reflux disease K21.9    Left ankle pain, unspecified chronicity M25.572    Psychosis, atypical (H) F29    Recurrent genital herpes A60.00    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    Hallucinations R44.3    Encounter for medication refill Z76.0    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    Methamphetamine use disorder, moderate, in sustained remission, in controlled environment, dependence (H) F15.21    Methamphetamine use disorder, severe (H) F15.20    Schizoaffective disorder, bipolar type (H) F25.0    Schizoaffective disorder, chronic condition with acute exacerbation (H) F25.9    Schizoaffective disorder, depressive type (H) F25.1    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.30, S09.90XS    Auditory hallucinations R44.0    Substance-induced psychotic disorder with hallucinations (H) F19.951    Altered mental status, unspecified altered mental status type R41.82       Primary Problem This Admission  Active Hospital Problems    Schizoaffective disorder, bipolar type (H)      Amphetamine use disorder, severe, dependence (H)        Clinical Summary and Substantiation of Recommendations   Patient presents with psychosis secondary to methamphetamine use. Patient experiences hallucinations and delusions, which are distressing to him. He has been using methamphetamine at least daily for the past week. He struggles to maintain his sobriety. He was sober for six months while in substance abuse treatment until 9/9/2023. He moved away from  [place] in order to start a new life and manage a sober life style, but he relapsed shortly afterwards to meth. The patient denies any suicidal ideation. He has mental health issues including depression, anxiety and being worried about the psychotic symptoms he is experiencing.  He stopped taking prescribed medications 5 months ago. He denies any self-harm. He denies homicidal ideation. He is currently under a civil commitment. He has a sober friend.     He has an updated substance abuse assessment and scheduled to start treatment on Monday, 11/6/2023. As part of his admission to treatment, the psychotic symptoms must be cleared and he needs to be sober. He feels unsafe in the community and is at risk of using methamphetamine if being discharged tonight. In consultation with Dr Heller, the patient will be on observation for a re-evaluation of symptoms of psychosis and methamphetamine within the next 24 hours.         Patient coping skills attempted to reduce the crisis:  Patient cooperated with his friend and came to the ED    Disposition  Recommended disposition: Observation        Reviewed case and recommendations with attending provider. Attending Name: Dr Johann Heller.       Attending concurs with disposition: yes       Patient and/or validated legal guardian concurs with disposition:   yes       Final disposition:   observation    Legal status on admission:      Assessment Details   Total duration spent with the patient: 30 min     CPT code(s) utilized: 88972 - Psychotherapy for Crisis - 60 (30-74*) min    LYN Scanlon, [facility], Psychotherapist  DEC - Triage & Transition Services  Callback: [phone]

## 2023-11-04 NOTE — ED NOTES
2 am patient admitted from ED.Patient has been  hallucinating the last three days after using meth.VSS /69 p 84 R 18 SPO2 98%  R.A Temp 97.2.Denies pain,SI/HI at this time.Patient alert and orient x 4 .No issues noted.  3:48 AM patient was half wake  started  screaming/talking to himself, minute later patient appear comfortable resting breathing normal.  05:30 am Patient was screaming/talking to himself using F words patient was redirected with relief.

## 2023-11-04 NOTE — PROGRESS NOTES
"Triage & Transition Services, Extended Care     Hamzah REESE \[facility] [place] [facility]\""  November 4, 2023    Hamzah is followed related to Boarding Status. Please see initial DEC Crisis Assessment completed for complete assessment information. Medical record is reviewed. While patient is in the ED, care team is working towards Learn and Demonstrate at Least One Skill Focused on Crisis Stabilization. Additional notes include:    Writer met with pt in BEC milieu. Pt woke up when writer pointed out that his lunch was sitting out next to him. Pt woke up and ate his food. Pt reports he is still experiencing AH, hearing screaming, saying \"help me,\" that sound like his nephew and brother. Pt reports the voices started a few days ago when he started using meth.     Pt reports he typically takes Zyprexa, reports taking medication in the ED last night.  Pt reports feeling like he needs to go back to bed. Pt reports concern about discharging at this point, reporting historically hearing voices after using meth, and he is concerned about what would happen if he were to discharge while still experiencing hallucinations as that is what brought him to the ED. Pt reports continued concern for his family member's safety due to voices and reports he historically goes searching for the source of the voices.     There are not significant status changes.     Recommend care team to continue care coordination with      Plan:  Observation: Pt continues to report AH, had been sleeping until writer awoke pt for reassessment. Due to pt's continued reports of symptoms and lack of observable time with pt awake, writer continues to recommend observation of symptoms of psychosis.     Pt has an updated substance abuse assessment and scheduled to start treatment at [facility] on Monday, 11/6/2023. As part of his admission to treatment, the psychotic symptoms must be cleared and he needs to be sober. He feels unsafe in the community and is at risk of using methamphetamine if " being discharged.    Plan for Care reviewed with Assigned Medical Provider? Yes. Provider, Dr Ashley, response: agreement    Extended Care will follow and meet with patient/family/care team as able or requested.     GABRIELLE Washington  [facility], [facility]   [phone]

## 2023-11-04 NOTE — H&P
"ED Observation History and Physical  [facility]  Observation Initiation Date: Nov 3, 2023    Hamzah Roberts MRN: [id number]   Age: 38 year old YOB: 1985     History     Chief Complaint   Patient presents with    Hallucinations     Sober from meth and stating that he is \"hearing and seeing shit.\"     HPI  Hamzah Roberts is a 38 year old male with PMH notable for substance-induced psychosis, amphetamine use disorder, schizoaffective disorder who presented to the ED with hallucinations.  Patient reports he has been seeing and hearing things that do not seem real.  He is mainly having auditory hallucinations.  Patient endorses methamphetamine use 3 days ago.  He reports he had been sober for 6 months prior to that..  He denies medical concerns besides his hallucinations.    Past Medical History  Past Medical History:   Diagnosis Date    Chemical dependency (H)      History reviewed. No pertinent surgical history.  benztropine (COGENTIN) 0.5 MG tablet  cephALEXin (KEFLEX) 500 MG capsule  hydrOXYzine (VISTARIL) 50 MG capsule  melatonin 3 MG tablet  naltrexone (DEPADE/REVIA) 50 MG tablet  OLANZapine (ZYPREXA) 10 MG tablet  omeprazole (PRILOSEC) 20 MG DR capsule  QUEtiapine (SEROQUEL) 300 MG tablet      Allergies   Allergen Reactions    Morphine Sulfate [Morphine] Shortness Of Breath     Family History  History reviewed. No pertinent family history.  Social History   Social History     Tobacco Use    Smoking status: Every Day     Packs/day: .5     Types: Cigarettes   Substance Use Topics    Alcohol use: Never    Drug use: Yes     Types: Methamphetamines     Comment: \"It's been a few days.\"      Past medical history, past surgical history, medications, allergies, family history, and social history were reviewed with the patient. substance-induced psychosis, amphetamine use disorder, schizoaffective disorder .       Review of Systems  A complete review of systems was performed with " "pertinent positives and negatives noted in the HPI, and all other systems negative.    Physical Exam   BP: 132/78  Pulse: 105  Temp: 98.3  F (36.8  C)  Resp: 16  Height: 160 cm (5' 3\")  Weight: 73.2 kg (161 lb 6.4 oz)  SpO2: 99 %    Physical Exam  General: Mildly hyperactive. Appears stated age.   HENT: MMM, no oropharyngeal lesions  Eyes: PERRL, normal sclerae   Cardio: Borderline tachycardic rate, extremities well perfused  Resp: Normal work of breathing, normal respiratory rate  Neuro: alert and fully oriented. CN II-XII grossly intact. Grossly normal strength and sensation in all extremities.   MSK: no deformities.   Integumentary/Skin: no rash visualized, normal color  Psych: Mildly anxious affect, mildly erratic behavior.  No SI.  No HI.  Auditory and visual hallucinations. Thought process mostly linear. Insight poor.     ED Course      Procedures              Labs Ordered and Resulted from Time of ED Arrival to Time of ED Departure - No data to display         Assessments & Plan (with Medical Decision Making)   Patient presenting with hallucinations. Nursing notes reviewed.     DEC assessment completed with  recommending antipsychotic medication, observation admission to assess if the hallucinations resolve after further metabolism of amphetamines. See separate DEC note from today's date for details on the assessment.    UDS ordered and pending.  Olanzapine given.    During the initial observation period, the patient did not require medications for agitation, and did not require restraints/seclusion for patient and/or provider safety.     The patient was found to have a psychiatric condition that would benefit from an observation stay in the emergency department for further psychiatric stabilization and/or coordination of a safe disposition. The observation plan includes serial assessments of psychiatric condition, potential administration of medications if indicated, further disposition pending the " patient's psychiatric course during the monitoring period.     Preliminary diagnosis:  Altered mental status  Auditory hallucinations  Likely substance-induced psychosis     --  Johann Heller MD   [facility] EMERGENCY DEPARTMENT  11/3/2023

## 2023-11-05 PROCEDURE — 250N000013 HC RX MED GY IP 250 OP 250 PS 637: Performed by: EMERGENCY MEDICINE

## 2023-11-05 PROCEDURE — G0378 HOSPITAL OBSERVATION PER HR: HCPCS

## 2023-11-05 RX ORDER — POLYETHYLENE GLYCOL 3350 17 G
2 POWDER IN PACKET (EA) ORAL
Status: DISCONTINUED | OUTPATIENT
Start: 2023-11-05 | End: 2023-11-06 | Stop reason: HOSPADM

## 2023-11-05 RX ORDER — OLANZAPINE 10 MG/1
10 TABLET, ORALLY DISINTEGRATING ORAL AT BEDTIME
Status: DISCONTINUED | OUTPATIENT
Start: 2023-11-05 | End: 2023-11-06 | Stop reason: HOSPADM

## 2023-11-05 RX ORDER — VALACYCLOVIR HYDROCHLORIDE 500 MG/1
500 TABLET, FILM COATED ORAL PRN
COMMUNITY
Start: 2023-09-25

## 2023-11-05 RX ADMIN — NICOTINE POLACRILEX 2 MG: 2 LOZENGE ORAL at 17:37

## 2023-11-05 RX ADMIN — OLANZAPINE 10 MG: 10 TABLET, ORALLY DISINTEGRATING ORAL at 18:18

## 2023-11-05 ASSESSMENT — ACTIVITIES OF DAILY LIVING (ADL)
ADLS_ACUITY_SCORE: 35

## 2023-11-05 NOTE — ED NOTES
Writer attempted to meet with pt. Pt was asleep and declined to talk with writer when writer attempted to wake pt.  Will try to meet with pt after lunch.      LYN Batres, [facility]  Extended Care [facility]

## 2023-11-05 NOTE — PROGRESS NOTES
"Triage & Transition Services, Extended Care     Therapy Progress Note    Patient: Hamzah goes by \"Hamzah,\" uses he/him pronouns  Date of Service: November 5, 2023  Site of Service:  ED/[facility]  Patient was seen in-person.     Presenting problem:   Hamzah is followed related to  Observation status . Please see initial DEC/[facility] Crisis Assessment completed by Nelly Gonzalez on 11/3/23 for complete assessment information. Notable concerns include increased A/H and related delusions and amphetamine use.     Individuals Present: Hamzah & ANANDA Alejandro    Session start: 1:01p - 1:12p;  1:25 - 1:35  Session end:    Session duration in minutes: 21  Session number: 1  Anticipated number of sessions or this episode of care: 2-3  CPT utilized: 82588 - Psychotherapy (with patient) - 30 (16-37*) min    Current Presentation:   Writer met with pt who was sleeping and woke after a few prompts.  Writer attempted to meet with pt in [facility] consult room, however, the heater was making a repetitive, loud, clicking noise and pt asked to go somewhere else.  Writer explored pt presenting MH symptoms and attempted to identify current coping strategies. Pt reports auditory and visual hallucinations. Pt reported he did not have strategies for managing A/H.  He reports he has had them for years. Pt stated he did not want to talk about them, that it made him feel more agitated and distressed. Writer observed pt pacing in distress and writer and pt moved to quieter area. Writer and pt explored potential discharge plan. Pt has plans to begin OP NURIS treatment at [facility] tomorrow but is unsure if he has somewhere to go tonight.  Pt is open to f/up OP psychiatry but declined OP MH therapy.  Pt states he was previously prescribed Zyprexa and has not received medication for his hallucinations since he arrived at ED.  Pt is open to medications for his A/H. Writer explored potential barriers for beginning NURIS treatment tomorrow e.g. transportation).  Pt " stated he would not have issues related to transportation and reports his friend is a safe place to go.  Writer and pt identified plan for pt to complete psych consult for  medications before completing discharge plan.  Pt denies SI/HI or SIB.       Mental Status Exam:   Appearance: fatigued  Attitude: somewhat cooperative  Eye Contact: fair  Mood: anxious  Affect: intensity is blunted  Speech: clear, coherent  Psychomotor Behavior: no evidence of tardive dyskinesia, dystonia, or tics and physical agitation  Thought Process:  goal oriented  Associations: no loose associations  Thought Content: auditory hallucinations present and visual hallucinations present  Insight: fair  Judgement: fair  Oriented to: time, person, and place  Attention Span and Concentration: fair  Recent and Remote Memory: intact    Diagnosis:   F25.1 Schizoaffective disorder, depressive type  F15.20 Amphetamine use disorder, severe methamphetamine type    Therapeutic Intervention(s):   Provided active listening, unconditional positive regard, and validation. Engaged in safety planning.  Engaged in guided discovery, explored patient's perspectives and helped expand them through socratic dialogue. Reviewed healthy living that supports positive mental health, including looking at sleep hygiene, regular movement, nutrition, and regular socialization. Provided positive reinforcement for progress towards goals, gains in knowledge, and application of skills previously taught.  Worked on relapse prevention planning (review of stressors, early warning signs, written plan to respond to signs, and rehearse plan). Explored motivation for behavioral change.    Treatment Objective(s) Addressed:   The focus of this session was on rapport building, safety planning, identifying an appropriate aftercare plan, assessing safety, identifying additional supports, and exploring obstacles to safety in the community.     Progress Towards Goals:   Patient reports stable  "symptoms. Patient is not making progress towards treatment goals as evidenced by continued hallucinations and related distress.     Case Management:   Writer reviewed pt chart.  Per initial DEC assessment recommendations, pt needs to have psychosis \"stabilized\" before beginning treatment at [facility] scheduled to begin tomorrow, 11/6/23, however has not had a psych consult nor started on medications to stabilize symptoms of psychosis.  Writer consulted with psychiatry to determine if an evaluation could be completed today, given that pt has not yet received a consult and did not have psych medications scheduled.   Writer updated ED provider, Dr. Brannon who stated he would put in order for Zyprexa tonight.  Psychiatry has not determined at the time of this note if they have capacity today to see pt.      General Recommendations:   Continue to monitor for harm. Consider: Use a positive, direct and calm approach. Pt's tend to match the energy/mood of the staff. Keep focus positive and upbeat, Use clear and concise directions, too many words can be overwhelming, Provide the pt with options to provide a sense of control. Try to tell the pt what they can do instead of what they can't do, Verbally state expectations , Be firm but gentle when redirecting, Listen in a neutral, non-judgmental way. Offer reassurance, and Be mindful of your nonverbal cues (body language, facial expressions)    Plan:   Observation: Pt is recommended to discharge, however, the primary presenting problem (psychosis/auditory hallucinations) has not been addressed since his ED admit.  Pt has not received consult with psychiatry to consider medications nor has pt started medications to help alleviate of A/H and associated agitation/distress.  Pt is recommended to discharge 11/6/23 to [facility] OP NURIS treatment, following consultation with psychiatry.  ED provider will order medication for tonight 11/5/23.  Pt denies SI/HI or SIB and should be re-assessed " before discharge.      Plan for Care reviewed with Assigned Medical Provider? Yes. Provider, Dr. Brannon, response: acknowledged.       Lyn Dior, [facility]   Licensed Mental Health Professional (LMHP), [facility]  [phone]

## 2023-11-05 NOTE — ED NOTES
Pt spent the entire shift resting. Only awakens for meals, vitals, and bathroom breaks. VSS, denies pain. Denies SI/SIB/HI. Denies AVH. Pt briefly met with assessors. Pt remains behaviorally controlled.

## 2023-11-05 NOTE — PHARMACY-ADMISSION MEDICATION HISTORY
Pharmacist Admission Medication History    Admission medication history is complete. The information provided in this note is only as accurate as the sources available at the time of the update.    Information Source(s): Patient, Clinic records, and [facility] via in-person    Pertinent Information: Hamzah confirmed he takes omeprazole and valayclovir. He reported he takes Ativan PRN but not able to find any current prescriptions for it. The las time Ativan was filled was 01/25/2023 for quantity 23 tablets on 1 mg strength.    Changes made to PTA medication list:  Added: omeprazole, valacyclovir  Deleted: benztropine, cephalexin, hydroxyzine, melatonin, naltrexone, olanzapine, omeprazole, quetiapine  Changed: None    Medication History Completed By: Sabrina Swnason RPH 11/5/2023 4:42 PM    PTA Med List   Medication Sig Last Dose    omeprazole (PRILOSEC) 20 MG DR capsule Take 1 capsule (20 mg) by mouth daily     valACYclovir (VALTREX) 500 MG tablet Take 500 mg by mouth as needed Take BID for 3 day at onset of symptoms

## 2023-11-06 VITALS
HEIGHT: 63 IN | WEIGHT: 161.4 LBS | RESPIRATION RATE: 16 BRPM | TEMPERATURE: 98.2 F | DIASTOLIC BLOOD PRESSURE: 85 MMHG | HEART RATE: 66 BPM | SYSTOLIC BLOOD PRESSURE: 129 MMHG | OXYGEN SATURATION: 98 % | BODY MASS INDEX: 28.6 KG/M2

## 2023-11-06 PROCEDURE — G0378 HOSPITAL OBSERVATION PER HR: HCPCS

## 2023-11-06 PROCEDURE — 99255 IP/OBS CONSLTJ NEW/EST HI 80: CPT

## 2023-11-06 RX ORDER — OLANZAPINE 10 MG/1
10 TABLET, ORALLY DISINTEGRATING ORAL AT BEDTIME
Qty: 30 TABLET | Refills: 0 | Status: SHIPPED | OUTPATIENT
Start: 2023-11-06 | End: 2023-11-25

## 2023-11-06 ASSESSMENT — ACTIVITIES OF DAILY LIVING (ADL)
ADLS_ACUITY_SCORE: 35

## 2023-11-06 ASSESSMENT — COLUMBIA-SUICIDE SEVERITY RATING SCALE - C-SSRS
2. HAVE YOU ACTUALLY HAD ANY THOUGHTS OF KILLING YOURSELF?: NO
1. SINCE LAST CONTACT, HAVE YOU WISHED YOU WERE DEAD OR WISHED YOU COULD GO TO SLEEP AND NOT WAKE UP?: NO
SUICIDE, SINCE LAST CONTACT: NO
TOTAL  NUMBER OF INTERRUPTED ATTEMPTS SINCE LAST CONTACT: NO
6. HAVE YOU EVER DONE ANYTHING, STARTED TO DO ANYTHING, OR PREPARED TO DO ANYTHING TO END YOUR LIFE?: NO
ATTEMPT SINCE LAST CONTACT: NO
TOTAL  NUMBER OF ABORTED OR SELF INTERRUPTED ATTEMPTS SINCE LAST CONTACT: NO

## 2023-11-06 NOTE — ED NOTES
Triage & Transition Services, Extended Care    Client Name: Hamzah Roberts    Date: November 6, 2023  Service Type:  Group Therapy  Session Start Time:  11:00 am     Session End Time: 11:30 am   Session Length: 30  Site Location: [facility]  Attendees: Patient and other group members  Facilitator: Stacy Sharif     Topic:   Body Scan/Emotional Regulation    Intervention:    Group process: support, challenge, affirm, psycho-education.     Response:  Patient did not participate in group.     Stacy Sharif

## 2023-11-06 NOTE — DISCHARGE INSTRUCTIONS
Aftercare Plan     If I am feeling unsafe or I am in a crisis, I will:      Contact my established care providers      Call the National Suicide Prevention Lifeline: 988     Go to the nearest emergency room      Call 911         Warning signs that I or other people might notice when a crisis is developing for me: hallucinations, chemical use     Things I am able to do on my own to cope or help me feel better:      *Take a time out.  Practice yoga, listen to music, meditate, learn relaxation techniques.  Stepping back from the problem helps clear your head.   *Welcome humor.  A good laugh goes a long way.   *Accept that you cannot control everything.  Put your stress in perspective: is it really as bad as you think.   *Focus on positive self-talk vs negative self-talk   *Journaling   *Take a warm bath or shower        Things that I am able to do with others to cope or help me better:     *Listen and talk about concerns.   *Go for a walk or an outing (dinner, movie, park)   *Reach out to family/friends by text or phone        Things I can use or do for distraction:      *Watch TV or a movie.   *Watch Boxever videos   *Listen to music and sing out loud.   *Grab a snack   *Play a game or video game   *Puzzles, word finds, coloring   *Read a book        Changes I can make to support my mental health and wellness:      *Eat well-balanced meals.  Do not skip any meals.  Keep healthy, energy-boosting snacks on hand.   *Limit alcohol and caffeine.   *Exercise daily.    *Surround yourself with positive people.   *Set realistic goals.   *Use community resources, including hotline numbers, [facility] crisis and support meetings   *Maintain a daily schedule/routine       Reduce Extreme Emotion ?QUICKLY:??Changing Your Body Chemistry?     T: ?Change your body Temperature to change your autonomic nervous system?     1. Use Ice Water to calm yourself down FAST?   2. Put your face in a bowl of ice water (this is the best way; have the  person keep his/her face in ice water for 30-45 seconds -?initial research is showing that the longer s/he can hold her/his face in the water, the better the response), or?   3. Splash ice water on your face, or?hold an ice pack on your face?   ?     I: ?Intensely exercise to calm down a body revved up by emotion?     Examples: running, walking fast, jumping, playing basketball, weight lifting, swimming, calisthenics, etc.?   Engage in exercises that DO NOT include violent behaviors. Exercises that utilize violent behaviors tend to function as  behavioral rehearsal, ?and rather than calming the person down, may actually  rev ?the person up more, increasing the likelihood of violence, and lessening the likelihood that they will  burn off ?energy   ?     P: ?Progressively relax your muscles?     *Starting with your hands, moving to your forearms, upper arms, shoulders, neck, forehead, eyes, cheeks and lips, tongue and teeth, chest, upper back, stomach, buttocks, thighs, calves, ankles, feet?   *Tense (10 seconds,   of the way), then relax each muscle (all the way)?   *Notice the tension?   *Notice the difference when relaxed (by tensing first, and then relaxing, you are able to get a more thorough relaxation than by simply relaxing)?   ?     P: Paced breathing to relax?     1.The standard technique is to begin with counting the number of steps one takes for a typical inhale, then counting the steps one takes for a typical exhale, and then lengthening the amount of steps for the exhalation by one or two steps. ?OR   2.Repeat this pattern for 1-2 minutes   3.Inhale for four (4) seconds?   4.Exhale for six (6) to eight (8) seconds?   5.Research demonstrated that one can change one's overall level of anxiety by doing this exercise for even a few minutes per day??       After using Distress Tolerance TIPP, TRY TO STOP!      S- Stop       Do not just react on your emotion urge. Stop! Freeze! Do not move a muscle! Your  "emotions may try to make you act without thinking. Stay in control! Take a step back Take a step back from the situation.       T- Take a break       Let go. Take a deep breath. Do not let your feelings make you act impulsively.       O- Observe       Notice what is going on inside and outside you. What is the situation? What are your thoughts and feelings? What are others saying or doing? Does my emotion make sense, is it justified? What is it that my emotions want me to do? Would that be effective?       P- Proceed mindfully       Act with awareness. In deciding what to do, consider your thoughts and feelings, the situation, and other people s thoughts and feelings. Think about your goals. Ask Wise Mind: Which actions will make it better or worse?        People in my life that I can ask for help: family, peer , friends      Your [facility] has a mental health crisis team you can call 24/7: [facility] Mobile Crisis  [phone] (adults)  [phone] (children)     Other things that are important when I'm in crisis:      *Value yourself.  Treat yourself with kindness and respect, avoid self-criticism.   *Get help when you need it.  Seeking help is a sign of strength, not weakness.  Remember that treatment is effective.        Additional resources and information:      Crisis Lines     Crisis Text Line  Text 103301  You will be connected with a trained live crisis counselor to provide support.     Community Resources     Fast Tracker  Linking people to mental health and substance use disorder resources  fasttrackermn.org      [place] [facility] Warm Line  Peer to peer support  Monday thru Saturday, 12 pm to 10 pm  378.688.6900 or 0.606.582.7622  Text \"Support\" to 58463     [employer] [place] [employer] Mental [employer]ness (HERO)  635.347.8282 or 1.888.HERO.HELPS     [facility] Phone (free remote counseling): 517.272.4316 Web address:      https://ChemiSense.org/  "     www.Piethis.com.Instant BioScan (filter for insurance, gender preference, etc.)     Mental Health Apps     My3  https://TruQu.org/     VirtualHopeBox  https://[employer]/apps/virtual-hope-box/     Additional Information     Today you were seen by a licensed mental health professional through Triage and Transition services, [facility] ([facility])  for a crisis assessment in the Emergency Department at [facility].  It is recommended that you follow up with your established providers (psychiatrist, mental health therapist, and/or primary care doctor - as relevant) as soon as possible. Coordinators from [facility] will be calling you in the next 24-48 hours to ensure that you have the resources you need.  You can also contact [facility] coordinators directly at [phone]. You may have been scheduled for or offered an appointment with a mental health provider. [facility] maintains an extensive network of licensed behavioral health providers to connect patients with the services they need.  We do not charge providers a fee to participate in our referral network.  We match patients with providers based on a patient's specific needs, insurance coverage, and location.  Our first effort will be to refer you to a provider within your care system, and will utilize providers outside your care system as needed.

## 2023-11-06 NOTE — ED NOTES
Pt is observed sleeping/resting. He is up for meals/snacks and bathroom use. Denies pain SI HI and hallucinations. VSS. No behavior concerns. Staff will continue to monitor.

## 2023-11-06 NOTE — CONSULTS
Hamzah Roberts MRN# [id number]   Age: 38 year old YOB: 1985   Date of Admission to ED: 11/3/2023    In person visit Details:     Patient was assessed and interviewed face-to-face in person with this writer radha. Patient was observed to be able to participate in the assessment as evidenced by verbal consent. Assessment methods included conducting a formal interview with patient, review of medical records, collaboration with medical staff, and obtaining relevant collateral information from family and community providers when available.        Reason for Consult:   This note is being entered to supplement the psychiatry consultation note that was completed on October by the licensed mental health professional Nelly Gonzalez, ANANDA   have reviewed the pertinent clinical details related to their encounter. I am being consulted to offer additional guidance on psychiatric pharmacological interventions    Patient is alert and oriented x4 pleasant and cooperative during assessment and interview.  Currently patient denied any suicidal ideation or homicidal ideation or self injury behavior.  Patient denied any auditory hallucination or visual hallucination.  Patient has been compliant with his medications while staying in the emergency room.  Patient said he was depressed and suicidal when he came to emergency room due to being relapsed on substance, he would like to be discharged and continue with sobriety outpatient.  Patient grossly appears to be cognitively intact. Insight and judgement have improved since to emergency room. Patient is aware of consequences of medication non-compliance .  Patient has not exhibited aggressive or violence behaviors while staying in the emergency room. Has no notable risk factors for self-harm, , past history of attempt. However, risk is mitigated by ability to volunteer a safety plan and history of seeking help when needed. Patient does not have immediate access to  firearms. Therefore, based on all available evidence including the factors cited above, he  does not appear to be at imminent risk for self-harm, does not meet criteria for a 72-hr hold, and therefore remains appropriate for ongoing outpatient level of care. Patient agreed to further reduce risk of self-harm by adhering to the safety plan and agreed to remain medication adherent. Additional steps taken to minimize risk include: medication optimization, close psychiatric follow up and provision of crisis resources. Patient expressed understanding of risk associated with medication non-adherence including increased risk of harm to self or others.       Protective Factors: strong bond to family unit, community support, friend's including lives in a responsibly safe and stable environment, good treatment engagement, sense of importance of health and wellness, able to access care without barriers, supportive ongoing medical and mental health care relationships, help seeking, good problem-solving, coping, and conflict resolution skills, sense of belonging, sense of self-efficacy and/or positive self-esteem, cultural, spiritual , or [employer] beliefs associated with meaning and value in life, optimistic outlook - identification of future goals, constructive use of leisure time, enjoyable activities, resilience and reality testing ability.    I have reviewed the nursing notes. I have reviewed the findings, diagnosis, plan and need for follow up with the patient.         HPI:     Hamzah Roberts is a 38 year old male with PMH notable for substance-induced psychosis, amphetamine use disorder, schizoaffective disorder who presented to the ED with hallucinations.  Patient reports he has been seeing and hearing things that do not seem real.  He is mainly having auditory hallucinations.  Patient endorses methamphetamine use 3 days ago.  He reports he had been sober for 6 months prior to that..  He denies medical concerns besides  his hallucinations.       Pt has *not required locked seclusion or restraints in the past 24 hours to maintain safety, please refer to RN documentation for further details.  Substance use does  appear to be playing a contributing role in the patient's presentation.**  Brief Therapeutic Intervention(s):   Provided active listening, unconditional positive regard, and validation. Engaged in cognitive restructuring/ reframing, looked at common cognitive distortions and challenged negative thoughts. *Engaged in guided discovery, explored patient's perspectives and helped expand them through socratic dialogue. Provided positive reinforcement for progress towards goals, gains in knowledge, and application of skills previously taught.  Engaged in social skills training. Explored and identified early warning signs to anger.        Past Psychiatric History:     See DEC  note      Substance Use and History:     The DEC  note        Past Medical History:   PAST MEDICAL HISTORY:   Past Medical History:   Diagnosis Date    Chemical dependency (H)        PAST SURGICAL HISTORY: History reviewed. No pertinent surgical history.            Allergies:     Allergies   Allergen Reactions    Morphine Sulfate [Morphine] Shortness Of Breath             Medications:   I have reviewed this patient's current medications  Current Facility-Administered Medications   Medication    acetaminophen (TYLENOL) tablet 650 mg    hydrOXYzine (ATARAX) tablet 25 mg    ibuprofen (ADVIL/MOTRIN) tablet 600 mg    melatonin tablet 3 mg    nicotine (COMMIT) lozenge 2 mg    OLANZapine zydis (zyPREXA) ODT tab 10 mg    OLANZapine zydis (zyPREXA) ODT tab 10 mg     Current Outpatient Medications   Medication Sig    OLANZapine zydis (ZYPREXA) 10 MG ODT Take 1 tablet (10 mg) by mouth at bedtime    omeprazole (PRILOSEC) 20 MG DR capsule Take 1 capsule (20 mg) by mouth daily    valACYclovir (VALTREX) 500 MG tablet Take 500 mg by mouth as needed Take BID  "for 3 day at onset of symptoms              Family History:   FAMILY HISTORY: History reviewed. No pertinent family history.           Social History:          - Collateral information from the famly/friend: none         PTA Medications:   (Not in a hospital admission)         Allergies:     Allergies   Allergen Reactions    Morphine Sulfate [Morphine] Shortness Of Breath          Labs:     Recent Results (from the past 48 hour(s))   Urine Drug Screen Panel    Collection Time: 11/04/23  3:14 PM   Result Value Ref Range    Amphetamines Urine Screen Positive (A) Screen Negative    Barbituates Urine Screen Negative Screen Negative    Benzodiazepine Urine Screen Negative Screen Negative    Cannabinoids Urine Screen Negative Screen Negative    Cocaine Urine Screen Negative Screen Negative    Fentanyl Qual Urine Screen Negative Screen Negative    Opiates Urine Screen Negative Screen Negative    PCP Urine Screen Negative Screen Negative          Physical and Psychiatric Examination:     /85   Pulse 66   Temp 98.2  F (36.8  C)   Resp 16   Ht 1.6 m (5' 3\")   Wt 73.2 kg (161 lb 6.4 oz)   SpO2 98%   BMI 28.59 kg/m    Weight is 161 lbs 6.4 oz  Body mass index is 28.59 kg/m .    Mental Status Exam:  Appearance: awake, alert  Attitude:  cooperative  Eye Contact:  good  Mood:  good  Affect:  appropriate and in normal range  Speech:  clear, coherent  Language: fluent and intact in English  Psychomotor, Gait, Musculoskeletal:  no evidence of tardive dyskinesia, dystonia, or tics  Thought Process:  logical, linear, and goal oriented  Associations:  no loose associations  Thought Content:  no evidence of suicidal ideation or homicidal ideation  Insight:  good  Judgement:  fair  Oriented to:  time, person, and place  Attention Span and Concentration:  fair  Recent and Remote Memory:  fair  Fund of Knowledge:  appropriate         Diagnoses:      Altered mental status, unspecified altered mental status type  Auditory " hallucinations  Substance-induced psychotic disorder with hallucinations (H)         Recommendations:       1.  Discharge to treatment center  2.  Continue Zyprexa 10 mg at bedtime  3.  Consult psychiatry as needed  4.   Refer to psychiatric provider for medication management.    treatment per ED team    - Consulted with  Shasta ELIZALDE [facility] ,  patient's ED RN regarding this case.    Please call [facility]/DEC at [phone] if you have follow-up questions or wish to place another consult.  Phil Georges, Psychiatric Nurse practitioner    Attestation:  Time with:  Patient: 20 minutes  Treatment Team: 20 Minutes  Chart Review: 40 minutes    Total time spent was 80 minutes. Over 50% of times was spent counseling and coordination of care.        I, Phil Georges, CNP, APRN, Psychiatric Nurse Practitioner have personally performed an examination of this patient.  I have edited the note to reflect all relevant changes.  I have discussed this patient with the care team October 6, 2023.  I have reviewed all vitals and laboratory findings.    Disclaimer: This note consists of symbols derived from keyboarding,

## 2023-11-06 NOTE — PROGRESS NOTES
The patient has been sleeping most of the shift. He woke up to call family on the phone or to use the restroom. PT is pleasant and cooperative with care and staff. PT contracted for safety. Denied pain, SI/HI, and AH/VH. A safety check is completed every 15 minutes. The patient asked when he would be going home and reaching out to extended care, and the patient scheduled to see a psychiatrist in the morning. Pt took PRN Olanzapine 10 mg but refused the  schedule one. Will continue to monitor PT.

## 2023-11-06 NOTE — ED NOTES
Pt discharged to  [facility]. Discharge instructions were reviewed by writer. Pt verbalized  understanding discharge orders. Stated that he will remain safe in the community, will take all his meds as ordered, and that he will follow up with aftercare appointments. Pt denied SI, HI, and hallucinations. Pt took all his personal belongings. Medication were sent to home pharmacy. Pt friend Damian is here to give him a ride.

## 2023-11-06 NOTE — PROGRESS NOTES
Triage & Transition Services, Extended Care     Hamzah Ambrociope  November 6, 2023    Hamzah is followed related to  Observation status . Please see initial DEC Crisis Assessment completed for complete assessment information. Medical record is reviewed. While patient is in the ED, care team is working towards Learn and Demonstrate at Least One Skill Focused on Crisis Stabilization.     Pt was sleeping and refused to meet at 8:42 am.    I returned at 9:32 am and pt agreed to talk.  He denied hallucinations.  He denied the symptoms that brought him to the ED.  He became irritable and asked why we kept asking him the same questions.  He stated that he wanted to discharge and go to treatment.  He stated that his peer , Damian, was going to pick him up and bring him to treatment.  He stated that he is stable enough to do treatment.  He does not want to remain in the hospital.  Pt signed an FOUZIA for Damian, but did not have a phone number.    Damian contacted the ED and spoke with nursing about picking the pt up this afternoon.  He provided his phone number to staff ([phone]).  I called and spoke with him.  He stated that he can come  the pt as soon as possible.  He plans to bring him to [place] to get a bed and this needs to be done before 2 pm.  I informed him that I would follow up with pt and doctor about discharge.    I spoke with pt again at 11:30 am.  He stated that he is safe to discharge.  He stated that he wanted to discharge and go to treatment.  He reported no additional concerns.    I spoke with Phil Georges CNP.  He is going to complete his assessment at this time.  Upon completion of this assessment, pt will likely be discharged.    [place] Suicide Severity Rating Scale Since Last Contact: 11/6/23  Suicidal Ideation (Since Last Contact)  1. Wish to be Dead (Since Last Contact): No  2. Non-Specific Active Suicidal Thoughts (Since Last Contact): No  Suicidal Behavior (Since Last  Contact)  Actual Attempt (Since Last Contact): No  Has subject engaged in non-suicidal self-injurious behavior? (Since Last Contact): No  Interrupted Attempts (Since Last Contact): No  Aborted or Self-Interrupted Attempt (Since Last Contact): No  Preparatory Acts or Behavior (Since Last Contact): No  Suicide (Since Last Contact): No     C-SSRS Risk (Since Last Contact)  Calculated C-SSRS Risk Score (Since Last Contact): No Risk Indicated       Plan:  Discharge: Pt was admitted for observation.  He had a plan to discharge to outpatient CD treatment on 11/6/23 once his symptoms had stabilized.  Pt currently reports no psychotic symptoms.  He reported that he is safe and stable to discharge.  He requested discharge.    Plan for Care reviewed with Assigned Medical Provider? Yes. Provider, Phil Georges, response: Agreement    Extended Care will follow and meet with patient/family/care team as able or requested.     Shasta Raymundo LP  [facility], Extended Care   [phone]

## 2023-11-25 ENCOUNTER — HOSPITAL ENCOUNTER (OUTPATIENT)
Facility: CLINIC | Age: 38
Setting detail: OBSERVATION
Discharge: NURSING, REHAB OR LONG-TERM CARE | End: 2023-11-27
Attending: EMERGENCY MEDICINE | Admitting: EMERGENCY MEDICINE
Payer: COMMERCIAL

## 2023-11-25 DIAGNOSIS — R44.3 HALLUCINATIONS: ICD-10-CM

## 2023-11-25 DIAGNOSIS — F15.10 METHAMPHETAMINE ABUSE (H): ICD-10-CM

## 2023-11-25 DIAGNOSIS — F20.0 PARANOID SCHIZOPHRENIA (H): Primary | ICD-10-CM

## 2023-11-25 LAB
ALBUMIN SERPL BCG-MCNC: 4.1 G/DL (ref 3.5–5.2)
ALP SERPL-CCNC: 62 U/L (ref 40–150)
ALT SERPL W P-5'-P-CCNC: 14 U/L (ref 0–70)
ANION GAP SERPL CALCULATED.3IONS-SCNC: 6 MMOL/L (ref 7–15)
AST SERPL W P-5'-P-CCNC: 18 U/L (ref 0–45)
BASOPHILS # BLD AUTO: 0 10E3/UL (ref 0–0.2)
BASOPHILS NFR BLD AUTO: 0 %
BILIRUB SERPL-MCNC: 0.4 MG/DL
BUN SERPL-MCNC: 9 MG/DL (ref 6–20)
CALCIUM SERPL-MCNC: 8.8 MG/DL (ref 8.6–10)
CHLORIDE SERPL-SCNC: 105 MMOL/L (ref 98–107)
CREAT SERPL-MCNC: 0.98 MG/DL (ref 0.67–1.17)
DEPRECATED HCO3 PLAS-SCNC: 27 MMOL/L (ref 22–29)
EGFRCR SERPLBLD CKD-EPI 2021: >90 ML/MIN/1.73M2
EOSINOPHIL # BLD AUTO: 0.2 10E3/UL (ref 0–0.7)
EOSINOPHIL NFR BLD AUTO: 3 %
ERYTHROCYTE [DISTWIDTH] IN BLOOD BY AUTOMATED COUNT: 12.6 % (ref 10–15)
GLUCOSE SERPL-MCNC: 89 MG/DL (ref 70–99)
HCT VFR BLD AUTO: 43.3 % (ref 40–53)
HGB BLD-MCNC: 15.5 G/DL (ref 13.3–17.7)
IMM GRANULOCYTES # BLD: 0 10E3/UL
IMM GRANULOCYTES NFR BLD: 0 %
LYMPHOCYTES # BLD AUTO: 2.4 10E3/UL (ref 0.8–5.3)
LYMPHOCYTES NFR BLD AUTO: 46 %
MCH RBC QN AUTO: 32.5 PG (ref 26.5–33)
MCHC RBC AUTO-ENTMCNC: 35.8 G/DL (ref 31.5–36.5)
MCV RBC AUTO: 91 FL (ref 78–100)
MONOCYTES # BLD AUTO: 0.6 10E3/UL (ref 0–1.3)
MONOCYTES NFR BLD AUTO: 11 %
NEUTROPHILS # BLD AUTO: 2.2 10E3/UL (ref 1.6–8.3)
NEUTROPHILS NFR BLD AUTO: 40 %
NRBC # BLD AUTO: 0 10E3/UL
NRBC BLD AUTO-RTO: 0 /100
PLATELET # BLD AUTO: 217 10E3/UL (ref 150–450)
POTASSIUM SERPL-SCNC: 3.8 MMOL/L (ref 3.4–5.3)
PROT SERPL-MCNC: 6.1 G/DL (ref 6.4–8.3)
RBC # BLD AUTO: 4.77 10E6/UL (ref 4.4–5.9)
SODIUM SERPL-SCNC: 138 MMOL/L (ref 135–145)
WBC # BLD AUTO: 5.4 10E3/UL (ref 4–11)

## 2023-11-25 PROCEDURE — G0378 HOSPITAL OBSERVATION PER HR: HCPCS

## 2023-11-25 PROCEDURE — 250N000013 HC RX MED GY IP 250 OP 250 PS 637: Performed by: EMERGENCY MEDICINE

## 2023-11-25 PROCEDURE — 85025 COMPLETE CBC W/AUTO DIFF WBC: CPT | Performed by: EMERGENCY MEDICINE

## 2023-11-25 PROCEDURE — 80053 COMPREHEN METABOLIC PANEL: CPT | Performed by: EMERGENCY MEDICINE

## 2023-11-25 PROCEDURE — 99285 EMERGENCY DEPT VISIT HI MDM: CPT | Performed by: EMERGENCY MEDICINE

## 2023-11-25 PROCEDURE — 99285 EMERGENCY DEPT VISIT HI MDM: CPT | Mod: 25 | Performed by: EMERGENCY MEDICINE

## 2023-11-25 PROCEDURE — 36415 COLL VENOUS BLD VENIPUNCTURE: CPT | Performed by: EMERGENCY MEDICINE

## 2023-11-25 RX ORDER — OLANZAPINE 10 MG/1
10 TABLET, ORALLY DISINTEGRATING ORAL AT BEDTIME
Status: DISCONTINUED | OUTPATIENT
Start: 2023-11-25 | End: 2023-11-27 | Stop reason: HOSPADM

## 2023-11-25 RX ORDER — OLANZAPINE 10 MG/1
10 TABLET, ORALLY DISINTEGRATING ORAL ONCE
Status: COMPLETED | OUTPATIENT
Start: 2023-11-25 | End: 2023-11-25

## 2023-11-25 RX ORDER — QUETIAPINE FUMARATE 25 MG/1
50 TABLET, FILM COATED ORAL 2 TIMES DAILY
Status: DISCONTINUED | OUTPATIENT
Start: 2023-11-25 | End: 2023-11-27 | Stop reason: HOSPADM

## 2023-11-25 RX ADMIN — QUETIAPINE FUMARATE 50 MG: 50 TABLET ORAL at 20:13

## 2023-11-25 RX ADMIN — QUETIAPINE FUMARATE 50 MG: 50 TABLET ORAL at 10:26

## 2023-11-25 RX ADMIN — OLANZAPINE 10 MG: 10 TABLET, ORALLY DISINTEGRATING ORAL at 20:14

## 2023-11-25 RX ADMIN — OLANZAPINE 10 MG: 10 TABLET, ORALLY DISINTEGRATING ORAL at 04:07

## 2023-11-25 ASSESSMENT — ACTIVITIES OF DAILY LIVING (ADL)
ADLS_ACUITY_SCORE: 35

## 2023-11-25 NOTE — ED NOTES
Pt stated having hallucinations both auditory and visual started about 1.5-2 weeks ago.  Pt denies auditory hallucinations are commanding, pt stated they are screams.

## 2023-11-25 NOTE — ED PROVIDER NOTES
"ED Provider Note  [facility]      History     Chief Complaint   Patient presents with    Hallucinations     Pt c/o \"cars flying every where and a lot of stuff.\"      HPI  Hamzah Roberts is a 38 year old male who presents to us with a chief complaint of agitation and hallucinations.  He has a history of schizophrenia as well as methamphetamine abuse.  He notes center on things like cars flying around him and various other issues.  He is concerned someone may be watching him right now out to get him.  He denies any recent methamphetamine abuse.  He denies any suicidal homicidal ideation.    Past Medical History  Past Medical History:   Diagnosis Date    Chemical dependency (H)      No past surgical history on file.  OLANZapine zydis (ZYPREXA) 10 MG ODT  omeprazole (PRILOSEC) 20 MG DR capsule  valACYclovir (VALTREX) 500 MG tablet      Allergies   Allergen Reactions    Morphine Sulfate [Morphine] Shortness Of Breath     Family History  No family history on file.  Social History   Social History     Tobacco Use    Smoking status: Every Day     Packs/day: .5     Types: Cigarettes   Substance Use Topics    Alcohol use: Never    Drug use: Yes     Types: Methamphetamines     Comment: \"It's been a few days.\"         A medically appropriate review of systems was performed with pertinent positives and negatives noted in the HPI, and all other systems negative.    Physical Exam   BP: 91/54  Pulse: 92  Temp: 98.6  F (37  C)  Resp: 12  SpO2: 99 %  Physical Exam  Vitals and nursing note reviewed.   Constitutional:       General: He is not in acute distress.     Appearance: He is well-developed.   HENT:      Head: Normocephalic.      Right Ear: External ear normal.      Left Ear: External ear normal.      Nose: Nose normal.   Eyes:      Extraocular Movements: Extraocular movements intact.      Conjunctiva/sclera: Conjunctivae normal.   Pulmonary:      Effort: Pulmonary effort is normal. No respiratory " distress.   Abdominal:      General: Abdomen is flat. There is no distension.   Musculoskeletal:         General: No deformity. Normal range of motion.      Cervical back: Normal range of motion and neck supple.   Skin:     General: Skin is warm and dry.   Neurological:      Mental Status: He is alert. Mental status is at baseline.      Comments: Oriented   Psychiatric:      Comments: Moderate agitation         ED Course, Procedures, & Data      Procedures                   No results found for any visits on 11/25/23.  Medications   OLANZapine zydis (zyPREXA) ODT tab 10 mg (10 mg Oral $Given 11/25/23 0407)     Labs Ordered and Resulted from Time of ED Arrival to Time of ED Departure - No data to display  No orders to display          Medical Decision Making  The patient's presentation is strongly suggestive of high complexity (a chronic illness severe exacerbation, progression, or side effect of treatment).    The patient's evaluation involved:  review of external note(s) from 3+ sources (Most recent H&P in addition to clinic and ED note)  review of 2 test result(s) ordered prior to this encounter (Most recent BMP and CBC)    The patient's management involved moderate risk (prescription drug management including medications given in the ED).    Assessment & Plan    38-year-old male with a history of methamphetamine abuse as well as schizophrenia presents to us with a chief complaint of hallucinations.  He was given a dose of Zyprexa.  He is now sleeping.  We will allow him to rest for a time and he can be reevaluated by the oncoming physician for likely discharge.    I have reviewed the nursing notes. I have reviewed the findings, diagnosis, plan and need for follow up with the patient.    New Prescriptions    No medications on file       Final diagnoses:   Hallucinations       Reg Brannon  [facility] EMERGENCY DEPARTMENT  11/25/2023     Reg Brannon,   11/25/23 [id number]

## 2023-11-25 NOTE — PHARMACY-ADMISSION MEDICATION HISTORY
Pharmacy Intern Admission Medication History    Admission medication history is complete. The information provided in this note is only as accurate as the sources available at the time of the update.    Information Source(s): Patient and dispense report  via in-person    Pertinent Information: Patient reported that he has not taken the deleted medications for at least 7 months. Dispense report has last fills for olanzapine at 7/6/23 and omeprazole at 9/25/23. When asked why he stopped he did not give a reason.   He would be willing to restart his medications.    Changes made to PTA medication list:  Added: None  Deleted: Olanzapine 10 mg ODT, take 1 tablet PO HS (per pt)  Omeprazole 20 mg DR capsule, take 1 capsule PO every day (Per pt)  Changed: None    Medication Affordability:did not discuss  Allergies reviewed with patient and updates made in EHR: yes    Medication History Completed By: Damian Bueno 11/25/2023 3:16 PM    PTA Med List   Medication Sig Last Dose    valACYclovir (VALTREX) 500 MG tablet Take 500 mg by mouth as needed Take BID for 3 day at onset of symptoms More than a month at PRN

## 2023-11-25 NOTE — ED TRIAGE NOTES
Triage Assessment (Adult)       Row Name 11/25/23 0336          Triage Assessment    Airway WDL WDL        Respiratory WDL    Respiratory WDL WDL        Skin Circulation/Temperature WDL    Skin Circulation/Temperature WDL WDL        Cardiac WDL    Cardiac WDL WDL        Peripheral/Neurovascular WDL    Peripheral Neurovascular WDL WDL        Cognitive/Neuro/Behavioral WDL    Cognitive/Neuro/Behavioral WDL X     Mood/Behavior restless  hallucinations

## 2023-11-25 NOTE — DISCHARGE INSTRUCTIONS
Follow Up Appointment:    Date: Wednesday, 11/29/2023  Time: 3:00 pm - 4:00 pm  Provider: Travon Stephenson  MSN  CNP,PMHNP,RN  Location: [facility], [address] [place] [address] [place] [place] [address]  Phone: [phone]  Type: Telepsychiatry    Patient Instructions:  All Intake appointments will be conducted via telehealth and must have access to video through smart phone or laptop/pc/tablet. You will be contacted by our office to set up the virtual meeting. If you have questions, please contact our office at [phone].      Follow-up with your primary care provider.  Return to the emergency department as needed for any new or worsening symptoms.    You have a bed secured at [facility] located at [address] [place] [address].   Phone: [phone]    Substance Use Disorder Direct Access Resources    It is recommended that you abstain from all mood altering chemicals. Please contact the sober support hotline ([phone]) as needed; phones are answered 24 hours a day, 7 days a week.    Ways to help cope with sobriety:    -- Take prescribed medicines as scheduled  -- Keep follow-up appointments  -- Talk to others about your concerns  -- Get regular exercise  -- Practice deep breathing skills  -- Eat a healthy diet  -- Use community resources, including hotline numbers, [facility] crisis and support meetings  -- Stay sober and avoid places/people/things associated with substance use  --Maintain a daily schedule/routine  --Get at least 7-8 hours of sleep per night  --Create a list 10--20 healthy activities that you can do that are enjoyable and do not involve substance use  --Create daily goals (approx. 1-4 goals) per day and work to achieve them throughout the day.    To access substance use treatment you must have a comprehensive assessment completed to begin any treatment program.     Community NURIS Evaluations: Clients may call their county for a full list of providers -  Availability and services listed belo are subject to change, please call the provider to confirm    [facility]  [phone]  [address] [place], [place], [address]  *Please call the above number to schedule a comprehensive assessment for determination of level of care needs. In person and virtual appointments available Mon-Fri.    [facility], [address] [place] [address], First Floor, [address], [place] [address] (next to the outpatient lab)    Phone: [phone]   Provides bridging services to people with Opiate Use Disorders (OUD) seeking care. This is a front door to Medication Assisted Treatments (MAT), ages 16+  Walk In hours: Monday-Friday 9:00am-3:00pm    [facility]  [phone]  Walk in Assessments: Mon-Friday 7a-1:45p  [address], [place], [address]    [facility]  Central Access [phone]  [address] [place], [place], [address]  *by appointment only    [facility]  [phone] (phone consultation available )  Locations in: [place], [place], [facility], and [place], [place]  Belgian virtual IOP [phone] or visit [email or web address]   Also offers LGBTQ programming     [facility]  [phone]  [address]  [place], [place], [address]  *Currently only offered via telehealth - call to set up an appointment    [facility]  [address] [place] [address], [address]  [facility]  For more information to to make a referral call:  [phone]  Walk-in on   9-11 a.m.    [facility]  [phone]  [address] [place], [place], [address]  *available by appointments only    [facility]  [phone]  [address] [place], [place], [address]  *available by appointment only    [facility]  [phone]  [address] [place], [place], [address]  *walk in assessments available M-F  starting at 7 am.    [facility]  [phone]  Locations: [place], [facility], [facility], and [place]  *Walk in assessments availble M-F starting at 8 am -virtual only    [facility]  [phone]  [address] [place] [address]    [facility]  Virtual + Locations: [place], [place], [place], [place], [facility], [place], [place], [place]   [phone]  *available by appointment only    [facility]  [phone]  [address]  [place], [place], [address]    [facility]  [phone]  [address] [place], [address]  *available by appointment    [facility]  [phone]  [address] [place], [place], [address]  *walk ins available M-TH from 9-3    [facility] program: [phone]  [address] [place], [place], [address]    [place]  [phone]  Same day substance use disorder assessments are available Monday - Friday, via walk-in or by appointment at the [place] location.  [address], [address], [place] [address]     [facility] - adolescent and adult SUDs services  [phone]  Offer services Monday through Friday, as well as evening hours Monday through Thursday. Normally, a first appointment will be scheduled within one week  [email or web address]  Locations all over [place]    Additional Resources:    [place] [facility] ([facility])  [facility] connects people seeking recovery to resources that help foster and sustain long-term recovery. Whether you are seeking resources for treatment, transportation, housing, job training, education, health care or other pathways to recovery, [facility] is a great place to start.  Phone: [phone]. www.[employer].iList (Great listing of all types of recovery and non-recovery related resources)    Alcoholics Anonymous  Phone: 1-800-ALCOHOL  Website:  HTTP://WWW.AA.ORG/  [facility] [place] (729-527-9372 or http://a[place].org)  [facility] [place] (281-135-6547 or www.aastpaul.org)     Narcotics Anonymous  Phone: 480.680.6982  Website: www.[employer].    [facility] [address], [place], [place],  Phone: [phone]  Drop-in Hours: Monday-Friday 9-11:30 am. By appointment at other times.  Provides: [facility] is a drop-in center on the [place] side of [place] that provides a safe space for individuals who are homeless and have a history of chemical use. Sobriety is not a requirement but drugs and alcohol are not allowed on the property.  Services: Non-clients can access drop-in services such as Recovery and Harm Reduction Groups, referrals to case management, community activities, shower facilities, and a pool table. Individuals who are homeless and have chemical health needs may be eligible for enrollment into [facility]'s case management program. Clients and  work together to access benefits, treatment, health care, shelter, and external housing resources.

## 2023-11-25 NOTE — ED NOTES
Patient was signed out to me at the end of my partner shift with him being set up for crisis placement.  Please see the behavioral note as well but at this time no crisis placement can be found and the patient will be kept here in the ER and observation status with medications adjusted to see if we can decrease his hallucinations prior to being placed in a crisis bed.    Orders Placed This Encounter   Procedures    [facility] ([facility]) Assessment Consult Order:    Urine Drug Screen       Medications   QUEtiapine (SEROquel) tablet 50 mg (50 mg Oral $Given 11/25/23 1026)   OLANZapine zydis (zyPREXA) ODT tab 10 mg (10 mg Oral $Given 11/25/23 3798)      Seroquel BID was initiated.      Working diagnoses:   Hallucinations     Jason Dietrich MD, Jason Perez MD  11/25/23 1429       Jason Dietrich MD  11/25/23 1427

## 2023-11-25 NOTE — CONSULTS
Diagnostic Evaluation Consultation  Crisis Assessment    Patient Name: Hamzah Roberts  Age:  38 year old  Legal Sex: male  Gender Identity: male  Pronouns:   Race: White  Ethnicity: Not  or   Language: English      Patient was assessed: In person      Patient location: [facility] EMERGENCY DEPARTMENT                             [facility]    Referral Data and Chief Complaint  Hamzah Roberts presents to the ED with family/friends. Patient is presenting to the ED for the following concerns: Substance use.       Factors that make the mental health crisis life threatening or complex are:  Patient reports that he asked a friend to bring him to the ED. He experiences auditory hallucinations at least daily. He hears noises and cars flying over his head.  The auditory hallucinations are persistent, loud and distressing. He denies any command hallucinations. The hallucinations are in the context of methamphetamine use. The hallucinations are also present when sober, but to a lesser extend. He has been smoking methamphetamine at least daily until 3 days ago. He entered outpatient substance use treatment earlier this month but left the program as he was too much triggered by other patients using substances while in treatment. He was sober for a brief period but relapsed to methamphetamine. He denies other use of substances. He endorses sleeping difficulties, being worried and anxious. He is not taking any prescribed medications. He denies suicidal thoughts. He denies homicidal ideation. He is currently under a civil comment. He is also under probation for substance use possession in his car and has a . He is motivated to enter an inpatient substance use treatment program. He does not have an updated substance use (Rule 25) assessment. Patient finds it helpful to use zyprexa for the hallucinations. He states that risperadol is not helpful..      Informed Consent and Assessment  Methods  Explained the crisis assessment process, including applicable information disclosures and limits to confidentiality, assessed understanding of the process, and obtained consent to proceed with the assessment.  Assessment methods included conducting a formal interview with patient, review of medical records, collaboration with medical staff, and obtaining relevant collateral information from family and community providers when available.  : done     Patient response to interventions: eager to participate  Coping skills were attempted to reduce the crisis:  Patient asked his friend to bring him to the ED     History of the Crisis   Patient is currently under a civil commitment for substance use disorder. He is unclear if the commitment includes mental health and a Son Order. Medical records indicate a prior mental health diagnosis of Schizophrenia and substance use disorder. Patient has a hx of several inpatient substance use treatment. Hx of leaving/incomplete CD treatment. Patient has hx of a sober friend and a sister as support. Most recent treatment at [facility] and twice at [facility]. The patient is not allowed back at [facility] (per patient's report)    Brief Psychosocial History  Family:  Single, Children no  Support System:  Other (specify) (NA/AA sponsor)  Employment Status:  unemployed  Source of Income:  disability  Financial Environmental Concerns:  unable to afford rent/mortgage, unemployed  Current Hobbies:  music, exercise/fitness, sports/team sports  Barriers in Personal Life:   (substance use)    Significant Clinical History  Current Anxiety Symptoms:  anxious, excessive worry, racing thoughts  Current Depression/Trauma:  excessive guilt, hopelessness, helplessness, withdrawl/isolation  Current Somatic Symptoms:  anxious  Current Psychosis/Thought Disturbance:  auditory hallucinations, visual hallucinations, impulsive, high risk behavior  Current Eating Symptoms:  loss of  "appetite  Chemical Use History:  Alcohol: None  Benzodiazepines: None  Opiates: None  Cocaine: None  Marijuana: Daily  Other Use: Methamphetamines (Smoking)  Last Use:: 11/23/23  Withdrawal Symptoms: Hallucinations   Past diagnosis:  Anxiety Disorder, Bipolar Disorder, Depression, Substance Use Disorder, Schizophrenia  Family history:  No known history of mental health or chemical health concerns  Past treatment:  Individual therapy, Case management, Psychiatric Medication Management, Primary Care, Civil Commitment, Probation/Court ordered treatment  Details of most recent treatment:  Completed [facility] treatment in Sept 2023. Patient left the most recent CD program in Nov 2023.  Other relevant history:  Patient in distress by ongoing psychosis. Patient is estranged from family members.       Collateral Information  Is there collateral information: Yes     Collateral information name, relationship, phone number:  Angie Bowman, patient's sister @ [phone]    What happened today: Angie reports that she is afraid for her brother. She states \"this time things are really bad for him\", and \"I'm afraid for him\". she is concerned for his substance use and that he needs treatment.     What is different about patient's functioning: He has been sober. He talked about suicide and said he was just going to lay down in the street and let cars run him over.     Concern about alcohol/drug use:  Yes.     What do you think the patient needs:  he needs treatment.     Has patient made comments about wanting to kill themselves/others: yes    If d/c is recommended, can they take part in safety/aftercare planning:  yes    Additional collateral information:  Angie is open to anyone calling her for collateral information.     Risk Assessment  [place] Suicide Severity Rating Scale Full Clinical Version: 11/25/2023     [place] Suicide Severity Rating Scale Recent:   Suicidal Ideation (Recent)  Q1 Wished to be Dead (Past Month): " no  Q2 Suicidal Thoughts (Past Month): no  Intensity of Ideation (Recent)  Deterrents (Past 1 Month): Does not apply  Reasons for Ideation (Past 1 Month): Does not apply  Suicidal Behavior (Recent)  Actual Attempt (Past 3 Months): No  Total Number of Actual Attempts (Past 3 Months): 0  Has subject engaged in non-suicidal self-injurious behavior? (Past 3 Months): No  Interrupted Attempts (Past 3 Months): No  Total Number of Interrupted Attempts (Past 3 Months): 0  Aborted or Self-Interrupted Attempt (Past 3 Months): No  Total Number of Aborted or Self-Interrupted Attempts (Past 3 Months): 0  Preparatory Acts or Behavior (Past 3 Months): No  Total Number of Preparatory Acts (Past 3 Months): 0    Environmental or Psychosocial Events: legal issues such as DWI, DUI, lawsuit, CPS involvement, etc., work or task failure, challenging interpersonal relationships, geographic isolation from supports, unemployment/underemployment, impulsivity/recklessness, helplessness/hopelessness, unstable housing, homelessness, ongoing abuse of substances  Protective Factors: Protective Factors: sense of importance of health and wellness, able to access care without barriers, help seeking, supportive ongoing medical and mental health care relationships, good problem-solving, coping, and conflict resolution skills, sense of self-efficacy and/or positive self-esteem, optimistic outlook - identification of future goals    Does the patient have thoughts of harming others? Feels Like Hurting Others: no  Previous Attempt to Hurt Others: no  Is the patient engaging in sexually inappropriate behavior?: no    Is the patient engaging in sexually inappropriate behavior?  no        Mental Status Exam   Affect: Appropriate  Appearance: Appropriate, Other (please comment) (tired and sleepy)  Attention Span/Concentration: Attentive  Eye Contact: Variable    Fund of Knowledge: Appropriate   Language /Speech Content: Fluent  Language /Speech Volume:  Normal  Language /Speech Rate/Productions: Normal  Recent Memory: Intact  Remote Memory: Intact  Mood: Anxious, Depressed  Orientation to Person: Yes   Orientation to Place: Yes  Orientation to Time of Day: Yes  Orientation to Date: Yes     Situation (Do they understand why they are here?): Yes  Psychomotor Behavior: Normal  Thought Content: Hallucinations, Delusions  Thought Form: Flight of Ideas, Intact     Mini-Cog Assessment  Number of Words Recalled:    Clock-Drawing Test:     Three Item Recall:    Mini-Cog Total Score:       Medication  Psychotropic medications:   Medication Orders - Psychiatric (From admission, onward)      Start     Dose/Rate Route Frequency Ordered Stop    11/25/23 1010  QUEtiapine (SEROquel) tablet 50 mg         50 mg Oral 2 TIMES DAILY 11/25/23 1009               Current Care Team  Patient Care Team:  No Ref-Primary, Physician as PCP - Brandon Gunn as Nursing Assistant    Diagnosis  Patient Active Problem List   Diagnosis Code    Suicidal ideation R45.851    Polysubstance abuse (H) F19.10    Paranoid schizophrenia (H) F20.0    Paranoia (H) F22    Methamphetamine abuse (H) F15.10    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    Amphetamine use disorder, severe, 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    Anxiety F41.9    Episodic mood disorder (H24) F39    Gastroesophageal reflux disease K21.9    Left ankle pain, unspecified chronicity M25.572    Psychosis, atypical (H) F29    Recurrent genital herpes A60.00    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    Hallucinations R44.3    Encounter for medication refill Z76.0    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    Methamphetamine use disorder, moderate, in sustained remission, in controlled environment, dependence (H) F15.21    Methamphetamine use disorder, severe (H) F15.20    Schizoaffective disorder, bipolar type (H) F25.0     Schizoaffective disorder, chronic condition with acute exacerbation (H) F25.9    Schizoaffective disorder, depressive type (H) F25.1    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 He reports daily use of methamphetamine for the past 2 weeks with last use on 11/23/2023. He entered  treatment (outpatient) in mid-November 2023 and left the program prior to completion. He was sober for a brief period and relapsed to methamphetamine. Patient has daily hallucinations which are more severe following use of methamphetamine. He reports a depressed and anxious mood. He is motivated to be sober, but the recent outpatient program was not helpful. He does not have a current substance use assessment. Patient is not currently taking prescribed medications. He denies suicidal ideation and non-suicidal self-harm. Patient denies homicidal ideation. He is currently under a civil commitment for substance use. Patient is currently under probation for possession of substances.     He prefers a crisis stablization treatment program and treatment with zyprexa. Dr Nam treated the patient with zyprexa and he reported improved symptoms. He is motivated to be admitted to the crisis stabilization program, to improve current hallucinations. Patient is familiar with substance use assessment programs (walk-in). In consultation with Dr Dietrich, the patient is assessed as medically appropriate for discharge to a crisis stabilization program.      Addendum  There were not available crisis bed.  In consultation with Dr Dietrich, the patient will remain overnight in Observation status, to monitor the hallucinations.          Patient coping skills attempted to reduce the crisis:  Patient asked his friend to bring him to the ED    Disposition  Recommended disposition:          Reviewed case and recommendations with attending provider. Attending Name: Dr Jason Dietrich       Attending concurs with disposition: yes       Patient and/or validated legal guardian concurs with disposition:   yes       Final disposition:   Observation     Legal status on admission:   N.A.     Assessment Details   Total duration spent with the patient: 30 min     CPT code(s) utilized: 82284 - Psychotherapy for Crisis - 60 (30-74*) min    LYN Scanlon, [facility], Psychotherapist  DEC - Triage & Transition Services  Callback: [phone]

## 2023-11-25 NOTE — ED NOTES
Hamzah Roberts  November 25, 2023  Plan of Care Hand-off Note     Patient Care Path: Observation     Plan for Care:   The patient presents with recent methamphetamine use, hallucinations and depression. He reports daily use of methamphetamine for the past 2 weeks with last use on 11/23/2023. He entered CD treatment (outpatient) in mid-November 2023 and left the program prior to completion. He was sober for a brief period and relapsed to methamphetamine. Patient has daily hallucinations which are more severe following use of methamphetamine. He reports a depressed and anxious mood. He is motivated to be sober, but the recent outpatient program was not helpful. He does not have a current substance use assessment. Patient is not currently taking prescribed medications. He denies suicidal ideation and non-suicidal self-harm. Patient denies homicidal ideation. He is currently under a civil commitment for substance use. Patient is currently under probation for possession of substances.     He prefers a crisis stablization treatment program and treatment with zyprexa. Dr Nam treated the patient with zyprexa and he reported improved symptoms. He is motivated to be admitted to the crisis stabilization program, to improve current hallucinations. Patient is familiar with substance use assessment programs (walk-in). In consultation with Dr Dietrich, the patient is assessed as medically appropriate for discharge to a crisis stabilization program.     Addendum  There were not available crisis bed.  In consultation with Dr Dietrich, the patient will remain overnight in Observation status, to monitor the hallucinations.         Identified Goals and Safety Issues:  Treatment, monitor symptoms of hallucinations, stabilization.     Overview:       Legal Status:  Voluntary     Psychiatry Consult: N.A        Updated   regarding plan of care.  LYN Luis, [facility], Psychotherapist

## 2023-11-26 PROCEDURE — 250N000013 HC RX MED GY IP 250 OP 250 PS 637: Performed by: EMERGENCY MEDICINE

## 2023-11-26 PROCEDURE — G0378 HOSPITAL OBSERVATION PER HR: HCPCS

## 2023-11-26 RX ADMIN — QUETIAPINE FUMARATE 50 MG: 50 TABLET ORAL at 21:03

## 2023-11-26 RX ADMIN — OLANZAPINE 10 MG: 10 TABLET, ORALLY DISINTEGRATING ORAL at 21:03

## 2023-11-26 ASSESSMENT — ACTIVITIES OF DAILY LIVING (ADL)
ADLS_ACUITY_SCORE: 35

## 2023-11-26 NOTE — ED NOTES
Pt arrived to the [facility]. Oriented to the unit. Belongings locked in locker. Pt appears calm and cooperative. Pt endorses AH states he is hearing screaming. Denies any SI/HI. Pt medication compliant. VSS. Pt went to sleep right after assessment awoke only to eat dinner. No behaviors noted pt resting.

## 2023-11-26 NOTE — ED NOTES
Anticipated Starting Dosage (Optional): 40mg Daily Patient uncooperative at times with choosing not to take AM medication, as scheduled, and vital signs. Patient somewhat irritable, slept during this AM shift, awake for meals with good appetite ate 100% and drank fluids.    Detail Level: Zone Patient Reported Weight (Optional - Include Units): 170

## 2023-11-26 NOTE — ED NOTES
Patient slept through the shift.Patient was awake couple times to use the bathroom and requested snacks.Patient seemed calm and cooperative.No behaviors noted. Will continue to monitor.

## 2023-11-27 VITALS
SYSTOLIC BLOOD PRESSURE: 116 MMHG | HEART RATE: 85 BPM | OXYGEN SATURATION: 99 % | RESPIRATION RATE: 18 BRPM | TEMPERATURE: 98.1 F | DIASTOLIC BLOOD PRESSURE: 75 MMHG

## 2023-11-27 PROCEDURE — 99207 PR NO CHARGE LOS: CPT | Performed by: NURSE PRACTITIONER

## 2023-11-27 PROCEDURE — 250N000013 HC RX MED GY IP 250 OP 250 PS 637: Performed by: EMERGENCY MEDICINE

## 2023-11-27 PROCEDURE — G0378 HOSPITAL OBSERVATION PER HR: HCPCS

## 2023-11-27 PROCEDURE — 80307 DRUG TEST PRSMV CHEM ANLYZR: CPT | Performed by: EMERGENCY MEDICINE

## 2023-11-27 RX ORDER — OLANZAPINE 10 MG/1
10 TABLET ORAL AT BEDTIME
Qty: 10 TABLET | Refills: 0 | Status: SHIPPED | OUTPATIENT
Start: 2023-11-27 | End: 2024-08-25

## 2023-11-27 RX ORDER — QUETIAPINE FUMARATE 50 MG/1
50 TABLET, FILM COATED ORAL 2 TIMES DAILY
Qty: 20 TABLET | Refills: 0 | Status: SHIPPED | OUTPATIENT
Start: 2023-11-27

## 2023-11-27 ASSESSMENT — ACTIVITIES OF DAILY LIVING (ADL)
ADLS_ACUITY_SCORE: 35

## 2023-11-27 ASSESSMENT — COLUMBIA-SUICIDE SEVERITY RATING SCALE - C-SSRS
6. HAVE YOU EVER DONE ANYTHING, STARTED TO DO ANYTHING, OR PREPARED TO DO ANYTHING TO END YOUR LIFE?: NO
2. HAVE YOU ACTUALLY HAD ANY THOUGHTS OF KILLING YOURSELF?: NO
TOTAL  NUMBER OF INTERRUPTED ATTEMPTS SINCE LAST CONTACT: NO
1. SINCE LAST CONTACT, HAVE YOU WISHED YOU WERE DEAD OR WISHED YOU COULD GO TO SLEEP AND NOT WAKE UP?: NO
SUICIDE, SINCE LAST CONTACT: NO
TOTAL  NUMBER OF ABORTED OR SELF INTERRUPTED ATTEMPTS SINCE LAST CONTACT: NO
ATTEMPT SINCE LAST CONTACT: NO

## 2023-11-27 NOTE — PROGRESS NOTES
Triage & Transition Services, Extended Care     Client Name: Hamzah Roberts    Date: November 27, 2023    Service Type: excused from group session (asleep)  Session Start Time:  1135    Session End Time: 1135  Session Length: 0  Site Location: [facility] EMERGENCY DEPARTMENT                             [id number]  Total Number ofAttendees: 0  Topic: relaxation techniques   Response: (asleep)     NAANDA Wright   Licensed Mental Health Professional (LMHP), Extended Care  [phone]

## 2023-11-27 NOTE — ED NOTES
[facility] ED Mental Health Handoff Note:       Brief HPI:  This is a 38 year old male signed out to me.  See initial ED Provider note for full details of the presentation. Interval history is pertinent for no events.    Home meds reviewed and ordered/administered: Yes    Medically stable for inpatient mental health admission: Yes.    Evaluated by mental health: Yes    Safety concerns: At the time I received sign out, there were no safety concerns.    Hold Status:  Active Orders   N/A           Exam:   Patient Vitals for the past 24 hrs:   BP Temp Temp src Pulse Resp SpO2   11/26/23 1846 116/75 98.1  F (36.7  C) Oral 85 16 99 %       General: Patient is in no acute distress and is resting comfortably.  HEENT: Normocephalic atraumatic  Neck: Supple  Cardiovascular: Heart rate normal  Pulmonary: Patient is in no respiratory distress  Extremities: No signs of any significant or life-threatening trauma.  Neurologic: No new focal neurologic deficits.      ED Course:    Medications   QUEtiapine (SEROquel) tablet 50 mg (50 mg Oral Not Given 11/26/23 0941)   OLANZapine zydis (zyPREXA) ODT tab 10 mg ( Oral Canceled Entry 11/25/23 2200)   OLANZapine zydis (zyPREXA) ODT tab 10 mg (10 mg Oral $Given 11/25/23 0407)            There were no significant events during my shift.    Patient was signed out to the oncoming provider.      Impression:    ICD-10-CM    1. Hallucinations  R44.3           Plan:    Patient is been accepted to crisis residence ([facility]), but they will not be able to take patient until tomorrow.  Plan for patient to remain here overnight, and discharged there in the morning.      RESULTS:   No results found for this visit on 11/25/23 (from the past 24 hour(s)).          KENNEY Carolina Brendan, PA-C  11/26/23 2036

## 2023-11-27 NOTE — ED PROVIDER NOTES
I received report on this patient and will take over carte at 7:30 am.8:11 am patient refused his am medications.Patient refused morning medications.Observed with soft clear speech and alert x 4. 8:42 am patient moved to back lounge area.9:06 am [facility]12:14 MD was just in to see him and plans are to get some out patient med's and send him to [address] [address] Observed with steady even gait and balance and clear speech.Patient should be leaving very soon,

## 2023-11-27 NOTE — ED NOTES
Appearance: Pt awake, alert & oriented to person, place & time. Pt in no acute distress at present time. Pt is clean and well groomed with clothes appropriately fastened.   Skin: Skin cool and clammy. Color consistent with ethnicity. Mucous membranes moist. No breakdown or brusing noted.   Musculoskeletal: Patient moving all extremities well, no obvious swelling or deformities noted.   Respiratory: Respirations spontaneous, even, and non-labored. Visible chest rise noted. Airway is open and patent. No accessory muscle use noted.   Neurologic: Sensation is intact. Speech is clear and appropriate. Eyes open spontaneously, behavior appropriate to situation, follows commands, facial expression symmetrical, bilateral hand grasp equal and even, purposeful motor response noted.  Cardiac: All peripheral pulses present. No Bilateral lower extremity edema. Cap refill is <3 seconds. Pt denies active chest pains, SOB, dizziness, blurred vision, weakness or fatigue at this time.   Abdomen: Abdomen soft, non-tender to palpation. + nausea and vomiting.  : Pt reports no dysuria or hematuria.         Patient cooperative. Good appetite. Makes needs well known to staff.

## 2023-11-27 NOTE — ED PROVIDER NOTES
"ED Observation Discharge Summary  [facility]  Discharge Date: 11/27/2023    Hamzah Roberts MRN: [id number]   Age: 38 year old YOB: 1985     Brief HPI & Initial ED Course     Chief Complaint   Patient presents with    Hallucinations     Pt c/o \"cars flying every where and a lot of stuff.\"      HPI  Hamzah Roberts is a 38 year old male with PMH notable for schizophrenia and substance abuse (methamphetamine) who presented to the ED with a psychiatric concern.  Initially denied but then later confirmed that he had relapsed using methamphetamine and was having hallucinations.      The patient was evaluated in the emergency department by a physician and DEC behavioral health . The DEC  recommended admission to observation to allow for a period of stabilization by reinitiating medications and allowing metabolism of intoxicating substance (methamphetamine). See separate DEC note from this encounter for details on the assessment. The patient's psychiatric state was such that he would benefit from ongoing monitoring. Observation care was initiated with the plan including serial assessments of psychiatric condition, potential administration of medications if indicated, and further disposition pending the patient's psychiatric course during the monitoring period.     See ED Observation H&P for further details on the patient's presenting history and initial evaluation.     Physical Exam   BP: 91/54  Pulse: 92  Temp: 98.6  F (37  C)  Resp: 12  SpO2: 99 %    Physical Exam  General: . Appears stated age.   HENT: MMM, no oropharyngeal lesions  Eyes: PERRL, normal sclerae   Cardio: Regular rate, extremities well perfused  Resp: Normal work of breathing, normal respiratory rate  Neuro: alert and fully oriented. CN II-XII grossly intact. Grossly normal strength and sensation in all extremities.   MSK: no deformities.   Integumentary/Skin: no rash visualized, normal color  Psych: " Flat affect, calm behavior.  Denies SI.  Denies HI.  Denies any current hallucinations. Thought process logical. Insight fair.     Results      Procedures                    Labs Ordered and Resulted from Time of ED Arrival to Time of ED Departure   COMPREHENSIVE METABOLIC PANEL - Abnormal       Result Value    Sodium 138      Potassium 3.8      Carbon Dioxide (CO2) 27      Anion Gap 6 (*)     Urea Nitrogen 9.0      Creatinine 0.98      GFR Estimate >90      Calcium 8.8      Chloride 105      Glucose 89      Alkaline Phosphatase 62      AST 18      ALT 14      Protein Total 6.1 (*)     Albumin 4.1      Bilirubin Total 0.4     URINE DRUG SCREEN PANEL - Abnormal    Amphetamines Urine Screen Positive (*)     Barbituates Urine Screen Negative      Benzodiazepine Urine Screen Negative      Cannabinoids Urine Screen Negative      Cocaine Urine Screen Negative      Fentanyl Qual Urine Screen Negative      Opiates Urine Screen Negative      PCP Urine Screen Negative     CBC WITH PLATELETS AND DIFFERENTIAL    WBC Count 5.4      RBC Count 4.77      Hemoglobin 15.5      Hematocrit 43.3      MCV 91      MCH 32.5      MCHC 35.8      RDW 12.6      Platelet Count 217      % Neutrophils 40      % Lymphocytes 46      % Monocytes 11      % Eosinophils 3      % Basophils 0      % Immature Granulocytes 0      NRBCs per 100 WBC 0      Absolute Neutrophils 2.2      Absolute Lymphocytes 2.4      Absolute Monocytes 0.6      Absolute Eosinophils 0.2      Absolute Basophils 0.0      Absolute Immature Granulocytes 0.0      Absolute NRBCs 0.0              Observation Course   The patient was found to have a psychiatric condition that would benefit from an observation stay in the emergency department for further psychiatric stabilization and/or coordination of a safe disposition. The plan upon observation admission included serial assessments of psychiatric condition, potential administration of medications if indicated, further disposition  pending the patient's psychiatric course during the monitoring period.     Serial assessments of the patient's psychiatric condition were performed. Nursing notes were reviewed. During the observation period, the patient did require medications for agitation, and did not require restraints/seclusion for patient and/or provider safety.        After the period in observation care, the patient's circumstances and mental state were safe for outpatient management.  Patient's hallucinations resolved, he took his medications without difficulty and reported feeling substantially better.  He was deemed appropriate for crisis residence placement and a bed was located at [facility].  They request that a 10-day supply of his medications be sent to him and so those prescriptions were sent to the pharmacy.  After counseling on the diagnosis, work-up, and treatment plan, the patient was discharged. Close follow-up with a psychiatrist and/or therapist was recommended and community psychiatric resources were provided. Patient is to return to the ED if any urgent or potentially life-threatening concerns.  He was discharged to the crisis residence.    Discharge Diagnoses:   Final diagnoses:   Hallucinations       --  Damian Meehan MD  [facility] EMERGENCY DEPARTMENT  11/27/2023      Damian Meehan MD  11/27/23 1218

## 2023-11-27 NOTE — PROGRESS NOTES
"Triage and Transition Services Extended Care Reassessment     Patient: Hamzah goes by \"Hamzah,\" uses he/him pronouns  Date of Service: November 27, 2023  Site of Service: [facility] EMERGENCY DEPARTMENT                               Patient was seen yes  Mode of Assessment: In person BEC15X     Reason for Reassessment: other (see comment) (reassessment for safety and coordinations of care with crisis residences)    History of Patient's Original Emergency Room Encounter: Patient is currently under a civil commitment for substance use disorder. He is unclear if the commitment includes mental health and a Son Order. Medical records indicate a prior mental health diagnosis of Schizophrenia and substance use disorder. Patient has a hx of several inpatient substance use treatment. Hx of leaving/incomplete CD treatment. Patient has hx of a sober friend and a sister as support. Most recent treatment at [facility] and twice at [facility]. The patient is not allowed back at [facility] (per patient's report)    Current Patient Presentation: Patient is alert and orientated x4. Patient was tired and remained laying down during assessment but was able to engage and track conversation appropriately. Patient denies symptoms of psychosis and does not appear to be responding to internal stimuli. Patient denies SI/HI/NSSIB. Patient engaged in reviewing safety planning, scheduling outpatient services including psychiatry appointment and crisis residence. Patient requested support accessing care at crisis residence.    Changes Observed Since Initial Assessment: decrease in presenting symptoms    Therapeutic Interventions Provided: Engaged in safety planning    Mental Status Exam   Affect: Blunted  Appearance: Appropriate  Attention Span/Concentration: Attentive  Eye Contact: Engaged    Fund of Knowledge: Appropriate   Language /Speech Content: Fluent  Language /Speech Volume: Normal  Language /Speech Rate/Productions: " Minimally Responsive  Recent Memory: Intact  Remote Memory: Intact  Mood: Normal  Orientation to Person: Yes   Orientation to Place: Yes  Orientation to Time of Day: Yes  Orientation to Date: Yes     Situation (Do they understand why they are here?): Yes  Psychomotor Behavior: Normal  Thought Content: Clear  Thought Form: Intact    Treatment Objective(s) Addressed: safety planning, identifying an appropriate aftercare plan, assessing safety    Patient Response to Interventions: verbalizes understanding, acceptance expressed    Progress Towards Goals:  Patient Reports Symptoms Are: stable  Patient Progress Toward Goals: is making progress  Comment: Patient denies SI/HI/NSSIB. Patient reports significant decrease in hallucinations. Patient actively engaged in safety planning and requested support accessing a crisis residence.    Case Management: Summary of Interaction: Coordinated with [facility] where patient was accepted for open bed.    C-SSRS Since Last Contact: 11/27/2023  Suicidal Ideation (Since Last Contact)  1. Wish to be Dead (Since Last Contact): No  2. Non-Specific Active Suicidal Thoughts (Since Last Contact): No  Suicidal Behavior (Since Last Contact)  Actual Attempt (Since Last Contact): No  Has subject engaged in non-suicidal self-injurious behavior? (Since Last Contact): No  Interrupted Attempts (Since Last Contact): No  Aborted or Self-Interrupted Attempt (Since Last Contact): No  Preparatory Acts or Behavior (Since Last Contact): No  Suicide (Since Last Contact): No     C-SSRS Risk (Since Last Contact)  Calculated C-SSRS Risk Score (Since Last Contact): No Risk Indicated     Plan: Discharge to [facility]   Patient has been accepted to [facility]  After brief therapeutic intervention, observation, and aftercare planning by Extended Care and in consultation with attending provide, the patient's initial crisis appears to have resolved and patients current mental state  is appropriate for outpatient management.  It is the recommendation of this clinician that pt discharge to [facility] and follow up with outpatient services. At this time the pt is not presenting as an acute risk to self or others due to the following factors: Denied SI/HI/NSSIB, able to contract for safety, willing and wanting to discharge to [facility] residence, willing and wanting to engage in outpatient care.    Plan for Care reviewed with assigned Medical Provider: yes (Dr. Meehan)  Plan for Care Team Review: RN    Legal Status: Legal Status at Admission: Voluntary/Patient has signed consent for treatment    Session Status: Time session started: 0846  Time session ended: 0902  Session Duration (minutes): 16 minutes  Anticipated number of sessions or this episode of care: 1    Session Start Time: 0846  Session Stop Time: 0902  CPT codes: 27605 - Psychotherapy (with patient) - 30 (16-37*) min  Time Spent: 16 minutes      CPT code(s) utilized: 43219 - Psychotherapy (with patient) - 30 (16-37*) min    Diagnosis:   Patient Active Problem List   Diagnosis Code    Suicidal ideation R45.851    Polysubstance abuse (H) F19.10    Paranoid schizophrenia (H) F20.0    Paranoia (H) F22    Methamphetamine abuse (H) F15.10    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    Amphetamine use disorder, severe, 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    Anxiety F41.9    Episodic mood disorder (H24) F39    Gastroesophageal reflux disease K21.9    Left ankle pain, unspecified chronicity M25.572    Psychosis, atypical (H) F29    Recurrent genital herpes A60.00    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    Hallucinations R44.3    Encounter for medication refill Z76.0    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    Methamphetamine use disorder, moderate, in sustained remission, in controlled environment, dependence (H) F15.21     Methamphetamine use disorder, severe (H) F15.20    Schizoaffective disorder, bipolar type (H) F25.0    Schizoaffective disorder, chronic condition with acute exacerbation (H) F25.9    Schizoaffective disorder, depressive type (H) F25.1    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.30, S09.90XS    Auditory hallucinations R44.0    Substance-induced psychotic disorder with hallucinations (H) F19.951    Altered mental status, unspecified altered mental status type R41.82       Primary Problem This Admission: Active Hospital Problems    Schizoaffective disorder, depressive type (H)      *Hallucinations      Amphetamine use disorder, severe, dependence (H)        ANANDA Healy   Licensed Mental Health Professional (LMHP), [facility]  [phone]

## 2023-11-27 NOTE — CONSULTS
Attempted to meet with pt for CD Consult and was informed by [facility] staff that consult is no longer needed as pt is discharging to [facility].    If pt has active insurance and wishes to he may schedule an assessment on an outpatient basis by calling [facility] at [phone].     CHRIS Romo, [facility]  Substance Use Disorder Evaluation Counselor  Email: [email or web address]

## 2023-11-30 ENCOUNTER — HOSPITAL ENCOUNTER (EMERGENCY)
Facility: HOSPITAL | Age: 38
Discharge: HOME | End: 2023-12-01
Attending: EMERGENCY MEDICINE | Admitting: EMERGENCY MEDICINE
Payer: COMMERCIAL

## 2023-11-30 ENCOUNTER — HOSPITAL ENCOUNTER (EMERGENCY)
Facility: CLINIC | Age: 38
Discharge: PSYCHIATRIC OR SUBSTANCE USE FACILITY | End: 2023-11-30
Attending: EMERGENCY MEDICINE | Admitting: EMERGENCY MEDICINE
Payer: COMMERCIAL

## 2023-11-30 VITALS
SYSTOLIC BLOOD PRESSURE: 144 MMHG | TEMPERATURE: 98.2 F | DIASTOLIC BLOOD PRESSURE: 96 MMHG | OXYGEN SATURATION: 97 % | RESPIRATION RATE: 112 BRPM | HEART RATE: 115 BPM

## 2023-11-30 VITALS
HEIGHT: 63 IN | RESPIRATION RATE: 18 BRPM | HEART RATE: 98 BPM | DIASTOLIC BLOOD PRESSURE: 86 MMHG | SYSTOLIC BLOOD PRESSURE: 142 MMHG | OXYGEN SATURATION: 97 % | TEMPERATURE: 98.1 F | BODY MASS INDEX: 27.59 KG/M2 | WEIGHT: 155.7 LBS

## 2023-11-30 DIAGNOSIS — R44.0 AUDITORY HALLUCINATIONS: ICD-10-CM

## 2023-11-30 DIAGNOSIS — F19.10 POLYSUBSTANCE ABUSE (H): ICD-10-CM

## 2023-11-30 DIAGNOSIS — F15.10 METHAMPHETAMINE USE (H): ICD-10-CM

## 2023-11-30 PROCEDURE — 99284 EMERGENCY DEPT VISIT MOD MDM: CPT | Performed by: EMERGENCY MEDICINE

## 2023-11-30 PROCEDURE — 99282 EMERGENCY DEPT VISIT SF MDM: CPT | Performed by: EMERGENCY MEDICINE

## 2023-11-30 PROCEDURE — 99283 EMERGENCY DEPT VISIT LOW MDM: CPT

## 2023-11-30 RX ORDER — OLANZAPINE 5 MG/1
10 TABLET ORAL ONCE
Status: COMPLETED | OUTPATIENT
Start: 2023-12-01 | End: 2023-12-01

## 2023-11-30 ASSESSMENT — ACTIVITIES OF DAILY LIVING (ADL): ADLS_ACUITY_SCORE: 35

## 2023-11-30 NOTE — ED TRIAGE NOTES
Seeking detox from fentanyl and meth, last use one hour ago. Visual and auditory hallucinations

## 2023-12-01 PROCEDURE — 250N000013 HC RX MED GY IP 250 OP 250 PS 637: Performed by: EMERGENCY MEDICINE

## 2023-12-01 RX ORDER — OLANZAPINE 10 MG/1
10 TABLET ORAL AT BEDTIME
Qty: 30 TABLET | Refills: 0 | Status: SHIPPED | OUTPATIENT
Start: 2023-12-01 | End: 2024-07-17

## 2023-12-01 RX ADMIN — OLANZAPINE 10 MG: 5 TABLET, FILM COATED ORAL at 00:11

## 2023-12-01 ASSESSMENT — ACTIVITIES OF DAILY LIVING (ADL): ADLS_ACUITY_SCORE: 35

## 2023-12-01 NOTE — ED PROVIDER NOTES
EMERGENCY DEPARTMENT ENCOUNTER      NAME: Hamzah Roberts  AGE: 38 year old male  YOB: 1985  MRN: [id number]  EVALUATION DATE & TIME: 11/30/2023 11:11 PM    PCP: Tierra Ref-Primary, Physician    ED PROVIDER: Yair Aguilar M.D.      Chief Complaint   Patient presents with    Hallucinations         FINAL IMPRESSION:  1. Auditory hallucinations    2. Methamphetamine use (H)          ED COURSE & MEDICAL DECISION MAKING:    Pertinent Labs & Imaging studies reviewed below.  All EKGs below represent my independent interpretation.   ED Course as of 12/01/23 0427   Fri Dec 01, 2023   0021 Patient is a 38-year-old gentleman with a history of methamphetamine use, hallucinations, here with 3 days of persistent hallucinations that are becoming bothersome.  No SI or HI.  Here in the emergency department he is mildly anxious, but mentating normally.  He reports being prescribed 10 mg of Zyprexa but has not been on this recently.  This was ordered.  Plan to observe here to see if he shows any symptomatic improvement.  Sx are not new, and are likely provoked by recent meth use. If pt feeling improved then I believe he be safe for discharge with refill of his prescription.  He is set up for treatment in a little over a week.   0102 Patient is feeling somewhat improved.  He would like to go.  Discharged with 30 days of Zyprexa         Additional ED Course Timestamps:  12:08 AM I met with patient for initial interview and encounter. We also discussed plan for treatment and diagnostic interventions.   1:06 AM Reevaluated and updated patient. We discussed plan for discharge as well as supportive cares at home and reasons for return to the ED including new or worsening symptoms. All questions and concerns addressed. Patient to be discharged by RN. They are agreeable and comfortable with plan.    Medical Decision Making    History:  Supplemental history from: N/A  External Record(s) reviewed: Outpatient Record: [facility] ED visit on  11/25-11/27    Work Up:  Chart documentation includes differential considered and any EKGs or imaging independently interpreted by provider, where specified.  In additional to work up documented, I considered the following work up: Documented in chart, if applicable.    External consultation:  Discussion of management with another provider: Documented in chart, if applicable    Complicating factors:  Care impacted by chronic illness: Mental Health  Care affected by social determinants of health: Access to Medical Care and Alcohol Abuse and/or Recreational Drug Use    Disposition considerations: Discharge. I prescribed additional prescription strength medication(s) as charted. See documentation for any additional details.    At the conclusion of the encounter I discussed the results of all of the tests and the disposition. The questions were answered. The patient or family acknowledged understanding and was agreeable with the care plan.     MEDICATIONS GIVEN IN THE EMERGENCY:  Medications   OLANZapine (zyPREXA) tablet 10 mg (10 mg Oral $Given 12/1/23 0011)       NEW PRESCRIPTIONS STARTED AT [facility]'S ER VISIT  Discharge Medication List as of 12/1/2023  1:06 AM        START taking these medications    Details   !! OLANZapine (ZYPREXA) 10 MG tablet Take 1 tablet (10 mg) by mouth at bedtime for 30 days, Disp-30 tablet, R-0, E-Prescribe       !! - Potential duplicate medications found. Please discuss with provider.        =================================================================    HPI    Patient information was obtained from: Patient    Use of : N/A     Hamzah Roberts is a 38 year old male with a pertinent history of paranoid schizophrenia, polysubstance abuse who presents to this ED for evaluation of hallucinations.     Per chart review, patient has been seen in the ED several times for similar complaints.  Most recently, he was seen at [facility] ED on 11/25-11/27 for visual hallucinations.  After the period  "and observation care, patient's circumstances and mental state were safe for outpatient management.  He was deemed appropriate for crisis residence placement and a bed was located at [facility].  Sent 10-day supply of his medications. After counseling on the diagnosis, work-up, and treatment plan, the patient was discharged. Close follow-up with a psychiatrist and/or therapist was recommended and community psychiatric resources were provided.      Patient reports about a week-long history of auditory hallucinations, stating that he is hearing voices and that he is hearing \"cars everywhere.\"  Historically, Zyprexa has helped significantly, but he is not taking anymore.  He also admits to recent meth use a few days ago which has also been a trigger for his hallucinations.  He is currently staying with a friend which is a safe place for him.  No SI/HI.     VITALS:  BP (!) 142/86   Pulse 98   Temp 98.1  F (36.7  C) (Oral)   Resp 18   Ht 1.6 m (5' 3\")   Wt 70.6 kg (155 lb 11.2 oz)   SpO2 97%   BMI 27.58 kg/m      PHYSICAL EXAM    Constitutional: Well developed, well nourished. Comfortable appearing.   HENT: Normocephalic, atraumatic, mucous membranes moist, nose normal  Eyes: PERRL, EOMI, conjunctiva normal, no discharge.  Neck- Supple, gross ROM intact.   Respiratory: Normal work of breathing, normal rate, speaks in full sentences  Cardiovascular: Normal heart rate  Musculoskeletal: Moving all 4 extremities intentionally and without pain.  Neurologic: Alert & oriented x 3, cranial nerves grossly intact. Normal gross coordination  Psychiatric: Anxious affect, cooperative. Reports auditory hallucinations. No SI/HI.      I, Mariusz Del Rosario am serving as a scribe to document services personally performed by Dr. Yair Aguilar based on my observation and the provider's statements to me. IYair MD attest that Mariusz Del Rosario is acting in a scribe capacity, has observed my performance of the services and has " documented them in accordance with my direction.    Yair Aguilar M.D.  Emergency Medicine  [facility] EMERGENCY DEPARTMENT  [facility] [facility] [id number] [phone]  Dept: [phone]       Yair Aguilar MD  12/01/23 0428

## 2023-12-01 NOTE — DISCHARGE INSTRUCTIONS
If your hallucinations are getting worse, not better, or you are afraid that you may hurt somebody or yourself, please come back to the emergency department immediately.

## 2023-12-01 NOTE — ED TRIAGE NOTES
"Patient arrives to triage from home with chief complaint of hallucinations for the past 7-8 days.  Patient reports he is hearing voices and \"cars everywhere.\"  Reports voices are just screaming and overwhelming but not telling him to hurt himself.  Patient reports non-compliance with medications.  Alert and oriented x4.         "

## 2023-12-01 NOTE — ED NOTES
"Pt states he is prescribed Zyprexa to take nightly, he states 10mg. He states he hasn't taken this for some time. He attests that he was at \"[facility]  treatment facility off [place] for 6 months and was discharged in August. He states he has been living in sober living homes since this. He attests that he smoked meth 3 days ago. He states he has 3 children, ages 7 months to 18 years old. He describes his hallucinations as a family member screaming and cars in the background. He denies any command hallucinations. He is appropriately dressing, using his telephone. Denies any suicidal or homicidal ideations. He was given a sandwich, pudding and juice per request. Awaiting MD orders and disposition. Pt updated on plan of care, he is in agreement with plan.  "

## 2024-02-16 ENCOUNTER — HOSPITAL ENCOUNTER (EMERGENCY)
Facility: CLINIC | Age: 39
Discharge: HOME | End: 2024-02-18
Attending: EMERGENCY MEDICINE | Admitting: EMERGENCY MEDICINE
Payer: COMMERCIAL

## 2024-02-16 ENCOUNTER — HOSPITAL ENCOUNTER (EMERGENCY)
Facility: CLINIC | Age: 39
Discharge: HOME | End: 2024-02-16
Attending: FAMILY MEDICINE | Admitting: FAMILY MEDICINE
Payer: COMMERCIAL

## 2024-02-16 VITALS
SYSTOLIC BLOOD PRESSURE: 151 MMHG | HEART RATE: 94 BPM | DIASTOLIC BLOOD PRESSURE: 96 MMHG | OXYGEN SATURATION: 98 % | RESPIRATION RATE: 18 BRPM | TEMPERATURE: 98.8 F

## 2024-02-16 DIAGNOSIS — F15.20 METHAMPHETAMINE USE DISORDER, SEVERE (H): ICD-10-CM

## 2024-02-16 DIAGNOSIS — F22 PARANOIA (H): ICD-10-CM

## 2024-02-16 DIAGNOSIS — F20.0 PARANOID SCHIZOPHRENIA (H): ICD-10-CM

## 2024-02-16 PROBLEM — F15.259 METHAMPHETAMINE-INDUCED PSYCHOTIC DISORDER WITH MODERATE OR SEVERE USE DISORDER (H): Status: ACTIVE | Noted: 2024-02-16

## 2024-02-16 PROBLEM — F25.0 SCHIZOAFFECTIVE DISORDER, BIPOLAR TYPE (H): Status: ACTIVE | Noted: 2021-10-27

## 2024-02-16 PROCEDURE — 250N000013 HC RX MED GY IP 250 OP 250 PS 637: Performed by: EMERGENCY MEDICINE

## 2024-02-16 PROCEDURE — 250N000013 HC RX MED GY IP 250 OP 250 PS 637: Performed by: FAMILY MEDICINE

## 2024-02-16 PROCEDURE — 250N000013 HC RX MED GY IP 250 OP 250 PS 637: Performed by: PSYCHIATRY & NEUROLOGY

## 2024-02-16 PROCEDURE — 99285 EMERGENCY DEPT VISIT HI MDM: CPT | Performed by: EMERGENCY MEDICINE

## 2024-02-16 PROCEDURE — 99285 EMERGENCY DEPT VISIT HI MDM: CPT | Performed by: FAMILY MEDICINE

## 2024-02-16 PROCEDURE — 99284 EMERGENCY DEPT VISIT MOD MDM: CPT | Performed by: EMERGENCY MEDICINE

## 2024-02-16 PROCEDURE — 99284 EMERGENCY DEPT VISIT MOD MDM: CPT | Performed by: FAMILY MEDICINE

## 2024-02-16 RX ORDER — OLANZAPINE 10 MG/1
10 TABLET, ORALLY DISINTEGRATING ORAL ONCE
Status: COMPLETED | OUTPATIENT
Start: 2024-02-16 | End: 2024-02-16

## 2024-02-16 RX ORDER — PANTOPRAZOLE SODIUM 40 MG/1
40 TABLET, DELAYED RELEASE ORAL
Status: DISCONTINUED | OUTPATIENT
Start: 2024-02-17 | End: 2024-02-16

## 2024-02-16 RX ORDER — ACETAMINOPHEN 325 MG/1
650 TABLET ORAL EVERY 4 HOURS PRN
Status: DISCONTINUED | OUTPATIENT
Start: 2024-02-16 | End: 2024-02-18 | Stop reason: HOSPADM

## 2024-02-16 RX ORDER — OLANZAPINE 10 MG/1
10 TABLET ORAL AT BEDTIME
Status: DISCONTINUED | OUTPATIENT
Start: 2024-02-16 | End: 2024-02-18 | Stop reason: HOSPADM

## 2024-02-16 RX ORDER — IBUPROFEN 600 MG/1
600 TABLET, FILM COATED ORAL EVERY 6 HOURS PRN
Status: DISCONTINUED | OUTPATIENT
Start: 2024-02-16 | End: 2024-02-18 | Stop reason: HOSPADM

## 2024-02-16 RX ORDER — QUETIAPINE FUMARATE 25 MG/1
50 TABLET, FILM COATED ORAL 2 TIMES DAILY
Status: DISCONTINUED | OUTPATIENT
Start: 2024-02-17 | End: 2024-02-16

## 2024-02-16 RX ORDER — OLANZAPINE 5 MG/1
10 TABLET, ORALLY DISINTEGRATING ORAL 2 TIMES DAILY PRN
Status: DISCONTINUED | OUTPATIENT
Start: 2024-02-16 | End: 2024-02-18 | Stop reason: HOSPADM

## 2024-02-16 RX ADMIN — OLANZAPINE 10 MG: 10 TABLET, ORALLY DISINTEGRATING ORAL at 15:55

## 2024-02-16 RX ADMIN — OLANZAPINE 10 MG: 10 TABLET, FILM COATED ORAL at 22:27

## 2024-02-16 ASSESSMENT — ACTIVITIES OF DAILY LIVING (ADL)
ADLS_ACUITY_SCORE: 35

## 2024-02-16 NOTE — ED TRIAGE NOTES
"Paranoid delusions \"ever since I started smoking meth again.\" Says his family is kidnapped, thinks people are looking at him. Distressed. Pt says he has been on zyprexa in the past but has not taken in \"a while\". Tearful, hypervigilant, paranoid in triage.     Triage Assessment (Adult)       Row Name 02/16/24 1512          Triage Assessment    Airway WDL WDL        Respiratory WDL    Respiratory WDL WDL        Skin Circulation/Temperature WDL    Skin Circulation/Temperature WDL WDL        Cardiac WDL    Cardiac WDL WDL        Peripheral/Neurovascular WDL    Peripheral Neurovascular WDL WDL        Cognitive/Neuro/Behavioral WDL    Cognitive/Neuro/Behavioral WDL X;mood/behavior     Level of Consciousness alert     Mood/Behavior restless  paranoid        Pupils (CN II)    Pupil PERRLA yes                     "

## 2024-02-16 NOTE — ED PROVIDER NOTES
"ED Provider Note  [facility]      History     Chief Complaint   Patient presents with    Paranoid     Paranoid delusions \"ever since I started smoking meth again.\" Says his family is kidnapped, thinks people are looking at him. Distressed.      The history is provided by the patient and medical records.     Hamzah Roberts is a 38 year old male with history of paranoid schizophrenia and amphetamine use disorder presenting to the  due to paranoid delusions.  Patient increasingly agitated and anxious having some hallucinations and paranoid behaviors stating that his family has been kidnapped thinks that people are looking at him and he is currently feeling as though he is very distressed.  Patient admits that he has been abusing methamphetamine and feels as though the symptoms have become acutely much worse over the last several days.  Family members are able to be contacted and can verify his history as well.    Past Medical History  Past Medical History:   Diagnosis Date    Chemical dependency (H)      History reviewed. No pertinent surgical history.  OLANZapine (ZYPREXA) 10 MG tablet  OLANZapine (ZYPREXA) 10 MG tablet  omeprazole (PRILOSEC) 20 MG DR capsule  QUEtiapine (SEROQUEL) 50 MG tablet  valACYclovir (VALTREX) 500 MG tablet      Allergies   Allergen Reactions    Morphine Sulfate [Morphine] Shortness Of Breath     Family History  History reviewed. No pertinent family history.  Social History   Social History     Tobacco Use    Smoking status: Every Day     Packs/day: .5     Types: Cigarettes   Substance Use Topics    Alcohol use: Never    Drug use: Yes     Types: Methamphetamines     Comment: smokes meth, last use about 12 hours PTA         A medically appropriate review of systems was performed with pertinent positives and negatives noted in the HPI, and all other systems negative.    Physical Exam   BP: (!) 151/96  Pulse: 94  Temp: 98.8  F (37.1  C)  Resp: 18  SpO2: 98 %  Physical " Exam  Vitals and nursing note reviewed.   Constitutional:       General: He is not in acute distress.     Appearance: Normal appearance. He is not toxic-appearing.   HENT:      Head: Atraumatic.   Eyes:      General: No scleral icterus.     Conjunctiva/sclera: Conjunctivae normal.   Cardiovascular:      Rate and Rhythm: Normal rate.      Heart sounds: Normal heart sounds.   Pulmonary:      Effort: Pulmonary effort is normal. No respiratory distress.      Breath sounds: Normal breath sounds.   Abdominal:      Palpations: Abdomen is soft.      Tenderness: There is no abdominal tenderness.   Musculoskeletal:         General: No deformity.      Cervical back: Neck supple.   Skin:     General: Skin is warm.   Neurological:      General: No focal deficit present.      Mental Status: He is alert and oriented to person, place, and time.      Sensory: No sensory deficit.      Motor: No weakness.      Coordination: Coordination normal.   Psychiatric:         Attention and Perception: He is inattentive.         Mood and Affect: Mood is anxious. Affect is labile.         Speech: Speech is rapid and pressured and tangential.         Behavior: Behavior is agitated. Behavior is cooperative.         Thought Content: Thought content is paranoid and delusional.           ED Course, Procedures, & Data      Procedures        Urine drug screen pending at time of dictation.     No results found for any visits on 02/16/24.  Medications   OLANZapine zydis (zyPREXA) ODT tab 10 mg (10 mg Oral $Given 2/16/24 3228)     Labs Ordered and Resulted from Time of ED Arrival to Time of ED Departure - No data to display  No orders to display          Critical care was not performed.     Medical Decision Making  The patient's presentation was of high complexity (a chronic illness severe exacerbation, progression, or side effect of treatment).    The patient's evaluation involved:  an assessment requiring an independent historian (family members that  dropped him off will be contacted for further history as independent historians)  ordering and/or review of 1 test(s) in this encounter (see separate area of note for details)  discussion of management or test interpretation with another health professional (patient will be evaluated by independent professional with discussion of need for hospitalization versus outpatient management.)    The patient's management necessitated high risk (a decision regarding hospitalization).    Assessment & Plan        I have reviewed the nursing notes. I have reviewed the findings, diagnosis, plan and need for follow up with the patient.    Patient with underlying thought disorder paranoid schizophrenia as well as methamphetamine abuse at this time increasingly paranoid delusional agitated likely secondary to exacerbation of schizophrenia due to his meth use however patient will require full DEC assessment and will have further discussion with the evening staff Dr. Hodge.  After full evaluation we will determine possible admission versus outpatient management.    Final diagnoses:   Paranoid schizophrenia (H)   Methamphetamine use disorder, severe (H)         [facility] EMERGENCY DEPARTMENT  2/16/2024     Pernell Hartman MD  02/16/24 8529

## 2024-02-16 NOTE — ED NOTES
Patient in hallway, notably anxious and paranoid. Speaking on portable phone with daughter, Ladonna. Patient request this RN speak with daughter, verbal permission from patient to speak with daughter. Per daughter, patient has history of drug induced psychosis. Often comes to hospital then leaves due to anxiety and paranoia.     Patient paranoid about being in his room, does not like that it's a safe room. Educated patient on safety of rooms, RN offered to change lighting and TV, declined.    Apple juice requested and given.

## 2024-02-17 VITALS
WEIGHT: 166 LBS | TEMPERATURE: 98.5 F | SYSTOLIC BLOOD PRESSURE: 102 MMHG | DIASTOLIC BLOOD PRESSURE: 59 MMHG | HEIGHT: 63 IN | RESPIRATION RATE: 16 BRPM | HEART RATE: 79 BPM | BODY MASS INDEX: 29.41 KG/M2 | OXYGEN SATURATION: 97 %

## 2024-02-17 PROBLEM — F15.20 METHAMPHETAMINE USE DISORDER, SEVERE (H): Status: ACTIVE | Noted: 2021-11-03

## 2024-02-17 PROBLEM — F20.0 PARANOID SCHIZOPHRENIA (H): Status: ACTIVE | Noted: 2021-06-04

## 2024-02-17 LAB — SARS-COV-2 RNA RESP QL NAA+PROBE: NEGATIVE

## 2024-02-17 PROCEDURE — 87635 SARS-COV-2 COVID-19 AMP PRB: CPT | Performed by: EMERGENCY MEDICINE

## 2024-02-17 ASSESSMENT — ACTIVITIES OF DAILY LIVING (ADL)
ADLS_ACUITY_SCORE: 35

## 2024-02-17 NOTE — ED TRIAGE NOTES
Pt presents with concerns of mental health evaluation. Pt states he has been having increasing paranoia for last few weeks. Pt states he has been off his mental health meds since he relapsed about a month ago.      Triage Assessment (Adult)       Row Name 02/16/24 8132          Triage Assessment    Airway WDL WDL        Respiratory WDL    Respiratory WDL WDL        Skin Circulation/Temperature WDL    Skin Circulation/Temperature WDL WDL        Cardiac WDL    Cardiac WDL WDL        Peripheral/Neurovascular WDL    Peripheral Neurovascular WDL WDL     Capillary Refill, General less than/equal to 3 secs        Cognitive/Neuro/Behavioral WDL    Cognitive/Neuro/Behavioral WDL WDL        [place] Coma Scale    Best Eye Response 4-->(E4) spontaneous     Best Motor Response 6-->(M6) obeys commands     Best Verbal Response 5-->(V5) oriented     [place] Coma Scale Score 15

## 2024-02-17 NOTE — ED PROVIDER NOTES
History     Chief Complaint   Patient presents with    Paranoid     HPI  Hamzah Roberts is a 38 year old male who presents asking for help with his methamphetamine use disorder.  The patient reports that he has been using methamphetamine off and on over the past 10 years.  He smokes, does not inject.  Last use was yesterday.  He feels paranoid and feels as if everyone is out to get him.  He says it is ruining his life and his relationship with his children.  He smokes cigarettes occasionally.  Denies any alcohol use, says this was a significant problem for him years ago and so he stopped drinking alcohol completely.  Denies other drug use besides occasional marijuana use.  He denies being suicidal however says he does very risky behaviors such as walking in front of traffic.  No plan of suicide.  He wants help, wants to get into a program to help stop using methamphetamine.    The patient has been in multiple different emergency department over the past several months.  He was seen at the [facility] emergency department.  He was evaluated by DEC and by the emergency department and was offered olanzapine and a place to sleep and rest while he detoxes from the methamphetamine.  The patient felt uncomfortable there because the busyness of the department and so he decided to leave and come here for another evaluation.  I reviewed the DEC consultation from 2/16/2024, was actually just performed about 2 hours prior to his arrival here.  They gave the patient chemical dependency resources and he discharged with a sober ride.    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 04/10/2023     Priority: Medium    Schizoaffective disorder, chronic condition with acute exacerbation (H) 11/30/2022     Priority: Medium    Schizoaffective disorder, depressive type (H) 11/30/2022     Priority: Medium    Hallucinations 11/18/2022     Priority: Medium    Encounter for medication refill 11/18/2022     Priority: Medium    Left ankle pain, unspecified chronicity 11/17/2022     Priority: Medium    Recurrent genital herpes 07/29/2022     Priority: Medium    Anxiety 01/03/2022     Priority: Medium    Gastroesophageal reflux disease 01/03/2022     Priority: Medium    Methamphetamine use disorder, moderate, in sustained remission, in controlled environment, dependence (H) 11/03/2021     Priority: Medium    Methamphetamine use disorder, severe (H) 11/03/2021     Priority: Medium    Schizoaffective disorder, bipolar type (H) 10/27/2021     Priority: Medium    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) 06/20/2021     Priority: Medium    Amphetamine-induced mood disorder (H) 06/20/2021     Priority: Medium    Episodic mood disorder (H24) 06/20/2021     Priority: Medium    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) 06/20/2021     Priority: Medium    Paranoid schizophrenia (H) 06/04/2021     Priority: Medium    Paranoia (H) 06/04/2021     Priority: Medium    Methamphetamine abuse (H) 06/04/2021     Priority: Medium    Suicidal ideation 05/09/2021     Priority: Medium    Polysubstance abuse (H) 05/09/2021     Priority: Medium    Amphetamine use disorder, severe, dependence (H) 07/30/2020     Priority: Medium    Psychosis, atypical (H) 07/29/2020     Priority: Medium    Substance-induced psychotic disorder (H) 07/28/2020     Priority: Medium        Past Medical  "History:    Past Medical History:   Diagnosis Date    Chemical dependency (H)        Past Surgical History:    No past surgical history on file.    Family History:    No family history on file.    Social History:  Marital Status:  Single [1]  Social History     Tobacco Use    Smoking status: Every Day     Packs/day: .5     Types: Cigarettes   Substance Use Topics    Alcohol use: Never    Drug use: Yes     Types: Methamphetamines     Comment: smokes meth, last use about 12 hours PTA        Medications:    OLANZapine (ZYPREXA) 10 MG tablet  OLANZapine (ZYPREXA) 10 MG tablet  omeprazole (PRILOSEC) 20 MG DR capsule  QUEtiapine (SEROQUEL) 50 MG tablet  valACYclovir (VALTREX) 500 MG tablet          Review of Systems    Physical Exam   BP: (!) 176/96  Pulse: 112  Temp: 98.4  F (36.9  C)  Resp: 20  Height: 160 cm (5' 3\")  Weight: 75.3 kg (166 lb)  SpO2: 99 %      Physical Exam  Constitutional:       General: He is not in acute distress.     Appearance: He is well-developed. He is not diaphoretic.   HENT:      Head: Normocephalic and atraumatic.      Nose: No congestion.      Mouth/Throat:      Mouth: Mucous membranes are moist.   Eyes:      General: No scleral icterus.     Conjunctiva/sclera: Conjunctivae normal.   Pulmonary:      Effort: Pulmonary effort is normal.   Musculoskeletal:      Cervical back: Normal range of motion and neck supple.   Skin:     General: Skin is warm and dry.      Capillary Refill: Capillary refill takes less than 2 seconds.      Findings: No rash.   Neurological:      Mental Status: He is alert and oriented to person, place, and time.   Psychiatric:         Attention and Perception: Attention normal.         Speech: Speech is not rapid and pressured or slurred.         Behavior: Behavior is not aggressive. Behavior is cooperative.         Thought Content: Thought content does not include suicidal ideation. Thought content does not include suicidal plan.         ED Course               "   Procedures              Critical Care time:  none               No results found for this or any previous visit (from the past 24 hour(s)).    Medications   OLANZapine (zyPREXA) tablet 10 mg (10 mg Oral $Given 2/16/24 5331)   acetaminophen (TYLENOL) tablet 650 mg (has no administration in time range)   ibuprofen (ADVIL/MOTRIN) tablet 600 mg (has no administration in time range)   OLANZapine zydis (zyPREXA) ODT tab 10 mg (has no administration in time range)   nicotine Patch in Place (has no administration in time range)   nicotine (NICODERM CQ) 7 MG/24HR 24 hr patch 1 patch (has no administration in time range)       Assessments & Plan (with Medical Decision Making)   38-year-old male with a history of methamphetamine use disorder who presents with concerns of paranoia and wanting help to stop using methamphetamine.  He does not appear acutely intoxicated at this time.  He denies suicidal ideation.  He is voluntary and wants help at this time.  He has already been evaluated by DEC earlier today, no indication for repeat evaluation here tonight.  The patient is not holdable and I have told him he is free to leave at any time if he has a safe ride.  He is agreeable to take his olanzapine here.  Will allow him to sleep here overnight and possibly reassess with DEC in the morning and likely discharge at that time.    I have reviewed the nursing notes.    I have reviewed the findings, diagnosis, plan and need for follow up with the patient.         New Prescriptions    No medications on file       Final diagnoses:   Methamphetamine use disorder, severe (H)   Paranoia (H)       2/16/2024   [facility] EMERGENCY DEPT       Juan Carlos Cortes MD  02/16/24 8823

## 2024-02-17 NOTE — CONSULTS
Diagnostic Evaluation Consultation  Crisis Assessment    Patient Name: Hamzah Roberts  Age:  38 year old  Legal Sex: male  Gender Identity: male  Pronouns:   Race: White  Ethnicity: Not  or   Language: English      Patient was assessed: In person      Patient location: [facility] EMERGENCY DEPARTMENT                             ED15    Referral Data and Chief Complaint  Hamzah Roberts presents to the ED with family/friends. Patient is presenting to the ED for the following concerns: Paranoia, Anxiety.   Factors that make the mental health crisis life threatening or complex are:  Pt presents to ED for paranoia. Pt reports smoking methamphetamine at 3AM this morning. He has been using for the past week.  Pt had 6 months of sobriety prior to this relapse. Unable to specify stressors that led to relapse. Pt was working the [employer] treatment program but no longer attending due to relapse in use. Pt reports he has not slept in 3 days. Symptoms appear to be primarly substance induced. Pt feels anxious, uncomfortable, endorsing paranoid thoughts. Denies suicidal and homicidal thoughts. Pt requesting to go to the [facility] unit because there are too many patients in the ED currently. Informed pt it is a long wait for the unit at this time and we would prefer he stays in ED until symptoms resolve. Pt offered zyprexa medication, took 1/4 of the medication and stated he cannot confirm that it is zyprexa so he will not take anymore of it. Pt states he would prefer to be seen at another hospital that is no so busy and can send him to a quiet mental health unit. Informed pt that is his choice to make but we are recommending he take his zyprexa and rest in our ED. Pt is on commitment, works with , medication provider, ..      Informed Consent and Assessment Methods  Explained the crisis assessment process, including applicable information disclosures and limits to confidentiality,  "assessed understanding of the process, and obtained consent to proceed with the assessment.  Assessment methods included conducting a formal interview with patient, review of medical records, collaboration with medical staff, and obtaining relevant collateral information from family and community providers when available.  : done     Patient response to interventions: needs reinforcement  Coping skills were attempted to reduce the crisis:  pt unable to use coping skills, feels too distressed.     History of the Crisis   Hx of paranoid schizophrenia, methamphetamine abuse, substance induced psychosis. Pt works with , medication provider, . Was previous attending [facility] but reports \"quitting\" after recent relapse. Pt's primary drug of choice is methamphetamine. Pt has legal history, currently working with a . Pt is currently on MI/CD commitment. Hx of attending several treatment facilities and detox. Numerous admissions in the past, most recently 3/28/23.    Brief Psychosocial History  Family:  Single, Children    Support System:   (\"sober friends, , \")  Employment Status:   (unknown)  Source of Income:  unable to assess  Financial Environmental Concerns:  none  Current Hobbies:   (spending time with friends)  Barriers in Personal Life:   (substance use)    Significant Clinical History  Current Anxiety Symptoms:  anxious, excessive worry, racing thoughts  Current Depression/Trauma:  sadness  Current Somatic Symptoms:     Current Psychosis/Thought Disturbance:   (paranoia)  Current Eating Symptoms:     Chemical Use History:  Alcohol: None  Benzodiazepines: None  Opiates: None  Cocaine: None  Marijuana: None  Other Use: Methamphetamines  Last Use:: 02/16/24   Past diagnosis:  Schizophrenia, Substance Use Disorder  Family history:  No known history of mental health or chemical health concerns  Past treatment:  Case " management, Civil Commitment, Primary Care, Psychiatric Medication Management, Inpatient Hospitalization, Residential Treatment  Details of most recent treatment:  [facility], no longer attending per pt report  Other relevant history:            Risk Assessment  Isabela Suicide Severity Rating Scale Full Clinical Version:             Isabela Suicide Severity Rating Scale Recent:   Suicidal Ideation (Recent)  Q1 Wished to be Dead (Past Month): no  Q2 Suicidal Thoughts (Past Month): no     Suicidal Behavior (Recent)  Actual Attempt (Past 3 Months): No  Has subject engaged in non-suicidal self-injurious behavior? (Past 3 Months): No  Interrupted Attempts (Past 3 Months): No  Aborted or Self-Interrupted Attempt (Past 3 Months): No  Preparatory Acts or Behavior (Past 3 Months): No    Environmental or Psychosocial Events: ongoing abuse of substances  Protective Factors: Protective Factors: good treatment engagement, help seeking, able to access care without barriers, supportive ongoing medical and mental health care relationships    Does the patient have thoughts of harming others? Feels Like Hurting Others: no  Previous Attempt to Hurt Others: no  Is the patient engaging in sexually inappropriate behavior?: no    Is the patient engaging in sexually inappropriate behavior?  no        Mental Status Exam   Affect: Dramatic  Appearance: Appropriate  Attention Span/Concentration: Inattentive  Eye Contact: Variable    Fund of Knowledge: Appropriate   Language /Speech Content: Fluent  Language /Speech Volume: Normal  Language /Speech Rate/Productions: Hyperverbal, Pressured  Recent Memory: Variable  Remote Memory: Variable  Mood: Anxious  Orientation to Person: Yes   Orientation to Place: Yes  Orientation to Time of Day: Yes  Orientation to Date: Yes     Situation (Do they understand why they are here?): Yes  Psychomotor Behavior: Hyperactive  Thought Content: Paranoia  Thought Form: Paranoia        Medication  Psychotropic  medications:   zyprexa       Current Care Team  Patient Care Team:  No Ref-Primary, Physician as PCP - Brandon Gunn as Nursing Assistant    Diagnosis  Patient Active Problem List   Diagnosis Code    Suicidal ideation R45.851    Polysubstance abuse (H) F19.10    Paranoid schizophrenia (H) F20.0    Paranoia (H) F22    Methamphetamine abuse (H) F15.10    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    Amphetamine use disorder, severe, 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    Anxiety F41.9    Episodic mood disorder (H24) F39    Gastroesophageal reflux disease K21.9    Left ankle pain, unspecified chronicity M25.572    Psychosis, atypical (H) F29    Recurrent genital herpes A60.00    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    Hallucinations R44.3    Encounter for medication refill Z76.0    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    Methamphetamine use disorder, moderate, in sustained remission, in controlled environment, dependence (H) F15.21    Methamphetamine use disorder, severe (H) F15.20    Schizoaffective disorder, bipolar type (H) F25.0    Schizoaffective disorder, chronic condition with acute exacerbation (H) F25.9    Schizoaffective disorder, depressive type (H) F25.1    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.30, S09.90XS    Auditory hallucinations R44.0    Substance-induced psychotic disorder with hallucinations (H) F19.951    Altered mental status, unspecified altered mental status type R41.82    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 Pt has used  methamphetamine over past week, now experiencing psychotic symptoms, primarly paranoia. Pt denies SI, HI, NSSIB. Pt is not at risk for harm to self or others. Recommending observational status for pt to take zyprexa and rest while symptoms subside. Pt states he would like to seek out another hospital because this one is too busy. Informed pt that it is his right to do so if he prefers. Pt has found his own ride and will discharge with a trusted friend. CD resources placed in discharge paperwork, safety planning completed with pt engagement.                          Patient coping skills attempted to reduce the crisis:  pt unable to use coping skills, feels too distressed.    Disposition  Recommended disposition: Observation        Reviewed case and recommendations with attending provider. Attending Name: Dr. Hodge       Attending concurs with disposition: yes       Patient and/or validated legal guardian concurs with disposition:   no (Pt states he would prefer to discharge and seek out another hospital due to number of patients in ED)       Final disposition:  discharge    Legal status on admission: Voluntary/Patient has signed consent for treatment    Assessment Details   Total duration spent with the patient: 45 min     CPT code(s) utilized: 56141 - Psychotherapy for Crisis - 60 (30-74*) min    GABRIELLE Hickman, Psychotherapist  DEC - Triage & Transition Services  Callback: [phone]

## 2024-02-17 NOTE — ED PROVIDER NOTES
"[facility] ED Mental Health Handoff Note:       Brief HPI:  This is a 38 year old male signed out to me by Dr. Parker.  See initial ED Provider note for full details of the presentation. Interval history is pertinent for repeat DEC evaluation.    I spoke on the phone with the DEC accessor, we discussed ongoing medical care of the patient. Family reported multiple prior dangerous behavior with suicidal intent. He is willing to stay for hospitalization.  The patient is not currently suicidal, he is not on a hold.    Home meds reviewed and ordered/administered: Yes    Medically stable for inpatient mental health admission: Yes.    Evaluated by mental health: Yes. The recommendation is for inpatient mental health treatment. Bed search in process    Safety concerns: At the time I received sign out, there were no safety concerns.    Hold Status:  Active Orders   N/A            Exam:   Patient Vitals for the past 24 hrs:   BP Temp Temp src Pulse Resp SpO2 Height Weight   02/17/24 1100 -- -- -- -- 18 -- -- --   02/16/24 2136 (!) 176/96 98.4  F (36.9  C) Tympanic 112 20 99 % 1.6 m (5' 3\") 75.3 kg (166 lb)       Resting comfortably  Cooperative    ED Course:    Medications   OLANZapine (zyPREXA) tablet 10 mg (10 mg Oral $Given 2/16/24 2227)   acetaminophen (TYLENOL) tablet 650 mg (has no administration in time range)   ibuprofen (ADVIL/MOTRIN) tablet 600 mg (has no administration in time range)   OLANZapine zydis (zyPREXA) ODT tab 10 mg (has no administration in time range)   nicotine Patch in Place (has no administration in time range)   nicotine (NICODERM CQ) 7 MG/24HR 24 hr patch 1 patch (has no administration in time range)            There were no significant events during my shift.    Patient was signed out to the oncoming provider, Dr. Kam      Impression:    ICD-10-CM    1. Methamphetamine use disorder, severe (H)  F15.20       2. Paranoia (H)  F22           Plan:    Awaiting inpatient mental health " admission/transfer.      RESULTS:   No results found for this visit on 02/16/24 (from the past 24 hour(s)).          MD Sophia David, Juan Carlos Thomas MD  02/17/24 8638

## 2024-02-17 NOTE — ED PROVIDER NOTES
"     Emergency Department Psychiatric Patient Sign-out       Brief HPI:  This is a 38 year old male signed out to me by Dr. Cortes .  See initial ED Provider note for details of the presentation.          Pending studies include none.      The patient is not on a hold.      The patient has required medication for agitation.    Medications   OLANZapine (zyPREXA) tablet 10 mg (10 mg Oral $Given 2/16/24 3812)   acetaminophen (TYLENOL) tablet 650 mg (has no administration in time range)   ibuprofen (ADVIL/MOTRIN) tablet 600 mg (has no administration in time range)   OLANZapine zydis (zyPREXA) ODT tab 10 mg (has no administration in time range)   nicotine Patch in Place (has no administration in time range)   nicotine (NICODERM CQ) 7 MG/24HR 24 hr patch 1 patch (has no administration in time range)       Exam:   Patient Vitals for the past 24 hrs:   BP Temp Temp src Pulse Resp SpO2 Height Weight   02/16/24 2136 (!) 176/96 98.4  F (36.9  C) Tympanic 112 20 99 % 1.6 m (5' 3\") 75.3 kg (166 lb)         ED Course:    There were no significant events while under my care.      Patient was signed out to the oncoming provider. Dr. Parker      Impression:    ICD-10-CM    1. Methamphetamine use disorder, severe (H)  F15.20       2. Paranoia (H)  F22           Plan:    1. Await Transfer to Mental Health Facility      RESULTS:   No results found for this visit on 02/16/24 (from the past 24 hour(s)).      MD Eddy Rudolph, Al Boykin MD  02/17/24 0602    "

## 2024-02-17 NOTE — DISCHARGE INSTRUCTIONS
Please continue taking your olanzapine consistently.  Follow-up with the chemical dependency resources as provided.

## 2024-02-17 NOTE — ED PROVIDER NOTES
[facility] ED Mental Health Handoff Note:       Brief HPI:  This is a 38 year old male signed out to me by Dr. Hartman.  See initial ED Provider note for full details of the presentation. Interval history is pertinent for ongoing ED boarding while patient is monitored for his paranoia. He is refusing to take recommended olanzapine. He feels too anxious being in the ED and wants to either be admitted or plan to go to another hospital.    Home meds reviewed and ordered/administered: Yes    Medically stable for inpatient mental health admission: Yes.    Evaluated by mental health: Yes. The patient would likely benefit from detoxification treatment. Specifically, ongoing ED OBS to allow for meth with wash out and paranoia to resolve.    Safety concerns: At the time I received sign out, there were no safety concerns.    Hold Status:  Active Orders   Legal    Health Officer Authority to Detain (MARCIE)     Frequency: Effective Now     Start Date/Time: 02/16/24 8321      Number of Occurrences: Until Specified     Order Comments: This patient presented with circumstances that have led me to be reasonably suspicious that the patient is experiencing psychosis. The patient's judgment to this situation appears to be impaired. Given the circumstances in which the patient presented, it is likely that the patient is at significant risk of harming self or others if this situation is not investigated further. I am highly concerned that the patient is mentally ill and currently cannot safely care for oneself. This represents endangerment to the patient's well-being and safety, and I am placing a Health Officer Authority hold upon the patient at this time.           Exam:   Patient Vitals for the past 24 hrs:   BP Temp Temp src Pulse Resp SpO2   02/16/24 1512 (!) 151/96 98.8  F (37.1  C) Oral 94 18 98 %         ED Course:    Medications   OLANZapine zydis (zyPREXA) ODT tab 10 mg (10 mg Oral $Given 2/16/24 7263)   OLANZapine  zydis (zyPREXA) ODT tab 10 mg (10 mg Oral Not Given 2/16/24 1800)            There were no significant events during my shift.      Impression:    ICD-10-CM    1. Paranoid schizophrenia (H)  F20.0       2. Methamphetamine use disorder, severe (H)  F15.20           Plan:    Awaiting mental health evaluation/recommendations.  Patient was encouraged to stay overnight in the ED for OBS and monitoring as he remains paranoid.   Patient is not an acute safety risk and is not holdable if he chooses to leave. Patient will need to arrange for a sober ride if he chooses to leave to plan on going to another hospital.  Patient arranged for a ride upon discharge. I do not find him holdable. He can be released.      RESULTS:   No results found for this visit on 02/16/24 (from the past 24 hour(s)).          MD Naveen Harding Dung Hoang, MD  02/16/24 1928

## 2024-02-17 NOTE — ED NOTES
Pt is sleeping. Easy to waken. States he is trying to get into a psych hospital because he isn't safe on the streets right now. He knows he will use again.

## 2024-02-18 ENCOUNTER — TELEPHONE (OUTPATIENT)
Dept: BEHAVIORAL HEALTH | Facility: CLINIC | Age: 39
End: 2024-02-18
Payer: COMMERCIAL

## 2024-02-18 ASSESSMENT — ACTIVITIES OF DAILY LIVING (ADL)
ADLS_ACUITY_SCORE: 35

## 2024-02-18 NOTE — ED NOTES
IP MH Referral Acuity Rating Score (RARS)    LMHP complete at referral to IP MH, with DEC; and, daily while awaiting IP MH placement. Call score to PPS.  CRITERIA SCORING   New 74 HH and Involuntary for IP MH (not adolescent) 0/1   Boarding over 24 hours 1/1   Vulnerable adult at least 55+ with multiple co morbidities; or, Patient age 11 or under 0/1   Suicide ideation without relief of precipitating factors 0/1   Current plan for suicide 0/1   Current plan for homicide 0/1   Imminent risk or actual attempt to seriously harm another without relief of factors precipitating the attempt 1/1   Severe dysfunction in daily living (ex: complete neglect for self care, extreme disruption in vegetative function, extreme deterioration in social interactions) 1/1   Recent (last 2 weeks) or current physical aggression in the ED 0/1   Restraints or seclusion episode in ED 0/1   Verbal aggression, agitation, yelling, etc., while in the ED 0/1   Active psychosis with psychomotor agitation or catatonia 0/1   Need for constant or near constant redirection (from leaving, from others, etc).  0/1   Intrusive or disruptive behaviors 0/1   TOTAL Acuity Total Score: 3

## 2024-02-18 NOTE — ED NOTES
Hamzah Roberts  February 17, 2024  Plan of Care Hand-off Note     Patient Care Path: inpatient mental health    Plan for Care:   Pt presents to ED due to paranoia, auditory and visual hallucinations.  Pt reports recent use of methamphetamines and is not taking prescribed medication.  Pt denies SI or HI at this time, however, family contact reports pt was threatening to jump off bridge 2 days ago.  Family notes concern for pt's immediate safety due to psychotic state and history of unsafe behaviors.  It is the recommendation of this clinician that pt admit to Critical access hospital for safety and stabilizaiton.  Pt displays the following risk factors:  noncompliant with medication, active substance use, current symptoms of psychosis, feeling unsafe, unable to identify current coping strategies, per family hx of suicide threats and behavior, Pt without current residence and capacity to properly care for self (not eating or sleeping for 5 days).  Central Intake contacted at 6:20pm and pt added to list.  Writer will attempt contact with pt's county worker regarding pt's status.    Identified Goals and Safety Issues: Pt functioning below baseline, experiencing auditory and visual hallucinations along with paranoia, concern for unsafe behaviors and ability to care for self    Overview:  Sister Angie            Legal Status: Legal Status at Admission: Voluntary/Patient has signed consent for treatment    Psychiatry Consult:       Updated provider regarding plan of care.           Cammie Mcdaniels

## 2024-02-18 NOTE — ED PROVIDER NOTES
Emergency Department Psychiatric Patient Sign-out       Brief HPI:  This is a 38 year old male signed out to me by Dr. Cortes .  See initial ED Provider note for details of the presentation.     Patient is medically cleared for admission to a Behavioral Health unit.      Pending studies include none.      The patient is not on a hold.      The patient has not required medication for agitation.    Medications   OLANZapine (zyPREXA) tablet 10 mg (10 mg Oral $Given 2/16/24 2227)   acetaminophen (TYLENOL) tablet 650 mg (has no administration in time range)   ibuprofen (ADVIL/MOTRIN) tablet 600 mg (has no administration in time range)   OLANZapine zydis (zyPREXA) ODT tab 10 mg (has no administration in time range)   nicotine Patch in Place (has no administration in time range)   nicotine (NICODERM CQ) 7 MG/24HR 24 hr patch 1 patch (has no administration in time range)       Exam:   Patient Vitals for the past 24 hrs:   BP Temp Temp src Pulse Resp SpO2   02/17/24 1920 102/59 98.5  F (36.9  C) Oral 79 16 97 %   02/17/24 1100 -- -- -- -- 18 --         ED Course:    There were no significant events while under my care.      Patient was signed out to the oncoming provider. Dr. Parker      Impression:    ICD-10-CM    1. Methamphetamine use disorder, severe (H)  F15.20       2. Paranoia (H)  F22           Plan:    1. Await Transfer to Mental Health Facility      RESULTS:   Results for orders placed or performed during the hospital encounter of 02/16/24 (from the past 24 hour(s))   [facility] Assessment Consult Order:     Status: None ()    Collection Time: 02/17/24  1:12 PM    Narrative    Cammie Mcdaniels     2/17/2024  6:14 PM  Diagnostic Evaluation Consultation  Crisis Assessment    Patient Name: Hamzah Roberts  Age:  38 year old  Legal Sex: male  Gender Identity: male  Pronouns:   Race: White  Ethnicity: Not  or   Language: English      Patient was assessed: Virtual: Kalin  "Crisis Assessment Start   Time: 1630 Crisis Assessment Stop Time: 1700  Patient location: [facility] EMERGENCY DEPT                             ED05    Referral Data and Chief Complaint  Hamzah Roberts presents to the ED with family/friends. Patient is   presenting to the ED for the following concerns: Substance use,   Paranoia.   Factors that make the mental health crisis life   threatening or complex are:  Pt presents to ED last evening after   leaving [facility] with complaint that the hospital was   too busy.  Pt has spent most of the day asleep per nursing staff.    Pt reports daily methamphetamine use for past week after period   of sobriety.  Pt was in sober living and using OP treatment until   relapse.  Pt complains of auditory and visual hallucinaitons   along with paranoia.  Pt states he hears voices yelling \"help me\"   and is unsure where they are coming from.  He states he has heard   these [place]persons yelling for help (November 2023).  Pt reports the   voices get worse once he gets outside.  Pt states he feels   \"everyone\" is trying to kill him and feel he is being watched and   followed.  Pt's halluncinations are described as seeing people's   faces \"giving weird signals\" to each other.  Pt reports he hadn't   eaten or slep in about 5 days until coming to hospital.-  Pt   denies suicidal ideation, however, collateral contact with pt's   sister states pt was threatening to jump off bridge 2 nights ago   and that pt has hx of lying in road waiting for a vehicle to run   him over.  Pt denies current homicidal ideation but adds, \"unless   they do something to hurt me\".  Pt reports he has been off of   Zyprexa for about one month.  Pt is agreeable to considering   voluntary hospitalization.  Pt states it \"takes weeks\" for the   psychotic symptoms to wean after the meth use.  Describes his   mood as \"miserable\" when he is " "using..      Informed Consent and Assessment Methods  Explained the crisis assessment process, including applicable   information disclosures and limits to confidentiality, assessed   understanding of the process, and obtained consent to proceed   with the assessment.  Assessment methods included conducting a   formal interview with patient, review of medical records,   collaboration with medical staff, and obtaining relevant   collateral information from family and community providers when   available.  : done     Patient response to interventions: acceptance expressed  Coping skills were attempted to reduce the crisis:  substance   abuse, going to ED, called family     History of the Crisis   Hx of paranoid schizophrenia, methamphetamine abuse, substance   induced psychosis. Pt works with , medication   provider, . Was previous attending [facility]   treatment [facility] but reports \"quitting\" after recent relapse.   Pt's primary drug of choice is methamphetamine. Pt has legal   history, currently working with a . Pt is   currently on MI/CD commitment. Hx of attending several treatment   facilities and detox. Numerous admissions in the past, most   recently 3/28/23.    Brief Psychosocial History  Family:  Single, Children yes  Support System:  Other (specify) (, probabtion   officer)  Employment Status:  unemployed  Source of Income:  unable to assess  Financial Environmental Concerns:     Current Hobbies:  family functions  Barriers in Personal Life:  emotional concerns, transportation   concerns    Significant Clinical History  Current Anxiety Symptoms:  anxious, excessive worry, racing   thoughts  Current Depression/Trauma:  impaired decision making,   helplessness, hopelessness, sadness  Current Somatic Symptoms:  anxious  Current Psychosis/Thought Disturbance:  auditory hallucinations,   visual hallucinations, high risk behavior, impulsive  Current " "Eating Symptoms:  loss of appetite  Chemical Use History:  Alcohol: None  Benzodiazepines: None  Opiates: None  Cocaine: None  Last Use::  (NOT SURE WILL LAST USE)  Marijuana: Occasional  Other Use: Methamphetamines  Last Use:: 02/15/24   Past diagnosis:  Schizophrenia, Substance Use Disorder  Family history:  Depression, Anxiety Disorder  Past treatment:  Case management, Civil Commitment, Primary Care,   Psychiatric Medication Management, Inpatient Hospitalization,   Residential Treatment  Details of most recent treatment:  [facility], no longer attending per   pt report  Other relevant history:  Patient in distress by ongoing   psychosis.       Collateral Information  Is there collateral information: Yes     Collateral information name, relationship, phone number:  Angie Bowman, patient's sister @ [phone]    What happened today: Pt threatening to jump off bridge 2 nights   ago, pt's \"mind is not right\", feels he is unsafe at this time     What is different about patient's functioning: using currently,   expressing suicidal ideation     Concern about alcohol/drug use:      What do you think the patient needs:      Has patient made comments about wanting to kill   themselves/others: yes    If d/c is recommended, can they take part in safety/aftercare   planning:       Additional collateral information:        Risk Assessment  [place] Suicide Severity Rating Scale Full Clinical Version:  Suicidal Ideation  Q6 Suicide Behavior (Lifetime): no          [place] Suicide Severity Rating Scale Recent:   Suicidal Ideation (Recent)  Q1 Wished to be Dead (Past Month): no  Q2 Suicidal Thoughts (Past Month): no  Intensity of Ideation (Recent)  Most Severe Ideation Rating (Past 1 Month): 1  Suicidal Behavior (Recent)  Actual Attempt (Past 3 Months): No  Total Number of Actual Attempts (Past 3 Months): 0  Actual Attempt Description (Past 3 Months): 0  Has subject engaged in non-suicidal self-injurious behavior?   (Past 3 " Months): No  Total Number of Interrupted Attempts (Past 3 Months): 0  Interrupted Attempt Description (Past 3 Months): 0  Aborted or Self-Interrupted Attempt (Past 3 Months): No    Environmental or Psychosocial Events: legal issues such as DWI,   DUI, lawsuit, CPS involvement, etc., ongoing abuse of substances,   helplessness/hopelessness  Protective Factors: Protective Factors: strong bond to family   unit, community support, or employment, sense of belonging    Does the patient have thoughts of harming others? Feels Like   Hurting Others: no  Previous Attempt to Hurt Others: no  Current presentation: Confused  Violence Threats in Past 6 Months: no  Is the patient engaging in sexually inappropriate behavior?: no  Duty to warn initiated: no    Is the patient engaging in sexually inappropriate behavior?  no          Mental Status Exam   Affect: Blunted  Appearance: Appropriate  Attention Span/Concentration: Attentive  Eye Contact: Engaged    Fund of Knowledge: Appropriate   Language /Speech Content: Fluent  Language /Speech Volume: Normal  Language /Speech Rate/Productions: Normal  Recent Memory: Variable  Remote Memory: Variable  Mood: Apathetic  Orientation to Person: Yes   Orientation to Place: Yes  Orientation to Time of Day: No  Orientation to Date: No     Situation (Do they understand why they are here?): Yes  Psychomotor Behavior: Other (please comment) (jaw and neck   clenching)  Thought Content: Paranoia, Hallucinations  Thought Form: Paranoia     Mini-Cog Assessment  Number of Words Recalled:    Clock-Drawing Test:     Three Item Recall:    Mini-Cog Total Score:       Medication  Psychotropic medications:   Medication Orders - Psychiatric (From admission, onward)      Start     Dose/Rate Route Frequency Ordered Stop    02/17/24 0800  nicotine (NICODERM CQ) 7 MG/24HR 24 hr patch 1   patch         1 patch  over 24 Hours Transdermal DAILY 02/16/24 2226 02/16/24 2220  OLANZapine zydis (zyPREXA) ODT tab 10  mg         10 mg Oral 2 TIMES DAILY PRN 02/16/24 2220      02/16/24 2200  OLANZapine (zyPREXA) tablet 10 mg         10 mg Oral AT BEDTIME 02/16/24 2153               Current Care Team  Patient Care Team:  No Ref-Primary, Physician as PCP - Brandon Gunn as Nursing Assistant    Diagnosis  Patient Active Problem List   Diagnosis Code    Suicidal ideation R45.851    Polysubstance abuse (H) F19.10    Paranoid schizophrenia (H) F20.0    Paranoia (H) F22    Methamphetamine abuse (H) F15.10    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    Amphetamine use disorder, severe, 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    Anxiety F41.9    Episodic mood disorder (H24) F39    Gastroesophageal reflux disease K21.9    Left ankle pain, unspecified chronicity M25.572    Psychosis, atypical (H) F29    Recurrent genital herpes A60.00    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    Hallucinations R44.3    Encounter for medication refill Z76.0    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    Methamphetamine use disorder, moderate, in sustained remission,   in controlled environment, dependence (H) F15.21    Methamphetamine use disorder, severe (H) F15.20    Schizoaffective disorder, bipolar type (H) F25.0    Schizoaffective disorder, chronic condition with acute   exacerbation (H) F25.9    Schizoaffective disorder, depressive type (H) F25.1    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.30, S09.90XS    Auditory hallucinations R44.0    Substance-induced psychotic disorder with hallucinations (H)   F19.951    Altered mental status, unspecified altered mental status type   R41.82    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  Pt reports recent use of methamphetamines and is   not taking prescribed medication.  Pt denies SI or HI at this   time, however, family contact reports pt was threatening to jump   off bridge 2 days ago.  Family notes concern for pt's immediate   safety due to psychotic state and history of unsafe behaviors.    It is the recommendation of this clinician that pt admit to [facility]   for safety and stabilizaiton.  Pt displays the following risk   factors:  noncompliant with medication, active substance use,   current symptoms of psychosis, feeling unsafe, unable to identify   current coping strategies, per family hx of suicide threats and   behavior, Pt without current residence and capacity to properly   care for self (not eating or sleeping for 5 days).          Severe psychiatric, behavioral or other comorbid conditions are   appropriate for management at inpatient mental health as   indicated by at least one of the following: Psychiatric Symptoms,   Comorbid substance use disorder, Impaired impulse control,   judgement, or insight  Severe dysfunction in daily living is present as indicated by at   least one of the following: Other evidence of severe dysfunction  Situation and expectations are appropriate for inpatient care:   Around-the-clock medical and nursing care to address symptoms and   initiate intervention is required  Inpatient mental health services are necessary to meet patient   needs and at least one of the following: Specific condition   related to admission diagnosis is present and judged likely to   further improve at proposed level of care      Patient coping skills attempted to reduce the crisis:  substance   abuse, going to ED, called family    Disposition  Recommended disposition: Other. please comment        Reviewed case and recommendations with attending provider.   Attending Name:   Sophia       Attending concurs with disposition: yes       Patient and/or validated legal guardian concurs with disposition:     yes       Final disposition:  inpatient mental health    Legal status on admission: Voluntary/Patient has signed consent   for treatment    Assessment Details   Total duration spent with the patient: 30 min     CPT code(s) utilized: 91564 - Psychotherapy for Crisis - 60   (30-74*) min    Cammie Mcdaniels Psychotherapist  [facility] - Triage & Transition Services  Callback: [phone]           Urine Drug Screen     Status: None ()    Collection Time: 02/17/24  5:38 PM    Narrative    The following orders were created for panel order Urine Drug Screen.  Procedure                               Abnormality         Status                     ---------                               -----------         ------                     Urine Drug Screen Panel[[id number]]                                                       Please view results for these tests on the individual orders.   Asymptomatic COVID-19 Virus (Coronavirus) by PCR Nose     Status: Normal    Collection Time: 02/17/24  6:40 PM    Specimen: Nose; Swab   Result Value Ref Range    SARS CoV2 PCR Negative Negative    Narrative    Testing was performed using the Xpert Xpress SARS-CoV-2 Assay on the Cepheid Gene-Xpert Instrument Systems. Additional information about this Emergency Use Authorization (EUA) assay can be found via the Lab Guide. This test should be ordered for the detection of SARS-CoV-2 in individuals who meet SARS-CoV-2 clinical and/or epidemiological criteria as well as from individuals without symptoms or other reasons to suspect COVID-19. Test performance for asymptomatic patients has only been established in anterior nasal swab specimens. This test is for in vitro diagnostic use under the FDA EUA for laboratories certified under CLIA to perform high complexity testing. This test has not been FDA cleared or approved. A  negative result does not rule out the presence of PCR inhibitors in the specimen or target RNA concentration below the limit of detection for the assay. The possibility of a false negative should be considered if the patient's recent exposure or clinical presentation suggests COVID-19. This test was validated by the [facility]. This laboratory is certified under the Clinical Laboratory Improvement Amendments (CLIA) as qualified to perform high complexity laboratory testing.           MD Eddy Rudolph Christopher James, MD  02/18/24 0600

## 2024-02-18 NOTE — CONSULTS
"Diagnostic Evaluation Consultation  Crisis Assessment    Patient Name: Hamzah Roberts  Age:  38 year old  Legal Sex: male  Gender Identity: male  Pronouns:   Race: White  Ethnicity: Not  or   Language: English      Patient was assessed: Virtual: [facility] LS15 Crisis Assessment Start Time: 1630 Crisis Assessment Stop Time: 1700  Patient location: [facility] EMERGENCY DEPT                             ED05    Referral Data and Chief Complaint  Hamzah Roberts presents to the ED with family/friends. Patient is presenting to the ED for the following concerns: Substance use, Paranoia.   Factors that make the mental health crisis life threatening or complex are:  Pt presents to ED last evening after leaving [facility] with complaint that the hospital was too busy.  Pt has spent most of the day asleep per nursing staff.  Pt reports daily methamphetamine use for past week after period of sobriety.  Pt was in sober living and using OP treatment until relapse.  Pt complains of auditory and visual hallucinaitons along with paranoia.  Pt states he hears voices yelling \"help me\" and is unsure where they are coming from.  He states [place] has heard these [place] for help (November 2023).  Pt reports the voices get worse once he gets outside.  Pt states he feels \"everyone\" is trying to kill him and feel he is being watched and followed.  Pt's halluncinations are described as seeing people's faces \"giving weird signals\" to each other.  Pt reports he hadn't eaten or slep in about 5 days until coming to hospital.-  Pt denies suicidal ideation, however, collateral contact with pt's sister states pt was threatening to jump off bridge 2 nights ago and that pt has hx of lying in road waiting for a vehicle to run him over.  Pt denies current homicidal ideation but adds, \"unless they do something to hurt me\".  Pt reports he has been off of Zyprexa for " "about one month.  Pt is agreeable to considering voluntary hospitalization.  Pt states it \"takes weeks\" for the psychotic symptoms to wean after the meth use.  Describes his mood as \"miserable\" when he is using..      Informed Consent and Assessment Methods  Explained the crisis assessment process, including applicable information disclosures and limits to confidentiality, assessed understanding of the process, and obtained consent to proceed with the assessment.  Assessment methods included conducting a formal interview with patient, review of medical records, collaboration with medical staff, and obtaining relevant collateral information from family and community providers when available.  : done     Patient response to interventions: acceptance expressed  Coping skills were attempted to reduce the crisis:  substance abuse, going to ED, called family     History of the Crisis   Hx of paranoid schizophrenia, methamphetamine abuse, substance induced psychosis. Pt works with , medication provider, . Was previous attending [facility] treatment facility but reports \"quitting\" after recent relapse. Pt's primary drug of choice is methamphetamine. Pt has legal history, currently working with a . Pt is currently on MI/CD commitment. Hx of attending several treatment facilities and detox. Numerous admissions in the past, most recently 3/28/23.    Brief Psychosocial History  Family:  Single, Children yes  Support System:  Other (specify) (, probabtion officer)  Employment Status:  unemployed  Source of Income:  unable to assess  Financial Environmental Concerns:     Current Hobbies:  family functions  Barriers in Personal Life:  emotional concerns, transportation concerns    Significant Clinical History  Current Anxiety Symptoms:  anxious, excessive worry, racing thoughts  Current Depression/Trauma:  impaired decision making, helplessness, hopelessness, " "sadness  Current Somatic Symptoms:  anxious  Current Psychosis/Thought Disturbance:  auditory hallucinations, visual hallucinations, high risk behavior, impulsive  Current Eating Symptoms:  loss of appetite  Chemical Use History:  Alcohol: None  Benzodiazepines: None  Opiates: None  Cocaine: None  Last Use::  (NOT SURE WILL LAST USE)  Marijuana: Occasional  Other Use: Methamphetamines  Last Use:: 02/15/24   Past diagnosis:  Schizophrenia, Substance Use Disorder  Family history:  Depression, Anxiety Disorder  Past treatment:  Case management, Civil Commitment, Primary Care, Psychiatric Medication Management, Inpatient Hospitalization, Residential Treatment  Details of most recent treatment:  [facility], no longer attending per pt report  Other relevant history:  Patient in distress by ongoing psychosis.       Collateral Information  Is there collateral information: Yes     Collateral information name, relationship, phone number:  Angie Bowman, patient's sister @ [phone]    What happened today: Pt threatening to jump off bridge 2 nights ago, pt's \"mind is not right\", feels he is unsafe at this time     What is different about patient's functioning: using currently, expressing suicidal ideation     Concern about alcohol/drug use:      What do you think the patient needs:      Has patient made comments about wanting to kill themselves/others: yes    If d/c is recommended, can they take part in safety/aftercare planning:       Additional collateral information:        Risk Assessment  [place] Suicide Severity Rating Scale Full Clinical Version:  Suicidal Ideation  Q6 Suicide Behavior (Lifetime): no          [place] Suicide Severity Rating Scale Recent:   Suicidal Ideation (Recent)  Q1 Wished to be Dead (Past Month): no  Q2 Suicidal Thoughts (Past Month): no  Intensity of Ideation (Recent)  Most Severe Ideation Rating (Past 1 Month): 1  Suicidal Behavior (Recent)  Actual Attempt (Past 3 Months): No  Total Number of " Actual Attempts (Past 3 Months): 0  Actual Attempt Description (Past 3 Months): 0  Has subject engaged in non-suicidal self-injurious behavior? (Past 3 Months): No  Total Number of Interrupted Attempts (Past 3 Months): 0  Interrupted Attempt Description (Past 3 Months): 0  Aborted or Self-Interrupted Attempt (Past 3 Months): No    Environmental or Psychosocial Events: legal issues such as DWI, DUI, lawsuit, CPS involvement, etc., ongoing abuse of substances, helplessness/hopelessness  Protective Factors: Protective Factors: strong bond to family unit, community support, or employment, sense of belonging    Does the patient have thoughts of harming others? Feels Like Hurting Others: no  Previous Attempt to Hurt Others: no  Current presentation: Confused  Violence Threats in Past 6 Months: no  Is the patient engaging in sexually inappropriate behavior?: no  Duty to warn initiated: no    Is the patient engaging in sexually inappropriate behavior?  no        Mental Status Exam   Affect: Blunted  Appearance: Appropriate  Attention Span/Concentration: Attentive  Eye Contact: Engaged    Fund of Knowledge: Appropriate   Language /Speech Content: Fluent  Language /Speech Volume: Normal  Language /Speech Rate/Productions: Normal  Recent Memory: Variable  Remote Memory: Variable  Mood: Apathetic  Orientation to Person: Yes   Orientation to Place: Yes  Orientation to Time of Day: No  Orientation to Date: No     Situation (Do they understand why they are here?): Yes  Psychomotor Behavior: Other (please comment) (jaw and neck clenching)  Thought Content: Paranoia, Hallucinations  Thought Form: Paranoia     Mini-Cog Assessment  Number of Words Recalled:    Clock-Drawing Test:     Three Item Recall:    Mini-Cog Total Score:       Medication  Psychotropic medications:   Medication Orders - Psychiatric (From admission, onward)      Start     Dose/Rate Route Frequency Ordered Stop    02/17/24 0800  nicotine (NICODERM CQ) 7 MG/24HR 24  hr patch 1 patch         1 patch  over 24 Hours Transdermal DAILY 02/16/24 2226 02/16/24 2220  OLANZapine zydis (zyPREXA) ODT tab 10 mg         10 mg Oral 2 TIMES DAILY PRN 02/16/24 2220 02/16/24 2200  OLANZapine (zyPREXA) tablet 10 mg         10 mg Oral AT BEDTIME 02/16/24 2153               Current Care Team  Patient Care Team:  No Ref-Primary, Physician as PCP - Brandon Gunn as Nursing Assistant    Diagnosis  Patient Active Problem List   Diagnosis Code    Suicidal ideation R45.851    Polysubstance abuse (H) F19.10    Paranoid schizophrenia (H) F20.0    Paranoia (H) F22    Methamphetamine abuse (H) F15.10    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    Amphetamine use disorder, severe, 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    Anxiety F41.9    Episodic mood disorder (H24) F39    Gastroesophageal reflux disease K21.9    Left ankle pain, unspecified chronicity M25.572    Psychosis, atypical (H) F29    Recurrent genital herpes A60.00    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    Hallucinations R44.3    Encounter for medication refill Z76.0    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    Methamphetamine use disorder, moderate, in sustained remission, in controlled environment, dependence (H) F15.21    Methamphetamine use disorder, severe (H) F15.20    Schizoaffective disorder, bipolar type (H) F25.0    Schizoaffective disorder, chronic condition with acute exacerbation (H) F25.9    Schizoaffective disorder, depressive type (H) F25.1    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.30, S09.90XS    Auditory hallucinations R44.0    Substance-induced psychotic disorder with hallucinations (H) F19.951    Altered mental status, unspecified altered mental status type R41.82    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  Pt reports recent use of methamphetamines and is not taking prescribed medication.  Pt denies SI or HI at this time, however, family contact reports pt was threatening to jump off bridge 2 days ago.  Family notes concern for pt's immediate safety due to psychotic state and history of unsafe behaviors.  It is the recommendation of this clinician that pt admit to [facility] for safety and stabilizaiton.  Pt displays the following risk factors:  noncompliant with medication, active substance use, current symptoms of psychosis, feeling unsafe, unable to identify current coping strategies, per family hx of suicide threats and behavior, Pt without current residence and capacity to properly care for self (not eating or sleeping for 5 days).          Severe psychiatric, behavioral or other comorbid conditions are appropriate for management at inpatient mental health as indicated by at least one of the following: Psychiatric Symptoms, Comorbid substance use disorder, Impaired impulse control, judgement, or insight  Severe dysfunction in daily living is present as indicated by at least one of the following: Other evidence of severe dysfunction  Situation and expectations are appropriate for inpatient care: Around-the-clock medical and nursing care to address symptoms and initiate intervention is required  Inpatient mental health services are necessary to meet patient needs and at least one of the following: Specific condition related to admission diagnosis is present and judged likely to further improve at proposed level of care      Patient coping skills attempted to reduce the crisis:  substance abuse, going to ED, called family    Disposition  Recommended disposition: Other. please  comment        Reviewed case and recommendations with attending provider. Attending Name: Dr Cortes       Attending concurs with disposition: yes       Patient and/or validated legal guardian concurs with disposition:   yes       Final disposition:  inpatient mental health    Legal status on admission: Voluntary/Patient has signed consent for treatment    Assessment Details   Total duration spent with the patient: 30 min     CPT code(s) utilized: 63123 - Psychotherapy for Crisis - 60 (30-74*) min    Cammie Mcdaniels Psychotherapist  DEC - Triage & Transition Services  Callback: [phone]

## 2024-02-18 NOTE — ED NOTES
Writer called [facility] [phone], and left message for pt's  (contracted for [facility]) regarding status of pt.  Cammie Mcdaniels, [facility]   [facility]

## 2024-02-18 NOTE — TELEPHONE ENCOUNTER
[place]: [place]  Access Inpatient Bed Call Log  2/18/24 @ 1:00am   Intake has called facilities that have not updated their bed status within the last 12 hours.??     *METRO:  [place] -- [facility]: @ cap per website.  [place] -- [facility]:  @ cap per website.  [place] -- [facility]: @ cap per website.  [place] -- [facility]: @ cap per website. Low acuity only.  [place] -- [facility]: @ cap per website.  [facility]: @ cap per website.   [facility]/ beds - POSTING 3 BEDS. Ages 18-25, Voluntary only, NO aggression/physical/sexual assault, violence hx or drug abuse, or psychosis. Negative Covid.   [place] -- [facility]: @ cap per website.  [place] -- [facility]: @ cap per website.  [place] -- [facility]: @ cap per website. Not reviewing until 10AM.    *STATEWIDE (by distance):  [facility] - @ cap per website. Mixed unit/Low acuity only.   [facility] - @ cap per website. Low acuity, No aggression  [facility] -- @ cap per website.   [facility] - POSTING 1 BED. Low acuity only. No current aggression.  [facility] - POSTING 5 BEDS. Negative Covid. Lower acuity only.  [facility] - @ cap per website. Low acuity only. Prefer med-adjustment placements.  [facility] - @ cap per website.  No aggression.  [place] - [facility]: @ cap per website. No aggressive behaviors.   [place] -- [facility]: POSTING 7 BEDS.  No hx of aggression. No sexual offenders. Voluntary patients only.  [place] -- [facility]: POSTING 9 BEDS. Low acuity only. Must be able to do programming. No aggression/violent behavior in 2 years. No CD treatment.  [place] [facility]. Negative Covid test. Must be low acuity ONLY.  [place] -- [facility]: @ cap per website. Low acuity. Negative Covid.   [place] -- [facility]: POSTING 3 BEDS. Covid Neg. Voluntary only. Mixed unit; no  aggression/violent behavior. No registered sex offenders.   [place] -- [facility]: POSTING 6 BEDS. Negative Covid. LOW ACUITY ONLY AT THIS TIME  [facility]: @ [facility] per website. No hx of aggression/assault. No lines, drains or tubes. Does not provide detox or CD treatment.   [place] -- [facility]: POSTING 5 BEDS. Negative COVID. No medical devices.   **[place] -- [facility]: POSTING 16 BEDS. Out-of-State Facility.     Pt remains on waitlist pending appropriate placement availability

## 2024-02-18 NOTE — TELEPHONE ENCOUNTER
[facility] Access Inpatient Bed Call Log 2/18/2024 7:05:24 AM    Intake has called facilities that have not updated their bed status within the last 12 hours. (Anywhere)    [facility] is posting 0 beds.              [facility] is posting 0 beds. [phone]    [place] is posting 0 beds. [phone]              [facility] is posting 0 beds. [phone]   [facility] is posting 0 beds. [phone]            [facility] is posting 0 beds. [phone]   [facility] (These are Young Adult beds, 18-26) is posting 3 beds. Negative COVID test required, no recent or significant aggression, violence, or sexual assault. [phone] 2/18 Pt not appropriate d/t unit age restrictions.  [facility] is posting 0 beds. [phone]            [facility] is posting 0 beds. [phone]     [facility] is posting 0 beds. [phone]        [facility] is posting 0 beds. Mixed unit 12+. Low acuity only. [phone]      [facility] is positing 0 beds. No aggression.  [phone]         [facility] is posting 0 beds. [phone] 2/18 7:15a Per Keri, no beds available.     [facility] is posting 5 beds. Low acuity only. [phone]      [facility] is posting 0 beds. Low acuity. No current aggression. [phone]   [facility] is posting 0 beds. Low acuity. [phone]   [facility] is posting 0 beds. 72 HH preferred. Low acuity. [phone] 2/18 7:18a Per Kandi, @ capacity. Can CB tomorrow.  [facility] is posting 0 beds. Low acuity. [phone]      [facility] is posting 7 beds. Vol only, No Hx of aggression, violence, or assault. No sexual offenders. No 72 hr holds. [phone]     [facility] is posting 9 beds. (Must have the cognitive ability to do programming. No aggressive or violent behavior or recent HX in the last 2 yrs. MH must be primary.) [phone]  [facility]  [facility] is posting 3 beds. Low acuity only. Violence and aggression capped. [phone]     [facility] is posting 0 beds. Low acuity, Neg Covid. [phone] 7:19a Per Dalia, 2 open beds available.  [facility] is posting 3 beds. Covid neg. Vol only. Adult unit. No aggressive or violent behavior. No registered sex offenders. [phone] 7:21a Per Julio, beds available.  [facility] posting 6 beds. Negative covid.  2/18- HI decl. d/t high paranoia [facility] is posting 0 beds. [phone] no wounds, lines, drains, C-paps, tubes and must be able to care for themselves; no heavy hx of aggression. Pt also needs a confirmed ride from facility upon d/c. 2/18 7:23a Per Melissa, @ capacity.   [facility] is posting 16 beds. Call for details. [phone] OUT OF STATE 7:13a Per Rossi beds available for adol and adults, no child.  [facility] is posting 5 beds. Mixed unit.  [phone] 7:24a Per will Almanzar. High and low acuity available.    8:08 AM Per call to Pernell at [facility] will review to fax clinical.                    8:22 AM Fax sent to Heather Gutierrez for review.    Pt remains on work list pending appropriate bed availability.

## 2024-03-16 ENCOUNTER — HOSPITAL ENCOUNTER (EMERGENCY)
Facility: HOSPITAL | Age: 39
Discharge: HOME | End: 2024-03-17
Attending: EMERGENCY MEDICINE | Admitting: EMERGENCY MEDICINE
Payer: COMMERCIAL

## 2024-03-16 DIAGNOSIS — R07.9 CHEST PAIN, UNSPECIFIED TYPE: ICD-10-CM

## 2024-03-16 DIAGNOSIS — F17.200 TOBACCO USE DISORDER: ICD-10-CM

## 2024-03-16 DIAGNOSIS — R09.1 PLEURISY: ICD-10-CM

## 2024-03-16 LAB
ERYTHROCYTE [DISTWIDTH] IN BLOOD BY AUTOMATED COUNT: 12.4 % (ref 10–15)
HCT VFR BLD AUTO: 46.1 % (ref 40–53)
HGB BLD-MCNC: 16 G/DL (ref 13.3–17.7)
MCH RBC QN AUTO: 32 PG (ref 26.5–33)
MCHC RBC AUTO-ENTMCNC: 34.7 G/DL (ref 31.5–36.5)
MCV RBC AUTO: 92 FL (ref 78–100)
PLATELET # BLD AUTO: 265 10E3/UL (ref 150–450)
RBC # BLD AUTO: 5 10E6/UL (ref 4.4–5.9)
WBC # BLD AUTO: 15.6 10E3/UL (ref 4–11)

## 2024-03-16 PROCEDURE — 85027 COMPLETE CBC AUTOMATED: CPT | Performed by: EMERGENCY MEDICINE

## 2024-03-16 PROCEDURE — 80048 BASIC METABOLIC PNL TOTAL CA: CPT | Performed by: EMERGENCY MEDICINE

## 2024-03-16 PROCEDURE — 250N000013 HC RX MED GY IP 250 OP 250 PS 637: Performed by: EMERGENCY MEDICINE

## 2024-03-16 PROCEDURE — 85379 FIBRIN DEGRADATION QUANT: CPT | Performed by: EMERGENCY MEDICINE

## 2024-03-16 PROCEDURE — 93005 ELECTROCARDIOGRAM TRACING: CPT | Performed by: EMERGENCY MEDICINE

## 2024-03-16 PROCEDURE — 36415 COLL VENOUS BLD VENIPUNCTURE: CPT | Performed by: EMERGENCY MEDICINE

## 2024-03-16 PROCEDURE — 99285 EMERGENCY DEPT VISIT HI MDM: CPT | Mod: 25

## 2024-03-16 PROCEDURE — 84484 ASSAY OF TROPONIN QUANT: CPT | Performed by: EMERGENCY MEDICINE

## 2024-03-16 RX ORDER — ASPIRIN 81 MG/1
324 TABLET, CHEWABLE ORAL ONCE
Status: COMPLETED | OUTPATIENT
Start: 2024-03-17 | End: 2024-03-16

## 2024-03-16 RX ADMIN — ASPIRIN 324 MG: 81 TABLET, CHEWABLE ORAL at 23:46

## 2024-03-16 ASSESSMENT — COLUMBIA-SUICIDE SEVERITY RATING SCALE - C-SSRS
1. IN THE PAST MONTH, HAVE YOU WISHED YOU WERE DEAD OR WISHED YOU COULD GO TO SLEEP AND NOT WAKE UP?: NO
6. HAVE YOU EVER DONE ANYTHING, STARTED TO DO ANYTHING, OR PREPARED TO DO ANYTHING TO END YOUR LIFE?: NO
2. HAVE YOU ACTUALLY HAD ANY THOUGHTS OF KILLING YOURSELF IN THE PAST MONTH?: NO

## 2024-03-17 ENCOUNTER — APPOINTMENT (OUTPATIENT)
Dept: RADIOLOGY | Facility: HOSPITAL | Age: 39
End: 2024-03-17
Attending: EMERGENCY MEDICINE
Payer: COMMERCIAL

## 2024-03-17 VITALS
WEIGHT: 162.3 LBS | SYSTOLIC BLOOD PRESSURE: 148 MMHG | HEIGHT: 63 IN | RESPIRATION RATE: 18 BRPM | OXYGEN SATURATION: 92 % | TEMPERATURE: 97.8 F | DIASTOLIC BLOOD PRESSURE: 97 MMHG | HEART RATE: 103 BPM | BODY MASS INDEX: 28.76 KG/M2

## 2024-03-17 LAB
ANION GAP SERPL CALCULATED.3IONS-SCNC: 9 MMOL/L (ref 7–15)
BUN SERPL-MCNC: 11.3 MG/DL (ref 6–20)
CALCIUM SERPL-MCNC: 10.1 MG/DL (ref 8.6–10)
CHLORIDE SERPL-SCNC: 103 MMOL/L (ref 98–107)
CREAT SERPL-MCNC: 1.06 MG/DL (ref 0.67–1.17)
D DIMER PPP FEU-MCNC: <0.27 UG/ML FEU (ref 0–0.5)
DEPRECATED HCO3 PLAS-SCNC: 28 MMOL/L (ref 22–29)
EGFRCR SERPLBLD CKD-EPI 2021: >90 ML/MIN/1.73M2
GLUCOSE SERPL-MCNC: 146 MG/DL (ref 70–99)
POTASSIUM SERPL-SCNC: 3.8 MMOL/L (ref 3.4–5.3)
SODIUM SERPL-SCNC: 140 MMOL/L (ref 135–145)
TROPONIN T SERPL HS-MCNC: 8 NG/L

## 2024-03-17 PROCEDURE — 71046 X-RAY EXAM CHEST 2 VIEWS: CPT

## 2024-03-17 ASSESSMENT — ACTIVITIES OF DAILY LIVING (ADL): ADLS_ACUITY_SCORE: 35

## 2024-03-17 NOTE — DISCHARGE INSTRUCTIONS
You were seen in the emergency department for chest pain.    Likely talked about, your labs, EKG, and x-ray all looked okay today.  It is possible that your pain is related to inflammation of the lining of your lungs.  Sometimes this is caused by a virus.  I would recommend that you try taking Tylenol and ibuprofen to see if this helps.    Please return to the Emergency Department immediately if you experience chest pain, difficulty breathing, and/or if your symptoms get worse, do not improve, or with any new concerns. Otherwise, please follow up with your primary physician within the next 2-3 days for recheck and ongoing management.    I am sending you with a referral to see cardiology through the rapid access clinic. Someone should be calling you to arrange a follow up appointment.     Below is some information that you might find informative and useful.     Thank you for choosing [facility]. It was a pleasure taking care of you today!  -Dr. Parisa Jauregui

## 2024-03-17 NOTE — ED TRIAGE NOTES
Patient arrives from home. Reports midchest/epigastric pain that started this morning. Worse with cough and deep breaths. No hx of heart problems.      Triage Assessment (Adult)       Row Name 03/16/24 [phone]          Triage Assessment    Airway WDL WDL        Respiratory WDL    Respiratory WDL WDL        Cardiac WDL    Cardiac WDL X;chest pain        Cognitive/Neuro/Behavioral WDL    Cognitive/Neuro/Behavioral WDL WDL

## 2024-03-17 NOTE — ED PROVIDER NOTES
"EMERGENCY DEPARTMENT ENCOUNTER      NAME: Hamzah Roberts  YOB: 1985  MRN: [id number]    FINAL IMPRESSION  1. Chest pain, unspecified type    2. Pleurisy    3. Tobacco use disorder        MEDICAL DECISION MAKING   Pertinent Labs & Imaging studies reviewed. (See chart for details)    Hamzah Roberts is a 38 year old male who presents for evaluation of chest pain.  Records reviewed.  Patient has a history of polysubstance abuse and paranoid schizophrenia.  He has been seen at outside ED's for evaluation of mental health complaints but has not had any recent visits or admissions for medical/cardiac.  He presents to the ED today with complaints of chest pain that began earlier this morning  It has been persistent throughout the day.  He locates discomfort over anterior chest and notes that it is exacerbated with deep inspiration.  He took his usual Zyprexa but no other medications.  He has a family history of cardiac disease but has never been diagnosed with any and himself.  He does note that he has \"clubbed fingers which are a sign of heart problems.\"  Additionally, he does smoke cigarettes.  No associated dyspnea, significant cough, fever, abdominal pain, diaphoresis, nausea.  No mental health concerns.  Vitals on arrival notable for slightly elevated blood pressure and mild tachycardia with heart rate of 103.    I considered a broad differential including but not limited to ACS/ischemia, unstable angina, CHF, pericarditis, myocarditis, pericardial effusion, PE, viral illness, pneumonia, aortic dissection, pneumothorax, costochondritis, pleurisy, GERD, muscular strain/sprain, anxiety, anemia. No hypoxia, pleuritic pain, tachypnea, or other 2/2 to suggest PE. Unable to PERC out 2/2 to tobacco use and tachycardia but is low risk by Wells. Low suspicion for AAA/dissection given history and exam.  Orders placed for labs, EKG.  Will proceed with either chest x-ray or CT scan depending on results of D-dimer.  " Patient declined offer for analgesic but was amenable to dose of aspirin.    D-dimer negative. With this, I have low suspicion for PE and do not believe further workup with CT PE study necessary. Will proceed with CXR.  High sensitivity troponin below age adjusted cutoff. ACS less likely in the setting of ECG without acute ischemic changes and I do not feel delta troponin necessary given timing of symptoms.  CBC with mild leukocytosis.  I suspect this is related to inflammatory/stress response.  No acute anemia.  I independently reviewed chest x-ray which showed no obvious pneumothorax, infiltrate, pulmonary edema, or displaced rib fractures.      I rechecked the patient multiple times.  He had slight improvement after aspirin and continued to decline offer for additional analgesic.  I reviewed results and he felt reassured.  At this point, etiology of symptoms is unclear but I do have some concern for viral illness, pleurisy, or costochondritis.  Given his tobacco use and family history of cardiac disease, I did offer admission for observation and/or referral to rapid access clinic.  He was not registered in any further workup or admission but was interested in following up with cardiology team.  I recommended continued conservative management of symptoms and if possible, working on cessation of tobacco use.    Strict return precautions and follow up recommendations were discussed and all questions were answered. Patient endorsed understanding and was in agreement with plan.    I independently reviewed CXR (as noted above), formal radiologist read pending.      Medical Decision Making  Obtained supplemental history:Supplemental history obtained?: No  Reviewed external records: External records reviewed?: Documented in chart  Care impacted by chronic illness:N/A  Care significantly affected by social determinants of health:Access to Medical Care and Other: Chemical dependency  Did you consider but not order tests?:  Work up considered but not performed and documented in chart, if applicable  Did you interpret images independently?: Independent interpretation of ECG and images noted in documentation, when applicable.  Consultation discussion with other provider:Did you involve another provider (consultant, , pharmacy, etc.)?: No  Discharge. I recommended the patient continue their current prescription strength medication(s): Zyprexa. I considered admission, but discharged the patient after share decision making conversation.      ED COURSE  11:33 PM I met with the patient, obtained history, performed an initial exam, and discussed options and plan for diagnostics and treatment here in the ED.   12:48 AM I rechecked the patient.  1:01 AM I updated the patient regarding their work-up findings. \        MEDICATIONS GIVEN IN THE ED  Medications   aspirin (ASA) chewable tablet 324 mg (324 mg Oral $Given 3/16/24 2624)       NEW PRESCRIPTIONS STARTED AT TODAY'S VISIT  Discharge Medication List as of 3/17/2024  1:25 AM             =================================================================    Chief Complaint   Patient presents with    Chest Pain         HPI:    Patient information was obtained from: The patient    Use of : N/A     Hamzah Roberts is a 38 year old male who presents with chest pain.    The patient is complaining of chest discomfort since 4 AM this morning and has been constant all day. His discomfort worsens with deep breaths and coughing fits. His pain does not radiate anywhere else. He took a dose of Zyprexa today but has not taken any medication for his chest pain. He reports he felt nauseous and light-headed 2 days ago but has subsided since. He reports having a personal history of clubbed fingers. He reports his father had cardiac diseases. Denies any recent travels. He is a smoker.    Otherwise, the patient denied having headache, leg swelling, fevers, abdominal pain, shortness of breath, and any  "other medical complaints or concerns at this time.        RELEVANT HISTORY, MEDICATIONS, & ALLERGIES   Past medical history, surgical history, family history, medications, and allergies reviewed and pertinent noted in HPI.    REVIEW OF SYSTEMS:  A complete review of systems was performed with pertinent positives and negatives noted in the HPI. All other systems negative.     PHYSICAL EXAM:    Vitals: BP (!) 148/97   Pulse 103   Temp 97.8  F (36.6  C) (Oral)   Resp 18   Ht 1.6 m (5' 3\")   Wt 73.6 kg (162 lb 4.8 oz)   SpO2 92%   BMI 28.75 kg/m     General: Alert and interactive, comfortable appearing.  HENT: Atraumatic. Full AROM of neck. Conjunctiva clear.   Cardiovascular: Regular rate and rhythm.   Chest/Pulmonary: Normal work of breathing. Speaking in complete sentences. Lungs CTAB. No chest wall tenderness or deformities.  Abdomen: Soft, nondistended. Nontender without guarding or rebound.  Extremities: Normal AROM of all major joints.  Clubbing of fingers of bilateral upper extremities  Skin: Warm and dry. Normal skin color.   Neuro: Speech clear. CNs grossly intact. Moves all extremities spontaneously.   Psych: Normal affect/mood, cooperative, memory appropriate.    LAB  Labs Ordered and Resulted from Time of ED Arrival to Time of ED Departure   BASIC METABOLIC PANEL - Abnormal       Result Value    Sodium 140      Potassium 3.8      Chloride 103      Carbon Dioxide (CO2) 28      Anion Gap 9      Urea Nitrogen 11.3      Creatinine 1.06      GFR Estimate >90      Calcium 10.1 (*)     Glucose 146 (*)    CBC WITH PLATELETS - Abnormal    WBC Count 15.6 (*)     RBC Count 5.00      Hemoglobin 16.0      Hematocrit 46.1      MCV 92      MCH 32.0      MCHC 34.7      RDW 12.4      Platelet Count 265     D DIMER QUANTITATIVE - Normal    D-Dimer Quantitative <0.27     TROPONIN T, HIGH SENSITIVITY - Normal    Troponin T, High Sensitivity 8         RADIOLOGY  XR Chest 2 Views   Final Result   IMPRESSION:       No " focal airspace disease. No pleural effusion or pneumothorax.      The cardiomediastinal silhouette is unremarkable.          EKG  Performed at: 1628  Impression: Sinus tachycardia.  No acute ischemic changes.  Normal intervals.  No significant change from previous.  Rate: 103  Rhythm: Sinus   QRS Interval: 86  QTc Interval: 463  Comparison: 9/5/2008    All laboratory and imaging results and EKG's were personally reviewed and interpreted by myself prior to disposition decision.         I, Mg Mosquera, am serving as a scribe to document services personally performed by Dr. Parisa Jauregui based on my observation and the provider's statements to me. I, Parisa Jauregui MD attest that Mg Mosquera is acting in a scribe capacity, has observed my performance of the services and has documented them in accordance with my direction.    Parisa Jauregui M.D.  Emergency Medicine  [facility] EMERGENCY DEPARTMENT  [facility] [facility] [address]  [phone]  Dept: [phone]     Parisa Jauregui MD  03/17/24 0517

## 2024-03-18 LAB
ATRIAL RATE - MUSE: 104 BPM
DIASTOLIC BLOOD PRESSURE - MUSE: NORMAL MMHG
INTERPRETATION ECG - MUSE: NORMAL
P AXIS - MUSE: 74 DEGREES
PR INTERVAL - MUSE: 130 MS
QRS DURATION - MUSE: 80 MS
QT - MUSE: 318 MS
QTC - MUSE: 418 MS
R AXIS - MUSE: 54 DEGREES
SYSTOLIC BLOOD PRESSURE - MUSE: NORMAL MMHG
T AXIS - MUSE: 20 DEGREES
VENTRICULAR RATE- MUSE: 104 BPM

## 2024-03-19 ENCOUNTER — HOSPITAL ENCOUNTER (EMERGENCY)
Facility: HOSPITAL | Age: 39
Discharge: HOME | End: 2024-03-19
Attending: EMERGENCY MEDICINE | Admitting: EMERGENCY MEDICINE
Payer: COMMERCIAL

## 2024-03-19 VITALS
HEART RATE: 104 BPM | WEIGHT: 156 LBS | HEIGHT: 63 IN | TEMPERATURE: 98.1 F | SYSTOLIC BLOOD PRESSURE: 140 MMHG | OXYGEN SATURATION: 97 % | RESPIRATION RATE: 20 BRPM | BODY MASS INDEX: 27.64 KG/M2 | DIASTOLIC BLOOD PRESSURE: 82 MMHG

## 2024-03-19 DIAGNOSIS — K62.5 RECTAL BLEEDING: ICD-10-CM

## 2024-03-19 DIAGNOSIS — R44.3 HALLUCINATIONS: ICD-10-CM

## 2024-03-19 LAB
ALBUMIN UR-MCNC: NEGATIVE MG/DL
ANION GAP SERPL CALCULATED.3IONS-SCNC: 13 MMOL/L (ref 7–15)
APPEARANCE UR: CLEAR
BASOPHILS # BLD AUTO: 0 10E3/UL (ref 0–0.2)
BASOPHILS NFR BLD AUTO: 0 %
BILIRUB UR QL STRIP: NEGATIVE
BUN SERPL-MCNC: 9.2 MG/DL (ref 6–20)
CALCIUM SERPL-MCNC: 9.4 MG/DL (ref 8.6–10)
CHLORIDE SERPL-SCNC: 104 MMOL/L (ref 98–107)
COLOR UR AUTO: ABNORMAL
CREAT SERPL-MCNC: 1.04 MG/DL (ref 0.67–1.17)
DEPRECATED HCO3 PLAS-SCNC: 24 MMOL/L (ref 22–29)
EGFRCR SERPLBLD CKD-EPI 2021: >90 ML/MIN/1.73M2
EOSINOPHIL # BLD AUTO: 0 10E3/UL (ref 0–0.7)
EOSINOPHIL NFR BLD AUTO: 0 %
ERYTHROCYTE [DISTWIDTH] IN BLOOD BY AUTOMATED COUNT: 12.1 % (ref 10–15)
GLUCOSE SERPL-MCNC: 119 MG/DL (ref 70–99)
GLUCOSE UR STRIP-MCNC: 50 MG/DL
HCT VFR BLD AUTO: 43.8 % (ref 40–53)
HGB BLD-MCNC: 15.4 G/DL (ref 13.3–17.7)
HGB UR QL STRIP: NEGATIVE
IMM GRANULOCYTES # BLD: 0 10E3/UL
IMM GRANULOCYTES NFR BLD: 0 %
KETONES UR STRIP-MCNC: NEGATIVE MG/DL
LEUKOCYTE ESTERASE UR QL STRIP: NEGATIVE
LYMPHOCYTES # BLD AUTO: 2 10E3/UL (ref 0.8–5.3)
LYMPHOCYTES NFR BLD AUTO: 19 %
MCH RBC QN AUTO: 32 PG (ref 26.5–33)
MCHC RBC AUTO-ENTMCNC: 35.2 G/DL (ref 31.5–36.5)
MCV RBC AUTO: 91 FL (ref 78–100)
MONOCYTES # BLD AUTO: 0.8 10E3/UL (ref 0–1.3)
MONOCYTES NFR BLD AUTO: 8 %
NEUTROPHILS # BLD AUTO: 7.3 10E3/UL (ref 1.6–8.3)
NEUTROPHILS NFR BLD AUTO: 73 %
NITRATE UR QL: NEGATIVE
NRBC # BLD AUTO: 0 10E3/UL
NRBC BLD AUTO-RTO: 0 /100
PH UR STRIP: 6 [PH] (ref 5–7)
PLATELET # BLD AUTO: 261 10E3/UL (ref 150–450)
POTASSIUM SERPL-SCNC: 3.6 MMOL/L (ref 3.4–5.3)
RBC # BLD AUTO: 4.81 10E6/UL (ref 4.4–5.9)
RBC URINE: <1 /HPF
SODIUM SERPL-SCNC: 141 MMOL/L (ref 135–145)
SP GR UR STRIP: 1 (ref 1–1.03)
UROBILINOGEN UR STRIP-MCNC: <2 MG/DL
WBC # BLD AUTO: 10.1 10E3/UL (ref 4–11)
WBC URINE: 0 /HPF

## 2024-03-19 PROCEDURE — 81001 URINALYSIS AUTO W/SCOPE: CPT | Performed by: EMERGENCY MEDICINE

## 2024-03-19 PROCEDURE — 250N000013 HC RX MED GY IP 250 OP 250 PS 637: Performed by: EMERGENCY MEDICINE

## 2024-03-19 PROCEDURE — 36415 COLL VENOUS BLD VENIPUNCTURE: CPT | Performed by: EMERGENCY MEDICINE

## 2024-03-19 PROCEDURE — 80048 BASIC METABOLIC PNL TOTAL CA: CPT | Performed by: EMERGENCY MEDICINE

## 2024-03-19 PROCEDURE — 99283 EMERGENCY DEPT VISIT LOW MDM: CPT

## 2024-03-19 PROCEDURE — 85025 COMPLETE CBC W/AUTO DIFF WBC: CPT | Performed by: EMERGENCY MEDICINE

## 2024-03-19 RX ORDER — OLANZAPINE 5 MG/1
10 TABLET ORAL ONCE
Status: COMPLETED | OUTPATIENT
Start: 2024-03-19 | End: 2024-03-19

## 2024-03-19 RX ADMIN — OLANZAPINE 10 MG: 5 TABLET, FILM COATED ORAL at 16:21

## 2024-03-19 ASSESSMENT — ACTIVITIES OF DAILY LIVING (ADL): ADLS_ACUITY_SCORE: 33

## 2024-03-19 NOTE — ED TRIAGE NOTES
Patient c/o blood in the urine for 4 days now, feeling lightheaded at times.       Triage Assessment (Adult)       Row Name 03/19/24 1500          Triage Assessment    Airway WDL WDL        Respiratory WDL    Respiratory WDL WDL        Skin Circulation/Temperature WDL    Skin Circulation/Temperature WDL WDL        Cardiac WDL    Cardiac WDL WDL        Peripheral/Neurovascular WDL    Peripheral Neurovascular WDL WDL        Cognitive/Neuro/Behavioral WDL    Cognitive/Neuro/Behavioral WDL WDL

## 2024-03-19 NOTE — DISCHARGE INSTRUCTIONS
You are seen in the emergency department for rectal bleeding.  Your evaluation included blood testing here which showed stable blood counts as well as no evidence of blood in your urine.  As we discussed, usually bleeding like what you are experiencing is the result of a bleeding source inside the rectum like an internal hemorrhoid.  The evaluation for this typically involves a consultation with a gastroenterologist.  We attached the number for [facility].  We would suggest following up there as soon as you can once you have sorted out other concerns about refilling medications and checking into treatment

## 2024-03-19 NOTE — ED PROVIDER NOTES
EMERGENCY DEPARTMENT ENCOUNTER      NAME: Hamzah Roberts  AGE: 38 year old male  YOB: 1985  MRN: [id number]  EVALUATION DATE & TIME: 3/19/2024  3:11 PM    PCP: No Ref-Primary, Physician    ED PROVIDER: Britton Ruiz M.D.      Chief Complaint   Patient presents with    Hematuria         FINAL IMPRESSION:  1. Rectal bleeding    2. Hallucinations          ED COURSE & MEDICAL DECISION MAKIN year old male presents to the Emergency Department for evaluation of rectal bleeding, possible hematuria.  Patient has a history of schizophrenia and substance abuse.  Presenting with concerns about bright red blood per rectum and possible blood in his urine.  He shows me a picture with a small amount of blood in the toilet bowl after wiping.  He does not have any abdominal pain or rectal pain.  He declines an exam however would suspect this is probably related to hemorrhoids or similar source of lower GI bleeding.  Hemoglobin here is stable.  His prior leukocytosis from a few days ago has resolved.  He does report some increase in paranoia and hallucinations, he says he has been unable to fill his usual Zyprexa prescriptions.  He was interested in the dose here.  I am really not that convinced that he is having any true hematuria, his UA here is clear.  We discussed that his lower GI bleeding probably would benefit from some additional workup by gastroenterology but for that now things appear stable.  He is going to check himself into treatment tonight and is talking to his  while here in the emergency department.  Patient was discharged in stable condition.    3:13 PM I met with the patient, obtained history, performed an initial exam, and discussed options and plan for diagnostics and treatment here in the ED.  4:16 PM We discussed plans for discharge including supportive cares, symptomatic treatment, outpatient follow up, and reasons to return to the emergency department.    At the conclusion  "of the encounter I discussed the results of all of the tests and the disposition. The questions were answered. The patient or family acknowledged understanding and was agreeable with the care plan.       Medical Decision Making  Obtained supplemental history:Supplemental history obtained?: No  Reviewed external records: External records reviewed?: Documented in chart  Care impacted by chronic illness:Mental Health  Care significantly affected by social determinants of health:Other:    Did you consider but not order tests?: Work up considered but not performed and documented in chart, if applicable  Did you interpret images independently?: Independent interpretation of ECG and images noted in documentation, when applicable.  Consultation discussion with other provider:Did you involve another provider (consultant, , pharmacy, etc.)?: No  Discharge. No recommendations on prescription strength medication(s). N/A.        MEDICATIONS GIVEN IN THE EMERGENCY:  Medications   OLANZapine (zyPREXA) tablet 10 mg (10 mg Oral $Given 3/19/24 1626)       NEW PRESCRIPTIONS STARTED AT TODAY'S ER VISIT  Discharge Medication List as of 3/19/2024  4:22 PM             =================================================================    HPI    Patient information was obtained from: Patient    Use of : N/A         Hamzah Roberts is a 38 year old male with a pertinent history of schizoaffective disorder, methamphetamine use, paranoia,  recurrent genital herpes, who presents to this ED via walk-in for evaluation of blood in stools and urine.    Patient reports blood in his urine and stool. He also has lightheadedness, heart palpitations, and decreased fluid intake for 4 days. Patient states that his lightheadedness worsens with standing up. He is unsure when he noticed blood in both his stool and urine but states having episodes \"everyday\". He states that he has not been drinking enough fluids because he has schizophrenia and that " "he hasn't been able to  his prescription for it at the pharmacy. He denies rectal pain, abdominal pain, and any other symptoms.      REVIEW OF SYSTEMS   All systems reviewed and negative except as noted in HPI.    PAST MEDICAL HISTORY:  Past Medical History:   Diagnosis Date    Chemical dependency (H)        PAST SURGICAL HISTORY:  History reviewed. No pertinent surgical history.        CURRENT MEDICATIONS:    No current facility-administered medications for this encounter.     Current Outpatient Medications   Medication    OLANZapine (ZYPREXA) 10 MG tablet    OLANZapine (ZYPREXA) 10 MG tablet    omeprazole (PRILOSEC) 20 MG DR capsule    QUEtiapine (SEROQUEL) 50 MG tablet    valACYclovir (VALTREX) 500 MG tablet         ALLERGIES:  Allergies   Allergen Reactions    Morphine Sulfate [Morphine] Shortness Of Breath       FAMILY HISTORY:  No family history on file.    SOCIAL HISTORY:   Social History     Socioeconomic History    Marital status: Single   Tobacco Use    Smoking status: Every Day     Packs/day: .5     Types: Cigarettes   Substance and Sexual Activity    Alcohol use: Never    Drug use: Yes     Types: Methamphetamines     Comment: smokes meth, last use about 12 hours PTA    Sexual activity: Not Currently   Social History Narrative    Aug 2020: Patient admits to meth use.       VITALS:  BP (!) 140/82   Pulse 104   Temp 98.1  F (36.7  C) (Oral)   Resp 20   Ht 1.6 m (5' 3\")   Wt 70.8 kg (156 lb)   SpO2 97%   BMI 27.63 kg/m      PHYSICAL EXAM    Constitutional: Well developed, Well nourished, NAD.  HENT: Normocephalic, Atraumatic. Neck Supple.  Eyes: EOMI, Conjunctiva normal.  Respiratory: Breathing comfortably on room air. Speaks full sentences easily. Lungs clear to ascultation.  Cardiovascular: Normal heart rate, Regular rhythm. No peripheral edema.  Abdomen: Soft, nontender  Rectal: Declined  Musculoskeletal: Good range of motion in all major joints. No major deformities noted.  Integument: " Warm, Dry.  Neurologic: Alert & awake, Normal motor function, Normal sensory function, No focal deficits noted.   Psychiatric: Cooperative. Affect appropriate.     LAB:  All pertinent labs reviewed and interpreted.  Labs Ordered and Resulted from Time of ED Arrival to Time of ED Departure   ROUTINE UA WITH MICROSCOPIC REFLEX TO CULTURE - Abnormal       Result Value    Color Urine Light Yellow      Appearance Urine Clear      Glucose Urine 50 (*)     Bilirubin Urine Negative      Ketones Urine Negative      Specific Gravity Urine 1.005      Blood Urine Negative      pH Urine 6.0      Protein Albumin Urine Negative      Urobilinogen Urine <2.0      Nitrite Urine Negative      Leukocyte Esterase Urine Negative      RBC Urine <1      WBC Urine 0     BASIC METABOLIC PANEL - Abnormal    Sodium 141      Potassium 3.6      Chloride 104      Carbon Dioxide (CO2) 24      Anion Gap 13      Urea Nitrogen 9.2      Creatinine 1.04      GFR Estimate >90      Calcium 9.4      Glucose 119 (*)    CBC WITH PLATELETS AND DIFFERENTIAL    WBC Count 10.1      RBC Count 4.81      Hemoglobin 15.4      Hematocrit 43.8      MCV 91      MCH 32.0      MCHC 35.2      RDW 12.1      Platelet Count 261      % Neutrophils 73      % Lymphocytes 19      % Monocytes 8      % Eosinophils 0      % Basophils 0      % Immature Granulocytes 0      NRBCs per 100 WBC 0      Absolute Neutrophils 7.3      Absolute Lymphocytes 2.0      Absolute Monocytes 0.8      Absolute Eosinophils 0.0      Absolute Basophils 0.0      Absolute Immature Granulocytes 0.0      Absolute NRBCs 0.0         EKG:    None    PROCEDURES:   none      I, Surekha Wood, am serving as a scribe to document services personally performed by Dr. Britton Ruiz, based on my observation and the provider's statements to me. I, Britton Ruiz MD attest that Surekha Wood is acting in a scribe capacity, has observed my performance of the services and has documented them in accordance with my  direction.    Britton Ruiz M.D.  Emergency Medicine  [facility] EMERGENCY DEPARTMENT  [address] [place] [id number] [phone]  Dept: [phone]       Britton Ruiz MD  03/19/24 0874

## 2024-04-03 ENCOUNTER — HOSPITAL ENCOUNTER (EMERGENCY)
Facility: CLINIC | Age: 39
Discharge: HOME | End: 2024-04-04
Attending: EMERGENCY MEDICINE | Admitting: EMERGENCY MEDICINE
Payer: COMMERCIAL

## 2024-04-03 VITALS
DIASTOLIC BLOOD PRESSURE: 62 MMHG | OXYGEN SATURATION: 99 % | TEMPERATURE: 97.6 F | SYSTOLIC BLOOD PRESSURE: 124 MMHG | HEART RATE: 98 BPM | RESPIRATION RATE: 18 BRPM

## 2024-04-03 DIAGNOSIS — F15.10 METHAMPHETAMINE ABUSE (H): ICD-10-CM

## 2024-04-03 DIAGNOSIS — F23 ACUTE PSYCHOSIS (H): ICD-10-CM

## 2024-04-03 PROCEDURE — 99284 EMERGENCY DEPT VISIT MOD MDM: CPT

## 2024-04-03 PROCEDURE — 99284 EMERGENCY DEPT VISIT MOD MDM: CPT | Performed by: EMERGENCY MEDICINE

## 2024-04-03 ASSESSMENT — COLUMBIA-SUICIDE SEVERITY RATING SCALE - C-SSRS
6. HAVE YOU EVER DONE ANYTHING, STARTED TO DO ANYTHING, OR PREPARED TO DO ANYTHING TO END YOUR LIFE?: NO
2. HAVE YOU ACTUALLY HAD ANY THOUGHTS OF KILLING YOURSELF IN THE PAST MONTH?: NO
1. IN THE PAST MONTH, HAVE YOU WISHED YOU WERE DEAD OR WISHED YOU COULD GO TO SLEEP AND NOT WAKE UP?: NO

## 2024-04-04 PROBLEM — F25.9 SCHIZOAFFECTIVE DISORDER, CHRONIC CONDITION WITH ACUTE EXACERBATION (H): Status: ACTIVE | Noted: 2022-11-30

## 2024-04-04 PROCEDURE — 250N000013 HC RX MED GY IP 250 OP 250 PS 637: Performed by: EMERGENCY MEDICINE

## 2024-04-04 RX ORDER — OLANZAPINE 10 MG/1
10 TABLET, ORALLY DISINTEGRATING ORAL ONCE
Status: COMPLETED | OUTPATIENT
Start: 2024-04-04 | End: 2024-04-04

## 2024-04-04 RX ADMIN — OLANZAPINE 10 MG: 10 TABLET, ORALLY DISINTEGRATING ORAL at 01:35

## 2024-04-04 ASSESSMENT — ACTIVITIES OF DAILY LIVING (ADL)
ADLS_ACUITY_SCORE: 35

## 2024-04-04 NOTE — ED NOTES
Adelina Rodriguez would like to speak with DEC when available due to Pts recommitment window closing and needing a reassessment, wondering if this can be done here. [phone]

## 2024-04-04 NOTE — ED PROVIDER NOTES
"    [facility] EMERGENCY DEPARTMENT ([facility])    4/03/24      ED PROVIDER NOTE    History     Chief Complaint   Patient presents with    Hallucinations     Patient reports increase in auditory and visual hallucinations, reports being paranoid. Meth use yesterday.     HPI  Hamzah Roberts is a 38 year old male with PMH notable for schizoaffective disorder, methamphetamine use disorder, who presents to the Emergency Department reporting, \"psychosis.\"  When asked him to elaborate, he states that he is hearing people yelling. He states he does not hear any specific words, but yelling. He does admit to methamphetamine use, but states it has been over 1 day since his last use. Does report history of similar, but states he is not sure if this gets worse with his substance use. No current suicidal ideation but he has had this in the past. He is not complaining of any acute physical complaint such as fever, cough, shortness breath, vomiting or diarrhea.    This part of the document was transcribed by Chang Guzman Medical Scribe.     Past Medical History  Past Medical History:   Diagnosis Date    Chemical dependency (H)      History reviewed. No pertinent surgical history.  OLANZapine (ZYPREXA) 10 MG tablet  OLANZapine (ZYPREXA) 10 MG tablet  omeprazole (PRILOSEC) 20 MG DR capsule  QUEtiapine (SEROQUEL) 50 MG tablet  valACYclovir (VALTREX) 500 MG tablet      Allergies   Allergen Reactions    Morphine Sulfate [Morphine] Shortness Of Breath     Family History  History reviewed. No pertinent family history.  Social History   Social History     Tobacco Use    Smoking status: Every Day     Packs/day: .5     Types: Cigarettes, Vaping Device   Substance Use Topics    Alcohol use: Never    Drug use: Yes     Types: Methamphetamines     Comment: smokes meth, last use about 12 hours PTA         A complete review of systems was performed with pertinent positives and negatives noted in the HPI, and all other systems " negative.    Physical Exam   BP: 124/62  Pulse: 98  Temp: 97.6  F (36.4  C)  Resp: 18  SpO2: 99 %  Physical Exam  Constitutional:       General: He is not in acute distress.     Appearance: Normal appearance. He is not toxic-appearing.   HENT:      Head: Atraumatic.   Eyes:      General: No scleral icterus.     Conjunctiva/sclera: Conjunctivae normal.   Cardiovascular:      Rate and Rhythm: Normal rate.      Heart sounds: Normal heart sounds.   Pulmonary:      Effort: Pulmonary effort is normal. No respiratory distress.      Breath sounds: Normal breath sounds.   Abdominal:      Palpations: Abdomen is soft.      Tenderness: There is no abdominal tenderness.   Musculoskeletal:         General: No deformity.      Cervical back: Neck supple.   Skin:     General: Skin is warm.   Neurological:      Mental Status: He is alert.           ED Course, Procedures, & Data      Procedures              No results found for any visits on 04/03/24.  Medications   OLANZapine zydis (zyPREXA) ODT tab 10 mg (10 mg Oral $Given 4/4/24 0135)     Labs Ordered and Resulted from Time of ED Arrival to Time of ED Departure - No data to display  No orders to display          Critical care was not performed.     Medical Decision Making  The patient's presentation was of moderate complexity (a chronic illness mild to moderate exacerbation, progression, or side effect of treatment).    The patient's evaluation involved:  review of external note(s) from 2 sources (previous notess)    The patient's management necessitated moderate risk (prescription drug management including medications given in the ED).    Assessment & Plan    The patient has had ongoing issues with substance abuse, often methamphetamine.  He admits to using yesterday.  I do suspect that this is playing a role in his symptoms.  He was given a dose of Zyprexa here and we will allow him to rest, see if he has clearing in the morning.  If he is not clearing he will need mental health  assessment.  He is signed out at shift change pending repeat assessment.    Dictation Disclaimer: Some of this Note has been completed with voice-recognition dictation software. Although errors are generally corrected real-time, there is the potential for a rare error to be present in the completed chart.      I have reviewed the nursing notes. I have reviewed the findings, diagnosis, plan and need for follow up with the patient.    New Prescriptions    No medications on file       Final diagnoses:   Acute psychosis (H)   Methamphetamine abuse (H)       Evon Stoll MD  [facility] EMERGENCY DEPARTMENT  4/3/2024        Evon Stoll MD  04/04/24 0709

## 2024-04-04 NOTE — ED NOTES
Writer attempted to meet with patient to complete DEC assessment. Patient was not arousable to voice or touch and was too somnolent for assessment at this time. Please call ED when patient is alert for assessment.

## 2024-04-04 NOTE — ED PROVIDER NOTES
[facility] Mental Health Handoff Note:     7:00 AM received  signout from Dr Stoll pending patient metabolizing methamphetamines  10:30 AM patient walking, using bathroom independently. Called [facility] for DEC assessment.   12:09 AM Patient was seen by DEC , please see consult note for further details. Patient denies any suicidal ideation at this time.  Continues to report having auditory hallucinations, specifically lots of screaming, cars, engaging in high risk behaviors, has never been hurt. No homicidal ideation. Patient states he prefers to return to [facility] to wait.  can meet with him at 1030 am tomorrow. Patient here voluntarily. Doesn't have a place to stay, tends to go to detox, starts to feel better as he isn't using and then leaves because he thinks he does better, then relapses. He doesn't have his own residence but can stay with his girlfriend. Patient states he wants to be on commitment. DEC  doesn't feel we need to pursue recommitment at this time. Agree with DEC .     Stevenson Alanis MD, MD  04/04/24 6018

## 2024-04-04 NOTE — CONSULTS
Diagnostic Evaluation Consultation  Crisis Assessment    Patient Name: Hamzah Roberts  Age:  38 year old  Legal Sex: male  Gender Identity: male  Pronouns:   Race: White  Ethnicity: Not  or   Language: English      Patient was assessed: In person      Patient location: [facility] EMERGENCY DEPARTMENT                                 Referral Data and Chief Complaint  Hamzah Roberts presents to the ED by  self. Patient is presenting to the ED for the following concerns: Paranoia, Anxiety, Substance use.   Factors that make the mental health crisis life threatening or complex are:  Patient presenting to the emergency room reporting ' psychosis', reporting experiencing auditory and visual hallucinations.  Denies command hallucinations, states hearing screaming, yelling, voices asking for help.  Patient also reports hearing cars.  States has been engaging in high risk behavior like running in traffic.  Denies being hit or injured as a result, does state engaging in behavior to harm self.  Patient slept shortly after arriving in the ED overnight and remained sleeping until approximately 11 a.m.  Patient willing to engage in assessment, reports experiencing psychosis.  Last use was 2 days ago, reports using meth.  The amount of meth patient is using daily is approximately 2 g.  Has had multiple substance abuse program admissions, often does not stay past a few days as he reports he starts to feel better and then thinks he is healed and can return to the community.  Patient is wanting to go to the [facility], as it is quieter.  Patient denies HI, denies SI, does report engaging in high risk behavior.  Reports feeling worsening symptoms when he is in the community, especially around more people.  States that he wants to get into treatment.  Stated his girlfriend's last night, indicates others in the home use substances as well making it difficult for him to refrain.  Patient states he has been making an  effort to take his medication more consistently, does acknowledge missing his psychiatry appointment this past week as he was sleeping.  Patient would like to remain at the hospital however does not present as a risk of harm to self or others.  Recommendation at this time is for patient to discharge with appointment with  tomorrow at 1130.  Patient is agreeable to this plan..      Informed Consent and Assessment Methods  Explained the crisis assessment process, including applicable information disclosures and limits to confidentiality, assessed understanding of the process, and obtained consent to proceed with the assessment.  Assessment methods included conducting a formal interview with patient, review of medical records, collaboration with medical staff, and obtaining relevant collateral information from family and community providers when available.  : done     Patient response to interventions: acceptance expressed, verbalizes understanding  Coping skills were attempted to reduce the crisis:  Reaching out to family, connecting with girlfriend     History of the Crisis   Hx of paranoid schizophrenia, methamphetamine abuse, substance induced psychosis. Pt works with , medication provider, . Was previous attending [facility] left after 2 days.  Prior to this patient was at [facility], states leaving that program after approximately 1 week as well.  States that he often feels better and does not need to remain in the program.. Pt's primary drug of choice is methamphetamine. Pt has legal history, currently working with a . Pt is currently on MI/CD commitment. Hx of attending several treatment facilities and detox. Numerous admissions in the past, most recently 3/28/23.    Brief Psychosocial History  Family:  Single, Children yes (Patient reports having an 19-year-old, 18-year-old and 1-year-old child)  Support System:  Significant  Other, Parent(s), Sibling(s)  Employment Status:  unemployed  Source of Income:  public assistance  Financial Environmental Concerns:  unemployed  Current Hobbies:  family functions, exercise/fitness  Barriers in Personal Life:  mental health concerns, other (see comments) (Substance use)    Significant Clinical History  Current Anxiety Symptoms:  racing thoughts, anxious, excessive worry  Current Depression/Trauma:  impaired decision making, difficulty concentrating  Current Somatic Symptoms:  racing thoughts, excessive worry, anxious  Current Psychosis/Thought Disturbance:  auditory hallucinations, visual hallucinations, impulsive, high risk behavior  Current Eating Symptoms:  loss of appetite  Chemical Use History:  Other Use: Methamphetamines  Last Use:: 04/03/24  Withdrawal Symptoms: Hallucinations, Other (comments) (anxiety)   Past diagnosis:  Schizophrenia, Substance Use Disorder  Family history:  Depression, Anxiety Disorder  Past treatment:  Case management, Civil Commitment, Primary Care, Psychiatric Medication Management, Inpatient Hospitalization, Residential Treatment  Details of most recent treatment:  [facility] NURIS program, was at program 2 days.  Multiple ED visits for similar presentation in context of methamphetiamine abuse.  Pt has targeted case management services through [facility].  Patient has been in communication with his , did miss recent appointment with psychiatry this week.  Compliance with medication has been sporadic, continues to abuse methamphetamine.  Has had several referrals and admissions into MI/CD programming, reports often leaving programs after feeling improvement in mood.  Other relevant history:          Collateral Information  Is there collateral information: Yes     Collateral information name, relationship, phone number:  NARESH Cade TCM coverage worker.  [phone]    What happened today: Adelina reports patient's  Ashutosh has been  in communication with patient yesterday.  Patient was to connect with psychiatry this week as they were considering filing for recommitment.  Patient did not make appointment,  waited outside the home for several minutes and patient did not arrive.  Adelina would like patient assessed for possible recommitment.  Concerned that patient has been unable to stay sober, can continues to experience auditory and visual hallucinations especially in context of continued meth use.  Has been moved to an intensive targeted case management program due to frequent ED visits.     What is different about patient's functioning: Patient's current presentation is consistent with baseline presentation over the last year, continues to have poor compliance with medication and abuse substances.     Concern about alcohol/drug use:  yes, methamphetamine    What do you think the patient needs:  NURIS [place]    Has patient made comments about wanting to kill themselves/others: no    If d/c is recommended, can they take part in safety/aftercare planning:  yes    Additional collateral information:  Patient has been moved to an intensive targeted case management team through mental health resources, is scheduled to meet with  tomorrow at 1130.   will go to patient's girlfriend's home for meeting.  Coverage  Adelina was informed of patient discharging to girlfriend's home.     Risk Assessment  Gypsy Suicide Severity Rating Scale Full Clinical Version:  Suicidal Ideation  Q6 Suicide Behavior (Lifetime): no          Gypsy Suicide Severity Rating Scale Recent:   Suicidal Ideation (Recent)  Q1 Wished to be Dead (Past Month): no  Q2 Suicidal Thoughts (Past Month): no  Level of Risk per Screen: no risks indicated  Intensity of Ideation (Recent)  Most Severe Ideation Rating (Past 1 Month): 1  Frequency (Past 1 Month): Less than once a week  Suicidal Behavior (Recent)  Actual Attempt (Past 3 Months): No  Has  subject engaged in non-suicidal self-injurious behavior? (Past 3 Months): No  Interrupted Attempts (Past 3 Months): No  Aborted or Self-Interrupted Attempt (Past 3 Months): No  Preparatory Acts or Behavior (Past 3 Months): No    Environmental or Psychosocial Events: legal issues such as DWI, DUI, lawsuit, CPS involvement, etc., challenging interpersonal relationships, impulsivity/recklessness, unemployment/underemployment, unstable housing, homelessness, helplessness/hopelessness, ongoing abuse of substances  Protective Factors: Protective Factors: help seeking, strong bond to family unit, community support, or employment    Does the patient have thoughts of harming others? Feels Like Hurting Others: no  Previous Attempt to Hurt Others: no  Is the patient engaging in sexually inappropriate behavior?: no    Is the patient engaging in sexually inappropriate behavior?  no        Mental Status Exam   Affect: Blunted  Appearance: Appropriate  Attention Span/Concentration: Attentive  Eye Contact: Engaged    Fund of Knowledge: Appropriate   Language /Speech Content: Fluent  Language /Speech Volume: Normal  Language /Speech Rate/Productions: Normal  Recent Memory: Variable  Remote Memory: Variable  Mood: Apathetic, Anxious  Orientation to Person: Yes   Orientation to Place: Yes  Orientation to Time of Day: Yes  Orientation to Date: Yes     Situation (Do they understand why they are here?): Yes  Psychomotor Behavior: Normal, Other (please comment) (Chewing ice throughout assessment,)  Thought Content: Clear, Hallucinations  Thought Form: Goal Directed       Medication  Psychotropic medications:   Medication Orders - Psychiatric (From admission, onward)      None             Current Care Team  Patient Care Team:  No Ref-Primary, Physician as PCP - Brandon Gunn as Nursing Assistant  Cynthia Bueno as     Diagnosis  Patient Active Problem List   Diagnosis    Suicidal ideation    Polysubstance abuse (H)     Paranoid schizophrenia (H)    Paranoia (H)    Methamphetamine abuse (H)    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)    Amphetamine use disorder, severe, dependence (H)    Amphetamine-induced mood disorder (H)    Anxiety    Episodic mood disorder (H24)    Gastroesophageal reflux disease    Left ankle pain, unspecified chronicity    Psychosis, atypical (H)    Recurrent genital herpes    Substance-induced psychotic disorder (H)    Hallucinations    Encounter for medication refill    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)    Methamphetamine use disorder, moderate, in sustained remission, in controlled environment, dependence (H)    Methamphetamine use disorder, severe (H)    Schizoaffective disorder, bipolar type (H)    Schizoaffective disorder, chronic condition with acute exacerbation (H)    Schizoaffective disorder, depressive type (H)    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 injury    Auditory hallucinations    Substance-induced psychotic disorder with hallucinations (H)    Altered mental status, unspecified altered mental status type    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  This is in context of methamphetamine abuse, noncompliance with psychotropic medications.  Patient has had multiple ED visits with similar presentations over the last few weeks, continues to abuse methamphetamine frequently, has left MI/CD programming after few days.  Patient is involved with intensive targeted case management through mental  health resources, remains on commitment through [facility] under provisional discharge.  After period of observation in the emergency room, patient has cleared significantly from substances, A/V hallucinations are significantly decreased, does not present as a risk of harm to self or others.  Recommendation is for patient to discharge and follow-up with targeted  tomorrow at 1130.    Patient coping skills attempted to reduce the crisis:  Reaching out to family, connecting with girlfriend    Disposition  Recommended disposition: Medication Management, Substance Abuse Disorder Treatment        Reviewed case and recommendations with attending provider. Attending Name: Dr Davies       Attending concurs with disposition: yes       Patient and/or validated legal guardian concurs with disposition:   yes       Final disposition:  discharge        Assessment Details   Total duration spent with the patient: 30 min     CPT code(s) utilized: 46137 - Psychotherapy for Crisis - 60 (30-74*) min    Rachna Quach [facility] Center[facility], Psychotherapist  DEC - Triage & Transition Services  Callback: [phone]

## 2024-04-04 NOTE — DISCHARGE INSTRUCTIONS
She will be discharged to girlfriend's residence today, your  Samir will meet you at that residence tomorrow at 1130 to discuss additional programming and resources.  Keep the existing contact with  and mental health providers while in the community.

## 2024-04-04 NOTE — ED TRIAGE NOTES
Patient reports increase in auditory and visual hallucinations, reports being paranoid. Meth use yesterday.     Triage Assessment (Adult)       Row Name 04/03/24 3799          Triage Assessment    Airway WDL WDL        Respiratory WDL    Respiratory WDL WDL        Skin Circulation/Temperature WDL    Skin Circulation/Temperature WDL WDL        Cardiac WDL    Cardiac WDL WDL        Peripheral/Neurovascular WDL    Peripheral Neurovascular WDL WDL        Cognitive/Neuro/Behavioral WDL    Cognitive/Neuro/Behavioral WDL WDL

## 2024-04-13 ENCOUNTER — HOSPITAL ENCOUNTER (EMERGENCY)
Facility: HOSPITAL | Age: 39
Discharge: HOME | End: 2024-04-14
Attending: EMERGENCY MEDICINE | Admitting: EMERGENCY MEDICINE
Payer: COMMERCIAL

## 2024-04-13 DIAGNOSIS — F15.10 METHAMPHETAMINE ABUSE (H): ICD-10-CM

## 2024-04-13 DIAGNOSIS — R45.851 SUICIDAL THOUGHTS: ICD-10-CM

## 2024-04-13 PROBLEM — F15.20 AMPHETAMINE USE DISORDER, SEVERE, DEPENDENCE (H): Status: ACTIVE | Noted: 2020-07-30

## 2024-04-13 PROBLEM — F25.1 SCHIZOAFFECTIVE DISORDER, DEPRESSIVE TYPE (H): Status: ACTIVE | Noted: 2022-11-30

## 2024-04-13 PROBLEM — F19.959 SUBSTANCE-INDUCED PSYCHOTIC DISORDER (H): Status: ACTIVE | Noted: 2020-07-28

## 2024-04-13 LAB
ALBUMIN SERPL BCG-MCNC: 4.4 G/DL (ref 3.5–5.2)
ALP SERPL-CCNC: 87 U/L (ref 40–150)
ALT SERPL W P-5'-P-CCNC: 18 U/L (ref 0–70)
AMPHETAMINES UR QL SCN: ABNORMAL
ANION GAP SERPL CALCULATED.3IONS-SCNC: 13 MMOL/L (ref 7–15)
APAP SERPL-MCNC: <5 UG/ML (ref 10–30)
AST SERPL W P-5'-P-CCNC: 27 U/L (ref 0–45)
BARBITURATES UR QL SCN: ABNORMAL
BASOPHILS # BLD AUTO: 0 10E3/UL (ref 0–0.2)
BASOPHILS NFR BLD AUTO: 0 %
BENZODIAZ UR QL SCN: ABNORMAL
BILIRUB DIRECT SERPL-MCNC: <0.2 MG/DL (ref 0–0.3)
BILIRUB SERPL-MCNC: 0.5 MG/DL
BUN SERPL-MCNC: 12.9 MG/DL (ref 6–20)
BZE UR QL SCN: ABNORMAL
CALCIUM SERPL-MCNC: 9.3 MG/DL (ref 8.6–10)
CANNABINOIDS UR QL SCN: ABNORMAL
CHLORIDE SERPL-SCNC: 99 MMOL/L (ref 98–107)
CREAT SERPL-MCNC: 0.97 MG/DL (ref 0.67–1.17)
DEPRECATED HCO3 PLAS-SCNC: 22 MMOL/L (ref 22–29)
EGFRCR SERPLBLD CKD-EPI 2021: >90 ML/MIN/1.73M2
EOSINOPHIL # BLD AUTO: 0 10E3/UL (ref 0–0.7)
EOSINOPHIL NFR BLD AUTO: 0 %
ERYTHROCYTE [DISTWIDTH] IN BLOOD BY AUTOMATED COUNT: 12.1 % (ref 10–15)
ETHANOL SERPL-MCNC: <0.01 G/DL
FENTANYL UR QL: ABNORMAL
GLUCOSE SERPL-MCNC: 145 MG/DL (ref 70–99)
HCT VFR BLD AUTO: 42.7 % (ref 40–53)
HGB BLD-MCNC: 15 G/DL (ref 13.3–17.7)
HOLD SPECIMEN: NORMAL
IMM GRANULOCYTES # BLD: 0 10E3/UL
IMM GRANULOCYTES NFR BLD: 0 %
INR PPP: 0.95 (ref 0.85–1.15)
LIPASE SERPL-CCNC: 18 U/L (ref 13–60)
LYMPHOCYTES # BLD AUTO: 1.6 10E3/UL (ref 0.8–5.3)
LYMPHOCYTES NFR BLD AUTO: 14 %
MCH RBC QN AUTO: 32.1 PG (ref 26.5–33)
MCHC RBC AUTO-ENTMCNC: 35.1 G/DL (ref 31.5–36.5)
MCV RBC AUTO: 91 FL (ref 78–100)
MONOCYTES # BLD AUTO: 0.7 10E3/UL (ref 0–1.3)
MONOCYTES NFR BLD AUTO: 6 %
NEUTROPHILS # BLD AUTO: 9.2 10E3/UL (ref 1.6–8.3)
NEUTROPHILS NFR BLD AUTO: 80 %
NRBC # BLD AUTO: 0 10E3/UL
NRBC BLD AUTO-RTO: 0 /100
OPIATES UR QL SCN: ABNORMAL
PCP QUAL URINE (ROCHE): ABNORMAL
PLATELET # BLD AUTO: 289 10E3/UL (ref 150–450)
POTASSIUM SERPL-SCNC: 3.3 MMOL/L (ref 3.4–5.3)
PROT SERPL-MCNC: 7.2 G/DL (ref 6.4–8.3)
RBC # BLD AUTO: 4.67 10E6/UL (ref 4.4–5.9)
SALICYLATES SERPL-MCNC: <0.3 MG/DL
SODIUM SERPL-SCNC: 134 MMOL/L (ref 135–145)
WBC # BLD AUTO: 11.6 10E3/UL (ref 4–11)

## 2024-04-13 PROCEDURE — 36415 COLL VENOUS BLD VENIPUNCTURE: CPT | Performed by: EMERGENCY MEDICINE

## 2024-04-13 PROCEDURE — 93005 ELECTROCARDIOGRAM TRACING: CPT | Performed by: EMERGENCY MEDICINE

## 2024-04-13 PROCEDURE — 85004 AUTOMATED DIFF WBC COUNT: CPT | Performed by: EMERGENCY MEDICINE

## 2024-04-13 PROCEDURE — 85610 PROTHROMBIN TIME: CPT | Performed by: EMERGENCY MEDICINE

## 2024-04-13 PROCEDURE — 99284 EMERGENCY DEPT VISIT MOD MDM: CPT

## 2024-04-13 PROCEDURE — 82077 ASSAY SPEC XCP UR&BREATH IA: CPT | Performed by: EMERGENCY MEDICINE

## 2024-04-13 PROCEDURE — 80143 DRUG ASSAY ACETAMINOPHEN: CPT | Performed by: EMERGENCY MEDICINE

## 2024-04-13 PROCEDURE — 80179 DRUG ASSAY SALICYLATE: CPT | Performed by: EMERGENCY MEDICINE

## 2024-04-13 PROCEDURE — 80076 HEPATIC FUNCTION PANEL: CPT | Performed by: EMERGENCY MEDICINE

## 2024-04-13 PROCEDURE — 80307 DRUG TEST PRSMV CHEM ANLYZR: CPT | Performed by: EMERGENCY MEDICINE

## 2024-04-13 PROCEDURE — 83690 ASSAY OF LIPASE: CPT | Performed by: EMERGENCY MEDICINE

## 2024-04-13 ASSESSMENT — COLUMBIA-SUICIDE SEVERITY RATING SCALE - C-SSRS
4. HAVE YOU HAD THESE THOUGHTS AND HAD SOME INTENTION OF ACTING ON THEM?: NO
2. HAVE YOU ACTUALLY HAD ANY THOUGHTS OF KILLING YOURSELF IN THE PAST MONTH?: YES
1. IN THE PAST MONTH, HAVE YOU WISHED YOU WERE DEAD OR WISHED YOU COULD GO TO SLEEP AND NOT WAKE UP?: YES
6. HAVE YOU EVER DONE ANYTHING, STARTED TO DO ANYTHING, OR PREPARED TO DO ANYTHING TO END YOUR LIFE?: YES
5. HAVE YOU STARTED TO WORK OUT OR WORKED OUT THE DETAILS OF HOW TO KILL YOURSELF? DO YOU INTEND TO CARRY OUT THIS PLAN?: YES
3. HAVE YOU BEEN THINKING ABOUT HOW YOU MIGHT KILL YOURSELF?: YES

## 2024-04-13 ASSESSMENT — ACTIVITIES OF DAILY LIVING (ADL)
ADLS_ACUITY_SCORE: 35
ADLS_ACUITY_SCORE: 35

## 2024-04-13 ASSESSMENT — ENCOUNTER SYMPTOMS
SHORTNESS OF BREATH: 0
COUGH: 0
FEVER: 0
DYSURIA: 0
VOMITING: 0
DYSPHORIC MOOD: 1
DIARRHEA: 0
ABDOMINAL PAIN: 0
NAUSEA: 0

## 2024-04-14 VITALS
RESPIRATION RATE: 21 BRPM | SYSTOLIC BLOOD PRESSURE: 142 MMHG | DIASTOLIC BLOOD PRESSURE: 79 MMHG | HEIGHT: 63 IN | HEART RATE: 113 BPM | TEMPERATURE: 97.8 F | WEIGHT: 159 LBS | BODY MASS INDEX: 28.17 KG/M2 | OXYGEN SATURATION: 98 %

## 2024-04-14 LAB
ATRIAL RATE - MUSE: 106 BPM
DIASTOLIC BLOOD PRESSURE - MUSE: NORMAL MMHG
INTERPRETATION ECG - MUSE: NORMAL
P AXIS - MUSE: 62 DEGREES
PR INTERVAL - MUSE: 130 MS
QRS DURATION - MUSE: 86 MS
QT - MUSE: 324 MS
QTC - MUSE: 430 MS
R AXIS - MUSE: 24 DEGREES
SYSTOLIC BLOOD PRESSURE - MUSE: NORMAL MMHG
T AXIS - MUSE: 25 DEGREES
VENTRICULAR RATE- MUSE: 106 BPM

## 2024-04-14 ASSESSMENT — ACTIVITIES OF DAILY LIVING (ADL)
ADLS_ACUITY_SCORE: 35

## 2024-04-14 NOTE — DISCHARGE INSTRUCTIONS
Substance Use Disorder Direct Access Resources    It is recommended that you abstain from all mood altering chemicals. Please contact the sober support hotline ([phone]) as needed; phones are answered 24 hours a day, 7 days a week.    To access substance use treatment you must have a comprehensive assessment completed to begin any treatment program.     If uninsured, please contact your county of residence for eligibility screen to substance use disorder evaluation and treatment:    [place] - [phone]   [place] - [phone]   [facility] [phone]   [place] - [phone]   [place]  [phone]   [place] - [phone]   [facility] [phone]   [place] - [phone]     If you have private insurance, call the customer service number on the back of your insurance card to find an in-network substance abuse use disorder assessment. The ideal provider will be a treatment facility, licensed in the [facility].     Community NURIS Evaluations: Clients may call their [facility] for a full list of providers - Availability and services listed belo are subject to change, please call the provider to confirm    [facility]  [phone]  [facility] [place], [place], [address]  *Please call the above number to schedule a comprehensive assessment for determination of level of care needs. In person and virtual appointments available Mon-Fri.    [facility] [address] First Floor, [address], [place] [address] (next to the outpatient lab)    Phone: [phone]   Provides bridging services to people with Opiate Use Disorders (OUD) seeking care. This is a front door to Medication Assisted Treatments (MAT), ages 16+  Walk In hours: Monday-Friday 9:00am-3:00pm    [facility]  [phone]  Walk in Assessments: Mon-Friday 7a-1:45p  [address], [place], [address]    [facility]  Central Access [phone]  [address] [place] [address]  [place], [place], [address]  *by appointment only    [facility]  [phone] (phone consultation available )  Locations in: [place], [place], [facility], and [place], [place]  [place] virtual IOP [phone] or visit [email or web address]   Also offers LGBTQ programming     [facility]  [phone]  [address] [facility] [address]  [place], [place], [address]  *Currently only offered via telehealth - call to set up an appointment    [facility]  [address] [place], [place], [address]  [facility]  For more information to to make a referral call:  [phone]  Walk-in on   9-11 a.m.    [facility]  [phone]  [address] [place], [place], [address]  *available by appointments only    [facility]  [phone]  [address] [place], [place], [address]  *available by appointment only    [facility]  [phone]  [address] [place], [place], [address]  *walk in assessments available M-F starting at 7 am.    [facility]  [phone]  Locations: [facility], [facility], and [place]  *Walk in assessments availble M-F starting at 8 am -virtual only    [facility]  [phone]  [address] [place] [address]    [facility]  Virtual + Locations: [place], [place], [place], [place], [facility], [place], [place], [place]   [phone]  *available by appointment only    [facility]  [phone]  [address]  [place], [address]    [facility]  [phone]  [address], [place], [address]  *available by appointment    [facility]  [phone]  [address] [place], [place], [address]  *walk ins available M-TH from 9-3    [facility] program: [phone]  [address]  [place], [place], [address]    [facility]  [phone]  Same day substance use disorder assessments are available Monday - Friday, via walk-in or by appointment at the [place] location.  [address], [address], [place] [address]     [facility] - adolescent and adult SUDs services  [phone]  Offer services Monday through Friday, as well as evening hours Monday through Thursday. Normally, a first appointment will be scheduled within one week  [email or web address]  Locations all over [place]    If you are intoxicated, you may be required to detox at a detox facility before starting treatment. The following are detox facilities that you can self present to. All detox facilities are able to help you complete an assessment prior to discharge if you choose:    [facility]: Arrive at a [facility] for immediate medical evaluation    [facility]: [address] [place], [place], [address].         [phone]    [facility]: [address] [place], [place], [address]  [phone]     [facility]): [address] [place], [place], [address]  [phone]     [facility]: [address] [place], [place], [address], [phone]         Ways to help cope with sobriety:    -- Take prescribed medicines as scheduled  -- Keep follow-up appointments  -- Talk to others about your concerns  -- Get regular exercise  -- Practice deep breathing skills  -- Eat a healthy diet  -- Use community resources, including hotline numbers, [facility] crisis and support meetings  -- Stay sober and avoid places/people/things associated with substance use  --Maintain a daily schedule/routine  --Get at least 7-8 hours of sleep per night  --Create a list 10--20 healthy activities that you can do that are enjoyable and do not involve substance use  --Create daily goals (approx. 1-4 goals) per day and work to achieve them throughout the day.       Free  Resources:    [facility] ([facility])  [facility] connects people seeking recovery to resources that help foster and sustain long-term recovery. Whether you are seeking resources for treatment, transportation, housing, job training, education, health care or other pathways to recovery, [facility] is a great place to start.  Phone: 825.159.5321. www.[place]nes[place]Localo.Order Mapper (Great listing of all types of recovery and non-recovery related resources)    Alcoholics Anonymous  Phone: -687-ALCOHOL  Website: HTTP://WWW.AA.ORG/  AA [place] (403-598-4427 or http://aaminneaMentiNova.org)  AA [place] (052-203-9470 or www.aastpaul.org)     Narcotics Anonymous  Phone: 523.286.1756  Website: www.Clutch.io.Nanoference.    [employer] [address], [place], [place],  Phone: [phone]  Drop-in Hours: Monday-Friday 9-11:30 am. By appointment at other times.  Provides: [facility] is a drop-in center on the east side [facility] that provides a safe space for individuals who are homeless and have a history of chemical use. Sobriety is not a requirement but drugs and alcohol are not allowed on the property.  Services: Non-clients can access drop-in services such as Recovery and Harm Reduction Groups, referrals to case management, community activities, shower facilities, and a pool table. Individuals who are homeless and have chemical health needs may be eligible for enrollment into [facility]'s case management program. Clients and  work together to access benefits, treatment, health care, shelter, and external housing resources.      For shelter tonight, start with Adult Shelter [facility] Overnight Shelter: [phone]  *Phone lines are closed between 5:30-7:30 pm    [address] (enter on the [address] side near the [facility] corner)  Walk-in Hours: 9 am - 5:30 pm Monday-Friday and 1 pm - 5:30 pm Saturday and Sunday    [facility]   [phone]  [address] [facility]    [facility]  [phone]  [address] [facility]    [facility]  [phone]  [address] [facility] [place]  [phone]  [address] [facility]    [facility]  [phone]  [address] [place] [address]    To start making a plan for a more long-term solution, contact the Coordinated Entry Homeless Assistance Program:    Coordinated Entry Homeless Assistance  [facility]  [address] [place] [address]  [phone]  Open Wednesdays and Thursdays 8 am-2 pm  The Coordinated Entry System is the [facility]'s approach to organizing and providing housing services for people experiencing homelessness in [facility].  Because housing resources are limited, this process is designed to ensure that individuals and families with the highest vulnerability, service needs, and length of homelessness receive top priority in housing placement.    Assessment changes due to COVID-19 virus    [facility] family assessors will now be conducting assessments by phone. If you are working with a family staying in a place other than a [facility] shelter and is eligible for Coordinated Entry, continue to contact [facility]  at [phone] to set up an assessment.    For single adults, [facility] will be suspending drop-in hours at [facility]. To have a Coordinated Entry assessment completed, call the [facility]' certified assessors: Ashutosh at [phone] or Sherry at [phone] directly to set up a time to meet    Call 211 at any time on any day for questions about services and resources available to you.      Family Shelters    [facility] Shelter Team  [phone]  [address] [facility], Floors 5 & 6              Youth, families & disabled adults    Hours: Mon-Fri 8:00 am-4:30 pm.  After-Hours Shelter Team   [address]      Drop-Ins    [facility]  [phone]  [address] ([facility])              Showers/Laundry (8-11am)              Health Care              Employment Services              Breakfast (7-8 am)              Lunch (11:30am-12:30pm)    Hours: Mon-Sat: Summer 7am-3pm; Winter 7am-4pm.      [facility] [phone]  [address] [place] [address]  Hours: Mon, Wed and Fri 9:00-11:30 am      Clothing    [facility]  [phone]  [address] [place] [address]  Hours: M-Th 10-11:30am & 1:30-3:30pm; Sat/Sun 9:30-10:30 am      Free Meals & Showers    [facility]  [phone]  [address] [place] [address]  Dinner: Mon-Fri 5:30-6:30pm (No showers)    [facility]  [phone]  Meals ([address]): Women & Children M-F 8:30-9am; Everyone: M-F 12-1pm; Sat/Sun, 10:30-11:30 am  Showers ([address]): Mon-Fri 9-10 am    [facility]  [phone]  [address]  Dinner: Thurs - Mon 6 pm  Showers: Daily 8 - 4:30 pm    Health Care    [facility]  [phone]  Clinics are in 11 [place] shelters/drop-in centers.  Hours: Mon-Fri at varying sites. Call for sites/times. No insurance or appts required to receive care.  Clinic sites: [facility] [place], [facility], [facility], [facility], [facility], [facility], [facility], [facility], [facility]     Aftercare Plan  If I am feeling unsafe or I am in a crisis, I will:   Contact my established care providers   Call the National Suicide Prevention Lifeline: 218.779.6351   Go to the nearest emergency room   Call 911     Warning signs that I or other people might notice when a crisis is developing for me:     I am having increasing suicidal thoughts that turn to plans with intent or means  I am having additional urges to self-harm    My emotions are of hopelessness; feeling like there's no way out.  Rage or  anger.  Engaging in risky activities without thinking  Withdrawing from family/friends  Dramatic mood swings  Drastic personality changes   Use of alcohol or drugs  Postings on social media  Neglect of personal hygiene or cares      Things I am able to do on my own to cope or help me feel better:    Other things to Try:  Spending quality time with loved ones  Staying hydrated  Eating balanced meals  Going for a walk every day  Take care of daily responsibilities/needs  Focus on positive self-talk vs negative self-talk     Things that I am able to do with others to cope or help me better:   Other things to Try:  Exercise  Music  Deep breathing  Meditations  Journal  Self-regulate  Self check-in  Ask for help     Things I can use or do for distraction:   Other things to Try:  Reach out to/spend time with family, friends  Shower  Exercise  Chores or do a project  Listen to music  Watch movie/TV  Listening to music  Journaling  Reading a book  Meditating  Call a friend     Changes I can make to support my mental health and wellness:    -I will abstain from all mood altering chemicals not currently prescribed to me   -I will attend scheduled mental health therapy and psychiatric appointments and follow all   recommendations  -I will commit to 30 minutes of self care daily - this can be as simple as taking a shower, going for a   walk, cooking a meal, read, writing, etc  -I will practice square breathing when I begin to feel anxious - in breath through the nose for the count   of 4 and the first line on the square. Out breath through the mouth for the count of 4 for the second line   of the square. Repeat to complete the square. Repeat the square as many times as needed.  - I will use distraction skills of: going for walks, watching TV, spending time outside, calling a friend or   family member  -Use community resources, including hotline numbers, [facility] crisis and support meetings  -Maintain a daily  "schedule/routine  -Practice deep breathing skills  -Download a meditation aydin and spend 15-20 minutes per day mediating/relaxing. Some apps to   download include: Calm, Headspace and Insight Timer. All 3 of these apps have free version     People in my life that I can ask for help:   Family  Friends      Your [facility] has a mental health crisis team you can call 24/7: [facility] Crisis  [phone] (adults)  [phone] (children)          Crisis Lines  Crisis Text Line  Text 053183  You will be connected with a trained live crisis counselor to provide support.    Por espanol, texto  FAISAL a 996383 o texto a 442-AYUDAME en WhatsApp    The Gerard Project (LGBTQ Youth Crisis Line)  7.044.517.1344  text START to 236-814      Kompyte.  Fast Tracker  Linking people to mental health and substance use disorder resources  Ornis.Luminetx     [place] Mental Health Warm Line  Peer to peer support  Monday thru Saturday, 12 pm to 10 pm  612.905.2196 or 6.425.420.1834  Text \"Support\" to 42117    National [place] on Mental Illness (HERO)  362.665.6411 or 1.888.HERO.HELPS      Mental Health Apps  My3  https://Spor Chargers/    [place] SpaceeBSavingStar  https://Pathable.org/apps/virtual-hope-box/      Crisis Lines  Crisis Text Line  Text 665495  You will be connected with a trained live crisis counselor to provide support.    Por espanol, texto  FAISAL a 395073 o texto a 442-AYUDAME en WhatsApp    National Hope Line  1.800.SUICIDE [4645108]      Community RealtimeBoard  Fast Tracker  Linking people to mental health and substance use disorder resources  Ornis.Luminetx     [place] Mental Health Warm Line  Peer to peer support  Monday thru Saturday, 12 pm to 10 pm  605.590.9484 or 0.572.061.4445  Text \"Support\" to [address]    National [place] on Mental Illness (HERO)  202.347.7863 or 1.888.HERO.HELPS      Mental Health Apps  My3  https://Spor Chargers/    VirtualHomeUnion ServiceseBox  " https://RML Information Services Ltd./apps/virtual-hope-box/      Additional Information  Today you were seen by a licensed mental health professional through Triage and Transition services, [facility] (P)  for a crisis assessment in the Emergency Department at [facility].  It is recommended that you follow up with your established providers (psychiatrist, mental health therapist, and/or primary care doctor - as relevant) as soon as possible. Coordinators from [facility] will be calling you in the next 24-48 hours to ensure that you have the resources you need.  You can also contact [facility] coordinators directly at [phone]. You may have been scheduled for or offered an appointment with a mental health provider. [facility] maintains an extensive network of licensed behavioral health providers to connect patients with the services they need.  We do not charge providers a fee to participate in our referral network.  We match patients with providers based on a patient's specific needs, insurance coverage, and location.  Our first effort will be to refer you to a provider within your care system, and will utilize providers outside your care system as needed.

## 2024-04-14 NOTE — CONSULTS
"Diagnostic Evaluation Consultation  Crisis Assessment    Patient Name: Hamzah Roberts  Age:  38 year old  Legal Sex: male  Gender Identity: male  Pronouns:   Race: White  Ethnicity: Not  or   Language: English      Patient was assessed: Virtual: [employer] [facility] Crisis Assessment Start Time: 2236 Crisis Assessment Stop Time: 2256  Patient location: [facility] EMERGENCY DEPARTMENT                             JNED-08    Referral Data and Chief Complaint  Hamzah Roberts presents to the ED by  self. Patient is presenting to the ED for the following concerns: Anxiety, Substance use, Depression.   Factors that make the mental health crisis life threatening or complex are:  Patient presenting to the emergency room reporting ' psychosis', reporting experiencing auditory and visual hallucinations.  Denies command hallucinations, states hearing screaming, yelling, voices asking for help.  Patient also reports hearing cars. Patient used Meth yesterday, unknown amount. Has had multiple substance abuse program admissions, often does not stay past a few days as he reports he starts to feel better and then thinks he is healed and can return to the community.  Patient denies HI, denies SI, does report \"I hate my fucking life\" and \"I dont want to be alive\".  Reports feeling worsening symptoms when he is using drugs, States that he wants to get into treatment. Patient is agreeable to detox, he is worried he will use if he is discharged..      Informed Consent and Assessment Methods  Explained the crisis assessment process, including applicable information disclosures and limits to confidentiality, assessed understanding of the process, and obtained consent to proceed with the assessment.  Assessment methods included conducting a formal interview with patient, review of medical records, collaboration with medical staff, and obtaining relevant collateral information from family and community providers when " available.  : done     Patient response to interventions: acceptance expressed, verbalizes understanding  Coping skills were attempted to reduce the crisis:  walked to ED     History of the Crisis   Hx of paranoid schizophrenia, methamphetamine abuse, substance induced psychosis. Pt works with , medication provider, . Was previous attending [facility] treatment facility left after 2 days.  Prior to this patient was at [facility], states leaving that program after approximately 1 week as well.  States that he often feels better and does not need to remain in the program.. Pt's primary drug of choice is methamphetamine. Pt has legal history, currently working with a . Pt is currently on MI/CD commitment. Hx of attending several treatment facilities and detox. Numerous admissions in the past, most recently 3/28/23. He does acknowledge missing his psychiatry appointment and his case management appt a weeka and half ago .    Brief Psychosocial History  Family:   , Children yes (Patient reports having an 19-year-old, 18-year-old and 1-year-old child)  Support System:     Employment Status:  unemployed  Source of Income:  public assistance  Financial Environmental Concerns:  unemployed  Current Hobbies:  family functions, exercise/fitness  Barriers in Personal Life:  mental health concerns, other (see comments)    Significant Clinical History  Current Anxiety Symptoms:  racing thoughts, anxious, excessive worry  Current Depression/Trauma:  impaired decision making, difficulty concentrating  Current Somatic Symptoms:  racing thoughts, excessive worry, anxious  Current Psychosis/Thought Disturbance:  auditory hallucinations, visual hallucinations, impulsive, high risk behavior  Current Eating Symptoms:     Chemical Use History:  Alcohol: None  Benzodiazepines: None  Opiates: None  Cocaine: None  Marijuana: None  Other Use: Methamphetamines  Last Use::  04/12/24  Withdrawal Symptoms: Myalgias, Tremors   Past diagnosis:  Schizophrenia, Substance Use Disorder  Family history:  Depression, Anxiety Disorder  Past treatment:  Case management, Civil Commitment, Primary Care, Psychiatric Medication Management, Inpatient Hospitalization, Residential Treatment  Details of most recent treatment:  [facility] NURIS program, was at program 2 days.  Multiple ED visits for similar presentation in context of methamphetiamine abuse.  Pt has targeted case management services through [facility].  Patient has been in communication with his , did miss recent appointment with psychiatry this week.  Compliance with medication has been sporadic, continues to abuse methamphetamine.  Has had several referrals and admissions into MI/CD programming, reports often leaving programs after feeling improvement in mood.  Other relevant history:  Patient in distress by ongoing psychosis.       Collateral Information  Is there collateral information: No     Collateral information name, relationship, phone number:       What happened today:       What is different about patient's functioning:       Concern about alcohol/drug use:      What do you think the patient needs:      Has patient made comments about wanting to kill themselves/others:      If d/c is recommended, can they take part in safety/aftercare planning:       Additional collateral information:        Risk Assessment  Rogers Suicide Severity Rating Scale Full Clinical Version:  Suicidal Ideation  Q1 Wish to be Dead (Lifetime): Yes  Q2 Non-Specific Active Suicidal Thoughts (Lifetime): Yes  3. Active Suicidal Ideation with any Methods (Not Plan) Without Intent to Act (Lifetime): Yes  Q4 Active Suicidal Ideation with Some Intent to Act, Without Specific Plan (Lifetime): No  Q5 Active Suicidal Ideation with Specific Plan and Intent (Lifetime): No  Q6 Suicide Behavior (Lifetime): yes     Suicidal Behavior  (Lifetime)  Actual Attempt (Lifetime): No  Has subject engaged in non-suicidal self-injurious behavior? (Lifetime): No  Interrupted Attempts (Lifetime): No  Aborted or Self-Interrupted Attempt (Lifetime): No  Preparatory Acts or Behavior (Lifetime): No    Centerpoint Suicide Severity Rating Scale Recent:   Suicidal Ideation (Recent)  Q1 Wished to be Dead (Past Month): yes  Q2 Suicidal Thoughts (Past Month): yes  Q3 Suicidal Thought Method: no  Q4 Suicidal Intent without Specific Plan: yes  Q5 Suicide Intent with Specific Plan: no  Within the Past 3 Months?: no  Level of Risk per Screen: high risk  Intensity of Ideation (Recent)  Most Severe Ideation Rating (Past 1 Month): 1  Frequency (Past 1 Month): Less than once a week  Duration (Past 1 Month):  (increases when useing)  Deterrents (Past 1 Month): Does not apply  Reasons for Ideation (Past 1 Month): Does not apply  Suicidal Behavior (Recent)  Actual Attempt (Past 3 Months): No  Total Number of Actual Attempts (Past 3 Months): 0  Actual Attempt Description (Past 3 Months): 0  Has subject engaged in non-suicidal self-injurious behavior? (Past 3 Months): No  Interrupted Attempts (Past 3 Months): No  Total Number of Interrupted Attempts (Past 3 Months): 0  Aborted or Self-Interrupted Attempt (Past 3 Months): No  Total Number of Aborted or Self-Interrupted Attempts (Past 3 Months): 0  Preparatory Acts or Behavior (Past 3 Months): No    Environmental or Psychosocial Events: legal issues such as DWI, DUI, lawsuit, CPS involvement, etc., challenging interpersonal relationships, impulsivity/recklessness, unemployment/underemployment, unstable housing, homelessness, helplessness/hopelessness, ongoing abuse of substances  Protective Factors: Protective Factors: help seeking, strong bond to family unit, community support, or employment    Does the patient have thoughts of harming others? Feels Like Hurting Others: no  Previous Attempt to Hurt Others: no  Current presentation:  Confused  Is the patient engaging in sexually inappropriate behavior?: no  Duty to warn initiated: no    Is the patient engaging in sexually inappropriate behavior?  no        Mental Status Exam   Affect: Blunted  Appearance: Appropriate  Attention Span/Concentration: Attentive  Eye Contact: Engaged    Fund of Knowledge: Appropriate   Language /Speech Content: Fluent  Language /Speech Volume: Normal  Language /Speech Rate/Productions: Normal  Recent Memory: Variable  Remote Memory: Variable  Mood: Apathetic, Anxious  Orientation to Person: Yes   Orientation to Place: Yes  Orientation to Time of Day: Yes  Orientation to Date: Yes     Situation (Do they understand why they are here?): Yes  Psychomotor Behavior: Normal, Other (please comment) (Chewing ice throughout assessment,)  Thought Content: Clear, Hallucinations  Thought Form: Goal Directed     Mini-Cog Assessment  Number of Words Recalled:    Clock-Drawing Test:     Three Item Recall:    Mini-Cog Total Score:       Medication  Psychotropic medications:   Medication Orders - Psychiatric (From admission, onward)      None             Current Care Team  Patient Care Team:  No Ref-Primary, Physician as PCP - Brandon Gunn as Nursing Assistant  Cynthia Bueno as     Diagnosis  Patient Active Problem List   Diagnosis    Suicidal ideation    Polysubstance abuse (H)    Paranoid schizophrenia (H)    Paranoia (H)    Methamphetamine abuse (H)    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)    Amphetamine use disorder, severe, dependence (H)    Amphetamine-induced mood disorder (H)    Anxiety    Episodic mood disorder (H24)    Gastroesophageal reflux disease    Left ankle pain, unspecified chronicity    Psychosis, atypical (H)    Recurrent genital herpes    Substance-induced psychotic disorder (H)    Hallucinations    Encounter for medication refill    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)    Methamphetamine  use disorder, moderate, in sustained remission, in controlled environment, dependence (H)    Methamphetamine use disorder, severe (H)    Schizoaffective disorder, bipolar type (H)    Schizoaffective disorder, chronic condition with acute exacerbation (H)    Schizoaffective disorder, depressive type (H)    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 injury    Auditory hallucinations    Substance-induced psychotic disorder with hallucinations (H)    Altered mental status, unspecified altered mental status type    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 This is in context of methamphetamine abuse, noncompliance with psychotropic medications. Patient has had multiple ED visits with similar presentations over the last few weeks, continues to abuse methamphetamine frequently, has left MI/CD programming after few days. Patient is involved with intensive targeted case management through mental health resources, remains on commitment through [facility] under provisional discharge. Patient is denying active suicidal ideation although reporting that he does not want to be alive. Patient is open to detox feeling that getting sober and reaching out to his targeted  would be a step in a positive direction.He does not present as a risk of harm to self or others. Recommendation is for patient to discharge to detox and follow-up with targeted .          Patient coping skills attempted to reduce the crisis:  walked to ED    Disposition  Recommended disposition: Medication Management, Substance Abuse  Disorder Treatment, Detox        Reviewed case and recommendations with attending provider. Attending Name: Dr. Campos       Attending concurs with disposition: yes       Patient and/or validated legal guardian concurs with disposition:   yes       Final disposition:  discharge    Legal status on admission: Voluntary/Patient has signed consent for treatment    Assessment Details   Total duration spent with the patient: 20 min     CPT code(s) utilized: Non-Billable    ANANDA Covarrubias, Psychotherapist  [facility] - Triage & Transition Services  Callback: [phone]

## 2024-04-14 NOTE — Clinical Note
Substance Use Disorder Direct Access Resources    It is recommended that you abstain from all mood altering chemicals. Please contact the sober support hotline (921-264-9470) as needed; phones are answered 24 hours a day, 7 days a week.    To access substance use treatment you must have a comprehensive assessment completed to begin any treatment program.     If uninsured, please contact your county of residence for eligibility screen to substance use disorder evaluation and treatment:    [place] - [phone]   [place] - [phone]   [facility] [phone]   [place] - [phone]   [place]  [phone]   [place] - [phone]   [facility] [phone]   [place] - [phone]     If you have private insurance, call the customer service number on the back of your insurance card to find an in-network substance abuse use disorder assessment. The ideal provider will be a treatment facility, licensed in the [facility].     Community NURIS Evaluations: Clients may call their [facility] for a full list of providers - Availability and services listed belo are subject to change, please call the provider to confirm    [facility]  [phone]  [facility] [place], [place], [address]  *Please call the above number to schedule a comprehensive assessment for determination of level of care needs. In person and virtual appointments available Mon-Fri.    [facility], [facility] [place] [place] [address] Floor, [address], [place] [address] (next to the outpatient lab)    Phone: [phone]   Provides bridging services to people with Opiate Use Disorders (OUD) seeking care. This is a front door to Medication Assisted Treatments (MAT), ages 16+  Walk In hours: Monday-Friday 9:00am-3:00pm    [facility]  [phone]  Walk in Assessments: Mon-Friday 7a-1:45p  [address], [place], [address]    [facility]  Central Access [phone]  [address] [place] [address]  [place], [place], [address]  *by appointment only    [facility]  [phone] (phone consultation available )  Locations in: [place], [place], [facility], and [place], [place]  Ukrainian virtual IOP [phone] or visit [email or web address]   Also offers LGBTQ programming     [facility]  [phone]  [address] [facility]  [place], [place], [address]  *Currently only offered via telehealth - call to set up an appointment    [facility]  [address] [place], [place], [address]  [facility]  For more information to to make a referral call:  [phone]  Walk-in on   9-11 a.m.    [facility]  [phone]  [address] [place], [place], [address]  *available by appointments only    [facility]  [phone]  [address] [place], [place], [address]  *available by appointment only    [facility]  [phone]  [address] [place], [place], [address]  *walk in assessments available M-F starting at 7 am.    [facility]  [phone]  Locations: [facility], [facility], and [place]  *Walk in assessments availble M-F starting at 8 am -virtual only    [facility]  [phone]  [address] [place] [address]    [facility]  Virtual + Locations: [place], [place], [place], [place], [facility], [place], [place], [place]   [phone]  *available by appointment only    [facility]  [phone]  [address]  [place], [place], [address]    [facility]  [phone]  [address] [place], [place], [address]  *available by appointment    [facility]  [phone]  [address] [place], [place], [address]  *walk ins available M-TH from 9-3    [facility] program: [phone]  [address]  [place], [place], [address]    [facility]  [phone]  Same day substance use disorder assessments are available Monday - Friday, via walk-in or by appointment at the [place] location.  [address], [address], [place] [address]     [facility] - adolescent and adult SUDs services  [phone]  Offer services Monday through Friday, as well as evening hours Monday through Thursday. Normally, a first appointment will be scheduled within one week  [email or web address]  Locations all over [place]    If you are intoxicated, you may be required to detox at a detox facility before starting treatment. The following are detox facilities that you can self present to. All detox facilities are able to help you complete an assessment prior to discharge if you choose:    [facility]: Arrive at a [facility] for immediate medical evaluation    [facility]: [address] [place], [place], [address].         [phone]    [facility]: [address] [place], [place], [address]  [phone]     [facility] ([facility]): [address] [place], [place], [address]  [phone]     [facility]: [address] [place], [place], [address], [phone]         Ways to help cope with sobriety:    -- Take prescribed medicines as scheduled  -- Keep follow-up appointments  -- Talk to others about your concerns  -- Get regular exercise  -- Practice deep breathing skills  -- Eat a healthy diet  -- Use community resources, including hotline numbers, Psychiatric hospital crisis and support meetings  -- Stay sober and avoid places/people/things associated with substance use  --Maintain a daily schedule/routine  --Get at least 7-8 hours of sleep per night  --Create a list 10--20 healthy activities that you can do that are enjoyable and do not involve substance use  --Create daily goals (approx. 1-4 goals) per day and work to achieve them throughout the day.       Free  Resources:    [facility] ([facility])  [facility] connects people seeking recovery to resources that help foster and sustain long-term recovery. Whether you are seeking resources for treatment, transportation, housing, job training, education, health care or other pathways to recovery, [facility] is a great place to start.  Phone: [phone]. www.minnesotaSmartio.Waffle (Great listing of all types of recovery and non-recovery related resources)    Alcoholics Anonymous  Phone: 4-834-ALCOHOL  Website: HTTP://WWW.AA.ORG/  [facility] [place] (926-324-0206 or http://aaHaxiu.com.org)  [facility] [place] (795-854-3163 or www.aastpaul.org)     Narcotics Anonymous  Phone: 223.514.6008  Website: www.ServerPilot.[employer].    [facility] [address], [place], [place],  Phone: [phone]  Drop-in Hours: Monday-Friday 9-11:30 am. By appointment at other times.  Provides: [facility] is a drop-in center on the [address] side [facility] that provides a safe space for individuals who are homeless and have a history of chemical use. Sobriety is not a requirement but drugs and alcohol are not allowed on the property.  Services: Non-clients can access drop-in services such as Recovery and Harm Reduction Groups, referrals to case management, community activities, shower facilities, and a pool table. Individuals who are homeless and have chemical health needs may be eligible for enrollment into [facility]'s case management program. Clients and  work together to access benefits, treatment, health care, shelter, and external housing resources.

## 2024-04-14 NOTE — ED TRIAGE NOTES
"Pt ambulated into ED with reports of auditory hallucination/voices \"that are screaming\" inside head.  Pt reports \"unable to take it anymore\".  Pt having SI with the plan to over dose medications.  Pt walked from [place] and has bilateral abrasions on back of heels/ankles.     Triage Assessment (Adult)       Row Name 04/13/24 5217          Triage Assessment    Airway WDL WDL        Respiratory WDL    Respiratory WDL WDL                     "

## 2024-04-14 NOTE — PLAN OF CARE
Hamzah Roberts  April 13, 2024  Plan of Care Hand-off Note     Patient Care Path: discharge    Plan for Care:   Patient presenting to the emergency room due to increased auditory and visual hallucinations, paranoia. This is in context of methamphetamine abuse, noncompliance with psychotropic medications. Patient has had multiple ED visits with similar presentations over the last few weeks, continues to abuse methamphetamine frequently, has left MI/CD programming after few days. Patient is involved with intensive targeted case management through mental health resources, [facility]. Patient is denying active suicidal ideation although reporting that he does not want to be alive. Patient is open to detox feeling that getting sober and reaching out to his targeted  would be a step in a positive direction.He does not present as a risk of harm to self or others. Recommendation is for patient to discharge to detox and follow-up with targeted .    Identified Goals and Safety Issues: Discharge to detox or Discharge to the community then reach out to .    Overview:  Angie Roberts ([facility])  [phone]            Legal Status: Legal Status at Admission: Voluntary/Patient has signed consent for treatment    Psychiatry Consult:       Updated   regarding plan of care.           Alexa Reynoso, [facility]

## 2024-04-14 NOTE — ED PROVIDER NOTES
EMERGENCY DEPARTMENT ENCOUNTER      NAME: Hamzah Roberts  AGE: 38 year old male  YOB: 1985  MRN: [id number]  EVALUATION DATE & TIME: 2024  9:21 PM    PCP: No Ref-Primary, Physician    ED PROVIDER: Sabrina Campos M.D.        Chief Complaint   Patient presents with    Hallucinations    Suicidal         FINAL IMPRESSION:    1. Methamphetamine abuse (H)    2. Suicidal thoughts            MEDICAL DECISION MAKIN year old male history of schizoaffective disorder and methamphetamine use who presents emergency department reporting hallucinations and some suicidal thoughts.  At this time it is thought that maybe the suicidal thoughts stem more around his substance abuse and patient feels that if he can get help with his substance abuse he may have better mental health.  We did try to find him outpatient detox programs here overnight, but there are no beds available.  It is not thought that he is in acute mental health crisis and there is no indication for inpatient mental health stabilization at this time.  Patient agrees with that plan.  Here in the ER he is very pleasant and cooperative.  We did allow him to sleep overnight.  Laboratories reassuring.  Plan at this time is discharge home.  Patient has been provided with outpatient resources for substance abuse.  He understands that he is welcome back to the ER should he have concerns about his safety or any other concerns.        ED COURSE:  9:53 PM  I met with the patient to gather history and perform my exam. ED course and treatment discussed.    10:08 PM  Patient appears very sad here in the emergency department.  I do feel that he is at very high risk for harming himself if his suicidal thoughts become worse.  He is thinking about overdosing.  He is feeling very hopeless at this time.  He is asking for inpatient mental health stabilization and possible substance abuse treatment.  He is aware of the plan for blood work and DEC assessment  and agrees.  Cooperative and pleasant at this time.    10:53 PM  Labs look good.  Patient otherwise cooperative and pleasant at this time.  Awaiting DEC assessment.    11:05 PM  Spoke with DEC who feels that he is stable from mental health and is recommending.  They spoke with the patient and he states that while he thinks about suicidal thoughts he thinks that if he truly got help for his substance abuse that that is really what he needs and wants.  He feels that otherwise he is really stable from a mental health standpoint.  He is requesting treatment and detox for his meth use at this time.  =======================================  Clinical Summary and Substantiation of Recommendations   Patient presenting to the emergency room due to increased auditory and visual hallucinations, paranoia. This is in context of methamphetamine abuse, noncompliance with psychotropic medications. Patient has had multiple ED visits with similar presentations over the last few weeks, continues to abuse methamphetamine frequently, has left MI/CD programming after few days. Patient is involved with intensive targeted case management through mental health resources, remains on commitment through [facility] under provisional discharge. Patient is denying active suicidal ideation although reporting that he does not want to be alive. Patient is open to detox feeling that getting sober and reaching out to his targeted  would be a step in a positive direction.He does not present as a risk of harm to self or others. Recommendation is for patient to discharge to detox and follow-up with targeted .  ===========================================    11:12 PM  Spoke with patient and he would like to try to go to [facility].    11:15 PM  Spoke with [facility] and he does not qualify as they do not detox off of meth.  They only do alcohol and benzodiazepines which he does not report any abuse of.  They did say that  [facility] detox would be a potential and also [facility] and [facility].    11:17 PM  Patient states he is willing to try [facility] detox and [facility].    11:20 PM  I called [facility] and left a message for them to call us back.    11:58 PM  Called [facility] again and received voicemail.    12:59 AM  No call back yet from [facility].  We did reach out to [facility] and [facility] detox and no beds there.    1:42 AM  Still no word back from [facility].  Patient sleeping at this time.  Will let him rest in the ER at this moment.    2:31 AM  Called [facility] again and left a message that we are still waiting to talk with them.    4:58 AM  We have been unsuccessful finding detox for him overnight.  I was able to speak with him this morning and he feels comfortable and safe for discharge home.  He does not want to keep looking for treatment places.  He did not feel that any other services are needed at this time.  We will try to continue to find his phone and find him away for a ride out of here.  He feels comfortable with that plan.  At this time he does not have an active suicidal plan.  He has been very pleasant and cooperative while here in the ER.    At this time he is not holdable.  He does not appear to be intoxicated at this time.    At the conclusion of the encounter I discussed the results of all of the tests and the disposition. Their questions were answered. The patient (and any family present) acknowledged understanding and were agreeable with the care plan.    CONSULTANTS:  ASHLEIGH Liu        MEDICATIONS GIVEN IN THE EMERGENCY:  Medications - No data to display        NEW PRESCRIPTIONS STARTED AT TODAY'S ER VISIT     Medication List      There are no discharge medications for this visit.             CONDITION:  stable        DISPOSITION:  D.c from the ER    "      =================================================================  =================================================================  TRIAGE ASSESSMENT:  Pt ambulated into ED with reports of auditory hallucination/voices \"that are screaming\" inside head.  Pt reports \"unable to take it anymore\".  Pt having SI with the plan to over dose medications.  Pt walked from [place] and has bilateral abrasions on back of heels/ankles.     Triage Assessment (Adult)       Row Name 04/13/24 2117          Triage Assessment    Airway WDL WDL        Respiratory WDL    Respiratory WDL WDL                       ED Triage Vitals [04/13/24 2116]   Enc Vitals Group      /86      Pulse 111      Resp 16      Temp 97.8  F (36.6  C)      Temp src Oral      SpO2 97 %      Weight 72.1 kg (159 lb)      Height 1.6 m (5' 3\")          ================================================================  ================================================================    HPI    Patient information was obtained from: patient    Use of Intrepreter: N/A     Hamzah Roberts is a 38 year old male with history of suicidal ideation, paranoid schizophrenia, methamphetamine abuse, GERD, hallucinations, who presents to the ER with complaints of suicidal ideation, hallucinations, and substance abuse.    He states that he uses methamphetamines quite regularly with recent use.  He states he no longer has any relationship really with any family or friends.  He is feeling that he just wants to end it all.  He is thinking about overdosing on any pills and medications he can get his hands on.  He currently does take Zyprexa and when he takes it he states he feels like it does help some, but then he will often forget to take it or be too busy with substance abuse and forget to take it.  He last took it a few days ago.    He thinks that he desperately needs mental health treatment as well as some substance abuse treatment.  He states he has tried " substance abuse treatment in the past and it just never sticks.  At this point he just does not know what else to do.    He otherwise denies fevers, cough, chest pain, shortness of breath, abdominal pain, vomiting or diarrhea.    States he does smoke a little marijuana from time to time but does not do any other drugs or alcohol.      CHART REVIEW:  Chart review shows that he was seen through the [facility] earlier this month for hallucinations in the setting of his schizoaffective disorder.  He was seen by mental health  in person while there.  Clinical Summary and Substantiation of Recommendations   Patient presenting to the emergency room due to increased auditory and visual hallucinations, paranoia.  This is in context of methamphetamine abuse, noncompliance with psychotropic medications.  Patient has had multiple ED visits with similar presentations over the last few weeks, continues to abuse methamphetamine frequently, has left MI/CD programming after few days.  Patient is involved with intensive targeted case management through mental health resources, remains on commitment through [facility] under provisional discharge.  After period of observation in the emergency room, patient has cleared significantly from substances, A/V hallucinations are significantly decreased, does not present as a risk of harm to self or others.  Recommendation is for patient to discharge and follow-up with targeted  tomorrow at 1130.     Patient coping skills attempted to reduce the crisis:  Reaching out to family, connecting with girlfriend    Patient was ultimately discharged without inpatient mental health admission.    REVIEW OF SYSTEMS  Review of Systems   Constitutional:  Negative for fever.   Respiratory:  Negative for cough and shortness of breath.    Cardiovascular:  Negative for chest pain.   Gastrointestinal:  Negative for abdominal pain, diarrhea, nausea and vomiting.    Genitourinary:  Negative for dysuria.   Psychiatric/Behavioral:  Positive for dysphoric mood and suicidal ideas.    All other systems reviewed and are negative.        PAST MEDICAL HISTORY:  Past Medical History:   Diagnosis Date    Chemical dependency (H)          PAST SURGICAL HISTORY:  No past surgical history on file.      CURRENT MEDICATIONS:    Prior to Admission medications    Medication Sig Start Date End Date Taking? Authorizing Provider   OLANZapine (ZYPREXA) 10 MG tablet Take 1 tablet (10 mg) by mouth at bedtime for 30 days 12/1/23 12/31/23  Yair Aguilar MD   OLANZapine (ZYPREXA) 10 MG tablet Take 1 tablet (10 mg) by mouth at bedtime 11/27/23   Damian Meehan MD   omeprazole (PRILOSEC) 20 MG DR capsule Take 1 capsule (20 mg) by mouth daily 11/27/23   Damian Meehan MD   QUEtiapine (SEROQUEL) 50 MG tablet Take 1 tablet (50 mg) by mouth 2 times daily 11/27/23   Damian Meehan MD   valACYclovir (VALTREX) 500 MG tablet Take 500 mg by mouth as needed Take BID for 3 day at onset of symptoms 9/25/23   Unknown, Entered By History         ALLERGIES:  Allergies   Allergen Reactions    Morphine Sulfate [Morphine] Shortness Of Breath         FAMILY HISTORY:  No family history on file.      SOCIAL HISTORY:  Social History     Socioeconomic History    Marital status: Single   Tobacco Use    Smoking status: Every Day     Current packs/day: 0.50     Types: Cigarettes, Vaping Device   Substance and Sexual Activity    Alcohol use: Never    Drug use: Yes     Types: Methamphetamines     Comment: smokes meth, last use about 12 hours PTA    Sexual activity: Not Currently   Social History Narrative    Aug 2020: Patient admits to meth use.     Social Determinants of Health      [facility]    Social Connections   Interpersonal Safety: Unknown (3/20/2024)    [facility]    Humiliation, Afraid, Rape, and Kick questionnaire     Physically  "Abused: No     Sexually Abused: No         VITALS:  Patient Vitals for the past 24 hrs:   BP Temp Temp src Pulse Resp SpO2 Height Weight   04/13/24 2320 -- -- -- 113 21 98 % -- --   04/13/24 2314 (!) 142/79 -- -- 110 -- 97 % -- --   04/13/24 2116 137/86 97.8  F (36.6  C) Oral 111 16 97 % 1.6 m (5' 3\") 72.1 kg (159 lb)       Wt Readings from Last 3 Encounters:   04/13/24 72.1 kg (159 lb)   03/19/24 70.8 kg (156 lb)   03/16/24 73.6 kg (162 lb 4.8 oz)       Estimated Creatinine Clearance: 93.8 mL/min (based on SCr of 0.97 mg/dL).    PHYSICAL EXAM    Constitutional:  Well developed, Well nourished, NAD  HENT:  Normocephalic, Atraumatic, Bilateral external ears normal, Nose normal. Neck- Supple, No stridor.   Eyes:  PERRL, EOMI, Conjunctiva normal, No discharge.  Respiratory:  Normal breath sounds, No respiratory distress, No wheezing, Speaks full sentences easily. No cough.   Cardiovascular:  Normal heart rate, Regular rhythm, No murmurs, No rubs, No gallops.   GI:  No excessive obesity.  Bowel sounds normal, Soft, No tenderness, No masses, No flank tenderness. No rebound or guarding.   : deferred  Musculoskeletal: No cyanosis, No clubbing. Good range of motion in all major joints. No major deformities noted.   Integument:  Warm, Dry, No erythema, No rash.  No petechiae. +multiple tattoos.  Neurologic:  Alert & oriented x 3  Psychiatric:  Appears sad, cooperative, admits to SI with thoughts to overdose on any drugs/meds that he can find         LAB:  All pertinent labs reviewed and interpreted.  Recent Results (from the past 24 hour(s))   ECG 12-LEAD WITH MUSE (E)    Collection Time: 04/13/24  9:44 PM   Result Value Ref Range    Systolic Blood Pressure  mmHg    Diastolic Blood Pressure  mmHg    Ventricular Rate 106 BPM    Atrial Rate 106 BPM    KS Interval 130 ms    QRS Duration 86 ms     ms    QTc 430 ms    P Axis 62 degrees    R AXIS 24 degrees    T Axis 25 degrees    Interpretation ECG       Sinus " tachycardia  Otherwise normal ECG  When compared with ECG of 16-MAR-2024 23:02,  No significant change was found  Confirmed by SEE ED PROVIDER NOTE FOR, ECG INTERPRETATION ([id number]),  REINALDO KUMAR ([id number]) on 4/14/2024 4:55:48 AM     Basic metabolic panel    Collection Time: 04/13/24  9:45 PM   Result Value Ref Range    Sodium 134 (L) 135 - 145 mmol/L    Potassium 3.3 (L) 3.4 - 5.3 mmol/L    Chloride 99 98 - 107 mmol/L    Carbon Dioxide (CO2) 22 22 - 29 mmol/L    Anion Gap 13 7 - 15 mmol/L    Urea Nitrogen 12.9 6.0 - 20.0 mg/dL    Creatinine 0.97 0.67 - 1.17 mg/dL    GFR Estimate >90 >60 mL/min/1.73m2    Calcium 9.3 8.6 - 10.0 mg/dL    Glucose 145 (H) 70 - 99 mg/dL   Acetaminophen level    Collection Time: 04/13/24  9:45 PM   Result Value Ref Range    Acetaminophen <5.0 (L) 10.0 - 30.0 ug/mL   Salicylate level    Collection Time: 04/13/24  9:45 PM   Result Value Ref Range    Salicylate <0.3   mg/dL   Alcohol level blood    Collection Time: 04/13/24  9:45 PM   Result Value Ref Range    Alcohol ethyl <0.01 <=0.01 g/dL   Hepatic function panel    Collection Time: 04/13/24  9:45 PM   Result Value Ref Range    Protein Total 7.2 6.4 - 8.3 g/dL    Albumin 4.4 3.5 - 5.2 g/dL    Bilirubin Total 0.5 <=1.2 mg/dL    Alkaline Phosphatase 87 40 - 150 U/L    AST 27 0 - 45 U/L    ALT 18 0 - 70 U/L    Bilirubin Direct <0.20 0.00 - 0.30 mg/dL   Lipase    Collection Time: 04/13/24  9:45 PM   Result Value Ref Range    Lipase 18 13 - 60 U/L   INR    Collection Time: 04/13/24  9:45 PM   Result Value Ref Range    INR 0.95 0.85 - 1.15   Extra Blue Top Tube    Collection Time: 04/13/24  9:45 PM   Result Value Ref Range    Hold Specimen [facility]    Extra Red Top Tube    Collection Time: 04/13/24  9:45 PM   Result Value Ref Range    Hold Specimen [facility]    Extra Green Top (Lithium Heparin) Tube    Collection Time: 04/13/24  9:45 PM   Result Value Ref Range    Hold Specimen [facility]    Extra Purple Top Tube    Collection Time: 04/13/24  9:45 PM  "  Result Value Ref Range    Hold Specimen [facility] Purple Top Tube    Collection Time: 04/13/24  9:45 PM   Result Value Ref Range    Hold Specimen [facility]    CBC with platelets and differential    Collection Time: 04/13/24  9:45 PM   Result Value Ref Range    WBC Count 11.6 (H) 4.0 - 11.0 10e3/uL    RBC Count 4.67 4.40 - 5.90 10e6/uL    Hemoglobin 15.0 13.3 - 17.7 g/dL    Hematocrit 42.7 40.0 - 53.0 %    MCV 91 78 - 100 fL    MCH 32.1 26.5 - 33.0 pg    MCHC 35.1 31.5 - 36.5 g/dL    RDW 12.1 10.0 - 15.0 %    Platelet Count 289 150 - 450 10e3/uL    % Neutrophils 80 %    % Lymphocytes 14 %    % Monocytes 6 %    % Eosinophils 0 %    % Basophils 0 %    % Immature Granulocytes 0 %    NRBCs per 100 WBC 0 <1 /100    Absolute Neutrophils 9.2 (H) 1.6 - 8.3 10e3/uL    Absolute Lymphocytes 1.6 0.8 - 5.3 10e3/uL    Absolute Monocytes 0.7 0.0 - 1.3 10e3/uL    Absolute Eosinophils 0.0 0.0 - 0.7 10e3/uL    Absolute Basophils 0.0 0.0 - 0.2 10e3/uL    Absolute Immature Granulocytes 0.0 <=0.4 10e3/uL    Absolute NRBCs 0.0 10e3/uL   Urine Drug Screen Panel    Collection Time: 04/13/24 10:05 PM   Result Value Ref Range    Amphetamines Urine Screen Positive (A) Screen Negative    Barbituates Urine Screen Negative Screen Negative    Benzodiazepine Urine Screen Negative Screen Negative    Cannabinoids Urine Screen Positive (A) Screen Negative    Cocaine Urine Screen Negative Screen Negative    Fentanyl Qual Urine Screen Negative Screen Negative    Opiates Urine Screen Negative Screen Negative    PCP Urine Screen Negative Screen Negative       No results found for: \"ABORH\"        RADIOLOGY:  Reviewed all pertinent imaging. Please see official radiology report.    No orders to display         EKG:    Indication: psych eval with threatens of overdose    Performed at: 21:44p  Impression: Sinus tachycardia at 106 bpm.  Flipped T waves noted in lead III, aVR and V1.  No ST elevations appreciated.  HI interval 130 ms, QRS 86 ms, QTc " 430 ms.  Nonspecific changes compared to March 16, 2024.      I have independently reviewed and interpreted the EKG(s) documented above.        PROCEDURES:  none    Medical Decision Making  Obtained supplemental history:Supplemental history obtained?: No  Reviewed external records: External records reviewed?: Documented in chart and Outpatient Record: seeI  Care impacted by chronic illness:Mental Health  Care significantly affected by social determinants of health:Alcohol Abuse and/or Recreational Drug Use and No Transportation for Health Care  Did you consider but not order tests?: Work up considered but not performed and documented in chart, if applicable  Did you interpret images independently?: Independent interpretation of ECG and images noted in documentation, when applicable.  Consultation discussion with other provider:Did you involve another provider (consultant, , pharmacy, etc.)?: I discussed the care with another health care provider, see documentation for details.  Discharge. No recommendations on prescription strength medication(s). I considered admission, but ultimately discharged patient after patient was evaluated by DEC and felt to be stable from a mental health standpoint.  Here he is hemodynamically stable.  He does not appear toxic or septic.  He is not showing any signs for significant psychosis.  I do not feel that he is an active danger to himself or others at this moment..      Sabrina Campos M.D. [facility]  Emergency Medicine and Medical Toxicology  Formerly [facility] EMERGENCY DEPARTMENT  [address] [place] [id number] [phone]  Dept: [phone]           Sabrina Campos MD  04/14/24 2108

## 2024-04-14 NOTE — ED NOTES
Bed: JNED-08  Expected date:   Expected time:   Means of arrival:   Comments:  Psych crash  
Cab called for patient. Discharge instructions reviewed. No questions or concerns at this time. Patient eating and has all of his belongings waiting on ride.  
Currently speaking with DEC.  
rollator

## 2024-04-22 VITALS
BODY MASS INDEX: 29.41 KG/M2 | HEART RATE: 109 BPM | TEMPERATURE: 98.7 F | DIASTOLIC BLOOD PRESSURE: 80 MMHG | WEIGHT: 166 LBS | RESPIRATION RATE: 20 BRPM | OXYGEN SATURATION: 97 % | SYSTOLIC BLOOD PRESSURE: 136 MMHG

## 2024-04-22 PROCEDURE — 99282 EMERGENCY DEPT VISIT SF MDM: CPT

## 2024-04-22 ASSESSMENT — COLUMBIA-SUICIDE SEVERITY RATING SCALE - C-SSRS
3. HAVE YOU BEEN THINKING ABOUT HOW YOU MIGHT KILL YOURSELF?: NO
5. HAVE YOU STARTED TO WORK OUT OR WORKED OUT THE DETAILS OF HOW TO KILL YOURSELF? DO YOU INTEND TO CARRY OUT THIS PLAN?: NO
1. IN THE PAST MONTH, HAVE YOU WISHED YOU WERE DEAD OR WISHED YOU COULD GO TO SLEEP AND NOT WAKE UP?: NO
6. HAVE YOU EVER DONE ANYTHING, STARTED TO DO ANYTHING, OR PREPARED TO DO ANYTHING TO END YOUR LIFE?: NO
2. HAVE YOU ACTUALLY HAD ANY THOUGHTS OF KILLING YOURSELF IN THE PAST MONTH?: YES
4. HAVE YOU HAD THESE THOUGHTS AND HAD SOME INTENTION OF ACTING ON THEM?: YES

## 2024-04-23 ENCOUNTER — HOSPITAL ENCOUNTER (EMERGENCY)
Facility: HOSPITAL | Age: 39
Discharge: HOME | End: 2024-04-23
Attending: EMERGENCY MEDICINE | Admitting: EMERGENCY MEDICINE
Payer: COMMERCIAL

## 2024-04-23 ENCOUNTER — HOSPITAL ENCOUNTER (EMERGENCY)
Facility: HOSPITAL | Age: 39
Discharge: HOME | End: 2024-04-23
Attending: EMERGENCY MEDICINE
Payer: COMMERCIAL

## 2024-04-23 VITALS
SYSTOLIC BLOOD PRESSURE: 130 MMHG | OXYGEN SATURATION: 97 % | HEART RATE: 105 BPM | RESPIRATION RATE: 20 BRPM | DIASTOLIC BLOOD PRESSURE: 79 MMHG | TEMPERATURE: 98.2 F

## 2024-04-23 VITALS
SYSTOLIC BLOOD PRESSURE: 128 MMHG | HEART RATE: 120 BPM | TEMPERATURE: 98.2 F | DIASTOLIC BLOOD PRESSURE: 98 MMHG | OXYGEN SATURATION: 97 % | RESPIRATION RATE: 18 BRPM

## 2024-04-23 VITALS
SYSTOLIC BLOOD PRESSURE: 144 MMHG | HEART RATE: 114 BPM | TEMPERATURE: 98.2 F | DIASTOLIC BLOOD PRESSURE: 76 MMHG | BODY MASS INDEX: 29.41 KG/M2 | WEIGHT: 166 LBS | OXYGEN SATURATION: 96 % | HEIGHT: 63 IN | RESPIRATION RATE: 18 BRPM

## 2024-04-23 DIAGNOSIS — Z13.9 ENCOUNTER FOR MEDICAL SCREENING EXAMINATION: ICD-10-CM

## 2024-04-23 DIAGNOSIS — F19.10 DRUG ABUSE (H): ICD-10-CM

## 2024-04-23 DIAGNOSIS — R44.0 AUDITORY HALLUCINATIONS: ICD-10-CM

## 2024-04-23 DIAGNOSIS — F29 PSYCHOSIS, UNSPECIFIED PSYCHOSIS TYPE (H): ICD-10-CM

## 2024-04-23 DIAGNOSIS — F20.0 PARANOID SCHIZOPHRENIA (H): ICD-10-CM

## 2024-04-23 PROCEDURE — 80307 DRUG TEST PRSMV CHEM ANLYZR: CPT | Performed by: EMERGENCY MEDICINE

## 2024-04-23 PROCEDURE — 99283 EMERGENCY DEPT VISIT LOW MDM: CPT

## 2024-04-23 PROCEDURE — 99284 EMERGENCY DEPT VISIT MOD MDM: CPT | Mod: 25

## 2024-04-23 PROCEDURE — 99282 EMERGENCY DEPT VISIT SF MDM: CPT | Mod: 27

## 2024-04-23 PROCEDURE — 93005 ELECTROCARDIOGRAM TRACING: CPT | Performed by: EMERGENCY MEDICINE

## 2024-04-23 RX ORDER — MULTIVITAMIN,THERAPEUTIC
1 TABLET ORAL ONCE
Status: COMPLETED | OUTPATIENT
Start: 2024-04-23 | End: 2024-04-23

## 2024-04-23 RX ORDER — OLANZAPINE 5 MG/1
10 TABLET, ORALLY DISINTEGRATING ORAL ONCE
Status: COMPLETED | OUTPATIENT
Start: 2024-04-23 | End: 2024-04-23

## 2024-04-23 RX ORDER — HYDROXYZINE HYDROCHLORIDE 25 MG/1
25 TABLET, FILM COATED ORAL EVERY 4 HOURS PRN
Status: DISCONTINUED | OUTPATIENT
Start: 2024-04-23 | End: 2024-04-23

## 2024-04-23 RX ORDER — FOLIC ACID 1 MG/1
1 TABLET ORAL ONCE
Status: COMPLETED | OUTPATIENT
Start: 2024-04-23 | End: 2024-04-23

## 2024-04-23 ASSESSMENT — COLUMBIA-SUICIDE SEVERITY RATING SCALE - C-SSRS
1. IN THE PAST MONTH, HAVE YOU WISHED YOU WERE DEAD OR WISHED YOU COULD GO TO SLEEP AND NOT WAKE UP?: NO
1. IN THE PAST MONTH, HAVE YOU WISHED YOU WERE DEAD OR WISHED YOU COULD GO TO SLEEP AND NOT WAKE UP?: NO
6. HAVE YOU EVER DONE ANYTHING, STARTED TO DO ANYTHING, OR PREPARED TO DO ANYTHING TO END YOUR LIFE?: YES
2. HAVE YOU ACTUALLY HAD ANY THOUGHTS OF KILLING YOURSELF IN THE PAST MONTH?: NO
IS THE PATIENT NOT ABLE TO COMPLETE C-SSRS: UNABLE TO VERBALIZE
2. HAVE YOU ACTUALLY HAD ANY THOUGHTS OF KILLING YOURSELF IN THE PAST MONTH?: NO
6. HAVE YOU EVER DONE ANYTHING, STARTED TO DO ANYTHING, OR PREPARED TO DO ANYTHING TO END YOUR LIFE?: NO

## 2024-04-23 ASSESSMENT — ACTIVITIES OF DAILY LIVING (ADL): ADLS_ACUITY_SCORE: 33

## 2024-04-23 NOTE — ED NOTES
"Got iPad for DEC assessment into room 41 for pt.  Pt demanded to go outside for a cigarette.  Told pt that was alright to avoid conflict.  DEC  needed time to look through chart.  Pulled Zyprexa for pt.  Pt advised that already taken medication that was in backpack that pt was wearing.  Pt became argumentative stating \"Do you want to see the medication!!!  I have it in my backpack!!!\"  Pt continued to ask, \"why haven't my vitals been checked?\".  Reassured pt vitals are taken while getting triaged.  Attempted to deescalate by stating that was fine and would return the medication.  DEC  called in and pt walked outside deciding to not go ahead with assessment.   "

## 2024-04-23 NOTE — ED NOTES
Patient continues to be verbally aggressive to staff. Smoking outside of ED entrance, security arrived to ask patient to stop and patient stopped smoking. Patient refusing to return inside of ED for DEC assessment.

## 2024-04-23 NOTE — ED PROVIDER NOTES
"EMERGENCY DEPARTMENT ENCOUNTER      NAME: Hamzah Roberts  AGE: 38 year old male  YOB: 1985  MRN: [id number]  EVALUATION DATE & TIME: No admission date for patient encounter.    PCP: No Ref-Primary, Physician    ED PROVIDER: Aníbal Bejarano M.D.      Chief Complaint   Patient presents with    Mental Health Problem         IMPRESSION  1. Auditory hallucinations    2. Paranoid schizophrenia (H)        PLAN  - close PCP & psych follow up  - discharge (patient left prior to talking with me about leaving, not holdable)    ED COURSE & MEDICAL DECISION MAKING    ED Course as of 04/23/24 1140   Tue Apr 23, 2024   1025 Patient seen in the waiting room due to boarding crisis.    38yoM with history of paranoid schizophrenia, polysubstance abuse who was seen in the ED last night with increasing auditory hallucinations; discharged with no medication changes made. States he takes Zyprexa 5m qAM and 10mg qHS. Returns to the ED today with worsening auditory hallucinations; denies any alcohol or drug use. States \"the voices are making me crazy\". Denies any SI/HI. Took 5mg Zyprexa prior to arrival with no relief. States he takes no other mental health meds. States he has psych follow up in a couple weeks. Thinks he needs to stay inpatient psych to \"get the voices calmed down\"; states admission has helped him in the past. Has no physical complaints at this time.    HR 120s on presentation (100s during exam) with otherwise normal vitals. Calm on exam with mild pressured but nontangential speech, active auditory hallucinations, denies SI/HI, steady unaccompanied gait with normal neuro exam and no nystagmus, clear lungs, normal work of breathing, benign abdomen.    Doubt ongoing acute medical issue ongoing with no physical complaints; medically cleared at this time.    Patient wants to talk to DEC; states he needs voices under better control so he can function. Will have DEC assess. Does not appear to be a danger to himself " or others at this time; not holdable to my view. Will give additional Zydis while having DEC assess. Patient comfortable with this plan; no further questions at this time.   1126 RN informed me that patient left the ED prior to getting his Zyprexa or talking with DEC. Patient did not talk with me prior to leaving.       --------------------------------------------------------------------------------   --------------------------------------------------------------------------------     10:30 AM I met with the patient for the initial interview and physical examination. Discussed plan for treatment and workup in the ED.      This patient involved a high degree of complexity in medical decision making, as significant risks were present and assessed. Recent encounters & results in medical record reviewed by me.    All workup (i.e. any EKG/labs/imaging as per charting below) reviewed and independently interpreted by me. See respective sections for details.    Broad differential considered for this patient, including but not limited to:  schizophrenia, psychosis, substance abuse, sepsis, anemia, ACS, arrhythmia, other medical issue      See additional MDM below if interested.      MEDICATIONS GIVEN IN THE EMERGENCY DEPARTMENT  Medications   OLANZapine zydis (zyPREXA) ODT tab 10 mg (10 mg Oral Not Given 4/23/24 1140)       NEW PRESCRIPTIONS STARTED AT TODAY'S ER VISIT  Current Discharge Medication List        CONTINUE these medications which have NOT CHANGED    Details   OLANZapine (ZYPREXA) 10 MG tablet Take 1 tablet (10 mg) by mouth at bedtime  Qty: 10 tablet, Refills: 0      omeprazole (PRILOSEC) 20 MG DR capsule Take 1 capsule (20 mg) by mouth daily  Qty: 10 capsule, Refills: 0      QUEtiapine (SEROQUEL) 50 MG tablet Take 1 tablet (50 mg) by mouth 2 times daily  Qty: 20 tablet, Refills: 0      valACYclovir (VALTREX) 500 MG tablet Take 500 mg by mouth as needed Take BID for 3 day at onset of symptoms        "          =================================================================      HPI  Use of : N/A         Hamzah Roberts is a 38 year old male with a pertinent history of psychosis, polysubstance abuse, paranoid schizophrenia who presents to this ED walk-in for evaluation of mental health problem.    Patient reports a few days of \"screaming noises\" that have been intermittent but are \"just bad right now\". He takes 5 mg zyprexa during the day with an additional 10 mg dose at night. Patient notes he just took a dose prior to his arrival in the ED. He is requesting to meet with the mental health team and for a higher dose of his zyprexa.     Patient denies suicidal ideation, homicidal ideation, recent alcohol/drug use. Denies current use of Seroquel. History of inpatient treatment that patient states he left after improvement in symptoms after a few days. Patient has an appointment with his psychiatrist in a few weeks.     Per chart review, patient has been seen in the ED six other times in the past six weeks including once this morning at [facility] for psychosis.        --------------- MEDICAL HISTORY ---------------  PAST MEDICAL HISTORY:  Reviewed independently by me.  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)    Amphetamine use disorder, severe, dependence (H)    Amphetamine-induced mood disorder (H)    Anxiety    Episodic mood disorder (H24)    Gastroesophageal reflux disease    Left ankle pain, unspecified chronicity    Psychosis, atypical (H)    Recurrent genital herpes    Substance-induced psychotic disorder (H)    Hallucinations    Encounter for medication refill    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)    " Methamphetamine use disorder, moderate, in sustained remission, in controlled environment, dependence (H)    Methamphetamine use disorder, severe (H)    Schizoaffective disorder, bipolar type (H)    Schizoaffective disorder, chronic condition with acute exacerbation (H)    Schizoaffective disorder, depressive type (H)    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  No past surgical history on file.    CURRENT MEDICATIONS:    Reviewed independently by me.  No current facility-administered medications for this encounter.    Current Outpatient Medications:     OLANZapine (ZYPREXA) 10 MG tablet, Take 1 tablet (10 mg) by mouth at bedtime for 30 days, Disp: 30 tablet, Rfl: 0    OLANZapine (ZYPREXA) 10 MG tablet, Take 1 tablet (10 mg) by mouth at bedtime, Disp: 10 tablet, Rfl: 0    omeprazole (PRILOSEC) 20 MG DR capsule, Take 1 capsule (20 mg) by mouth daily, Disp: 10 capsule, Rfl: 0    QUEtiapine (SEROQUEL) 50 MG tablet, Take 1 tablet (50 mg) by mouth 2 times daily, Disp: 20 tablet, Rfl: 0    valACYclovir (VALTREX) 500 MG tablet, Take 500 mg by mouth as needed Take BID for 3 day at onset of symptoms, Disp: , Rfl:     ALLERGIES:  Reviewed independently by me.  Allergies   Allergen Reactions    Morphine Sulfate [Morphine] Shortness Of Breath       FAMILY HISTORY:  Reviewed independently by me.  No family history on file.    SOCIAL HISTORY:   Reviewed independently by me.  Social History     Socioeconomic History    Marital status: Single   Tobacco Use    Smoking status: Every Day     Current packs/day: 0.50     Types: Cigarettes, Vaping Device   Substance and Sexual Activity    Alcohol use: Never    Drug use: Yes     Types:  Methamphetamines     Comment: smokes meth, last use about 12 hours PTA    Sexual activity: Not Currently   Social History Narrative    Aug 2020: Patient admits to meth use.     Social Determinants of Health      Received from [facility], [facility]    Social Connections   Interpersonal Safety: Unknown (3/20/2024)    [facility]    Humiliation, Afraid, Rape, and Kick questionnaire     Physically Abused: No     Sexually Abused: No       --------------- PHYSICAL EXAM ---------------  Nursing notes and vitals independently reviewed by me.  VITALS:  Vitals:    04/23/24 0954   BP: (!) 128/98   Pulse: 120   Resp: 18   Temp: 98.2  F (36.8  C)   SpO2: 97%       PHYSICAL EXAM:    General:  alert, interactive, no distress  Eyes:  PERRL to 20cm with EOMI, conjunctivae clear, conjugate gaze  HENT:  atraumatic, nose with no rhinorrhea, oropharynx clear  Neck:  no meningismus  Cardiovascular:  HR 100s during exam, regular rhythm, no murmurs, brisk cap refill  Chest:  no chest wall tenderness  Pulmonary:  no stridor, normal phonation, normal work of breathing, clear lungs bilaterally  Abdomen:  soft, nondistended, nontender  :  no CVA tenderness  Back:  no midline spinal tenderness  Musculoskeletal:  no pretibial edema, no calf tenderness. Gross ROM intact to joints of extremities with no obvious deformities.  Skin:  warm, dry, no rash  Neuro:  awake, alert, answers questions appropriately, follows commands, moves all limbs  Psych:  mild pressured speech, endorses auditory hallucinations. Denies suicidal ideation, homicidal ideation. Speech non-tangential               --------------- ADDITIONAL MDM ---------------  History:  - I considered systemic symptoms of the presenting illness.  - Supplemental history from:       -- patient  - External Record(s) reviewed:       -- Inpatient/outpatient record (outside ED visit 4/3/24), prior  labs (blood/urine 4/13/24), prior imaging (CXR 3/17/24)       -- see above ED course & MDM for further details    Workup:  - Chart documentation above includes differential considered and any EKGs or imaging independently interpreted by provider.  - emergent/severe conditions considered and evaluated for: see above differential & MDM  - In additional to work up documented, I considered the following work up:       -- blood, urine, EKG       -- see above ED course & MDM for further details    External Consultation:  - Discussion of management with another provider:       -- ED pharmacist re: meds       -- see above charting for additional    Complicating Factors:  - Care impacted by chronic illness:       -- see above MDM, past medical history, & problem list  - Care affected by social determinants of health:       -- see above social history       -- Access to Affordable Healthcare       -- Access to Medical Care       -- Alcohol Abuse and/or Recreational Drug Use       -- Food Insecurity       -- Housing Insecurity       -- Literacy       -- Low Income       -- Problems Related to Employment    Disposition Considerations:  - Discharge       -- I considered escalation of care with admission to the hospital (DEC evaluation), but patient left the ED prior to getting this even though he requested it           I, Jaison Mccray, am serving as a scribe to document services personally performed by Dr. Aníbal Bejarano based on my observation and the provider's statements to me. I, Aníbal Bejarano MD attest that Jaison Mccray is acting in a scribe capacity, has observed my performance of the services and has documented them in accordance with my direction.      Aníbal Bejarano MD  04/23/24  Emergency Medicine  [facility] EMERGENCY DEPARTMENT  [address] [place] [id number] [phone]  Dept: [phone]     Aníbal Bejarano MD  04/23/24 3383

## 2024-04-23 NOTE — ED TRIAGE NOTES
"Patient returns here for evaluation. He was seen here due to hearing hallucinations. He left without care completed.  He states that he is walking around randomly. \"Like I could walk in front of a car because I am not paying attention\" \"I am losing my mind\" He denies suicidal thoughts or thoughts of harming others.           "

## 2024-04-23 NOTE — ED PROVIDER NOTES
EMERGENCY DEPARTMENT ENCOUNTER      NAME: Hamzah Roberts  AGE: 38 year old male  YOB: 1985  MRN: [id number]  EVALUATION DATE & TIME: No admission date for patient encounter.    PCP: No Ref-Primary, Physician    ED PROVIDER: Kate Sanches M.D.      Chief Complaint   Patient presents with    Hallucinations         FINAL IMPRESSION:  1. Encounter for medical screening examination    2. Drug abuse (H)          ED COURSE & MEDICAL DECISION MAKING:    ED Course as of 04/23/24 1831 Tue Apr 23, 2024 1825 Patient again presents to the ED, seen by me and a colleague of mine with whom I spoke today, no SI/HI, has medications on him, compliant with medications and with PMD and therapy outpatient f/u scheduled, here because he wants a ride to detox/drug treatment facility, left earlier AMA and then explains he walked to [facility] to buy a beverage despite promises to me to not leave without results and DEC assessment. However, patient is not psychotic appearing, notes no use for 2 days and no plans to continue abusing drugs. Pt given rule 25 AVS information, will call cab to detox for him, and outpaitent resources, advised to continue his outpatient prescription antipsychotic therapy which he did take today. Patient discharged after being provided with extensive anticipatory guidance and given return precautions, importance of PMD follow-up emphasized.      6:30 PM Met with and introduced myself to the patient. Discussed history and plan of care.     Pertinent Labs & Imaging studies reviewed. (See chart for details)    N95 worn  A face shield was worn also  COVID PPE      At the conclusion of the encounter I discussed the results of all of the tests and the disposition. The questions were answered. The patient or family acknowledged understanding and was agreeable with the care plan.     MEDICATIONS GIVEN IN THE EMERGENCY:  Medications - No data to display    NEW PRESCRIPTIONS STARTED AT TODAY'S ER VISIT  New  Prescriptions    No medications on file          =================================================================    HPI      Hamzah Roberts is a 38 year old male with PMHx of polysubstance abuse and paranoid schizophrenia who presents to the ED today via walk in with hallucinations.     The patient was seen earlier today and had left before being signing paperwork. Patient is back in the ED again and has been going back and forth to the ED since last night.     Per chart review patient presented to [facility] Emergency Department on 4/23/24 for evaluation of hallucinations. The patient was in the ED prior and had left since he wanted to feel the wind, but came back for a DEC assessment. Patient does take his psych medications and missed a day or two of it. Patient then wanted to leave for a walk and to smoke, but left before signing paperwork.     Denies suicidal thoughts, new injuries, and homicidal thoughts.       REVIEW OF SYSTEMS   All other systems reviewed and are negative except as noted above in HPI.    PAST MEDICAL HISTORY:  Past Medical History:   Diagnosis Date    Chemical dependency (H)        PAST SURGICAL HISTORY:  No past surgical history on file.    CURRENT MEDICATIONS:    OLANZapine (ZYPREXA) 10 MG tablet  OLANZapine (ZYPREXA) 10 MG tablet  omeprazole (PRILOSEC) 20 MG DR capsule  QUEtiapine (SEROQUEL) 50 MG tablet  valACYclovir (VALTREX) 500 MG tablet        ALLERGIES:  Allergies   Allergen Reactions    Morphine Sulfate [Morphine] Shortness Of Breath       FAMILY HISTORY:  No family history on file.    SOCIAL HISTORY:   Social History     Socioeconomic History    Marital status: Single   Tobacco Use    Smoking status: Every Day     Current packs/day: 0.50     Types: Cigarettes, Vaping Device   Substance and Sexual Activity    Alcohol use: Never    Drug use: Yes     Types: Methamphetamines     Comment: smokes meth, last use about 12 hours PTA    Sexual activity: Not Currently   Social History  Narrative    Aug 2020: Patient admits to meth use.     Social Determinants of Health      [facility], [facility]    Social Connections   Interpersonal Safety: Unknown (3/20/2024)    [facility]    Humiliation, Afraid, Rape, and Kick questionnaire     Physically Abused: No     Sexually Abused: No       VITALS:  Patient Vitals for the past 24 hrs:   BP Temp Temp src Pulse Resp SpO2   04/23/24 1824 130/79 98.2  F (36.8  C) Temporal 105 20 97 %       PHYSICAL EXAM    GENERAL: Awake, alert.  In no acute distress.   HEENT: Normocephalic, atraumatic.  Pupils equal, round and reactive.  Conjunctiva normal.  EOMI.  NECK: No stridor or apparent deformity.  PULMONARY: Symmetrical breath sounds without distress.  Lungs clear to auscultation bilaterally without wheezes, rhonchi or rales.  CARDIO: Regular rate and rhythm.  No significant murmur, rub or gallop.  Radial pulses strong and symmetrical.  ABDOMINAL: Abdomen soft, non-distended and non-tender to palpation.  No CVAT, no palpable hepatosplenomegaly.  EXTREMITIES: No lower extremity swelling or edema.    NEURO: Alert and oriented to person, place and time.  Cranial nerves grossly intact.  No focal motor deficit.  PSYCH: Normal mood and affect  SKIN: No rashes      I, Sidra Giles, am serving as a scribe to document services personally performed by Dr. Kate Sanches based on my observation and the provider's statements to me. I, Kate Sanches MD attest that Sidra Giles is acting in a scribe capacity, has observed my performance of the services and has documented them in accordance with my direction.       Kate Sanches MD  04/23/24 8091

## 2024-04-23 NOTE — ED NOTES
Patient is paranoid--when asked about drawing labs he said that he does not want that done--refusing meds--restless, walking around in his room the hallway--asked to go back to his room. Not making good eye contact--did go to the bathroom and gave a urine. Patient then walked to the lobby then outside--said he could not stay in this room.  MD aware patient has left the building and will be dismissed.

## 2024-04-23 NOTE — ED PROVIDER NOTES
"EMERGENCY DEPARTMENT ENCOUNTER      NAME: Hamzah Roberts  AGE: 38 year old male  YOB: 1985  MRN: [id number]  EVALUATION DATE & TIME: 4/23/2024  2:45 PM    PCP: No Ref-Primary, Physician    ED PROVIDER: Kate Sanches M.D.      Chief Complaint   Patient presents with    Hallucinations         FINAL IMPRESSION:  1. Drug abuse (H)    2. Auditory hallucinations          ED COURSE & MEDICAL DECISION MAKING:    ED Course as of 04/23/24 1537   Tue Apr 23, 2024   1441 Pt with worsening auditory hallucinations of \"screaming in my head\" and demonstrates quietly a very intense scream after \"using\" amphetamines most recently a few days ago, /BIB father last night after father of patient attempted to bring him to an outpaitent drug treatment facility to admit yesterday, but nobody was there at that time, then dropped him off in the ED, then he came to the ED today but then left to \"wander\" and ate chips at [facility], then came back here for DEC assessment, does endorse overall restlessness  and with flat affect but highly cooperative, good historian, here voluntarily and not holdable without SI/HI or imminent threat to self or others, pending multivitamin, PRN hydroxyzine, food/drink, DEC assessment and plan to serially reassess   1535 Patient notably walked out of the ED, said he wanted to go for a walk and to smoke, after agreeing to me to wait in the ED for dispo. He has left AMA without signing papework .       Pertinent Labs & Imaging studies reviewed. (See chart for details)    N95 worn  A face shield was worn also  COVID PPE      At the conclusion of the encounter I discussed the results of all of the tests and the disposition. The questions were answered. The patient or family acknowledged understanding and was agreeable with the care plan.     MEDICATIONS GIVEN IN THE EMERGENCY:  Medications   thiamine (B-1) tablet 100 mg (100 mg Oral Not Given 4/23/24 1511)   multivitamin, therapeutic (THERA-VIT) " "tablet 1 tablet (1 tablet Oral Not Given 4/23/24 1511)   folic acid (FOLVITE) tablet 1 mg (1 mg Oral Not Given 4/23/24 1510)       NEW PRESCRIPTIONS STARTED AT TODAY'S ER VISIT  New Prescriptions    No medications on file          =================================================================    HPI      Hamzah Roberts is a 38 year old male with PMHx of polysubstance abuse and paranoid schizophrenia who presents to the ED today via walk in with hallucinations.     The patient was seen here earlier due to hearing hallucinations and is coming back here for evaluation. The patient reports this morning while at the ED, he kept hearing screaming that went \"ahhh\" and left the ED to feel the wind and walked to [facility] where he got chips from the vending machine. He has been taking his psych medications, but missed a day or two of taking them.    Denies cough and suicidal thoughts.     Per chart review patient presented to [facility] Emergency Department on 4/23/24 for evaluation of mental health problem. Patient returned to the ED with worsening auditory hallucinations and took 5 mg Zyprexa prior to arrival with no relief. He wanted to speak with DEC to better control the voices so he can function. RN then later informed the patient left the ED prior to getting his Zyprexa or talking with DEC.       REVIEW OF SYSTEMS   All other systems reviewed and are negative except as noted above in HPI.    PAST MEDICAL HISTORY:  Past Medical History:   Diagnosis Date    Chemical dependency (H)        PAST SURGICAL HISTORY:  History reviewed. No pertinent surgical history.    CURRENT MEDICATIONS:    OLANZapine (ZYPREXA) 10 MG tablet  OLANZapine (ZYPREXA) 10 MG tablet  omeprazole (PRILOSEC) 20 MG DR capsule  QUEtiapine (SEROQUEL) 50 MG tablet  valACYclovir (VALTREX) 500 MG tablet        ALLERGIES:  Allergies   Allergen Reactions    Morphine Sulfate [Morphine] Shortness Of Breath       FAMILY HISTORY:  History reviewed. No " "pertinent family history.    SOCIAL HISTORY:   Social History     Socioeconomic History    Marital status: Single     Spouse name: None    Number of children: None    Years of education: None    Highest education level: None   Tobacco Use    Smoking status: Every Day     Current packs/day: 0.50     Types: Cigarettes, Vaping Device   Substance and Sexual Activity    Alcohol use: Never    Drug use: Yes     Types: Methamphetamines     Comment: smokes meth, last use about 12 hours PTA    Sexual activity: Not Currently   Social History Narrative    Aug 2020: Patient admits to meth use.     Social Determinants of Health      [facility]    Social Connections   Interpersonal Safety: Unknown (3/20/2024)    [facility]    Humiliation, Afraid, Rape, and Kick questionnaire     Physically Abused: No     Sexually Abused: No       VITALS:  Patient Vitals for the past 24 hrs:   BP Temp Temp src Pulse Resp SpO2 Height Weight   04/23/24 1421 (!) 144/76 98.2  F (36.8  C) Oral 114 18 96 % 1.6 m (5' 3\") 75.3 kg (166 lb)       PHYSICAL EXAM    GENERAL: Awake, alert.  In no acute distress.   HEENT: Normocephalic, atraumatic.  Pupils equal, round and reactive.  Conjunctiva normal.  EOMI.  NECK: No stridor or apparent deformity.  EXTREMITIES: No lower extremity swelling or edema.    NEURO: Alert and oriented to person, place and time.  Cranial nerves grossly intact.  No focal motor deficit.  PSYCH:  Flat affect.   SKIN: No rashes      EKG:    Reviewed and interpreted as: EKG done on 04/23/24, at 15:03. Vent. Rate 109 BPM. Sinus tachycardia and similar to prior EKG 4/13/24.       I have independently reviewed and interpreted the EKG(s) documented above.        I, Sidra Giles, am serving as a scribe to document services personally performed by Dr. Kate Sanches based on my observation and the provider's statements to " me. I, Kate Sanches MD attest that Sidra Giles is acting in a scribe capacity, has observed my performance of the services and has documented them in accordance with my direction.       Kate Sanches MD  04/23/24 1539

## 2024-04-23 NOTE — DISCHARGE INSTRUCTIONS
Substance Use Treatment (Rule 25) Information -     To access chemical dependency treatment you must have an assessment complete or have an updated assessment within 30 days of starting any program.    Information for this to be completed and to secure funding if you have medical assistance or no insurance can be found through your [facility]'s chemical health intake line.      If you have private insurance contact the customer service number on the back of your insurance card to determine the required steps for accessing services through your insurance provider.     [facility] RULE 25 INFORMATION -   [place] - [phone]  [place]  [phone]  [facility] [phone]  [facility] [phone]  [facility] [phone]  [place] - [phone]  [place] - [phone]  [facility] [phone]      Once approved for funding you can connect with a facility that does Rule 25 assessment.     The following facilities offer Rule 25 assessments -     [facility]  [phone]  [facility]   [address] [facility] [address]    [facility] ([facility])  [phone]  [address] [place] [address]  *Walk in assessments also available M-F starting at 8 am.     [facility]  [phone]  [address] [facility] [address]       [facility]   [phone]  [facility]  [address] [facility] [address]    [facility]   [phone]  [address] [facility] [address]    [facility]  [phone] - press 1  Multiple clinic locations    [facility]   [phone]  [address] [place] [address] E  [facility] [address]    [facility]  [phone]  [address] [facility] [address]    [facility]  [phone]  [address] [facility] [address]          If you are intoxicated  You may be required to detox at a detox facility  before starting treatment.    [facility]- [address].  [place], [place].    [phone]    [facility]: [phone]  [facility]: [phone]  [facility]: [phone]      *All information subject to change by facility- connect by phone prior to arrival to verify available services. Information listed is not an endorsement of particular program and is populated using a few of the available resources in the area.     Additional support while to provide support while you await assessment and any recommendations-  [facility] is providing telehealth services during the COVID-19 outbreak to ensure ongoing access to high-quality treatment for substance use and co-occurring mental health conditions.  COVID-19 poses unprecedented challenges to clients and providers. [facility], one of [place] s largest non-profit extended-care behavioral healthcare providers, successfully transitioned more than nine hundred outpatient clients to telehealth. Its licensed clinicians are delivering individual and group therapy via computers, mobile devices, and phones.  [facility] is now extending telehealth services to any adult [place] resident to ensure uninterrupted access to crucial care during this challenging time.  [facility] is accredited by The Joint Commission and utilizes evidence-based care   In-network with most major insurance companies, accept Medical Assistance (i.e. MA/PMAP/CCDTF), and private pay   Conducting virtual assessments and intakes   Individual and group telehealth sessions available   Specialized groups and trauma treatment available   Employing secure telehealth platforms   Partnering with recovery residences in the [facility], [facility], and other [facility] to help clients access high-quality recovery housing when clinically indicated.  Refer a client by contacting a [facility] admissions representative using the following information:  [facility] ([address] [place] [address])   Contact: Brunilda Jenkins, Admissions Supervisor ([email or web address])   Office: [phone] Fax: [phone]  [facility] ([address] [place] [address])   Contact: Brunilda Jenkins, Admissions Supervisor ([email or web address])   Office: [phone] Fax: [phone]  [address] [place] [address] ([address] [place] [address])   Contact: Barbara Saucedo, Admissions Supervisor ([email or web address])   Office: [phone] Fax: [phone]  [address] [place] [address] ([address] [place] [address])   Contact: Florinda Torres, Admissions Supervisor ([email or web address])   Office: [phone] Fax: [phone]

## 2024-04-23 NOTE — ED PROVIDER NOTES
EMERGENCY DEPARTMENT ENCOUNTER      NAME: Hamzah Roberts  YOB: 1985  MRN: [id number]    FINAL IMPRESSION  1. Psychosis, unspecified psychosis type (H)    2. Auditory hallucinations        MEDICAL DECISION MAKING   Pertinent Labs & Imaging studies reviewed. (See chart for details)    Hamzah Roberts is a 38 year old male who presents requesting an increased dose of his Zydis.  Outside records reviewed.  Patient has a long and very well-documented history of polysubstance abuse, homelessness, psychosis, and frequent ED visits for related complaints.  He was most recently seen here in the ED on 4/13/2024 with auditory hallucinations and suicidal ideation.  He did admit to using methamphetamine.  He was evaluated by DEC team who agreed that he would not benefit from inpatient mental health stabilization but rather, substance abuse treatment and detox.  Patient is involved with intensive targeted case management through mental health resources and is on a commitment through [facility] with a provisional discharge.  He was offered assistance in getting help to chemical dependency but unfortunately, no beds were available at all facilities that were contacted.  He was allowed to sleep in the ED overnight and ultimately, was discharged with plan to follow-up with his .  Patient has also been seen numerous times at outside hospitals, most recently on 3/20/2024 and 4/3/2024.  Today, he presents requesting a prescription for an increased dose of Zyprexa.  He is a very difficult historian, somewhat argumentative and confrontational.  He states that he decided to come in because he would like his Zyprexa increased from 10 mg to 15 mg.  He reports that when he was outside, he was hearing screaming voices and cars driving but believes those cars and people were probably present.  He states that he is not seeing or hearing anything now that he is back in a quiet room in the ED.  Patient has plans to  follow-up with his  and  tomorrow morning and an appointment with his psychiatrist next week.  He is not experiencing any SI, HI, or any physical complaints.  He has a safe place to go tonight and states that he is here only hoping that he can get his prescription increased.  He is very vague when asked why he would like the dose increased but does note that he was told it might take a few weeks for him to see the effects of what he is currently on.    I considered a broad differential including but is not limited to decompensation of previously diagnosed psychiatric disorder, social stressors, substance abuse/intoxication/withdrawal, medication non-compliance, housing issue/homelessness, secondary gain. No s/s on exam or by history to suggest infection, trauma/intracranial pathology, electrolyte derangement, or other acute medical process requiring emergent workup/imaging at this time.  I offered to connect patient to DEC team again to see if they might be able to expedite his follow-up with psychiatry or provide any other recommendations but he was not interested in waiting for an assessment.  When I explained to the patient that it would be better if he follows up with his outpatient providers given he is well-connected, he became frustrated and more confrontational/argumentative.  I explained that he is already on what is usually an adequate dose of Zyprexa and that if he needs it to be increased, it would be best for him to have this done by the psychiatry team who can follow-up to make sure that he is tolerating it.  He did verbalize understanding.  I offered assistance with housing overnight but he states that he has a safe place to go.  He again stated that he did not want to wait for DEC team.  He is not experiencing any active SI, HI, VH, AH and overall, does seem to be at his baseline mental status.  I do not believe he meets criteria for an emergent hold.  Ultimately, he was  agreeable with plan for discharge and follow-up with his outpatient providers as planned.  I recommended that he continue to take all of his medications as prescribed in the meantime.  He declined offer for any prescription refills or other assistance.  Strict return precautions and follow up recommendations were discussed and all questions were answered. Patient endorsed understanding and was in agreement with plan.      Medical Decision Making  Obtained supplemental history:Supplemental history obtained?: No  Reviewed external records: External records reviewed?: Documented in chart  Care impacted by chronic illness:Mental Health  Care significantly affected by social determinants of health:Access to Medical Care, Alcohol Abuse and/or Recreational Drug Use, Housing Insecurity, Medication Noncompliance, No Support for Care at Home, and No Transportation for Health Care  Did you consider but not order tests?: Work up considered but not performed and documented in chart, if applicable  Did you interpret images independently?: Independent interpretation of ECG and images noted in documentation, when applicable.  Consultation discussion with other provider:Did you involve another provider (consultant, MH, pharmacy, etc.)?: No  Discharge. I recommended the patient continue their current prescription strength medication(s): zyprexa. I considered admission, but ultimately discharged patient after he did not want to wait for assessment by DEC.      ED COURSE  12:34 AM I met the patient and performed my initial interview and exam. We discussed the plan for discharge and the patient is agreeable. Reviewed supportive cares, symptomatic treatment, outpatient follow up, and reasons to return to the Emergency Department. All questions and concerns were addressed. Patient to be discharged by ED RN.        MEDICATIONS GIVEN IN THE ED  Medications - No data to display    NEW PRESCRIPTIONS STARTED AT TODAY'S VISIT  New Prescriptions     No medications on file          =================================================================    Chief Complaint   Patient presents with    Mental Health Problem         HPI:    Patient information was obtained from: The patient     Use of : N/A     Hamzah Roberts is a 38 year old male who presents for evaluation of mental health issue.    The patient reports that he was here 10 days ago and given a prescription for Zyprexa. He has taken it for 3 days. He has been here with a friend who had a seizure and was outside. While outside he started hearing screaming voices and saw cars driving. He came in and checked in to see if he could get a higher dose of Zyprexa. His dose is 10 mg. He has been told that it might take a few weeks for the Zyprexa to work. He has an appointment with his case manger and parol officer for tomorrow as well as an appointment with his psychiatrist next week. He denies any suicidal ideation or homicidal ideation. He has a safe place to go tonight. He has no physical complaints. He notes that the screaming has quieted down since being in the room, and that the screaming could have been from cars. He does not want to wait here so he will be going home.       RELEVANT HISTORY, MEDICATIONS, & ALLERGIES   Past medical history, surgical history, family history, medications, and allergies reviewed and pertinent noted in HPI.    REVIEW OF SYSTEMS:  A complete review of systems was performed with pertinent positives and negatives noted in the HPI. All other systems negative.     PHYSICAL EXAM:    Vitals: /80   Pulse 109   Temp 98.7  F (37.1  C) (Oral)   Resp 20   Wt 75.3 kg (166 lb)   SpO2 97%   BMI 29.41 kg/m    General: Awake, alert, interactive.   HENT: Atraumatic. MMM.   Neck: Full AROM.  Cardiovascular: Regular rate.  Respiratory/Chest: Normal work of breathing.   Abdomen: Non-distended.   Musculoskeletal: Normal appearing extremities without obvious deformities or  signs of trauma.   Skin: Normal color. No rash or diaphoresis.  Neurologic: Alert, oriented. Speech clear. CN's grossly intact. Moving all extremities spontaneously.   Psychiatric: Affect appears somewhat agitated and confrontational. Eye contact intact. Does not appear to be responding to internal stimuli. Thought process and content organized, no delusions. Speech normal, not tangential or pressured. Denies suicidal or homicidal ideation, visual hallucinations, or auditory hallucinations.          I, Katlin Caballero, am serving as a scribe to document services personally performed by Dr. Parisa Jauregui based on my observation and the provider's statements to me. I, Parisa Jauregui MD attest that Katlin Caballero is acting in a scribe capacity, has observed my performance of the services and has documented them in accordance with my direction.    Parisa Jauregui M.D.  Emergency Medicine  [facility] EMERGENCY DEPARTMENT  [facility] [facility] [address] [phone]  Dept: [phone]     Parisa Jauregui MD  04/26/24 0115

## 2024-04-23 NOTE — DISCHARGE INSTRUCTIONS
You were seen in the Emergency Department today for hearing things.     Like we talked about, I would recommend that you continue to take your medicine as prescribed and follow-up with the mental health team.  You can call them to see if he might be able to get in any sooner.    Please return to the ER if you experience thoughts of wanting to hurt yourself, feeling unsafe in your environment, and/or for any other new or concerning symptoms, otherwise please follow up with your  and mental health team as planned.     Thank you for choosing [facility]. It was a pleasure taking care of you today!  -Dr. Parisa Jauregui

## 2024-04-23 NOTE — ED TRIAGE NOTES
"Pt reports he was seen last night for higher dose of zyprexa as he has been hearing noises. Pt reports he left here \"and have been walking around and hearing tires squealing.\" Pt denies hearing voices or suicidal thoughts. Denies drug or alcohol use.      Triage Assessment (Adult)       Row Name 04/23/24 0955          Triage Assessment    Airway WDL WDL        Respiratory WDL    Respiratory WDL WDL        Skin Circulation/Temperature WDL    Skin Circulation/Temperature WDL WDL        Cardiac WDL    Cardiac WDL WDL        Peripheral/Neurovascular WDL    Peripheral Neurovascular WDL WDL        Cognitive/Neuro/Behavioral WDL    Cognitive/Neuro/Behavioral WDL WDL                     "

## 2024-04-23 NOTE — ED TRIAGE NOTES
Pt here for third time today, pt has no new complaints.  Pt seen by Dr. Sanches in the ED.       Triage Assessment (Adult)       Row Name 04/23/24 8505          Triage Assessment    Airway WDL WDL        Respiratory WDL    Respiratory WDL WDL        Skin Circulation/Temperature WDL    Skin Circulation/Temperature WDL WDL        Cardiac WDL    Cardiac WDL WDL        Peripheral/Neurovascular WDL    Peripheral Neurovascular WDL WDL        Cognitive/Neuro/Behavioral WDL    Cognitive/Neuro/Behavioral WDL WDL

## 2024-04-23 NOTE — ED TRIAGE NOTES
Pt states he is hearing voices, screaming.  States was seen here 3 days ago and put on zyprexa.   States the voices are worse now.  Denies SI or HI.       Triage Assessment (Adult)       Row Name 04/22/24 5353          Triage Assessment    Airway WDL WDL        Respiratory WDL    Respiratory WDL WDL        Skin Circulation/Temperature WDL    Skin Circulation/Temperature WDL WDL        Cardiac WDL    Cardiac WDL WDL        Peripheral/Neurovascular WDL    Peripheral Neurovascular WDL WDL        Cognitive/Neuro/Behavioral WDL    Cognitive/Neuro/Behavioral WDL X   hearing voices, screaming.        Clint Coma Scale    Best Eye Response 4-->(E4) spontaneous     Best Motor Response 6-->(M6) obeys commands     Best Verbal Response 5-->(V5) oriented     Clint Coma Scale Score 15     Assessment Qualifiers patient not sedated/intubated

## 2024-04-28 LAB
ATRIAL RATE - MUSE: 109 BPM
DIASTOLIC BLOOD PRESSURE - MUSE: NORMAL MMHG
INTERPRETATION ECG - MUSE: NORMAL
P AXIS - MUSE: 64 DEGREES
PR INTERVAL - MUSE: 118 MS
QRS DURATION - MUSE: 84 MS
QT - MUSE: 314 MS
QTC - MUSE: 422 MS
R AXIS - MUSE: 39 DEGREES
SYSTOLIC BLOOD PRESSURE - MUSE: NORMAL MMHG
T AXIS - MUSE: 8 DEGREES
VENTRICULAR RATE- MUSE: 109 BPM

## 2024-07-17 ENCOUNTER — HOSPITAL ENCOUNTER (EMERGENCY)
Facility: HOSPITAL | Age: 39
Discharge: HOME | End: 2024-07-17
Attending: EMERGENCY MEDICINE | Admitting: EMERGENCY MEDICINE
Payer: COMMERCIAL

## 2024-07-17 VITALS
RESPIRATION RATE: 16 BRPM | BODY MASS INDEX: 30.12 KG/M2 | HEIGHT: 63 IN | TEMPERATURE: 98.1 F | WEIGHT: 170 LBS | OXYGEN SATURATION: 98 % | DIASTOLIC BLOOD PRESSURE: 82 MMHG | SYSTOLIC BLOOD PRESSURE: 143 MMHG | HEART RATE: 82 BPM

## 2024-07-17 DIAGNOSIS — Z76.0 ENCOUNTER FOR MEDICATION REFILL: ICD-10-CM

## 2024-07-17 PROCEDURE — 99283 EMERGENCY DEPT VISIT LOW MDM: CPT

## 2024-07-17 RX ORDER — OLANZAPINE 10 MG/1
10 TABLET ORAL AT BEDTIME
Qty: 30 TABLET | Refills: 0 | Status: SHIPPED | OUTPATIENT
Start: 2024-07-17 | End: 2024-08-25

## 2024-07-17 NOTE — ED PROVIDER NOTES
EMERGENCY DEPARTMENT ENCOUNTER     NAME: Hamzah Roberts   AGE: 38 year old male   YOB: 1985   MRN: [id number]   EVALUATION DATE & TIME: No admission date for patient encounter.   PCP: No Ref-Primary, Physician     Chief Complaint   Patient presents with    Medication Refill   :    FINAL IMPRESSION       1. Encounter for medication refill           ED COURSE & MEDICAL DECISION MAKING      Pertinent Labs & Imaging studies reviewed. (See chart for details)   38 year old male  presents to the Emergency Department for evaluation of medication refill. Initial Vitals Reviewed. Initial exam notable for well-appearing male in no acute distress.  On chart review, patient does present to the ED frequently, typically either due to polysubstance concerns or psychiatric concerns.  I can see on chart review that the last time Zyprexa was prescribed to him was in December 2023.  He stated that he takes it several times a day and gave me doses that is different than what was in the chart from there, but since he does not have a PCP and I have no record of any different dosing I am willing to prescribe him a month of the 10 mg nightly dosing and I am going to make a primary care referral so he can get established for further refills.           At the conclusion of the encounter I discussed the results of all of the tests and the disposition. The questions were answered. The patient or family acknowledged understanding and was agreeable with the care plan.     0 minutes critical care time, see procedure note below for details if relevant    Medical Decision Making    History:  Supplemental history from: Documented in chart  External Record(s) reviewed: Documented in chart, ED visit with prescription 12/1/2023    Work Up:  Chart documentation includes differential considered and any EKGs or imaging independently interpreted by provider, where specified.  In additional to work up documented, I considered the following work  up: Documented in chart, if applicable.    External consultation:  Discussion of management with another provider: Documented in chart, if applicable    Complicating factors:  Care impacted by chronic illness: Mental Health  Care affected by social determinants of health: Access to Medical Care and Alcohol Abuse and/or Recreational Drug Use    Disposition considerations: Discharge. I prescribed additional prescription strength medication(s) as charted. See documentation for any additional details.        MEDICATIONS GIVEN IN THE EMERGENCY:   Medications - No data to display   NEW PRESCRIPTIONS STARTED AT TODAY'S ER VISIT   New Prescriptions    No medications on file     ================================================================   HISTORY OF PRESENT ILLNESS       Patient information was obtained from: patient   Use of Intrepreter: N/A   Hamzah Roberts is a 38 year old male with history of polysubstance abuse and paranoid schizophrenia who presents medication refill.    The patient would like a prescription for Zyprexa 15 mg at night and 5 mg twice daily. His last dose was last night. He does not have a primary care provider so he has been coming to [facility]. His last refill was a little over a month ago. No additional complaints at this time.    ================================================================    Per chart review,  The patient has 3 additional visits on this day.  4/23/24 The patient visited [facility] ED for a ride to detox/drug treatment facility. Patient given rule 25 AVS information, will call cab to detox for him, and outpaitent resources, advised to continue his outpatient prescription antipsychotic therapy which he did take today.     PAST HISTORY     PAST MEDICAL HISTORY:   Past Medical History:   Diagnosis Date    Chemical dependency (H)       PAST SURGICAL HISTORY:   No past surgical history on file.   CURRENT MEDICATIONS:   OLANZapine (ZYPREXA) 10 MG tablet  OLANZapine (ZYPREXA) 10  "MG tablet  omeprazole (PRILOSEC) 20 MG DR capsule  QUEtiapine (SEROQUEL) 50 MG tablet  valACYclovir (VALTREX) 500 MG tablet      ALLERGIES:   Allergies   Allergen Reactions    Morphine Sulfate [Morphine] Shortness Of Breath      FAMILY HISTORY:   No family history on file.   SOCIAL HISTORY:   Social History     Socioeconomic History    Marital status: Single   Tobacco Use    Smoking status: Every Day     Current packs/day: 0.50     Types: Cigarettes, Vaping Device   Substance and Sexual Activity    Alcohol use: Never    Drug use: Yes     Types: Methamphetamines     Comment: smokes meth, last use about 12 hours PTA    Sexual activity: Not Currently   Social History Narrative    Aug 2020: Patient admits to meth use.     Social Determinants of Health      [facility]    Social Connections   Interpersonal Safety: Unknown (3/20/2024)    [facility]    Humiliation, Afraid, Rape, and Kick questionnaire     Physically Abused: No     Sexually Abused: No        VITALS  Patient Vitals for the past 24 hrs:   BP Temp Temp src Pulse Resp SpO2 Height Weight   07/17/24 1455 132/81 98.1  F (36.7  C) Oral 104 16 97 % 1.6 m (5' 3\") 77.1 kg (170 lb)        ================================================================    PHYSICAL EXAM     VITAL SIGNS: /81   Pulse 104   Temp 98.1  F (36.7  C) (Oral)   Resp 16   Ht 1.6 m (5' 3\")   Wt 77.1 kg (170 lb)   SpO2 97%   BMI 30.11 kg/m     Constitutional:  Awake, no acute distress   HENT:  Atraumatic, oropharynx without exudate or erythema, membranes moist  Lymph:  No adenopathy  Eyes: EOM intact, PERRL, no injection  Neck: Supple  Respiratory:  Clear to auscultation bilaterally, no wheezes or crackles   Cardiovascular:  Regular rate and rhythm, single S1 and S2   GI:  Soft, nontender, nondistended, no rebound or guarding   Musculoskeletal:  Moves all " extremities, no lower extremity edema, no deformities    Skin:  Warm, dry  Neurologic:  Alert and oriented x3, no focal deficits noted       ================================================================  LAB       All pertinent labs reviewed and interpreted.   Labs Ordered and Resulted from Time of ED Arrival to Time of ED Departure - No data to display     ===============================================================  RADIOLOGY       Reviewed all pertinent imaging. Please see official radiology report.   No orders to display         ================================================================  EKG         I have independently reviewed and interpreted the EKG(s) documented above.     ================================================================  PROCEDURES         I, Carlito Torres, am serving as a scribe to document services personally performed by Dr. Servin based on my observation and the provider's statements to me. I, Victorina Servin MD attest that Carlito Torres is acting in a scribe capacity, has observed my performance of the services and has documented them in accordance with my direction.   Victorina Servin M.D.   Emergency Medicine   [facility] EMERGENCY DEPARTMENT  [facility] [facility] [address]  [phone]  Dept: [phone]      Victorina Servin MD  07/17/24 1525

## 2024-07-17 NOTE — DISCHARGE INSTRUCTIONS
We have placed a referral in the chart for you to establish care with a primary care doctor who can prescribe medicine for you.  The last time it was prescribed from [facility] it was a 10 mg once a day dosing so this is what I can give you at this time.

## 2024-07-17 NOTE — ED TRIAGE NOTES
Patient walk in c/o medication refill for Zyprexa ( twice daily ).  Last time taken last night.      Triage Assessment (Adult)       Row Name 07/17/24 0341          Triage Assessment    Airway WDL WDL        Respiratory WDL    Respiratory WDL WDL        Skin Circulation/Temperature WDL    Skin Circulation/Temperature WDL WDL        Cardiac WDL    Cardiac WDL WDL        Peripheral/Neurovascular WDL    Peripheral Neurovascular WDL WDL        Cognitive/Neuro/Behavioral WDL    Cognitive/Neuro/Behavioral WDL WDL

## 2024-07-17 NOTE — ED NOTES
Discharge paperwork and prescription discussed with pt. Pt verbalized understanding and has no questions at this time. VSS

## 2024-08-08 NOTE — PLAN OF CARE
"Pt had an uneventful shift this day. Pt denies SI, SIB, HI and thoughts of hurting others. Pt denies A/V hallucinations, depression and anxiety. Pt had one episode of agitation regarding staff member. Pt attempted to enter the room of a peer. Staff attempted to redirect. Pt became irritated but was able to maintain self control and cooled off quickly with redirection.    General surgeon assessed pt groin abscess. Pt declined I&D, choosing to continue with abx and observation. Borders marked with skin marker. Dressing placed over wound.    Pt mood is \"good\" with bright affect and med compliant. Pt is looking forward to discharge tomorrow. No other concerns this day.    VS reviewed: BP (!) 154/83 (BP Location: Left arm, Patient Position: Sitting, Cuff Size: Adult Regular)   Pulse 102   Temp 98.2  F (36.8  C) (Temporal)   Resp 16   Wt 75.6 kg (166 lb 11.2 oz)   SpO2 95%   BMI 27.74 kg/m   . Patient denies pain.    Length of stay: 12  " No No No

## 2024-08-09 ENCOUNTER — HOSPITAL ENCOUNTER (EMERGENCY)
Facility: HOSPITAL | Age: 39
Discharge: HOME | End: 2024-08-09
Payer: COMMERCIAL

## 2024-08-09 VITALS
WEIGHT: 167.55 LBS | HEART RATE: 93 BPM | BODY MASS INDEX: 29.69 KG/M2 | OXYGEN SATURATION: 97 % | DIASTOLIC BLOOD PRESSURE: 92 MMHG | RESPIRATION RATE: 19 BRPM | SYSTOLIC BLOOD PRESSURE: 126 MMHG | HEIGHT: 63 IN | TEMPERATURE: 97.8 F

## 2024-08-09 DIAGNOSIS — K08.89 PAIN, DENTAL: Primary | ICD-10-CM

## 2024-08-09 DIAGNOSIS — Z75.8 DOES NOT HAVE PRIMARY CARE PROVIDER: ICD-10-CM

## 2024-08-09 PROCEDURE — 99284 EMERGENCY DEPT VISIT MOD MDM: CPT

## 2024-08-09 PROCEDURE — 250N000013 HC RX MED GY IP 250 OP 250 PS 637

## 2024-08-09 RX ORDER — CHLORHEXIDINE GLUCONATE ORAL RINSE 1.2 MG/ML
SOLUTION DENTAL
Qty: 118 ML | Refills: 0 | Status: SHIPPED | OUTPATIENT
Start: 2024-08-09

## 2024-08-09 RX ORDER — OLANZAPINE 10 MG/1
10 TABLET ORAL AT BEDTIME
Qty: 14 TABLET | Refills: 0 | Status: SHIPPED | OUTPATIENT
Start: 2024-08-09 | End: 2024-08-25

## 2024-08-09 RX ADMIN — AMOXICILLIN AND CLAVULANATE POTASSIUM 1 TABLET: 875; 125 TABLET, FILM COATED ORAL at 19:27

## 2024-08-09 ASSESSMENT — COLUMBIA-SUICIDE SEVERITY RATING SCALE - C-SSRS
1. IN THE PAST MONTH, HAVE YOU WISHED YOU WERE DEAD OR WISHED YOU COULD GO TO SLEEP AND NOT WAKE UP?: NO
2. HAVE YOU ACTUALLY HAD ANY THOUGHTS OF KILLING YOURSELF IN THE PAST MONTH?: NO
6. HAVE YOU EVER DONE ANYTHING, STARTED TO DO ANYTHING, OR PREPARED TO DO ANYTHING TO END YOUR LIFE?: NO

## 2024-08-09 ASSESSMENT — ACTIVITIES OF DAILY LIVING (ADL): ADLS_ACUITY_SCORE: 35

## 2024-08-09 NOTE — DISCHARGE INSTRUCTIONS
Take the prescribed medications as directed.  Follow-up with your dentist on Tuesday.  Follow up with primary care for additional refills of your zyprexa.      Call 911 anytime you think you may need emergency care. For example, call if:    You have trouble breathing.   Call your dentist or doctor now or seek immediate medical care if:    You have signs of infection, such as:  Increased pain, swelling, warmth, or redness.  Red streaks leading from the area.  Pus draining from the area.  A fever.   Watch closely for changes in your health, and be sure to contact your doctor if:    You do not get better as expected.       DENTAL RESOURCES  Many of these clinics offer a sliding fee option for patients that qualify, and see patients on a walk-in or same-day basis.  Please call each clinic directly, services, hours, fees, and policies vary greatly.  [facility] [address], [address]  [place] [address]    [phone] Open 365 days a year including holidays  9am-9pm     Walk in: 10am-7pm    [facility] [address] [place], [place] [id number] [phone]     [facility] [address] [id number] [phone]     [facility] [address] [id number] [phone]     [facility] [address] [place] [id number] [phone]     [facility] [address] [place] [address]    Monday - Friday 8am - 5pm    [facility] [address] [place] [address]  Monday, Wednesday, Thursday, Friday 8am-5pm.  Tuesday 8am-8pm     Dental services are available Monday, Wednesday, Thursday and Fridays only.    [facility]  [address] [address]    The Dental Clinic is located in the lower level of [facility]. When you walk into the main entrance, walk up the ramp, and  down the stairs. The Dental Clinic is at the bottom of the stairs.  Dental Clinic is open every Tuesday & Thursday at 10:00am Dental services are available on a first-come, first-serve basis whenever a volunteer dentist is available. We currently only provide extractions. Services are completely free and are provided for those who are uninsured or under-insured and unable to pay for dental care.      [facility] [address] [address]     Appointments:  [phone]   Fees at the dental student clinics are generally less than the average fees in the [facility]. Payment is expected at each visit. The [facility] accepts cash, credit cards, and CareCredit dental financing. The [facility] accepts most private insurance plans as well as Medical Assistance and [facility].

## 2024-08-09 NOTE — ED PROVIDER NOTES
Emergency Department Encounter  [facility] EMERGENCY DEPARTMENT  Hamzah Roberts   AGE: 38 year old SEX: male  YOB: 1985  MRN: [id number]      EVALUATION DATE & TIME: 8/9/2024  6:11 PM ; PCP: No Ref-Primary, Physician   ED PROVIDER: Chen Aguirre PA-C    CHIEF COMPLAINT: Dental Pain      MEDICAL DECISION MAKING   Hamzah Roberts is a 38 year old male with a pertinent history of polysubstance abuse, psychosis, and schizophrenia, who presents to the ED for evaluation of 7 days of right lower tooth pain that has worsened over the past few days and now radiates up the right jaw into the right ear.  Has dentist appointment scheduled in 4 days, has not seen a dentist since 2019.  No difficulty breathing, swallowing, fever, or chills.  Using clove oil for pain.  Patient requesting Zyprexa refill as he has been unable to establish primary care provider.    Vitals reviewed and hemodynamically stable, afebrile. On exam, patient is well-appearing and in no respiratory distress, airway is patent.  There is pain with palpation over tooth #34. No fluctuance noted along the gumline.    Based on evaluation today, low suspicion for deep space infection or concern for airway compromise.  No evidence of tooth fracture, avulsion, or bleeding socket.  Doubt jaw osteomyelitis given no severe mandibular pain. No evidence of retropharyngeal abscesses, peritonsillar abscess, Tal s angina, periapical abscess.     Patient was offered a dental nerve block and declined.  Recommended acetaminophen and ibuprofen for pain at home and discharge patient home with Augmentin and chlorhexidine mouthwash, first dose of Augmentin given in ED.  14-day supply of Zyprexa sent to patient's pharmacy and primary care referral placed for patient to establish care for subsequent refills.  Recommended patient follow-up with dentist at his established appointment and also provided a list low income and emergency  dentists. Patient was provided with clear, written instructions of potential danger signs and where and when to return for emergency care or re-evaluation.    Medical Decision Making  Obtained supplemental history:Supplemental history obtained?: No  Reviewed external records: External records reviewed?: Documented in chart  Care impacted by chronic illness:Mental Health  Care significantly affected by social determinants of health:Access to Medical Care, Alcohol Abuse and/or Recreational Drug Use, and Literacy  Did you consider but not order tests?: Work up considered but not performed and documented in chart, if applicable  Did you interpret images independently?: Independent interpretation of ECG and images noted in documentation, when applicable.  Consultation discussion with other provider:Did you involve another provider (consultant, , pharmacy, etc.)?: No  Discharge. I prescribed additional prescription strength medication(s) as charted. See documentation for any additional details.    FINAL IMPRESSION       ICD-10-CM    1. Pain, dental  K08.89       2. Does not have primary care provider  Z75.8 Primary Care Referral          ED COURSE   6:57 PM I met and introduced myself to the patient. I gathered initial history and performed my physical exam.  We discussed results, discharge, follow up, and reasons to return to the ED.     MEDICATIONS GIVEN IN THE EMERGENCY DEPARTMENT:   Medications   amoxicillin-clavulanate (AUGMENTIN) 875-125 MG per tablet 1 tablet (1 tablet Oral $Given 8/9/24 1927)      NEW PRESCRIPTIONS STARTED AT TODAY'S ED VISIT:  Discharge Medication List as of 8/9/2024  7:31 PM        START taking these medications    Details   amoxicillin-clavulanate (AUGMENTIN) 875-125 MG tablet Take 1 tablet by mouth every 12 hours for 10 days for dental infection., Disp-20 tablet, R-0, E-Prescribe      chlorhexidine (PERIDEX) 0.12 % solution Swish with 15 mL (one capful) for 30 seconds after toothbrushing,  then spit. Repeat twice daily (morning and evening) until symptoms resolve., Disp-118 mL, R-0, E-Prescribe      !! OLANZapine (ZYPREXA) 10 MG tablet Take 1 tablet (10 mg) by mouth at bedtime, Disp-14 tablet, R-0, E-Prescribe       !! - Potential duplicate medications found. Please discuss with provider.              =================================================================         HISTORY OF PRESENT ILLNESS   Patient information was obtained from: patient   Use of Intrepreter: N/A     Hamzah Roberts is a 38 year old male with a pertinent history of polysubstance abuse, psychosis, and schizophrenia, who presents to the ED by private vehicle for evaluation of dental pain. Patient reports right lower tooth pain for the past 7 days that has acutely worsened and is now radiating to the right jaw up to the right ear.  Patient has a dentist appointment scheduled in 4 days but states the pain worsened significantly today and he presented to the emergency department since he is concerned about infection.  No difficulty breathing or swallowing.  No fevers or chills.  Using clove oil for pain. No history of recent tooth extraction in that area.  Last saw a dentist in 2019.      Per chart review,  07/17/2024 - Patient seen in ED for medication refill.  Patient reports he is out of his Zyprexa that was prescribed to him in December 2023.  Does not have a PCP.  Primary care referral placed so patient can establish further refills.      MEDICAL HISTORY     Past Medical History:   Diagnosis Date    Chemical dependency (H)        No past surgical history on file.    No family history on file.    Social History     Tobacco Use    Smoking status: Every Day     Current packs/day: 0.50     Types: Cigarettes, Vaping Device   Substance Use Topics    Alcohol use: Never    Drug use: Yes     Types: Methamphetamines     Comment: smokes meth, last use about 12 hours PTA         There is no immunization history on file for this patient.  "    amoxicillin-clavulanate (AUGMENTIN) 875-125 MG tablet  chlorhexidine (PERIDEX) 0.12 % solution  OLANZapine (ZYPREXA) 10 MG tablet  OLANZapine (ZYPREXA) 10 MG tablet  OLANZapine (ZYPREXA) 10 MG tablet  omeprazole (PRILOSEC) 20 MG DR capsule  QUEtiapine (SEROQUEL) 50 MG tablet  valACYclovir (VALTREX) 500 MG tablet        PHYSICAL EXAM   VITALS:  Patient Vitals for the past 24 hrs:   BP Temp Temp src Pulse Resp SpO2 Height Weight   08/09/24 1931 -- 97.8  F (36.6  C) Oral -- -- -- -- --   08/09/24 1929 (!) 126/92 -- -- 93 -- 97 % -- --   08/09/24 1735 -- 98.3  F (36.8  C) Oral -- -- -- -- --   08/09/24 1734 (!) 143/84 -- Oral 99 19 97 % 1.6 m (5' 3\") 76 kg (167 lb 8.8 oz)     Body mass index is 29.68 kg/m .     Vitals reviewed. Nursing notes reviewed.  CONSITUTIONAL: Generally well-appearing male , in no acute distress. Well developed, nourished.  EYES: Conjunctivae clear, no discharge. EOM grossly intact.  HENT: Normocephalic, atraumatic. Mucous membranes moist, nares normal. Uvula midline without oropharyngeal erythema, exudate or swelling. TMs normal bilaterally. No fluctuance along gumline.  Pain with direct palpation over tooth #32.  No palpable masses along the outside of face or under the mandible.   NECK: Supple, gross ROM intact. No firm or woody neck masses.   RESPIRATORY: Normal work of breathing, speaks in full sentences.  CARDIO: Normal heart rate.  GI: Nondistended.   MSK: Moving all 4 extremities intentionally and without pain.  SKIN: Warm, dry. No rashes or lesions.  NEURO:  AxOx4. No focal neurological deficits.   PSYCH: Thoughts linear and responses appropriate. Cooperative. Appropriate mood and affect.     LABS AND IMAGING   All pertinent labs reviewed and interpreted  Labs Ordered and Resulted from Time of ED Arrival to Time of ED Departure - No data to display    No orders to display     Chen Aguirre PA-C   Emergency Medicine   [facility] EMERGENCY " DEPARTMENT  [address] [place] [address]  [phone]  Dept: [phone]      Chen Aguirre PA-C  08/09/24 7467

## 2024-08-09 NOTE — ED TRIAGE NOTES
Pt here for 1 week of right lower tooth pain now radiating to his right side of throat and ear and right cheek with some discomfort swallowing.  Have a dental appointment on tues.

## 2024-08-25 ENCOUNTER — HOSPITAL ENCOUNTER (EMERGENCY)
Facility: HOSPITAL | Age: 39
Discharge: HOME | End: 2024-08-25
Admitting: STUDENT IN AN ORGANIZED HEALTH CARE EDUCATION/TRAINING PROGRAM
Payer: COMMERCIAL

## 2024-08-25 VITALS
WEIGHT: 167.5 LBS | RESPIRATION RATE: 18 BRPM | HEART RATE: 103 BPM | HEIGHT: 63 IN | OXYGEN SATURATION: 96 % | DIASTOLIC BLOOD PRESSURE: 80 MMHG | BODY MASS INDEX: 29.68 KG/M2 | TEMPERATURE: 98.5 F | SYSTOLIC BLOOD PRESSURE: 141 MMHG

## 2024-08-25 DIAGNOSIS — Z76.0 ENCOUNTER FOR MEDICATION REFILL: ICD-10-CM

## 2024-08-25 PROCEDURE — 99283 EMERGENCY DEPT VISIT LOW MDM: CPT

## 2024-08-25 RX ORDER — OLANZAPINE 10 MG/1
10 TABLET ORAL AT BEDTIME
Qty: 18 TABLET | Refills: 0 | Status: SHIPPED | OUTPATIENT
Start: 2024-08-25 | End: 2024-09-12

## 2024-08-25 ASSESSMENT — ACTIVITIES OF DAILY LIVING (ADL): ADLS_ACUITY_SCORE: 33

## 2024-08-25 NOTE — ED TRIAGE NOTES
Patient presents to ER for a medication refill for zyprexa.  States he can't get in to primary care until September 11th, 2024 with [facility].

## 2024-08-25 NOTE — ED PROVIDER NOTES
EMERGENCY DEPARTMENT ENCOUNTER   NAME: Hamzah Roberts ; AGE: 38 year old male ; YOB: 1985 ; MRN: [id number] ; PCP: Tierra Ref-Primary, Physician     Evaluation Date & Time: 8/25/2024  2:52 PM    ED Provider: Doris Schwab PA-C    CHIEF COMPLAINT     Medication Refill      FINAL ASSESSMENT       ICD-10-CM    1. Encounter for medication refill  Z76.0           ED COURSE, MEDICAL DECISION MAKING, PLAN     ED course     2:59 PM Evaluated patient. Discharge and follow up plans discussed. RN to discharge.   ______________________________________________________________________    Reason for visit: Hamzah Roberts is a 38 year old male with past medical history of Schizoaffective disorder, paranoid schizophrenia, polysubstance abuse presenting to get a short term of Zyprexa to get him through until his PCP appointment on 9/11.     Exam: Brief visual exam unremarkable. Slightly elevated BP at 141/80 and mildly tachycardic at 103 otherwise vitally normal.     Assessment: Need for medication refill.   Overall, no red flag s/s present to suggest an acutely serious or life threatening condition that would necessitate hospitalization.     Plan: Rx for Zyprexa 10mg at bedtime. 18 tablets provided. He will be establishing care with primary on 9/11.   Indications for re-evaluation in the ER discussed.   Patient/parent/guardian understanding and agreeable with the plan and will discharge to home in good condition.       *All pertinent lab & imaging studies independently reviewed. (See chart for details)   *Discussed the results of all the tests and plan with patient and family/guardians.       HISTORY OF PRESENT ILLNESS   Patient information was obtained from: Patient   Use of Intrepreter: None     Pertinent past medical history: Schizoaffective disorder, paranoid schizophrenia, polysubstance abuse    Hamzah Roberts is here by means of walk in  with friend  for a medication refill.     Patient states that he is out of  "Zyprexa and cannot get in with a primary care provider until September 11.  He is hoping to get a refill to get him through until that date.    He has no other concerns.    Per my chart review  7/17/2024: [facility] ER for medication refill and was given 30 tabs of Zyprexa 10 mg nightly.  Was given a referral to primary care.  8/9/2024: [facility] ER for dental pain, but also was given 14 tabs of Zyprexa 10 mg nightly.  Was given another referral to primary care.    MEDICAL HISTORY     Past Medical History:   Diagnosis Date    Chemical dependency (H)        No past surgical history on file.    No family history on file.    Social History     Tobacco Use    Smoking status: Every Day     Current packs/day: 0.50     Types: Cigarettes, Vaping Device   Substance Use Topics    Alcohol use: Never    Drug use: Yes     Types: Methamphetamines     Comment: smokes meth, last use about 12 hours PTA       OLANZapine (ZYPREXA) 10 MG tablet  chlorhexidine (PERIDEX) 0.12 % solution  omeprazole (PRILOSEC) 20 MG DR capsule  QUEtiapine (SEROQUEL) 50 MG tablet  valACYclovir (VALTREX) 500 MG tablet          PHYSICAL EXAM     First Vitals:  Patient Vitals for the past 24 hrs:   BP Temp Temp src Pulse Resp SpO2 Height Weight   08/25/24 1411 (!) 141/80 98.5  F (36.9  C) Oral 103 18 96 % 1.6 m (5' 3\") 76 kg (167 lb 8 oz)       PHYSICAL EXAM:   Constitutional: No acute distress.  Neuro: Awake and alert. No focal deficits.  Psych: Calm and cooperative.  Eyes: PERRL. EOMI. Conjunctivae clear.   Cardio: Regular rate. Adequate perfusion to extremities.  Pulmonary: Oxygenating well on RA. No labored breathing.    Skin: Natural color, warm, dry, intact.       RESULTS     LAB:  All pertinent labs reviewed and interpreted  Labs Ordered and Resulted from Time of ED Arrival to Time of ED Departure - No data to display    RADIOLOGY:  No orders to display       ECG:  N/A      PROCEDURES     None         FINAL IMPRESSION:    ICD-10-CM    1. Encounter for " medication refill  Z76.0           MEDICATIONS GIVEN IN THE EMERGENCY DEPARTMENT:  Medications - No data to display    NEW PRESCRIPTIONS STARTED AT TODAY'S ED VISIT:  Discharge Medication List as of 8/25/2024  3:02 PM               MEDICAL DECISION MAKING:  Obtained supplemental history:Supplemental history obtained?: No  Reviewed external records: External records reviewed?: Outpatient Record: See HPI  Care impacted by chronic illness:Mental Health and Other: Substance abuse  Care significantly affected by social determinants of health:Alcohol Abuse and/or Recreational Drug Use  Did you consider but not order tests?: Work up considered but not performed and documented in chart, if applicable  Did you interpret images independently?: Independent interpretation of ECG and images noted in documentation, when applicable.  Consultation discussion with other provider:Did you involve another provider (consultant, , pharmacy, etc.)?: No  Discharge. I prescribed additional prescription strength medication(s) as charted. See documentation for any additional details.      MIPS:  No MIPS measures identified.      CRITICAL CARE:  N/A             Some or all of this documentation has been completed using dictation software and mild grammatical errors may be present. Please contact me with any concerns regarding this.       Doris Schwab PA-C  Emergency Medicine   [facility] EMERGENCY DEPARTMENT       Doris Schwab PA-C  08/25/24 2340

## 2024-09-22 ENCOUNTER — HEALTH MAINTENANCE LETTER (OUTPATIENT)
Age: 39
End: 2024-09-22

## 2024-09-27 ENCOUNTER — HOSPITAL ENCOUNTER (EMERGENCY)
Facility: HOSPITAL | Age: 39
Discharge: HOME | End: 2024-09-27
Admitting: EMERGENCY MEDICINE
Payer: COMMERCIAL

## 2024-09-27 VITALS
WEIGHT: 177 LBS | DIASTOLIC BLOOD PRESSURE: 90 MMHG | SYSTOLIC BLOOD PRESSURE: 153 MMHG | RESPIRATION RATE: 18 BRPM | BODY MASS INDEX: 31.35 KG/M2 | HEART RATE: 100 BPM | OXYGEN SATURATION: 97 % | TEMPERATURE: 98.4 F

## 2024-09-27 DIAGNOSIS — Z76.0 ENCOUNTER FOR MEDICATION REFILL: ICD-10-CM

## 2024-09-27 PROCEDURE — 99283 EMERGENCY DEPT VISIT LOW MDM: CPT

## 2024-09-27 RX ORDER — OLANZAPINE 10 MG/1
10 TABLET ORAL AT BEDTIME
Qty: 30 TABLET | Refills: 0 | Status: SHIPPED | OUTPATIENT
Start: 2024-09-27 | End: 2024-10-27

## 2024-09-27 ASSESSMENT — COLUMBIA-SUICIDE SEVERITY RATING SCALE - C-SSRS
2. HAVE YOU ACTUALLY HAD ANY THOUGHTS OF KILLING YOURSELF IN THE PAST MONTH?: NO
6. HAVE YOU EVER DONE ANYTHING, STARTED TO DO ANYTHING, OR PREPARED TO DO ANYTHING TO END YOUR LIFE?: NO
1. IN THE PAST MONTH, HAVE YOU WISHED YOU WERE DEAD OR WISHED YOU COULD GO TO SLEEP AND NOT WAKE UP?: NO

## 2024-09-27 NOTE — ED TRIAGE NOTES
The pt arrives with a request to have his Zyprexa refilled. He missed his appointment on the 11th that he missed to have it refilled and would like a new prescription.      Triage Assessment (Adult)       Row Name 09/27/24 1749          Triage Assessment    Airway WDL WDL        Cognitive/Neuro/Behavioral WDL    Cognitive/Neuro/Behavioral WDL WDL

## 2024-09-27 NOTE — DISCHARGE INSTRUCTIONS
I sent a 30 day fill of your zyprexa to the pharmacy. Take one daily.     Follow up in primary care as soon as possible. Return here for any new or worsening symptoms.

## 2024-09-27 NOTE — ED PROVIDER NOTES
EMERGENCY DEPARTMENT ENCOUNTER      NAME: Hamzah Roberts  AGE: 38 year old male  YOB: 1985  MRN: [id number]  EVALUATION DATE & TIME: No admission date for patient encounter.    PCP: Tierra Ref-Primary, Physician    ED PROVIDER: Vivi Espinosa PA-C      Chief Complaint   Patient presents with    Medication Refill         FINAL IMPRESSION:  1. Encounter for medication refill          ED COURSE & MEDICAL DECISION MAKING:    Pertinent Labs & Imaging studies reviewed. (See chart for details)    38 year old male presents to the Emergency Department for evaluation of medication refill.    Physical exam is remarkable for a well-appearing male who is in no acute distress.  He is alert and oriented.  Vital signs remarkable for mild hypertension but otherwise stable and he is afebrile.    I do not think any emergent labs or imaging are indicated at this time.  The patient denies any concerning psychiatric symptoms that would warrant evaluation/involuntary hold.  I sent a 30-day prescription of his Zyprexa to his pharmacy as he will likely be unable to get into primary care before then, recommend follow-up with them as soon as possible and return here for any new or worsening symptoms.  The patient is agreeable with this treatment plan and verbalized understanding.      Medical Decision Making  Obtained supplemental history:Supplemental history obtained?: No  Reviewed external records: External records reviewed?: Inpatient Record: 08/25/24 er visit for medication refill  Care impacted by chronic illness:Mental Health and Other: Substance abuse  Care significantly affected by social determinants of health:Access to Medical Care, Alcohol Abuse and/or Recreational Drug Use, and Medication Noncompliance  Did you consider but not order tests?: Work up considered but not performed and documented in chart, if applicable  Did you interpret images independently?: Independent interpretation of ECG and images noted in  documentation, when applicable.  Consultation discussion with other provider:Did you involve another provider (consultant, MH, pharmacy, etc.)?: No  Discharge. I prescribed additional prescription strength medication(s) as charted. See documentation for any additional details.  Not Applicable      ED Course   5:57 PM Performed my initial history and physical exam. Discussed workup in the emergency department, management of symptoms, and likely disposition. I discussed the plan for discharge with the patient or family and they are agreeable.. We discussed supportive cares at home and reasons for return to the ER including new or worsening symptoms - all questions and concerns addressed. Patient to be discharged by RN.    At the conclusion of the encounter I discussed the results of all of the tests and the disposition. The questions were answered. The patient or family acknowledged understanding and was agreeable with the care plan.     Voice recognition software was used in the creation of this note. Any grammatical or nonsensical errors are due to inherent errors with the software and are not the intention of the writer.     MEDICATIONS GIVEN IN THE EMERGENCY:  Medications - No data to display    NEW PRESCRIPTIONS STARTED AT TODAY'S ER VISIT  Discharge Medication List as of 9/27/2024  6:07 PM               =================================================================    HPI    Patient information was obtained from: The patient     Use of : N/A        Hamzah Roberts is a 38 year old male who presents for a medication refill.     The patient reports that he missed his visit with his PCP on September 11th where he was going to have his medications refilled. He ran out of his Zyprexa today. He would like a refill of his Zyprexa. He will schedule a follow up appointment with his PCP. He takes 10 mg Zyprexa daily. He denies any suicidal ideation, homicidal ideation, or hallucinations. He is sleeping  normally.       REVIEW OF SYSTEMS   Review of Systems    All other systems reviewed and are negative unless noted in HPI.      PAST MEDICAL HISTORY:  Past Medical History:   Diagnosis Date    Chemical dependency (H)        PAST SURGICAL HISTORY:  No past surgical history on file.    CURRENT MEDICATIONS:    OLANZapine (ZYPREXA) 10 MG tablet  chlorhexidine (PERIDEX) 0.12 % solution  OLANZapine (ZYPREXA) 10 MG tablet  omeprazole (PRILOSEC) 20 MG DR capsule  QUEtiapine (SEROQUEL) 50 MG tablet  valACYclovir (VALTREX) 500 MG tablet        ALLERGIES:  Allergies   Allergen Reactions    Morphine Sulfate [Morphine] Shortness Of Breath       FAMILY HISTORY:  No family history on file.    SOCIAL HISTORY:   Social History     Socioeconomic History    Marital status: Single   Tobacco Use    Smoking status: Every Day     Current packs/day: 0.50     Types: Cigarettes, Vaping Device   Substance and Sexual Activity    Alcohol use: Never    Drug use: Yes     Types: Methamphetamines     Comment: smokes meth, last use about 12 hours PTA    Sexual activity: Not Currently   Social History Narrative    Aug 2020: Patient admits to meth use.     Social Determinants of Health      Received from [facility], [facility]    Social Connections   Interpersonal Safety: Unknown (3/20/2024)    Received from [facility], [facility]    Humiliation, Afraid, Rape, and Kick questionnaire     Physically Abused: No     Sexually Abused: No       VITALS:  Patient Vitals for the past 24 hrs:   BP Temp Temp src Pulse Resp SpO2 Weight   09/27/24 1749 (!) 153/90 98.4  F (36.9  C) Oral 100 18 97 % 80.3 kg (177 lb)       PHYSICAL EXAM    VITAL SIGNS: BP (!) 153/90   Pulse 100   Temp 98.4  F (36.9  C) (Oral)   Resp 18   Wt 80.3 kg (177 lb)   SpO2 97%   BMI 31.35 kg/m    General Appearance: Alert, cooperative, normal speech and facial symmetry, appears stated age, the patient does not  appear in distress  Head:  Normocephalic, without obvious abnormality, atraumatic  Extremities: Moves all extremities  Neuro: Patient is awake, alert, and responsive to voice. No gross motor weaknesses or sensory loss; moves all extremities.    LAB:  All pertinent labs reviewed and interpreted.  Labs Ordered and Resulted from Time of ED Arrival to Time of ED Departure - No data to display    RADIOLOGY:  Reviewed all pertinent imaging. Please see official radiology report.  No orders to display     I, Katlin Caballero, am serving as a scribe to document services personally performed by Vivi Espinosa PA-C based on my observation and the provider's statements to me. I, Vivi Espinosa PA-C attest that Katlin Caballero is acting in a scribe capacity, has observed my performance of the services and has documented them in accordance with my direction.     Vivi Espinosa PA-C  Emergency Medicine  [facility] EMERGENCY DEPARTMENT  [facility] [facility] [id number] [phone]  Dept: [phone]       Vivi Espinosa PA-C  09/27/24 2085

## 2024-10-31 ENCOUNTER — HOSPITAL ENCOUNTER (EMERGENCY)
Facility: HOSPITAL | Age: 39
Discharge: HOME | End: 2024-10-31
Payer: COMMERCIAL

## 2024-10-31 VITALS
WEIGHT: 167.5 LBS | SYSTOLIC BLOOD PRESSURE: 143 MMHG | DIASTOLIC BLOOD PRESSURE: 76 MMHG | OXYGEN SATURATION: 99 % | BODY MASS INDEX: 29.67 KG/M2 | RESPIRATION RATE: 16 BRPM | HEART RATE: 98 BPM | TEMPERATURE: 97.9 F

## 2024-10-31 DIAGNOSIS — F19.10 POLYSUBSTANCE ABUSE (H): ICD-10-CM

## 2024-10-31 DIAGNOSIS — Z76.0 ENCOUNTER FOR MEDICATION REFILL: ICD-10-CM

## 2024-10-31 DIAGNOSIS — F20.0 PARANOID SCHIZOPHRENIA (H): ICD-10-CM

## 2024-10-31 PROCEDURE — 99283 EMERGENCY DEPT VISIT LOW MDM: CPT

## 2024-10-31 RX ORDER — OLANZAPINE 10 MG/1
10 TABLET ORAL AT BEDTIME
Qty: 30 TABLET | Refills: 0 | Status: SHIPPED | OUTPATIENT
Start: 2024-10-31

## 2024-10-31 ASSESSMENT — ACTIVITIES OF DAILY LIVING (ADL): ADLS_ACUITY_SCORE: 0

## 2024-10-31 NOTE — ED NOTES
Pt states he took his last zyprexa yesterday. Has missed several doctors appointments (one due to no ride, the last on September 12th 2024 because he was sick).

## 2024-10-31 NOTE — ED TRIAGE NOTES
Pt reports needing Zyprexa refill. Last pill was last night     Triage Assessment (Adult)       Row Name 10/31/24 1425          Triage Assessment    Airway WDL WDL        Respiratory WDL    Respiratory WDL WDL        Skin Circulation/Temperature WDL    Skin Circulation/Temperature WDL WDL        Cardiac WDL    Cardiac WDL WDL        Peripheral/Neurovascular WDL    Peripheral Neurovascular WDL WDL        Cognitive/Neuro/Behavioral WDL    Cognitive/Neuro/Behavioral WDL WDL

## 2024-10-31 NOTE — ED PROVIDER NOTES
EMERGENCY DEPARTMENT ENCOUNTER      NAME: Hamzah Roberts  AGE: 39 year old male  YOB: 1985  MRN: [id number]  EVALUATION DATE & TIME: 10/31/2024  2:30 PM    PCP: Tierra Callahan-Primary, Physician    ED PROVIDER: Neli Hall PA-C      Chief Complaint   Patient presents with    Medication Refill         FINAL IMPRESSION:  1. Encounter for medication refill    2. Polysubstance abuse (H)    3. Paranoid schizophrenia (H)      ED COURSE & MEDICAL DECISION MAKIN:58 PM Met with patient for initial interview. Plan for care discussed. I discussed the plan for discharge with the patient, and patient is agreeable. We discussed supportive cares at home and reasons for return to the ER including new or worsening symptoms. All questions and concerns addressed. Patient to be discharged by RN.    39 year old male with a history of paranoid schizophrenia (on Zyprexa), polysubstance use disorder who presents to this ED for medication refill.  Per chart review, patient has been evaluated in the ED multiple times for medication refill of Zyprexa. He was most recently evaluated on 2024 for medication refill and provided 30-day supply.  He was seen on 2024 and 2024 for medication refill as well. He states he took his last pill last night and does not want to miss any medications as non-compliance has resulted in in-patient psych admissions in the past. He denies any current hallucinations, suicidal or homicidal ideation or plan. He is motivated to re-establish care with PCP and/or psychiatry for ongoing refills/management. Upon exam, patient is afebrile, hypertensive, but in no acute distress.     Discussed importance of following up with PCP and/or psychiatry to continue to manage this medication - referrals placed today. Patient expressed understanding. Plan to discharge patient home with prescription Zyprexa, strict return precautions, and close follow up with their PCP and/or psychiatry for reevaluation  and ongoing management - referrals placed today. The patient was stable and well appearing upon discharge. The patient was advised to return to the ER if any new or worsening symptoms develop. The patient verbalizes understanding and agrees with the plan.     Medical Decision Making  Obtained supplemental history:Supplemental history obtained?: No  Reviewed external records: External records reviewed?: Documented in chart  Care impacted by chronic illness:Documented in Chart, Alcohol and/or Drug Abuse or Dependence, and Mental Health  Did you consider but not order tests?: Work up considered but not performed and documented in chart, if applicable  Did you interpret images independently?: Independent interpretation of ECG and images noted in documentation, when applicable.  Consultation discussion with other provider:Did you involve another provider (consultant, , pharmacy, etc.)?: No  Discharge. I prescribed additional prescription strength medication(s) as charted. See documentation for any additional details.    MEDICATIONS GIVEN IN THE EMERGENCY:  Medications - No data to display    NEW PRESCRIPTIONS STARTED AT TODAY'S ER VISIT  New Prescriptions    OLANZAPINE (ZYPREXA) 10 MG TABLET    Take 1 tablet (10 mg) by mouth at bedtime.          =================================================================    HPI    Patient information was obtained from: patient    Use of : N/A       Hamzah Roberts is a 39 year old male with a pertinent history of paranoid schizophrenia (on Zyprexa), polysubstance use disorder who presents to this ED for medication refill.  Per chart review, patient has been evaluated in the ED multiple times for medication refill of Zyprexa. He was most recently evaluated on 9/27/2024 for medication refill and provided 30-day supply.  He was seen on 8/25/2024 and 7/17/2024 for medication refill as well.  He states that he did have an PCP appointment when September with his primary care  provider, but did not have a ride and was unable to make his appointment.  He states that he used to follow with mental health resources in [place], but hasn't been there for quite some time. He states he is motivated to make an appointment with primary care to keep prescriptions managed. He states he took his last pill last night and does not want to miss any medications as non-compliance has resulted in in-patient psych admissions in the past. He denies any suicidal or homicidal ideation or plans, hallucinations, or any other complaints.     REVIEW OF SYSTEMS   Review of Systems see HPI    PAST MEDICAL HISTORY:  Past Medical History:   Diagnosis Date    Chemical dependency (H)        PAST SURGICAL HISTORY:  No past surgical history on file.        CURRENT MEDICATIONS:    OLANZapine (ZYPREXA) 10 MG tablet  chlorhexidine (PERIDEX) 0.12 % solution  OLANZapine (ZYPREXA) 10 MG tablet  OLANZapine (ZYPREXA) 10 MG tablet  omeprazole (PRILOSEC) 20 MG DR capsule  QUEtiapine (SEROQUEL) 50 MG tablet  valACYclovir (VALTREX) 500 MG tablet        ALLERGIES:  Allergies   Allergen Reactions    Morphine Sulfate [Morphine] Shortness Of Breath       FAMILY HISTORY:  No family history on file.    SOCIAL HISTORY:   Social History     Socioeconomic History    Marital status: Single   Tobacco Use    Smoking status: Every Day     Current packs/day: 0.50     Types: Cigarettes, Vaping Device   Substance and Sexual Activity    Alcohol use: Never    Drug use: Yes     Types: Methamphetamines     Comment: smokes meth, last use about 12 hours PTA    Sexual activity: Not Currently   Social History Narrative    Aug 2020: Patient admits to meth use.     Social Drivers of Health      Received from [facility], [facility]    Social Connections   Interpersonal Safety: Unknown (3/20/2024)    [facility]    Humiliation, Afraid, Rape, and Kick  questionnaire     Physically Abused: No     Sexually Abused: No       VITALS:  BP (!) 155/85   Pulse 102   Temp 97.8  F (36.6  C) (Oral)   Resp 18   Wt 76 kg (167 lb 8 oz)   SpO2 97%   BMI 29.67 kg/m      PHYSICAL EXAM    Constitutional:  Alert, in no acute distress. Cooperative.  EYES: Conjunctivae clear.  HENT:  Atraumatic, normocephalic.  Respiratory:  Respirations even, unlabored, in no acute respiratory distress.  Cardiovascular:  Regular rate, good peripheral perfusion.  GI: Soft, flat, non-distended.  Musculoskeletal:  No edema. No cyanosis. Range of motion major extremities intact.    Integument: Warm, Dry.   Neurologic:  Alert & oriented. No focal deficits noted.  Psych: Normal mood and affect.      LAB:  All pertinent labs reviewed and interpreted.       RADIOLOGY:  Reviewed all pertinent imaging. Please see official radiology report.  No orders to display     Neli Hall PA-C  [facility] EMERGENCY DEPARTMENT  [address] [place] [id number]  [phone]      Neli Hall PA-C  10/31/24 1525       Neli Hall PA-C  10/31/24 1522

## 2024-10-31 NOTE — DISCHARGE INSTRUCTIONS
----- Message from Waylon Martinez MD sent at 3/14/2024  8:51 AM CDT -----  Please notify patient with results. Persistent anemia. Pls schedule for EGD with gastric polyps removal/banding     Continue your medications as previously prescribed.    Please schedule an appointment with your primary care provider and/or psychiatrist for ongoing management of this medication - referrals placed today.    Return to the ED for any new or worsening symptoms including hallucinations, suicidal or homicidal ideation or plan, chest pain, shortness of breath, or any other concerning symptoms.

## 2024-11-26 ENCOUNTER — HOSPITAL ENCOUNTER (EMERGENCY)
Facility: HOSPITAL | Age: 39
Discharge: HOME | End: 2024-11-26
Attending: EMERGENCY MEDICINE | Admitting: EMERGENCY MEDICINE
Payer: COMMERCIAL

## 2024-11-26 VITALS
OXYGEN SATURATION: 98 % | HEART RATE: 117 BPM | WEIGHT: 155 LBS | HEIGHT: 64 IN | SYSTOLIC BLOOD PRESSURE: 116 MMHG | DIASTOLIC BLOOD PRESSURE: 77 MMHG | BODY MASS INDEX: 26.46 KG/M2 | TEMPERATURE: 98.1 F | RESPIRATION RATE: 18 BRPM

## 2024-11-26 DIAGNOSIS — F15.10 METHAMPHETAMINE ABUSE (H): ICD-10-CM

## 2024-11-26 DIAGNOSIS — F25.9 SCHIZOAFFECTIVE DISORDER, UNSPECIFIED TYPE (H): ICD-10-CM

## 2024-11-26 PROCEDURE — 99283 EMERGENCY DEPT VISIT LOW MDM: CPT

## 2024-11-26 ASSESSMENT — ACTIVITIES OF DAILY LIVING (ADL)
ADLS_ACUITY_SCORE: 52

## 2024-11-26 ASSESSMENT — COLUMBIA-SUICIDE SEVERITY RATING SCALE - C-SSRS
2. HAVE YOU ACTUALLY HAD ANY THOUGHTS OF KILLING YOURSELF IN THE PAST MONTH?: NO
1. IN THE PAST MONTH, HAVE YOU WISHED YOU WERE DEAD OR WISHED YOU COULD GO TO SLEEP AND NOT WAKE UP?: NO
6. HAVE YOU EVER DONE ANYTHING, STARTED TO DO ANYTHING, OR PREPARED TO DO ANYTHING TO END YOUR LIFE?: NO

## 2024-11-26 NOTE — CONSULTS
Diagnostic Evaluation Consultation  Crisis Assessment    Patient Name: Hamzah Roberts  Age:  39 year old  Legal Sex: male  Gender Identity: male  Pronouns:   Race: White  Ethnicity: Not  or   Language: English      Patient was assessed: Virtual: Trademob   Crisis Assessment Start Date: 11/26/24  Crisis Assessment Start Time: 0939  Crisis Assessment Stop Time: 0946  Patient location: [facility] EMERGENCY DEPARTMENT                             JNED-08    Referral Data and Chief Complaint  Hamzah Roberts presents to the ED with family/friends. Patient is presenting to the ED for the following concerns: Substance use.       Factors that make the mental health crisis life threatening or complex are:  The patient reports using meth yesterday. Early this morning he was experiencing hallucinations of hearing people screaming for help. He called 911 and requested to be taken to the ED. Patient arrived around 0759. Patient slept in the ED and then was interviewed at 0939. Patient appeared sleepy, oriented, and able to engage in assessment. Patient reports feeling better now that he has slept. He still feels tired and currently denies any visual or auditory hallucinations. He denies suicidal or homicidal ideation, intent, or plan. He is future thinking and hopes to go to treatment. He requested resources to get an evaluation.    Informed Consent and Assessment Methods  Explained the crisis assessment process, including applicable information disclosures and limits to confidentiality, assessed understanding of the process, and obtained consent to proceed with the assessment.  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 He reports that he takes Zyprexa. He has gone to treatment in the past and plans to return to treatment.  is [facility]. Not currently under commitment. Last commitment was terminated May 2024. His on probation. He is currently homeless. He denies any stressors or concerns.    Brief Psychosocial History  Family:  Single, Children  (unknown)  Support System:   (unknown)  Employment Status:  unemployed  Source of Income:  none  Financial Environmental Concerns:  unemployed  Current Hobbies:   (unknown)  Barriers in Personal Life:   (substance use)    Significant Clinical History  Current Anxiety Symptoms:   (none currently)  Current Depression/Trauma:   (none currently)  Current Somatic Symptoms:   (none currently)  Current Psychosis/Thought Disturbance:   (none currently)  Current Eating Symptoms:   (none currently)  Chemical Use History:  Alcohol: None  Benzodiazepines: None  Opiates: None  Cocaine: None  Marijuana: None  Other Use: Methamphetamines  Last Use:: 11/26/24   Past diagnosis:  Substance Use Disorder  Family history:   (unknown)  Past treatment:  Psychiatric Medication Management, Supportive Living Environment (group home, correction house, etc), Inpatient Hospitalization, Probation/Court ordered treatment     Collateral Information  Is there collateral information: reviewed recent medical records      Risk Assessment  Gates Suicide Severity Rating Scale Full Clinical Version: 11/26/2024  Suicidal Ideation  Q1 Wish to be Dead (Lifetime): No  Q2 Non-Specific Active Suicidal Thoughts (Lifetime): No  Q6 Suicide Behavior (Lifetime): no     Suicidal Behavior (Lifetime)  Actual Attempt (Lifetime): No    Gates Suicide Severity Rating Scale Recent: 11/26/2024  Suicidal Ideation (Recent)  Q1 Wished to be Dead (Past Month): no  Q2  Suicidal Thoughts (Past Month): no  Level of Risk per Screen: no risks indicated     Suicidal Behavior (Recent)  Actual Attempt (Past 3 Months): No    Environmental or Psychosocial Events: ongoing abuse of substances  Protective Factors: Protective Factors: optimistic outlook - identification of future goals, help seeking    Does the patient have thoughts of harming others? Feels Like Hurting Others: no  Previous Attempt to Hurt Others: no  Is the patient engaging in sexually inappropriate behavior?: no    Is the patient engaging in sexually inappropriate behavior?  no        Mental Status Exam   Affect: Appropriate  Appearance: Appropriate  Attention Span/Concentration: Attentive  Eye Contact: Engaged    Fund of Knowledge: Appropriate   Language /Speech Content: Fluent  Language /Speech Volume: Normal  Language /Speech Rate/Productions: Normal  Recent Memory: Intact  Remote Memory: Intact  Mood: Normal  Orientation to Person: Yes   Orientation to Place: Yes  Orientation to Time of Day: Yes  Orientation to Date: Yes     Situation (Do they understand why they are here?): Yes  Psychomotor Behavior: Normal  Thought Content: Clear  Thought Form: Intact     Medication  Psychotropic medications: zyprexa     Current Care Team  Patient Care Team:  No Ref-Primary, Physician as PCP - Brandon Gunn as Nursing Assistant  Cynthia Bueno as     Diagnosis  Patient Active Problem List   Diagnosis Code    Suicidal ideation R45.851    Polysubstance abuse (H) F19.10    Paranoid schizophrenia (H) F20.0    Paranoia (H) F22    Methamphetamine abuse (H) F15.10    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    Amphetamine use disorder, severe, 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    Anxiety F41.9    Episodic mood disorder (H) F39    Gastroesophageal reflux disease K21.9    Left ankle pain, unspecified chronicity M25.572    Psychosis, atypical (H) F29     Recurrent genital herpes A60.00    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    Hallucinations R44.3    Encounter for medication refill Z76.0    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    Methamphetamine use disorder, moderate, in sustained remission, in controlled environment, dependence (H) F15.21    Methamphetamine use disorder, severe (H) F15.20    Schizoaffective disorder, bipolar type (H) F25.0    Schizoaffective disorder, chronic condition with acute exacerbation (H) F25.9    Schizoaffective disorder, depressive type (H) F25.1    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.30, S09.90XS    Auditory hallucinations R44.0    Substance-induced psychotic disorder with hallucinations (H) F19.951    Altered mental status, unspecified altered mental status type R41.82    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 Upon interview the patient was oriented and no longer experiencing hallucinations. He denies suicidal or homicidal ideation, intent, or plan. He is future thinking, listed reasons to live, has a  he will follow up with, and he hopes to go to treatment. He requested resources to get an evaluation.    Patient coping skills attempted to reduce the crisis:  came to ED    Disposition  Recommended disposition: Substance Abuse Disorder Treatment        Reviewed case and recommendations with attending provider. Attending Name: MD Reese       Attending concurs with disposition: yes       Patient and/or validated legal guardian concurs with disposition: yes       Final disposition:  discharge    Assessment Details    Total duration spent with the patient: 7 min     CPT code(s) utilized: Non-billable    ANANDA Garcia   DEC - Triage & Transition Services   Callback: [phone]

## 2024-11-26 NOTE — DISCHARGE INSTRUCTIONS
Aftercare Plan  If I am feeling unsafe or I am in a crisis, I will:   Contact my established care providers   Call the National Suicide Prevention Lifeline: 988  Go to the nearest emergency room   Call 911     It is recommended that you have a substance use evaluation and go to treatment.     Substance Use Disorder Direct Access Resources    It is recommended that you abstain from all mood altering chemicals. Please contact the sober support hotline (994-456-3043) as needed; phones are answered 24 hours a day, 7 days a week. To access substance use treatment you must have a comprehensive assessment completed to begin any treatment program.   Please contact your county of residence for eligibility screen to substance use disorder evaluation and treatment:    [place] - [phone]   [place] - [phone]   [facility] [phone]   [place] - [phone]   [facility] [phone]   [facility] [phone]   [facility] [phone]   [place] - [phone]     Community NURIS Evaluations: Clients may call their county for a full list of providers - Availability and services listed belo are subject to change, please call the provider to confirm    [facility]  [phone]  [facility] [place], [place], [address]  *Please call the above number to schedule a comprehensive assessment for determination of level of care needs. In person and virtual appointments available Mon-Fri.    [facility] [address] Floor, [address], [place] [address] (next to the outpatient lab)    Phone: [phone]   Provides bridging services to people with Opiate Use Disorders (OUD) seeking care. This is a front door to Medication Assisted Treatments (MAT), ages 16+  Walk In hours: Monday-Friday 9:00am-3:00pm    [facility]  [phone]  Walk in Assessments: Mon-Friday 7a-1:45p  [address], [place], [address]    [facility]  [phone]  [address] [place], [place], [address]  *by appointment only    [facility]  [phone] (phone consultation available )  Locations in: [place], [place], [place], [place], and [place], [place]  [facility] virtual IOP [phone] or visit [email or web address]   Also offers LGBTQ programming     [facility]  [phone]  [address] [facility]  [place], [place], [address]  *Currently only offered via telehealth - call to set up an appointment    [facility]  [address] [place], [place], [address]  [facility]  For more information to to make a referral call:  [phone]  Walk-in on   9-11 a.m.    [facility]  [phone]  [address] [place], [place], [address]  *available by appointments only    [facility]  [phone]  [address] [place], [place], [address]  *available by appointment only    [facility]  [phone]  [address] [place], [place], [address]  *walk in assessments available M-F starting at 7 am.    [facility]  [phone]  Locations: [facility], [facility], and [place]  *Walk in assessments availble M-F starting at 8 am -virtual only    [facility]  [phone]  [address] [place] [address]    [facility]  Virtual + Locations: [place], [place], [place], [place], [facility], [place], [place], [place]   [phone]  *available by appointment only    [facility]  [phone]  [address]  [place], [place], [address]    [facility]  [phone]  [address]  [place], [place], [address]  *available by appointment    [facility]  [phone]  [address] [place], [place], [address]  *walk ins available M-TH from 9-3    [facility] program: [phone]  [address], [place],  [address]    [place]  [phone]  Same day substance use disorder assessments are available Monday - Friday, via walk-in or by appointment at the [place] location.  [address], [address], [place] [address]     [facility] - adolescent and adult SUDs services  [phone]  Offer services Monday through Friday, as well as evening hours Monday through Thursday. Normally, a first appointment will be scheduled within one week  [email or web address]  Locations all over [place]    If you are intoxicated, you may be required to detox at a detox facility before starting treatment. The following are detox facilities that you can self present to. All detox facilities are able to help you complete an assessment prior to discharge if you choose:    [facility]: Arrive at a [place] for immediate medical evaluation    [facility]: [address] [place], [place], [address].         [phone]    [facility]: [address] [place], [place], [address]  [phone]     [facility]): [address], [place], [address]  [phone]     [facility]: [address] [facility] [place], [address], [phone]         Ways to help cope with sobriety:    -- Take prescribed medicines as scheduled  -- Keep follow-up appointments  -- Talk to others about your concerns  -- Get regular exercise  -- Practice deep breathing skills  -- Eat a healthy diet  -- Use community resources, including hotline numbers, [facility] crisis and support meetings  -- Stay sober and avoid places/people/things associated with substance use  --Maintain a daily schedule/routine  --Get at least 7-8 hours of sleep per night  --Create a list 10--20 healthy activities that you can do that are enjoyable and do not involve substance use  --Create daily goals (approx. 1-4 goals) per day and work to achieve them throughout the  day.       Free Resources:    [facility] Connection ([facility])  [facility] connects people seeking recovery to resources that help foster and sustain long-term recovery. Whether you are seeking resources for treatment, transportation, housing, job training, education, health care or other pathways to recovery, [facility] is a great place to start.  Phone: [phone]. www.[place]EnerTrac.[employer] (Great listing of all types of recovery and non-recovery related resources)    Alcoholics Anonymous  Phone: 7-660-ALCOHOL  Website: HTTP://WWW.AA.ORG/  [employer] (741-607-3210 or http://aaERN.org)  [employer] [place] (920-500-3356 or www.aastpaul.org)     Narcotics Anonymous  Phone: 324.379.5180  Website: www.eGifter.[employer].    [facility] [address], [place], [place],  Phone: [phone]  Drop-in Hours: Monday-Friday 9-11:30 am. By appointment at other times.  Provides: [facility] is a drop-in center on the [address] [facility] that provides a safe space for individuals who are homeless and have a history of chemical use. Sobriety is not a requirement but drugs and alcohol are not allowed on the property.  Services: Non-clients can access drop-in services such as Recovery and Harm Reduction Groups, referrals to case management, community activities, shower facilities, and a pool table. Individuals who are homeless and have chemical health needs may be eligible for enrollment into [facility]'s case management program. Clients and  work together to access benefits, treatment, health care, shelter, and external housing resources.      Your county has a mental health crisis team you can call 24/7:   [facility] Crisis Line Number: [phone]  [facility] Mental Health Crisis: [phone]   [facility] Crisis Line Number: [phone]  [facility] Crisis Line Number: 384-704-4303    Crisis Lines  Crisis Text Line  Text 257674  You will be connected with a  "trained live crisis counselor to provide support.    Community Resources  Fast Tracker  Linking people to mental health and substance use disorder resources  [employer]     [place] Mental Health Warm Line  Peer to peer support  Monday thru Saturday, 12 pm to 10 pm  428.029.2516 or 6.416.836.2376  Text \"Support\" to 71726    National [place] on Mental Illness (HERO)  209.736.2103 or 1.888.HERO.HELPS    Coping Skills  Grounding Techniques:  Try to notice where you are, your surroundings including the people, the sounds like the TV or radio.  Concentrate on your breathing. Take a deep cleansing breath from your diaphragm. Count the breaths as you exhale. Make sure you breath slowly.  Hold something that you find comforting, for some it may be a stuffed animal or a blanket. Notice how it feels in your hands. Is it hard or soft?  During a non-crisis time make a list of positive affirmations. Print them out and keep them handy for times of intense anxiety. At those times, read them aloud.  Try the Solar[employer] game:  Name 5 things you can see in the room with you  Name 4 things you can feel ( chair on my back  or  feet on floor )   Name 3 things you can hear right now ( people talking  or  tv )   Name 2 things you can smell right now (or, 2 things you like the smell of)   Name 1 good thing about yourself  Create A Safe Place  Image a safe place -- it can be a real or imaginary place:   What do you see -- especially colors?   What sounds do you hear?   What sensations do you feel?   What smells do you smell?   What people or animals would you want in your safe place?   Imagine a protective bubble, wall or boundary around your safe place.   Imagine a door or gate with a guard at your safe place.   Image a lock and key to your safe place and only you can unlock it.  You can draw or make a collage that represents your safe place.   Choose a souvenir of your safe place -- a color, an object, a song.   Keep your image " of your safe place so you can come back to it when you need to.     Things I can use or do for distraction:   Reduce Extreme Emotion  QUICKLY:  Changing Your Body Chemistry      T:  Change your body Temperature to change your autonomic nervous system   Use Ice Water to calm yourself down FAST   Put your face in a bowl of ice water (this is the best way; have the person keep his/her face in ice water for 30-45 seconds - initial research is showing that the longer s/he can hold her/his face in the water, the better the response), or   Splash ice water on your face, or hold an ice pack on your face      I:  Intensely exercise to calm down a body revved up by emotion   Examples: running, walking fast, jumping, playing basketball, weight lifting, swimming, calisthenics, etc.   Engage in exercises that DO NOT include violent behaviors. Exercises that utilize violent behaviors tend to function as  behavioral rehearsal,  and rather than calming the person down, may actually  rev  the person up more, increasing the likelihood of violence, and lessening the likelihood that they will  burn off  energy     P:  Progressively relax your muscles   Starting with your hands, moving to your forearms, upper arms, shoulders, neck, forehead, eyes, cheeks and lips, tongue and teeth, chest, upper back, stomach, buttocks, thighs, calves, ankles, feet   Tense (10 seconds,   of the way), then relax each muscle (all the way)   Notice the tension   Notice the difference when relaxed (by tensing first, and then relaxing, you are able to get a more thorough relaxation than by simply relaxing)      P: Paced breathing to relax   The standard technique is to begin with counting the number of steps one takes for a typical inhale, then counting the steps one takes for a typical exhale, and then lengthening the amount of steps for the exhalation by one or two steps.  OR  Repeat this pattern for 1-2 minutes  Inhale for four (4) seconds   Exhale for six  (6) to eight (8) seconds   Research demonstrated that one can change one's overall level of anxiety by doing this exercise for even a few minutes per day     Additional Information  Today you were seen by a licensed mental health professional through Triage and Transition services, [facility] ([facility])  for a crisis assessment in the Emergency Department at [facility].  It is recommended that you follow up with your established providers (psychiatrist, mental health therapist, and/or primary care doctor - as relevant) as soon as possible. Coordinators from [facility] will be calling you in the next 24-48 hours to ensure that you have the resources you need.  You can also contact [facility] coordinators directly at [phone]. You may have been scheduled for or offered an appointment with a mental health provider. [facility] maintains an extensive network of licensed behavioral health providers to connect patients with the services they need.  We do not charge providers a fee to participate in our referral network.  We match patients with providers based on a patient's specific needs, insurance coverage, and location.  Our first effort will be to refer you to a provider within your care system, and will utilize providers outside your care system as needed.

## 2024-11-26 NOTE — ED PROVIDER NOTES
Emergency Department Encounter     Evaluation Date & Time:   11/26/2024  7:59 AM    CHIEF COMPLAINT:  Hallucinations      Triage Note:Pt arrived via [facility] ems. Pt w/ PMH of schizo affective disorder that has been on and off for the past 4 years. Has been having auditory & visual hallucination for the past two weeks. Pt hears people screaming for help, seeing faces changes. Pt compliance  with medications.       ED COURSE & MEDICAL DECISION MAKING:     Pt here voluntarily by EMS for evaluation of psychosis, stating he's hearing screaming voices. Denies command hallucinations or SI/HI, but feels he can't manage.  Pt asking to speak with someone, which is what his probation office reportedly told him. Does admit to smoking meth 2 days ago. Denies IVDA. Denies medical illness, including no recent n/v/d, cough, fevers.  Pt is voluntary, but not holdable. Will get DEC consult, check drug screen. Pt reports compliance with his meds.      ED Course as of 11/26/24 1227   Tue Nov 26, 2024   0955 I spoke with DEC, who completed evaluation.  Pt ok for discharge when ready here.  No need for inpatient management.  Pt aware of need for meth addiction treatment, again given resources for all of this.  Pt without SI/HI, no ongoing active hallucinations with DEC.   1200 Pt ultimately upset with discharge, but has no SI/HI.  He has numerous ED evaluations for his car and will certainly bounce back.  Ultimately, there is no indication for emergent MH admission or treatment.  Pt discharged back to where he's staying.           Medical Decision Making    History:  Supplemental history from: Documented in chart  External Record(s) reviewed: Documented in chart    Work Up:  Chart documentation includes differential considered and any EKGs or imaging independently interpreted by provider, where specified.  In additional to work up documented, I considered the following work up: Documented in chart, if applicable.    External  "consultation:  Discussion of management with another provider: Documented in chart, if applicable    Complicating factors:  Care impacted by chronic illness: Mental Health  Care affected by social determinants of health: Alcohol Abuse and/or Recreational Drug Use    Disposition considerations: Discharge. No recommendations on prescription strength medication(s). See documentation for any additional details.    Not Applicable     At the conclusion of the encounter I discussed the results of all the tests and the disposition. The questions were answered. The patient or family acknowledged understanding and was agreeable with the care plan.      MEDICATIONS GIVEN IN THE EMERGENCY DEPARTMENT:  Medications - No data to display    NEW PRESCRIPTIONS STARTED AT TODAY'S ED VISIT:  New Prescriptions    No medications on file       HPI   HPI     Hamzah Roberts is a 39 year old male with a pertinent history of chemical dependency, schizoaffective disorder who presents to this ED via EMS voluntarily for evaluation of auditory hallucinations.  Pt reports \"psychosis\" and that he's hearing screams through walls.  Pt reports it's too much, advised by his  to come in.  Pt asking to speak with someone. Admits to smoking meth last 2 days ago.  Denies IVDA. Denies etoh. Denies medical illness, including n/v/d, cough, fevers, cp/sob.  Pt states he's taking meds, but misses at times.      REVIEW OF SYSTEMS:  See HPI      Medical History     Past Medical History:   Diagnosis Date    Chemical dependency (H)        No past surgical history on file.    No family history on file.    Social History     Tobacco Use    Smoking status: Every Day     Current packs/day: 0.50     Types: Cigarettes, Vaping Device   Substance Use Topics    Alcohol use: Never    Drug use: Yes     Types: Methamphetamines     Comment: smokes meth, last use about 12 hours PTA       chlorhexidine (PERIDEX) 0.12 % solution  OLANZapine (ZYPREXA) 10 MG " "tablet  OLANZapine (ZYPREXA) 10 MG tablet  OLANZapine (ZYPREXA) 10 MG tablet  omeprazole (PRILOSEC) 20 MG DR capsule  QUEtiapine (SEROQUEL) 50 MG tablet  valACYclovir (VALTREX) 500 MG tablet        Physical Exam     Vitals:  /77   Pulse 117   Temp 98.1  F (36.7  C)   Resp 18   Ht 1.626 m (5' 4\")   Wt 70.3 kg (155 lb)   SpO2 98%   BMI 26.61 kg/m      PHYSICAL EXAM:   Physical Exam  Vitals and nursing note reviewed.   Constitutional:       General: He is not in acute distress.     Appearance: Normal appearance.   HENT:      Head: Normocephalic and atraumatic.      Nose: Nose normal.      Mouth/Throat:      Mouth: Mucous membranes are moist.   Eyes:      Pupils: Pupils are equal, round, and reactive to light.   Cardiovascular:      Rate and Rhythm: Regular rhythm. Tachycardia present.      Pulses: Normal pulses.           Radial pulses are 2+ on the right side and 2+ on the left side.        Dorsalis pedis pulses are 2+ on the right side and 2+ on the left side.   Pulmonary:      Effort: Pulmonary effort is normal. No respiratory distress.      Breath sounds: Normal breath sounds.   Abdominal:      Palpations: Abdomen is soft.      Tenderness: There is no abdominal tenderness.   Musculoskeletal:      Cervical back: Full passive range of motion without pain and neck supple.      Comments: No calf tenderness or swelling b/l   Skin:     General: Skin is warm.      Findings: No rash.   Neurological:      General: No focal deficit present.      Mental Status: He is alert. Mental status is at baseline.      Comments: Fluent speech, no acute lateralizing deficits   Psychiatric:         Attention and Perception: He perceives auditory hallucinations.         Mood and Affect: Mood is anxious.         Behavior: Behavior is hyperactive.         Thought Content: Thought content does not include homicidal or suicidal ideation. Thought content does not include homicidal or suicidal plan.           Results     LAB:  All " pertinent labs reviewed and interpreted  Labs Ordered and Resulted from Time of ED Arrival to Time of ED Departure - No data to display    RADIOLOGY:  No orders to display                ECG:  none    PROCEDURES:  Procedures:  none      FINAL IMPRESSION:    ICD-10-CM    1. Schizoaffective disorder, unspecified type (H)  F25.9       2. Methamphetamine abuse (H)  F15.10           0 minutes of critical care time        Mani Leigh DO  Emergency Medicine  [facility] EMERGENCY DEPARTMENT  11/26/2024  8:17 AM          Mani Leigh MD  11/26/24 1220

## 2024-11-26 NOTE — ED NOTES
Bed: JNED-08  Expected date:   Expected time:   Means of arrival:   Comments:  CARES1: 39m hallucinations/psychosis

## 2024-11-26 NOTE — ED TRIAGE NOTES
Pt arrived via [facility] ems. Pt w/ PMH of schizo affective disorder that has been on and off for the past 4 years. Has been having auditory & visual hallucination for the past two weeks. Pt hears people screaming for help, seeing faces changes. Pt compliance  with medications.      Triage Assessment (Adult)       Row Name 11/26/24 0801          Triage Assessment    Airway WDL WDL        Respiratory WDL    Respiratory WDL WDL        Skin Circulation/Temperature WDL    Skin Circulation/Temperature WDL WDL        Cardiac WDL    Cardiac WDL WDL        Peripheral/Neurovascular WDL    Peripheral Neurovascular WDL WDL        Cognitive/Neuro/Behavioral WDL    Cognitive/Neuro/Behavioral WDL WDL

## 2024-11-27 ENCOUNTER — TELEPHONE (OUTPATIENT)
Dept: BEHAVIORAL HEALTH | Facility: CLINIC | Age: 39
End: 2024-11-27
Payer: COMMERCIAL

## 2024-12-15 ENCOUNTER — HOSPITAL ENCOUNTER (EMERGENCY)
Facility: HOSPITAL | Age: 39
Discharge: PSYCHIATRIC OR SUBSTANCE USE FACILITY | End: 2024-12-16
Attending: STUDENT IN AN ORGANIZED HEALTH CARE EDUCATION/TRAINING PROGRAM | Admitting: STUDENT IN AN ORGANIZED HEALTH CARE EDUCATION/TRAINING PROGRAM
Payer: COMMERCIAL

## 2024-12-15 DIAGNOSIS — R44.0 AUDITORY HALLUCINATIONS: ICD-10-CM

## 2024-12-15 PROCEDURE — 99285 EMERGENCY DEPT VISIT HI MDM: CPT | Mod: 25

## 2024-12-15 RX ORDER — RISPERIDONE 0.5 MG/1
2 TABLET, ORALLY DISINTEGRATING ORAL ONCE
Status: COMPLETED | OUTPATIENT
Start: 2024-12-16 | End: 2024-12-16

## 2024-12-15 ASSESSMENT — COLUMBIA-SUICIDE SEVERITY RATING SCALE - C-SSRS
1. IN THE PAST MONTH, HAVE YOU WISHED YOU WERE DEAD OR WISHED YOU COULD GO TO SLEEP AND NOT WAKE UP?: YES
6. HAVE YOU EVER DONE ANYTHING, STARTED TO DO ANYTHING, OR PREPARED TO DO ANYTHING TO END YOUR LIFE?: YES
3. HAVE YOU BEEN THINKING ABOUT HOW YOU MIGHT KILL YOURSELF?: NO
2. HAVE YOU ACTUALLY HAD ANY THOUGHTS OF KILLING YOURSELF IN THE PAST MONTH?: YES
4. HAVE YOU HAD THESE THOUGHTS AND HAD SOME INTENTION OF ACTING ON THEM?: YES
5. HAVE YOU STARTED TO WORK OUT OR WORKED OUT THE DETAILS OF HOW TO KILL YOURSELF? DO YOU INTEND TO CARRY OUT THIS PLAN?: NO

## 2024-12-16 ENCOUNTER — HOSPITAL ENCOUNTER (INPATIENT)
Facility: CLINIC | Age: 39
LOS: 3 days | Discharge: HOME | End: 2024-12-19
Attending: PSYCHIATRY & NEUROLOGY | Admitting: PSYCHIATRY & NEUROLOGY
Payer: COMMERCIAL

## 2024-12-16 ENCOUNTER — TELEPHONE (OUTPATIENT)
Dept: BEHAVIORAL HEALTH | Facility: CLINIC | Age: 39
End: 2024-12-16
Payer: COMMERCIAL

## 2024-12-16 VITALS
OXYGEN SATURATION: 98 % | BODY MASS INDEX: 29.02 KG/M2 | HEART RATE: 90 BPM | WEIGHT: 170 LBS | HEIGHT: 64 IN | TEMPERATURE: 98.3 F | SYSTOLIC BLOOD PRESSURE: 113 MMHG | RESPIRATION RATE: 20 BRPM | DIASTOLIC BLOOD PRESSURE: 58 MMHG

## 2024-12-16 DIAGNOSIS — F19.951 SUBSTANCE-INDUCED PSYCHOTIC DISORDER WITH HALLUCINATIONS (H): ICD-10-CM

## 2024-12-16 DIAGNOSIS — R41.82 ALTERED MENTAL STATUS, UNSPECIFIED ALTERED MENTAL STATUS TYPE: Primary | ICD-10-CM

## 2024-12-16 DIAGNOSIS — F25.9 SCHIZOAFFECTIVE DISORDER, CHRONIC CONDITION WITH ACUTE EXACERBATION (H): ICD-10-CM

## 2024-12-16 PROBLEM — F23 ACUTE PSYCHOSIS (H): Status: ACTIVE | Noted: 2024-12-16

## 2024-12-16 LAB
AMPHETAMINES UR QL SCN: ABNORMAL
ANION GAP SERPL CALCULATED.3IONS-SCNC: 9 MMOL/L (ref 7–15)
BARBITURATES UR QL SCN: ABNORMAL
BASOPHILS # BLD AUTO: 0 10E3/UL (ref 0–0.2)
BASOPHILS NFR BLD AUTO: 0 %
BENZODIAZ UR QL SCN: ABNORMAL
BUN SERPL-MCNC: 21.7 MG/DL (ref 6–20)
BZE UR QL SCN: ABNORMAL
CALCIUM SERPL-MCNC: 8.7 MG/DL (ref 8.8–10.4)
CANNABINOIDS UR QL SCN: ABNORMAL
CHLORIDE SERPL-SCNC: 106 MMOL/L (ref 98–107)
CREAT SERPL-MCNC: 0.97 MG/DL (ref 0.67–1.17)
EGFRCR SERPLBLD CKD-EPI 2021: >90 ML/MIN/1.73M2
EOSINOPHIL # BLD AUTO: 0.1 10E3/UL (ref 0–0.7)
EOSINOPHIL NFR BLD AUTO: 1 %
ERYTHROCYTE [DISTWIDTH] IN BLOOD BY AUTOMATED COUNT: 12 % (ref 10–15)
ETHANOL SERPL-MCNC: <0.01 G/DL
FENTANYL UR QL: ABNORMAL
GLUCOSE SERPL-MCNC: 109 MG/DL (ref 70–99)
HCO3 SERPL-SCNC: 26 MMOL/L (ref 22–29)
HCT VFR BLD AUTO: 42.1 % (ref 40–53)
HGB BLD-MCNC: 14.6 G/DL (ref 13.3–17.7)
IMM GRANULOCYTES # BLD: 0 10E3/UL
IMM GRANULOCYTES NFR BLD: 0 %
LYMPHOCYTES # BLD AUTO: 3 10E3/UL (ref 0.8–5.3)
LYMPHOCYTES NFR BLD AUTO: 36 %
MCH RBC QN AUTO: 30.9 PG (ref 26.5–33)
MCHC RBC AUTO-ENTMCNC: 34.7 G/DL (ref 31.5–36.5)
MCV RBC AUTO: 89 FL (ref 78–100)
MONOCYTES # BLD AUTO: 0.9 10E3/UL (ref 0–1.3)
MONOCYTES NFR BLD AUTO: 11 %
NEUTROPHILS # BLD AUTO: 4.3 10E3/UL (ref 1.6–8.3)
NEUTROPHILS NFR BLD AUTO: 51 %
NRBC # BLD AUTO: 0 10E3/UL
NRBC BLD AUTO-RTO: 0 /100
OPIATES UR QL SCN: ABNORMAL
PCP QUAL URINE (ROCHE): ABNORMAL
PLATELET # BLD AUTO: 313 10E3/UL (ref 150–450)
POTASSIUM SERPL-SCNC: 4 MMOL/L (ref 3.4–5.3)
RBC # BLD AUTO: 4.72 10E6/UL (ref 4.4–5.9)
SARS-COV-2 RNA RESP QL NAA+PROBE: NEGATIVE
SODIUM SERPL-SCNC: 141 MMOL/L (ref 135–145)
WBC # BLD AUTO: 8.4 10E3/UL (ref 4–11)

## 2024-12-16 PROCEDURE — 250N000013 HC RX MED GY IP 250 OP 250 PS 637

## 2024-12-16 PROCEDURE — 80048 BASIC METABOLIC PNL TOTAL CA: CPT | Performed by: STUDENT IN AN ORGANIZED HEALTH CARE EDUCATION/TRAINING PROGRAM

## 2024-12-16 PROCEDURE — 80307 DRUG TEST PRSMV CHEM ANLYZR: CPT | Performed by: STUDENT IN AN ORGANIZED HEALTH CARE EDUCATION/TRAINING PROGRAM

## 2024-12-16 PROCEDURE — 124N000002 HC R&B MH UMMC

## 2024-12-16 PROCEDURE — 87635 SARS-COV-2 COVID-19 AMP PRB: CPT | Performed by: STUDENT IN AN ORGANIZED HEALTH CARE EDUCATION/TRAINING PROGRAM

## 2024-12-16 PROCEDURE — 93005 ELECTROCARDIOGRAM TRACING: CPT | Performed by: REGISTERED NURSE

## 2024-12-16 PROCEDURE — 36415 COLL VENOUS BLD VENIPUNCTURE: CPT | Performed by: STUDENT IN AN ORGANIZED HEALTH CARE EDUCATION/TRAINING PROGRAM

## 2024-12-16 PROCEDURE — 85041 AUTOMATED RBC COUNT: CPT | Performed by: STUDENT IN AN ORGANIZED HEALTH CARE EDUCATION/TRAINING PROGRAM

## 2024-12-16 PROCEDURE — 82077 ASSAY SPEC XCP UR&BREATH IA: CPT | Performed by: STUDENT IN AN ORGANIZED HEALTH CARE EDUCATION/TRAINING PROGRAM

## 2024-12-16 PROCEDURE — 99255 IP/OBS CONSLTJ NEW/EST HI 80: CPT | Performed by: REGISTERED NURSE

## 2024-12-16 PROCEDURE — 250N000013 HC RX MED GY IP 250 OP 250 PS 637: Performed by: STUDENT IN AN ORGANIZED HEALTH CARE EDUCATION/TRAINING PROGRAM

## 2024-12-16 PROCEDURE — 85004 AUTOMATED DIFF WBC COUNT: CPT | Performed by: STUDENT IN AN ORGANIZED HEALTH CARE EDUCATION/TRAINING PROGRAM

## 2024-12-16 PROCEDURE — 250N000013 HC RX MED GY IP 250 OP 250 PS 637: Performed by: REGISTERED NURSE

## 2024-12-16 RX ORDER — GABAPENTIN 100 MG/1
100 CAPSULE ORAL EVERY 6 HOURS PRN
Status: DISCONTINUED | OUTPATIENT
Start: 2024-12-16 | End: 2024-12-19 | Stop reason: HOSPADM

## 2024-12-16 RX ORDER — POLYETHYLENE GLYCOL 3350 17 G
2 POWDER IN PACKET (EA) ORAL
Status: DISCONTINUED | OUTPATIENT
Start: 2024-12-16 | End: 2024-12-16

## 2024-12-16 RX ORDER — RISPERIDONE 2 MG/1
2 TABLET, ORALLY DISINTEGRATING ORAL DAILY
Status: DISCONTINUED | OUTPATIENT
Start: 2024-12-17 | End: 2024-12-19 | Stop reason: HOSPADM

## 2024-12-16 RX ORDER — IBUPROFEN 600 MG/1
600 TABLET, FILM COATED ORAL EVERY 6 HOURS PRN
Status: DISCONTINUED | OUTPATIENT
Start: 2024-12-16 | End: 2024-12-19 | Stop reason: HOSPADM

## 2024-12-16 RX ORDER — MAGNESIUM HYDROXIDE/ALUMINUM HYDROXICE/SIMETHICONE 120; 1200; 1200 MG/30ML; MG/30ML; MG/30ML
30 SUSPENSION ORAL EVERY 4 HOURS PRN
Status: DISCONTINUED | OUTPATIENT
Start: 2024-12-16 | End: 2024-12-19 | Stop reason: HOSPADM

## 2024-12-16 RX ORDER — POLYETHYLENE GLYCOL 3350 17 G
2 POWDER IN PACKET (EA) ORAL
Status: DISCONTINUED | OUTPATIENT
Start: 2024-12-16 | End: 2024-12-16 | Stop reason: HOSPADM

## 2024-12-16 RX ORDER — OLANZAPINE 10 MG/2ML
10 INJECTION, POWDER, FOR SOLUTION INTRAMUSCULAR 3 TIMES DAILY PRN
Status: DISCONTINUED | OUTPATIENT
Start: 2024-12-16 | End: 2024-12-19 | Stop reason: HOSPADM

## 2024-12-16 RX ORDER — IBUPROFEN 200 MG
400 TABLET ORAL ONCE
Status: COMPLETED | OUTPATIENT
Start: 2024-12-16 | End: 2024-12-16

## 2024-12-16 RX ORDER — RISPERIDONE 0.5 MG/1
1 TABLET, ORALLY DISINTEGRATING ORAL 2 TIMES DAILY
Status: DISCONTINUED | OUTPATIENT
Start: 2024-12-16 | End: 2024-12-16 | Stop reason: HOSPADM

## 2024-12-16 RX ORDER — POLYETHYLENE GLYCOL 3350 17 G
4 POWDER IN PACKET (EA) ORAL
Status: DISCONTINUED | OUTPATIENT
Start: 2024-12-16 | End: 2024-12-19 | Stop reason: HOSPADM

## 2024-12-16 RX ORDER — TRAZODONE HYDROCHLORIDE 50 MG/1
50 TABLET, FILM COATED ORAL
Status: DISCONTINUED | OUTPATIENT
Start: 2024-12-16 | End: 2024-12-19 | Stop reason: HOSPADM

## 2024-12-16 RX ORDER — OLANZAPINE 10 MG/1
10 TABLET ORAL 3 TIMES DAILY PRN
Status: DISCONTINUED | OUTPATIENT
Start: 2024-12-16 | End: 2024-12-19 | Stop reason: HOSPADM

## 2024-12-16 RX ORDER — ACETAMINOPHEN 325 MG/1
650 TABLET ORAL EVERY 4 HOURS PRN
Status: DISCONTINUED | OUTPATIENT
Start: 2024-12-16 | End: 2024-12-19 | Stop reason: HOSPADM

## 2024-12-16 RX ORDER — POLYETHYLENE GLYCOL 3350 17 G/17G
17 POWDER, FOR SOLUTION ORAL DAILY PRN
Status: DISCONTINUED | OUTPATIENT
Start: 2024-12-16 | End: 2024-12-19 | Stop reason: HOSPADM

## 2024-12-16 RX ADMIN — RISPERIDONE 1 MG: 0.5 TABLET, ORALLY DISINTEGRATING ORAL at 20:01

## 2024-12-16 RX ADMIN — Medication 3 MG: at 21:58

## 2024-12-16 RX ADMIN — RISPERIDONE 2 MG: 0.5 TABLET, ORALLY DISINTEGRATING ORAL at 00:44

## 2024-12-16 RX ADMIN — NICOTINE POLACRILEX 2 MG: 2 LOZENGE ORAL at 18:18

## 2024-12-16 RX ADMIN — NICOTINE POLACRILEX 2 MG: 2 LOZENGE ORAL at 11:33

## 2024-12-16 RX ADMIN — NICOTINE POLACRILEX 2 MG: 2 LOZENGE ORAL at 15:38

## 2024-12-16 ASSESSMENT — ACTIVITIES OF DAILY LIVING (ADL)
ADLS_ACUITY_SCORE: 52
DRESS: INDEPENDENT
ADLS_ACUITY_SCORE: 52
LAUNDRY: WITH SUPERVISION
ADLS_ACUITY_SCORE: 52
HYGIENE/GROOMING: INDEPENDENT
ADLS_ACUITY_SCORE: 52
ORAL_HYGIENE: INDEPENDENT
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
## 2024-12-16 NOTE — PLAN OF CARE
"Hamzah Roberts  December 16, 2024  Plan of Care Hand-off Note     Patient Care Path: inpatient mental health    Plan for Care: Patient with Schizoaffective Disorder and Amphetamine Use Disorder has been off medication for two weeks. He is hallucinating, paranoid, suicidal, and concerned he might hurt someone if he acts on paranoid delusions. He denies homicidal thoughts. Patient last used meth two weeks ago. He is not adhering to outpatient behavioral health services. Patient would benefit from inpatient level of care for medication management, safety, and stabilization. He is on the work list with Patient Placement.    Identified Goals and Safety Issues: Suicidal Ideation. Hallucinations, paranoia.    Overview:  Patient presents alone to the emergency department at the recommendation of his sister due to paranoia, hallucinations, and suicidal ideation. He has been staying with friends, but does not feel safe there. He hears voices telling him people will harm him. He is afraid he might hurt someone if he acts on paranoid delusions. Patient has a significant history of methamphetamine use with his most recent use two weeks ago. He stated that his medications were thrown away when he was at detox, so he has been off psychiatric medication for two weeks. Patient is unemployed with no income. He stated that he has not liked his life since he started using drugs at age 29. Patient reports suicidal ideation with intent; \"I don't want to live anymore.\" He denies plan. He denies homicidal ideation but is worried he might act on paranoid delusions and hurt someone. He endorsed auditory hallucinations.       Sister, Angie Roberts, [phone]          Legal Status: Legal Status at Admission: Voluntary/Patient has signed consent for treatment    Psychiatry Consult: Yes. Patient has been off psychiatric medications for two weeks. His last reported use of meth (smoked) was two weeks ago.  Patient wants to restart medications " and stabilize psychiatrically.         Updated Mattie Feliciano RN, regarding plan of care.           Kristy Drake LP

## 2024-12-16 NOTE — ED NOTES
Pt concerned about swelling to bilateral hands, painful to make a fist. No numbness/tingling noted. Dr. Gonzalez notified.
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
## 2024-12-16 NOTE — ED NOTES
EKG Interpretation @4614  This ECG was ordered by mental health team in preparation for initiation of Risperdal    Independently interpreted by me    Rhythm: normal sinus   Rate: normal  Axis: normal  Ectopy: none  Conduction: normal  ST Segments: no acute change  T Waves: no acute change    Clinical Impression: Sinus rhythm, normal intervals, no acute changes       Jamel Waite MD  12/16/24 1886

## 2024-12-16 NOTE — CONSULTS
"Diagnostic Evaluation Consultation  Crisis Assessment    Patient Name: Hamzah Roberts  Age:  39 year old  Legal Sex: male  Gender Identity: male  Pronouns: He/him/his  Race: White  Ethnicity: Not  or   Language: English      Patient was assessed: Virtual: [employer]   Crisis Assessment Start Date: 12/16/24  Crisis Assessment Start Time: 0019  Crisis Assessment Stop Time: 0032  Patient location: [facility] EMERGENCY DEPARTMENT                             JNED-07    Referral Data and Chief Complaint  Hamzah Roberts presents to the ED by  self. Patient is presenting to the ED for the following concerns: Paranoia, Depression, Suicidal ideation.   Factors that make the mental health crisis life threatening or complex are:  Patient presents alone to the emergency department at the recommendation of his sister due to paranoia, hallucinations, and suicidal ideation. He has been staying with friends, but does not feel safe there. He hears voices telling him people will harm him. He is afraid he might hurt someone if he acts on paranoid delusions. Patient has a significant history of methamphetamine use with his most recent use two weeks ago. He stated that his medications were thrown away when he was at detox, so he has been off psychiatric medication for two weeks. Patient is unemployed with no income. He stated that he has not liked his life since he started using drugs at age 29. Patient reports suicidal ideation with intent; \"I don't want to live anymore.\" He denies plan. He denies homicidal ideation but is worried he might act on paranoid delusions and hurt someone. He endorsed auditory hallucinations.      Informed Consent and Assessment Methods  Explained the crisis assessment process, including applicable information disclosures and limits to confidentiality, assessed understanding of the process, and obtained consent to proceed with the assessment.  Assessment methods included " conducting a formal interview with patient, review of medical records, collaboration with medical staff, and obtaining relevant collateral information from family and community providers when available.  : done     Patient response to interventions: eager to participate, acceptance expressed, verbalizes understanding  Coping skills were attempted to reduce the crisis:  Patient called his sister, who encouraged him to come to the hospital, which he did.     History of the Crisis   Previous diagnoses include Schizophrenia, Schizoaffective Disorder, and Substance Use Disorder. His drug of choice is meth, which he smokes. Patient has had [facility] treatment multiple times. He had a perior of 8 years sobriety during his 20s, then restarted meth use at age 29. Patient has a history of civil commitments as Mentally Ill and Chemically Dependent by [facility] in 2021, 2022, and 2023. He has not followed up with aftercare services. Patient has been off pyschiatric medication for 2 weeks.    Brief Psychosocial History  Family:  Single, Children no  Support System:  None  Employment Status:  unemployed  Source of Income:  none  Financial Environmental Concerns:  unemployed  Current Hobbies:  outdoor activities (Cleaning.)  Barriers in Personal Life:  mental health concerns    Significant Clinical History  Current Anxiety Symptoms:   (Denied)  Current Depression/Trauma:  irritable, helplessness, hopelessness, thoughts of death/suicide, withdrawl/isolation, low self esteem  Current Somatic Symptoms:   (Denied)  Current Psychosis/Thought Disturbance:  auditory hallucinations  Current Eating Symptoms:   (Denied)    Chemical Use History:    Alcohol: None  Benzodiazepines: None  Opiates: None  Cocaine: None  Marijuana: Occasional  Other Use: Methamphetamines (Smokes meth.)  Last Use:: 11/01/24     Past diagnosis:  Schizophrenia, Substance Use Disorder (Schizoaffective Disorder)  Family history:  No known history of mental health or  "chemical health concerns  Past treatment:  Psychiatric Medication Management, Probation/Court ordered treatment, Primary Care, Civil Commitment, Inpatient Hospitalization  Details of most recent treatment:     Other relevant history:  Patient began using drugs at age 20. He quit for 8 years. He has now been using for 11 years. He has not liked his life since he started using drugs.       Collateral Information  Is there collateral information: Yes     Collateral information name, relationship, phone number:  Sister, Angie Roberts, [phone]    What happened today: Angie talked with patient and encouraged him to go to the hospital. She is concerned for his safety and believes he needs treatment for mental health.     What is different about patient's functioning: He hasn't been taking his medication.  He's \"always like this\".     Concern about alcohol/drug use: Yes     What do you think the patient needs:  \"If he's saying he's suicidal, he needs to be admitted\".     Has patient made comments about wanting to kill themselves/others: yes    If d/c is recommended, can they take part in safety/aftercare planning:yes    Additional collateral information:        Risk Assessment  [place] Suicide Severity Rating Scale Full Clinical Version:  Suicidal Ideation  Q6 Suicide Behavior (Lifetime): yes    [place] Suicide Severity Rating Scale Recent: 12/16/2024  Suicidal Ideation (Recent)  Q1 Wished to be Dead (Past Month): yes  Q2 Suicidal Thoughts (Past Month): yes  Q3 Suicidal Thought Method: no  Q4 Suicidal Intent without Specific Plan: yes (\"I don't like my life. I don't want to live anymore.\")  Q5 Suicide Intent with Specific Plan: no  If yes to Q6, within past 3 months?: no  Level of Risk per Screen: high risk    Intensity of Ideation (Recent)  Most Severe Ideation Rating (Past 1 Month): 4  Description of Most Severe Ideation (Past 1 Month): Depressed mood, hopelessness, does not want to live anymore.  Frequency (Past " "1 Month): Daily or almost daily  Duration (Past 1 Month): Less than 1 hour/some of the time  Controllability (Past 1 Month): Can control thoughts with some difficulty  Deterrents (Past 1 Month): Deterrents definitely stopped you from attempting suicide  Reasons for Ideation (Past 1 Month): Mostly to end or stop the pain (You couldn't go on living with the pain or how you were feeling)  Suicidal Behavior (Recent)  Actual Attempt (Past 3 Months): No  Has subject engaged in non-suicidal self-injurious behavior? (Past 3 Months): No  Interrupted Attempts (Past 3 Months): No  Aborted or Self-Interrupted Attempt (Past 3 Months): No  Preparatory Acts or Behavior (Past 3 Months): No    Environmental or Psychosocial Events: challenging interpersonal relationships, helplessness/hopelessness, unemployment/underemployment, unstable housing, homelessness, other life stressors, neither working nor attending school, ongoing abuse of substances  Protective Factors: Protective Factors: help seeking    Does the patient have thoughts of harming others? Feels Like Hurting Others: no (\"I don't want to hurt someone just because I thought they did something they didn't mean to\".)  Previous Attempt to Hurt Others: no  Current presentation:  (Calm, cooperative, polite.)  Is the patient engaging in sexually inappropriate behavior?: no    Is the patient engaging in sexually inappropriate behavior?  no        Mental Status Exam   Affect: Flat  Appearance: Appropriate  Attention Span/Concentration: Attentive  Eye Contact: Engaged    Fund of Knowledge: Appropriate   Language /Speech Content: Fluent  Language /Speech Volume: Normal  Language /Speech Rate/Productions: Normal  Recent Memory: Intact  Remote Memory: Intact  Mood: Depressed  Orientation to Person: Yes   Orientation to Place: Yes  Orientation to Time of Day: Yes  Orientation to Date: Yes     Situation (Do they understand why they are here?): Yes  Psychomotor Behavior: Normal  Thought " Content: Suicidal, Paranoia, Hallucinations  Thought Form: Paranoia        Medication  Psychotropic medications:   Medication Orders - Psychiatric (From admission, onward)      None             Current Care Team  Patient Care Team:  No Ref-Primary, Physician as PCP - Brandon Gunn as Nursing Assistant  Cynthia Bueno as     Diagnosis  Patient Active Problem List   Diagnosis Code    Suicidal ideation R45.851    Polysubstance abuse (H) F19.10    Paranoid schizophrenia (H) F20.0    Paranoia (H) F22    Methamphetamine abuse (H) F15.10    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    Amphetamine use disorder, severe, 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    Anxiety F41.9    Episodic mood disorder (H) F39    Gastroesophageal reflux disease K21.9    Left ankle pain, unspecified chronicity M25.572    Psychosis, atypical (H) F29    Recurrent genital herpes A60.00    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    Hallucinations R44.3    Encounter for medication refill Z76.0    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    Methamphetamine use disorder, moderate, in sustained remission, in controlled environment, dependence (H) F15.21    Methamphetamine use disorder, severe (H) F15.20    Schizoaffective disorder, bipolar type (H) F25.0    Schizoaffective disorder, chronic condition with acute exacerbation (H) F25.9    Schizoaffective disorder, depressive type (H) F25.1    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.30, S09.90XS    Auditory hallucinations R44.0    Substance-induced psychotic disorder with hallucinations (H) F19.951    Altered mental status, unspecified altered mental status type R41.82    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 He is hallucinating, paranoid, suicidal, and concerned he might hurt someone if he acts on paranoid delusions. He denies homicidal thoughts. Patient last used meth two weeks ago. He is not adhering to outpatient behavioral health services. Patient would benefit from inpatient level of care for medication management, safety, and stabilization. He is on the work list with Patient Placement.       Imminent risk of harm: Suicidal Behavior (Auditory Hallucinations, Paranoia, Suicidal Ideation-does not want to live anymore.)    Severe psychiatric, behavioral or other comorbid conditions are appropriate for management at inpatient mental health as indicated by at least one of the following: Psychiatric Symptoms, Comorbid substance use disorder (Last use of meth was 2 weeks ago. He has been off psychiatric medication for 2 weeks.)    Severe dysfunction in daily living is present as indicated by at least one of the following: Extreme deterioration in social interactions, Complete inability to maintain any appropriate aspect of personal responsibility in any adult roles    Situation and expectations are appropriate for inpatient care: Patient management/treatment at lower level of care is not feasible or is inappropriate    Inpatient mental health services are necessary to meet patient needs and at least one of the following: Specific condition related to admission diagnosis is present and judged likely to deteriorate in absence of treatment at proposed level of care      Patient coping skills attempted to reduce the crisis:  Patient called his sister, who encouraged him to come to the hospital, which he did.    Disposition  Recommended disposition: Inpatient Mental Health        Reviewed case and recommendations with attending provider. Attending Name:  Dr. Briones       Attending concurs with disposition: yes       Patient and/or validated legal guardian concurs with disposition:   yes       Final disposition:  inpatient mental health    Legal status on admission: Voluntary/Patient has signed consent for treatment    Assessment Details   Total duration spent with the patient: 13 min     CPT code(s) utilized: Non-Billable    Kristy Drake LP, Psychotherapist  [facility] - Triage & Transition Services  Callback: [phone]

## 2024-12-16 NOTE — PROGRESS NOTES
"Triage & Transition Services, Extended Care     Therapy Progress Note    Patient: Hamzah goes by \"Hamzah,\" uses he/him pronouns  Date of Service: December 16, 2024  Site of Service: [facility] EMERGENCY DEPARTMENT                             JNED-07  Patient was seen yes  Mode of Assessment: Virtual: iPad, Virtual: [employer]    Presentation Summary: Patient is minimally engaged in session.  Nursing reports he was sleeping most of the morning.  Pt is minimally responsive, laying in bed with his eyes closed.  Patient continues to endorse hallucinations and states he is hearing screams for help.  Pt continues to enforse suiciidal ideation.  Pt makes request for Risperdal which he states has been more effective than the Zyprexa he was given.    Therapeutic Intervention(s) Provided: Engaged in guided discovery, explored patient's perspectives and helped expand them through socratic dialogue.    Current Symptoms:   thoughts of death/suicide   auditory hallucinations      Mental Status Exam   Affect: Flat  Appearance: Appropriate  Attention Span/Concentration: Inattentive  Eye Contact: Other (please comment) (disengaged, eyes shut)    Fund of Knowledge: Appropriate   Language /Speech Content: Fluent  Language /Speech Volume: Normal  Language /Speech Rate/Productions: Normal  Recent Memory: Intact  Remote Memory: Intact  Mood: Depressed  Orientation to Person: Yes   Orientation to Place: Yes  Orientation to Time of Day: Yes  Orientation to Date: Yes     Situation (Do they understand why they are here?): Yes  Psychomotor Behavior: Underactive  Thought Content: Suicidal, Paranoia, Hallucinations  Thought Form: Paranoia    Treatment Objective(s) Addressed: identifying treatment goals, assessing safety    Patient Response to Interventions: verbalizes understanding, acceptance expressed    Progress Towards Goals: Comment: Patient reports continued hallucinations, paranoia, and suicdial ideation.  Plan for " inpatient mental health.  Next Step to Work Toward Discharge: symptom stabilization    Case Management: Summary of Interaction: Pt reprots having a  through [facility] named Samir.    Plan: inpatient mental health  yes (Dr Gonzalez) provider, RN    yes    Clinical Substantiation: Plan continues for inpatient mental health.  He continues to enforse hallucinations, paranoia, and suicidal ideation.  Pt has a hx of schizoaffective disorder and amphetamine use.  He has been off medications for 2 weeks.  Last use of meth was 2 weeks ago.    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    Amphetamine use disorder, severe, 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    Anxiety F41.9    Episodic mood disorder (H) F39    Gastroesophageal reflux disease K21.9    Left ankle pain, unspecified chronicity M25.572    Psychosis, atypical (H) F29    Recurrent genital herpes A60.00    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    Hallucinations R44.3    Encounter for medication refill Z76.0    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    Methamphetamine use disorder, moderate, in sustained remission, in controlled environment, dependence (H) F15.21    Methamphetamine use disorder, severe (H) F15.20    Schizoaffective disorder, bipolar type (H) F25.0    Schizoaffective disorder, chronic condition with acute exacerbation (H) F25.9     Schizoaffective disorder, depressive type (H) F25.1    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.30, S09.90XS    Auditory hallucinations R44.0    Substance-induced psychotic disorder with hallucinations (H) F19.951    Altered mental status, unspecified altered mental status type R41.82    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

## 2024-12-16 NOTE — ED PROVIDER NOTES
EMERGENCY DEPARTMENT ENCOUNTER      NAME: Hamzah Roberts  AGE: 39 year old male  YOB: 1985  MRN: [id number]  EVALUATION DATE & TIME: No admission date for patient encounter.    PCP: No Ref-Primary, Physician    ED PROVIDER: Isiah Briones MD      Chief Complaint   Patient presents with    Hallucinations         FINAL IMPRESSION:  1. Auditory hallucinations          ED COURSE & MEDICAL DECISION MAKING:    Pertinent Labs & Imaging studies reviewed. (See chart for details)  39 year old male presents to the Emergency Department for evaluation of hallucinations    ED Course as of 12/16/24 0501   Sun Dec 15, 2024   2352 I met with the patient to gather history and perform my exam. ED course and treatment discussed.    2358 Patient is a 39-year-old male with a past medical history significant for schizoaffective disorder, methamphetamine abuse, who presents the emergency department with auditory hallucinations, passive suicidal thoughts without an active plan, and has been off his medications.  He has pressured speech, paranoia, but is cooperative and calm.  I discussed with the patient that we would typically need blood work and urinalysis if the patient would require inpatient management, the patient does not want to do blood work at this time but would offer UDS.  I offered him medications to help with his symptoms, he has been on Zyprexa in the past, but states that risperidone helps him better, so we will give him 2 mg ODT risperidone at this time.  DEC consulted for assistance in managing his acute psychosis in the event that inpatient treatment would be better for him.  Patient is voluntary, not holdable as he does not have any active suicidal plan.   Mon Dec 16, 2024   0052 DEC: voluntary admission would be a good idea. They will begin looking for placement. Last meth use 2 wks ago.   0053 I spoke with Kristy Drake LP, with DEC who recommends inpatient mental health treatment for the patient     0410 Patient is now agreeable to having labs drawn.     4:59 AM patient signed out to Dr. Sanchez, who will sign out to day team, as patient waiting for bed placement for inpatient [facility] from decompensation of his mental illness    Medical Decision Making  Obtained supplemental history:Supplemental history obtained?: No  Reviewed external records: External records reviewed?: Documented in chart  Care impacted by chronic illness:Mental Health  Did you consider but not order tests?: Work up considered but not performed and documented in chart, if applicable  Did you interpret images independently?: Independent interpretation of ECG and images noted in documentation, when applicable.  Consultation discussion with other provider:Did you involve another provider (consultant, , pharmacy, etc.)?: I discussed the care with another health care provider, see documentation for details.  Admission considered. Patient was signed out to the oncoming physician, disposition pending.    MIPS: Not Applicable      At the conclusion of the encounter I discussed the results of all of the tests and the disposition. The questions were answered. The patient or family acknowledged understanding and was agreeable with the care plan.     0 minutes of critical care time     MEDICATIONS GIVEN IN THE EMERGENCY:  Medications   risperiDONE (risperDAL M-TABS) ODT tab 2 mg (2 mg Oral $Given 12/16/24 0044)       NEW PRESCRIPTIONS STARTED AT TODAY'S ER VISIT  New Prescriptions    No medications on file          =================================================================    HPI    Patient information was obtained from: Patient     Use of : N/A       Hamzah Roberts is a 39 year old male with a pertinent history of hallucinations, schizoaffective disorder, paranoid schizophrenia, suicidal ideation, psychosis, methamphetamine abuse, who presents to this ED via EMS for evaluation of hallucinations    Per chart review, The patient has been  "seen in this ED frequently for complaints of mental health disorders, medication refills, and drug abuse.  Most recently, the patient was seen on 11/26/2024 for schizoaffective disorder and hallucinations.  Patient reported hearing \"screaming voices\" and reported smoking meth 2 days prior.  Patient spoke with DEC who felt the patient was safe for discharge and did not require inpatient management due to lack of suicidal ideation or homicidal ideation.    The patient reports he has been having auditory hallucinations and has been hearing \"cars everywhere\" and states that \"the world seems weird\" and that there is \"too many people\". The patient states that he \"can't deal with it\" and states \"I just don't want to live anymore\", but denies any plans. The patient denies any homicidal ideation, but states \"not unless they try to touch me\".   The patient reports he normally takes Zyprexa and Risperdal, but has not taken any medications for the past week.    PAST MEDICAL HISTORY:  Past Medical History:   Diagnosis Date    Chemical dependency (H)        PAST SURGICAL HISTORY:  No past surgical history on file.        CURRENT MEDICATIONS:    chlorhexidine (PERIDEX) 0.12 % solution  OLANZapine (ZYPREXA) 10 MG tablet  OLANZapine (ZYPREXA) 10 MG tablet  OLANZapine (ZYPREXA) 10 MG tablet  omeprazole (PRILOSEC) 20 MG DR capsule  QUEtiapine (SEROQUEL) 50 MG tablet  valACYclovir (VALTREX) 500 MG tablet        ALLERGIES:  Allergies   Allergen Reactions    Morphine Sulfate [Morphine] Shortness Of Breath       FAMILY HISTORY:  No family history on file.    SOCIAL HISTORY:   Social History     Socioeconomic History    Marital status: Single   Tobacco Use    Smoking status: Every Day     Current packs/day: 0.50     Types: Cigarettes, Vaping Device   Substance and Sexual Activity    Alcohol use: Never    Drug use: Yes     Types: Methamphetamines     Comment: smokes meth, last use about 12 hours PTA    Sexual activity: Not Currently " "  Social History Narrative    Aug 2020: Patient admits to meth use.     Social Drivers of Health      Received from [employer] [facility], [employer] [facility]    Social Connections   Interpersonal Safety: Unknown (3/20/2024)    [facility]    Humiliation, Afraid, Rape, and Kick questionnaire     Physically Abused: No     Sexually Abused: No       VITALS:  /76   Pulse 100   Temp 98.3  F (36.8  C) (Oral)   Resp 18   Ht 1.626 m (5' 4\")   Wt 77.1 kg (170 lb)   SpO2 97%   BMI 29.18 kg/m      PHYSICAL EXAM    Physical Exam  Vitals and nursing note reviewed.   Constitutional:       General: He is not in acute distress.     Appearance: Normal appearance. He is normal weight. He is not ill-appearing.   HENT:      Head: Normocephalic and atraumatic.   Eyes:      Conjunctiva/sclera: Conjunctivae normal.   Cardiovascular:      Rate and Rhythm: Normal rate.      Pulses: Normal pulses.   Pulmonary:      Effort: Pulmonary effort is normal.   Abdominal:      General: Abdomen is flat.   Musculoskeletal:         General: Normal range of motion.      Cervical back: Normal range of motion.   Skin:     General: Skin is dry.      Capillary Refill: Capillary refill takes less than 2 seconds.      Findings: No rash.   Neurological:      General: No focal deficit present.      Mental Status: He is alert and oriented to person, place, and time. Mental status is at baseline.   Psychiatric:      Comments: Pressured speech, paranoia, auditory hallucinations.  Passive suicidal thoughts of wishing he would be dead, but no active plan to do so.            LAB:  All pertinent labs reviewed and interpreted.       RADIOLOGY:  Reviewed all pertinent imaging. Please see official radiology report.  No orders to display       PROCEDURES:   None      [employer] Documentation:   CMS Diagnoses:              IBecka, am serving as a scribe to " document services personally performed by Isiah Briones MD based on my observation and the provider's statements to me. I, Isiah Briones MD, attest that Becka Gonsalez is acting in a scribe capacity, has observed my performance of the services and has documented them in accordance with my direction.    Isiah Briones MD   [facility] EMERGENCY DEPARTMENT  [address] [place] [id number] [phone]       Isiah Briones MD  12/16/24 1763

## 2024-12-16 NOTE — ED NOTES
Went to patients room and explained we needed some labs from him for placement.  Pt refusing at this time, said to come back later.

## 2024-12-16 NOTE — ED PROVIDER NOTES
"[facility] EMERGENCY DEPARTMENT   ED Mental Health Observation - Daily Note for 12/16/2024    Hamzah Roberts is a 39 year old male currently boarding in the ED while awaiting placement for Psychosis.  Please see the initial H&P for this patient's presentation, workup, and disposition plan.     Hold Status:  Patient is Voluntary, but holdable and Voluntary (NOT holdable)    Plan:  In brief, the patient's presentation is notable for psychosis.  Patient is voluntary but not holdable.  No suicidal ideation homicidal ideation..   Patient is awaiting Mental health placement    Interim History:  There were no significant events since last note.    Physical Exam:  /76   Pulse 100   Temp 98.3  F (36.8  C) (Oral)   Resp 18   Ht 1.626 m (5' 4\")   Wt 77.1 kg (170 lb)   SpO2 97%   BMI 29.18 kg/m    No respiratory distress, on room air   Well perfused  Behavior appropriate    Medications provided prior to my care:  Medications   risperiDONE (risperDAL M-TABS) ODT tab 2 mg (2 mg Oral $Given 12/16/24 0044)       Laboratory (reviewed and interpreted):  Labs Ordered and Resulted from Time of ED Arrival to Time of ED Departure   BASIC METABOLIC PANEL - Abnormal       Result Value    Sodium 141      Potassium 4.0      Chloride 106      Carbon Dioxide (CO2) 26      Anion Gap 9      Urea Nitrogen 21.7 (*)     Creatinine 0.97      GFR Estimate >90      Calcium 8.7 (*)     Glucose 109 (*)    ETHYL ALCOHOL LEVEL - Normal    Alcohol ethyl <0.01     CBC WITH PLATELETS AND DIFFERENTIAL    WBC Count 8.4      RBC Count 4.72      Hemoglobin 14.6      Hematocrit 42.1      MCV 89      MCH 30.9      MCHC 34.7      RDW 12.0      Platelet Count 313      % Neutrophils 51      % Lymphocytes 36      % Monocytes 11      % Eosinophils 1      % Basophils 0      % Immature Granulocytes 0      NRBCs per 100 WBC 0      Absolute Neutrophils 4.3      Absolute Lymphocytes 3.0      Absolute Monocytes 0.9      Absolute " Eosinophils 0.1      Absolute Basophils 0.0      Absolute Immature Granulocytes 0.0      Absolute NRBCs 0.0     URINE DRUG SCREEN PANEL   COVID-19 VIRUS (CORONAVIRUS) BY PCR       ED Course:  7:15 AM Patient signed out to me by Dr. Poncho Sanchez.   725 I met with the patient and discussed plan with care team.     Impression/Plan:  1. Auditory hallucinations        MD Carlos Sung Jacob L, MD  12/16/24 0712

## 2024-12-16 NOTE — CONSULTS
"      Initial Psychiatric Consult   Consult date: December 16, 2024         Reason for Consult, requesting source:    Recommendations.    Requesting source: Carlito Gonzalez    Labs and imaging reviewed.  Provider contacted with recommendations.        HPI:   Psychiatry seeing patient today regarding recommendations.      Hamzah Roberts presents to the ED by  self. Patient is presenting to the ED for the following concerns: Paranoia, Depression, Suicidal ideation.   Factors that make the mental health crisis life threatening or complex are:  Patient presents alone to the emergency department at the recommendation of his sister due to paranoia, hallucinations, and suicidal ideation. He has been staying with friends, but does not feel safe there. He hears voices telling him people will harm him. He is afraid he might hurt someone if he acts on paranoid delusions. Patient has a significant history of methamphetamine use with his most recent use two weeks ago. He stated that his medications were thrown away when he was at detox, so he has been off psychiatric medication for two weeks. Patient is unemployed with no income. He stated that he has not liked his life since he started using drugs at age 29. Patient reports suicidal ideation with intent; \"I don't want to live anymore.\" He denies plan. He denies homicidal ideation but is worried he might act on paranoid delusions and hurt someone. He endorsed auditory hallucinations.     Today, when introducing myself, patient request ed a nicotine lozenge and covered his face with a  blanket.  Patient reports that he smoked methamphetamines a few days ago.  He denies the presence of hallucinations outside the context of substance use.  When asked if he is experiencing thoughts of harming himself, patient responds \"I do not know.\"   Patient denies active hallucinations at this time.  Patient reports that has been several weeks since he is taking his medications, as \"the treatment " "facility threw away my medications.\"  When asked if he has additional questions or concerns, patient requested sylvia lewis.        Past Psychiatric History:   Previous inpatient psychiatry hospitalization in 2023, including MICD commitment at that time. Has additional MICD commitments in 2021 and 2022.         Substance Use and History:     Tobacco Use    Smoking status: Every Day     Current packs/day: 0.50     Types: Cigarettes, Vaping Device    Smokeless tobacco: Not on file   Substance Use Topics    Alcohol use: Never     Patient has had [facility] treatment multiple times. He had a perior of 8 years sobriety during his 20s, then restarted meth use at age 29.     Smoke methamphetamines 2-3 days prior to his hospitalization.         Past Medical History:   PAST MEDICAL HISTORY:   Past Medical History:   Diagnosis Date    Chemical dependency (H)        PAST SURGICAL HISTORY: No past surgical history on file.          Family History:   FAMILY HISTORY: No family history on file.        Social History:   Lives independently. Sister is a support to patient.          Physical ROS:   The 10 point Review of Systems is negative other than noted in the HPI or here.           Medications:     No current facility-administered medications for this encounter.              Allergies:     Allergies   Allergen Reactions    Morphine Sulfate [Morphine] Shortness Of Breath          Labs:     Recent Results (from the past 48 hours)   Basic metabolic panel    Collection Time: 12/16/24  4:51 AM   Result Value Ref Range    Sodium 141 135 - 145 mmol/L    Potassium 4.0 3.4 - 5.3 mmol/L    Chloride 106 98 - 107 mmol/L    Carbon Dioxide (CO2) 26 22 - 29 mmol/L    Anion Gap 9 7 - 15 mmol/L    Urea Nitrogen 21.7 (H) 6.0 - 20.0 mg/dL    Creatinine 0.97 0.67 - 1.17 mg/dL    GFR Estimate >90 >60 mL/min/1.73m2    Calcium 8.7 (L) 8.8 - 10.4 mg/dL    Glucose 109 (H) 70 - 99 mg/dL   Ethyl Alcohol Level    Collection Time: 12/16/24  4:51 AM   Result " "Value Ref Range    Alcohol ethyl <0.01 <=0.01 g/dL   COVID-19 Virus (Coronavirus) by PCR Nasopharyngeal    Collection Time: 12/16/24  4:51 AM    Specimen: Nasopharyngeal; Swab   Result Value Ref Range    SARS CoV2 PCR Negative Negative   CBC with platelets and differential    Collection Time: 12/16/24  4:51 AM   Result Value Ref Range    WBC Count 8.4 4.0 - 11.0 10e3/uL    RBC Count 4.72 4.40 - 5.90 10e6/uL    Hemoglobin 14.6 13.3 - 17.7 g/dL    Hematocrit 42.1 40.0 - 53.0 %    MCV 89 78 - 100 fL    MCH 30.9 26.5 - 33.0 pg    MCHC 34.7 31.5 - 36.5 g/dL    RDW 12.0 10.0 - 15.0 %    Platelet Count 313 150 - 450 10e3/uL    % Neutrophils 51 %    % Lymphocytes 36 %    % Monocytes 11 %    % Eosinophils 1 %    % Basophils 0 %    % Immature Granulocytes 0 %    NRBCs per 100 WBC 0 <1 /100    Absolute Neutrophils 4.3 1.6 - 8.3 10e3/uL    Absolute Lymphocytes 3.0 0.8 - 5.3 10e3/uL    Absolute Monocytes 0.9 0.0 - 1.3 10e3/uL    Absolute Eosinophils 0.1 0.0 - 0.7 10e3/uL    Absolute Basophils 0.0 0.0 - 0.2 10e3/uL    Absolute Immature Granulocytes 0.0 <=0.4 10e3/uL    Absolute NRBCs 0.0 10e3/uL          Physical and Psychiatric Examination:     /76   Pulse 100   Temp 98.3  F (36.8  C) (Oral)   Resp 18   Ht 1.626 m (5' 4\")   Wt 77.1 kg (170 lb)   SpO2 97%   BMI 29.18 kg/m    Weight is 170 lbs 0 oz  Body mass index is 29.18 kg/m .    Physical Exam:  I have reviewed the physical exam as documented by by the medical team and agree with findings and assessment and have no additional findings to add at this time.         MSE:   Calm, but guarded. Engaged minimally, answered most questions with one-word answers. Covered face with a blanket and eyes remained closed. Denies SI/SIB, as well as AH/VH at the time of our meeting.              DSM-5 Diagnosis:   Schizoaffective Disorder, unspecified type  Methamphetamine use disorder, severe  TBI          Assessment:   Psychiatry seeing patient during recommendations.  He has " above historical diagnoses, with continued concerns for methamphetamine use.  Patient is agreeable to receiving psychiatric care, as well as taking medications.  He denied hallucinations at the time of our meeting, and reports to experience hallucinations only while utilizing methamphetamine.  However, hallucinations were reported to DEC . Patient may benefit from inpatient psychiatry for additional assessment and medication management, as he has not taken medications for several weeks.            Summary of Recommendations:   1) Given that patient reports Risperdal to be more effective in targeting his symptoms, will start Risperdal 1 mg BID.   -EKG ordered, as this can prolong QTc.    2) Can continue referral for IP psychiatry for additional stabilization and medication adjustments.           Page me or re-consult psychiatry as needed.       Tasia Becerra, PHILL, APRN  Consult/Liaison Psychiatry  [facility]   Contact information available via [facility] Paging/Directory.  If I am not available, please call [facility] intake ([phone])

## 2024-12-16 NOTE — TELEPHONE ENCOUNTER
12:09 PM Bryan Henriquez at [facility] can have CRN review. Intake can fax clinical consult and ED provider notes.    12:47 PM Fax sent.    2:14 PM Paged Dr. Portillo.    3:00 PM Paged Dr. Portillo (2nd)    2:59 PM [facility] d/t no appropriate bed will need high acuity. [facility] and admit board updated.    R:  [place] [facility] Bed Call Log 12/16/2024 9:20:18 AM  Intake has called facilities that have not updated the bed status within the last 12 hours.         (Adults);        [facility]:                [facility] is posting 0 beds.             [facility] is posting 0 beds. [phone]: 9:48AM CB AFTER 10:30AM.  [facility] is posting 0 beds. Negative covid required.   [facility] is posting 0 beds. Neg covid. No high school/[facility]-psych. [phone] 9:45AM PER DAI, CAN REVIEW PT'S.  [place] is posting 0 beds. [phone]   [facility] is posting 0 bed. [phone] [place] [address] is posting 1 beds. (Ages 18-35) Negative covid. [phone] PT NOT APPROPRIATE D/T UNIT AGE RESTRICTIONS.  [facility] is posting 0 beds.    [facility] is posting 0 beds [phone]       Pt remains on the work list pending appropriate bed availability.

## 2024-12-16 NOTE — ED TRIAGE NOTES
Hamzah arrives to triage by self with complaints of hallucinations. Patient reports auditory and visual hallucinations for weeks and that he can't handle them anymore. Charge, RN notified.     Triage Assessment (Adult)       Row Name 12/15/24 4269          Triage Assessment    Airway WDL WDL        Respiratory WDL    Respiratory WDL WDL        Skin Circulation/Temperature WDL    Skin Circulation/Temperature WDL WDL        Cardiac WDL    Cardiac WDL WDL        Peripheral/Neurovascular WDL    Peripheral Neurovascular WDL WDL

## 2024-12-16 NOTE — ED NOTES
Patient to room 7 and changing into scrubs. Security requested to wand patient and take belongings.

## 2024-12-16 NOTE — ED PROVIDER NOTES
[facility] EMERGENCY DEPARTMENT   ED Mental Health Observation - Initiation Note    Hamzah Roberts was placed into observation at 12:53 AM on 12/16/2024 for Psychosis.   Patient is expected to be under observation status for a minimum of eight hours.    MD Anali Mancilla Jonathan, MD  12/16/24 0053

## 2024-12-17 ENCOUNTER — DOCUMENTATION ONLY (OUTPATIENT)
Dept: BEHAVIORAL HEALTH | Facility: CLINIC | Age: 39
End: 2024-12-17
Payer: COMMERCIAL

## 2024-12-17 PROCEDURE — 250N000013 HC RX MED GY IP 250 OP 250 PS 637

## 2024-12-17 PROCEDURE — 124N000002 HC R&B MH UMMC

## 2024-12-17 RX ADMIN — RISPERIDONE 2 MG: 2 TABLET, ORALLY DISINTEGRATING ORAL at 10:55

## 2024-12-17 RX ADMIN — ACETAMINOPHEN 650 MG: 325 TABLET, FILM COATED ORAL at 22:27

## 2024-12-17 RX ADMIN — NICOTINE POLACRILEX 4 MG: 2 LOZENGE ORAL at 19:29

## 2024-12-17 RX ADMIN — NICOTINE POLACRILEX 4 MG: 2 LOZENGE ORAL at 15:08

## 2024-12-17 RX ADMIN — Medication 3 MG: at 22:27

## 2024-12-17 RX ADMIN — IBUPROFEN 600 MG: 600 TABLET, FILM COATED ORAL at 19:18

## 2024-12-17 RX ADMIN — NICOTINE POLACRILEX 4 MG: 2 LOZENGE ORAL at 22:23

## 2024-12-17 RX ADMIN — NICOTINE POLACRILEX 4 MG: 2 LOZENGE ORAL at 18:31

## 2024-12-17 ASSESSMENT — ACTIVITIES OF DAILY LIVING (ADL)
ADLS_ACUITY_SCORE: 26
ADLS_ACUITY_SCORE: 52
ADLS_ACUITY_SCORE: 52
HYGIENE/GROOMING: INDEPENDENT
ORAL_HYGIENE: INDEPENDENT
HYGIENE/GROOMING: INDEPENDENT
ADLS_ACUITY_SCORE: 52
DRESS: INDEPENDENT
ADLS_ACUITY_SCORE: 52
ADLS_ACUITY_SCORE: 26
ADLS_ACUITY_SCORE: 52
ADLS_ACUITY_SCORE: 52
ADLS_ACUITY_SCORE: 26
LAUNDRY: WITH SUPERVISION
ADLS_ACUITY_SCORE: 52
ADLS_ACUITY_SCORE: 52
ADLS_ACUITY_SCORE: 26
ADLS_ACUITY_SCORE: 52
ADLS_ACUITY_SCORE: 26
ADLS_ACUITY_SCORE: 52
ADLS_ACUITY_SCORE: 26
LAUNDRY: WITH SUPERVISION
ADLS_ACUITY_SCORE: 52
ORAL_HYGIENE: INDEPENDENT
ADLS_ACUITY_SCORE: 26
DRESS: INDEPENDENT
ADLS_ACUITY_SCORE: 26
ADLS_ACUITY_SCORE: 52
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
## 2024-12-17 NOTE — PROGRESS NOTES
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 lacks a sober living environment, would benefit from developing long-term sober maintenance skills, would benefit from developing sober coping skills, would benefit from developing a sober peer support network, and has dual issues of mental health and substance abuse.    Referrals/ Alternatives:  [facility]  [address] [place] [address]  P: [phone]  F: [phone]    [facility] [address] [place] [address]  P: [phone]  Office: [phone]  F: [phone]    [facility] [id number]     NURIS consult completed by:   CHRIS Romo, [facility]  Substance Use Disorder Evaluation Counselor  E-mail Address: [email or web address] Consult & [place] [address]     *Due to regulation of Title 42 of the Code of Federal Regulations (CFR) Part 2: Confidentiality laws apply to this note and the information wherein.  Thus, this note cannot be copy and pasted into any other health care staff's note nor can it be included in general medical records sent  to ANY outside agency without the patient's written consent.

## 2024-12-17 NOTE — PLAN OF CARE
"Pt was admitted on station 22 at 2043 from the [facility]. Per chart review, pt is a \"39 year old male with previous psychiatric diagnoses of schizoaffective disorder, meth use disorder admitted from the ER due to concern for SI in the context of medication non-adherence, meth use, and psychosocial stressors including housing and financial instability.\"    Pt was compliant throughout the admission process. Pt's VSS and reported of having bilateral swelling and pain rated 5/10. Pt is unsure how he got the swelling and pain. Pt declined prn pain medication when offered but did accept cold pack for it. Pt came here as voluntary and signed the Vol consent form. Pt met with the on call resident for an hour. Pt rated his anxiety 4/10 and depression 7/10. Pt was initially ready to complete his admission questionnaire. Pt reported of having SI and stated that he is thinking about how to do it. Pt did contract for safety when he is here in a hospital. Pt denied having HI/AVH in this shift. Pt was sleepy and stated that he is tired. Pt stated that he will like to complete the rest of the admission questionnaire in a morning tomorrow. Pt requested and received prn Melatonin for sleep at 2158. Pt has yet to finish the admission assessment questions. Pt went to bed around 2200. Pt has a fasting lab draw in a morning.     Goal Outcome Evaluation:    Plan of Care Reviewed With: patient    "

## 2024-12-17 NOTE — CONSULTS
Met with pt for CD Consult and completed NURIS Comprehensive Assessment.    Faxing referrals to [facility] and [facility] per pt's verbal consent, sending ROIs to unit [facility] for pt signature.    Recommendations:  1)  Complete a Residential MICD Treatment Program  2)  Abstain from all mood-altering chemicals unless prescribed by a licensed provider.   3)  Attend, at minimum, 2 weekly support group meetings, such as 12 step based (AA/NA), SMART Recovery, Health Realizations, and/or Refuge Recovery meetings.     4)  Actively work with a mentor/sponsor on a weekly basis.   5)  Follow all the recommendations of your treatment/medical providers.  6)  Patient may benefit from obtaining a full mental health evaluation.    Clinical Substantiation:  Patient has unstable housing, lacks a sober living environment, would benefit from developing long-term sober maintenance skills, would benefit from developing sober coping skills, would benefit from developing a sober peer support network, and has dual issues of mental health and substance abuse.    Referrals/ Alternatives:  [facility]  [address] [place] [address]  P: [phone]  F: [phone]    [facility] [address] [place] [address]  P: [phone]  Office: [phone]  F: [phone]    [facility] [id number]     NURIS consult completed by:   CHRIS Romo, [facility]  Substance Use Disorder Evaluation Counselor  E-mail Address: [email or web address] Mental Health and Addiction Services Consult & [place] [address]

## 2024-12-17 NOTE — PLAN OF CARE
Initial meeting note:    Therapist introduced self to patient and discussed psychotherapy service available to patient.     Pt response: Pt was in his bed, appeared to be asleep. Writer made several verbal attempts to wake patient but Pt did not appear to wake up.    Plan: Will continue to check in with Pt for 1:1 sessions.

## 2024-12-17 NOTE — ED NOTES
Report given to Elvni at [facility] 22. He requests that we call when pt leaves ED, [phone]

## 2024-12-17 NOTE — PLAN OF CARE
Problem: Sleep Disturbance  Goal: Adequate Sleep/Rest  Outcome: Progressing    Pt was in sleep at start of the night shift. Pt have slept 7 hours based on Q 15 min rounding. Pt has yet to finish the admission assessment questions. Pt has a fasting lab draw in a morning.      Goal Outcome Evaluation:    Plan of Care Reviewed With: patient

## 2024-12-17 NOTE — PLAN OF CARE
Problem: Adult Behavioral Health Plan of Care  Goal: Develops/Participates in Therapeutic [place] to Support Successful Transition  Outcome: Progressing     Hamzah spends the entire shift isolated and withdrawn to his room, asleep. He ate breakfast and lunch. He denies to speak with or interact with writer, including answering mental health assessment questions. No unsafe behaviors noted.

## 2024-12-17 NOTE — ED PROVIDER NOTES
[facility] EMERGENCY DEPARTMENT   ED Mental Health Observation - Discharge Note (Brief)    Hamzah Roberts is boarding in the ED after undergoing evaluation for Mental health crisis  Please see the daily progress note for this patient's presentation, physical examination, and work up.    Upon reevaluation and discussion with DEC , we believe patient has shown insufficient improvement and thus will be Transferred.    EMERGENCY DEPARTMENT OBSERVATION status ended at 7:01 PM 12/16/2024    Discharge Management Time: < 30 minutes    Patient has been accepted and will be transferred via EMS for further mental health stabilization at [facility].    1. Auditory hallucinations        MD Ravindra Sexton Scott E., MD  12/16/24 0990

## 2024-12-17 NOTE — PLAN OF CARE
12/17/24 1439   Individualization/Patient Specific Goals   Patient Personal Strengths family/social support;motivated for recovery   Patient Vulnerabilities history of unsuccessful treatment;substance abuse/addiction;occupational insecurity;housing insecurity   Interprofessional Rounds   Participants CTC;nursing;psychiatrist   Behavioral Team Discussion   Progress New admit   Anticipated length of stay 5-7 days   Continued Stay Criteria/Rationale New admit   Medical/Physical See H&P   Precautions See below   Plan Psychiatric assessment/Medication management. Therapeutic Milieu. Individual care planning and after care planning. Patient to participate in unit groups and activities. Individual and group support on unit.   Rationale for change in precautions or plan N/A   Safety Plan Per unit protocol         PRECAUTIONS AND SAFETY    Behavioral Orders   Procedures    Assault precautions    Code 1 - Restrict to Unit    Routine Programming     As clinically indicated    Status 15     Every 15 minutes.    Suicide precautions: Suicide Risk: MODERATE; Clinical rationale to override score: modification to the care environment, response to medication     Patients on Suicide Precautions should have a Combination Diet ordered that includes a Diet selection(s) AND a Behavioral Tray selection for Safe Tray - with utensils, or Safe Tray - NO utensils       Order Specific Question:   Suicide Risk     Answer:   MODERATE     Order Specific Question:   Clinical rationale to override score:     Answer:   modification to the care environment     Order Specific Question:   Clinical rationale to override score:     Answer:   response to medication       Safety  Safety WDL: WDL  Patient Location: patient room, own  Observed Behavior: sleeping  Safety Measures: safety rounds completed  Suicidality: Status 15  Assault: status 15, private room

## 2024-12-17 NOTE — PROGRESS NOTES
Prior Authorization **INITIATED**    Medication: PALIPERIDONE ER 6 MG PO TB24  Insurance Company: [employer] - Phone [phone] Fax [phone]  Pharmacy Filling the Rx: n/a - Patient has not yet been discharged, and there are no outpatient orders for this medication yet  Start Date: 12/17/2024  Reference #: CoverMyMeds Key: [id number] - PA Case ID #: [id number]  Comments:  Proactive Prior Authorization      Jaylene Guan CPhT  Discharge Pharmacy Liaison  [facility]charge [facility]  Pronouns: She/Her/Hers    Securely message with [employer], Epic Secure Chat, or [facility]  Phone: [phone]  Fax: [phone]  [email or web address]

## 2024-12-17 NOTE — CONSULTS
Consulted to run a test claim for Invega.    Patient has pharmacy benefits through [facility] [employer]. Per insurance, the following are covered and preferred under the patient's plans:     Invega Sustenna - $0 (does not apply to medical benefit if administered in-clinic)     The following are not covered and requires prior authorization:  Paliperidone ER tabs      If patient is receiving CONKLIN injection in-clinic, medication will need to be billed under medical benefits. Discharge pharmacy liaison is unable to verify coverage under the medical benefit. To verify coverage under medical benefits, patient, SW, RNCC, CM, or other member of care team may:    Contact Member Services department of patient's insurance, OR   Complete a cost of care estimate request by calling [phone] or via [email or web address]  Only valid to check benefits if receiving injection at an [facility] or Infusion Site  Turnaround time is about 1 to 3 business days for this estimate    When an outpatient order for this injection is placed in chart along with an appointment for administration at an [facility]/infusion center, coverage will be secured by the CAM and Infusion Finance teams.      Please feel free to contact me with any other test claims, prior authorizations, or insurance questions regarding outpatient medications.     Thanks!      Jaylene Guan CPhT  Discharge Pharmacy Liaison  [facility] Discharge Pharmacy  Pronouns: She/Her/Hers    Securely message with [employer], Epic Secure Chat, or [employer]  Phone: [phone]  Fax: [phone]  [email or web address]

## 2024-12-17 NOTE — PROGRESS NOTES
----------------------------------------------------------------------------------------------------------  [facility]  Psychiatric Progress Note  Hospital Day #1    Hamzah Roberts MRN# [id number]   Age: 39 year old YOB: 1985   Date of Admission: 12/16/2024     Subjective     The patient was discussed with the treatment team and chart notes were reviewed.      Identifier: Edwige Roberts is a 39 year old male with previous psychiatric diagnoses of schizoaffective disorder, meth use disorder admitted from the ER on 12/16/2024 due to concern for SI in the context of medication non-adherence, meth use, and psychosocial stressors including housing and financial instability. Patient is on hospital day #1. Presentation is consistent with schizoaffective disorder vs substance-induced psychosis.    Sleep:  7 hours (12/17/24 0622)  Prescribed Medications: Taken as prescribed  PRN Psychiatric Medications:     Last 24H PRN:     melatonin tablet 3 mg, 3 mg at 12/16/24 2158    Overnight Nursing Notes/Staff Report: Please see staff notes for additional details.    Patient interview:  Unable to interview pt today due to sleeping in room. Will attempt to see pt tomorrow.      ROS     ROS was negative unless noted above.     Medications     Scheduled Medications:  Current Facility-Administered Medications   Medication Dose Route Frequency Provider Last Rate Last Admin    risperiDONE (risperDAL M-TABS) ODT tab 2 mg  2 mg Oral Daily Jessy Brown MD   2 mg at 12/17/24 1055     PRN Medications:  Current Facility-Administered Medications   Medication Dose Route Frequency Provider Last Rate Last Admin    acetaminophen (TYLENOL) tablet 650 mg  650 mg Oral Q4H PRN Jessy Brown MD        alum & mag hydroxide-simethicone (MAALOX) suspension 30 mL  30 mL Oral Q4H PRN Jessy Brown MD        gabapentin (NEURONTIN) capsule 100 mg  100 mg Oral Q6H PRN Jessy Brown MD         "ibuprofen (ADVIL/MOTRIN) tablet 600 mg  600 mg Oral Q6H PRN Jessy Brown MD        melatonin tablet 3 mg  3 mg Oral At Bedtime PRN Estelle Jones DO   3 mg at 12/16/24 2158    nicotine (COMMIT) lozenge 4 mg  4 mg Buccal Q1H PRN Estelle Jones DO        OLANZapine (zyPREXA) tablet 10 mg  10 mg Oral TID PRN Jessy Brown MD        Or    OLANZapine (zyPREXA) injection 10 mg  10 mg Intramuscular TID PRN Jessy Brown MD        polyethylene glycol (MIRALAX) Packet 17 g  17 g Oral Daily PRN Jessy Brown MD        traZODone (DESYREL) tablet 50 mg  50 mg Oral At Bedtime PRN Estelle Jones DO            Objective     Vitals:  /73 (BP Location: Right arm, Patient Position: Chair, Cuff Size: Adult Regular)   Pulse 94   Temp 97.7  F (36.5  C) (Temporal)   Resp 16   Ht 1.646 m (5' 4.8\")   Wt 73.4 kg (161 lb 12.8 oz)   SpO2 99%   BMI 27.09 kg/m      Mental Status Examination:  Oriented to:   Unable to assess today  due to sleeping in room  General: Unable to assess today  Appearance:   Unkempt  Behavior:   Sleeping  Eye Contact:  Unable to assess today  Psychomotor:  no abnormal motor symptoms appreciated; no catatonia present  Speech:  Unable to assess today  Language: Fluent in English with appropriate syntax and vocabulary.  Mood: Unable to assess today  Affect:  Unable to assess today  Thought Process:  Unable to assess today  Thought Content: Unable to assess today  Associations:  Unable to assess today  Insight:  Unable to assess today   Judgment:  Unable to assess today due to sleeping  Attention Span: Unable to assess today  Concentration:  Unable to assess today  Recent and Remote Memory:  Unable to assess today  Fund of Knowledge: Unable to assess today     Allergies     Allergies   Allergen Reactions    Morphine Sulfate [Morphine] Shortness Of Breath      Labs & Imaging   No results found for this or any previous visit (from the past 24 hours).   Assessment & Plan     Hamzah Roberts is a 39 " year old male previously diagnosed with schizoaffective disorder, meth use disorder who presented voluntarily with psychotic decompensation and SI in the context of medication non-adherence, substance use, and psychosocial stressors (financial and housing instability). Most recent psychiatric hospitalization was April 2023. The MSE on admission was pertinent for auditory and visual hallucinations, passive SI.      Patient has a chart-reported hx schizoaffective disorder; given worsening hallucinations in the context of medication non-adherence for the past 2 weeks, his presentation may be 2/2 psychotic decompensation. Given he's had psychotic symptoms only in the context of methamphetamine use, substance-induced psychosis remains on the differential. However, continual monitoring of his symptoms as he's withdrawing from methamphetamine and collateral from sister and  would be important in corroborating the diagnosis. Psychological factors contributing to his current presentation includes poor coping skills to heal from past trauma and past decisions he's made and depending on methamphetamine when needing to cope. Social factors exacerbating his presentation include lack of social support from family and friends, financial and housing instability. His protective factors include willingness to stay in treatment and help-seeking behavior.      Given that he currently has SI and psychosis, patient warrants inpatient psychiatric hospitalization to maintain his safety.     # Schizoaffective disorder, depressive type  # R/o substance-induced psychosis   1. Medications:  - Risperidone 2 mg at bedtime      2. Pertinent Labs/Monitoring:   - CBC, CMP grossly normal  - UDS + amphetamine  - SARS negative 12/16/24    3. Additional Plans:  - Patient will be treated in therapeutic milieu with appropriate individual and group therapies as described     # Stimulant Use Disorder - severe (methamphetamine)   - CD  evaluation/treatment     Additional Planning:  - Continue to monitor and stabilize  - Patient will be treated in therapeutic milieu with appropriate individual and group therapies as described    Psychiatric course:  Hamzah Roberts was admitted to [facility] as a voluntary patient/on a 72 hour hold.    - 12/16: PTA Olanzapine 10mg was held in the ED due to pt request. He said Risperidone has in the past been more effective than Olanzapine.   - Started risperidone 2mg in ED     Hamzah Roberts continued to meet criteria for inpatient hospitalization medication optimization, inpatient stabilization, and appropriate discharge planning.    Legal Status: Voluntary     SIO: none    Disposition: TBD, pending clinical stabilization, medication optimization, and formulation of a safe discharge plan.  __________________________________________________________________    Pertinent Medical diagnoses and management:    # none    Medical course:   Hamzah Roberts was physically examined by the ED prior to being transferred to the unit and was found to be medically stable and appropriate for admission.     Consults:  Chemical Dependency IP consult  Pharmacy IP consult     Attestation   Ruby Shahid, OMS-4  Resident/Fellow Attestation   I, Yazmin Cantu MD, was present with the medical/ELISA student who participated in the service and in the documentation of the note.  I have verified the history and personally performed the physical exam and medical decision making.  I agree with the assessment and plan of care as documented in the note.      Yazmin Cantu MD  PGY1  Date of Service (when I saw the patient): 12/17/24

## 2024-12-17 NOTE — PLAN OF CARE
12/16/24 0076   Patient Belongings   Did you bring any home meds/supplements to the hospital?  No   Patient Belongings none   Patient Belongings Remaining with Patient clothing;cell phone/electronics;other (see comments)   Patient Belongings Put in Hospital Secure Location (Security or Locker, etc.) necklace;other (see comments)   Belongings Search Yes   Clothing Search Yes   Second Staff Elvin PATEL      Patient Belongings:   Locker: 2 bags, 3 shoes, 1 pouch (3 toothpastes, 2 luis carlos chargers, razors) 1 , 1 carmex/vaseline, 2 shorts, 1 boxer, 2 shirts, 1 sweater, 1 jacket, 1 pair of socks, 1 pants, 9 hats, 1 jbl speaker, 1 deodorant, 1 pair of gloves, 1 phone, 1 beaded jewelry, 3 lithium batteries, 1 jumpstart cords, 2 wrenchs, 1 mini saw, 1 metal detector, 1 perfume, 3  cords, 3  heads, 1 flashlight, 1 torch, 1 lighter,   With Patient: none.  Security: 1 necklace, 1 pendent, 1 foldable knives, 3 hook sets         A               Admission:  I am responsible for any personal items that are not sent to the safe or pharmacy.  [facility] is not responsible for loss, theft or damage of any property in my possession.    Signature:  _________________________________ Date: _______  Time: _____                                              Staff Signature:  ____________________________ Date: ________  Time: _____      2nd Staff person, if patient is unable/unwilling to sign:    Signature: ________________________________ Date: ________  Time: _____     Discharge:  [facility] has returned all of my personal belongings:    Signature: _________________________________ Date: ________  Time: _____                                          Staff Signature:  ____________________________ Date: ________  Time: _____

## 2024-12-18 PROCEDURE — 90832 PSYTX W PT 30 MINUTES: CPT

## 2024-12-18 PROCEDURE — 124N000002 HC R&B MH UMMC

## 2024-12-18 PROCEDURE — 99232 SBSQ HOSP IP/OBS MODERATE 35: CPT | Mod: GC | Performed by: PSYCHIATRY & NEUROLOGY

## 2024-12-18 PROCEDURE — 250N000013 HC RX MED GY IP 250 OP 250 PS 637

## 2024-12-18 RX ORDER — OLANZAPINE 5 MG/1
5 TABLET ORAL 2 TIMES DAILY PRN
COMMUNITY

## 2024-12-18 RX ADMIN — TRAZODONE HYDROCHLORIDE 50 MG: 50 TABLET ORAL at 20:14

## 2024-12-18 RX ADMIN — NICOTINE POLACRILEX 4 MG: 2 LOZENGE ORAL at 18:37

## 2024-12-18 RX ADMIN — IBUPROFEN 600 MG: 600 TABLET, FILM COATED ORAL at 18:58

## 2024-12-18 RX ADMIN — NICOTINE POLACRILEX 4 MG: 2 LOZENGE ORAL at 11:44

## 2024-12-18 RX ADMIN — NICOTINE POLACRILEX 4 MG: 2 LOZENGE ORAL at 13:02

## 2024-12-18 RX ADMIN — IBUPROFEN 600 MG: 600 TABLET, FILM COATED ORAL at 11:44

## 2024-12-18 RX ADMIN — Medication 3 MG: at 18:59

## 2024-12-18 RX ADMIN — ALUMINUM HYDROXIDE, MAGNESIUM HYDROXIDE, AND SIMETHICONE 30 ML: 200; 200; 20 SUSPENSION ORAL at 18:37

## 2024-12-18 RX ADMIN — NICOTINE POLACRILEX 4 MG: 2 LOZENGE ORAL at 19:39

## 2024-12-18 RX ADMIN — NICOTINE POLACRILEX 4 MG: 2 LOZENGE ORAL at 17:22

## 2024-12-18 RX ADMIN — GABAPENTIN 100 MG: 100 CAPSULE ORAL at 13:49

## 2024-12-18 RX ADMIN — NICOTINE POLACRILEX 4 MG: 2 LOZENGE ORAL at 13:49

## 2024-12-18 RX ADMIN — ACETAMINOPHEN 650 MG: 325 TABLET, FILM COATED ORAL at 13:49

## 2024-12-18 RX ADMIN — RISPERIDONE 2 MG: 2 TABLET, ORALLY DISINTEGRATING ORAL at 11:32

## 2024-12-18 ASSESSMENT — ACTIVITIES OF DAILY LIVING (ADL)
ADLS_ACUITY_SCORE: 26
DRESS: INDEPENDENT
HYGIENE/GROOMING: INDEPENDENT
ADLS_ACUITY_SCORE: 26
ORAL_HYGIENE: INDEPENDENT
ADLS_ACUITY_SCORE: 26
LAUNDRY: WITH SUPERVISION
ADLS_ACUITY_SCORE: 26
HYGIENE/GROOMING: INDEPENDENT
ORAL_HYGIENE: INDEPENDENT
LAUNDRY: WITH SUPERVISION
DRESS: INDEPENDENT
ADLS_ACUITY_SCORE: 26

## 2024-12-18 NOTE — PLAN OF CARE
"  Problem: Adult Behavioral Health Plan of Care  Goal: Adheres to Safety Considerations for Self and Others  Outcome: Progressing     Problem: Suicide Risk  Goal: Absence of Self-Harm  Outcome: Progressing     Problem: Sleep Disturbance  Goal: Adequate Sleep/Rest  Outcome: Progressing   Goal Outcome Evaluation:  On-going    Rec'd pt. Sleeping and was observed to have slept well till approx  0420 at which time pt. Came to the nurses station requesting 2 orange juices.  Explained to pt. That he had fasting labs scheduled for the AM and that he please limit his intake at this time to water to which he responded \" Nobody told me I had blood work: I'm thirsty, I want the juice now, they'll just have to wait for that, I'm thirsty.\" Had the juice, then ret'd to bed/sleep. Observed to have slept for approx  6.5 hrs overnight                        "

## 2024-12-18 NOTE — PLAN OF CARE
Problem: Psychotic Signs/Symptoms  Goal: Improved Behavioral Control (Psychotic Signs/Symptoms)  Outcome: Progressing     Problem: Depressive Signs/Symptoms  Goal: Optimized Energy Level (Depressive Signs/Symptoms)  Outcome: Progressing   Goal Outcome Evaluation:    Approached patient earlier in the shift to check, patient was sleeping, attempted to talk to him he ignored staff. Patient slept in, woke at about 11 am. Took scheduled Risperdal without any issues. Noted to be social with staff. Out and visible in the unit most of the afternoon. He denies any hallucinations nor delusions. Staff will continue to monitor and offer support as needed.         Patient approached staff and handed over a pocket knife. He said the knife was still in his boxers and clamed he was not aware. Knife logged in patient belongings and sent down to security. He denies having any other  items with him.

## 2024-12-18 NOTE — PLAN OF CARE
IP Treatment Plan    Client's Name: Hamzah Roberts  YOB: 1985      Treatment Plan Date: December 18, 2024      Anticipated number of sessions for this episode of care: 2-3    Current Concerns/Problem Areas:    Goal 1: Discuss and understand the skills essential to maintaining a substance free life Identify 1-2 reasons to obtain and maintain sobriety and From a relapse, identify at least 1-2 things that will strengthen sobriety       Intervention(s)    Explored motivation for behavioral change, Coached on coping techniques/relaxation skills to help improve distress tolerance and managing intense emotions. , Explored strategies for self-soothing, Engaged in activity scheduling and behavioral activation, looking at and reviewing the prior week's goals, problem solving any barriers and acknowledging successes, as well as setting new goals, and Reviewed healthy living that supports positive mental health, including looking at sleep hygiene, regular movement, nutrition, and regular socialization

## 2024-12-18 NOTE — PHARMACY-ADMISSION MEDICATION HISTORY
Pharmacist Admission Medication History    Admission medication history is complete. The information provided in this note is only as accurate as the sources available at the time of the update.    Information Source(s): Patient via in-person    Pertinent Information: Patient has not needed valacyclovir in last 6 months but does still utilizes it during flair ups. Will keep on PTA med list. Last took other PTA medications 2 weeks before admission.     Changes made to PTA medication list:  Added:   Olanzapine 5 mg prn  Deleted:   Chlorhexidine   Omeprazole   Quetiapine   Removed duplicate olanzapine orders  Changed: None    Allergies reviewed with patient and updates made in EHR: yes    Medication History Completed By: Sada Adrian [facility] 12/18/2024 12:38 PM    PTA Med List   Medication Sig Last Dose/Taking    OLANZapine (ZYPREXA) 10 MG tablet Take 1 tablet (10 mg) by mouth at bedtime. Past Month    OLANZapine (ZYPREXA) 5 MG tablet Take 5 mg by mouth 2 times daily as needed (anxiety). Past Month    valACYclovir (VALTREX) 500 MG tablet Take 500 mg by mouth as needed Take BID for 3 day at onset of symptoms More than a month

## 2024-12-18 NOTE — PLAN OF CARE
"Individual Therapy Note      Date of Service: December 18, 2024    Patient: Hamzah goes by \"Hamzah,\" uses he/him pronouns    Individuals Present: Hamzah GABRIELLE Carrillo    Session start: 1320  Session end: 1340  Session duration in minutes: 20      Modality Used: Person Centered, Rapport Building, and Motivational Interviewing    Goals: Discuss and understand the skills essential to maintaining a substance free life    Patient Description of current symptoms: Better, denied SI, motivated to engage in CD tx     Mental Status Exam:   Attitude: cooperative  Eye Contact: good  Mood: anxious and good  Affect: intensity is blunted  Speech: clear, coherent  Psychomotor Behavior: no evidence of tardive dyskinesia, dystonia, or tics  Thought Process:  logical and linear  Associations: no loose associations  Thought Content: no evidence of suicidal ideation or homicidal ideation  Insight: fair  Judgement: fair  Attention Span and Concentration: intact    Pt progress: Met with Pt in interview room to process feelings around hospitalization and engage in safety planning. Pt was cooperative, fairly guarded, opened up while discussing music. Pt reports he has a lot of demons, has lost relationships due to his substance use and took accountability for things he has done to push people away. Pt reports he experienced SI 2 days ago, denied SI currently. Discussed warning signs that a crisis may be developing which includes AH, urges to use, anxiety. Reported coping skills include: listening to music and going to a safe place. Pt expressed appreciation for 1:1 session.       Treatment Objective(s) Addressed:   The focus of this session was on rapport building, orienting the patient to therapy, identifying and practicing coping strategies, safety planning, identifying an appropriate aftercare plan, and assessing safety     Progress Towards Goals and Assessment of Patient:   Unable to assess progress towards goals - first meeting " with Pt.     Therapeutic Intervention(s):   Provided active listening, unconditional positive regard, and validation.   Engaged in safety planning identifying coping skills, warning signs, health support and resources, Understand and identify reasons for hospitalization, how to use the hospital to create change and prevent future hospitalizations , Explored motivation for behavioral change, and Worked on relapse prevention planning (review of stressors, early warning signs, written plan to respond to signs, and rehearse plan)      Plan/next step: Writer will continue to check in with Pt, encouraged Pt to attend group sessions.      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)    Amphetamine use disorder, severe, dependence (H)    Amphetamine-induced mood disorder (H)    Anxiety    Episodic mood disorder (H)    Gastroesophageal reflux disease    Left ankle pain, unspecified chronicity    Psychosis, atypical (H)    Recurrent genital herpes    Substance-induced psychotic disorder (H)    Hallucinations    Encounter for medication refill    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)    Methamphetamine use disorder, moderate, in sustained remission, in controlled environment, dependence (H)    Methamphetamine use disorder, severe (H)    Schizoaffective disorder, bipolar type (H)    Schizoaffective disorder, chronic condition with acute exacerbation (H)    Schizoaffective disorder, depressive type (H)    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

## 2024-12-18 NOTE — PROGRESS NOTES
"    ----------------------------------------------------------------------------------------------------------  [facility]  Psychiatric Progress Note  Hospital Day #2    Hamzah Roberts MRN# [id number]   Age: 39 year old YOB: 1985   Date of Admission: 12/16/2024     Subjective     The patient was discussed with the treatment team and chart notes were reviewed.      Identifier: Hamzah Roberts is a 39 year old male with previous psychiatric diagnoses of schizoaffective disorder, meth use disorder admitted from the ER on 12/16/2024 due to concern for SI in the context of medication non-adherence, meth use, and psychosocial stressors including housing and financial instability. Patient is on hospital day #2. Presentation is consistent with schizoaffective disorder vs substance-induced psychosis.    Sleep:  7 hours (12/18/24 0600)  Prescribed Medications: Taken as prescribed  PRN Psychiatric Medications:     Last 24H PRN:     acetaminophen (TYLENOL) tablet 650 mg, 650 mg at 12/17/24 2227    ibuprofen (ADVIL/MOTRIN) tablet 600 mg, 600 mg at 12/17/24 1918    melatonin tablet 3 mg, 3 mg at 12/17/24 2227    nicotine (COMMIT) lozenge 4 mg, 4 mg at 12/17/24 2223    Overnight Nursing Notes/Staff Report:   Pt was initially napping at the start of the shift but soon got up and became active on the unit. He presented as calm, pleasant, and cooperative, with an appropriate affect. He engaged well with staff. Pt reported pain in all his fingers, rating it 6/10, and requested Ibuprofen. After receiving prn Ibuprofen 600mg, his pain decreased to 2/10. Pt endorsed mild anxiety (3/10) but did not require intervention at this time. After dinner, pt remained in the lounge, walking around and watching TV intermittently for a while before going to bed.     Patient interview:  Patient is interviewed in the lounge. He is feeling frustrated with the team \"rolling up on me like that, 10 deep with people " "and an ipad.\" He slept fine last night. Has no concerns with his medications. Unsure if he feels safe on the unit yet. Interview terminated early due to patient preference.      ROS     ROS was negative unless noted above.     Medications     Scheduled Medications:  Current Facility-Administered Medications   Medication Dose Route Frequency Provider Last Rate Last Admin    risperiDONE (risperDAL M-TABS) ODT tab 2 mg  2 mg Oral Daily Jessy Brown MD   2 mg at 12/17/24 1055     PRN Medications:  Current Facility-Administered Medications   Medication Dose Route Frequency Provider Last Rate Last Admin    acetaminophen (TYLENOL) tablet 650 mg  650 mg Oral Q4H PRN Jessy Brown MD   650 mg at 12/17/24 2227    alum & mag hydroxide-simethicone (MAALOX) suspension 30 mL  30 mL Oral Q4H PRN Jessy Brown MD        gabapentin (NEURONTIN) capsule 100 mg  100 mg Oral Q6H PRN Jessy Brown MD        ibuprofen (ADVIL/MOTRIN) tablet 600 mg  600 mg Oral Q6H PRN Jessy Brown MD   600 mg at 12/17/24 1918    melatonin tablet 3 mg  3 mg Oral At Bedtime PRN Estelle Jones, DO   3 mg at 12/17/24 2227    nicotine (COMMIT) lozenge 4 mg  4 mg Buccal Q1H PRN Estelle Jones, DO   4 mg at 12/17/24 2223    OLANZapine (zyPREXA) tablet 10 mg  10 mg Oral TID PRN Jessy Brown MD        Or    OLANZapine (zyPREXA) injection 10 mg  10 mg Intramuscular TID PRN Jessy Brown MD        polyethylene glycol (MIRALAX) Packet 17 g  17 g Oral Daily PRN Jessy Brown MD        traZODone (DESYREL) tablet 50 mg  50 mg Oral At Bedtime PRN Estelle Jones, DO            Objective     Vitals:  /73 (BP Location: Right arm, Patient Position: Chair, Cuff Size: Adult Regular)   Pulse 94   Temp 97.7  F (36.5  C) (Temporal)   Resp 16   Ht 1.646 m (5' 4.8\")   Wt 73.4 kg (161 lb 12.8 oz)   SpO2 99%   BMI 27.09 kg/m      Mental Status Examination:  Oriented to:  Grossly Oriented  General:  Awake and Alert  Appearance:  " "appears stated age  Behavior/Attitude:  Guarded and Defensive  Eye Contact: Avoids or Evasive  Psychomotor: No evidence of tics, dystonia, or tardive dyskinesia  and Agitated no catatonia present  Speech:  appropriate volume/tone  Language: Fluent in English with appropriate syntax and vocabulary.  Mood:  \"fine\"  Affect:  hostile and irritable  Thought Process:  linear and coherent  Thought Content:   did not assess today; No apparent delusions  Associations:  questionable  Insight:  limited   Judgment:  limited  Impulse control: limited  Attention Span:  grossly intact and adequate for conversation  Concentration:  grossly intact  Recent and Remote Memory:  not formally assessed  Fund of Knowledge: not formally assessed  Muscle Strength and Tone: normal  Gait and Station: Normal        Allergies     Allergies   Allergen Reactions    Morphine Sulfate [Morphine] Shortness Of Breath      Labs & Imaging   No results found for this or any previous visit (from the past 24 hours).   Assessment & Plan     Hamzah Roberts is a 39 year old male previously diagnosed with schizoaffective disorder, meth use disorder who presented voluntarily with psychotic decompensation and SI in the context of medication non-adherence, substance use, and psychosocial stressors (financial and housing instability). Most recent psychiatric hospitalization was April 2023. The MSE on admission was pertinent for auditory and visual hallucinations, passive SI.      Patient has a chart-reported hx schizoaffective disorder; given worsening hallucinations in the context of medication non-adherence for the past 2 weeks, his presentation may be 2/2 psychotic decompensation. Given he's had psychotic symptoms only in the context of methamphetamine use, substance-induced psychosis remains on the differential. However, continual monitoring of his symptoms as he's withdrawing from methamphetamine and collateral from sister and  would be important in " corroborating the diagnosis. Psychological factors contributing to his current presentation includes poor coping skills to heal from past trauma and past decisions he's made and depending on methamphetamine when needing to cope. Social factors exacerbating his presentation include lack of social support from family and friends, financial and housing instability. His protective factors include willingness to stay in treatment and help-seeking behavior.      Given that he currently has SI and psychosis, patient warrants inpatient psychiatric hospitalization to maintain his safety.     # Schizoaffective disorder, depressive type  # R/o substance-induced psychosis   1. Medications:  - Risperidone 2 mg at bedtime      2. Pertinent Labs/Monitoring:   - CBC, CMP grossly normal  - UDS + amphetamine  - COVID negative    3. Additional Plans:  - Patient will be treated in therapeutic milieu with appropriate individual and group therapies as described     # Stimulant Use Disorder - severe (methamphetamine)   - [facility]errals sent to [facility] for CD treatment    Additional Planning:  - Continue to monitor and stabilize  - Patient will be treated in therapeutic milieu with appropriate individual and group therapies as described    Psychiatric course:  Hamzah Roberts was admitted to [facility] as a voluntary patient/on a 72 hour hold.    - 12/16: PTA Olanzapine 10mg was held in the ED due to pt request. He said Risperidone has in the past been more effective than Olanzapine. Started risperidone 2mg in ED.    Hamzah Roberts continued to meet criteria for inpatient hospitalization medication optimization, inpatient stabilization, and appropriate discharge planning.    Legal Status: Voluntary     SIO: none    Disposition: TBD, pending clinical stabilization, medication optimization, and formulation of a safe discharge plan.  __________________________________________________________________    Pertinent Medical diagnoses and  management:    # none    Medical course:   Hamzah Roberts was physically examined by the ED prior to being transferred to the unit and was found to be medically stable and appropriate for admission.     Consults:  Chemical Dependency IP consult  Pharmacy IP consult     Attestation   Ruby Shahid, OMS-4    Resident/Fellow Attestation   I, Yazmin Cantu MD, was present with the medical/ELISA student who participated in the service and in the documentation of the note.  I have verified the history and personally performed the physical exam and medical decision making.  I agree with the assessment and plan of care as documented in the note.      Yazmin Cantu MD  PGY1  Date of Service (when I saw the patient): 12/18/24     This patient has been seen and evaluated by me, Travon Portillo.  I have discussed this patient with the psychiatry resident and I agree with the findings and plan in this note.    I have reviewed today's vital signs, medications, labs and imaging.     Travon Lopez MD

## 2024-12-18 NOTE — PROGRESS NOTES
Prior Authorization **APPROVED**    Authorization Effective Date: 12/17/2024  Authorization Expiration Date: 12/17/2025  Medication: PALIPERIDONE ER 6 MG PO TB24  Approved Dose/Quantity: -  Reference #: CoverMyMeds Key: [id number] - [place] Case ID #: [id number]   Insurance Company: [employer] - Phone [phone] Fax [phone]  Expected CoPay: $ 0    CoPay Card Available: No    Foundation Assistance Needed: -  Which Pharmacy is filling the prescription (Not needed for infusion/clinic administered): n/a - Patient has not yet been discharged, and there are no outpatient orders for this medication yet  Comments:  Proactive Prior Authorization          Jaylene Guan CPhT  Discharge Pharmacy Liaison  [facility] [place]  Pronouns: She/Her/Hers    Securely message with [email or web address], [employer] Secure Chat, or [employer]  Phone: [phone]  Fax: [phone]  [email or web address]

## 2024-12-18 NOTE — PLAN OF CARE
BEH IP Unit Acuity Rating Score (UARS)  Patient is given one point for every criteria they meet.    CRITERIA SCORING   On a 72 hour hold, court hold, committed, stay of commitment, or revocation. 0    Patient LOS on BEH unit exceeds 20 days. 0  LOS: 2   Patient under guardianship, 55+, otherwise medically complex, or under age 11. 0   Suicide ideation without relief of precipitating factors. 1   Current plan for suicide. 0   Current plan for homicide. 0   Imminent risk or actual attempt to seriously harm another without relief of factors precipitating the attempt. 0   Severe dysfunction in daily living (ex: complete neglect for self care, extreme disruption in vegetative function, extreme deterioration in social interactions). 1   Recent (last 7 days) or current physical aggression in the ED or on unit. 0   Restraints or seclusion episode in past 72 hours. 0   Recent (last 7 days) or current verbal aggression, agitation, yelling, etc., while in the ED or unit. 0   Active psychosis. 1   Need for constant or near constant redirection (from leaving, from others, etc).  0   Intrusive or disruptive behaviors. 0   Patient requires 3 or more hours of individualized nursing care per 8-hour shift (i.e. for ADLs, meds, therapeutic interventions). 0   TOTAL 3

## 2024-12-18 NOTE — PLAN OF CARE
Problem: Adult Behavioral Health Plan of Care  Goal: Plan of Care Review  Outcome: Progressing  Flowsheets (Taken 12/17/2024 2003)  Plan of Care Reviewed With: patient  Patient Agreement with Plan of Care: agrees   Goal Outcome Evaluation:    Plan of Care Reviewed With: patient Plan of Care Reviewed With: patient        Pt was initially napping at the start of the shift but soon got up and became active on the unit. He presented as calm, pleasant, and cooperative, with an appropriate affect. He engaged well with staff. Pt reported pain in all his fingers, rating it 6/10, and requested Ibuprofen. After receiving prn Ibuprofen 600mg, his pain decreased to 2/10. He ate dinner in the dining room, consuming 100%. Pt maintained good eye contact, clear and coherent speech, and adequate hygiene. Pt endorsed mild anxiety (3/10) but did not require intervention at this time. He denied experiencing SI/HI/AVH and anxiety. After dinner, pt remained in the lounge, walking around and watching TV intermittently for a while before going to bed. No abrupt or disruptive behavior.

## 2024-12-18 NOTE — PLAN OF CARE
Team Note Due:  Tuesday    Assessment/Intervention/Current Symtoms and Care Coordination:  Chart review and met with team, discussed pt progress, symptomology, and response to treatment.  Discussed the discharge plan and any potential impediments to discharge.    -Received a voicemail from Hamzah's , Samir, to discuss substance use options.   -Received a voicemail from [facility]. They are reviewing referral and will call with an update.  -Met with Hamzah and had him sign a release for his . This writer updated him on referrals to [facility] and [facility], and he reported [facility] was his first choice.   -Spoke with Hamzah's . He reported Hamzah seemed ready for changed and motivated for treatment. However, he reported Hamzah will frequently go into treatment, but a couple of days later change his mind and leave. He worries this will happen again. This writer will update him on Hamzah's progress and the referrals for substance use treatment.        Discharge Plan or Goal:  Residential Substance Use Program     Barriers to Discharge:  Medication management and stability       Referral Status:  [facility]  [address] [place] [address]  P: [phone]  F: [phone]     [facility] [address] [place] [address]  P: [phone]  Office: [phone]  F: [phone]  Reviewing referral      Legal Status:  Voluntary       Contacts (include FOUZIA status):    /ACT Team:  [facility]   Number: [phone]  FOUZIA signed     Family:  Angie Roberts,   P: [phone]   No FOUZIA signed     Upcoming Meetings and Dates/Important Information and next steps:  None currently

## 2024-12-19 ENCOUNTER — PSYCHE (OUTPATIENT)
Dept: BEHAVIORAL HEALTH | Facility: CLINIC | Age: 39
End: 2024-12-19

## 2024-12-19 VITALS
HEART RATE: 78 BPM | SYSTOLIC BLOOD PRESSURE: 120 MMHG | TEMPERATURE: 97.4 F | HEIGHT: 65 IN | DIASTOLIC BLOOD PRESSURE: 83 MMHG | RESPIRATION RATE: 16 BRPM | WEIGHT: 165.3 LBS | OXYGEN SATURATION: 98 % | BODY MASS INDEX: 27.54 KG/M2

## 2024-12-19 DIAGNOSIS — F15.951 AMPHETAMINE-INDUCED PSYCHOTIC DISORDER WITH HALLUCINATIONS (H): Primary | ICD-10-CM

## 2024-12-19 LAB
CHOLEST SERPL-MCNC: 137 MG/DL
EST. AVERAGE GLUCOSE BLD GHB EST-MCNC: 111 MG/DL
FOLATE SERPL-MCNC: 9.8 NG/ML (ref 4.6–34.8)
HBA1C MFR BLD: 5.5 %
HDLC SERPL-MCNC: 34 MG/DL
LDLC SERPL CALC-MCNC: 67 MG/DL
NONHDLC SERPL-MCNC: 103 MG/DL
TRIGL SERPL-MCNC: 178 MG/DL
TSH SERPL DL<=0.005 MIU/L-ACNC: 3.68 UIU/ML (ref 0.3–4.2)
VIT B12 SERPL-MCNC: 468 PG/ML (ref 232–1245)
VIT D+METAB SERPL-MCNC: 23 NG/ML (ref 20–50)

## 2024-12-19 PROCEDURE — 250N000013 HC RX MED GY IP 250 OP 250 PS 637

## 2024-12-19 PROCEDURE — 36415 COLL VENOUS BLD VENIPUNCTURE: CPT | Performed by: PSYCHIATRY & NEUROLOGY

## 2024-12-19 PROCEDURE — 82746 ASSAY OF FOLIC ACID SERUM: CPT | Performed by: PSYCHIATRY & NEUROLOGY

## 2024-12-19 PROCEDURE — 82607 VITAMIN B-12: CPT | Performed by: PSYCHIATRY & NEUROLOGY

## 2024-12-19 PROCEDURE — 82306 VITAMIN D 25 HYDROXY: CPT | Performed by: PSYCHIATRY & NEUROLOGY

## 2024-12-19 PROCEDURE — 84443 ASSAY THYROID STIM HORMONE: CPT | Performed by: PSYCHIATRY & NEUROLOGY

## 2024-12-19 PROCEDURE — 99239 HOSP IP/OBS DSCHRG MGMT >30: CPT | Mod: GC | Performed by: PSYCHIATRY & NEUROLOGY

## 2024-12-19 PROCEDURE — 83036 HEMOGLOBIN GLYCOSYLATED A1C: CPT | Performed by: PSYCHIATRY & NEUROLOGY

## 2024-12-19 PROCEDURE — 80061 LIPID PANEL: CPT | Performed by: PSYCHIATRY & NEUROLOGY

## 2024-12-19 RX ORDER — RISPERIDONE 2 MG/1
2 TABLET ORAL DAILY
Qty: 30 TABLET | Refills: 0 | Status: SHIPPED | OUTPATIENT
Start: 2024-12-19 | End: 2025-01-18

## 2024-12-19 RX ORDER — RISPERIDONE 2 MG/1
2 TABLET, ORALLY DISINTEGRATING ORAL DAILY
Qty: 30 TABLET | Refills: 0 | Status: SHIPPED | OUTPATIENT
Start: 2024-12-20 | End: 2024-12-19

## 2024-12-19 RX ADMIN — IBUPROFEN 600 MG: 600 TABLET, FILM COATED ORAL at 08:28

## 2024-12-19 RX ADMIN — NICOTINE POLACRILEX 4 MG: 2 LOZENGE ORAL at 08:26

## 2024-12-19 RX ADMIN — NICOTINE POLACRILEX 4 MG: 2 LOZENGE ORAL at 14:47

## 2024-12-19 RX ADMIN — RISPERIDONE 2 MG: 2 TABLET, ORALLY DISINTEGRATING ORAL at 08:26

## 2024-12-19 ASSESSMENT — ACTIVITIES OF DAILY LIVING (ADL)
ADLS_ACUITY_SCORE: 26
ORAL_HYGIENE: INDEPENDENT
ADLS_ACUITY_SCORE: 26
LAUNDRY: WITH SUPERVISION
ADLS_ACUITY_SCORE: 26
DRESS: INDEPENDENT
ADLS_ACUITY_SCORE: 26
HYGIENE/GROOMING: INDEPENDENT
ADLS_ACUITY_SCORE: 26

## 2024-12-19 NOTE — PLAN OF CARE
BEH IP Unit Acuity Rating Score (UARS)  Patient is given one point for every criteria they meet.    CRITERIA SCORING   On a 72 hour hold, court hold, committed, stay of commitment, or revocation. 0    Patient LOS on BEH unit exceeds 20 days. 0  LOS: 3   Patient under guardianship, 55+, otherwise medically complex, or under age 11. 0   Suicide ideation without relief of precipitating factors. 1   Current plan for suicide. 0   Current plan for homicide. 0   Imminent risk or actual attempt to seriously harm another without relief of factors precipitating the attempt. 0   Severe dysfunction in daily living (ex: complete neglect for self care, extreme disruption in vegetative function, extreme deterioration in social interactions). 1   Recent (last 7 days) or current physical aggression in the ED or on unit. 0   Restraints or seclusion episode in past 72 hours. 0   Recent (last 7 days) or current verbal aggression, agitation, yelling, etc., while in the ED or unit. 0   Active psychosis. 1   Need for constant or near constant redirection (from leaving, from others, etc).  0   Intrusive or disruptive behaviors. 0   Patient requires 3 or more hours of individualized nursing care per 8-hour shift (i.e. for ADLs, meds, therapeutic interventions). 0   TOTAL 3

## 2024-12-19 NOTE — PLAN OF CARE
"Goal Outcome Evaluation:    Plan of Care Reviewed With: patient      Patient has bee up and visible in the milieu mostly keeping to himself. Patient is alert and oriented x 4. Patient did attend and participated in unit programming. Affect was calm/quiet, mood as \"Good.\" Patient denies SI/SIB/HI and auditory/visual hallucinations.  PRN medications of Nicotine lozenge, melatonin, Trazodone for sleep and Ibuprofen for hand pain were given at the request of the patient. Patient is tolerating medications. Rest, fluids, and food encouraged. Status 15 checks remain. Patient is able to make needs known appropriately. Patient denies any unmet needs at this time.     Blood pressure 123/78, pulse 117, temperature 97.5  F (36.4  C), temperature source Temporal, resp. rate 16, height 1.646 m (5' 4.8\"), weight 73.4 kg (161 lb 12.8 oz), SpO2 98%.          "

## 2024-12-19 NOTE — PLAN OF CARE
SHIFT PLAN OF CARE   Problem: Sleep Disturbance  Goal: Adequate Sleep/Rest  Outcome: Progressing     Pt in bed at beginning of shift, breathing quiet and unlabored. Up to the bathroom one to void and returned back to bed.    No pt complaints or concerns at this time.  No PRNs given. Appeared to have slept for 6.5 hrs.

## 2024-12-19 NOTE — PLAN OF CARE
"  Problem: Psychotic Signs/Symptoms  Goal: Improved Mood Symptoms (Psychotic Signs/Symptoms)  Outcome: Progressing  Intervention: Optimize Emotion and Mood  Recent Flowsheet Documentation  Taken 12/19/2024 1400 by Jaylene Phelps RN  Supportive Measures: active listening utilized  Diversional Activity: television   Goal Outcome Evaluation:    Plan of Care Reviewed With: patient      /83 (BP Location: Left arm, Patient Position: Sitting, Cuff Size: Adult Regular)   Pulse 78   Temp 97.4  F (36.3  C) (Temporal)   Resp 16   Ht 1.646 m (5' 4.8\")   Wt 75 kg (165 lb 4.8 oz)   SpO2 98%   BMI 27.68 kg/m         Shift Summary:    Patient has been intermittently visible in the milieu occasionally this shift eating meals and watching television.  Patient observed to be isolated and withdrawn to self and spent the most of the time resting in his room.  Hamzah reports pain, 4/10, in bilateral hands and states they feel stiff and swollen.  Prn ibuprofen administered at 08:30 without relief.  Patient declined any further intervention including medication.      Patient ate 75% of breakfast and 50% of lunch.  Patient denies anxiety, SI/SIB/HI/AVH and any delusions.  At change of shift patient reported to , Анна, that he would like to leave the hospital and asked if she could find him a bed somewhere.   unable to find a crisis bed at this time,  so patient found a \"sober friend\" that had a room available.  Team notified and will see patient after change of shift.    Patient remains on a no roommate order and continues on suicide and assault precautions.  No associated behaviors noted this shift.  Hamzah has been compliant with medications and has been requesting and received prn nicotine lozenges.  Will continue plan of care and assist as needed.                 "

## 2024-12-19 NOTE — NURSING NOTE
Pt discharged AMA at 1650 after meeting with provider. Pt signed AMA form. Pt was given education on outpatient psychiatry follow up. Pt received discharge medication and education on that medication. Pt also received and signed for their belongings and valuables. Pt was walked to exit where they said a friend would be picking them up shortly. Pt denies SI, HI , and hallucinations at time of discharge.

## 2024-12-19 NOTE — DISCHARGE SUMMARY
---------------------------------------------------------------------------------------------------------[facility]   Psychiatric Discharge Summary      Hamzah Roberts MRN# [id number]   Age: 39 year old YOB: 1985     Date of Admission:  12/16/2024  Date of Discharge:  12/19/2024  Admitting Physician:  Travon Lopez MD  Discharge Physician:  Travon Lopez MD      This document serves as a transfer of care to Hamzah Roberts's outpatient providers.     Events Leading to Hospitalization:   Hamzah Roberts is a 39 year old male with previous psychiatric diagnoses of schizoaffective disorder, meth use disorder admitted from the ER on 12/16/2024 due to concern for SI in the context of medication non-adherence, meth use, and psychosocial stressors including housing and financial instability .     [facility]/DEC Assessment:  Hamzah Roberts presents to the ED by  self. Patient is presenting to the ED for the following concerns: Paranoia, Depression, Suicidal ideation.   Factors that make the mental health crisis life threatening or complex are:  Patient presents alone to the emergency department at the recommendation of his sister due to paranoia, hallucinations, and suicidal ideation. He has been staying with friends, but does not feel safe there. He hears voices telling him people will harm him. He is afraid he might hurt someone if he acts on paranoid delusions. Patient has a significant history of methamphetamine use with his most recent use two weeks ago. He stated that his medications were thrown away when he was at detox, so he has been off psychiatric medication for two weeks. Patient is unemployed with no income. He stated that he has not liked his life since he started using drugs at age 29. Patient reports suicidal ideation  "with intent; \"I don't want to live anymore.\" He denies plan. He denies homicidal ideation but is worried he might act on paranoid delusions and hurt someone. He endorsed auditory hallucinations.     History of the Crisis   Previous diagnoses include Schizophrenia, Schizoaffective Disorder, and Substance Use Disorder. His drug of choice is meth, which he smokes. Patient has had [facility] treatment multiple times. He had a perior of 8 years sobriety during his 20s, then restarted meth use at age 29. Patient has a history of civil commitments as Mentally Ill and Chemically Dependent by [facility] in 2021, 2022, and 2023. He has not followed up with aftercare services. Patient has been off pyschiatric medication for 2 weeks.        ED/Hospital Course:  Hamzah Roberts was medically cleared for admission to inpatient psychiatric unit. In the ED, was endorsing auditory and visual hallucination for weeks and passive SI without a plan. Noted that he was hearing voices telling him people will harm him. Was started on Risperidone 2 mg ODT. No behavioral codes or physical/verbal agitations in the ED.      Patient interview:  Patient was interviewed in the interview room. He says he's been having worsening psychotic symptoms including auditory hallucination of people screaming and asking for help and visual hallucination of random cars for the past 2 weeks. Notes that during his psychotic decompensation, he typically tries to listen to the khan bc he believes that they're making noises and also believes animals are trying to communicate with him. His first episode psychosis was in 2021 after smoking meth. Notes that he only has psychotic symptoms only in the context of meth use.      Says that he has had multiple psychiatric hospitalizations under the diagnosis of substance-induced psychosis and schizoaffective disorder. When asked about Olanzapine, says that it was \"too sedating\" for him. In the past when he was on Risperidone, he " "was able to function with no issues. Both Olanzapine and Risperidone cleared all the psychotic symptoms mentioned above; when the symptoms typically resolve, he says he stops taking meds, smokes meth again, ends up in the ED, gets Olanzapine, and gets discharged.      He has been in [facility] residential treatment multiple times. He had a period of 8 years sobriety during his 20s, then restarted meth use at age 29. Was recently at a residential tx center but left to smoke meth. Last use was 2-3 days ago. Denies any IV drug use. His withdrawal symptoms include extreme fatigue and increased appetite. Hopes to reach sobriety, says that \"he's done living this life.\" When asked what he uses, he says that he uses as a coping mechanism from \"things he's done in the past\". When asked what helped him during the 8 year sobriety period, he was lifting weights, working out all the time, and worked as . Per chart review, was on Naltrexone in the past, but when asked about it, says he doesn't recall.      Endorses depressive symptoms including low mood, anhedonia, worthlessness, and suicidal ideation; passive SI started 2 days ago but denies any plan or intent.      Reports of prior commitments in the past, which he found helpful he was paired up with a . Says since he doesn't have any support from family, having a  was helpful. Doesn't like the current mental health  he has through [facility] - says he doesn't feel like he's being supported.      Currently lives at his friend's house but doesn't want to return since people at the house use substances. Notes that his family has cut off all contact from him except his sister.       See H&P by Travon Lopez MD on 12/16 for additional details.      Diagnoses:   Primary Psychiatric Diagnosis    # Schizoaffective disorder, depressive type  # R/o substance-induced psychosis     Secondary Psychiatric Diagnoses  # Stimulant " Use Disorder - severe (methamphetamine)       Psychiatric Assessment:   Hamzah Roberts is a 39 year old male previously diagnosed with schizoaffective disorder, meth use disorder who presented voluntarily with psychotic decompensation and SI in the context of medication non-adherence, substance use, and psychosocial stressors (financial and housing instability). Most recent psychiatric hospitalization was April 2023. The MSE on admission was pertinent for auditory and visual hallucinations, passive SI.      Patient has a chart-reported hx schizoaffective disorder; given worsening hallucinations in the context of medication non-adherence for the past 2 weeks, his presentation may be 2/2 psychotic decompensation. Given he's had psychotic symptoms only in the context of methamphetamine use, substance-induced psychosis remains on the differential. However, continual monitoring of his symptoms as he's withdrawing from methamphetamine and collateral from sister and  would be important in corroborating the diagnosis. Psychological factors contributing to his current presentation includes poor coping skills to heal from past trauma and past decisions he's made and depending on methamphetamine when needing to cope. Social factors exacerbating his presentation include lack of social support from family and friends, financial and housing instability. His protective factors include willingness to stay in treatment and help-seeking behavior.      During his brief hospitalization, Hamzah reported improved mood and denied thoughts of hurting himself or anyone else and could contract for safety. He denied auditory and visual hallucinations. Given the context of recent methamphetamine news and resolution of psychotic after a few days being sober makes a substance induced psychosis more likely. Patient will likely benefit from CD treatment and switching to a CONKLIN antipsychotic to help with medication medication adherence.    "Psychiatric Hospital Course:   Hamzah Roberts was admitted to [facility] as a voluntary patient/on a 72 hour hold.     - 12/16: PTA Olanzapine 10mg was held in the ED due to pt request. He said Risperidone has in the past been more effective than Olanzapine. Started risperidone 2mg in ED.  Level of medication adherence: Good  Behaviors: The patient was safe and appropriate and did not require chemical/physical restraints during admission. He was cooperative with cares, had adequate group attendance, and was visible in the milieu.  Change in psychiatric symptoms: Over the course of this hospitalization the patient's symptoms of psychosis improved.   Hamzah was released to home. At the time of discharge he was determined to not be a danger to himself or others.     Risk Assessment:      Today Hamzah Roberts denies SI/SIB/AH/VH and contracts for safety. Patient has notable risk factors for self-harm, including substance abuse, maladaptive coping, trauma, and past behaviors. However, risk is mitigated by absence of past attempts and ability to volunteer a safety plan. Therefore, based on all available evidence including the factors cited above, he does not appear to be at imminent risk for self-harm, does not meet criteria for a 72-hr hold, and therefore remains appropriate for ongoing outpatient level of care. Additional steps taken to minimize risk include: medication optimization, close psychiatric follow up and provision of crisis resources . Voluntary referral for CD treatment was offered, he declined this offer.     Psychiatric Examination:     Mental Status Exam:  Oriented to:  Grossly Oriented  General:  Awake and Alert  Appearance:  appears stated age  Behavior/Attitude:  Guarded and Defensive  Eye Contact: Avoids or Evasive  Psychomotor: Normal no catatonia present  Speech:  appropriate volume/tone  Language: Fluent in English with appropriate syntax and vocabulary.  Mood:  \"I don't feel good\"  Affect:  " "irritable  Thought Process:  linear and coherent  Thought Content:   No SI/HI/AH/VH; No apparent delusions  Associations:  questionable  Insight:  limited  Judgment:  limited   Impulse control: limited  Attention Span:  grossly intact  Concentration:  grossly intact  Recent and Remote Memory:  not formally assessed  Fund of Knowledge: not formally assessed   Muscle Strength and Tone: normal  Gait and Station: Normal     Medical Hospital Course:   Hamzah Roberts was medically cleared by the ED prior to admission to the unit. PTA medications were continued on admission.     Medical Diagnoses addressed:  # none    Consults:   Chemical Dependency IP consult  Pharmacy IP consult    Labs were notable for the following:  (Copy form \"Data this admission\" from progress note)     Discharge Medications:     Current Discharge Medication List        START taking these medications    Details   risperiDONE (RISPERDAL) 2 MG tablet Take 1 tablet (2 mg) by mouth daily.  Qty: 30 tablet, Refills: 0    Associated Diagnoses: Schizoaffective disorder, chronic condition with acute exacerbation (H)           CONTINUE these medications which have NOT CHANGED    Details   !! OLANZapine (ZYPREXA) 10 MG tablet Take 1 tablet (10 mg) by mouth at bedtime.  Qty: 30 tablet, Refills: 0      !! OLANZapine (ZYPREXA) 5 MG tablet Take 5 mg by mouth 2 times daily as needed (anxiety).      valACYclovir (VALTREX) 500 MG tablet Take 500 mg by mouth as needed Take BID for 3 day at onset of symptoms       !! - Potential duplicate medications found. Please discuss with provider.           Discharge Plan:   Medications as above  Psychiatric Appointments: TBD   Psychotherapy Appointments: TBD  Referrals: TBD  Medical follow up: TBD     Attestations:     Resident without student: This patient was seen and discussed with my attending physician.  Yazmin Cantu  Psychiatry Resident Physician     " I have examined the patient today and reviewed the discharge plan with the resident. I agree with the final assessment and plan, as noted in the discharge summary. I have reviewed today's vital signs, medications, labs and imaging.    Total time discharge plannin minutes    Travon Lopez MD

## 2024-12-19 NOTE — PLAN OF CARE
"Team Note Due:  Tuesday    Assessment/Intervention/Current Symtoms and Care Coordination:  Chart review and met with team, discussed pt progress, symptomology, and response to treatment.  Discussed the discharge plan and any potential impediments to discharge.    -Received a voicemail from [facility]. They reported they cannot accept Hamzah due to a \"no admit\" order. They reported he has been to their facility three times in the past, and he typically only stays for two days before deciding he does not want to follow through with treatment.    -Let Hamzah know that he was not accepted to [facility].  -Hamzah requested help getting into a crisis facility. He reported he would like to leave as soon as possible, because he is needs fresh air and would rather be at a crisis house before going to treatment. This writer updated his nurse and let him know he would most likely not be able to leave [facility]. Will follow up tomorrow.   -Hamzah said he found a sober friend he could stay with and wanted to leave tonight. This writer followed up with the doctor to have them come see Hamzah.      Discharge Plan or Goal:  Residential Substance Use Program     Barriers to Discharge:  Medication management and stability       Referral Status:  [facility]  [address] [place] [address]  P: [phone]  F: [phone]     [facility] [address] [place] [address]  P: [phone]  Office: [phone]  F: [phone]  Reviewing referral      Legal Status:  Voluntary       Contacts (include FOUZIA status):    /ACT Team:  Name/Clinic: [facility]   Number: [phone]  FOUZIA signed     Family:  sister Sanchez  P: [phone]   No FOUZIA signed     Upcoming Meetings and Dates/Important Information and next steps:  None currently    "

## 2024-12-19 NOTE — PROGRESS NOTES
"    ----------------------------------------------------------------------------------------------------------  [facility]  Psychiatric Progress Note  Hospital Day #3    Hamzah Roberts MRN# [id number]   Age: 39 year old YOB: 1985   Date of Admission: 12/16/2024     Subjective     The patient was discussed with the treatment team and chart notes were reviewed.      Identifier: Hamzah Roberts is a 39 year old male with previous psychiatric diagnoses of schizoaffective disorder, meth use disorder admitted from the ER on 12/16/2024 due to concern for SI in the context of medication non-adherence, meth use, and psychosocial stressors including housing and financial instability. Patient is on hospital day #3. Presentation is consistent with schizoaffective disorder vs substance-induced psychosis.    Sleep:  6.5 hours (12/19/24 0600)  Prescribed Medications: Taken as prescribed  PRN Psychiatric Medications:     Last 24H PRN:     acetaminophen (TYLENOL) tablet 650 mg, 650 mg at 12/18/24 1349    alum & mag hydroxide-simethicone (MAALOX) suspension 30 mL, 30 mL at 12/18/24 1837    gabapentin (NEURONTIN) capsule 100 mg, 100 mg at 12/18/24 1349    ibuprofen (ADVIL/MOTRIN) tablet 600 mg, 600 mg at 12/18/24 1858    melatonin tablet 3 mg, 3 mg at 12/18/24 1859    nicotine (COMMIT) lozenge 4 mg, 4 mg at 12/18/24 1939    traZODone (DESYREL) tablet 50 mg, 50 mg at 12/18/24 2014    Overnight Nursing Notes/Staff Report:   -Patient has been up and visible in the milieu mostly keeping to himself. Patient is alert and oriented x 4. Patient did attend and participated in unit programming. Affect was calm/quiet, mood as \"Good.\" Patient denies SI/SIB/HI and auditory/visual hallucinations. Patient is tolerating medications.   -Met with Hamzah and had him sign a release for his . This writer updated him on referrals to [facility] and [facility], and he reported [facility] was his first choice.   -Spoke " "with Hamzah's . He reported Hamzah seemed ready for changed and motivated for treatment. However, he reported Hamzah will frequently go into treatment, but a couple of days later change his mind and leave. He worries this will happen again.     Patient interview:  Pt laying in bed and refused to interview. He stated \"leave me alone, I don't feel good.\" He did not elaborate and closed door.      ROS     ROS was negative unless noted above.     Medications     Scheduled Medications:  Current Facility-Administered Medications   Medication Dose Route Frequency Provider Last Rate Last Admin    risperiDONE (risperDAL M-TABS) ODT tab 2 mg  2 mg Oral Daily Jessy Brown MD   2 mg at 12/18/24 1132     PRN Medications:  Current Facility-Administered Medications   Medication Dose Route Frequency Provider Last Rate Last Admin    acetaminophen (TYLENOL) tablet 650 mg  650 mg Oral Q4H PRN Jessy Brown MD   650 mg at 12/18/24 1349    alum & mag hydroxide-simethicone (MAALOX) suspension 30 mL  30 mL Oral Q4H PRN Jessy Brown MD   30 mL at 12/18/24 1837    gabapentin (NEURONTIN) capsule 100 mg  100 mg Oral Q6H PRN Jessy Brown MD   100 mg at 12/18/24 1349    ibuprofen (ADVIL/MOTRIN) tablet 600 mg  600 mg Oral Q6H PRN Jessy Brown MD   600 mg at 12/18/24 1858    melatonin tablet 3 mg  3 mg Oral At Bedtime PRN Estelle Jones, DO   3 mg at 12/18/24 1859    nicotine (COMMIT) lozenge 4 mg  4 mg Buccal Q1H PRN Estelle Jones, DO   4 mg at 12/18/24 1939    OLANZapine (zyPREXA) tablet 10 mg  10 mg Oral TID PRN Jessy Brown MD        Or    OLANZapine (zyPREXA) injection 10 mg  10 mg Intramuscular TID PRN Jessy Brown MD        polyethylene glycol (MIRALAX) Packet 17 g  17 g Oral Daily PRN Jessy Brown MD        traZODone (DESYREL) tablet 50 mg  50 mg Oral At Bedtime PRN Estelle Jones, DO   50 mg at 12/18/24 2014        Objective     Vitals:  /78 (BP Location: Right arm, Patient " "Position: Sitting, Cuff Size: Adult Regular)   Pulse 117   Temp 97.5  F (36.4  C) (Temporal)   Resp 16   Ht 1.646 m (5' 4.8\")   Wt 73.4 kg (161 lb 12.8 oz)   SpO2 98%   BMI 27.09 kg/m      Mental Status Examination:  Oriented to:  Grossly Oriented  General:  Sleepy  Appearance:  appears stated age  Behavior/Attitude:  Guarded and Defensive  Eye Contact: Avoids or Evasive  Psychomotor: No evidence of tics, dystonia, or tardive dyskinesia  and Agitated no catatonia present  Speech:  appropriate volume/tone  Language: Fluent in English with appropriate syntax and vocabulary.  Mood:  \"I don't feel good\"  Affect:  irritable  Thought Process:  linear and coherent  Thought Content:   did not assess today; No apparent delusions  Associations:  questionable  Insight:  limited   Judgment:  limited  Impulse control: limited  Attention Span:  grossly intact and adequate for conversation  Concentration:  grossly intact  Recent and Remote Memory:  not formally assessed  Fund of Knowledge: not formally assessed  Muscle Strength and Tone: normal  Gait and Station: Normal        Allergies     Allergies   Allergen Reactions    Morphine Sulfate [Morphine] Shortness Of Breath      Labs & Imaging   No results found for this or any previous visit (from the past 24 hours).   Assessment & Plan     Hamzah Roberts is a 39 year old male previously diagnosed with schizoaffective disorder, meth use disorder who presented voluntarily with psychotic decompensation and SI in the context of medication non-adherence, substance use, and psychosocial stressors (financial and housing instability). Most recent psychiatric hospitalization was April 2023. The MSE on admission was pertinent for auditory and visual hallucinations, passive SI.      Patient has a chart-reported hx schizoaffective disorder; given worsening hallucinations in the context of medication non-adherence for the past 2 weeks, his presentation may be 2/2 psychotic decompensation. " Given he's had psychotic symptoms only in the context of methamphetamine use, substance-induced psychosis remains on the differential. However, continual monitoring of his symptoms as he's withdrawing from methamphetamine and collateral from sister and  would be important in corroborating the diagnosis. Psychological factors contributing to his current presentation includes poor coping skills to heal from past trauma and past decisions he's made and depending on methamphetamine when needing to cope. Social factors exacerbating his presentation include lack of social support from family and friends, financial and housing instability. His protective factors include willingness to stay in treatment and help-seeking behavior.      Given that he currently has SI and psychosis, patient warrants inpatient psychiatric hospitalization to maintain his safety.     Today's changes:  none    # Schizoaffective disorder, depressive type  # R/o substance-induced psychosis   1. Medications:  - Risperidone 2 mg at bedtime      2. Pertinent Labs/Monitoring:   - CBC, CMP grossly normal  - UDS + amphetamine  - COVID negative    3. Additional Plans:  - Patient will be treated in therapeutic milieu with appropriate individual and group therapies as described     # Stimulant Use Disorder - severe (methamphetamine)   - Referrals sent to [facility] for CD treatment    Additional Planning:  - Continue to monitor and stabilize  - Patient will be treated in therapeutic milieu with appropriate individual and group therapies as described    Psychiatric course:  Hamzah Roberts was admitted [facility] as a voluntary patient/on a 72 hour hold.    - 12/16: PTA Olanzapine 10mg was held in the ED due to pt request. He said Risperidone has in the past been more effective than Olanzapine. Started risperidone 2mg in ED.    Hamzah Roberts continued to meet criteria for inpatient hospitalization medication optimization, inpatient  stabilization, and appropriate discharge planning.    Legal Status: Voluntary     SIO: none    Disposition: TBD, pending clinical stabilization, medication optimization, and formulation of a safe discharge plan.  __________________________________________________________________    Pertinent Medical diagnoses and management:    # none    Medical course:   Hamzah Roberts was physically examined by the ED prior to being transferred to the unit and was found to be medically stable and appropriate for admission.     Consults:  Chemical Dependency IP consult  Pharmacy IP consult     Attestation   Ruby Shahid, OMS-4  Resident/Fellow Attestation   I, Yazmin Cantu MD, was present with the medical/ELISA student who participated in the service and in the documentation of the note.  I have verified the history and personally performed the physical exam and medical decision making.  I agree with the assessment and plan of care as documented in the note.      Yazmin Cantu MD  PGY1  Date of Service (when I saw the patient): 12/19/24

## 2024-12-23 ENCOUNTER — PATIENT OUTREACH (OUTPATIENT)
Dept: CARE COORDINATION | Facility: CLINIC | Age: 39
End: 2024-12-23
Payer: COMMERCIAL

## 2024-12-23 NOTE — PROGRESS NOTES
Connected Care Resource Center Contact  [facility]/Voicemail     Clinical Data: Post-Discharge Outreach     Outreach attempted x 2.  Left message on patient's voicemail, providing [facility]'s central phone number of [phone] ([phone]) for questions/concerns and/or to schedule an appt with an [facility] provider, if they do not have a PCP.      Plan:  [facility] will do no further outreaches at this time.       LIANA Brown  Connected Care Resource Center, [facility]    *Connected Care Resource Team does NOT follow patient ongoing. Referrals are identified based on internal discharge reports and the outreach is to ensure patient has an understanding of their discharge instructions.

## 2024-12-26 LAB
ATRIAL RATE - MUSE: 67 BPM
DIASTOLIC BLOOD PRESSURE - MUSE: NORMAL MMHG
INTERPRETATION ECG - MUSE: NORMAL
P AXIS - MUSE: 39 DEGREES
PR INTERVAL - MUSE: 126 MS
QRS DURATION - MUSE: 82 MS
QT - MUSE: 352 MS
QTC - MUSE: 371 MS
R AXIS - MUSE: 4 DEGREES
SYSTOLIC BLOOD PRESSURE - MUSE: NORMAL MMHG
T AXIS - MUSE: -19 DEGREES
VENTRICULAR RATE- MUSE: 67 BPM

## 2025-01-09 ENCOUNTER — HOSPITAL ENCOUNTER (EMERGENCY)
Facility: HOSPITAL | Age: 40
Discharge: HOME | End: 2025-01-09
Attending: STUDENT IN AN ORGANIZED HEALTH CARE EDUCATION/TRAINING PROGRAM
Payer: COMMERCIAL

## 2025-01-09 ENCOUNTER — APPOINTMENT (OUTPATIENT)
Dept: CT IMAGING | Facility: HOSPITAL | Age: 40
End: 2025-01-09
Attending: STUDENT IN AN ORGANIZED HEALTH CARE EDUCATION/TRAINING PROGRAM
Payer: COMMERCIAL

## 2025-01-09 VITALS
DIASTOLIC BLOOD PRESSURE: 86 MMHG | HEART RATE: 103 BPM | SYSTOLIC BLOOD PRESSURE: 157 MMHG | HEIGHT: 62 IN | BODY MASS INDEX: 27.6 KG/M2 | OXYGEN SATURATION: 97 % | WEIGHT: 150 LBS | RESPIRATION RATE: 18 BRPM
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
## 2025-01-09 LAB
ALBUMIN SERPL BCG-MCNC: 4.6 G/DL (ref 3.5–5.2)
ALP SERPL-CCNC: 82 U/L (ref 40–150)
ALT SERPL W P-5'-P-CCNC: 15 U/L (ref 0–70)
ANION GAP SERPL CALCULATED.3IONS-SCNC: 12 MMOL/L (ref 7–15)
AST SERPL W P-5'-P-CCNC: 22 U/L (ref 0–45)
BASOPHILS # BLD AUTO: 0 10E3/UL (ref 0–0.2)
BASOPHILS NFR BLD AUTO: 0 %
BILIRUB SERPL-MCNC: 0.3 MG/DL
BUN SERPL-MCNC: 15.3 MG/DL (ref 6–20)
CALCIUM SERPL-MCNC: 9.8 MG/DL (ref 8.8–10.4)
CHLORIDE SERPL-SCNC: 104 MMOL/L (ref 98–107)
CREAT SERPL-MCNC: 1.17 MG/DL (ref 0.67–1.17)
EGFRCR SERPLBLD CKD-EPI 2021: 82 ML/MIN/1.73M2
EOSINOPHIL # BLD AUTO: 0.1 10E3/UL (ref 0–0.7)
EOSINOPHIL NFR BLD AUTO: 1 %
ERYTHROCYTE [DISTWIDTH] IN BLOOD BY AUTOMATED COUNT: 11.9 % (ref 10–15)
GLUCOSE SERPL-MCNC: 107 MG/DL (ref 70–99)
HCO3 SERPL-SCNC: 23 MMOL/L (ref 22–29)
HCT VFR BLD AUTO: 45.1 % (ref 40–53)
HGB BLD-MCNC: 15.8 G/DL (ref 13.3–17.7)
HOLD SPECIMEN: NORMAL
IMM GRANULOCYTES # BLD: 0 10E3/UL
IMM GRANULOCYTES NFR BLD: 0 %
LYMPHOCYTES # BLD AUTO: 2.4 10E3/UL (ref 0.8–5.3)
LYMPHOCYTES NFR BLD AUTO: 23 %
MCH RBC QN AUTO: 31.2 PG (ref 26.5–33)
MCHC RBC AUTO-ENTMCNC: 35 G/DL (ref 31.5–36.5)
MCV RBC AUTO: 89 FL (ref 78–100)
MONOCYTES # BLD AUTO: 0.9 10E3/UL (ref 0–1.3)
MONOCYTES NFR BLD AUTO: 9 %
NEUTROPHILS # BLD AUTO: 6.8 10E3/UL (ref 1.6–8.3)
NEUTROPHILS NFR BLD AUTO: 67 %
NRBC # BLD AUTO: 0 10E3/UL
NRBC BLD AUTO-RTO: 0 /100
PLATELET # BLD AUTO: 277 10E3/UL (ref 150–450)
POTASSIUM SERPL-SCNC: 3.8 MMOL/L (ref 3.4–5.3)
PROT SERPL-MCNC: 7.5 G/DL (ref 6.4–8.3)
RBC # BLD AUTO: 5.06 10E6/UL (ref 4.4–5.9)
SODIUM SERPL-SCNC: 139 MMOL/L (ref 135–145)
WBC # BLD AUTO: 10.3 10E3/UL (ref 4–11)

## 2025-01-09 PROCEDURE — 36415 COLL VENOUS BLD VENIPUNCTURE: CPT | Performed by: STUDENT IN AN ORGANIZED HEALTH CARE EDUCATION/TRAINING PROGRAM

## 2025-01-09 PROCEDURE — 80053 COMPREHEN METABOLIC PANEL: CPT | Performed by: STUDENT IN AN ORGANIZED HEALTH CARE EDUCATION/TRAINING PROGRAM

## 2025-01-09 PROCEDURE — 82435 ASSAY OF BLOOD CHLORIDE: CPT | Performed by: STUDENT IN AN ORGANIZED HEALTH CARE EDUCATION/TRAINING PROGRAM

## 2025-01-09 PROCEDURE — 250N000011 HC RX IP 250 OP 636: Performed by: STUDENT IN AN ORGANIZED HEALTH CARE EDUCATION/TRAINING PROGRAM

## 2025-01-09 PROCEDURE — 99285 EMERGENCY DEPT VISIT HI MDM: CPT | Mod: 25 | Performed by: STUDENT IN AN ORGANIZED HEALTH CARE EDUCATION/TRAINING PROGRAM

## 2025-01-09 PROCEDURE — 82040 ASSAY OF SERUM ALBUMIN: CPT | Performed by: STUDENT IN AN ORGANIZED HEALTH CARE EDUCATION/TRAINING PROGRAM

## 2025-01-09 PROCEDURE — 12034 INTMD RPR S/TR/EXT 7.6-12.5: CPT | Mod: LT | Performed by: STUDENT IN AN ORGANIZED HEALTH CARE EDUCATION/TRAINING PROGRAM

## 2025-01-09 PROCEDURE — 85025 COMPLETE CBC W/AUTO DIFF WBC: CPT | Performed by: STUDENT IN AN ORGANIZED HEALTH CARE EDUCATION/TRAINING PROGRAM

## 2025-01-09 PROCEDURE — 250N000013 HC RX MED GY IP 250 OP 250 PS 637: Performed by: STUDENT IN AN ORGANIZED HEALTH CARE EDUCATION/TRAINING PROGRAM

## 2025-01-09 PROCEDURE — 73706 CT ANGIO LWR EXTR W/O&W/DYE: CPT | Mod: LT

## 2025-01-09 RX ORDER — IOPAMIDOL 755 MG/ML
90 INJECTION, SOLUTION INTRAVASCULAR ONCE
Status: COMPLETED | OUTPATIENT
Start: 2025-01-09 | End: 2025-01-09

## 2025-01-09 RX ORDER — CEPHALEXIN 500 MG/1
500 CAPSULE ORAL 4 TIMES DAILY
Qty: 28 CAPSULE | Refills: 0 | Status: SHIPPED | OUTPATIENT
Start: 2025-01-09 | End: 2025-01-16

## 2025-01-09 RX ORDER — HYDROCODONE BITARTRATE AND ACETAMINOPHEN 5; 325 MG/1; MG/1
1 TABLET ORAL ONCE
Status: COMPLETED | OUTPATIENT
Start: 2025-01-09 | End: 2025-01-09

## 2025-01-09 RX ADMIN — NICOTINE POLACRILEX 4 MG: 4 LOZENGE ORAL at 06:23

## 2025-01-09 RX ADMIN — HYDROCODONE BITARTRATE AND ACETAMINOPHEN 1 TABLET: 5; 325 TABLET ORAL at 06:22

## 2025-01-09 RX ADMIN — IOPAMIDOL 90 ML: 755 INJECTION, SOLUTION INTRAVENOUS at 05:13

## 2025-01-09 ASSESSMENT — ACTIVITIES OF DAILY LIVING (ADL)
ADLS_ACUITY_SCORE: 52

## 2025-01-09 NOTE — DISCHARGE INSTRUCTIONS
Your sutures in your leg need to be removed in 7 to 10 days.    Please take antibiotics as prescribed.    Please wash your leg daily with warm water and soap.  Return for any signs of infection   Such as increasing redness, warmth, pain or white discharge
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
## 2025-01-26 ENCOUNTER — HOSPITAL ENCOUNTER (EMERGENCY)
Facility: HOSPITAL | Age: 40
Discharge: HOME | End: 2025-01-27
Attending: STUDENT IN AN ORGANIZED HEALTH CARE EDUCATION/TRAINING PROGRAM | Admitting: STUDENT IN AN ORGANIZED HEALTH CARE EDUCATION/TRAINING PROGRAM
Payer: COMMERCIAL

## 2025-01-26 DIAGNOSIS — T14.8XXA WOUND INFECTION: ICD-10-CM

## 2025-01-26 DIAGNOSIS — L08.9 WOUND INFECTION: ICD-10-CM

## 2025-01-26 PROCEDURE — 99283 EMERGENCY DEPT VISIT LOW MDM: CPT

## 2025-01-26 ASSESSMENT — COLUMBIA-SUICIDE SEVERITY RATING SCALE - C-SSRS
6. HAVE YOU EVER DONE ANYTHING, STARTED TO DO ANYTHING, OR PREPARED TO DO ANYTHING TO END YOUR LIFE?: NO
1. IN THE PAST MONTH, HAVE YOU WISHED YOU WERE DEAD OR WISHED YOU COULD GO TO SLEEP AND NOT WAKE UP?: NO
2. HAVE YOU ACTUALLY HAD ANY THOUGHTS OF KILLING YOURSELF IN THE PAST MONTH?: NO

## 2025-01-27 ENCOUNTER — TELEPHONE (OUTPATIENT)
Dept: WOUND CARE | Facility: CLINIC | Age: 40
End: 2025-01-27
Payer: COMMERCIAL

## 2025-01-27 VITALS
OXYGEN SATURATION: 98 % | BODY MASS INDEX: 27.6 KG/M2 | HEART RATE: 121 BPM | RESPIRATION RATE: 18 BRPM | DIASTOLIC BLOOD PRESSURE: 85 MMHG | HEIGHT: 62 IN | SYSTOLIC BLOOD PRESSURE: 138 MMHG | WEIGHT: 150 LBS | TEMPERATURE: 98.3 F

## 2025-01-27 RX ORDER — CLINDAMYCIN HYDROCHLORIDE 300 MG/1
300 CAPSULE ORAL 3 TIMES DAILY
Qty: 21 CAPSULE | Refills: 0 | Status: SHIPPED | OUTPATIENT
Start: 2025-01-27 | End: 2025-02-03

## 2025-01-27 NOTE — ED NOTES
Dressing on leg with wet to dry dressing. Pt tolerated well, denied pain or discomfort with dressing

## 2025-01-27 NOTE — DISCHARGE INSTRUCTIONS
Please do the wet-to-dry dressings as we showed you here in the hospital.  Please make sure you do it at least twice daily.    Please also bathe at least once daily, letting the shower water spray directly onto the wound.    We placed a referral to the wound care clinic.  They should be calling you on Monday or Tuesday for follow-up this week.
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
## 2025-01-27 NOTE — TELEPHONE ENCOUNTER
Attempt#1- phone number not in service, never has logged on to [facility], no other phone numbers in CareEverywhere, no communication consent on file. [phone]   Problem: Perioperative Period (Adult)  Goal: Signs and Symptoms of Listed Potential Problems Will be Absent or Manageable (Perioperative Period)  Outcome: Ongoing (interventions implemented as appropriate)    10/04/17 1130   Perioperative Period   Problems Assessed (Perioperative Period) all   Problems Present (Perioperative Period) pain;wound complications;situational response

## 2025-01-27 NOTE — ED TRIAGE NOTES
Patient here to have wound assessment. Patient has a wound on his left posterior calf, wound is scabbed with stitches intact in the wound. Patient concerned for infection. No drainage noted.      Triage Assessment (Adult)       Row Name 01/26/25 5703          Triage Assessment    Airway WDL WDL        Respiratory WDL    Respiratory WDL WDL        Cognitive/Neuro/Behavioral WDL    Cognitive/Neuro/Behavioral WDL WDL
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
## 2025-01-28 NOTE — TELEPHONE ENCOUNTER
Attempt#2- phone number not in service, never has logged on to [employer], no other phone numbers in CareEverywhere, no communication consent on file. Mailing Letter [phone]

## 2025-01-31 ENCOUNTER — HOSPITAL ENCOUNTER (EMERGENCY)
Facility: HOSPITAL | Age: 40
Discharge: PSYCHIATRIC OR SUBSTANCE USE FACILITY | End: 2025-02-01
Payer: COMMERCIAL

## 2025-01-31 DIAGNOSIS — R45.851 SUICIDAL IDEATION: ICD-10-CM

## 2025-01-31 DIAGNOSIS — S81.802A WOUND OF LEFT LOWER EXTREMITY, INITIAL ENCOUNTER: ICD-10-CM

## 2025-01-31 PROBLEM — F15.10 METHAMPHETAMINE ABUSE (H): Status: ACTIVE | Noted: 2021-06-04

## 2025-01-31 PROBLEM — F25.9 SCHIZOAFFECTIVE DISORDER, CHRONIC CONDITION WITH ACUTE EXACERBATION (H): Status: ACTIVE | Noted: 2022-11-30

## 2025-01-31 LAB
ALBUMIN SERPL BCG-MCNC: 4.5 G/DL (ref 3.5–5.2)
ALP SERPL-CCNC: 86 U/L (ref 40–150)
ALT SERPL W P-5'-P-CCNC: 19 U/L (ref 0–70)
AMPHETAMINES UR QL SCN: ABNORMAL
ANION GAP SERPL CALCULATED.3IONS-SCNC: 10 MMOL/L (ref 7–15)
APAP SERPL-MCNC: <5 UG/ML (ref 10–30)
AST SERPL W P-5'-P-CCNC: 23 U/L (ref 0–45)
BARBITURATES UR QL SCN: ABNORMAL
BASOPHILS # BLD AUTO: 0 10E3/UL (ref 0–0.2)
BASOPHILS NFR BLD AUTO: 0 %
BENZODIAZ UR QL SCN: ABNORMAL
BILIRUB DIRECT SERPL-MCNC: <0.2 MG/DL (ref 0–0.3)
BILIRUB SERPL-MCNC: 0.5 MG/DL
BUN SERPL-MCNC: 14 MG/DL (ref 6–20)
BZE UR QL SCN: ABNORMAL
CALCIUM SERPL-MCNC: 9.6 MG/DL (ref 8.8–10.4)
CANNABINOIDS UR QL SCN: ABNORMAL
CHLORIDE SERPL-SCNC: 101 MMOL/L (ref 98–107)
CREAT SERPL-MCNC: 0.94 MG/DL (ref 0.67–1.17)
EGFRCR SERPLBLD CKD-EPI 2021: >90 ML/MIN/1.73M2
EOSINOPHIL # BLD AUTO: 0.1 10E3/UL (ref 0–0.7)
EOSINOPHIL NFR BLD AUTO: 2 %
ERYTHROCYTE [DISTWIDTH] IN BLOOD BY AUTOMATED COUNT: 12 % (ref 10–15)
FENTANYL UR QL: ABNORMAL
GLUCOSE SERPL-MCNC: 123 MG/DL (ref 70–99)
HCO3 SERPL-SCNC: 28 MMOL/L (ref 22–29)
HCT VFR BLD AUTO: 45.3 % (ref 40–53)
HGB BLD-MCNC: 15.2 G/DL (ref 13.3–17.7)
IMM GRANULOCYTES # BLD: 0 10E3/UL
IMM GRANULOCYTES NFR BLD: 0 %
LYMPHOCYTES # BLD AUTO: 2.4 10E3/UL (ref 0.8–5.3)
LYMPHOCYTES NFR BLD AUTO: 32 %
MCH RBC QN AUTO: 30.7 PG (ref 26.5–33)
MCHC RBC AUTO-ENTMCNC: 33.6 G/DL (ref 31.5–36.5)
MCV RBC AUTO: 92 FL (ref 78–100)
MONOCYTES # BLD AUTO: 0.5 10E3/UL (ref 0–1.3)
MONOCYTES NFR BLD AUTO: 7 %
NEUTROPHILS # BLD AUTO: 4.4 10E3/UL (ref 1.6–8.3)
NEUTROPHILS NFR BLD AUTO: 59 %
NRBC # BLD AUTO: 0 10E3/UL
NRBC BLD AUTO-RTO: 0 /100
OPIATES UR QL SCN: ABNORMAL
PCP QUAL URINE (ROCHE): ABNORMAL
PLATELET # BLD AUTO: 280 10E3/UL (ref 150–450)
POTASSIUM SERPL-SCNC: 3.8 MMOL/L (ref 3.4–5.3)
PROT SERPL-MCNC: 7.3 G/DL (ref 6.4–8.3)
RBC # BLD AUTO: 4.95 10E6/UL (ref 4.4–5.9)
SALICYLATES SERPL-MCNC: <0.3 MG/DL
SODIUM SERPL-SCNC: 139 MMOL/L (ref 135–145)
WBC # BLD AUTO: 7.5 10E3/UL (ref 4–11)

## 2025-01-31 PROCEDURE — 82565 ASSAY OF CREATININE: CPT

## 2025-01-31 PROCEDURE — 80048 BASIC METABOLIC PNL TOTAL CA: CPT

## 2025-01-31 PROCEDURE — 80307 DRUG TEST PRSMV CHEM ANLYZR: CPT

## 2025-01-31 PROCEDURE — 85004 AUTOMATED DIFF WBC COUNT: CPT

## 2025-01-31 PROCEDURE — 250N000013 HC RX MED GY IP 250 OP 250 PS 637

## 2025-01-31 PROCEDURE — 85018 HEMOGLOBIN: CPT

## 2025-01-31 PROCEDURE — 80179 DRUG ASSAY SALICYLATE: CPT

## 2025-01-31 PROCEDURE — 85041 AUTOMATED RBC COUNT: CPT

## 2025-01-31 PROCEDURE — 80143 DRUG ASSAY ACETAMINOPHEN: CPT

## 2025-01-31 PROCEDURE — 99291 CRITICAL CARE FIRST HOUR: CPT

## 2025-01-31 PROCEDURE — 82248 BILIRUBIN DIRECT: CPT

## 2025-01-31 PROCEDURE — 87637 SARSCOV2&INF A&B&RSV AMP PRB: CPT

## 2025-01-31 PROCEDURE — 36415 COLL VENOUS BLD VENIPUNCTURE: CPT

## 2025-01-31 RX ORDER — POLYETHYLENE GLYCOL 3350 17 G
4 POWDER IN PACKET (EA) ORAL
Status: DISCONTINUED | OUTPATIENT
Start: 2025-01-31 | End: 2025-02-01 | Stop reason: HOSPADM

## 2025-01-31 RX ORDER — NICOTINE 21 MG/24HR
1 PATCH, TRANSDERMAL 24 HOURS TRANSDERMAL DAILY
Status: DISCONTINUED | OUTPATIENT
Start: 2025-01-31 | End: 2025-01-31

## 2025-01-31 RX ORDER — OLANZAPINE 5 MG/1
10 TABLET ORAL 2 TIMES DAILY
Status: DISCONTINUED | OUTPATIENT
Start: 2025-01-31 | End: 2025-02-01 | Stop reason: HOSPADM

## 2025-01-31 RX ORDER — DOXYCYCLINE 100 MG/1
100 CAPSULE ORAL EVERY 12 HOURS SCHEDULED
Status: DISCONTINUED | OUTPATIENT
Start: 2025-01-31 | End: 2025-02-01 | Stop reason: HOSPADM

## 2025-01-31 RX ORDER — POLYETHYLENE GLYCOL 3350 17 G
2 POWDER IN PACKET (EA) ORAL
Status: DISCONTINUED | OUTPATIENT
Start: 2025-01-31 | End: 2025-01-31

## 2025-01-31 RX ORDER — LORAZEPAM 0.5 MG/1
1 TABLET ORAL EVERY 8 HOURS PRN
Status: DISCONTINUED | OUTPATIENT
Start: 2025-01-31 | End: 2025-02-01 | Stop reason: HOSPADM

## 2025-01-31 RX ADMIN — NICOTINE POLACRILEX 2 MG: 2 LOZENGE ORAL at 19:52

## 2025-01-31 RX ADMIN — OLANZAPINE 10 MG: 5 TABLET, FILM COATED ORAL at 19:01

## 2025-01-31 RX ADMIN — DOXYCYCLINE 100 MG: 100 CAPSULE ORAL at 18:57

## 2025-01-31 RX ADMIN — NICOTINE POLACRILEX 4 MG: 2 LOZENGE ORAL at 20:21

## 2025-01-31 ASSESSMENT — ACTIVITIES OF DAILY LIVING (ADL)
ADLS_ACUITY_SCORE: 52
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
## 2025-02-01 ENCOUNTER — HOSPITAL ENCOUNTER (INPATIENT)
Facility: CLINIC | Age: 40
End: 2025-02-01
Attending: PSYCHIATRY & NEUROLOGY | Admitting: PSYCHIATRY & NEUROLOGY
Payer: COMMERCIAL

## 2025-02-01 ENCOUNTER — TELEPHONE (OUTPATIENT)
Dept: BEHAVIORAL HEALTH | Facility: CLINIC | Age: 40
End: 2025-02-01
Payer: COMMERCIAL

## 2025-02-01 VITALS
OXYGEN SATURATION: 98 % | BODY MASS INDEX: 26.91 KG/M2 | HEIGHT: 64 IN | RESPIRATION RATE: 16 BRPM | TEMPERATURE: 98.4 F | WEIGHT: 157.63 LBS | DIASTOLIC BLOOD PRESSURE: 57 MMHG | HEART RATE: 77 BPM | SYSTOLIC BLOOD PRESSURE: 104 MMHG

## 2025-02-01 DIAGNOSIS — B00.9 HERPES SIMPLEX VIRUS INFECTION: ICD-10-CM

## 2025-02-01 DIAGNOSIS — L08.9 SOFT TISSUE INFECTION: ICD-10-CM

## 2025-02-01 DIAGNOSIS — S81.832A GUNSHOT WOUND OF LEFT LOWER LEG, INITIAL ENCOUNTER: Primary | ICD-10-CM

## 2025-02-01 DIAGNOSIS — F25.1 SCHIZOAFFECTIVE DISORDER, DEPRESSIVE TYPE (H): ICD-10-CM

## 2025-02-01 PROBLEM — F33.2 MAJOR DEPRESSIVE DISORDER, RECURRENT SEVERE WITHOUT PSYCHOTIC FEATURES (H): Status: ACTIVE | Noted: 2025-02-01

## 2025-02-01 LAB
FLUAV RNA SPEC QL NAA+PROBE: NEGATIVE
FLUBV RNA RESP QL NAA+PROBE: NEGATIVE
RSV RNA SPEC NAA+PROBE: NEGATIVE
SARS-COV-2 RNA RESP QL NAA+PROBE: NEGATIVE

## 2025-02-01 PROCEDURE — 99222 1ST HOSP IP/OBS MODERATE 55: CPT | Performed by: NURSE PRACTITIONER

## 2025-02-01 PROCEDURE — 250N000013 HC RX MED GY IP 250 OP 250 PS 637

## 2025-02-01 PROCEDURE — 250N000013 HC RX MED GY IP 250 OP 250 PS 637: Performed by: NURSE PRACTITIONER

## 2025-02-01 PROCEDURE — 124N000002 HC R&B MH UMMC

## 2025-02-01 RX ORDER — AMOXICILLIN 250 MG
1 CAPSULE ORAL 2 TIMES DAILY PRN
Status: DISCONTINUED | OUTPATIENT
Start: 2025-02-01 | End: 2025-02-07 | Stop reason: HOSPADM

## 2025-02-01 RX ORDER — TRAZODONE HYDROCHLORIDE 50 MG/1
50 TABLET ORAL
Status: CANCELLED | OUTPATIENT
Start: 2025-02-01

## 2025-02-01 RX ORDER — TRAZODONE HYDROCHLORIDE 50 MG/1
50 TABLET ORAL
Status: DISCONTINUED | OUTPATIENT
Start: 2025-02-01 | End: 2025-02-07 | Stop reason: HOSPADM

## 2025-02-01 RX ORDER — HYDROXYZINE HYDROCHLORIDE 25 MG/1
25 TABLET, FILM COATED ORAL EVERY 4 HOURS PRN
Status: DISCONTINUED | OUTPATIENT
Start: 2025-02-01 | End: 2025-02-07 | Stop reason: HOSPADM

## 2025-02-01 RX ORDER — OLANZAPINE 10 MG/1
10 TABLET ORAL AT BEDTIME
Status: DISCONTINUED | OUTPATIENT
Start: 2025-02-02 | End: 2025-02-07 | Stop reason: HOSPADM

## 2025-02-01 RX ORDER — OLANZAPINE 10 MG/1
10 TABLET ORAL 3 TIMES DAILY PRN
Status: DISCONTINUED | OUTPATIENT
Start: 2025-02-01 | End: 2025-02-07 | Stop reason: HOSPADM

## 2025-02-01 RX ORDER — ACETAMINOPHEN 325 MG/1
650 TABLET ORAL EVERY 4 HOURS PRN
Status: CANCELLED | OUTPATIENT
Start: 2025-02-01

## 2025-02-01 RX ORDER — OLANZAPINE 10 MG/2ML
10 INJECTION, POWDER, FOR SOLUTION INTRAMUSCULAR 3 TIMES DAILY PRN
Status: CANCELLED | OUTPATIENT
Start: 2025-02-01

## 2025-02-01 RX ORDER — AMOXICILLIN 250 MG
1 CAPSULE ORAL 2 TIMES DAILY PRN
Status: CANCELLED | OUTPATIENT
Start: 2025-02-01

## 2025-02-01 RX ORDER — POLYETHYLENE GLYCOL 3350 17 G
4 POWDER IN PACKET (EA) ORAL
Status: DISCONTINUED | OUTPATIENT
Start: 2025-02-01 | End: 2025-02-07 | Stop reason: HOSPADM

## 2025-02-01 RX ORDER — DOXYCYCLINE 100 MG/1
100 CAPSULE ORAL EVERY 12 HOURS SCHEDULED
Status: COMPLETED | OUTPATIENT
Start: 2025-02-01 | End: 2025-02-07

## 2025-02-01 RX ORDER — HYDROXYZINE HYDROCHLORIDE 25 MG/1
25 TABLET, FILM COATED ORAL EVERY 4 HOURS PRN
Status: CANCELLED | OUTPATIENT
Start: 2025-02-01

## 2025-02-01 RX ORDER — MAGNESIUM HYDROXIDE/ALUMINUM HYDROXICE/SIMETHICONE 120; 1200; 1200 MG/30ML; MG/30ML; MG/30ML
30 SUSPENSION ORAL EVERY 4 HOURS PRN
Status: CANCELLED | OUTPATIENT
Start: 2025-02-01

## 2025-02-01 RX ORDER — OLANZAPINE 5 MG/1
10 TABLET ORAL 3 TIMES DAILY PRN
Status: CANCELLED | OUTPATIENT
Start: 2025-02-01

## 2025-02-01 RX ORDER — ACETAMINOPHEN 325 MG/1
650 TABLET ORAL EVERY 4 HOURS PRN
Status: DISCONTINUED | OUTPATIENT
Start: 2025-02-01 | End: 2025-02-07 | Stop reason: HOSPADM

## 2025-02-01 RX ORDER — MAGNESIUM HYDROXIDE/ALUMINUM HYDROXICE/SIMETHICONE 120; 1200; 1200 MG/30ML; MG/30ML; MG/30ML
30 SUSPENSION ORAL EVERY 4 HOURS PRN
Status: DISCONTINUED | OUTPATIENT
Start: 2025-02-01 | End: 2025-02-07 | Stop reason: HOSPADM

## 2025-02-01 RX ORDER — OLANZAPINE 10 MG/2ML
10 INJECTION, POWDER, FOR SOLUTION INTRAMUSCULAR 3 TIMES DAILY PRN
Status: DISCONTINUED | OUTPATIENT
Start: 2025-02-01 | End: 2025-02-07 | Stop reason: HOSPADM

## 2025-02-01 RX ADMIN — OLANZAPINE 10 MG: 5 TABLET, FILM COATED ORAL at 10:40

## 2025-02-01 RX ADMIN — DOXYCYCLINE 100 MG: 100 CAPSULE ORAL at 20:28

## 2025-02-01 RX ADMIN — DOXYCYCLINE 100 MG: 100 CAPSULE ORAL at 10:06

## 2025-02-01 ASSESSMENT — ACTIVITIES OF DAILY LIVING (ADL)
HYGIENE/GROOMING: INDEPENDENT
ADLS_ACUITY_SCORE: 26
LAUNDRY: UNABLE TO COMPLETE
ADLS_ACUITY_SCORE: 52
ADLS_ACUITY_SCORE: 52
ADLS_ACUITY_SCORE: 26
DRESS: INDEPENDENT
ADLS_ACUITY_SCORE: 26
ADLS_ACUITY_SCORE: 52
ORAL_HYGIENE: INDEPENDENT
ADLS_ACUITY_SCORE: 52
ADLS_ACUITY_SCORE: 26
ADLS_ACUITY_SCORE: 52
ADLS_ACUITY_SCORE: 26
ADLS_ACUITY_SCORE: 26
ADLS_ACUITY_SCORE: 52
ADLS_ACUITY_SCORE: 52

## 2025-02-01 NOTE — PLAN OF CARE
"Hamzah Roberts  January 31, 2025  Plan of Care Hand-off Note     Patient Recommended Care Path: inpatient mental health    Clinical Substantiation:  Pt is reocommended for [facility] for sx stabilization and safety. Pt presents to ED by self due to concerns from increased MH sx including anxiety, paranoia, depression and suicidal ideation that are influenced by worsening psychsocial stress and recent substance use (methamphetamine use.) Pt reports MH sx have worsened in the last month resulting in increased AH, worsening depression, and urges to use substances. Pt reports SI with plan to overdose on street drugs; pt denies SI intent. Pt states \"I don't want to live anymore, I have nothing to live for.\" Pt denies SIB and HI. Pt reports AH with \"constant screams with people asking for help\" and some VH with \"seeing cars everywhere.\" Pt also endorses some paranoia with \"fear of outside world\" resulting in increased isolation reporting he spends most of his time at home (girlfriends house) with \"doors barricaded.\" Pt endorses psychosocial stress from contentious family relationships, social isolation, interactions with legal systems, poor finances, and ongoing substance use. Pt denies current OP therapy and OP psychiatry. Pt reports having  through [facility] who has been trying to get pt into residential tx. Pt reports he has not been on psychiatric medications for over a week. Pt reports recent methamphetamine use 3 days ago which exacerbated MH sx further with significant sleeplessness and increased paranoia. Pt also presents with untreated physical health concern (leg wound and infection.) Pt reports coming to ED to seek help and does not feel safe in the community; pt reports wanting to regain sobriety and improve his mental health. Pt lacks OP supports and a safe environment to effectively stabilize sx. Pt's mental health and chemical health are co-occuring and with the reported SI plan to overdose on street drugs, " risk is difficult to mitigate and pt lacks self-efficacy to engage at lower levels of care while maintaining sobriety. Moreover, pt cannot effectively engage in safety planning at this time and lacks collateral to support safety planning process. Pt's MH sx will likely deteriorate in the absence of proposed level of [facility] care. Pt is recommended for [facility] hospitalization to effectively stabilize sx while maintaining safety. Aftercare plan may consider residential tx or ACT team referral to support ongoing maintenance of mental and chemical health. Pt and provider are agreeable to [facility] care path. Pt has been added to [facility] worklist. LMHP can reassess as needed to alter care path.    Goals for crisis stabilization:  medication management and safe environment,    Next steps for Care Team:  Pt has been added to [facility] worklist. Pt may need NURIS assessment to address chemical health concerns.    Treatment Objectives Addressed:  identifying and practicing coping strategies, processing feelings, identifying an appropriate aftercare plan, assessing safety, identifying treatment goals, exploring obstacles to safety in the community    Therapeutic Interventions:  Engaged in guided discovery, explored patient's perspectives and helped expand them through socratic dialogue., Provided positive reinforcement for progress towards goals, gains in knowledge, and application of skills previously taught.    Has a specific means been identified for suicidal.homicide actions: Yes  If yes, describe: overdose on street drugs  Explain action steps toward mitigation: pt is help seeking and wanted to come to ED today  Document completion of mitigation action: no documented mitigation action at time of DEC  The follow up action still needed prior to discharge: safety planning with collateral    Patient coping skills attempted to reduce the crisis:  help seeking       Imminent risk of harm: Suicidal Behavior  Severe psychiatric, behavioral or other  comorbid conditions are appropriate for management at inpatient mental health as indicated by at least one of the following: Comorbid substance use disorder, Impaired impulse control, judgement, or insight  Severe dysfunction in daily living is present as indicated by at least one of the following: Extreme deterioration in social interactions, Complete inability to maintain any appropriate aspect of personal responsibility in any adult roles  Situation and expectations are appropriate for inpatient care: Voluntary treatment at lower level of care is not feasible, Patient management/treatment at lower level of care is not feasible or is inappropriate, Around-the-clock medical and nursing care to address symptoms and initiate intervention is required  Inpatient mental health services are necessary to meet patient needs and at least one of the following: Specific condition related to admission diagnosis is present and judged likely to deteriorate in absence of treatment at proposed level of care      Collateral contact information:  No collateral provided at time of DEC [facility] for pt's sister Angie #[phone]     Legal Status: Voluntary/Patient has signed consent for treatment                                                                                                                                 Reviewed court records: yes     Psychiatry Consult: not completed at time of DEC    Tutu Han

## 2025-02-01 NOTE — ED NOTES
"Writer attempted to enter room and clean and dress wound, since off going RN on night shift informed writer that pt was refusing but agreed when he woke up. Pt stated \"get out of my room\" and refused to let writer clean and dress wound. Also refused to take his morning meds, saying \"I'll take them when I get to [place], you can bring them in but I'm not taking them\". Pt stated again to writer \"get out of my room\". Writer left MD chary notified   "
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
## 2025-02-01 NOTE — ED PROVIDER NOTES
[facility] EMERGENCY DEPARTMENT   ED Mental Health Observation - Discharge Note (Brief)    Hamzah Roberts is boarding in the ED after undergoing evaluation for Mental health crisis  Please see the daily progress note for this patient's presentation, physical examination, and work up.    Upon reevaluation and discussion with DEC , we believe patient has shown insufficient improvement and thus will be Admitted.    EMERGENCY DEPARTMENT OBSERVATION status ended at 9:50 AM 2/1/2025    Discharge Management Time: > or equal to 30 minutes    1. Suicidal ideation    2. Wound of left lower extremity, initial encounter        MD Verónica White Erin E, MD  02/01/25 0063
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
## 2025-02-01 NOTE — ED NOTES
IP MH Referral Acuity Rating Score (RARS)    LMHP complete at referral to IP MH, with DEC; and, daily while awaiting IP MH placement. Call score to PPS.  CRITERIA SCORING   New 74 HH and Involuntary for IP MH (not adolescent) 0/3   Boarding over 24 hours 0/1   Vulnerable adult at least 55+ with multiple co morbidities; or, Patient age 11 or under 0/1   Suicide ideation without relief of precipitating factors 1/1   Current plan for suicide 1/1   Current plan for homicide 0/1   Imminent risk or actual attempt to seriously harm another without relief of factors precipitating the attempt 1/1   Severe dysfunction in daily living (ex: complete neglect for self care, extreme disruption in vegetative function, extreme deterioration in social interactions) 1/1   Recent (last 2 weeks) or current physical aggression in the ED 0/1   Restraints or seclusion episode in ED 0/1   Verbal aggression, agitation, yelling, etc., while in the ED 0/1   Active psychosis with psychomotor agitation or catatonia 1/1   Need for constant or near constant redirection (from leaving, from others, etc).  0/1   Intrusive or disruptive behaviors 0/1   TOTAL 5

## 2025-02-01 NOTE — ED NOTES
Dr Sanches went into room with this writer and pt, pt now agreeable to wounds being cleansed. Wound cleansed and dressed. Pt also agreeable to taking medications

## 2025-02-01 NOTE — ED PROVIDER NOTES
[facility] EMERGENCY DEPARTMENT   ED Mental Health Observation - Initiation Note    Hamzah Roberts was placed into observation at 6:00 PM on 1/31//2025 for Mental health crisis.   Patient is expected to be under observation status for a minimum of eight hours.    MD Carlos Sung Jacob L, MD  02/01/25 0006

## 2025-02-01 NOTE — TELEPHONE ENCOUNTER
1:30am - Writer called CRN on Station 12 to review pt for appropriateness for possible admission to Station 12. Unit is Case-by-case. Awaiting update    3:26am - CRN notified intake that pt is appropriate for the milieu and fits the unit criteria.     3:49am - Paged [facility] for review and acceptance to Station 12 for IPMH placement.    3:56am - Dr. Guzman accepts pt for Station 12    4:17am - Notified unit of pt in the queue. Unit has to do room changes in AM, therefore pt will be admitting next shift.     4:19am - Notified [facility] ED of pt placement and admission to happen next shift.     Car[place] Completed S.R    R: Patient Placement to [facility] - Station 12/Dr. Gore

## 2025-02-01 NOTE — TELEPHONE ENCOUNTER
8:43 AM  Intake received call from 12 CRN that after consulting with AMENA Mendez pt will remain in queue and will be addressed Monday due to open wound that patient is refusing to have treated.    8:48 AM Per Gila RN there is no wound care needed and pt is agreeable to have wound dressed when wakes up and before transfer. Also pt is only receiving oral antibiotics to treat wound.    8:54 AM 12/CRN notified. Will follow up with [facility] RN.

## 2025-02-01 NOTE — PLAN OF CARE
Problem: Adult Inpatient Plan of Care  Goal: Readiness for Transition of Care  Intervention: Mutually Develop Transition Plan  Recent Flowsheet Documentation  Taken 2/1/2025 1200 by Marva Pepe RN  Equipment Currently Used at Home: none   Goal Outcome Evaluation:      Plan of Care Reviewed With: patient     Patient admitted via gurney from [place]. Complied with the search. He was shown his room and given orientation to the unit. He is able to contract for safety. He prefers to rest for a while and went directly into his room and to sleep. Provider in to see. Currently, the wound to his left calf is dressed and wrapped. Wrap to stay on until it is assessed by WOC. He does not c/o of pain or discomfort upon admission.

## 2025-02-01 NOTE — CONSULTS
"Diagnostic Evaluation Consultation  Crisis Assessment    Patient Name: Hamzah Roberts  Age:  39 year old  Legal Sex: male  Gender Identity: male  Pronouns: he/him  Race: White  Ethnicity: Not  or   Language: English      Patient was assessed: Virtual: Kalin   Crisis Assessment Start Date: 01/31/25  Crisis Assessment Start Time: 1908  Crisis Assessment Stop Time: 1939  Patient location: [facility] Emergency Department                             [facility], [facility]      Referral Data and Chief Complaint  Hamzah Roebrts presents to the ED by  self. Patient is presenting to the ED for the following concerns: Depression, Anxiety, Suicidal ideation, Worsening psychosocial stress, Substance use, Paranoia. Factors that make the mental health crisis life threatening or complex are: Pt presents to ED by self due to concerns from increased MH sx including anxiety, paranoia, depression and suicidal ideation that are influenced by worsening psychosocial stress and recent substance use (methamphetamine use.) Pt reports MH sx have worsened in the last month resulting in increased AH, worsening depression, and urges to use substances. Pt reports SI with plan to overdose on street drugs; pt denies SI intent. Pt states \"I don't want to live anymore, I have nothing to live for.\" Pt denies SIB and HI. Pt reports AH with \"constant screams with people asking for help\" and some VH with \"seeing cars everywhere.\" Pt also endorses some paranoia with \"fear of outside world\" resulting in increased isolation reporting he spends most of his time at home (girlfriends house) with \"doors barricaded.\" Pt endorses psychosocial stress from contentious family relationships, social isolation, interactions with legal systems, poor finances, and ongoing substance use. Pt denies current OP therapy and OP psychiatry. Pt reports having  through [facility] who has been trying to get pt into residential tx. Pt reports he has " "not been on psychiatric medications for over a week. Pt reports recent methamphetamine use 3 days ago which exacerbated MH sx further with significant sleeplessness and increased paranoia. Pt also presents with untreated physical health concern (leg wound and infection.) Pt reports coming to ED to seek help and does not feel safe in the community; pt reports wanting to regain sobriety and improve his mental health..      Informed Consent and Assessment Methods  Explained the crisis assessment process, including applicable information disclosures and limits to confidentiality, assessed understanding of the process, and obtained consent to proceed with the assessment.  Assessment methods included conducting a formal interview with patient, review of medical records, collaboration with medical staff, and obtaining relevant collateral information from family and community providers when available.  : done       History of the Crisis   Pt is a 39 year old male that presents for DEC assessment as calm and cooperative. Pt has prior dx of schizoaffective disorder, polysubstance use disorder, and anxiety. Pt denies hx of prior suicide attempts. Pt has prior hx of [facility]MH hospitalizations most recently at [facility] from 12/16/24-12/19/24. Pt reports hx of multiple residential CD tx. Pt has hx of MICD commitment most recently from 5/5/23-5/19/24. Pt has hx of prior MICD commitments in 2022 and 2021. Pt denies family hx of MICD concerns. Pt denies hx of abuse. Pt reports feeling increasingly isolated reporting that his family \"stopped talking to me\" and that he hasn't seen his children in a long time. Pt reports longest period of sobriety existed from age 20 to 28.      Brief Psychosocial History  Family:  Lives with Significant Other, Children no  Support System:  None  Employment Status:  unemployed  Source of Income:  none  Financial Environmental Concerns:  unemployed  Current Hobbies:     Barriers in Personal Life:  mental health " concerns      Significant Clinical History  Current Anxiety Symptoms:  anxious, excessive worry, racing thoughts  Current Depression/Trauma:  sense of doom, difficulty concentrating, withdrawal/isolation, low self esteem, impaired decision making, irritable, helplessness, hopelessness, sadness, excessive guilt, thoughts of death/suicide  Current Somatic Symptoms:  anxious, excessive worry, racing thoughts  Current Psychosis/Thought Disturbance:  impulsive, inattentive, displaces blame, grandiosity, distractibility, auditory hallucinations, visual hallucinations  Current Eating Symptoms:  loss of appetite  Chemical Use History:  Alcohol: None  Benzodiazepines: None  Opiates: None  Cocaine: None  Marijuana: Occasional  Other Use: Methamphetamines  Last Use:: 01/28/25   Past diagnosis:  Anxiety Disorder, Schizophrenia, Substance Use Disorder  Family history:  No known history of mental health or chemical health concerns  Past treatment:  Case management, Probation/Court ordered treatment, Civil Commitment, Primary Care, Psychiatric Medication Management, Individual therapy, Residential Treatment, Inpatient Hospitalization, Supportive Living Environment (group home, long-term house, etc)  Details of most recent treatment:  Pt is followed by [facility] ; pt denies other recent hx of MH support since [facility] stay in December 2024  Other relevant history:  Pt has significant hx of MICD tx    Have there been any medication changes in the past two weeks:  patient is not on psychiatric meds       Is the patient compliant with medications:  no  pt has hx of medication non adherence       Collateral Information  Is there collateral information: No (LVM for Angie, sister, [phone])           Risk Assessment  Emmet Suicide Severity Rating Scale Full Clinical Version:  Suicidal Ideation  Q1 Wish to be Dead (Lifetime): Yes  Q2 Non-Specific Active Suicidal Thoughts (Lifetime): Yes  3. Active Suicidal Ideation with any  Methods (Not Plan) Without Intent to Act (Lifetime): Yes  4. Active Suicidal Ideation with Some Intent to Act, Without Specific Plan (Lifetime): Yes  5. Active Suicidal Ideation with Specific Plan and Intent (Lifetime): Yes  Q6 Suicide Behavior (Lifetime): no     Suicidal Behavior (Lifetime)  Actual Attempt (Lifetime): No  Has subject engaged in non-suicidal self-injurious behavior? (Lifetime): No  Interrupted Attempts (Lifetime): No  Preparatory Acts or Behavior (Lifetime): No    Hawkins Suicide Severity Rating Scale Recent:   Suicidal Ideation (Recent)  Q1 Wished to be Dead (Past Month): yes  Q2 Suicidal Thoughts (Past Month): yes  Q3 Suicidal Thought Method: yes  Q4 Suicidal Intent without Specific Plan: no  Q5 Suicide Intent with Specific Plan: yes  Level of Risk per Screen: high risk     Suicidal Behavior (Recent)  Actual Attempt (Past 3 Months): No  Has subject engaged in non-suicidal self-injurious behavior? (Past 3 Months): No  Interrupted Attempts (Past 3 Months): No  Aborted or Self-Interrupted Attempt (Past 3 Months): No  Preparatory Acts or Behavior (Past 3 Months): No    Environmental or Psychosocial Events: legal issues such as DWI, DUI, lawsuit, CPS involvement, etc., loss of a relationship due to divorce/separation, loss of status/respect/rank, work or task failure, challenging interpersonal relationships, helplessness/hopelessness, impulsivity/recklessness, unemployment/underemployment, unstable housing, homelessness, ongoing abuse of substances  Protective Factors: Protective Factors: help seeking    Does the patient have thoughts of harming others? Feels Like Hurting Others: no  Previous Attempt to Hurt Others: no  Is the patient engaging in sexually inappropriate behavior?: no  Does Patient have a known history of aggressive behavior: No  Has aggression occurred as a result of MH concerns/diagnosis: pt has hx of agitation when acutely symptomatic  Does patient have history of aggression in  hospital: no recent hx of aggression    Is the patient engaging in sexually inappropriate behavior?  no          Mental Status Exam   Affect:    Appearance:    Attention Span/Concentration:    Eye Contact:      Fund of Knowledge:     Language /Speech Content:    Language /Speech Volume:    Language /Speech Rate/Productions:    Recent Memory:    Remote Memory:    Mood:    Orientation to Person:     Orientation to Place:    Orientation to Time of Day:    Orientation to Date:       Situation (Do they understand why they are here?):    Psychomotor Behavior:    Thought Content:    Thought Form:           Medication  Psychotropic medications:   Medication Orders - Psychiatric (From admission, onward)      Start     Dose/Rate Route Frequency Ordered Stop    01/31/25 2008  nicotine (COMMIT) lozenge 4 mg         4 mg Buccal EVERY 1 HOUR PRN 01/31/25 2008 01/31/25 2000  OLANZapine (zyPREXA) tablet 10 mg         10 mg Oral 2 TIMES DAILY 01/31/25 1754 01/31/25 1754  LORazepam (ATIVAN) tablet 1 mg         1 mg Oral EVERY 8 HOURS PRN 01/31/25 1754               Current Care Team  Patient Care Team:  No Ref-Primary, Physician as PCP - Brandon Gunn as Nursing Assistant  Cynthia Bueno as     Diagnosis  Patient Active Problem List   Diagnosis Code    Suicidal ideation R45.851    Polysubstance abuse (H) F19.10    Paranoid schizophrenia (H) F20.0    Paranoia (H) F22    Methamphetamine abuse (H) F15.10    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    Amphetamine use disorder, severe, 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    Anxiety F41.9    Episodic mood disorder F39    Gastroesophageal reflux disease K21.9    Left ankle pain, unspecified chronicity M25.572    Psychosis, atypical (H) F29    Recurrent genital herpes A60.00    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    Hallucinations R44.3    Encounter for medication refill Z76.0     "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    Methamphetamine use disorder, moderate, in sustained remission, in controlled environment, dependence (H) F15.21    Methamphetamine use disorder, severe (H) F15.20    Schizoaffective disorder, bipolar type (H) F25.0    Schizoaffective disorder, chronic condition with acute exacerbation (H) F25.9    Schizoaffective disorder, depressive type (H) F25.1    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.30, S09.90XS    Auditory hallucinations R44.0    Substance-induced psychotic disorder with hallucinations (H) F19.951    Altered mental status, unspecified altered mental status type R41.82    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 Pt presents to ED by self due to concerns from increased MH sx including anxiety, paranoia, depression and suicidal ideation that are influenced by worsening psychosocial stress and recent substance use (methamphetamine use.) Pt reports MH sx have worsened in the last month resulting in increased AH, worsening depression, and urges to use substances. Pt reports SI with plan to overdose on street drugs; pt denies SI intent. Pt states \"I don't want to live anymore, I have nothing to live for.\" Pt denies SIB and HI. Pt reports AH with \"constant screams with people asking for help\" and some VH with \"seeing cars everywhere.\" Pt also endorses some paranoia with \"fear of outside world\" resulting in increased isolation reporting he spends most of his time at home (girlfriends house) with \"doors barricaded.\" Pt endorses " psychosocial stress from contentious family relationships, social isolation, interactions with legal systems, poor finances, and ongoing substance use. Pt denies current OP therapy and OP psychiatry. Pt reports having  through [facility] who has been trying to get pt into residential tx. Pt reports he has not been on psychiatric medications for over a week. Pt reports recent methamphetamine use 3 days ago which exacerbated MH sx further with significant sleeplessness and increased paranoia. Pt also presents with untreated physical health concern (leg wound and infection.) Pt reports coming to ED to seek help and does not feel safe in the community; pt reports wanting to regain sobriety and improve his mental health. Pt lacks OP supports and a safe environment to effectively stabilize sx. Pt's mental health and chemical health are co-occurring and with the reported SI plan to overdose on street drugs, risk is difficult to mitigate and pt lacks self-efficacy to engage at lower levels of care while maintaining sobriety. Moreover, pt cannot effectively engage in safety planning at this time and lacks collateral to support safety planning process. Pt's MH sx will likely deteriorate in the absence of proposed level of [facility] care. Pt is recommended for [facility] hospitalization to effectively stabilize sx while maintaining safety. Aftercare plan may consider residential tx or ACT team referral to support ongoing maintenance of mental and chemical health. Pt and provider are agreeable to [facility] care path. Pt has been added to [facility] work list. [facility] can reassess as needed to alter care path.    Goals for crisis stabilization:  medication management and safe environment,    Next steps for Care Team:  Pt has been added to [facility] work list. Pt may need NURIS assessment to address chemical health concerns.    Treatment Objectives Addressed:  identifying and practicing coping strategies, processing feelings, identifying an appropriate  aftercare plan, assessing safety, identifying treatment goals, exploring obstacles to safety in the community    Therapeutic Interventions:  Engaged in guided discovery, explored patient's perspectives and helped expand them through socratic dialogue., Provided positive reinforcement for progress towards goals, gains in knowledge, and application of skills previously taught.    Has a specific means been identified for suicidal/homicide actions: Yes    If yes, describe:  overdose on street drugs    Explain action steps toward mitigation:  pt is help seeking and wanted to come to ED today    Document completion of mitigation actions:  no documented mitigation action at time of DEC    The follow up action still needed prior to discharge:  safety planning with collateral    Patient coping skills attempted to reduce the crisis:  help seeking      Disposition  Recommended referrals: Individual Therapy, Medication Management, Programmatic Care, NURIS Comprehensive Assessment        Reviewed case and recommendations with attending provider. Attending Name: Carlito Gonzalez MD       Attending concurs with disposition: yes       Patient and/or validated legal guardian concurs with disposition:   yes       Final disposition:  inpatient mental health         Imminent risk of harm: Suicidal Behavior  Severe psychiatric, behavioral or other comorbid conditions are appropriate for management at inpatient mental health as indicated by at least one of the following: Comorbid substance use disorder, Impaired impulse control, judgement, or insight  Severe dysfunction in daily living is present as indicated by at least one of the following: Extreme deterioration in social interactions, Complete inability to maintain any appropriate aspect of personal responsibility in any adult roles  Situation and expectations are appropriate for inpatient care: Voluntary treatment at lower level of care is not feasible, Patient management/treatment at  lower level of care is not feasible or is inappropriate, Around-the-clock medical and nursing care to address symptoms and initiate intervention is required  Inpatient mental health services are necessary to meet patient needs and at least one of the following: Specific condition related to admission diagnosis is present and judged likely to deteriorate in absence of treatment at proposed level of care      Legal status: Voluntary/Patient has signed consent for treatment                                                                                                                                 Reviewed court records: yes       Assessment Details   Total duration spent with the patient: 31 min     CPT code(s) utilized: 45708 - Psychotherapy for Crisis - 60 (30-74*) min    Max KONRAD Han Psychotherapist  DEC - Triage & Transition Services  Callback: [phone]
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
## 2025-02-02 PROCEDURE — 99231 SBSQ HOSP IP/OBS SF/LOW 25: CPT | Performed by: PHYSICIAN ASSISTANT

## 2025-02-02 PROCEDURE — 250N000013 HC RX MED GY IP 250 OP 250 PS 637: Performed by: NURSE PRACTITIONER

## 2025-02-02 PROCEDURE — 124N000002 HC R&B MH UMMC

## 2025-02-02 PROCEDURE — 250N000013 HC RX MED GY IP 250 OP 250 PS 637: Performed by: PSYCHIATRY & NEUROLOGY

## 2025-02-02 RX ADMIN — ACETAMINOPHEN 650 MG: 325 TABLET, FILM COATED ORAL at 19:52

## 2025-02-02 RX ADMIN — HYDROXYZINE HYDROCHLORIDE 25 MG: 25 TABLET, FILM COATED ORAL at 19:54

## 2025-02-02 RX ADMIN — NICOTINE POLACRILEX 4 MG: 2 LOZENGE ORAL at 20:10

## 2025-02-02 RX ADMIN — NICOTINE POLACRILEX 4 MG: 2 LOZENGE ORAL at 12:34

## 2025-02-02 RX ADMIN — DOXYCYCLINE 100 MG: 100 CAPSULE ORAL at 08:33

## 2025-02-02 RX ADMIN — NICOTINE POLACRILEX 4 MG: 2 LOZENGE ORAL at 08:33

## 2025-02-02 RX ADMIN — ACETAMINOPHEN 650 MG: 325 TABLET, FILM COATED ORAL at 13:17

## 2025-02-02 RX ADMIN — OLANZAPINE 10 MG: 10 TABLET, FILM COATED ORAL at 21:47

## 2025-02-02 RX ADMIN — NICOTINE POLACRILEX 4 MG: 2 LOZENGE ORAL at 22:06

## 2025-02-02 RX ADMIN — NICOTINE POLACRILEX 4 MG: 2 LOZENGE ORAL at 16:12

## 2025-02-02 RX ADMIN — DOXYCYCLINE 100 MG: 100 CAPSULE ORAL at 19:51

## 2025-02-02 RX ADMIN — NICOTINE POLACRILEX 4 MG: 2 LOZENGE ORAL at 13:17

## 2025-02-02 RX ADMIN — NICOTINE POLACRILEX 4 MG: 2 LOZENGE ORAL at 19:04

## 2025-02-02 RX ADMIN — NICOTINE POLACRILEX 4 MG: 2 LOZENGE ORAL at 09:49

## 2025-02-02 ASSESSMENT — ACTIVITIES OF DAILY LIVING (ADL)
ADLS_ACUITY_SCORE: 26
LAUNDRY: UNABLE TO COMPLETE
ADLS_ACUITY_SCORE: 26
DRESS: SCRUBS (BEHAVIORAL HEALTH);INDEPENDENT
ADLS_ACUITY_SCORE: 26
HYGIENE/GROOMING: INDEPENDENT
ADLS_ACUITY_SCORE: 26
ORAL_HYGIENE: INDEPENDENT
ADLS_ACUITY_SCORE: 26

## 2025-02-02 NOTE — PLAN OF CARE
NOC Shift Report    SLEPT:  7 hours overnight and no signs of respiratory distress observed or reported.    15 minutes safety rounds completed by staff with no issues identified.  No self injurious bx observed.  No acute physical concerns reported.   No acute or aggressive behavior observed.     PAIN:  No pain or discomfort reported or noted.      PRNs:  NONE.      PRECAUTIONS:  SUICIDE.    Goal Outcome Evaluation:  Problem: Sleep Disturbance  Goal: Adequate Sleep/Rest  Outcome: Progressing

## 2025-02-02 NOTE — PLAN OF CARE
Problem: Adult Behavioral Health Plan of Care  Goal: Adheres to Safety Considerations for Self and Others  Outcome: Progressing   Goal Outcome Evaluation:      Hamzah has been isolative to his room much of the day. He is eating and drinking meals as appropriate. He appears paranoid. He demanded to see a medical provider regarding his leg would and was not satisfied that the [facility] nurse would be here to assess him tomorrow. He stood at the medication window door and shouting and demanding services.    He was seen by internal medicine today who advised to re-dress lightly and wait to see [facility] nurse service. He has been compliant with his ABT. Has not required PRN medications. He does not interact with any staff or peers unless it is for a specific need.

## 2025-02-02 NOTE — PLAN OF CARE
"  Problem: Suicide Risk  Goal: Absence of Self-Harm  Outcome: Progressing  Intervention: Assess Risk to Self and Maintain Safety  Recent Flowsheet Documentation  Taken 2/1/2025 1854 by Brennen Elaine RN  Self-Harm Prevention: environmental self-harm risks assessed  Intervention: Establish Safety Plan and Continuity of Care  Recent Flowsheet Documentation  Taken 2/1/2025 1854 by Brennne Elaine RN  Safe Transition Promotion: protective factors promoted    Patient spent all evening sleeping, refuse vital sign check, dismissive during shift assessment. Patient upset and verbally abusive when staff approach him to remind him about dinner stated \" why the fuck are you staring at me, get the fuck out of my room\", patient was given space and he ate about 75% of meal with no nausea or stomach discomfort. Later patient denies SI/SIB/HI and hallucination, contract for safety and took schedule abx. Continue to have dressing intact to left calf wound.                     "
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
## 2025-02-03 PROCEDURE — G0463 HOSPITAL OUTPT CLINIC VISIT: HCPCS

## 2025-02-03 PROCEDURE — 99232 SBSQ HOSP IP/OBS MODERATE 35: CPT | Performed by: CLINICAL NURSE SPECIALIST

## 2025-02-03 PROCEDURE — 250N000013 HC RX MED GY IP 250 OP 250 PS 637: Performed by: NURSE PRACTITIONER

## 2025-02-03 PROCEDURE — 124N000002 HC R&B MH UMMC

## 2025-02-03 PROCEDURE — 250N000013 HC RX MED GY IP 250 OP 250 PS 637: Performed by: PSYCHIATRY & NEUROLOGY

## 2025-02-03 RX ADMIN — NICOTINE POLACRILEX 4 MG: 2 LOZENGE ORAL at 14:30

## 2025-02-03 RX ADMIN — DOXYCYCLINE 100 MG: 100 CAPSULE ORAL at 11:28

## 2025-02-03 RX ADMIN — TRAZODONE HYDROCHLORIDE 50 MG: 50 TABLET ORAL at 20:14

## 2025-02-03 RX ADMIN — OLANZAPINE 10 MG: 10 TABLET, FILM COATED ORAL at 20:14

## 2025-02-03 RX ADMIN — DOXYCYCLINE 100 MG: 100 CAPSULE ORAL at 20:14

## 2025-02-03 RX ADMIN — ALUMINUM HYDROXIDE, MAGNESIUM HYDROXIDE, AND SIMETHICONE 30 ML: 200; 200; 20 SUSPENSION ORAL at 21:10

## 2025-02-03 RX ADMIN — NICOTINE POLACRILEX 4 MG: 2 LOZENGE ORAL at 21:10

## 2025-02-03 RX ADMIN — NICOTINE POLACRILEX 4 MG: 2 LOZENGE ORAL at 13:14

## 2025-02-03 RX ADMIN — NICOTINE POLACRILEX 4 MG: 2 LOZENGE ORAL at 19:11

## 2025-02-03 ASSESSMENT — ACTIVITIES OF DAILY LIVING (ADL)
ADLS_ACUITY_SCORE: 26
HYGIENE/GROOMING: INDEPENDENT
ADLS_ACUITY_SCORE: 26
DRESS: SCRUBS (BEHAVIORAL HEALTH);INDEPENDENT
ADLS_ACUITY_SCORE: 26
LAUNDRY: UNABLE TO COMPLETE
DRESS: INDEPENDENT
ADLS_ACUITY_SCORE: 26
HYGIENE/GROOMING: INDEPENDENT
ADLS_ACUITY_SCORE: 26
ORAL_HYGIENE: INDEPENDENT
ORAL_HYGIENE: INDEPENDENT
ADLS_ACUITY_SCORE: 26
ADLS_ACUITY_SCORE: 26

## 2025-02-03 NOTE — PLAN OF CARE
Problem: Adult Inpatient Plan of Care  Goal: Optimal Comfort and Wellbeing  Intervention: Provide Person-Centered Care  Recent Flowsheet Documentation  Taken 2/3/2025 1135 by Kate Senior RN  Trust Relationship/Rapport:   care explained   choices provided   emotional support provided   questions answered   empathic listening provided   questions encouraged   reassurance provided   thoughts/feelings acknowledged     Problem: Psychotic Signs/Symptoms  Goal: Improved Behavioral Control (Psychotic Signs/Symptoms)  Outcome: Progressing   Goal Outcome Evaluation:    Plan of Care Reviewed With: patient        Pt presents as withdrawn and irritable. He remained mainly isolative and withdrawn this shift. He came out to make his needs known to shower.   Pt denied all mental health symptoms including SI/HI/AVH and did not appear to be responding.   WOC RN  came and assessed and dressed calf wound and created EOD wound care orders.  Pt ate and drank well denied pain, declined vitals and had no other acute physical or behavioral concerns this shift.
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
## 2025-02-03 NOTE — PLAN OF CARE
"Problem: Psychotic Signs/Symptoms  Goal: Improved Behavioral Control (Psychotic Signs/Symptoms)  Outcome: Progressing  Patient was visible in the [facility] area and social with some peers. He is alert and oriented, calm and compliant with medication administration and vitals check. His affect is flat and mood is calm. He ate 100% of dinner without issues. He complained of leg pain rated 6/10 and received tylenol with relief. At 1950, patient endorse 7/10 anxiety and received 25 mg of hydroxyzine with relief. Wound care was done during the day shift and patient did not want writer to assess his wound stating \"it was clean this morning, you don't have to look at it, am fine\". Patient is overall calm with no aggression or agitation this shift. Patient did not take a shower.     /88 (BP Location: Left arm, Patient Position: Sitting, Cuff Size: Adult Regular)   Pulse 94   Temp (!) 96.3  F (35.7  C) (Temporal)   Resp 16   SpO2 99%    "

## 2025-02-03 NOTE — PLAN OF CARE
"Problem: Psychotic Signs/Symptoms  Goal: Improved Behavioral Control (Psychotic Signs/Symptoms)  Outcome: Progressing  Patient was visible in the [facility] area and social with some peers. He is alert and oriented, calm and compliant with medication administration and vitals check. His affect is flat and mood is calm. He ate 100% of dinner without issues. He complained of leg pain rated 6/10 and received tylenol with relief. At 1950, patient endorse 7/10 anxiety and received 25 mg of hydroxyzine with relief. Wound care was done during the day shift and patient did not want writer to assess his wound stating \"it was clean this morning, you don't have to look at it, am fine\". Patient is overall calm with no aggression or agitation this shift. Patient did not take a shower.     /88 (BP Location: Left arm, Patient Position: Sitting, Cuff Size: Adult Regular)   Pulse 94   Temp (!) 96.3  F (35.7  C) (Temporal)   Resp 16   SpO2 99%    "
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
## 2025-02-04 PROCEDURE — 99232 SBSQ HOSP IP/OBS MODERATE 35: CPT | Performed by: CLINICAL NURSE SPECIALIST

## 2025-02-04 PROCEDURE — 250N000013 HC RX MED GY IP 250 OP 250 PS 637: Performed by: PSYCHIATRY & NEUROLOGY

## 2025-02-04 PROCEDURE — 250N000013 HC RX MED GY IP 250 OP 250 PS 637: Performed by: NURSE PRACTITIONER

## 2025-02-04 PROCEDURE — 124N000002 HC R&B MH UMMC

## 2025-02-04 RX ORDER — RISPERIDONE 2 MG/1
2 TABLET ORAL DAILY
Status: DISCONTINUED | OUTPATIENT
Start: 2025-02-04 | End: 2025-02-07 | Stop reason: HOSPADM

## 2025-02-04 RX ADMIN — OLANZAPINE 10 MG: 10 TABLET, FILM COATED ORAL at 19:54

## 2025-02-04 RX ADMIN — NICOTINE POLACRILEX 4 MG: 2 LOZENGE ORAL at 22:03

## 2025-02-04 RX ADMIN — NICOTINE POLACRILEX 4 MG: 2 LOZENGE ORAL at 18:40

## 2025-02-04 RX ADMIN — NICOTINE POLACRILEX 4 MG: 2 LOZENGE ORAL at 20:52

## 2025-02-04 RX ADMIN — NICOTINE POLACRILEX 4 MG: 2 LOZENGE ORAL at 15:55

## 2025-02-04 RX ADMIN — ACETAMINOPHEN 650 MG: 325 TABLET, FILM COATED ORAL at 14:47

## 2025-02-04 RX ADMIN — NICOTINE POLACRILEX 4 MG: 2 LOZENGE ORAL at 17:33

## 2025-02-04 RX ADMIN — NICOTINE POLACRILEX 4 MG: 2 LOZENGE ORAL at 10:09

## 2025-02-04 RX ADMIN — DOXYCYCLINE 100 MG: 100 CAPSULE ORAL at 19:54

## 2025-02-04 RX ADMIN — HYDROXYZINE HYDROCHLORIDE 25 MG: 25 TABLET, FILM COATED ORAL at 14:27

## 2025-02-04 RX ADMIN — ACETAMINOPHEN 650 MG: 325 TABLET, FILM COATED ORAL at 20:00

## 2025-02-04 RX ADMIN — DOXYCYCLINE 100 MG: 100 CAPSULE ORAL at 10:09

## 2025-02-04 RX ADMIN — NICOTINE POLACRILEX 4 MG: 2 LOZENGE ORAL at 19:54

## 2025-02-04 RX ADMIN — TRAZODONE HYDROCHLORIDE 50 MG: 50 TABLET ORAL at 19:54

## 2025-02-04 RX ADMIN — NICOTINE POLACRILEX 4 MG: 2 LOZENGE ORAL at 14:51

## 2025-02-04 RX ADMIN — NICOTINE POLACRILEX 4 MG: 2 LOZENGE ORAL at 11:35

## 2025-02-04 ASSESSMENT — ACTIVITIES OF DAILY LIVING (ADL)
ADLS_ACUITY_SCORE: 26
ORAL_HYGIENE: INDEPENDENT
ADLS_ACUITY_SCORE: 26
ORAL_HYGIENE: INDEPENDENT
ADLS_ACUITY_SCORE: 26
LAUNDRY: UNABLE TO COMPLETE
ADLS_ACUITY_SCORE: 26
HYGIENE/GROOMING: INDEPENDENT
HYGIENE/GROOMING: INDEPENDENT
ADLS_ACUITY_SCORE: 26
ADLS_ACUITY_SCORE: 26
DRESS: INDEPENDENT
ADLS_ACUITY_SCORE: 26
DRESS: INDEPENDENT
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
## 2025-02-04 NOTE — PLAN OF CARE
"  Problem: Adult Inpatient Plan of Care  Goal: Optimal Comfort and Wellbeing  Intervention: Provide Person-Centered Care  Recent Flowsheet Documentation  Taken 2/4/2025 1400 by Kate Senior RN  Trust Relationship/Rapport:   care explained   choices provided   emotional support provided   empathic listening provided   questions answered   reassurance provided   questions encouraged   thoughts/feelings acknowledged   Goal Outcome Evaluation:    Plan of Care Reviewed With: patient      Pt presents as blunted and irritable. He remain mainly isolative to his room until after the provider attempted assessment. He endorses AVH when he is using only, and depression 5/10. He denied all other mental health symptoms. Writer asked if he was interested in writer making the provider know, and he stated \"no, I took ativan and that helped\".    Writer let the pt know that is not a sustainable option for depression, and he acknowledged, and stated \"he will let someone know when he's ready\".   Pt completed his CD assessment, and was willing to even help with placement as he called his outpt CM.   Pt is not accepting of changing any of his current medications, but is wanting CD help only. Writer and CTC met with him, and he again stated he wanted CD help only.   He was verbally aggressive with the provider when they met, because he again stated he wants help with CD, and not a med change.    Pt was not verbally or physically aggressive with writer or other staff  this shift. He asp showed no signs of overt psychosis or ronnie.   Writer spoke with the pt near shift end, and let him know that he needed to be more respectful to the staff and provider, as they are only trying to do what is best. He acknowledged. Pt had 3/10 pain in his Left calf where the wound was , and took tylenol for it.   Pt ate and drank well,and had no other acute physical or behavioral concerns this shift.   /87 (BP Location: Right arm, Patient Position: " Sitting, Cuff Size: Adult Large)   Pulse 116   Temp 98.4  F (36.9  C) (Temporal)   Resp 18   SpO2 98%

## 2025-02-04 NOTE — PROGRESS NOTES
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 treatment progress, or (B) Client has an actively antagonistic significant other, family, work, or living environment with immediate threat to the client's safety and well-being.     Category of Substance Severity (ICD-10 Code / DSM 5 Code)      Alcohol Use Disorder The patient does not meet the criteria for an Alcohol use disorder.   Cannabis Use Disorder The patient does not meet the criteria for a Cannabis use disorder.   Hallucinogen Use Disorder The patient does not meet the criteria for a Hallucinogen use disorder.   Inhalant Use Disorder The patient does not meet the criteria for an Inhalant use disorder.   Opioid Use Disorder The patient does not meet the criteria for an Opioid use disorder.   Sedative, Hypnotic, or Anxiolytic Use Disorder The patient does not meet the criteria for a Sedative/Hypnotic use disorder.   Stimulant Related Disorder Severe   (F15.20) (304.40) Amphetamine type substance   Tobacco Use Disorder Mild     (Z72.0) (305.1)   Other (or unknown) Substance Use Disorder The patient does not meet the criteria for a Other (or unknown) Substance use disorder.      A problematic pattern of alcohol/drug use leading to clinically significant impairment or distress, as manifested by at least two of the following, occurring within a 12-month period:     1.) Alcohol/drug is often taken in larger amounts or over a longer period than was intended.  2.) There is a persistent desire or unsuccessful efforts to cut down or control alcohol/drug use  3.) A great deal of time is spent in activities necessary to obtain alcohol, use alcohol, or recover from its effects.  5.) Recurrent alcohol/drug use resulting in a failure to fulfill major role obligations at work, school or home.  6.) Continued alcohol use despite having persistent or recurrent social or interpersonal problems caused or exacerbated by the effects of alcohol/drug.  7.) Important social, occupational, or recreational activities are given up or reduced because of alcohol/drug use.  9.) Alcohol/drug use is continued despite knowledge of having a persistent or recurrent physical or psychological problem that is likely to have been caused or exacerbated by alcohol.  10.) Tolerance, as defined by either of the following: A need for markedly increased amounts of alcohol/drug to achieve intoxication or desired effect.  11.) Withdrawal, as manifested by either of the following: The characteristic withdrawal syndrome for alcohol/drug (refer to Criteria A and B of the criteria set for alcohol/drug withdrawal).     Specify if: In early remission:  After full criteria for alcohol/drug use disorder were previously met, none of the criteria for alcohol/drug use disorder have been met for at least 3 months but for less than 12 months (with the exception that Criterion A4,  Craving or a strong desire or urge to use alcohol/drug  may be met).      In sustained remission:   After full  criteria for alcohol use disorder were previously met, non of the criteria for alcohol/drug use disorder have been met at any time during a period of 12 months or longer (with the exception that Criterion A4,  Craving or strong desire or urge to use alcohol/drug  may be met).      Specify if:   This additional specifier is used if the individual is in an environment where access to alcohol is restricted.     Mild: Presence of 2-3 symptoms  Moderate: Presence of 4-5 symptoms  Severe: Presence of 6 or more symptoms     Collateral information: NURIS Collateral Info: Sufficient information is obtained from the patient to support diagnosis and recommendations. Contact with a collateral sources is not required.     Recommendations:  1)  Complete a Residential MICD Treatment Program  2)  Abstain from all mood-altering chemicals unless prescribed by a licensed provider.   3)  Attend, at minimum, 2 weekly support group meetings, such as 12 step based (AA/NA), SMART Recovery, Health Realizations, and/or Refuge Recovery meetings.     4)  Actively work with a mentor/sponsor on a weekly basis.   5)  Follow all the recommendations of your treatment/medical providers.  6)  Patient may benefit from obtaining a full mental health evaluation.     Clinical Substantiation:  Patient has unstable housing, lacks a sober living environment, would benefit from developing long-term sober maintenance skills, would benefit from developing sober coping skills, would benefit from developing a sober peer support network, and has dual issues of mental health and substance abuse.     Referrals/ Alternatives:  [facility]  [address] [place] [address]  P: [phone]  F: [phone]     [facility] [address] [place] [address]  P: [phone]  Office: [phone]  F: [phone]     [facility] [id number]      NURIS consult completed by:   Javier Malik, CHRIS, [facility]  Substance Use Disorder Evaluation  Counselor  E-mail Address: [email or web address] Consult & [place] [address]     *Due to regulation of Title 42 of the Code of Federal Regulations (CFR) Part 2: Confidentiality laws apply to this note and the information wherein.  Thus, this note cannot be copy and pasted into any other health care staff's note nor can it be included in general medical records sent to ANY outside agency without the patient's written consent.

## 2025-02-04 NOTE — PLAN OF CARE
Team Note Due:  Monday      Assessment/Intervention/Current Symtoms and Care Coordination:  Chart review and met with team, discussed pt progress, symptomology, and response to treatment.  Discussed the discharge plan and any potential impediments to discharge.      Per provider pt was not willing to speak with him. Writer and nurse spoke with patient after provider and pt mentioned that he is not interested in changing his medications, but is interested in going to CD treatment. Pt reported that provider was focused on medications and felt provider was not listening to him. Pt said he completed a CD assessment about a month ago, but is open to speaking with CD  and working with them if another assessment is needed. Pt said he would like to get to treatment as soon as possible. Pt was frustrated, but cooperative. Pt also signed a FOUZIA for his . Writer asked provider to put in CD consult.     Discharge Plan or Goal:  CD treatment.     Barriers to Discharge:  Psychosis and withdrawal     Referral Status:  None     Legal Status:  Voluntary   County:   File Number:   Start and expiration date of commitment:     Contacts (include FOUZIA status):   : Samir Green, [facility]  Ph: [phone] (FOUZIA)     Upcoming Meetings and Dates/Important Information and next steps:  Coordinate care   Referral Tracker

## 2025-02-04 NOTE — PROGRESS NOTES
"PSYCHIATRY  PROGRESS NOTE     DATE OF SERVICE   February 4, 2025           CHIEF COMPLAINT   \"I need treatment.\"       SUBJECTIVE   Per nursing documentation:    Patient appeared to sleep 6.75 hours this night shift.  No prns or snacks given or requested.  No concerns were reported or noted.     Per unit CTC documentation:    Assessment/Intervention/Current Symtoms and Care Coordination:  Chart review and met with team, discussed pt progress, symptomology, and response to treatment.  Discussed the discharge plan and any potential impediments to discharge.        Per provider pt was not willing to speak with him. Writer and nurse spoke with patient after provider and pt mentioned that he is not interested in changing his medications, but is interested in going to CD treatment. Pt reported that provider was focused on medications and felt provider was not listening to him. Pt said he completed a CD assessment about a month ago, but is open to speaking with CD  and working with them if another assessment is needed. Pt said he would like to get to treatment as soon as possible. Pt was frustrated, but cooperative. Pt also signed a FOUZIA for his . Writer asked provider to put in CD consult.        OBJECTIVE   Met with the patient in his room of unit 12 [address], patient remains indifferent, condescending, nonchalant, uncooperative and verbally aggressive.  Patient botched every attempt to assess him, laying in bed unconcerned, abusive to staff and this writer stating \"I do not want to listen to any stupid questions.\"  The RN approached the patient with his medications, which he took and quickly gulped with water, and became dismissive. Patient continued to say \"I need CD treatment.\"  Despite informing the patient that he needed to be engaged in assessment, he became more angry and verbally insultive.  The RN and CTC approached the patient, he informed them that he is interested in CD treatment, that he " completed an assessment about a month ago but is open to speaking with CD , and working with them if another assessment is needed.  He reported that he would like to get to treatment as soon as possible.  Writer has put in a CD consult.  Patient seems to deny all psych symptoms including SI/HI, auditory/visual hallucinations, stated that he only hears voices when using.  He reported that, he had depression but no anxiety at this time.  Patient would not give a numeric value to his depression.  Provider only asked about patient being on risperidone in the past, patient states he is not interested in changing any medications.  Given the patient's verbal or physical aggression, he will be re-started on risperidone.         MEDICATIONS   Medications:  Scheduled Meds:  Current Facility-Administered Medications   Medication Dose Route Frequency Provider Last Rate Last Admin    doxycycline hyclate (VIBRAMYCIN) capsule 100 mg  100 mg Oral Q12H [facility] (08/20) Janna George APRN CNP   100 mg at 02/04/25 1009    OLANZapine (zyPREXA) tablet 10 mg  10 mg Oral At Bedtime Janna George APRN CNP   10 mg at 02/03/25 2014     Continuous Infusions:  Current Facility-Administered Medications   Medication Dose Route Frequency Provider Last Rate Last Admin     PRN Meds:.  Current Facility-Administered Medications   Medication Dose Route Frequency Provider Last Rate Last Admin    acetaminophen (TYLENOL) tablet 650 mg  650 mg Oral Q4H PRN Katherine Guzmanoa, DO   650 mg at 02/02/25 1952    alum & mag hydroxide-simethicone (MAALOX) suspension 30 mL  30 mL Oral Q4H PRN Guzman, Dionicio, DO   30 mL at 02/03/25 2110    hydrOXYzine HCl (ATARAX) tablet 25 mg  25 mg Oral Q4H PRN Guzman, Dionicio, DO   25 mg at 02/02/25 1954    nicotine (COMMIT) lozenge 4 mg  4 mg Buccal Q1H PRN Janna George APRN CNP   4 mg at 02/04/25 1135    OLANZapine (zyPREXA) tablet 10 mg  10 mg Oral TID PRN Megan, Dionicio, DO        Or    OLANZapine  "(zyPREXA) injection 10 mg  10 mg Intramuscular TID PRN Dionicio Guzman,         senna-docusate (SENOKOT-S/PERICOLACE) 8.6-50 MG per tablet 1 tablet  1 tablet Oral BID PRN Dionicio Guzman DO        traZODone (DESYREL) tablet 50 mg  50 mg Oral At Bedtime PRN Katherine GuzmanoaDO   50 mg at 02/03/25 2014       Medication adherence issues: MS Med Adherence Y/N: Yes, Unknown  Medication side effects: MEDICATION SIDE EFFECTS: no side effects reported  Benefit: Yes / No: Yes       ROS   A comprehensive review of systems was negative.       MENTAL STATUS EXAM   Vitals: /88 (BP Location: Left arm, Patient Position: Sitting, Cuff Size: Adult Regular)   Pulse 94   Temp (!) 96.3  F (35.7  C) (Temporal)   Resp 16   SpO2 99%     Appearance: Sleeping/half awake, casually groomed, Disheveled, and Appears stated age  Mood \"Irritable\"  Affect: Irritable, angry, condescending was congruent to speech  Suicidal ideation: Absent  Homicidal ideation:  absent  Thought process: Illogical, nonsensical, tangential  Thought content: Significant for verbal aggression, hostility, perseveration  Fund of knowledge: Not formally assessed  Attention/concentration: Poor  Language ability: Intact  Memory: Not formally assessed  Insight:  Poor  Judgement: Limited  Orientation: Not formally assessed, patient not cooperative   Psychomotor behavior: Agitated,  Muscle strength and tone: Normal  Gait and Station normal       LABS   personally reviewed.     No results found for: \"PHENYTOIN\", \"PHENOBARB\", \"VALPROATE\", \"CBMZ\"       DIAGNOSIS   Principal Problem:    Major depressive disorder, severe without psychotic features  Schizoaffective disorder depressed type (H)  Schizophrenia (H)  Suicidal ideation    Clinically Significant Risk Factors                              # Overweight: Estimated body mass index is 27.06 kg/m  as calculated from the following:    Height as of 1/31/25: 1.626 m (5' 4\").    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)    Amphetamine use disorder, severe, dependence (H)    Amphetamine-induced mood disorder (H)    Anxiety    Episodic mood disorder    Gastroesophageal reflux disease    Left ankle pain, unspecified chronicity    Psychosis, atypical (H)    Recurrent genital herpes    Substance-induced psychotic disorder (H)    Hallucinations    Encounter for medication refill    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)    Methamphetamine use disorder, moderate, in sustained remission, in controlled environment, dependence (H)    Methamphetamine use disorder, severe (H)    Schizoaffective disorder, bipolar type (H)    Schizoaffective disorder, chronic condition with acute exacerbation (H)    Schizoaffective disorder, depressive type (H)    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 Patient reports a worsening psychosocial symptoms which includes, racing thoughts, auditory and visual hallucinations, seeing people and cars, patient reports he has not been sleeping for a long time.  He  endorses meth use, his last use was a couple days prior to coming to the ED. met with the patient for assessment twice today, patient appears sleepy, very irritable, not cooperative with assessment.  Effort to make the patient talk was met with resistance and verbal aggression.       Changes made today: 2/4/2025  Risperidone 2 mg daily    PLAN   Target psychiatric symptoms and interventions:  Education:  Risks, benefits, and alternatives discussed at length with patient.   Admit to Unit: [address].  Attending Physician: Dr. Amy MD. Patient will be seen by staff psychiatrist.   Legal Status: voluntary  Reason for Admit: poses an imminent risk to self  Safety/Structure/Supervision  Precautions placed:  voluntary. Code 1, 15 min checks, suicide/assault/self-injurious behavior/withdrawal/elopement precautions.    Monitor target symptoms.   Provide a safe environment and therapeutic milieu.  Continue precautions as noted above  Medications-PTA: February 3, 2025: PTA medications reviewed.  Outpatient medications were continued with the exception of None  Medications-Hospital/ED:  Emergency medication with olanzapine PRN severe agitation ordered.  Olanzapine (Zyprexa) tablet 10 mg oral at bedtime  Risperidone tablet 2 mg daily  Doxycycline hyclate (Vibramycin) capsule 100 mg oral every 12 hours  Trazodone (Desyrel) tablet 50 mg oral at bedtime, may repeat after 60 minutes  Aluminum mag hydroxide-simethicone (Maalox) suspension 30 mL oral every 4 hours as needed indigestion  Hydroxyzine HCl (Atarax) tablet 25 mg oral every 4 hours as needed anxiety  Olanzapine (Zyprexa) tablet 10 mg oral 3 times daily as needed agitation  Or  Olanzapine (Zyprexa) injection 10 mg intramuscular 3 times daily as needed agitation, if patient unable to take p.o.  Senna docusate (Senokot - S/Allyson-Colace) 8.6 to 50 mg/tablet, 1 tablet oral 2 times daily as needed constipation  Regular diet adult  Legal status voluntary  Vital signs and  pain assessment  Weight patient every TU, TH, SA,  Acute Medical Problems and Treatments:  Consults:  Internal Medicine Consult placed.   Care management/social work IP consult  Wound osteotomy continence nurse IP consult  Routine labs have been ordered including CMP, CBC and diff, TSH, folate, vitamin B12, Tier 1 and 2 First Episode labs, and fasting lipid panel..  Behavioral/Psychological:  Encourage unit programming  Routine programming  Code 1 restrict to unit  Regular diet adult  Status 15  Full code  Suicide precaution: Suicide risk: High  :  Barriers to Discharge: Pending symptom stabilization  Referrals: CTC to facilitate all referrals  Care Coordination: CTC to coordinate care with outside providers  Placement:  home with self-care  Anticipated Discharge: 7 to 10 days     Continuous treatment planning to be performed during hospital course as per routine.     Risk Assessment: IP MHAC RISK ASSESSMENT: Patient able to contract for safety and Patient on precautions     This note was created with help of Dragon dictation system. Grammatical / typing errors are not intentional.       Re-Certification I certify that the inpatient psychiatric facility services furnished since the previous certification were, and continue to be, medically necessary for, either, treatment which could reasonably be expected to improve the patient s condition or diagnostic study and that the hospital records indicate that the services furnished were, either, intensive treatment services, admission and related services necessary for diagnostic study, or equivalent services.     I certify that the patient continues to need, on a daily basis, active treatment furnished directly by or requiring the supervision of inpatient psychiatric facility personnel.   I estimate 7-10 days of hospitalization is necessary for proper treatment of the patient. My plans for post-hospital care for this patient are   STEFANO Lopez  RAMONE CNP    -  February 4, 2025   -  10:37 AM        Attestation:   Patient has been seen and evaluated by me, Morgan Lopez, RAMONE, APRN CNP  The patient was counseled on nature of illness and treatment plan/options  Care was coordinated with treatment team  Total time > 30 minutes

## 2025-02-04 NOTE — PLAN OF CARE
BEH IP Unit Acuity Rating Score (UARS)  Patient is given one point for every criteria they meet.    CRITERIA SCORING   On a 72 hour hold, court hold, committed, stay of commitment, or revocation. 0    Patient LOS on BEH unit exceeds 20 days. 0  LOS: 3   Patient under guardianship, 55+, otherwise medically complex, or under age 11. 0   Suicide ideation without relief of precipitating factors. 0   Current plan for suicide. 0   Current plan for homicide. 0   Imminent risk or actual attempt to seriously harm another without relief of factors precipitating the attempt. 0   Severe dysfunction in daily living (ex: complete neglect for self care, extreme disruption in vegetative function, extreme deterioration in social interactions). 1   Recent (last 7 days) or current physical aggression in the ED or on unit. 0   Restraints or seclusion episode in past 72 hours. 0   Recent (last 7 days) or current verbal aggression, agitation, yelling, etc., while in the ED or unit. 1    Active psychosis. 0   Need for constant or near constant redirection (from leaving, from others, etc).  0   Intrusive or disruptive behaviors. 0   Patient requires 3 or more hours of individualized nursing care per 8-hour shift (i.e. for ADLs, meds, therapeutic interventions). 0   TOTAL 2

## 2025-02-04 NOTE — CONSULTS
Met with patient to discuss possible NURIS treatment options and to possibly complete an assessment. Assessment has been completed at this time and patient is being recommended:         Recommendation:  Residential Treatment     Referrals:  Referrals/ Alternatives:  [facility]  [address] [place] [address]  P: [phone]  F: [phone]     [facility] [address] [place] [address]  P: [phone]  Office: [phone]  F: [phone]    Referrals are being made at this time.  Patient is aware of the referrals and is in agreement.     DOLLY Miguel on 2/4/2025 at 2:24 PM

## 2025-02-05 PROCEDURE — 99233 SBSQ HOSP IP/OBS HIGH 50: CPT | Performed by: PSYCHIATRY & NEUROLOGY

## 2025-02-05 PROCEDURE — 250N000013 HC RX MED GY IP 250 OP 250 PS 637: Performed by: NURSE PRACTITIONER

## 2025-02-05 PROCEDURE — 124N000002 HC R&B MH UMMC

## 2025-02-05 PROCEDURE — 250N000013 HC RX MED GY IP 250 OP 250 PS 637: Performed by: PSYCHIATRY & NEUROLOGY

## 2025-02-05 PROCEDURE — 99418 PROLNG IP/OBS E/M EA 15 MIN: CPT | Performed by: PSYCHIATRY & NEUROLOGY

## 2025-02-05 RX ADMIN — DOXYCYCLINE 100 MG: 100 CAPSULE ORAL at 20:32

## 2025-02-05 RX ADMIN — DOXYCYCLINE 100 MG: 100 CAPSULE ORAL at 10:52

## 2025-02-05 RX ADMIN — NICOTINE POLACRILEX 4 MG: 2 LOZENGE ORAL at 17:48

## 2025-02-05 RX ADMIN — NICOTINE POLACRILEX 4 MG: 2 LOZENGE ORAL at 14:07

## 2025-02-05 RX ADMIN — HYDROXYZINE HYDROCHLORIDE 25 MG: 25 TABLET, FILM COATED ORAL at 18:44

## 2025-02-05 RX ADMIN — NICOTINE POLACRILEX 4 MG: 2 LOZENGE ORAL at 21:10

## 2025-02-05 RX ADMIN — OLANZAPINE 10 MG: 10 TABLET, FILM COATED ORAL at 20:32

## 2025-02-05 RX ADMIN — TRAZODONE HYDROCHLORIDE 50 MG: 50 TABLET ORAL at 20:38

## 2025-02-05 RX ADMIN — NICOTINE POLACRILEX 4 MG: 2 LOZENGE ORAL at 13:02

## 2025-02-05 RX ADMIN — NICOTINE POLACRILEX 4 MG: 2 LOZENGE ORAL at 19:41

## 2025-02-05 ASSESSMENT — ACTIVITIES OF DAILY LIVING (ADL)
ADLS_ACUITY_SCORE: 26

## 2025-02-05 NOTE — PLAN OF CARE
Team Note Due:  Monday      Assessment/Intervention/Current Symtoms and Care Coordination:  Chart review and met with team, discussed pt progress, symptomology, and response to treatment.  Discussed the discharge plan and any potential impediments to discharge.      Per team, patient is motivated for treatment. Future oriented. Denies SI. Social with peers. Patient completed CD assessment yesterday and referrals were sent out. Dr. Gore will  provide a second opinion.     Writer met with patient this afternoon. Pt stated that [facility] in [place] has a bed available and asked if his assessment could be sent to them. Pt was polite and pleasant.     Writer reached out to DOLLY Davis, CD   and asked if he could send the referral to [facility].     Discharge Plan or Goal:  CD treatment.     Barriers to Discharge:  Psychosis and withdrawal     Referral Status:  None     Legal Status:  Voluntary   County:   File Number:   Start and expiration date of commitment:     Contacts (include FOUZIA status):   : Samir Green, [facility]  Ph: [phone] (FOUZIA)     Upcoming Meetings and Dates/Important Information and next steps:  Coordinate care   Referral Tracker
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
## 2025-02-05 NOTE — PLAN OF CARE
Problem: Suicide Risk  Goal: Absence of Self-Harm  Outcome: Progressing  Intervention: Assess Risk to Self and Maintain Safety  Recent Flowsheet Documentation  Taken 2/5/2025 1400 by Adia Vanessa RN  Self-Harm Prevention: environmental self-harm risks assessed  Intervention: Establish Safety Plan and Continuity of Care  Recent Flowsheet Documentation  Taken 2/5/2025 1400 by Adia Vanessa, RN  Safe Transition Promotion: protective factors promoted   Goal Outcome Evaluation:    Plan of Care Reviewed With: patient      Patient had uneventful shift. He denies having any thoughts of wanting to harming self or others, depression, anxiety, visual hallucinations/auditory hallucinations. Patient spent majority of the shift in his room. He didn't have any behavioral concerns at this time.

## 2025-02-05 NOTE — PLAN OF CARE
"\"I can wait to go to treatment again. I think I've been to most of them before but I can't stay sober.\"    Acknowledges impaired coping and impulse control. Denies thought disturbances. Future oriented in wishing to continue to work as a .  Not suicidal and denies thoughts of harm to anyone.    Pleasant and polite with a sense of humour. Increasingly social with peers.     Acknowledges long hx of elopements (see my admission note.)    Refuses to take shower because he wants to wait until the dressing change on his calf wound is due: 2/6/25    Appearance:  a bit disheveled  Body Language: open  Attitude:  cooperative  Mood: neutral  Affect:congruent, brighter  Thought Process: intact  Thought content: future  Suicidal/homicidal:denies/denies  Knowledge,Insight,Judgement: intact/fair/fair  Attention/Concentration: intact  Memory:Recent- intact                Remote-intact  Speech: WNL rate, volume, prosody  Orientation: x 4  Psychomotor:WNL  Sleep/Activity level: WNL  Motivation:  good  Behaviour: cooperative     Plan: Monitor and document mood and behaviour, thought process and content. Establish and maintain therapeutic relationship. Educate about diagnoses, medications, treatment, legal status, plan of care. Address preexisting and concurrent medical concerns.      P:Behavioural disturbance, CD  G:Calm behaviour, harm reduction  O:Not progressing    "

## 2025-02-05 NOTE — PLAN OF CARE
BEH IP Unit Acuity Rating Score (UARS)  Patient is given one point for every criteria they meet.    CRITERIA SCORING   On a 72 hour hold, court hold, committed, stay of commitment, or revocation. 0    Patient LOS on BEH unit exceeds 20 days. 0  LOS: 4   Patient under guardianship, 55+, otherwise medically complex, or under age 11. 0   Suicide ideation without relief of precipitating factors. 0   Current plan for suicide. 0   Current plan for homicide. 0   Imminent risk or actual attempt to seriously harm another without relief of factors precipitating the attempt. 0   Severe dysfunction in daily living (ex: complete neglect for self care, extreme disruption in vegetative function, extreme deterioration in social interactions). 1   Recent (last 7 days) or current physical aggression in the ED or on unit. 0   Restraints or seclusion episode in past 72 hours. 0   Recent (last 7 days) or current verbal aggression, agitation, yelling, etc., while in the ED or unit. 1    Active psychosis. 0   Need for constant or near constant redirection (from leaving, from others, etc).  0   Intrusive or disruptive behaviors. 0   Patient requires 3 or more hours of individualized nursing care per 8-hour shift (i.e. for ADLs, meds, therapeutic interventions). 0   TOTAL 2

## 2025-02-06 VITALS
HEART RATE: 106 BPM | TEMPERATURE: 98.6 F | SYSTOLIC BLOOD PRESSURE: 128 MMHG | RESPIRATION RATE: 16 BRPM | DIASTOLIC BLOOD PRESSURE: 84 MMHG | OXYGEN SATURATION: 98 %

## 2025-02-06 PROCEDURE — 124N000002 HC R&B MH UMMC

## 2025-02-06 PROCEDURE — 250N000013 HC RX MED GY IP 250 OP 250 PS 637: Performed by: PSYCHIATRY & NEUROLOGY

## 2025-02-06 PROCEDURE — 99232 SBSQ HOSP IP/OBS MODERATE 35: CPT | Performed by: CLINICAL NURSE SPECIALIST

## 2025-02-06 PROCEDURE — 250N000013 HC RX MED GY IP 250 OP 250 PS 637: Performed by: NURSE PRACTITIONER

## 2025-02-06 RX ORDER — OLANZAPINE 10 MG/1
10 TABLET ORAL AT BEDTIME
Qty: 30 TABLET | Refills: 0 | Status: SHIPPED | OUTPATIENT
Start: 2025-02-06

## 2025-02-06 RX ORDER — DOXYCYCLINE 100 MG/1
100 CAPSULE ORAL EVERY 12 HOURS
Qty: 2 CAPSULE | Refills: 0 | Status: SHIPPED | OUTPATIENT
Start: 2025-02-07

## 2025-02-06 RX ADMIN — NICOTINE POLACRILEX 4 MG: 2 LOZENGE ORAL at 15:45

## 2025-02-06 RX ADMIN — TRAZODONE HYDROCHLORIDE 50 MG: 50 TABLET ORAL at 22:36

## 2025-02-06 RX ADMIN — DOXYCYCLINE 100 MG: 100 CAPSULE ORAL at 11:21

## 2025-02-06 RX ADMIN — DOXYCYCLINE 100 MG: 100 CAPSULE ORAL at 20:25

## 2025-02-06 RX ADMIN — NICOTINE POLACRILEX 4 MG: 2 LOZENGE ORAL at 11:21

## 2025-02-06 RX ADMIN — NICOTINE POLACRILEX 4 MG: 2 LOZENGE ORAL at 12:45

## 2025-02-06 RX ADMIN — NICOTINE POLACRILEX 4 MG: 2 LOZENGE ORAL at 16:56

## 2025-02-06 RX ADMIN — NICOTINE POLACRILEX 4 MG: 2 LOZENGE ORAL at 18:50

## 2025-02-06 RX ADMIN — NICOTINE POLACRILEX 4 MG: 2 LOZENGE ORAL at 22:36

## 2025-02-06 RX ADMIN — NICOTINE POLACRILEX 4 MG: 2 LOZENGE ORAL at 20:26

## 2025-02-06 RX ADMIN — NICOTINE POLACRILEX 4 MG: 2 LOZENGE ORAL at 14:45

## 2025-02-06 RX ADMIN — TRAZODONE HYDROCHLORIDE 50 MG: 50 TABLET ORAL at 20:25

## 2025-02-06 RX ADMIN — OLANZAPINE 10 MG: 10 TABLET, FILM COATED ORAL at 20:25

## 2025-02-06 ASSESSMENT — ACTIVITIES OF DAILY LIVING (ADL)
ADLS_ACUITY_SCORE: 26
DRESS: SCRUBS (BEHAVIORAL HEALTH);INDEPENDENT
ADLS_ACUITY_SCORE: 26
LAUNDRY: UNABLE TO COMPLETE
ADLS_ACUITY_SCORE: 26
ORAL_HYGIENE: INDEPENDENT
ADLS_ACUITY_SCORE: 26
ADLS_ACUITY_SCORE: 26
HYGIENE/GROOMING: INDEPENDENT
ADLS_ACUITY_SCORE: 26
ADLS_ACUITY_SCORE: 26

## 2025-02-06 NOTE — PLAN OF CARE
Team Note Due:  Monday      Assessment/Intervention/Current Symtoms and Care Coordination:  Chart review and met with team, discussed pt progress, symptomology, and response to treatment.  Discussed the discharge plan and any potential impediments to discharge.      Per team, slept 7 hours. Reported anxiety and requested medication for it.     11:42 AM  [facility]. Pt's assessment is currently being reviewed by the medical and mental health team.    Writer updated patient that his assessment is currently under review.     Pt stated that he called [facility] and they need updated progress notes. Pt asked if writer could fax progress notes over for review. Writer asked pt for contact information to verify what exactly is needed.     1:48 PM Writer spoke with someone from [facility] who stated they need updated progress notes for review. Once they review they will reach back out.    Writer faxed updated progress notes to [facility] for review.     Writer worked on AVS.     Discharge Plan or Goal:  CD treatment.     Barriers to Discharge:  Psychosis and withdrawal     Referral Status:  None     Legal Status:  Voluntary   County:   File Number:   Start and expiration date of commitment:     Contacts (include FOUZIA status):   : Samir Green, [facility]  Ph: [phone] (FOUZIA)     Upcoming Meetings and Dates/Important Information and next steps:  Coordinate care   Referral Tracker

## 2025-02-06 NOTE — PLAN OF CARE
Problem: Psychotic Signs/Symptoms  Goal: Improved Behavioral Control (Psychotic Signs/Symptoms)  Outcome: Progressing  Patient was visible in the [facility] area, social with peers and staff. His affect is bright and mood is calm. He is compliant with vitals check, wound care and medication administration. Writer spoke with wound care and they told writer that unit nurse is responsible to complete wound care and they will be here on Monday to assess patient's wound. This writer completed wound care this shift and patient tolerated it without issues. The wound did not have drainage and surrounding skin is intact. The wound does not look infected and the wound itself is beefy red with no odor. The wound is clean dry and intact. Patient did not complain of pain during dressing change. IM was here to assess patient, but patient was busy talking with the provider and they told writer to contract them if there is any concerns with the wound. There was no concern so IM was not contacted. Patient is restless this shift and he endorse anxiety, but did not request prn medication. He denies thoughts of harming self and others, SI/SIB, HI. Patient was overall calm this shift with no aggression or agitation. Prn nicotine lozenge was given. Patient wants to be discharged today and has been calling up treatment facilities to be discharged to. Patient told writer that he took a shower last night and wont take one today. Patient is eating and drinking without issues.     /89 (BP Location: Left arm, Patient Position: Standing, Cuff Size: Adult Regular)   Pulse 105   Temp 97.2  F (36.2  C) (Temporal)   Resp 16   SpO2 98%

## 2025-02-06 NOTE — PROGRESS NOTES
S: WOC received phone message asking for our service to complete wound dressings.     B: WOC role is to assess and treat. Standard reassessment is weekly unless concern for worsening wound.     A: Reviewed chart. No concern for worsening wound at this time.    R: updated wound care orders to state floor staff to complete dressing changes, WOC will reassess weekly.     Susana Titus RN BSN CWOCN

## 2025-02-06 NOTE — PROGRESS NOTES
Patient was requesting for his wound dressings to be changed. Writer attempted to get hold of the wound care nurse, but was not able to get hold of wound care nurse at the time of his request. Latter on her requested to be seen by Internal Medicine get his wound to be seen. IM was contacted regarding his request. Per Internal Medicine there is no change at this time, and patient would be seen by IM and wound nurse tomorrow. IM requested for this vitals to be checked, when writer went to check his vitals, he refused at first but when another staff approached he agreed to get his vitals checked.    He denies any pain on site of his wound, and vitals were stable, and will continue to monitor the wound for any sign of infection per provider.  Patient did request for PRN anxiety medication once this shift, and also comes to the nursing station to request for Nicorette gum. Patient continues to deny any thoughts of wanting to harm himself or others, depression, anxiety, visual hallucinations/auditory hallucinations.     Blood pressure 130/83, pulse 108, temperature 98.4  F (36.9  C), temperature source Oral, resp. rate 18, SpO2 98%.

## 2025-02-06 NOTE — PLAN OF CARE
I spoke with Mervat GALAVIZ at [facility] who accepted Hamzah Armando. She isn't sure if transportation will be tonight or tomorrow.  If tonight - I asked them to call unit directly, otherwise social work will coordinate tomorrow.

## 2025-02-06 NOTE — PROGRESS NOTES
"PSYCHIATRY  PROGRESS NOTE     DATE OF SERVICE   February 6, 2025             CHIEF COMPLAINT   \"I didn't know what I was saying when sleeping\"         SUBJECTIVE   Per nursing documentation:    Patient appeared to sleep 6.75 hours this night shift.  No prns or snacks given or requested.  No concerns were reported or noted.     Per unit CTC documentation:    Assessment/Intervention/Current Symtoms and Care Coordination:  Chart review and met with team, discussed pt progress, symptomology, and response to treatment.  Discussed the discharge plan and any potential impediments to discharge.        Per provider pt was not willing to speak with him. Writer and nurse spoke with patient after provider and pt mentioned that he is not interested in changing his medications, but is interested in going to CD treatment. Pt reported that provider was focused on medications and felt provider was not listening to him. Pt said he completed a CD assessment about a month ago, but is open to speaking with CD  and working with them if another assessment is needed. Pt said he would like to get to treatment as soon as possible. Pt was frustrated, but cooperative. Pt also signed a FOUZIA for his . Writer asked provider to put in CD consult.        OBJECTIVE   Met with the patient in the unge area of station 12, patient agreed to be interviewed in his room, patient appeared very Sharming and sociable, polite with no demonstrated irritability.  Writer reintroduced himself to the patient, informed him that we have been hearing voices but we have never seen our faces as patient was always sleeping whenever this provider checked in with him.  Patient apologized for his irritability during the previous assessment with this provider stating \"I did not know what I was saying when sleeping.\"  Patient reported that he has been looking for placement and has been calling places by himself, that his assessment has been reviewed by " the [facility] hoping that he will possibly get a feedback from them today, asking if he could discharge if they gave him a bed today.  Writer assured the patient that since he is doing okay, he will be discharged whenever he gets accepted into treatment because we do not want him to lose the bed.  At this point, patient requested if he could discharge today to stay with a friend, and from there go to the treatment when a bed is available?  Writer informed the patient that, the team has agreed that patient will go from [id number], which means he has to go from the hospital to the treatment facility.  Patient agrees with this plan.    Patient talked about his wound dressing, if he could be done today, writer responded he would informed the nurse to call the wound nurse to come and do the dressing.  Meanwhile the wound ostomy nurse documented that the area assessment is weekly unless concerns for worsening wound, that there is no concern for worsening wound at this time for this patient, that the floor RN to complete dressing changes.  Writer spoke with the RN caring for the patient, she states she will do the dressing changes today.   Patient reports that his medication is doing what it ought to do, that he has no side effects from the medications.  He denied auditory/visual hallucinations, denied suicidal/homicidal ideations, and thoughts of self-harm.         MEDICATIONS   Medications:  Scheduled Meds:  Current Facility-Administered Medications   Medication Dose Route Frequency Provider Last Rate Last Admin    doxycycline hyclate (VIBRAMYCIN) capsule 100 mg  100 mg Oral Q12H [facility] (08/20) Janna George APRN CNP   100 mg at 02/06/25 1121    OLANZapine (zyPREXA) tablet 10 mg  10 mg Oral At Bedtime Janna George APRN CNP   10 mg at 02/05/25 2032    [Held by provider] risperiDONE (risperDAL) tablet 2 mg  2 mg Oral Daily Morgan Lopez APRN CNP         Continuous  "Infusions:  Current Facility-Administered Medications   Medication Dose Route Frequency Provider Last Rate Last Admin     PRN Meds:.  Current Facility-Administered Medications   Medication Dose Route Frequency Provider Last Rate Last Admin    acetaminophen (TYLENOL) tablet 650 mg  650 mg Oral Q4H PRN Guzman, Dionicio, DO   650 mg at 02/04/25 2000    alum & mag hydroxide-simethicone (MAALOX) suspension 30 mL  30 mL Oral Q4H PRN Guzman, Dionicio, DO   30 mL at 02/03/25 2110    hydrOXYzine HCl (ATARAX) tablet 25 mg  25 mg Oral Q4H PRN Guzman, Dionicio, DO   25 mg at 02/05/25 1844    nicotine (COMMIT) lozenge 4 mg  4 mg Buccal Q1H PRN Janna George, APRN CNP   4 mg at 02/06/25 1245    OLANZapine (zyPREXA) tablet 10 mg  10 mg Oral TID PRN Guzman, Dionicio, DO        Or    OLANZapine (zyPREXA) injection 10 mg  10 mg Intramuscular TID PRN Guzman, Dionicio, DO        senna-docusate (SENOKOT-S/PERICOLACE) 8.6-50 MG per tablet 1 tablet  1 tablet Oral BID PRN Guzman, Dionicio, DO        traZODone (DESYREL) tablet 50 mg  50 mg Oral At Bedtime PRN Guzman, Dionicio, DO   50 mg at 02/05/25 2038       Medication adherence issues: MS Med Adherence Y/N: Yes, Unknown  Medication side effects: MEDICATION SIDE EFFECTS: no side effects reported  Benefit: Yes / No: Yes       ROS   A comprehensive review of system was negative       MENTAL STATUS EXAM   Vitals: /89 (BP Location: Left arm, Patient Position: Standing, Cuff Size: Adult Regular)   Pulse 105   Temp 97.2  F (36.2  C) (Temporal)   Resp 16   SpO2 98%     Appearance: Awake, alert, casually groomed, appearing at age stated.  Dressed in hospital yellow sweater and scrubs  Mood \"good\"  Affect irritable, calm, neutral response to humor  Suicidal ideation: Absent  Homicidal ideation: Absent  Thought process: Goal-directed  Thought content: denies suicidal/homicidal ideations, denies all psychotic symptoms including auditory/visual hallucinations, denied delusion and paranoia  Fund of knowledge: " Not formally assessed  Attention/concentration: Good  Language ability: Intact  Memory: Not formally assessed  Insight:  Fair  Judgement: Limited  Orientation orientation x 4  Psychomotor behavior: Calm, cooperative,  Muscle strength and tone: Normal  Gait and Station normal       LABS   Personally reviewed  Recent Results (from the past 2 weeks)   Basic metabolic panel    Collection Time: 01/31/25  6:49 PM   Result Value Ref Range    Sodium 139 135 - 145 mmol/L    Potassium 3.8 3.4 - 5.3 mmol/L    Chloride 101 98 - 107 mmol/L    Carbon Dioxide (CO2) 28 22 - 29 mmol/L    Anion Gap 10 7 - 15 mmol/L    Urea Nitrogen 14.0 6.0 - 20.0 mg/dL    Creatinine 0.94 0.67 - 1.17 mg/dL    GFR Estimate >90 >60 mL/min/1.73m2    Calcium 9.6 8.8 - 10.4 mg/dL    Glucose 123 (H) 70 - 99 mg/dL   CBC with platelets and differential    Collection Time: 01/31/25  6:49 PM   Result Value Ref Range    WBC Count 7.5 4.0 - 11.0 10e3/uL    RBC Count 4.95 4.40 - 5.90 10e6/uL    Hemoglobin 15.2 13.3 - 17.7 g/dL    Hematocrit 45.3 40.0 - 53.0 %    MCV 92 78 - 100 fL    MCH 30.7 26.5 - 33.0 pg    MCHC 33.6 31.5 - 36.5 g/dL    RDW 12.0 10.0 - 15.0 %    Platelet Count 280 150 - 450 10e3/uL    % Neutrophils 59 %    % Lymphocytes 32 %    % Monocytes 7 %    % Eosinophils 2 %    % Basophils 0 %    % Immature Granulocytes 0 %    NRBCs per 100 WBC 0 <1 /100    Absolute Neutrophils 4.4 1.6 - 8.3 10e3/uL    Absolute Lymphocytes 2.4 0.8 - 5.3 10e3/uL    Absolute Monocytes 0.5 0.0 - 1.3 10e3/uL    Absolute Eosinophils 0.1 0.0 - 0.7 10e3/uL    Absolute Basophils 0.0 0.0 - 0.2 10e3/uL    Absolute Immature Granulocytes 0.0 <=0.4 10e3/uL    Absolute NRBCs 0.0 10e3/uL   Hepatic panel    Collection Time: 01/31/25  6:49 PM   Result Value Ref Range    Protein Total 7.3 6.4 - 8.3 g/dL    Albumin 4.5 3.5 - 5.2 g/dL    Bilirubin Total 0.5 <=1.2 mg/dL    Alkaline Phosphatase 86 40 - 150 U/L    AST 23 0 - 45 U/L    ALT 19 0 - 70 U/L    Bilirubin Direct <0.20 0.00 - 0.30  "mg/dL   Urine Drug Screen Panel    Collection Time: 01/31/25  7:05 PM   Result Value Ref Range    Amphetamines Urine Screen Positive (A) Screen Negative    Barbituates Urine Screen Negative Screen Negative    Benzodiazepine Urine Screen Negative Screen Negative    Cannabinoids Urine Screen Negative Screen Negative    Cocaine Urine Screen Negative Screen Negative    Fentanyl Qual Urine Screen Negative Screen Negative    Opiates Urine Screen Negative Screen Negative    PCP Urine Screen Negative Screen Negative   Acetaminophen level    Collection Time: 01/31/25 11:19 PM   Result Value Ref Range    Acetaminophen <5.0 (L) 10.0 - 30.0 ug/mL   Salicylate level    Collection Time: 01/31/25 11:19 PM   Result Value Ref Range    Salicylate <0.3   mg/dL   Influenza A/B, RSV and SARS-CoV2 PCR (COVID-19) Nose    Collection Time: 01/31/25 11:19 PM    Specimen: Nose; Swab   Result Value Ref Range    Influenza A PCR Negative Negative    Influenza B PCR Negative Negative    RSV PCR Negative Negative    SARS CoV2 PCR Negative Negative       No results found for: \"PHENYTOIN\", \"PHENOBARB\", \"VALPROATE\", \"CBMZ\"       DIAGNOSIS   Principal Problem:   Major depressive disorder, severe without psychotic features  Schizoaffective disorder depressed type (H)  Schizophrenia (H)  Suicidal ideation      Clinically Significant Risk Factors                              # Overweight: Estimated body mass index is 27.06 kg/m  as calculated from the following:    Height as of 1/31/25: 1.626 m (5' 4\").    Weight as of 1/31/25: 71.5 kg (157 lb 10.1 oz).        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)    Amphetamine use disorder, " severe, dependence (H)    Amphetamine-induced mood disorder (H)    Anxiety    Episodic mood disorder    Gastroesophageal reflux disease    Left ankle pain, unspecified chronicity    Psychosis, atypical (H)    Recurrent genital herpes    Substance-induced psychotic disorder (H)    Hallucinations    Encounter for medication refill    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)    Methamphetamine use disorder, moderate, in sustained remission, in controlled environment, dependence (H)    Methamphetamine use disorder, severe (H)    Schizoaffective disorder, bipolar type (H)    Schizoaffective disorder, chronic condition with acute exacerbation (H)    Schizoaffective disorder, depressive type (H)    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  Patient reports a worsening psychosocial symptoms which includes,, auditory and visual hallucinations, seeing people and cars, patient reports he has not been sleeping for a long time.  He endorses meth use, his last use was a couple days prior to coming to the ED.  Upon assessment today, patient appears very sociable, Sharming and cooperative.  He is future oriented, reports that his assessment is being reviewed by [facility] in [place], hoping that we will be able to get accepted there.  Patient requested if he could go to a friend's place and go to treatment from there, if they are not offering him  the bed today.  The provider informed the patient that the team is agreed that he will go from door to door, patient is agreeable with the plan.     Changes today: 2/6/2025  No medication changes    PLAN   Target psychiatric symptoms and interventions:  Education:  Risks, benefits, and alternatives discussed at length with patient.   Admit to Unit: Station 12 [address].  Attending Physician: Dr. Amy MD. Patient will be seen by staff psychiatrist.   Legal Status: voluntary  Reason for Admit: poses an imminent risk to self  Safety/Structure/Supervision  Precautions placed:  voluntary. Code 1, 15 min checks, suicide/assault/self-injurious behavior/withdrawal/elopement precautions.    Monitor target symptoms.   Provide a safe environment and therapeutic milieu.  Continue precautions as noted above  Medications-PTA: February 3, 2025: PTA medications reviewed.  Outpatient medications were continued with the exception of None  Medications-Hospital/ED:  Emergency medication with olanzapine PRN severe agitation ordered.  Olanzapine (Zyprexa) tablet 10 mg oral at bedtime  Doxycycline hyclate (Vibramycin) capsule 100 mg oral every 12 hours  Trazodone (Desyrel) tablet 50 mg oral at bedtime, may repeat after 60 minutes  Aluminum mag hydroxide-simethicone (Maalox) suspension 30 mL oral every 4 hours as needed indigestion  Hydroxyzine HCl (Atarax) tablet 25 mg oral every 4 hours as needed anxiety  Olanzapine (Zyprexa) tablet 10 mg oral 3 times daily as needed agitation  Or  Olanzapine (Zyprexa) injection 10 mg intramuscular 3 times daily as needed agitation, if patient unable to take p.o.  Senna docusate (Senokot - S/Allyson-Colace) 8.6 to 50 mg/tablet, 1 tablet oral 2 times daily as needed constipation  Regular diet adult  Legal status voluntary  Vital signs and pain assessment  Weight patient every TU, TH, SA,  Acute Medical Problems and Treatments:  Consults:  Internal Medicine Consult placed.   Care management/social work IP  consult  Wound osteotomy continence nurse IP consult  Routine labs have been ordered including CMP, CBC and diff, TSH, folate, vitamin B12, Tier 1 and 2 First Episode labs, and fasting lipid panel..  Behavioral/Psychological:  Encourage unit programming  Routine programming  Code 1 restrict to unit  Regular diet adult  Status 15  Full code  Suicide precaution: Suicide risk: High  :  Barriers to Discharge: Pending symptom stabilization  Referrals: CTC to facilitate all referrals  Care Coordination: CTC to coordinate care with outside providers  Placement:  home with self-care  Anticipated Discharge: 7 to 10 days     Continuous treatment planning to be performed during hospital course as per routine.     Risk Assessment: IP [facility] RISK ASSESSMENT: Patient able to contract for safety and Patient on precautions     This note was created with help of Dragon dictation system. Grammatical / typing errors are not intentional.       Re-Certification I certify that the inpatient psychiatric facility services furnished since the previous certification were, and continue to be, medically necessary for, either, treatment which could reasonably be expected to improve the patient s condition or diagnostic study and that the hospital records indicate that the services furnished were, either, intensive treatment services, admission and related services necessary for diagnostic study, or equivalent services.     I certify that the patient continues to need, on a daily basis, active treatment furnished directly by or requiring the supervision of inpatient psychiatric facility personnel.   I estimate 7-10 days of hospitalization is necessary for proper treatment of the patient. My plans for post-hospital care for this patient are   STEFANO Lopez DNP CNP    - February 6, 2025                -    12:55 PM        Attestation:   Patient has been seen and evaluated by me, Morgan Lopez DNP, APRN CNP  The  patient was counseled on nature of illness and treatment plan/options  Care was coordinated with treatment team  Total time > 30 minutes

## 2025-02-06 NOTE — DISCHARGE INSTRUCTIONS
Behavioral Discharge Planning and Instructions    Summary: You were admitted on 2/1/2025  due to Suicidal Ideations and Chemical Use Issues.  You were treated by  MORRIS Spears NP  and discharged on 2/6/25 from [facility] to Substance Abuse Treatment Program [facility]    Main Diagnosis:   Major depressive disorder, severe without psychotic features  Schizoaffective disorder depressed type (H)  Schizophrenia (H)  Suicidal ideation    Health Care Follow-up:     Please make psychiatry appt once you are out of treatment.     Information will be faxed to your outpatient providers to ensure a healthy continuity of care for you.     Attend all scheduled appointments with your outpatient providers. Call at least 24 hours in advance if you need to reschedule an appointment to ensure continued access to your outpatient providers.     Major Treatments, Procedures and Findings:  You were provided with: a psychiatric assessment, assessed for medical stability, medication evaluation and/or management, group therapy, CD evaluation/assessment, milieu management, and medical interventions    Symptoms to Report: feeling more aggressive, increased confusion, losing more sleep, mood getting worse, or thoughts of suicide    Early warning signs can include: increased depression or anxiety sleep disturbances increased thoughts or behaviors of suicide or self-harm  increased unusual thinking, such as paranoia or hearing voices    Safety and Wellness:  Take all medicines as directed.  Make no changes unless your doctor suggests them.      Follow treatment recommendations.  Refrain from alcohol and non-prescribed drugs.  If there is a concern for safety, call 911.    Resources:   Mental Health Crisis Resources  Throughout [place]: call **CRISIS (**304261)  Crisis Text Line: is available for free, 24/7 by texting [place] to 080757  Suicide Awareness Voices of Education (SAVE) (www.save.org): 480-189-FEMH (5703)  The National Suicide  Prevention Lifeline is now: 988 Suicide and Crisis Lifeline. Call 988 anytime.  National [place] on Mental Illness (www.[place].org): 810.721.2118 or 296-540-2750.  Vaps4qatn: text the word LIFE to 85964 for immediate support and crisis intervention  Mental Health Consumer/Survivor Network of [place] (www.mhcsn.net): 811.630.1188 or 987-801-8397  Mental Health Association of [place] (www.mentalhealth.org): 601.264.8753 or 345-810-8365  Peer Support Connection [place] Warmline (PSC) 1-875.858.2375 Available from 5pm - 9am (7 days a week/365 days a year)  [facility] 1-883.410.1538 [facility] Mental Crisis Program      General Medication Instructions:   See your medication sheet(s) for instructions.   Take all medicines as directed.  Make no changes unless your doctor suggests them.   Go to all your doctor visits.  Be sure to have all your required lab tests. This way, your medicines can be refilled on time.  Do not use any drugs not prescribed by your doctor.  Avoid alcohol.    Advance Directives:   Scanned document on file with [email or web address]? No scanned doc  Is document scanned? Pt states no documents  Honoring Choices Your Rights Handout: Informed and given  Was more information offered? Materials given    The Treatment team has appreciated the opportunity to work with you. If you have any questions or concerns about your recent admission, you can contact the unit which can receive your call 24 hours a day, 7 days a week. They will be able to get in touch with a Provider if needed. The unit number is [phone] .

## 2025-02-06 NOTE — PROGRESS NOTES
Late Entry:    Per Patient and SW request made additional referrals to:  [facility]'s IP  [address] [place] [address]  Phone: [phone]  Fax: [phone]  [email or web address]/mens-[facility]/  [facility]  [address] [address] [place] [address]  Phone: [phone]  Fax: [phone]  [email or web address]/programs-services/residential/mens-[facility]/    [facility] Mccrack[facility] on 2/6/2025 at 11:42 AM

## 2025-02-06 NOTE — PLAN OF CARE
BEH IP Unit Acuity Rating Score (UARS)  Patient is given one point for every criteria they meet.    CRITERIA SCORING   On a 72 hour hold, court hold, committed, stay of commitment, or revocation. 0    Patient LOS on BEH unit exceeds 20 days. 0  LOS: 5   Patient under guardianship, 55+, otherwise medically complex, or under age 11. 0   Suicide ideation without relief of precipitating factors. 0   Current plan for suicide. 0   Current plan for homicide. 0   Imminent risk or actual attempt to seriously harm another without relief of factors precipitating the attempt. 0   Severe dysfunction in daily living (ex: complete neglect for self care, extreme disruption in vegetative function, extreme deterioration in social interactions). 1   Recent (last 7 days) or current physical aggression in the ED or on unit. 0   Restraints or seclusion episode in past 72 hours. 0   Recent (last 7 days) or current verbal aggression, agitation, yelling, etc., while in the ED or unit. 1    Active psychosis. 0   Need for constant or near constant redirection (from leaving, from others, etc).  0   Intrusive or disruptive behaviors. 0   Patient requires 3 or more hours of individualized nursing care per 8-hour shift (i.e. for ADLs, meds, therapeutic interventions). 0   TOTAL 2

## 2025-02-07 VITALS
HEART RATE: 95 BPM | DIASTOLIC BLOOD PRESSURE: 89 MMHG | RESPIRATION RATE: 16 BRPM | TEMPERATURE: 98.5 F | OXYGEN SATURATION: 98 % | SYSTOLIC BLOOD PRESSURE: 129 MMHG

## 2025-02-07 PROCEDURE — 99238 HOSP IP/OBS DSCHRG MGMT 30/<: CPT | Performed by: CLINICAL NURSE SPECIALIST

## 2025-02-07 PROCEDURE — 250N000013 HC RX MED GY IP 250 OP 250 PS 637: Performed by: NURSE PRACTITIONER

## 2025-02-07 RX ORDER — VALACYCLOVIR HYDROCHLORIDE 500 MG/1
500 TABLET, FILM COATED ORAL 2 TIMES DAILY
Status: SHIPPED
Start: 2025-02-07 | End: 2025-02-10

## 2025-02-07 RX ADMIN — NICOTINE POLACRILEX 4 MG: 2 LOZENGE ORAL at 08:32

## 2025-02-07 RX ADMIN — DOXYCYCLINE 100 MG: 100 CAPSULE ORAL at 08:32

## 2025-02-07 ASSESSMENT — ACTIVITIES OF DAILY LIVING (ADL)
ADLS_ACUITY_SCORE: 26

## 2025-02-07 NOTE — PLAN OF CARE
"   25 [id number]   Valuables   Patient Belongings other (see comments);locker   Patient Belongings Put in Hospital Secure Location (Security or Locker, etc.) other (see comments)       Belongings include (1 item only of each of the following, unless otherwise specified):    In \"Undry\" black bag:  Mayer & castles blazer (cream, in plastic bag)  scarface sweatshirt  Cell phone peraza  black osman sweatshirt  Evans sweatshirt  Purple shirt  Eugeniostanley pryor boxers  [place] bulls cap  Mayer & castles black sweatshirt  Windbreaker pants  2 jeans (mint green, dark grey)  Black shirt  Black camouflage sweatpants  Knitted long-sleeve sweater  9-volt battery  Puregear adapter  White charging cable  Light green handheld fan    Plastic bag 1:  4 \"PSD\" boxers  2 Eugenio Pryor Polo boxers  [place] black shorts  1 vinyl glove  Black t-shirt  Long-sleeved black shirt  Bulls basketball shorts  Bulls jersey    Bag 2:  White shooes  Right sole insert  1 sadly out-of-commission iphone, in plastic zipped bag  Red Nike shorts  Grey pajamas  White shirt  Scarface zip-up hoodie  Rugrats waterproof jacket  Saints sweatshirt  Pair of black socks  Solitary white sock  Grey PSD boxers  Lilac shoes  Blue and white shoes  Green and white shoes  2 Eugeniostanley Pryor boxers (grey, red)  2 PSD boxers  Camo jacket  Red Nike sweatshirt  7 shirts, assorted  \"Express\" collared shirt  Black & plaid button-down shirt  Grey osman jeans  Bulls long-sleeved shirt    White garbage ba white undershirts  White polo shirt  2 white t-shirts/undershirts  1 black sock  1 red sock  1 light grey sock  1 medium grey sock    Air Ray drawstring bag:  Air Ray shoes  VM air shoes  Solitary sole insert    Travon Ray drawstring bag:  Beaded dreamcatcher  Gilette razor handle (no razor)  6 charging cords of various types  Hubert toothbrush kit/carrying case  Toothbrush     Grey duffel ba sweatshirts  Osman jacket  White pants  Cropped blue pants  Dark blue " distressed pants    Black backpack:  2 windbreaker jackets  Orange PSD boxers  Black jeans  Light blue jeans  8 sports caps  2 condoms  Plusone adult toy  Eyeshadow wand    Medications & Valuables:  Guess cologne bottle  ? Clearwater bottle  Olanzapine   Toothpaste tube  Identification card  Cigarette lighter  Nasal applicator tube  Old Spice deodorant  Profusion eyelash adhesive      Goal Outcome Evaluation:       A               Admission:  I am responsible for any personal items that are not sent to the safe or pharmacy.  [facility] is not responsible for loss, theft or damage of any property in my possession.    Signature:  _________________________________ Date: _______  Time: _____                                              Staff Signature:  ____________________________ Date: ________  Time: _____      2nd Staff person, if patient is unable/unwilling to sign:    Signature: ________________________________ Date: ________  Time: _____     Discharge:  [facility] has returned all of my personal belongings:    Signature: _________________________________ Date: ________  Time: _____                                          Staff Signature:  ____________________________ Date: ________  Time: _____

## 2025-02-07 NOTE — PROGRESS NOTES
Pt discharged at this time to [facility]. He was picked up by their transportation van.     Discharge instructions (AVS) reviewed with pt including medication list and aftercare recommendations. Pt verbalizes understanding.     All personal belongings returned to pt, including a knife he picked up at Security.     Pt denies SI/HI or other safety concerns at time of discharge.

## 2025-02-07 NOTE — PLAN OF CARE
Goal Outcome Evaluation:  Problem: Psychotic Signs/Symptoms  Goal: Improved Sleep (Psychotic Signs/Symptoms)  Intervention: Promote Healthy Sleep Hygiene  Recent Flowsheet Documentation  Taken 2/7/2025 0000 by Bry Minaya RN  Sleep Hygiene Promotion:   noise level reduced   room lighting adjusted       Pt in bed at beginning of shift, breathing quiet and unlabored. Pt slept through shift. Pt slept 7 hours.      No pt complaints or concerns at this time.      No PRNs given. Will continue to monitor.

## 2025-02-07 NOTE — PLAN OF CARE
Team Note Due:  Monday      Assessment/Intervention/Current Symtoms and Care Coordination:  Chart review and met with team, discussed pt progress, symptomology, and response to treatment.  Discussed the discharge plan and any potential impediments to discharge.    Discussed patient in team. Pt will be discharging to [facility].     Met with patient and reviewed safety plan. Patient was given a copy of the safety plan.    Discharge Plan or Goal:  CD treatment.     Barriers to Discharge:  Psychosis and withdrawal     Referral Status:  None     Legal Status:  Voluntary   County:   File Number:   Start and expiration date of commitment:     Contacts (include FOUZIA status):   : Samir Green, Mental Health Resources  Ph: [phone] (FOUZIA)     Upcoming Meetings and Dates/Important Information and next steps:  Coordinate care

## 2025-02-07 NOTE — PLAN OF CARE
02/07/25 1017   Valuables   Patient Belongings other (see comments)       Valuables (stored in locker):  Phone  Sahara gold necklace  Belt  Nike windbreaker  4 cigarettes (in windbreaker pocket)  Pack of cigarettes  3 hairbinders  White cap  Pen  white shoelaces  Polo sweatshirt  Light blue pants      A               Admission:  I am responsible for any personal items that are not sent to the safe or pharmacy.  [facility] is not responsible for loss, theft or damage of any property in my possession.    Signature:  _________________________________ Date: _______  Time: _____                                              Staff Signature:  ____________________________ Date: ________  Time: _____      2nd Staff person, if patient is unable/unwilling to sign:    Signature: ________________________________ Date: ________  Time: _____     Discharge:  [facility] has returned all of my personal belongings:    Signature: _________________________________ Date: ________  Time: _____                                          Staff Signature:  ____________________________ Date: ________  Time: _____
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
## 2025-02-07 NOTE — PLAN OF CARE
Problem: Adult Inpatient Plan of Care  Goal: Optimal Comfort and Wellbeing  Outcome: Adequate for Care Transition  Intervention: Provide Person-Centered Care  Recent Flowsheet Documentation  Taken 2/6/2025 1703 by Karli Ramirez RN  Trust Relationship/Rapport:   care explained   choices provided   emotional support provided   Goal Outcome Evaluation:    Plan of Care Reviewed With: patient    /84 (BP Location: Left arm)   Pulse 106   Temp 98.6  F (37  C) (Temporal)   Resp 16   SpO2 98%      Pt have been in a good mood as he looks forward to being discharged tomorrow morning . Pt is to be picked up at 10:30am by [facility] . ( Call [phone]) .   Dressing done to pt calf with teaching done while dressing was been done .

## 2025-02-11 ENCOUNTER — PATIENT OUTREACH (OUTPATIENT)
Dept: CARE COORDINATION | Facility: CLINIC | Age: 40
End: 2025-02-11
Payer: COMMERCIAL

## 2025-02-11 NOTE — PROGRESS NOTES
[facility]    Background: Transitional Care Management program identified per system criteria and reviewed by [facility] team for possible outreach.    Assessment: Upon chart review, [facility] Team member will not proceed with patient outreach related to this episode of Transitional Care Management program due to reason below:    Patient discharged to [facility] where all needs will be met.    Plan: Transitional Care Management episode addressed appropriately per reason noted above.      LIANA Brown  [facility], [facility]    *Connected Care Resource Team does NOT follow patient ongoing. Referrals are identified based on internal discharge reports and the outreach is to ensure patient has an understanding of their discharge instructions.

## 2025-03-24 ENCOUNTER — HOSPITAL ENCOUNTER (INPATIENT)
Facility: CLINIC | Age: 40
LOS: 1 days | Discharge: LEFT BEFORE DISCHARGE | End: 2025-03-26
Attending: EMERGENCY MEDICINE | Admitting: PSYCHIATRY & NEUROLOGY
Payer: COMMERCIAL

## 2025-03-24 ENCOUNTER — TELEPHONE (OUTPATIENT)
Dept: BEHAVIORAL HEALTH | Facility: CLINIC | Age: 40
End: 2025-03-24

## 2025-03-24 DIAGNOSIS — F25.9 SCHIZOAFFECTIVE DISORDER, CHRONIC CONDITION WITH ACUTE EXACERBATION (H): ICD-10-CM

## 2025-03-24 DIAGNOSIS — F25.1 SCHIZOAFFECTIVE DISORDER, DEPRESSIVE TYPE (H): ICD-10-CM

## 2025-03-24 DIAGNOSIS — F15.951 AMPHETAMINE-INDUCED PSYCHOTIC DISORDER WITH HALLUCINATIONS (H): ICD-10-CM

## 2025-03-24 DIAGNOSIS — F20.0 PARANOID SCHIZOPHRENIA (H): Primary | ICD-10-CM

## 2025-03-24 DIAGNOSIS — R44.0 AUDITORY HALLUCINATIONS: ICD-10-CM

## 2025-03-24 DIAGNOSIS — R45.851 SUICIDAL IDEATION: ICD-10-CM

## 2025-03-24 LAB
ALBUMIN SERPL BCG-MCNC: 4.7 G/DL (ref 3.5–5.2)
ALCOHOL BREATH TEST: 0 (ref 0–0.01)
ALP SERPL-CCNC: 80 U/L (ref 40–150)
ALT SERPL W P-5'-P-CCNC: 24 U/L (ref 0–70)
AMPHETAMINES UR QL SCN: ABNORMAL
ANION GAP SERPL CALCULATED.3IONS-SCNC: 15 MMOL/L (ref 7–15)
APAP SERPL-MCNC: <5 UG/ML (ref 10–30)
AST SERPL W P-5'-P-CCNC: 34 U/L (ref 0–45)
BARBITURATES UR QL SCN: ABNORMAL
BASOPHILS # BLD AUTO: 0 10E3/UL (ref 0–0.2)
BASOPHILS NFR BLD AUTO: 0 %
BENZODIAZ UR QL SCN: ABNORMAL
BILIRUB SERPL-MCNC: 0.5 MG/DL
BUN SERPL-MCNC: 12.4 MG/DL (ref 6–20)
BZE UR QL SCN: ABNORMAL
CALCIUM SERPL-MCNC: 9.5 MG/DL (ref 8.8–10.4)
CANNABINOIDS UR QL SCN: ABNORMAL
CHLORIDE SERPL-SCNC: 101 MMOL/L (ref 98–107)
CREAT SERPL-MCNC: 0.93 MG/DL (ref 0.67–1.17)
EGFRCR SERPLBLD CKD-EPI 2021: >90 ML/MIN/1.73M2
EOSINOPHIL # BLD AUTO: 0 10E3/UL (ref 0–0.7)
EOSINOPHIL NFR BLD AUTO: 1 %
ERYTHROCYTE [DISTWIDTH] IN BLOOD BY AUTOMATED COUNT: 11.9 % (ref 10–15)
FENTANYL UR QL: ABNORMAL
FLUAV RNA SPEC QL NAA+PROBE: NEGATIVE
FLUBV RNA RESP QL NAA+PROBE: NEGATIVE
GLUCOSE SERPL-MCNC: 96 MG/DL (ref 70–99)
HCO3 SERPL-SCNC: 23 MMOL/L (ref 22–29)
HCT VFR BLD AUTO: 45.9 % (ref 40–53)
HGB BLD-MCNC: 16.4 G/DL (ref 13.3–17.7)
IMM GRANULOCYTES # BLD: 0 10E3/UL
IMM GRANULOCYTES NFR BLD: 0 %
LYMPHOCYTES # BLD AUTO: 1.9 10E3/UL (ref 0.8–5.3)
LYMPHOCYTES NFR BLD AUTO: 22 %
MCH RBC QN AUTO: 31.6 PG (ref 26.5–33)
MCHC RBC AUTO-ENTMCNC: 35.7 G/DL (ref 31.5–36.5)
MCV RBC AUTO: 88 FL (ref 78–100)
MONOCYTES # BLD AUTO: 0.9 10E3/UL (ref 0–1.3)
MONOCYTES NFR BLD AUTO: 10 %
NEUTROPHILS # BLD AUTO: 5.6 10E3/UL (ref 1.6–8.3)
NEUTROPHILS NFR BLD AUTO: 67 %
NRBC # BLD AUTO: 0 10E3/UL
NRBC BLD AUTO-RTO: 0 /100
OPIATES UR QL SCN: ABNORMAL
PCP QUAL URINE (ROCHE): ABNORMAL
PLATELET # BLD AUTO: 248 10E3/UL (ref 150–450)
POTASSIUM SERPL-SCNC: 3.6 MMOL/L (ref 3.4–5.3)
PROT SERPL-MCNC: 7.6 G/DL (ref 6.4–8.3)
RBC # BLD AUTO: 5.19 10E6/UL (ref 4.4–5.9)
RSV RNA SPEC NAA+PROBE: NEGATIVE
SALICYLATES SERPL-MCNC: <0.3 MG/DL
SARS-COV-2 RNA RESP QL NAA+PROBE: NEGATIVE
SODIUM SERPL-SCNC: 139 MMOL/L (ref 135–145)
WBC # BLD AUTO: 8.4 10E3/UL (ref 4–11)

## 2025-03-24 PROCEDURE — 80307 DRUG TEST PRSMV CHEM ANLYZR: CPT | Performed by: PHYSICIAN ASSISTANT

## 2025-03-24 PROCEDURE — 80053 COMPREHEN METABOLIC PANEL: CPT | Performed by: PHYSICIAN ASSISTANT

## 2025-03-24 PROCEDURE — 99285 EMERGENCY DEPT VISIT HI MDM: CPT | Mod: 25 | Performed by: EMERGENCY MEDICINE

## 2025-03-24 PROCEDURE — 99285 EMERGENCY DEPT VISIT HI MDM: CPT | Mod: FS | Performed by: EMERGENCY MEDICINE

## 2025-03-24 PROCEDURE — 85025 COMPLETE CBC W/AUTO DIFF WBC: CPT | Performed by: PHYSICIAN ASSISTANT

## 2025-03-24 PROCEDURE — 250N000013 HC RX MED GY IP 250 OP 250 PS 637: Performed by: EMERGENCY MEDICINE

## 2025-03-24 PROCEDURE — 36415 COLL VENOUS BLD VENIPUNCTURE: CPT | Performed by: PHYSICIAN ASSISTANT

## 2025-03-24 PROCEDURE — 82075 ASSAY OF BREATH ETHANOL: CPT | Performed by: EMERGENCY MEDICINE

## 2025-03-24 PROCEDURE — 80143 DRUG ASSAY ACETAMINOPHEN: CPT | Performed by: PHYSICIAN ASSISTANT

## 2025-03-24 PROCEDURE — 87637 SARSCOV2&INF A&B&RSV AMP PRB: CPT | Performed by: PHYSICIAN ASSISTANT

## 2025-03-24 PROCEDURE — 80179 DRUG ASSAY SALICYLATE: CPT | Performed by: PHYSICIAN ASSISTANT

## 2025-03-24 PROCEDURE — 250N000013 HC RX MED GY IP 250 OP 250 PS 637: Performed by: PHYSICIAN ASSISTANT

## 2025-03-24 RX ORDER — OLANZAPINE 10 MG/1
10 TABLET, ORALLY DISINTEGRATING ORAL ONCE
Status: COMPLETED | OUTPATIENT
Start: 2025-03-24 | End: 2025-03-24

## 2025-03-24 RX ORDER — POLYETHYLENE GLYCOL 3350 17 G
2 POWDER IN PACKET (EA) ORAL
Status: DISCONTINUED | OUTPATIENT
Start: 2025-03-24 | End: 2025-03-26 | Stop reason: HOSPADM

## 2025-03-24 RX ADMIN — NICOTINE POLACRILEX 2 MG: 2 LOZENGE ORAL at 22:17

## 2025-03-24 RX ADMIN — NICOTINE POLACRILEX 2 MG: 2 LOZENGE ORAL at 14:05

## 2025-03-24 RX ADMIN — OLANZAPINE 10 MG: 10 TABLET, ORALLY DISINTEGRATING ORAL at 14:03

## 2025-03-24 ASSESSMENT — ACTIVITIES OF DAILY LIVING (ADL)
ADLS_ACUITY_SCORE: 52

## 2025-03-24 ASSESSMENT — COLUMBIA-SUICIDE SEVERITY RATING SCALE - C-SSRS
1. IN THE PAST MONTH, HAVE YOU WISHED YOU WERE DEAD OR WISHED YOU COULD GO TO SLEEP AND NOT WAKE UP?: YES
2. HAVE YOU ACTUALLY HAD ANY THOUGHTS OF KILLING YOURSELF IN THE PAST MONTH?: NO
6. HAVE YOU EVER DONE ANYTHING, STARTED TO DO ANYTHING, OR PREPARED TO DO ANYTHING TO END YOUR LIFE?: NO

## 2025-03-24 NOTE — CONSULTS
"Diagnostic Evaluation Consultation  Crisis Assessment    Patient Name: Hamzah Roberts  Age:  39 year old  Legal Sex: male  Gender Identity: male  Pronouns:   Race: White  Ethnicity: Not  or   Language: English      Patient was assessed: In person   Crisis Assessment Start Date: 03/24/25  Crisis Assessment Start Time: 1430  Crisis Assessment Stop Time: 1500  Patient location: [facility]                             [facility]ED[facility]-A    Referral Data and Chief Complaint  Hamzah Roberts presents to the ED by  self. Patient is presenting to the ED for the following concerns: Suicidal ideation, Depression, Worsening psychosocial stress, Substance use. Factors that make the mental health crisis life threatening or complex are: Prior mental health diagnoses including Schizoaffective Disorder, anxiety, Methamphetamines and Cannabis use; psychosocial stressors (interpersonal issues, unstable housing, limited social supports).     Hamzah Roberts is a 39 year old male with history of paranoid schizophrenia, polysubstance abuse, anxiety, and suicidal ideation who presents to the ED with worsening auditory hallucinations and suicidal ideation. Throughout this assessment, the patient is alert, oriented X4, flat affect, calm and cooperative. Patient reports that he has been dealing with substance use issues, auditory hallucinations, and suicidal thoughts for several years. He has been seen and evaluated for similar concerns previously. Last ED evaluation was on January 31, 2025. Patient reports that since then, \"things just got worst and worst.\" Patient reports SI with plan to overdose on street drugs; patient denies SI intent. He states \"I don't want to die but I have nothing to live for. I'm dead inside. I hear voices. In my head, I have images of me dying. It is just too much. I just want to end it all.\" Patient reports that about 2 months ago, he had an aborted suicide attempt where he swallowed " "his prescribed medications and then ended up spitting them out. Pt denies SIB and HI. Additionally, the patient reports worsening auditory hallucinations. He states that the hallucinations consist of someone yelling and \"screaming for help\". Patient denies currently experiencing command AH, visual hallucinations, and paranoia. Per chart review, patient has been prsecribed Zyprexa and takes it intermittenly. Patient reports he ran out of meds about 4 days ago and has not been able to get a refill. When asked about substance use, patient reports he has been struggling with Methamphetamines use. Patient reports recent methamphetamine use 1-2 days ago which exacerbated MH sx further with significant sleeplessness. Patient reported a history of several [facility] use treatment programming. Patient reports he had a stretch of 4 years abstenance from all substances. Patient reports the hallucinations got better during that time. Patient  denies current OP therapy and OP psychiatry. Patient reports coming to ED to seek help and does not feel safe in the community. Patient reports wanting to regain sobriety and improve his mental health.    Informed Consent and Assessment Methods  Explained the crisis assessment process, including applicable information disclosures and limits to confidentiality, assessed understanding of the process, and obtained consent to proceed with the assessment.  Assessment methods included conducting a formal interview with patient, review of medical records, collaboration with medical staff, and obtaining relevant collateral information from family and community providers when available: done     History of the Crisis   Per chart review, patient was in [facility] from 2/1/25-2/8/25 due to similar presentation. Pt has prior dx of schizoaffective disorder, polysubstance use disorder, and anxiety. Pt has prior hx of [facility] hospitalizations most recently at [facility] from 2/1-2/8/25 and 12/16/24-12/19/24. Pt " reports hx of multiple residential CD tx. Pt has hx of MICD commitment most recently from 5/5/23-5/19/24. Pt has hx of prior MICD commitments in 2022 and 2021. Pt denies family hx of MICD concerns. Pt reports longest period of sobriety existed from age 20 to 28.    Brief Psychosocial History  Family:  Single, Children yes  Support System:  None  Employment Status:  unemployed  Source of Income:  unable to assess  Financial Environmental Concerns:  unable to afford rent/mortgage, unemployed  Current Hobbies:  music, television/movies/videos  Barriers in Personal Life:  lack of motivation, lack of companionship    Significant Clinical History  Current Anxiety Symptoms:  anxious, racing thoughts  Current Depression/Trauma:  apathy, sense of doom, withdrawl/isolation, low self esteem, impaired decision making, helplessness, hopelessness, excessive guilt, thoughts of death/suicide  Current Somatic Symptoms:  anxious, racing thoughts  Current Psychosis/Thought Disturbance:  auditory hallucinations  Current Eating Symptoms:     Chemical Use History:  Alcohol: None  Benzodiazepines: None  Opiates: None  Cocaine: None  Marijuana: Occasional  Other Use: Methamphetamines  Last Use:: 03/23/25   Past diagnosis:  Substance Use Disorder, Other (Schizoaffective disorder)  Family history:  No known history of mental health or chemical health concerns  Past treatment:  Psychiatric Medication Management, Civil Commitment, Primary Care, Inpatient Hospitalization, Residential Treatment  Details of most recent treatment:  Pt has prior dx of schizoaffective disorder, polysubstance use disorder, and anxiety. Pt denies hx of prior suicide attempts. Pt has prior hx of [facility] hospitalizations most recently at [facility] from 12/16/24-12/19/24. Pt reports hx of multiple residential CD tx. Pt has hx of MICD commitment most recently from 5/5/23-5/19/24. Pt has hx of prior MICD commitments in 2022 and 2021. Pt denies family hx of MICD concerns. Pt denies  "hx of abuse. Pt reports feeling increasingly isolated reporting that his family \"stopped talking to me\" and that he hasn't seen his children in a long time. Pt reports longest period of sobriety existed from age 20 to 28.  Other relevant history:       Have there been any medication changes in the past two weeks:  patient is not on psychiatric meds       Is the patient compliant with medications:  no  History of poor adherence to prescribed medications.     Collateral Information  Is there collateral information: No (Writer called patient's emergency contact Angie Roberts (Sister)  [phone] but unable to reach.)      Risk Assessment    [place] Suicide Severity Rating Scale Recent:   Suicidal Ideation (Recent)  Q1 Wished to be Dead (Past Month): yes  Q2 Suicidal Thoughts (Past Month): yes  Q3 Suicidal Thought Method: yes  Q4 Suicidal Intent without Specific Plan: no  Q5 Suicide Intent with Specific Plan: no  Level of Risk per Screen: moderate risk  Intensity of Ideation (Recent)  Most Severe Ideation Rating (Past 1 Month): 3  Frequency (Past 1 Month): 2-5 times in week  Duration (Past 1 Month): Less than 1 hour/some of the time  Controllability (Past 1 Month): Can control thoughts with little difficulty  Deterrents (Past 1 Month): Uncertain that deterrents stopped you  Reasons for Ideation (Past 1 Month): Equally to get attention, revenge, or a reaction from others and to end/stop the pain  Suicidal Behavior (Recent)  Actual Attempt (Past 3 Months): No  Total Number of Actual Attempts (Past 3 Months): 0  Actual Attempt Description (Past 3 Months): N/A  Has subject engaged in non-suicidal self-injurious behavior? (Past 3 Months): No  Interrupted Attempts (Past 3 Months): No  Total Number of Interrupted Attempts (Past 3 Months): 0  Interrupted Attempt Description (Past 3 Months): N/A  Aborted or Self-Interrupted Attempt (Past 3 Months): No  Total Number of Aborted or Self-Interrupted Attempts (Past 3 Months): " 0  Aborted or Self-Interrupted Attempt Description (Past 3 Months): N/A  Preparatory Acts or Behavior (Past 3 Months): No  Total Number of Preparatory Acts (Past 3 Months): 0  Preparatory Acts or Behavior Description (Past 3 Months): N/A    Environmental or Psychosocial Events: legal issues such as DWI, DUI, lawsuit, CPS involvement, etc., loss of a relationship due to divorce/separation, work or task failure, challenging interpersonal relationships, other life stressors, ongoing abuse of substances, helplessness/hopelessness  Protective Factors: Protective Factors: reality testing ability, help seeking    Does the patient have thoughts of harming others? Feels Like Hurting Others: no  Previous Attempt to Hurt Others: no  Is the patient engaging in sexually inappropriate behavior?: no  Does Patient have a known history of aggressive behavior: No    Is the patient engaging in sexually inappropriate behavior?  no        Mental Status Exam   Affect: Flat  Appearance: Appropriate  Attention Span/Concentration: Attentive  Eye Contact: Engaged    Fund of Knowledge: Appropriate   Language /Speech Content: Fluent  Language /Speech Volume: Soft  Language /Speech Rate/Productions: Normal  Recent Memory: Intact  Remote Memory: Variable  Mood: Anxious, Depressed  Orientation to Person: Yes   Orientation to Place: Yes  Orientation to Time of Day: Yes  Orientation to Date: Yes     Situation (Do they understand why they are here?): Yes  Psychomotor Behavior: Normal  Thought Content: Clear  Thought Form: Goal Directed    Medication  Psychotropic medications:   Medication Orders - Psychiatric (From admission, onward)      Start     Dose/Rate Route Frequency Ordered Stop    03/24/25 1402  nicotine (COMMIT) lozenge 2 mg         2 mg Buccal EVERY 1 HOUR PRN 03/24/25 1402               Current Care Team  Patient Care Team:  No Ref-Primary, Physician as PCP - Brandon Gunn as Nursing Assistant  Cynthia Bueno as Case  Manager    Diagnosis  Patient Active Problem List   Diagnosis Code    Suicidal ideation R45.851    Polysubstance abuse (H) F19.10    Paranoid schizophrenia (H) F20.0    Paranoia (H) F22    Methamphetamine abuse (H) F15.10    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    Amphetamine use disorder, severe, 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    Anxiety F41.9    Episodic mood disorder F39    Gastroesophageal reflux disease K21.9    Left ankle pain, unspecified chronicity M25.572    Psychosis, atypical (H) F29    Recurrent genital herpes A60.00    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    Hallucinations R44.3    Encounter for medication refill Z76.0    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    Methamphetamine use disorder, moderate, in sustained remission, in controlled environment, dependence (H) F15.21    Methamphetamine use disorder, severe (H) F15.20    Schizoaffective disorder, bipolar type (H) F25.0    Schizoaffective disorder, chronic condition with acute exacerbation (H) F25.9    Schizoaffective disorder, depressive type (H) F25.1    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.30, S09.90XS    Auditory hallucinations R44.0    Substance-induced psychotic disorder with hallucinations (H) F19.951    Altered mental status, unspecified altered mental status type R41.82    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 Patient reports SI with plan to overdose on street drugs; patient denies SI intent. He states \"I don't want to die but I have nothing to live for. I'm dead inside. I hear voices. In my head, I have images of me dying. It is just too much. I just want to end it all.\" Patient reports that about 2 months ago, he had an aborted suicide attempt where he swallowed his prescribed medications and then ended up spitting them out. Additionally, the patient reports worsening auditory hallucinations. He states that the hallucinations consist of someone yelling and \"screaming for help\". Patient denies currently experiencing command AH, visual hallucinations, and paranoia. Per chart review, patient has been prsecribed Zyprexa and takes it intermittenly. Patient reports he ran out of meds about 4 days ago and has not been able to get a refill. Patient reports recent methamphetamine use 1-2 days ago which exacerbated  sx further with significant sleeplessness. Patient reports coming to ED to seek help and does not feel safe in the community. Patient reports wanting to regain sobriety and improve his mental health. Patient appear to be dealing with worsening psychosis symptoms and SI which complicated by Methamphetamines use. Substance use likely contributing to the current psychosis symptoms.     It is the recommendation of this clinician that the pt be admitted to [facility] for safety and stabilization. Pt endorses active SI with a plan and access to means, recent attempt, inability to enagge in saefty planning to mitigate risks, and limtied support, lower levels of care have been unsuccessful in managing symptoms. Pt has limited protective factors and several risk factors for suicide. For this reason, a secure setting with 24 hour supervision is the most appropriate and least restrictive option available for the pt.    Goals for crisis stabilization:  Improvement in " Si and ability to safety plan.    Next steps for Care Team:  Patient identified his goal to restart his psychotropic medications.    Treatment Objectives Addressed:  rapport building, assessing safety, identifying additional supports, exploring obstacles to safety in the community    Therapeutic Interventions:  Identified and practiced coping skills.    Has a specific means been identified for suicidal/homicide actions: Yes    If yes, describe:  Overdose on street drugs or pills.    Explain action steps toward mitigation:  Patient unable to engage in safety planning to mitigate risks.    Document completion of mitigation actions:  Safety plan attempted but unable to complete.    The follow up action still needed prior to discharge:  Safety planning    Patient coping skills attempted to reduce the crisis:  Distraction techniques and come to the ED.    Disposition  Recommended referrals: Medication Management, Programmatic Care        Reviewed case and recommendations with attending provider. Attending Name: Guy Ross PA-C       Attending concurs with disposition: yes       Patient and/or validated legal guardian concurs with disposition:   yes       Final disposition:  inpatient mental health         Imminent risk of harm: Suicidal Behavior  Severe psychiatric, behavioral or other comorbid conditions are appropriate for management at inpatient mental health as indicated by at least one of the following: Symptoms of impact to function  Severe dysfunction in daily living is present as indicated by at least one of the following: Other evidence of severe dysfunction  Situation and expectations are appropriate for inpatient care: Biopsychosocial stresses potentially contributing to clinical presentation (co morbidities) have been assessed and are absent or manageable at proposed level of care  Inpatient mental health services are necessary to meet patient needs and at least one of the following: Specific  condition related to admission diagnosis is present and judged likely to deteriorate in absence of treatment at proposed level of care      Legal status: Voluntary/Patient has signed consent for treatment                                                                                  Reviewed court records: yes       Assessment Details   Total duration spent with the patient: 30 min     CPT code(s) utilized: 29538 - Psychotherapy for Crisis - 60 (30-74*) min    Vivi Rodriguez Psychotherapist  [facility] - Triage & Transition Services  Callback: [phone]

## 2025-03-24 NOTE — ED PROVIDER NOTES
"    [facility] - [place], [place] EMERGENCY DEPARTMENT ([facility])    3/24/25      ED PROVIDER NOTE     History     Chief Complaint   Patient presents with    Psychiatric Evaluation     Reluctant to say much in triage.  He states he knows he is in psychosis and needs therapy but is reluctant to say what exactly he is experiencing.  He admits to having smoked meth over 24 hours ago.  Is supposed to take olanzapine but has run out.    Suicidal     States how he is feeling makes him not want to live.     HPI  Hamzah Roberts is a 39 year old male with history of paranoid schizophrenia, polysubstance abuse, anxiety, and suicidal ideation who presents to the emergency department with concerns of auditory hallucinations and suicidal ideation, no plan.  He was brought to the emergency department by a friend.  Has been seen and evaluated for similar concerns previously. Last ED evaluation was on January 31, 2025.  States that the hallucinations consist of someone \"screaming for help\".  He does not know who this might be, does not feel anyone is trying to harm him.  No visual hallucinations.  On review of the chart, he has had these kinds of symptoms for several years, has been treated outpatient with Zyprexa which sometimes works for him.  At times he has requested Risperdal, states that it works better than Zyprexa.  He denies any homicidal ideation today.  States that he has run out of his home medications (Zyprexa).   He admits to marijuana use, denies any other substance use recently. Would like to be admitted for stabilization.  Wound on left lower extremity is completely healed.      A medically appropriate review of systems was performed with pertinent positives and negatives noted in the HPI, and all other systems negative.    Physical Exam   BP: (!) 151/94  Pulse: 97  Temp: 98  F (36.7  C)  Resp: 16  Weight: 73.5 kg (162 lb)  SpO2: 98 %  Physical Exam  Constitutional:       General: He is not in acute distress.     Appearance: " Normal appearance. He is not ill-appearing, toxic-appearing or diaphoretic.   HENT:      Head: Normocephalic and atraumatic.      Mouth/Throat:      Mouth: Mucous membranes are moist.      Pharynx: Oropharynx is clear. No oropharyngeal exudate or posterior oropharyngeal erythema.   Cardiovascular:      Rate and Rhythm: Normal rate and regular rhythm.      Pulses: Normal pulses.      Heart sounds: Normal heart sounds. No murmur heard.  Pulmonary:      Effort: Pulmonary effort is normal. No respiratory distress.      Breath sounds: Normal breath sounds. No stridor. No wheezing, rhonchi or rales.   Chest:      Chest wall: No tenderness.   Musculoskeletal:         General: Normal range of motion.      Cervical back: Normal range of motion and neck supple. No rigidity or tenderness. No muscular tenderness.   Lymphadenopathy:      Cervical: No cervical adenopathy.   Skin:     General: Skin is warm and dry.   Neurological:      General: No focal deficit present.      Mental Status: He is alert and oriented to person, place, and time.   Psychiatric:         Attention and Perception: He is inattentive. He perceives auditory hallucinations.         Mood and Affect: Mood is depressed. Affect is flat.         Behavior: Behavior is withdrawn.         Thought Content: Thought content includes suicidal ideation. Thought content does not include homicidal ideation. Thought content does not include homicidal or suicidal plan.           ED Course, Procedures, & Data      Procedures       1615: Patient endorses suicidal thoughts, hallucinations, exacerbated by meth use. Plans of overdose using street drugs. Tried to take all his pills about 3 weeks ago but then spit them out. Ran out of Zyprexa about 3 to 4 days ago. Recommend inpatient.     Results for orders placed or performed during the hospital encounter of 03/24/25   Comprehensive metabolic panel     Status: Normal   Result Value Ref Range    Sodium 139 135 - 145 mmol/L     Potassium 3.6 3.4 - 5.3 mmol/L    Carbon Dioxide (CO2) 23 22 - 29 mmol/L    Anion Gap 15 7 - 15 mmol/L    Urea Nitrogen 12.4 6.0 - 20.0 mg/dL    Creatinine 0.93 0.67 - 1.17 mg/dL    GFR Estimate >90 >60 mL/min/1.73m2    Calcium 9.5 8.8 - 10.4 mg/dL    Chloride 101 98 - 107 mmol/L    Glucose 96 70 - 99 mg/dL    Alkaline Phosphatase 80 40 - 150 U/L    AST 34 0 - 45 U/L    ALT 24 0 - 70 U/L    Protein Total 7.6 6.4 - 8.3 g/dL    Albumin 4.7 3.5 - 5.2 g/dL    Bilirubin Total 0.5 <=1.2 mg/dL   Acetaminophen level     Status: Abnormal   Result Value Ref Range    Acetaminophen <5.0 (L) 10.0 - 30.0 ug/mL   Salicylate level     Status: Normal   Result Value Ref Range    Salicylate <0.3   mg/dL   Influenza A/B, RSV and SARS-CoV2 PCR (COVID-19) Nasopharyngeal     Status: Normal    Specimen: Nasopharyngeal; Swab   Result Value Ref Range    Influenza A PCR Negative Negative    Influenza B PCR Negative Negative    RSV PCR Negative Negative    SARS CoV2 PCR Negative Negative    Narrative    Testing was performed using the Xpert Xpress CoV2/Flu/RSV Assay on the [employer] GeneXpert Instrument. This test should be ordered for the detection of SARS-CoV2, influenza, and RSV viruses in individuals with signs and symptoms of respiratory tract infection. This test is for in vitro diagnostic use under the [place] FDA for laboratories certified under CLIA to perform high or moderate complexity testing. This test has been [place] FDA cleared. A negative result does not rule out the presence of PCR inhibitors in the specimen or target RNA in concentration below the limit of detection for the assay. If only one viral target is positive but coinfection with multiple targets is suspected, the sample should be re-tested with another FDA cleared, approved, or authorized test, if coninfection would change clinical management. This test was validated by the [facility]. These laboratories are certified under the Clinical Laboratory  Improvement Amendments of 1988 (CLIA-88) as qualified to perfom high complexity laboratory testing.   CBC with platelets and differential     Status: None   Result Value Ref Range    WBC Count 8.4 4.0 - 11.0 10e3/uL    RBC Count 5.19 4.40 - 5.90 10e6/uL    Hemoglobin 16.4 13.3 - 17.7 g/dL    Hematocrit 45.9 40.0 - 53.0 %    MCV 88 78 - 100 fL    MCH 31.6 26.5 - 33.0 pg    MCHC 35.7 31.5 - 36.5 g/dL    RDW 11.9 10.0 - 15.0 %    Platelet Count 248 150 - 450 10e3/uL    % Neutrophils 67 %    % Lymphocytes 22 %    % Monocytes 10 %    % Eosinophils 1 %    % Basophils 0 %    % Immature Granulocytes 0 %    NRBCs per 100 WBC 0 <1 /100    Absolute Neutrophils 5.6 1.6 - 8.3 10e3/uL    Absolute Lymphocytes 1.9 0.8 - 5.3 10e3/uL    Absolute Monocytes 0.9 0.0 - 1.3 10e3/uL    Absolute Eosinophils 0.0 0.0 - 0.7 10e3/uL    Absolute Basophils 0.0 0.0 - 0.2 10e3/uL    Absolute Immature Granulocytes 0.0 <=0.4 10e3/uL    Absolute NRBCs 0.0 10e3/uL   Urine Drug Screen Panel     Status: Abnormal   Result Value Ref Range    Amphetamines Urine Screen Positive (A) Screen Negative    Barbituates Urine Screen Negative Screen Negative    Benzodiazepine Urine Screen Negative Screen Negative    Cannabinoids Urine Screen Positive (A) Screen Negative    Cocaine Urine Screen Negative Screen Negative    Fentanyl Qual Urine Screen Negative Screen Negative    Opiates Urine Screen Negative Screen Negative    PCP Urine Screen Negative Screen Negative   Alcohol breath test POCT     Status: Normal   Result Value Ref Range    Alcohol Breath Test 0.00 0.00 - 0.01   CBC with platelets differential     Status: None    Narrative    The following orders were created for panel order CBC with platelets differential.  Procedure                               Abnormality         Status                     ---------                               -----------         ------                     CBC with platelets and ...[[id number]]                      Final result                  Please view results for these tests on the individual orders.   Urine Drug Screen     Status: Abnormal    Narrative    The following orders were created for panel order Urine Drug Screen.  Procedure                               Abnormality         Status                     ---------                               -----------         ------                     Urine Drug Screen Panel[[id number]]     Abnormal            Final result                 Please view results for these tests on the individual orders.     Medications   nicotine (COMMIT) lozenge 2 mg (2 mg Buccal $Given 3/24/25 5230)   OLANZapine zydis (zyPREXA) ODT tab 10 mg (10 mg Oral $Given 3/24/25 5748)     Labs Ordered and Resulted from Time of ED Arrival to Time of ED Departure   ACETAMINOPHEN LEVEL - Abnormal       Result Value    Acetaminophen <5.0 (*)    URINE DRUG SCREEN PANEL - Abnormal    Amphetamines Urine Screen Positive (*)     Barbituates Urine Screen Negative      Benzodiazepine Urine Screen Negative      Cannabinoids Urine Screen Positive (*)     Cocaine Urine Screen Negative      Fentanyl Qual Urine Screen Negative      Opiates Urine Screen Negative      PCP Urine Screen Negative     COMPREHENSIVE METABOLIC PANEL - Normal    Sodium 139      Potassium 3.6      Carbon Dioxide (CO2) 23      Anion Gap 15      Urea Nitrogen 12.4      Creatinine 0.93      GFR Estimate >90      Calcium 9.5      Chloride 101      Glucose 96      Alkaline Phosphatase 80      AST 34      ALT 24      Protein Total 7.6      Albumin 4.7      Bilirubin Total 0.5     SALICYLATE LEVEL - Normal    Salicylate <0.3     INFLUENZA A/B, RSV AND SARS-COV2 PCR - Normal    Influenza A PCR Negative      Influenza B PCR Negative      RSV PCR Negative      SARS CoV2 PCR Negative     ALCOHOL BREATH TEST POCT - Normal    Alcohol Breath Test 0.00     CBC WITH PLATELETS AND DIFFERENTIAL    WBC Count 8.4      RBC Count 5.19      Hemoglobin 16.4      Hematocrit 45.9      MCV 88       MCH 31.6      MCHC 35.7      RDW 11.9      Platelet Count 248      % Neutrophils 67      % Lymphocytes 22      % Monocytes 10      % Eosinophils 1      % Basophils 0      % Immature Granulocytes 0      NRBCs per 100 WBC 0      Absolute Neutrophils 5.6      Absolute Lymphocytes 1.9      Absolute Monocytes 0.9      Absolute Eosinophils 0.0      Absolute Basophils 0.0      Absolute Immature Granulocytes 0.0      Absolute NRBCs 0.0       No orders to display          Critical care was not performed.     Medical Decision Making  The patient's presentation was of high complexity (an acute health issue posing potential threat to life or bodily function).    The patient's evaluation involved:  review of external note(s) from 3+ sources (see patient chart)  ordering and/or review of 3+ test(s) in this encounter (patient chart)    The patient's management necessitated high risk (a decision regarding hospitalization).    Assessment & Plan    The patient is a 39 year old male with history of paranoid schizophrenia, polysubstance abuse, anxiety, and suicidal ideation who presents to the emergency department with concerns of auditory hallucinations and suicidal ideation.  He has been seen and evaluated for similar concerns over the past 6 months.  Was admitted for similar concerns on January 31, 2025.    DEC assessment completed, the patient endorses suicidal thoughts, hallucinations, exacerbated by meth use. Plans of overdose using street drugs. Tried to take all his pills about 3 weeks ago but then spit them out.  Ran out of Zyprexa about 3 to 4 days ago.  DEC  recommends inpatient psychiatric care.    Workup included CBC and CMP, within normal limits.  Negative results for influenza, RSV and COVID-19.  Alcohol breath test normal, no salicylates or acetaminophen.  Utox positive for cannabinoids and amphetamines.    Admitting the patient for further evaluation and management, psych consult ordered.    --    ED  Attending Physician Attestation    I GEMMA DORSEY MD, cared for this patient with the Advanced Practice Provider (ELISA). I personally provided a substantive portion of the care for this patient, including approving the care plan for the number and complexity of problems addressed and taking responsibility related to the risk of complications and/or morbidity or mortality of patient management. Please see the ELISA's documentation for full details.    Summary of HPI, PE, ED Course   Patient is a 39 year old male evaluated in the emergency department for hallucinations. Exam and ED course notable for a DEC assessment, who agreed the pt requires admission to psychiatry. After the completion of care in the emergency department, the patient was admitted to inpatient.      GEMMA DORSEY MD  Emergency Medicine       I have reviewed the nursing notes. I have reviewed the findings, diagnosis, plan and need for follow up with the patient.    New Prescriptions    No medications on file       Final diagnoses:   Auditory hallucinations   Suicidal ideation       Guy Ross PA-C    [facility] EMERGENCY DEPARTMENT  3/24/2025     Guy Ross PA-C  03/24/25 3711       Gemma Dorsey MD  03/24/25 1773

## 2025-03-24 NOTE — TELEPHONE ENCOUNTER
S: [facility] Marilyn , DEC  Vivi  calling at 5:09 PM about a 39 year old/Male presenting with SI w/ plan and AH     B: Pt arrived via Self . Presenting problem, stressors: Pt reports he stopped his meds 4 days ago.  Pt reports he had an aborted overdose attempt a couple weeks ago where he took a bunch of pills. Pt reports AH that are not command.   reports pt is dep and using meth.     Pt affect in ED: Depressed  Pt Dx: Major Depressive Disorder, Generalized Anxiety Disorder, Schizoaffective Disorder, and Substance Use Disorder: meth  Previous IP hx? Yes: Feb 2025  Pt endorses SI with a plan to overdose    Hx of suicide attempt? Yes: overdose a couple weeks ago  Pt denies SIB  Pt denies HI   Pt endorses auditory hallucinations .   Pt RARS Score: 2    Hx of aggression/violence, sexual offenses, legal concerns, Epic care plan? describe: no  Current concerns for aggression this visit? No  Does pt have a history of Civil Commitment?  Hx of 3 previous commitments but none currently  Is Pt their own guardian? Yes    Pt is prescribed medication. Is patient medication compliant? No  Pt denies OP services   CD concerns: Actively using/consuming meth  Acute or chronic medical concerns: no  Does Pt present with specific needs, assistive devices, or exclusionary criteria? None      Pt is ambulatory  Pt is able to perform ADLs independently      A: Pt to be reviewed for [facility] admission. Pt is Voluntary  Preferred placement: [facility]    COVID Symptoms: No  If yes, COVID test required   Utox: Positive for amphetamines and cannabis    CMP: WNL  CBC: WNL  HCG: N/A    R: Patient cleared and ready for behavioral bed placement: Yes  Pt placed on [facility] worklist? Yes    Does Patient need a Transfer Center request created? No, Pt is located within [facility] ED, [facility] ED, or [facility]     R: [facility] Access Inpatient Bed Call Log 03/24/2025 @ 3:15 PM:   Intake has called facilities that have not updated the bed status within the  last 12 hours.          [facility] is posting 0 beds.   [facility] is posting 3 beds. [phone]; Per call at 7:40am no answer and unable to lvm.   [facility] ([facility] is posting 0 beds. [phone];    [facility] is posting 0 beds. No high school or rod psych. [phone]; Per call at 7:41am to [place] can follow up after 9:30am for updated bed availability.   [facility] ([facility]) is posting 0 beds. [phone];    [facility] () is posting 0 beds. [phone]    [facility] is posting 6 beds. Ages 18-35, labs required. [phone]; Per call at 7:42am to Martín currently unsure of any YA beds, no child discharges and adol beds are available. Per Martín @11:30 AM  has no young adult beds today  [facility] ([facility]) is posting 0 beds. [phone];    [facility] ([facility]) is posting 0 bed. [phone];

## 2025-03-24 NOTE — PLAN OF CARE
"Hamzah Roberts  March 24, 2025  Plan of Care Hand-off Note     Patient Recommended Care Path: inpatient mental health    Clinical Substantiation:  Hamzah Roberts is a 39 year old male with history of paranoid schizophrenia, polysubstance abuse, anxiety, and suicidal ideation who presents to the ED with worsening auditory hallucinations and suicidal ideation. Patient reports SI with plan to overdose on street drugs; patient denies SI intent. He states \"I don't want to die but I have nothing to live for. I'm dead inside. I hear voices. In my head, I have images of me dying. It is just too much. I just want to end it all.\" Patient reports that about 2 months ago, he had an aborted suicide attempt where he swallowed his prescribed medications and then ended up spitting them out. Additionally, the patient reports worsening auditory hallucinations. He states that the hallucinations consist of someone yelling and \"screaming for help\". Patient denies currently experiencing command AH, visual hallucinations, and paranoia. Patient appear to be dealing with worsening psychosis symptoms and SI which complicated by Methamphetamines use. Substance use likely contributing to the current psychosis symptoms.     It is the recommendation of this clinician that the pt be admitted to [facility] for safety and stabilization. Pt endorses active SI with a plan and access to means, recent attempt, inability to enagge in saefty planning to mitigate risks, and limtied support, lower levels of care have been unsuccessful in managing symptoms. Pt has limited protective factors and several risk factors for suicide. For this reason, a secure setting with 24 hour supervision is the most appropriate and least restrictive option available for the pt.    Goals for crisis stabilization:  Improvement in Si and ability to safety plan.    Next steps for Care Team:  Patient identified his goal to restart his psychotropic medications.    Treatment Objectives " Acute adjustment disorder Addressed:  rapport building, assessing safety, identifying additional supports, exploring obstacles to safety in the community    Therapeutic Interventions:  Identified and practiced coping skills.    Has a specific means been identified for suicidal.homicide actions: Yes  If yes, describe: Overdose on street drugs or pills.  Explain action steps toward mitigation: Patient unable to engage in safety planning to mitigate risks.  Document completion of mitigation action: Safety plan attempted but unable to complete.  The follow up action still needed prior to discharge: Safety planning    Patient coping skills attempted to reduce the crisis:  Distraction techniques and come to the ED.    Imminent risk of harm: Suicidal Behavior  Severe psychiatric, behavioral or other comorbid conditions are appropriate for management at inpatient mental health as indicated by at least one of the following: Symptoms of impact to function  Severe dysfunction in daily living is present as indicated by at least one of the following: Other evidence of severe dysfunction  Situation and expectations are appropriate for inpatient care: Biopsychosocial stresses potentially contributing to clinical presentation (co morbidities) have been assessed and are absent or manageable at proposed level of care  Inpatient mental health services are necessary to meet patient needs and at least one of the following: Specific condition related to admission diagnosis is present and judged likely to deteriorate in absence of treatment at proposed level of care      Collateral contact information:   Angie Roberts (Sister) [phone]      Legal Status: Voluntary/Patient has signed consent for treatment                                            Reviewed court records: yes     Psychiatry Consult: Patient has Psychiatry Consult Order    Vivi Rodriguez

## 2025-03-24 NOTE — ED TRIAGE NOTES
Triage Assessment (Adult)       Row Name 03/24/25 7990          Triage Assessment    Airway WDL WDL        Respiratory WDL    Respiratory WDL WDL        Skin Circulation/Temperature WDL    Skin Circulation/Temperature WDL WDL        Cardiac WDL    Cardiac WDL WDL        Peripheral/Neurovascular WDL    Peripheral Neurovascular WDL WDL        Cognitive/Neuro/Behavioral WDL    Cognitive/Neuro/Behavioral WDL WDL

## 2025-03-24 NOTE — ED NOTES
IP MH Referral Acuity Rating Score (RARS)    LMHP complete at referral to IP MH, with DEC; and, daily while awaiting IP MH placement. Call score to PPS.  CRITERIA SCORING   New 74 HH and Involuntary for IP MH (not adolescent) 0/3   Boarding over 24 hours 0/1   Vulnerable adult at least 55+ with multiple co morbidities; or, Patient age 11 or under 0/1   Suicide ideation without relief of precipitating factors 1/1   Current plan for suicide 1/1   Current plan for homicide 0/1   Imminent risk or actual attempt to seriously harm another without relief of factors precipitating the attempt 0/1   Severe dysfunction in daily living (ex: complete neglect for self care, extreme disruption in vegetative function, extreme deterioration in social interactions) 0/1   Recent (last 2 weeks) or current physical aggression in the ED 0/1   Restraints or seclusion episode in ED 0/1   Verbal aggression, agitation, yelling, etc., while in the ED 0/1   Active psychosis with psychomotor agitation or catatonia 0/1   Need for constant or near constant redirection (from leaving, from others, etc).  0/1   Intrusive or disruptive behaviors 0/1   TOTAL 2

## 2025-03-24 NOTE — MEDICATION SCRIBE - ADMISSION MEDICATION HISTORY
Medication Scribe Admission Medication History    Admission medication history is complete. The information provided in this note is only as accurate as the sources available at the time of the update.    Information Source(s): Patient and CareEverywhere/SureScripts via in-person    Pertinent Information: N/A    Changes made to PTA medication list:  Added: vibramycin 100mg, valtrex 500mg  Deleted: None  Changed: None    Allergies reviewed with patient and updates made in EHR: yes    Medication History Completed By: Deanna Escoto 3/24/2025 4:27 PM    PTA Med List   Medication Sig Last Dose/Taking    OLANZapine (ZYPREXA) 10 MG tablet Take 1 tablet (10 mg) by mouth at bedtime. Past Month

## 2025-03-25 ENCOUNTER — TELEPHONE (OUTPATIENT)
Dept: BEHAVIORAL HEALTH | Facility: CLINIC | Age: 40
End: 2025-03-25
Payer: COMMERCIAL

## 2025-03-25 VITALS
HEART RATE: 84 BPM | BODY MASS INDEX: 27.81 KG/M2 | DIASTOLIC BLOOD PRESSURE: 78 MMHG | SYSTOLIC BLOOD PRESSURE: 124 MMHG | WEIGHT: 162 LBS | TEMPERATURE: 97.8 F | RESPIRATION RATE: 16 BRPM | OXYGEN SATURATION: 98 %

## 2025-03-25 PROBLEM — R44.0 AUDITORY HALLUCINATIONS: Status: ACTIVE | Noted: 2023-11-04

## 2025-03-25 LAB
ATRIAL RATE - MUSE: 88 BPM
DIASTOLIC BLOOD PRESSURE - MUSE: NORMAL MMHG
INTERPRETATION ECG - MUSE: NORMAL
P AXIS - MUSE: 62 DEGREES
PR INTERVAL - MUSE: 134 MS
QRS DURATION - MUSE: 82 MS
QT - MUSE: 334 MS
QTC - MUSE: 404 MS
R AXIS - MUSE: 26 DEGREES
SYSTOLIC BLOOD PRESSURE - MUSE: NORMAL MMHG
T AXIS - MUSE: 31 DEGREES
VENTRICULAR RATE- MUSE: 88 BPM

## 2025-03-25 PROCEDURE — 93005 ELECTROCARDIOGRAM TRACING: CPT

## 2025-03-25 PROCEDURE — 250N000013 HC RX MED GY IP 250 OP 250 PS 637: Performed by: EMERGENCY MEDICINE

## 2025-03-25 PROCEDURE — 250N000013 HC RX MED GY IP 250 OP 250 PS 637: Performed by: PSYCHIATRY & NEUROLOGY

## 2025-03-25 PROCEDURE — 124N000002 HC R&B MH UMMC

## 2025-03-25 PROCEDURE — 99207 PR NO BILLABLE SERVICE THIS VISIT: CPT

## 2025-03-25 PROCEDURE — 99223 1ST HOSP IP/OBS HIGH 75: CPT | Performed by: PSYCHIATRY & NEUROLOGY

## 2025-03-25 PROCEDURE — 93010 ELECTROCARDIOGRAM REPORT: CPT | Performed by: INTERNAL MEDICINE

## 2025-03-25 RX ORDER — OLANZAPINE 10 MG/1
10 TABLET ORAL 3 TIMES DAILY PRN
Status: DISCONTINUED | OUTPATIENT
Start: 2025-03-25 | End: 2025-03-26 | Stop reason: HOSPADM

## 2025-03-25 RX ORDER — OLANZAPINE 10 MG/2ML
10 INJECTION, POWDER, FOR SOLUTION INTRAMUSCULAR 3 TIMES DAILY PRN
Status: DISCONTINUED | OUTPATIENT
Start: 2025-03-25 | End: 2025-03-26 | Stop reason: HOSPADM

## 2025-03-25 RX ORDER — MAGNESIUM HYDROXIDE/ALUMINUM HYDROXICE/SIMETHICONE 120; 1200; 1200 MG/30ML; MG/30ML; MG/30ML
30 SUSPENSION ORAL EVERY 4 HOURS PRN
OUTPATIENT
Start: 2025-03-25

## 2025-03-25 RX ORDER — POLYETHYLENE GLYCOL 3350 17 G/17G
17 POWDER, FOR SOLUTION ORAL DAILY PRN
OUTPATIENT
Start: 2025-03-25

## 2025-03-25 RX ORDER — ACETAMINOPHEN 325 MG/1
650 TABLET ORAL EVERY 4 HOURS PRN
Status: DISCONTINUED | OUTPATIENT
Start: 2025-03-25 | End: 2025-03-26 | Stop reason: HOSPADM

## 2025-03-25 RX ORDER — AMOXICILLIN 250 MG
1 CAPSULE ORAL 2 TIMES DAILY PRN
Status: DISCONTINUED | OUTPATIENT
Start: 2025-03-25 | End: 2025-03-26 | Stop reason: HOSPADM

## 2025-03-25 RX ORDER — HYDROXYZINE HYDROCHLORIDE 25 MG/1
25 TABLET, FILM COATED ORAL EVERY 4 HOURS PRN
OUTPATIENT
Start: 2025-03-25

## 2025-03-25 RX ORDER — HYDROXYZINE HYDROCHLORIDE 25 MG/1
25 TABLET, FILM COATED ORAL EVERY 4 HOURS PRN
Status: DISCONTINUED | OUTPATIENT
Start: 2025-03-25 | End: 2025-03-26 | Stop reason: HOSPADM

## 2025-03-25 RX ORDER — OLANZAPINE 10 MG/1
10 TABLET ORAL AT BEDTIME
Status: DISCONTINUED | OUTPATIENT
Start: 2025-03-25 | End: 2025-03-26 | Stop reason: HOSPADM

## 2025-03-25 RX ORDER — TRAZODONE HYDROCHLORIDE 50 MG/1
50 TABLET ORAL
Status: DISCONTINUED | OUTPATIENT
Start: 2025-03-25 | End: 2025-03-26 | Stop reason: HOSPADM

## 2025-03-25 RX ORDER — ACETAMINOPHEN 325 MG/1
650 TABLET ORAL EVERY 4 HOURS PRN
OUTPATIENT
Start: 2025-03-25

## 2025-03-25 RX ORDER — MAGNESIUM HYDROXIDE/ALUMINUM HYDROXICE/SIMETHICONE 120; 1200; 1200 MG/30ML; MG/30ML; MG/30ML
30 SUSPENSION ORAL EVERY 4 HOURS PRN
Status: DISCONTINUED | OUTPATIENT
Start: 2025-03-25 | End: 2025-03-26 | Stop reason: HOSPADM

## 2025-03-25 RX ADMIN — OLANZAPINE 10 MG: 10 TABLET, FILM COATED ORAL at 20:50

## 2025-03-25 RX ADMIN — NICOTINE POLACRILEX 2 MG: 2 LOZENGE ORAL at 18:47

## 2025-03-25 RX ADMIN — NICOTINE POLACRILEX 2 MG: 2 LOZENGE ORAL at 20:50

## 2025-03-25 RX ADMIN — NICOTINE POLACRILEX 2 MG: 2 LOZENGE ORAL at 13:51

## 2025-03-25 ASSESSMENT — ACTIVITIES OF DAILY LIVING (ADL)
HYGIENE/GROOMING: INDEPENDENT
ADLS_ACUITY_SCORE: 52
ADLS_ACUITY_SCORE: 52
ADLS_ACUITY_SCORE: 26
ADLS_ACUITY_SCORE: 26
ADLS_ACUITY_SCORE: 52
ADLS_ACUITY_SCORE: 26
LAUNDRY: WITH SUPERVISION
ADLS_ACUITY_SCORE: 26
ADLS_ACUITY_SCORE: 52
ADLS_ACUITY_SCORE: 26
DRESS: INDEPENDENT
ADLS_ACUITY_SCORE: 52
ADLS_ACUITY_SCORE: 26
DRESS: INDEPENDENT
ADLS_ACUITY_SCORE: 26
ORAL_HYGIENE: INDEPENDENT
LAUNDRY: WITH SUPERVISION
ADLS_ACUITY_SCORE: 52
ADLS_ACUITY_SCORE: 52
ORAL_HYGIENE: INDEPENDENT
ADLS_ACUITY_SCORE: 26
ADLS_ACUITY_SCORE: 52
ADLS_ACUITY_SCORE: 52
HYGIENE/GROOMING: INDEPENDENT
ADLS_ACUITY_SCORE: 52

## 2025-03-25 ASSESSMENT — LIFESTYLE VARIABLES: SKIP TO QUESTIONS 9-10: 1

## 2025-03-25 NOTE — CONSULTS
Psychiatric consult acknowledged chart reviewed patient accepted to mental health unit please reconsult if patient continues to remain in the emergency room

## 2025-03-25 NOTE — DISCHARGE INSTRUCTIONS
Behavioral Discharge Planning and Instructions    Summary: You were admitted on 3/24/2025  due to  suicidal ideation with drug use .  You were treated by Cl Chand MD and discharged  AGAINST MEDICAL ADVICE on March 26, 2025 from [facility] to Home at [address], [place] [address]      Main Diagnosis:   Rule out methamphetamine-induced psychosis;  Stimulant use disorder, severe;  Historical dx of schizoaffective disorder.         Health Care Follow-up:   The [facility] Member Services number is [phone],   [facility] - [phone] - for transportation to your health care appointments.  [facility] arranged for transportation services through [employer] @ [phone]. Frida will call the unit cell phone # at [phone]. They will pick you unit(s) p at 2:45PM.        Dr. Chand is trying to order your medications over to [facility] in [place].      You were given a list of crisis homes at your request.      You declined to have any services arranged for you.        You declined to have us contact your . You state you will call him.  Intensive Case Managemen through [facility] of Mental Health Resources  Samir Westfall,   Number: Phone:: [phone]   Fax: [phone]  [email or web address]         Patient Navigation Hub:   [facility] work to be your point-of-contact for trustworthy and compassionate care from Inpatient services to [facility] Programmatic Care. We will provide resources and communication to help guide you into programmatic care. Ultimately, our goal is to be the one-stop-shop of communication, coordination, and support for your journey to programmatic care.    Phone: [phone]    Information will be faxed to your outpatient providers to ensure a healthy continuity of care for you.     Attend all scheduled appointments with your outpatient providers. Call at least 24 hours in advance if you need to  reschedule an appointment to ensure continued access to your outpatient providers.     Major Treatments, Procedures and Findings:  You were provided with: a psychiatric assessment, assessed for medical stability, medication evaluation and/or management, group therapy, individual therapy, milieu management, and medical interventions    Symptoms to Report: feeling more aggressive, losing more sleep, mood getting worse, or thoughts of suicide    Early warning signs can include: increased thoughts or behaviors of suicide or self-harm     Safety and Wellness:  Take all medicines as directed.  Make no changes unless your doctor suggests them.      Follow treatment recommendations.  Refrain from alcohol and non-prescribed drugs.  If there is a concern for safety, call 911.    Resources:   Mental Health Crisis Resources  Throughout [place]: call **CRISIS (**998288)  Crisis Text Line: is available for free, 24/7 by texting [place] to 702517  Suicide Awareness Voices of Education (SAVE) (www.save.org): 041-002-JQPC (7291)  The [place] Suicide Prevention Lifeline is now: 988 Suicide and Crisis Lifeline. Call 988 anytime.  National [place] on Mental Illness (www.[place].leola.org): 374.220.9930 or 989-746-8073.  Zgjy1ljed: text the word LIFE to 58725 for immediate support and crisis intervention  Mental Health Consumer/Survivor Network of [place] (www.mhcsn.net): 941.980.7021 or 394-123-4601  Mental Health Association of [place] (www.mentalhealth.org): 773.352.5517 or 228-354-1406  Peer Support Connection [place] [facility] 1-418.748.1760 Available from 5pm - 9am (7 days a week/365 days a year)  Call or Text 988 or [facility] 1-655.524.2506 Community Outreach for Psych Emergencies      General Medication Instructions:   See your medication sheet(s) for instructions.   Take all medicines as directed.  Make no changes unless your doctor suggests them.   Go to all your doctor visits.  Be sure to have all your required lab tests. This way, your  medicines can be refilled on time.  Do not use any drugs not prescribed by your doctor.  Avoid alcohol.    Advance Directives:   Scanned document on file with [place]? No scanned doc  Is document scanned? Pt states no documents  Honoring Choices Your Rights Handout: Informed and given  Was more information offered? Pt declined    The Treatment team has appreciated the opportunity to work with you. If you have any questions or concerns about your recent admission, you can contact the unit which can receive your call 24 hours a day, 7 days a week. They will be able to get in touch with a Provider if needed. The unit number is [phone] .

## 2025-03-25 NOTE — TELEPHONE ENCOUNTER
5:41am - [facility]ID[facility] Dc reviewed pt and states pt is appropriate for the unit and a shared room. Writer will page [facility] for accepting orders.     5:45am - Paged [facility] for possible placement to [facility] (shared room)    6:04am - Notified unit of pt in the queue.    6:05am - Notified [facility] of pt placement.     Indicia Completed S.R    R: Patient Placement to [facility]/Dr. Chand

## 2025-03-25 NOTE — ED NOTES
Patient appeared to sleeping throughout the night shift.No s/s of respiratory distress noted .Q15 min checks done.

## 2025-03-25 NOTE — TELEPHONE ENCOUNTER
R: [place]  Access Inpatient Bed Call Log  3/25/2025 12:24 AM  Intake has called facilities that have not updated their bed status within the last 12 hours.    Adults:    *METRO:  [place] -- [facility]: @ CAPACITY.  [facility]-[id number]: @ CAPACITY. Reporting no reviews overnight.   [facility]- [id number]: @ CAPACITY. Low acuity   [place] -- [facility]- [id number]: @ CAPACITY. Low acuity only -12:25 AM Per Cristofer, they are capped.   [place] -- [facility]- [phone]: @ CAPACITY.   [facility]- [id number]: @ CAPACITY.   [facility]- [id number]: @ CAPACITY. Ages 18-35, Voluntary only, NO aggression/physical/sexual assault, violence hx or drug abuse, or psychosis. Negative Covid --12:17 AM Per Manuela, YA: 1, Adol: 0, Child: 0.   [facility]- [id number]: @ CAPACITY.  [facility]- [id number]: @ CAPACITY.  [place] -- [facility]- [phone]: @ CAPACITY. Do not review overnight.     Pt remains on waitlist pending appropriate placement availability.

## 2025-03-25 NOTE — PLAN OF CARE
03/25/25 0947   Patient Belongings   Did you bring any home meds/supplements to the hospital?  No   Patient Belongings locker;sent to security per site process;remains with patient   Patient Belongings Put in Hospital Secure Location (Security or Locker, etc.) cash/credit card;cell phone/electronics;clothing;jewelry;money (see comment);necklace;plastic bag;shoes;watch;purse/wallet   Belongings Search Yes   Clothing Search Yes   Second Staff Darby (RN st[address])  and Jonathan SOLIS   Comment See note     Goal Outcome Evaluation:  In locker: 1 pair of shoes, 1 pants, 1 jacket, 2 watches, 2 cell phones, 1 unactivated debit card, 3 necklaces, 1 belt, 1 Vaper, 1 lighter, 1  box, 1 ring, 2 bracelets, 1 purse, 2 and neck chains.   Sent to security: 1 Knife, 1 tool and $81 (cash)  With patient: 1 white t-shirt.      A               Admission:  I am responsible for any personal items that are not sent to the safe or pharmacy.  [facility] is not responsible for loss, theft or damage of any property in my possession.    Signature:  _________________________________ Date: _______  Time: _____                                              Staff Signature:  ____________________________ Date: ________  Time: _____      2nd Staff person, if patient is unable/unwilling to sign:    Signature: ________________________________ Date: ________  Time: _____     Discharge:  [facility] has returned all of my personal belongings:    Signature: _________________________________ Date: ________  Time: _____                                          Staff Signature:  ____________________________ Date: ________  Time: _____

## 2025-03-25 NOTE — PROVIDER NOTIFICATION
"Staff alerted the writer two patients were in the lounge arguing and threatening to fight each other.  Staff of six had intervened with several statements of redirection to move one of the patients to their room.  This patient agreed to return to his room.  The patient was unable to agree to not engage in aggressive behavior with the peer.  The patient stated, \"If he comes after me, I will defend myself.\"  The Provider, Unit Manger and ANS is updated on the incident.  New orders are received.  Please refer to the EMAR for details.   "
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
## 2025-03-25 NOTE — PSYCH
Writer received call regarding patient requesting voluntary admission for auditory hallucinations and SI with a plan. Recent labs/vitals reviewed. Patient reported to be medically cleared by ER physician per nurse report. Admission orders placed.

## 2025-03-25 NOTE — ED NOTES
Patient was transferred from ED adult to [facility] at 1050 pm.Upon arrival patient was calm and co-operative.Alert and orient x 5.Patient denies SI/HI/SIB.Patient was oriented to the unit,policy and rules was given.Vital stable.
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
## 2025-03-25 NOTE — PLAN OF CARE
"  Problem: Adult Behavioral Health Plan of Care  Goal: Plan of Care Review  Recent Flowsheet Documentation  Taken 3/25/2025 0900 by Maggie Tian RN  Patient Agreement with Plan of Care: refuses to participate   Goal Outcome Evaluation:    Plan of Care Reviewed With: patient             The patient vol admitted to [facility] for psychosis and SI.        The patient was brought to the ED by his friend.  The patient has a history of paranoid schizophrenia, polysubstance abuse, anxiety, and suicidal ideations.  The patient reports he is currently experiencing AH that are \"screaming for help.\"  The patient has a history of AH.  He is not compliant with his medications.      The patient arrived to the unit.  His body is tense.  He is hyperverbal, loud, paranoid statements, and using curse words.  The patient is not cooperative with the search, changing into scrubs, removing his shoes, and giving his money that will be documented and sent to Security.  The patient stated the staff was rude and disrespectful.  The patient stated that he has schizophrenia but he is not stupid. The patient shows difficulty with understanding.  As he accused staff of asking him to remove his clothing and to put on scrubs.  The patient kept asking for the scrubs.  The patient was unable to understand, the staff gave him a blue gown to put on, come out of the bathroom wearing the blue gown, complete a few exercises, after that is completed, the patient will return to the bathroom and place the hospital scrubs on.   The patient slammed the bathroom door.  Security was called to the unit d/t patients behavior. The patient agreed to cooperate with the facility policies.  The patient went into the lounge and fell asleep in a chair.  The patient apologized for his behavior.  The patients behavior is appropriate to the situation.        The patient is assessed for mental health symptoms.  The patient denies SI, SIB, and HI.  Reports " the  are not there this shift. The patient rates his depression and anxiety 10/10.    The patient is given a tour of the unit.  The patient required a single room.  Room changes were required to accommodate the patient.  The patient was calm and cooperative when waiting on hs room to be cleaned.  The admission profile is completed.  The patient stated he has a no pork diet and vapes.  The patient would like nicotine lozenges for nicotine replacement. The patient is given a unit folder.  The patient will see the provider before the end of the day.  The patient is on Status 15.      Will continue to monitor.
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
## 2025-03-25 NOTE — ED PROVIDER NOTES
Emergency Department I-PASS Sign-out      Illness Severity: Stable    Patient Summary:  39 year old male with pertinent PMH of schizophrenia, depression, anxiety, substance abuse who presented with hallucinations and suicidal ideation    ED Course/treatment plan: Seen by DEC, medically cleared, placed in queue for inpatient mental health admission    Clinical Impression:  (R44.0) Auditory hallucinations    (R45.851) Suicidal ideation      Edited by: Damian Meehan MD at 3/25/2025 9273    Action List:  Tests to Follow-up:  None    Medications Reconciled/Ordered:  Yes    ED Mental Health Boarding Order Set Used for Diet/PRNs/Other:  Yes    DEC, Extended Care, Psych Consult Orders:  Extended Care and Psychiatry consult ordered.    Situational Awareness & Contingency Plannin Hour Hold Status:  Voluntary, would reassess if wanting to leave.  Active Orders  N/A    Disposition:  Admit/Transfer to Behavioral Health, medically clear for admit/transfer    Boarding subsequent shift/day updates:  No events or changes were reported to me at shift change    Edited by: Damian Meehan MD at 3/25/2025 6043    Synthesis & Events after sign-out:  ***        Damian Meehan MD   Emergency Medicine

## 2025-03-25 NOTE — ED NOTES
Patient has an old wound on back of left calf.  It is closed.  There is a scar about 5 inches long.  Skin is slightly flaky over scar.

## 2025-03-26 PROCEDURE — 250N000013 HC RX MED GY IP 250 OP 250 PS 637: Performed by: PSYCHIATRY & NEUROLOGY

## 2025-03-26 PROCEDURE — 99239 HOSP IP/OBS DSCHRG MGMT >30: CPT | Performed by: PSYCHIATRY & NEUROLOGY

## 2025-03-26 RX ORDER — OLANZAPINE 10 MG/1
10 TABLET ORAL AT BEDTIME
Qty: 30 TABLET | Refills: 1 | Status: SHIPPED | OUTPATIENT
Start: 2025-03-26

## 2025-03-26 RX ORDER — OLANZAPINE 10 MG/1
10 TABLET ORAL AT BEDTIME
Qty: 30 TABLET | Refills: 0 | Status: SHIPPED | OUTPATIENT
Start: 2025-03-26 | End: 2025-03-26

## 2025-03-26 RX ADMIN — NICOTINE POLACRILEX 2 MG: 2 LOZENGE ORAL at 13:46

## 2025-03-26 ASSESSMENT — ACTIVITIES OF DAILY LIVING (ADL)
ADLS_ACUITY_SCORE: 26
ADLS_ACUITY_SCORE: 26
LAUNDRY: WITH SUPERVISION
ADLS_ACUITY_SCORE: 26
HYGIENE/GROOMING: INDEPENDENT
ADLS_ACUITY_SCORE: 26
ORAL_HYGIENE: INDEPENDENT
DRESS: INDEPENDENT
ADLS_ACUITY_SCORE: 26

## 2025-03-26 NOTE — PLAN OF CARE
BEH IP Unit Acuity Rating Score (UARS)  Patient is given one point for every criteria they meet.    CRITERIA SCORING   On a 72 hour hold, court hold, committed, stay of commitment, or revocation. 0    Patient LOS on BEH unit exceeds 20 days. 0  LOS: 1   Patient under guardianship, 55+, otherwise medically complex, or under age 11. 0   Suicide ideation without relief of precipitating factors. 0   Current plan for suicide. 0   Current plan for homicide. 0   Imminent risk or actual attempt to seriously harm another without relief of factors precipitating the attempt. 0   Severe dysfunction in daily living (ex: complete neglect for self care, extreme disruption in vegetative function, extreme deterioration in social interactions). 1   Recent (last 7 days) or current physical aggression in the ED or on unit. 1   Restraints or seclusion episode in past 72 hours. 0   Recent (last 7 days) or current verbal aggression, agitation, yelling, etc., while in the ED or unit. 1   Active psychosis. 0   Need for constant or near constant redirection (from leaving, from others, etc).  0   Intrusive or disruptive behaviors. 1   Patient requires 3 or more hours of individualized nursing care per 8-hour shift (i.e. for ADLs, meds, therapeutic interventions). 0   TOTAL 3

## 2025-03-26 NOTE — PLAN OF CARE
Discharge orders are received.  Reviewed and discussed discharge instructions, follow up care, future appointments and medication administration.  Patient denies thoughts of SI, SIB or hallucinations.  Verbalized understanding of discharge instructions. Received personal belongings.  All forms are signed.  Patient to discharge to home.

## 2025-03-26 NOTE — PLAN OF CARE
Patient has spent majority of the shift in the milieu. Social with select peers. Patient requested not to be transferred to station 22. On call provider was notified and patient stayed on station 30. Denies suicidal ideation and self injurious thoughts. Denies anxiety and depression. Denies auditory and visual hallucinations. Talked about signed a 12 hour intent to leave throughout the evening. Signed at 2041. States that he needs to get to his children and wants to go home. On call provider was paged and notified of the 12 hour intent. On call provider attempted to contact provider. Will pass along to staff to page provider in the morning. Med compliant.     Patient evaluation continues. Assessed mood,anxiety,thoughts and behavior.     Patient gradually progressing towards goals.    Patient is encouraged to participate in groups and assisted to develop healthy coping skills.     VS reviewed: /78   Pulse 84   Temp 97.8  F (36.6  C) (Temporal)   Resp 16   Wt 73.5 kg (162 lb)   SpO2 98%   BMI 27.81 kg/m      Length of stay: 0    Refer to daily team meeting notes for individualized plan of care. Nursing will continue to assess.
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
## 2025-03-26 NOTE — PLAN OF CARE
Goal Outcome Evaluation:    Initial meeting note:    Therapist introduced self to patient and discussed psychotherapy service available to patient.     Pt response: Pt not interested currently in meeting 1:1; therapist will continue remaining available for pt     Plan: Pt was encouraged to attend groups and therapist will remain available for 1:1 sessions    Pt very restless and agitated. He said he was frustrated by repeated questions. He had completed safety plan in February of 25, he did not remember admission on stn 12.     He got through one question and said he did not want to make changes, he was getting frustrated and would like to lay down.    He apologized and then went to rest.

## 2025-03-26 NOTE — PLAN OF CARE
Team Note Due:  Tuesday    Assessment/Intervention/Current Symtoms and Care Coordination:  Chart review and met with team, discussed pt progress, symptomology, and response to treatment.  Discussed the discharge plan and any potential impediments to discharge.     Team Meeting  RN reported that the pt signed a 12 hour notice of intent to leave.  NURIS assessment was ordered but not started at the time of team meeting.      Pt asked to leave and was given an AMA discharge.    I met with pt and he declined services except asked for a cab and list of crisis homes.  I completed AVS, talked to MD about medications, printed list of crisis homes, and arranged for cab.  See next note with AVS for more details.    Discharge Plan or Goal:  NURIS treatment       Barriers to Discharge:         Referral Status:    NURIS assessment ordered     Legal Status:  Voluntary     County:   File Number:   Start and expiration date of commitment:     Contacts (include FOUZIA status):            Upcoming Meetings and Dates/Important Information and next steps:

## 2025-03-26 NOTE — PLAN OF CARE
Behavioral Discharge Planning and Instructions    Summary: You were admitted on 3/24/2025  due to suicidal ideation with drug use.  You were treated by Cl Chand MD and discharged  AGAINST MEDICAL ADVICE on March 26, 2025 from Station 30 to Home at [address], [place] [address]      Main Diagnosis:   Rule out methamphetamine-induced psychosis;  Stimulant use disorder, severe;  Historical dx of schizoaffective disorder.         Health Care Follow-up:   The [facility] Member Services number is [phone],   [facility] - [phone] - for transportation to your health care appointments.  [facility] arranged for transportation services through [employer] @ [phone]. [facility] will call the unit cell phone # at [phone]. They will pick you unit(s) p at 2:45PM.        Dr. Chand is trying to order your medications over to [facility] in [place].      You were given a list of crisis homes at your request.      You declined to have any services arranged for you.        You declined to have us contact your . You state you will call him.  Intensive Case Managemen through [facility] of Mental Health Resources  Samir Westfall,   Number: Phone:: [phone]   Fax: [phone]  [email or web address]         Patient Navigation Hub:   [facility] Navigators work to be your point-of-contact for trustworthy and compassionate care from Inpatient services to [facility] Programmatic Care. We will provide resources and communication to help guide you into programmatic care. Ultimately, our goal is to be the one-stop-shop of communication, coordination, and support for your journey to programmatic care.    Phone: [phone]    Information will be faxed to your outpatient providers to ensure a healthy continuity of care for you.     Attend all scheduled appointments with your outpatient providers. Call at least 24 hours in advance if you need to  reschedule an appointment to ensure continued access to your outpatient providers.     Major Treatments, Procedures and Findings:  You were provided with: a psychiatric assessment, assessed for medical stability, medication evaluation and/or management, group therapy, individual therapy, milieu management, and medical interventions    Symptoms to Report: feeling more aggressive, losing more sleep, mood getting worse, or thoughts of suicide    Early warning signs can include: increased thoughts or behaviors of suicide or self-harm     Safety and Wellness:  Take all medicines as directed.  Make no changes unless your doctor suggests them.      Follow treatment recommendations.  Refrain from alcohol and non-prescribed drugs.  If there is a concern for safety, call 911.    Resources:   Mental Health Crisis Resources  Throughout [place]: call **CRISIS (**644270)  Crisis Text Line: is available for free, 24/7 by texting [place] to 214928  Suicide Awareness Voices of Education (SAVE) (www.save.org): 084-007-HORM (7204)  The [place] Suicide Prevention Lifeline is now: 988 Suicide and Crisis Lifeline. Call 988 anytime.  National [place] on Mental Illness (www.[place].leola.org): 125.984.2708 or 922-684-4734.  Ifqn6dljj: text the word LIFE to 24655 for immediate support and crisis intervention  Mental Health Consumer/Survivor Network of [place] (www.mhcsn.net): 651.525.7635 or 307-834-8084  Mental Health Association of [place] (www.mentalhealth.org): 406.970.3025 or 887-095-1704  Peer Support Connection [place] [facility] 1-424.492.7012 Available from 5pm - 9am (7 days a week/365 days a year)  Call or Text 988 or [facility] [place] 1-170.769.3574 Community Outreach for Psych Emergencies      General Medication Instructions:   See your medication sheet(s) for instructions.   Take all medicines as directed.  Make no changes unless your doctor suggests them.   Go to all your doctor visits.  Be sure to have all your required lab tests. This way, your  medicines can be refilled on time.  Do not use any drugs not prescribed by your doctor.  Avoid alcohol.    Advance Directives:   Scanned document on file with [facility]? No scanned doc  Is document scanned? Pt states no documents  Honoring Choices Your Rights Handout: Informed and given  Was more information offered? Pt declined    The Treatment team has appreciated the opportunity to work with you. If you have any questions or concerns about your recent admission, you can contact the unit which can receive your call 24 hours a day, 7 days a week. They will be able to get in touch with a Provider if needed. The unit number is [phone] .

## 2025-03-26 NOTE — PLAN OF CARE
Problem: Sleep Disturbance  Goal: Adequate Sleep/Rest  Outcome: Progressing     Goal Outcome Evaluation:    Patient slept most of the shift. Breathing quiet and unlabored when sleeping. Safety rounds check done every 15 minutes throughout the shift. Pt had no c/o pain or discomfort during the HS. Appears to have slept 7 hours.

## 2025-03-26 NOTE — PROGRESS NOTES
Met with patient to discuss possible NURIS treatment options and to possibly complete an assessment. Patient was very quick to say he wasn't willing to do the assessment at this time due to being very tired.   Will continue to follow. Patient wouldn't say if he is or isn't interested in NURIS services at this time.     Oscar Mccracken [facility] on 3/26/2025 at 10:13 AM

## 2025-03-26 NOTE — DISCHARGE SUMMARY
"Psychiatric Discharge Summary    Hamzah Roberts MRN# [id number]   Age: 39 year old YOB: 1985     Date of Admission:  3/24/2025  Date of Discharge:  3/26/2025  Admitting Physician:  Cl Chand MD  Discharge Physician:  Cl Chand MD (Contact: [phone])         Event Leading to Hospitalization:     Suicidal ideation and psychotic symptoms in context of meth use.     History of present illness:     Per ED Notes: Hamzah Roberts is a 39 year old male with history of paranoid schizophrenia, polysubstance abuse, anxiety, and suicidal ideation who presents to the emergency department with concerns of auditory hallucinations and suicidal ideation, no plan.  He was brought to the emergency department by a friend.  Has been seen and evaluated for similar concerns previously. Last ED evaluation was on January 31, 2025.  States that the hallucinations consist of someone \"screaming for help\".  He does not know who this might be, does not feel anyone is trying to harm him.  No visual hallucinations.  On review of the chart, he has had these kinds of symptoms for several years, has been treated outpatient with Zyprexa which sometimes works for him.  At times he has requested Risperdal, states that it works better than Zyprexa.  He denies any homicidal ideation today.  States that he has run out of his home medications (Zyprexa).   He admits to marijuana use, denies any other substance use recently. Would like to be admitted for stabilization.  Wound on left lower extremity is completely healed.     A medically appropriate review of systems was performed with pertinent positives and negatives noted in the HPI, and all other systems negative.     During visit with this provider:      We met pt in his room with PA students. Pt was standing by the bedside. Pt was dressed in hospital scrubs and well shaved hair. Pt has tattoos in his arms and neck. Pt was observed having repetitive involuntary mouth " movements (tics). Speech was clear and coherent during conversation. He appeared guarded but was calm and cooperative during the interview.  He reports he does not know who he is anymore, he describes himself as someone who used to love life, used to go to work (worked as a ) but not any more. He continued to say that drugs are the reason he is here. He reports that whenever he takes drugs(meth), he becomes psychotic. He said that he has experienced psychosis for the past 20 years and this is mostly aggravated by meth use. He did also add that he has had some psychosis without using drugs but it is manageable and can go on with his life. He said that during those moments of psychosis (not drug induced), he listens to music and takes Zyprexa which tends to help. He also that he used to take Wellbutrin and started abusing it and one time he took 10 tablets of 300 mg Wellbutrin and he had seizures.  He reports that at one point he was sober for 8 years and after breakup with his long term girlfriend of 8 years, he went back to drugs and life has never been the same again. He said that he feels stuck and would like to get better and live a normal life like other people. He stated that he would like to go to a residential CD treatment and complete it and get his life back by getting a job and having his own apartment. He also said that he would like to be connected with an outpatient Psych provider and a therapist to help with his mental well being upon discharge.           See Admission note by by admitting physician/nurse-practitioner for additional details.          DIagnoses:     Rule out methamphetamine-induced psychosis;  Stimulant use disorder, severe;  Historical dx of schizoaffective disorder.   Antisocial PD is likely.         Labs:      Latest Reference Range & Units 03/24/25 14:23 03/24/25 14:28 03/24/25 14:34 03/24/25 16:24 03/25/25 16:15   Sodium 135 - 145 mmol/L 139       Potassium 3.4 - 5.3 mmol/L  3.6       Chloride 98 - 107 mmol/L 101       Carbon Dioxide (CO2) 22 - 29 mmol/L 23       Urea Nitrogen 6.0 - 20.0 mg/dL 12.4       Creatinine 0.67 - 1.17 mg/dL 0.93       GFR Estimate >60 mL/min/1.73m2 >90       Calcium 8.8 - 10.4 mg/dL 9.5       Anion Gap 7 - 15 mmol/L 15       Albumin 3.5 - 5.2 g/dL 4.7       Protein Total 6.4 - 8.3 g/dL 7.6       Alkaline Phosphatase 40 - 150 U/L 80       ALT 0 - 70 U/L 24       AST 0 - 45 U/L 34       Bilirubin Total <=1.2 mg/dL 0.5       Glucose 70 - 99 mg/dL 96       WBC 4.0 - 11.0 10e3/uL 8.4       Hemoglobin 13.3 - 17.7 g/dL 16.4       Hematocrit 40.0 - 53.0 % 45.9       Platelet Count 150 - 450 10e3/uL 248       RBC Count 4.40 - 5.90 10e6/uL 5.19       MCV 78 - 100 fL 88       MCH 26.5 - 33.0 pg 31.6       MCHC 31.5 - 36.5 g/dL 35.7       RDW 10.0 - 15.0 % 11.9       % Neutrophils % 67       % Lymphocytes % 22       % Monocytes % 10       % Eosinophils % 1       % Basophils % 0       Absolute Basophils 0.0 - 0.2 10e3/uL 0.0       Absolute Eosinophils 0.0 - 0.7 10e3/uL 0.0       Absolute Immature Granulocytes <=0.4 10e3/uL 0.0       Absolute Lymphocytes 0.8 - 5.3 10e3/uL 1.9       Absolute Monocytes 0.0 - 1.3 10e3/uL 0.9       % Immature Granulocytes % 0       Absolute Neutrophils 1.6 - 8.3 10e3/uL 5.6       Absolute NRBCs 10e3/uL 0.0       NRBCs per 100 WBC <1 /100 0       SARS CoV2 PCR Negative    Negative     Influenza A Negative    Negative     Influenza B Negative    Negative     Resp Syncytial Virus Negative    Negative     Salicylate mg/dL <0.3       Alcohol Breath Test 0.00 - 0.01   0.00      Amphetamine Qual Urine Screen Negative     Screen Positive !    Fentanyl Qual Urine Screen Negative     Screen Negative    Cocaine Urine Screen Negative     Screen Negative    Benzodiazepine Urine Screen Negative     Screen Negative    Opiates Qualitative Urine Screen Negative     Screen Negative    PCP Urine Screen Negative     Screen Negative    Cannabinoids Qual Urine Screen  "Negative     Screen Positive !    Barbiturates Qual Urine Screen Negative     Screen Negative    EKG 12-LEAD, TRACING ONLY      Rpt (C)   Acetaminophen 10.0 - 30.0 ug/mL <5.0 (L)       !: Data is abnormal  (L): Data is abnormally low  (C): Corrected  Rpt: View report in Results Review for more information    Sinus rhythm   Normal ECG   When compared with ECG of 16-Dec-2024 15:29,   Criteria for Inferior infarct are no longer Present   T wave inversion no longer evident in Inferior leads   Confirmed by MD JESSA, ASIF ([id number]) on 3/25/2025 11:09:09 PM          Consults:     CD eval was attempted on the day of discharge, see note below: \"Met with patient to discuss possible NURIS treatment options and to possibly complete an assessment. Patient was very quick to say he wasn't willing to do the assessment at this time due to being very tired.   Will continue to follow. Patient wouldn't say if he is or isn't interested in NURIS services at this time.\"         Hospital Course:   Hamzah Roberts was admitted to [facility] with attending Cl Chand MD as a voluntary patient. The patient was placed under status 15 (15 minute checks) to ensure patient safety. He presented as very tense and paranoid and immediately refused to cooperate with unit routines: he refused to remove his clothes and shoes, refused to change into scrubs and give his money so it could be sent to security, was using curse language and security had to be called. He apologized later and was reasonably pleasant and cooperative with this treatment team during our visit, said that he would like to go to CD treatment program and change his life for the better. He denied presence of Suicidal ideation, Homicidal thoughts, Auditory hallucinations and Visual hallucinations and indicated that he definitely saw direct connection between his symptoms and substance use. Agreed to participate in CD eval and continue to take meds. Shortly after our visit this " "provider was informed by RN that patient got into a heated verbal altercation with another patient on this floor to a point that many members of staff had to be involved to separate them. After patients got  we felt that it was appropriate to transfer Hamzah to another unit, this was discussed with [facility] attending Dr. Lopez who accepted Hamzah. When patient was informed about scheduled discharge to [facility], he got agitated and put 12 hour intent to leave which would  on the morning of 3/26/2025. Patient did agree to wait and meet with this treatment team before leaving. We approached Hamzah on the morning on 3/26. He was in bed almost completely covered with blanket and said that he felt too tired to talk, added that he rescinded his plan to leave and he would like to stay and go to CD treatment. He then, turned away from us and pulled a blanket over his head. CD counselor approached patient later and patient said that he felt to tired to discuss going to CD treatment. Around 1 pm I was again informed by RN that patient requested to be discharged. I approached him and asked him to get out of bed and explain what he wanted. Hamzah got out of bed and walked towards nursing station where RN and I talked to him. Hamzah first, said that he would like to stay at this hospital: \"but not over weekend\". He denied remembering talking to CD counselor and appeared to be getting more and more irritated, then, finally, said: \"just discharge me from here\". He said that he felt safe to leave and had no other questions or concerns. Patient was discharged AMA.      Hamzah Roberts did not participate in groups and was not visible in the milieu.     The patient's symptoms of psychosis somewhat improved.     Hamzah Roberts was released to home. At the time of discharge Hamzah Roberts was determined to not be a danger to himself or others.          Discharge Medications:     Current Discharge Medication List        CONTINUE " these medications which have CHANGED    Details   OLANZapine (ZYPREXA) 10 MG tablet Take 1 tablet (10 mg) by mouth at bedtime.  Qty: 30 tablet, Refills: 0    Associated Diagnoses: Schizoaffective disorder, depressive type (H)                  Psychiatric Examination:   Appearance:  awake, alert and dressed in hospital scrubs  Attitude:  guarded and uncooperative  Eye Contact:  poor   Mood:  angry and sad   Affect:  constricted mobility  Speech:  decreased prosody  Psychomotor Behavior:  physical agitation  Thought Process:  linear  Associations:  no loose associations  Thought Content:  no evidence of suicidal ideation or homicidal ideation and no evidence of psychotic thought  Insight:  limited  Judgment:  limited  Oriented to:  time, person, and place  Attention Span and Concentration:  poor  Recent and Remote Memory:  limited  Language: Able to name objects, Able to repeat phrases, and Able to read and write  Fund of Knowledge: low-normal  Muscle Strength and Tone: normal  Gait and Station: Normal         Discharge Plan:       Health Care Follow-up:   The [facility] Member Services number is [phone],   [facility] [phone] - for transportation to your health care appointments.  [facility] arranged for transportation services through [employer] @ [phone]. Frida will call the unit cell phone # at [phone]. They will pick you unit(s) p at 2:45PM.           Dr. Chand is trying to order your medications over to [facility] in [place].        You were given a list of crisis homes at your request.        You declined to have any services arranged for you.           You declined to have us contact your . You state you will call him.  Intensive Case Managemen through [facility]  Samir Westfall,   Number: Phone:: [phone]   Fax: [phone]  [email or web address]        Attestation:  The patient has been seen and evaluated by me,  Cl  JOSE Chand MD    Total time spent was 47 minutes. Over 50% of times was spent counseling and coordination of care regarding coping skills, medication and discharge planning.

## 2025-03-29 ENCOUNTER — HOSPITAL ENCOUNTER (EMERGENCY)
Facility: CLINIC | Age: 40
Discharge: HOME | End: 2025-03-30
Attending: EMERGENCY MEDICINE | Admitting: EMERGENCY MEDICINE
Payer: COMMERCIAL

## 2025-03-29 DIAGNOSIS — F20.0 PARANOID SCHIZOPHRENIA (H): ICD-10-CM

## 2025-03-29 DIAGNOSIS — F25.9 SCHIZOAFFECTIVE DISORDER, CHRONIC CONDITION WITH ACUTE EXACERBATION (H): Primary | ICD-10-CM

## 2025-03-29 PROCEDURE — 99285 EMERGENCY DEPT VISIT HI MDM: CPT | Performed by: EMERGENCY MEDICINE

## 2025-03-29 PROCEDURE — 99284 EMERGENCY DEPT VISIT MOD MDM: CPT | Performed by: EMERGENCY MEDICINE

## 2025-03-29 PROCEDURE — 82075 ASSAY OF BREATH ETHANOL: CPT | Performed by: EMERGENCY MEDICINE

## 2025-03-29 PROCEDURE — 250N000013 HC RX MED GY IP 250 OP 250 PS 637: Performed by: EMERGENCY MEDICINE

## 2025-03-29 RX ORDER — LORAZEPAM 1 MG/1
1 TABLET ORAL ONCE
Status: DISCONTINUED | OUTPATIENT
Start: 2025-03-29 | End: 2025-03-29

## 2025-03-29 RX ORDER — OLANZAPINE 10 MG/1
10 TABLET, ORALLY DISINTEGRATING ORAL ONCE
Status: COMPLETED | OUTPATIENT
Start: 2025-03-29 | End: 2025-03-29

## 2025-03-29 RX ORDER — POLYETHYLENE GLYCOL 3350 17 G
2 POWDER IN PACKET (EA) ORAL
Status: DISCONTINUED | OUTPATIENT
Start: 2025-03-29 | End: 2025-03-30 | Stop reason: HOSPADM

## 2025-03-29 RX ADMIN — NICOTINE POLACRILEX 2 MG: 2 LOZENGE ORAL at 22:27

## 2025-03-29 RX ADMIN — OLANZAPINE 10 MG: 10 TABLET, ORALLY DISINTEGRATING ORAL at 20:40

## 2025-03-29 ASSESSMENT — COLUMBIA-SUICIDE SEVERITY RATING SCALE - C-SSRS
1. IN THE PAST MONTH, HAVE YOU WISHED YOU WERE DEAD OR WISHED YOU COULD GO TO SLEEP AND NOT WAKE UP?: NO
6. HAVE YOU EVER DONE ANYTHING, STARTED TO DO ANYTHING, OR PREPARED TO DO ANYTHING TO END YOUR LIFE?: YES
2. HAVE YOU ACTUALLY HAD ANY THOUGHTS OF KILLING YOURSELF IN THE PAST MONTH?: NO

## 2025-03-29 ASSESSMENT — ACTIVITIES OF DAILY LIVING (ADL)
ADLS_ACUITY_SCORE: 52

## 2025-03-30 ENCOUNTER — TELEPHONE (OUTPATIENT)
Dept: BEHAVIORAL HEALTH | Facility: CLINIC | Age: 40
End: 2025-03-30

## 2025-03-30 VITALS
SYSTOLIC BLOOD PRESSURE: 105 MMHG | TEMPERATURE: 98.1 F | OXYGEN SATURATION: 98 % | RESPIRATION RATE: 16 BRPM | HEART RATE: 72 BPM | DIASTOLIC BLOOD PRESSURE: 74 MMHG

## 2025-03-30 PROBLEM — F23 ACUTE PSYCHOSIS (H): Status: ACTIVE | Noted: 2024-12-16

## 2025-03-30 LAB
ALBUMIN SERPL BCG-MCNC: 4.5 G/DL (ref 3.5–5.2)
ALCOHOL BREATH TEST: 0 (ref 0–0.01)
ALP SERPL-CCNC: 90 U/L (ref 40–150)
ALT SERPL W P-5'-P-CCNC: 22 U/L (ref 0–70)
AMPHETAMINES UR QL SCN: ABNORMAL
ANION GAP SERPL CALCULATED.3IONS-SCNC: 13 MMOL/L (ref 7–15)
AST SERPL W P-5'-P-CCNC: 24 U/L (ref 0–45)
BARBITURATES UR QL SCN: ABNORMAL
BASOPHILS # BLD AUTO: 0 10E3/UL (ref 0–0.2)
BASOPHILS NFR BLD AUTO: 0 %
BENZODIAZ UR QL SCN: ABNORMAL
BILIRUB SERPL-MCNC: 0.3 MG/DL
BUN SERPL-MCNC: 19.8 MG/DL (ref 6–20)
BZE UR QL SCN: ABNORMAL
CALCIUM SERPL-MCNC: 9.2 MG/DL (ref 8.8–10.4)
CANNABINOIDS UR QL SCN: ABNORMAL
CHLORIDE SERPL-SCNC: 101 MMOL/L (ref 98–107)
CREAT SERPL-MCNC: 1.06 MG/DL (ref 0.67–1.17)
EGFRCR SERPLBLD CKD-EPI 2021: >90 ML/MIN/1.73M2
EOSINOPHIL # BLD AUTO: 0.1 10E3/UL (ref 0–0.7)
EOSINOPHIL NFR BLD AUTO: 1 %
ERYTHROCYTE [DISTWIDTH] IN BLOOD BY AUTOMATED COUNT: 12.1 % (ref 10–15)
FENTANYL UR QL: ABNORMAL
GLUCOSE SERPL-MCNC: 189 MG/DL (ref 70–99)
HCO3 SERPL-SCNC: 24 MMOL/L (ref 22–29)
HCT VFR BLD AUTO: 45.6 % (ref 40–53)
HGB BLD-MCNC: 16.1 G/DL (ref 13.3–17.7)
IMM GRANULOCYTES # BLD: 0 10E3/UL
IMM GRANULOCYTES NFR BLD: 0 %
LYMPHOCYTES # BLD AUTO: 3.4 10E3/UL (ref 0.8–5.3)
LYMPHOCYTES NFR BLD AUTO: 35 %
MCH RBC QN AUTO: 31.6 PG (ref 26.5–33)
MCHC RBC AUTO-ENTMCNC: 35.3 G/DL (ref 31.5–36.5)
MCV RBC AUTO: 90 FL (ref 78–100)
MONOCYTES # BLD AUTO: 0.9 10E3/UL (ref 0–1.3)
MONOCYTES NFR BLD AUTO: 10 %
NEUTROPHILS # BLD AUTO: 5.2 10E3/UL (ref 1.6–8.3)
NEUTROPHILS NFR BLD AUTO: 54 %
NRBC # BLD AUTO: 0 10E3/UL
NRBC BLD AUTO-RTO: 0 /100
OPIATES UR QL SCN: ABNORMAL
PCP QUAL URINE (ROCHE): ABNORMAL
PLATELET # BLD AUTO: 276 10E3/UL (ref 150–450)
POTASSIUM SERPL-SCNC: 3.6 MMOL/L (ref 3.4–5.3)
PROT SERPL-MCNC: 7.6 G/DL (ref 6.4–8.3)
RBC # BLD AUTO: 5.09 10E6/UL (ref 4.4–5.9)
SODIUM SERPL-SCNC: 138 MMOL/L (ref 135–145)
WBC # BLD AUTO: 9.7 10E3/UL (ref 4–11)

## 2025-03-30 PROCEDURE — 80307 DRUG TEST PRSMV CHEM ANLYZR: CPT | Performed by: EMERGENCY MEDICINE

## 2025-03-30 PROCEDURE — 99244 OFF/OP CNSLTJ NEW/EST MOD 40: CPT | Performed by: CLINICAL NURSE SPECIALIST

## 2025-03-30 PROCEDURE — 85004 AUTOMATED DIFF WBC COUNT: CPT | Performed by: EMERGENCY MEDICINE

## 2025-03-30 PROCEDURE — 250N000013 HC RX MED GY IP 250 OP 250 PS 637: Performed by: EMERGENCY MEDICINE

## 2025-03-30 PROCEDURE — 80053 COMPREHEN METABOLIC PANEL: CPT | Performed by: EMERGENCY MEDICINE

## 2025-03-30 PROCEDURE — 36415 COLL VENOUS BLD VENIPUNCTURE: CPT | Performed by: EMERGENCY MEDICINE

## 2025-03-30 RX ORDER — OLANZAPINE 10 MG/1
10 TABLET ORAL AT BEDTIME
Qty: 30 TABLET | Refills: 0 | Status: SHIPPED | OUTPATIENT
Start: 2025-03-30

## 2025-03-30 RX ORDER — OLANZAPINE 10 MG/1
10 TABLET ORAL AT BEDTIME
Qty: 30 TABLET | Refills: 0 | Status: SHIPPED | OUTPATIENT
Start: 2025-03-30 | End: 2025-03-30

## 2025-03-30 RX ORDER — OLANZAPINE 10 MG/1
10 TABLET ORAL 2 TIMES DAILY
Status: DISCONTINUED | OUTPATIENT
Start: 2025-03-30 | End: 2025-03-30 | Stop reason: HOSPADM

## 2025-03-30 RX ORDER — ACETAMINOPHEN 325 MG/1
650 TABLET ORAL EVERY 4 HOURS PRN
Status: DISCONTINUED | OUTPATIENT
Start: 2025-03-30 | End: 2025-03-30 | Stop reason: HOSPADM

## 2025-03-30 RX ADMIN — NICOTINE POLACRILEX 2 MG: 2 LOZENGE ORAL at 13:10

## 2025-03-30 RX ADMIN — OLANZAPINE 10 MG: 10 TABLET, FILM COATED ORAL at 17:21

## 2025-03-30 RX ADMIN — NICOTINE POLACRILEX 2 MG: 2 LOZENGE ORAL at 15:10

## 2025-03-30 RX ADMIN — NICOTINE POLACRILEX 2 MG: 2 LOZENGE ORAL at 17:21

## 2025-03-30 RX ADMIN — NICOTINE POLACRILEX 2 MG: 2 LOZENGE ORAL at 01:53

## 2025-03-30 ASSESSMENT — ACTIVITIES OF DAILY LIVING (ADL)
ADLS_ACUITY_SCORE: 52

## 2025-03-30 ASSESSMENT — COLUMBIA-SUICIDE SEVERITY RATING SCALE - C-SSRS
ATTEMPT SINCE LAST CONTACT: NO
2. HAVE YOU ACTUALLY HAD ANY THOUGHTS OF KILLING YOURSELF?: NO
1. SINCE LAST CONTACT, HAVE YOU WISHED YOU WERE DEAD OR WISHED YOU COULD GO TO SLEEP AND NOT WAKE UP?: NO
TOTAL  NUMBER OF INTERRUPTED ATTEMPTS SINCE LAST CONTACT: NO
6. HAVE YOU EVER DONE ANYTHING, STARTED TO DO ANYTHING, OR PREPARED TO DO ANYTHING TO END YOUR LIFE?: NO
SUICIDE, SINCE LAST CONTACT: NO
TOTAL  NUMBER OF ABORTED OR SELF INTERRUPTED ATTEMPTS SINCE LAST CONTACT: NO
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
## 2025-03-30 NOTE — DISCHARGE INSTRUCTIONS
Emergency Shelter Services      [facility] Hotline: 1-649.200.4302    [facility] sponsors the First Call for Help referral services; they have listings for a wide variety of community support services and can be reached at 211.       [facility]  Overnight emergency shelter for men and women is provided at [facility] [place]  Address:  [facility]  [address] [place] [address]  [phone]      [facility]  [address] [facility]  Phone: [phone]  Emergency Housing: Provides shelter, personal hygiene items and meals for homeless single adults who are receiving GA, social security or other benefits. Access through [facility] at [address] [facility] (5-11 pm daily) or [address] [facility]. Open to men and women.    [facility]: A secure shelter for single homeless women. Beds are available first come, first serve each night, starting at 4 pm. Open to women only.    [facility]  [address]  Phone: [phone]  Provides: Overnight shelter for adult men (18 and up). Doors open at 7:15 pm and close at 8:00 am.  Remarks: Men need to have resided in [facility] for the past 30 days to be eligible. They need to have no financial resources to house themselves. There is a total of 20 beds in the shelter.  Resources: Independent Living Skills classes, Computer Lab access, meal, laundry    [facility], [phone]  [address] [place] [address]  9a-5:30p Monday through Friday or 1p-5:30p on Saturdays and Sundays.  Must bring a photo ID.  Staff will help with a community card and assign a shelter.    [facility] ([employer])  During the day call [phone] or after 5pm call [phone] (men), [phone] (women)  [address] [place] [address]    [facility] ([employer])  [phone] ext. [phone]  [address] [facility]    [facility] ([employer]), [phone]  [address] [facility]      Housing Resources by [facility]:  Call 211 to inquire about openings.  [email or web address]  [phone] [address] [place] [address] [address], [place] [address]  Emergency and transitional housing for homeless adults; no alcohol or drugs; charge of 30% of income up to $28/night but will be considered if no income      [facility] (ages 18-24 only) - [place] open Monday - Friday 9am-5pm  [address] [facility] - [place] Open M-F 9am-4pm in 0 degrees or colder  [address] [place] [address] [place] available weekdays from 8:30 a.m.-4:30 p.m.  [address] [facility] ([phone])    [facility]    www.detoxone.org  [phone]    [address], [place]   A sober homeless shelter for those men ages 18 & up, who have recently completed chemical dependency treatment, but are not ready to enter day to day living experiences; men being released from FDC or jail; men who have found themselves unable to find employment or have recently lost a job; men who are on probation.      [facility] ([employer])  www.[email or web address]  [phone]  [address] [facility]   Shelter for families    [facility] [place] available weekdays from 8:30 a.m.-4:30 p.m.  [address] [facility] ([phone])      [facility]  [address] [facility]  [phone]  Physical Health:  urgent care, health screenings, primary care referrals, prescriptions and medication management.  Human Services:  housing, cash, and food support, parenting education, employment resources,  disability and veterans resources.  Mental Health & Addiction:  mental health screening and diagnostic assessments, comprehensive screening for addiction disorders, case management and care coordination, support from people with lived experience, help in a crisis including 3-10 day stay at the crisis stabilization program, withdrawal support from drugs and alcohol.    [facility] ([employer])  www.[facility].org  [phone]  [address] [place] [address]  Drop in center for homeless adults; supports basic needs, personal empowerment and transitional services; see web site for hours    [facility] and Pay-for-Stay Shelter ([employer])  www.[facility].org  [phone]  [address] [facility]  Beds for men, both programs offer light breakfast and supper and showers; Pay-for-Stay program also has lockers, charges $6/night which is held in escrow for deposit on permanent housing    [facility] ([employer])  www.[facility].org  [phone]  [address] [facility]  The [facility] offers 88 units of single room occupancy permanent housing for independent, single adults experiencing homelessness. Facilities are available for 72 men and 16 women, who each pay $360 a month. Workshops on money management and empowerment help residents develop their potential. Optional spiritual guidance, onsite Alcoholics Anonymous meetings and a men's support group help residents confront their barriers to self-sufficiency.    [facility] ([employer])  www.[email or web address]  [phone]  [address] [place] [address]  [address] [facility] provides low-cost single-room occupancy transitional housing to single men and women in [facility]. Residents work toward permanent housing by using the site's supportive services, including empowerment groups and case management to meet residents' mental health and emotional needs. Residents establish a rental history  and can stay in the program for up to two years; $350 monthly rent for transitional housing units; all other units covered under Group Residential Housing Plan assistance; Alcohol and chemical use prohibited     [facility] ([place])    www.[email or web address]   [phone]   [address] [facility] offers 80 units of permanent housing to late-stage, chronic alcoholics, based on a model known as harm reduction. Many low-income, homeless, chronic alcoholics have struggled to attain sobriety. [facility] serves chronic alcoholics in [facility] with repeated admissions to detoxification centers and a history of failure in traditional chemical dependency treatment programs. At least 75 percent of residents have mental health issues that contribute to the cycle of alcoholism [facility] provides residential care in a homelike environment for its tenants, regardless of level of intoxication, although alcohol is not allowed in the building. This cost-effective and compassionate housing option costs $47.25 a night. A large portion of the rent can be subsidized.     [facility] ([employer]), [phone]   www.[email or web address]  [address] [place] [address]  Housing for single, homeless men and women; both short term and long term (maximum 2 years) cost 30% of income, income cannot exceed $26,800/year; see program requirements for waiting list rules.    [facility], [phone]  [email or web address]  [address], [place]   Transitional housing for single women and women with children for up to 2 years; must be unemployed and not in school, no more than 4 children ages 10 and under; fees determined by .    [facility] ([facility]), [phone]  www.[email or web address]  [address] [facility]  The [facility] provides intake and assessment  services for any family seeking emergency shelter in [facility]. This address is a day program, will bus those needing overnight shelter to a Worship, location of overnight housing changes monthly.    [facility] ([place]), [phone]  www.[email or web address]  [address] [facility] provides permanent housing with supportive services to 70 single men and women who commit to building better lives for themselves. [facility] offers a critical stepping stone for people with low incomes, allowing them to grow, advance in skills and reach self sufficiency.    [facility] ([place]), [phone]  www.[email or web address].org  [address] [place] [address]  For single men and women; Portion of lodging expenses may be subsidized, Applicants must meet the definition of long-term homelessness and have an annual income of $14,800 or less, Desire to live in an alcohol- and drug-free environment.    [facility] ([place]), [phone]  www.[facility].org  [address] [facility]  [facility] provides a fresh start for single mothers and their children with a supportive, respectful, affordable housing situation. [facility] contains 16 two- and three-bedroom apartment units in a convenient [place] location.    [facility] ([place])    www.[facility].org   [phone]   [address] [facility] provides permanent housing for late-stage chronic alcoholic men in [facility] with repeated admissions to detoxification centers and a history of failure in traditional chemical dependency treatment programs. This cost-effective and compassionate housing option costs less than $50 a night.      You may self refer for most Crisis Residence services. This is a short-term service, typically 3-10 days, for stabilization when experiencing a mental health crisis. They provide housing and treatment services that integrate mental  health, medical, and substance use care.    [facility]  Main phone: [phone]   Direct: [phone]   [address] [place] [address]     [facility] Crisis Stabilization Program   [phone] [address] [facility] [address]     [facility]  Main phone: [phone]   Direct: [phone]   [address] [place] [address]     [facility]  [phone]   [address] [place] [address]     [facility] Crisis **requires referral from a medical facility  [phone] [address] [place] [address]     Wherever you attend for a crisis residence, if possible bring any medications you may have in their prescribed bottles.

## 2025-03-30 NOTE — ED NOTES
Pt anxious in bed in hallway asking when his DEC assessment would happen. Writer attempted to call DEC with no response. Information relayed to pt, pt expressed acceptance.

## 2025-03-30 NOTE — ED NOTES
IP MH Referral Acuity Rating Score (RARS)    LMHP complete at referral to IP MH, with DEC; and, daily while awaiting IP MH placement. Call score to PPS.  CRITERIA SCORING   New 74 HH and Involuntary for IP MH (not adolescent) 0/3   Boarding over 24 hours 0/1   Vulnerable adult at least 55+ with multiple co morbidities; or, Patient age 11 or under 0/1   Suicide ideation without relief of precipitating factors 1/1   Current plan for suicide 0/1   Current plan for homicide 0/1   Imminent risk or actual attempt to seriously harm another without relief of factors precipitating the attempt 0/1   Severe dysfunction in daily living (ex: complete neglect for self care, extreme disruption in vegetative function, extreme deterioration in social interactions) 1/1   Recent (last 2 weeks) or current physical aggression in the ED 0/1   Restraints or seclusion episode in ED 0/1   Verbal aggression, agitation, yelling, etc., while in the ED 0/1   Active psychosis with psychomotor agitation or catatonia 1/1   Need for constant or near constant redirection (from leaving, from others, etc).  0/1   Intrusive or disruptive behaviors 0/1   TOTAL 3

## 2025-03-30 NOTE — PROGRESS NOTES
"Triage and Transition Services Extended Care Reassessment     Patient: Hamzah goes by \"Hamzah,\" uses he/him pronouns  Date of Service: March 30, 2025  Site of Service: [facility] Emergency Department                             UREDH-A  Patient was seen yes  Mode of Assessment: In person     Reason for Reassessment: paranoia    History of Patient's Original Emergency Room Encounter: Patient has history of  hospitalizations. Most recent was 3/24-3/26 at [facility]. History of committment 2023, 2022, 2021.    Current Patient Presentation: Per initial assessment, Patient was discharged AMA from station [facility] on 3/26/25.  Since that time, he has not taken his medication.  He reports he hasn't been able to  the RX.    He reports his auditory hallucinations have increased.  He hears someone screaming.  He reports he came to ER tonight becuase, \"they are driving me crazy\".  He has not slept in 3 days.    Patient reports he has used Meth since he discharged.   Patient reports he feels helpless, hopeless, irritable and wishes he was dead. Pt has boarded in ER since yesterday. He reports having a good night of sleep. Pt states that his psychotic symptoms have gone away because he sobered up. Pt is alert and oriented. He denies any concerns for suicidal thinking. He denies HI, NSSIB, psychosis. Pt does not appear to be responding to internal stimuli. Pt is requesting to discharge, stating he does not require services of the hospital any longer. Pt is future focused. He reports intention of admitting in CD treatment on Tuesday which he already has setup. Pt will discharge from hospital and engage in NURIS tx on Tuesday. He was able to engage in safety planning.    Presentation Summary: SEe above.    Changes Observed Since Initial Assessment: decrease in presenting symptoms, patient/family request    Therapeutic Interventions Provided: Engaged in safety planning, Engaged in cognitive restructuring/ reframing, looked at " common cognitive distortions and challenged negative thoughts., Provided positive reinforcement for progress towards goals, gains in knowledge, and application of skills previously taught., Taught the link between thoughts, feelings, and behaviors.    Current Symptoms: anxious  (denies)          Mental Status Exam   Affect: Flat  Appearance: Appropriate  Attention Span/Concentration: Attentive  Eye Contact: Engaged    Fund of Knowledge: Appropriate   Language /Speech Content: Fluent  Language /Speech Volume: Normal  Language /Speech Rate/Productions: Minimally Responsive  Recent Memory: Intact  Remote Memory: Intact  Mood: Anxious  Orientation to Person: Yes   Orientation to Place: Yes  Orientation to Time of Day: Yes  Orientation to Date: Yes     Situation (Do they understand why they are here?): Yes  Psychomotor Behavior: Normal  Thought Content: Clear  Thought Form: Intact    Treatment Objective(s) Addressed: rapport building, safety planning, processing feelings, assessing safety    Patient Response to Interventions: acceptance expressed    Progress Towards Goals:  Patient Reports Symptoms Are: stable  Patient Progress Toward Goals: other  Comment: pt feels he has met goal of stabilizing.  Next Step to Work Toward Discharge: follow up on referrals  Symptom Stabilization Comment: Pt will follow up with CD tx on Tuesday at [facility].    Case Management: Case Management Included: completing or following up on referrals  Details on completing or following up on referrals: Pt received medication scripts from psychiatrist.    C-SSRS Since Last Contact:   1. Wish to be Dead (Since Last Contact): No  2. Non-Specific Active Suicidal Thoughts (Since Last Contact): No     Actual Attempt (Since Last Contact): No  Has subject engaged in non-suicidal self-injurious behavior? (Since Last Contact): No  Interrupted Attempts (Since Last Contact): No  Aborted or Self-Interrupted Attempt (Since Last Contact): No  Preparatory  Acts or Behavior (Since Last Contact): No  Suicide (Since Last Contact): No     Calculated C-SSRS Risk Score (Since Last Contact): No Risk Indicated    Plan: Final Disposition / Recommended Care Path: discharge  Plan for Care reviewed with assigned Medical Provider: yes  Plan for Care Team Review: provider  Comments: Dr. Park  Patient and/or validated legal guardian concurs: yes  Clinical Substantiation: Pt has boarded in ER since yesterday. He reports having a good night of sleep. Pt states that his psychotic symptoms have gone away because he sobered up. Pt is alert and oriented. He denies any concerns for suicidal thinking. He denies HI, NSSIB, psychosis. Pt does not appear to be responding to internal stimuli. Pt is requesting to discharge, stating he does not require services of the hospital any longer. Pt is future focused. He reports intention of admitting in CD treatment on Tuesday which he already has setup. Pt will discharge from hospital and engage in NURIS tx on Tuesday. He was able to engage in safety planning.    Legal Status: Legal Status: Voluntary/Patient has signed consent for treatment    Session Status: Time session started: 1600  Time session ended: 1620  Session Duration (minutes): 20 minutes  Session Number: 1  Anticipated number of sessions or this episode of care: 1  Date of most recent diagnostic assessment:  (none)    Session Start Time: 1600  Session Stop Time: 1620  CPT codes: 20415 - Psychotherapy (with patient) - 30 (16-37*) min  Time Spent: 20 minutes      CPT code(s) utilized: 92697 - Psychotherapy (with patient) - 30 (16-37*) min    Diagnosis:   Patient Active Problem List   Diagnosis Code    Suicidal ideation R45.851    Polysubstance abuse (H) F19.10    Paranoid schizophrenia (H) F20.0    Paranoia (H) F22    Methamphetamine abuse (H) F15.10    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    Amphetamine use disorder, severe, 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    Anxiety F41.9    Episodic mood disorder F39    Gastroesophageal reflux disease K21.9    Left ankle pain, unspecified chronicity M25.572    Psychosis, atypical (H) F29    Recurrent genital herpes A60.00    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    Hallucinations R44.3    Encounter for medication refill Z76.0    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    Methamphetamine use disorder, moderate, in sustained remission, in controlled environment, dependence (H) F15.21    Methamphetamine use disorder, severe (H) F15.20    Schizoaffective disorder, bipolar type (H) F25.0    Schizoaffective disorder, chronic condition with acute exacerbation (H) F25.9    Schizoaffective disorder, depressive type (H) F25.1    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.30, S09.90XS    Auditory hallucinations R44.0    Substance-induced psychotic disorder with hallucinations (H) F19.951    Altered mental status, unspecified altered mental status type R41.82    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

## 2025-03-30 NOTE — TELEPHONE ENCOUNTER
2:00am- Pt is being reviewed by [facility]    5:03am - [facility] declined pt at this time due to no MHICU bed availability       R: [facility] Access Inpatient Adult Bed Call Log  3/30/25 @ 1:00am   Intake has called facilities that have not updated their bed status within the last 12 hours.     *METRO:  [place] -- [facility]: @ capacity.  [facility]: POSTING 9 BEDS. No reviews overnight. #[phone]  [place] -- [facility]: @ cap per website. Low acuity. #[phone]  [place] -- [facility]: @ cap per website. Low acuity only. #[phone]  [place] -[facility]: @ cap per website. #[phone]  [facility]: @ cap per website. #[phone]  [facility]/ beds: POSTING 6 BEDS.  Ages 18-35, Voluntary only, NO aggression/physical or sexual assault/violence hx, or drug abuse. Negative Covid. #[phone]  [place] -- [facility]: @ cap per website. #[phone]  [place] -- [facility]: @ cap per website. #[phone]  [place] -- [facility]: @ cap per website. No reviews overnight. #[phone]    *STATEWIDE (by distance):  [facility]: POSTING 5 BEDS. Mixed unit - Ages 16 & up/Low acuity only. #[phone]  [facility] - POSTING 1 BED. Low acuity, No aggression. #[phone]  [facility] - @ cap per website. #[phone]  [facility] - POSTING 1 BED. Low acuity only. No current aggression. #[phone]  [facility] - @ cap per website. Negative Covid. Lower acuity only. #[phone]  [facility] - POSTING 2 BEDS. Low acuity only. Prefer med-adjustment placements. #[phone]  [facility] - @ cap per website. No aggression. - Only Low Acuity reviews. #[phone]  [facility] - POSTING 2 BEDS. Senior Care Unit, 65+. Low acuity only. #[phone]  [place] - [facility]: POSTING 1 BED. No aggressive behaviors. Do not review overnight. #[phone]  [place] -- [facility]:  POSTING 4 BEDS.  No hx of aggression. No sexual offenders. Voluntary patients only. #[phone]  [facility]: @ cap per website. Low acuity only. Must be able to do programming. No aggression/violent behavior in 2 years. No CD treatment. #[phone]  [place] -- [facility]: @ cap per website. Negative Covid test. Must be low acuity ONLY. #[phone]  [facility]: POSTING 7 BEDS. Low acuity. Negative Covid. #[phone]  [place] -- [facility]: POSTING 5 BEDS. No high acuity available.   [place] - [facility]: POSTING 3 BEDS. No hx of aggression/assault. No lines, drains or tubes. Does not provide detox or CD treatment. Require a confirmed ride upon discharge. #[phone]  [place] -- [facility]: POSTING 3 BEDS. Negative COVID. No medical devices. #[phone]     Pt remains on waitlist pending appropriate placement availability.

## 2025-03-30 NOTE — PLAN OF CARE
Hamzah REESE Armando  March 30, 2025  Plan of Care Hand-off Note     Patient Recommended Care Path: inpatient mental health    Clinical Substantiation:  It is the recommendation of this clinician that the pt be admitted to [facility] for safety and stabilization. Pt endorses increased paranoid thinking and auditory hallucination.    Pt also endorses symptoms depression and  anxiety, and several psychosocial, financial and housing related stressors. Lower levels of care have been unsuccessful in managing symptoms. Therapeutic intervention in the ER has not lessened intensity of SI and pt has been unable to engage in safety planning. Pt has limited protective factors and several risk factors for suicide. For this reason, a secure setting with 24 hour supervision is the most appropriate and least restrictive option available for the pt. While pt remains in the the ED, Extended Care will follow. Therapeutic work should continue, and disposition can re-evaluated if pt s level of immediate risk lowers and they can meaningfully engage in safety planning    Goals for crisis stabilization:  Decrease psychosis    Next steps for Care Team:  Admission    Treatment Objectives Addressed:  rapport building, assessing safety, building distress tolerance    Therapeutic Interventions:       Has a specific means been identified for suicidal.homicide actions: No  If yes, describe:    Explain action steps toward mitigation:    Document completion of mitigation action:    The follow up action still needed prior to discharge:      Patient coping skills attempted to reduce the crisis:             Severe psychiatric, behavioral or other comorbid conditions are appropriate for management at inpatient mental health as indicated by at least one of the following: Psychiatric Symptoms, Comorbid substance use disorder  Severe dysfunction in daily living is present as indicated by at least one of the following: Extreme deterioration in social  interactions  Situation and expectations are appropriate for inpatient care: Patient management/treatment at lower level of care is not feasible or is inappropriate  Inpatient mental health services are necessary to meet patient needs and at least one of the following: Specific condition related to admission diagnosis is present and judged likely to deteriorate in absence of treatment at proposed level of care      Collateral contact information:  Paula, friend present for assessment    Legal Status: Voluntary/Patient has signed consent for treatment                                                                                                                                 Reviewed court records: yes     Psychiatry Consult: Patient has Psychiatry Consult Order    ANANDA Dyer

## 2025-03-30 NOTE — ED TRIAGE NOTES
Patient reports he was here a few days ago, did not  his prescription for zyprexa. He does not feel safe, he appears to be paranoid. Auditory hallucinations present. Reports using drugs, will not elaborate.

## 2025-03-30 NOTE — ED PROVIDER NOTES
Emergency Department I-PASS Sign-out      Illness Severity: Stable    Patient Summary:  39 year old male with pertinent PMH of polysubstance use, SI, schizophrenia who presented with methamphetamine use, SI, worsening auditory hallucinations    ED Course/treatment plan:   Labs, DEC assessment    Clinical Impression:  No diagnosis found.    Edited by: Jeanette Lauren DO at 3/29/2025 1622    Action List:  Tests to Follow-up:  Labs, DEC assessment    Medications Reconciled/Ordered:  yes    ED Mental Health Boarding Order Set Used for Diet/PRNs/Other:  yes    DEC, Extended Care, Psych Consult Orders:  Extended care    Situational Awareness & Contingency Plannin Hour Hold Status:  Voluntary, would reassess if wanting to leave.  Active Orders  N/A    Disposition:  IP MH    Boarding subsequent shift/day updates:  IP MH admit    Edited by: Jeanette Lauren DO at 3/30/2025 0042    Synthesis & Events after sign-out:  The patient remains on the list for mental health admission at this time.  Psychiatry was consulted and their recommendations are pending at this time.        Travon Schwartz MD   Emergency Medicine       Travon Schwartz MD  [phone]

## 2025-03-30 NOTE — CONSULTS
"Diagnostic Evaluation Consultation  Crisis Assessment    Patient Name: Hamzah Roberts  Age:  39 year old  Legal Sex: male  Gender Identity: male  Pronouns:   Race: White  Ethnicity: Not  or   Language: English      Patient was assessed:     Crisis Assessment Start Date: 03/30/25  Crisis Assessment Start Time: 0016  Crisis Assessment Stop Time: 0029  Patient location: [facility]                             [facility]-F    Referral Data and Chief Complaint  Hamzah Roberts presents to the ED with family/friends. Patient is presenting to the ED for the following concerns: Paranoia, Significant behavioral change. Factors that make the mental health crisis life threatening or complex are: Patient was discharged AMA from station 22 on 3/26/25.  Since that time, he has not taken his medication.  He reports he hasn't been able to  the RX.    He reports his auditory hallucinations have increased.  He hears someone screaming.  He reports he came to [facility], \"they are driving me crazy\".  He has not slept in 3 days.    Patient reports he has used Meth since he discharged.   Patient reports he feels helpless, hopeless, irritable and wishes he was dead.   Patient endorses SI but denies a plan or inent.   Patient appeared gaurded and paranid.  He was minimally responsive.   Eye contact was avoidant. Mood was anxious and irritable.   Affect flat..      Informed Consent and Assessment Methods  Explained the crisis assessment process, including applicable information disclosures and limits to confidentiality, assessed understanding of the process, and obtained consent to proceed with the assessment.  Assessment methods included conducting a formal interview with patient, review of medical records, collaboration with medical staff, and obtaining relevant collateral information from family and community providers when available.  :       History of the Crisis   Patient has " history of MH hospitalizations.  Most recent was 3/24-3/26 at [facility].  History of committment 2023, 2022, 2021.    Brief Psychosocial History  Family:   , Children yes  Support System:  Friend  Employment Status:  unemployed  Source of Income:     Financial Environmental Concerns:  unable to afford food, unable to afford medication(s), unable to afford rent/mortgage, unemployed  Current Hobbies:     Barriers in Personal Life:  mental health concerns    Significant Clinical History  Current Anxiety Symptoms:  anxious  Current Depression/Trauma:  thoughts of death/suicide, helplessness, irritable, hopelessness  Current Somatic Symptoms:  anxious  Current Psychosis/Thought Disturbance:  auditory hallucinations, high risk behavior  Current Eating Symptoms:     Chemical Use History:  Alcohol: None  Benzodiazepines: None  Cocaine: None  Marijuana: None  Other Use: Methamphetamines  Last Use:: 03/26/25   Past diagnosis:  Schizophrenia, Substance Use Disorder  Family history:     Past treatment:  Case management, Psychiatric Medication Management, Residential Treatment, Inpatient Hospitalization  Details of most recent treatment:  [facility] 3/24-3/26, left AMA  Other relevant history:       Have there been any medication changes in the past two weeks:  yes, please comment  Zyprexa was prescribed    Is the patient compliant with medications:  no        Collateral Information  Is there collateral information: Yes     Collateral information name, relationship, phone number:  Paula, friend present for assessment    What happened today: Patient called her 3 days ago and stated he didn't feel safe.  He was in a trap house.  He hasn't had his medications.  He hasn't slept for 3 days.  She can usually help ground him in reality but he is paranoid and experiencin increased halluncination.   He doesn't feel safe     What is different about patient's functioning: He isn't able to be grounded.   He is detached from reality     What do you  think the patient needs:      Has patient made comments about wanting to kill themselves/others: no    If d/c is recommended, can they take part in safety/aftercare planning:       Additional collateral information:        Risk Assessment  [place] Suicide Severity Rating Scale Full Clinical Version:  Suicidal Ideation  Q6 Suicide Behavior (Lifetime): yes          [place] Suicide Severity Rating Scale Recent:   Suicidal Ideation (Recent)  Q1 Wished to be Dead (Past Month): yes  Q2 Suicidal Thoughts (Past Month): no  If yes to Q6, within past 3 months?: no  Level of Risk per Screen: moderate risk     Suicidal Behavior (Recent)  Actual Attempt (Past 3 Months): No  Has subject engaged in non-suicidal self-injurious behavior? (Past 3 Months): No  Interrupted Attempts (Past 3 Months): No  Aborted or Self-Interrupted Attempt (Past 3 Months): No  Preparatory Acts or Behavior (Past 3 Months): No    Environmental or Psychosocial Events: legal issues such as DWI, DUI, lawsuit, CPS involvement, etc., unemployment/underemployment, helplessness/hopelessness, unstable housing, homelessness, ongoing abuse of substances, neither working nor attending school  Protective Factors: Protective Factors: help seeking    Does the patient have thoughts of harming others? Feels Like Hurting Others: no  Previous Attempt to Hurt Others: no  Is the patient engaging in sexually inappropriate behavior?: no  Does Patient have a known history of aggressive behavior: No  Has aggression occurred as a result of MH concerns/diagnosis: Denies  Does patient have history of aggression in hospital: Records indicate a verbal altercation during last hospitalization.    Is the patient engaging in sexually inappropriate behavior?  no        Mental Status Exam   Affect: Flat  Appearance: Appropriate  Attention Span/Concentration: Inattentive  Eye Contact: Avoidant    Fund of Knowledge: Delayed   Language /Speech Content: Fluent  Language /Speech Volume:  Normal  Language /Speech Rate/Productions: Minimally Responsive  Recent Memory: Intact  Remote Memory: Poor  Mood: Anxious, Irritable  Orientation to Person: Yes   Orientation to Place: Yes  Orientation to Time of Day: Yes  Orientation to Date: Yes     Situation (Do they understand why they are here?): Yes  Psychomotor Behavior: Normal  Thought Content: Paranoia, Hallucinations  Thought Form: Intact     Mini-Cog Assessment  Number of Words Recalled:    Clock-Drawing Test:     Three Item Recall:    Mini-Cog Total Score:       Medication  Psychotropic medications:   Medication Orders - Psychiatric (From admission, onward)      Start     Dose/Rate Route Frequency Ordered Stop    03/30/25 0800  OLANZapine (zyPREXA) tablet 10 mg         10 mg Oral 2 TIMES DAILY 03/30/25 0041      03/29/25 2216  nicotine (COMMIT) lozenge 2 mg         2 mg Buccal EVERY 1 HOUR PRN 03/29/25 2216               Current Care Team  Patient Care Team:  No Ref-Primary, Physician as PCP - Brandon Gunn as Nursing Assistant  Cynthia Bueno as     Diagnosis  Patient Active Problem List   Diagnosis Code    Suicidal ideation R45.851    Polysubstance abuse (H) F19.10    Paranoid schizophrenia (H) F20.0    Paranoia (H) F22    Methamphetamine abuse (H) F15.10    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    Amphetamine use disorder, severe, 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    Anxiety F41.9    Episodic mood disorder F39    Gastroesophageal reflux disease K21.9    Left ankle pain, unspecified chronicity M25.572    Psychosis, atypical (H) F29    Recurrent genital herpes A60.00    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    Hallucinations R44.3    Encounter for medication refill Z76.0    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    Methamphetamine use disorder, moderate, in sustained remission, in controlled environment, dependence (H)  F15.21    Methamphetamine use disorder, severe (H) F15.20    Schizoaffective disorder, bipolar type (H) F25.0    Schizoaffective disorder, chronic condition with acute exacerbation (H) F25.9    Schizoaffective disorder, depressive type (H) F25.1    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.30, S09.90XS    Auditory hallucinations R44.0    Substance-induced psychotic disorder with hallucinations (H) F19.951    Altered mental status, unspecified altered mental status type R41.82    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      Clinical Summary and Substantiation of Recommendations   Clinical Substantiation:  It is the recommendation of this clinician that the pt be admitted to [facility] for safety and stabilization. Pt endorses increased paranoid thinking and auditory hallucination.    Pt also endorses symptoms depression and  anxiety, and several psychosocial, financial and housing related stressors. Lower levels of care have been unsuccessful in managing symptoms. Therapeutic intervention in the ER has not lessened intensity of SI and pt has been unable to engage in safety planning. Pt has limited protective factors and several risk factors for suicide. For this reason, a secure setting with 24 hour supervision is the most appropriate and least restrictive option available for the pt. While pt remains in the the ED, Extended Care will follow. Therapeutic work should continue, and disposition can re-evaluated if pt s level of immediate risk lowers and they can meaningfully engage in safety planning    Goals for crisis stabilization:  Decrease psychosis    Next steps for Care Team:  Admission    Treatment Objectives Addressed:  rapport building, assessing  safety, building distress tolerance    Therapeutic Interventions:       Has a specific means been identified for suicidal/homicide actions: No    If yes, describe:       Explain action steps toward mitigation:       Document completion of mitigation actions:       The follow up action still needed prior to discharge:       Patient coping skills attempted to reduce the crisis:       Disposition  Recommended referrals: Individual Therapy, Medication Management, NURIS Comprehensive Assessment        Reviewed case and recommendations with attending provider. Attending Name: Leonidas       Attending concurs with disposition: yes       Patient and/or validated legal guardian concurs with disposition:   yes       Final disposition:  inpatient mental health            Severe psychiatric, behavioral or other comorbid conditions are appropriate for management at inpatient mental health as indicated by at least one of the following: Psychiatric Symptoms, Comorbid substance use disorder  Severe dysfunction in daily living is present as indicated by at least one of the following: Extreme deterioration in social interactions  Situation and expectations are appropriate for inpatient care: Patient management/treatment at lower level of care is not feasible or is inappropriate  Inpatient mental health services are necessary to meet patient needs and at least one of the following: Specific condition related to admission diagnosis is present and judged likely to deteriorate in absence of treatment at proposed level of care      Legal status: Voluntary/Patient has signed consent for treatment                                                                                                                                 Reviewed court records: yes       Assessment Details   Total duration spent with the patient: 13 min     CPT code(s) utilized: Non-Billable    ANANDA Dyer, Psychotherapist  DEC - Triage & Transition  Services  Callback: [phone]

## 2025-03-30 NOTE — TELEPHONE ENCOUNTER
R: STATEWIDE    [facility] Access Inpatient Bed Call Log 3/30/25 at 7:50 AM: Intake has called facilities that have not updated the bed status within the last 12 hours.                                    [facility] is at capacity.             [facility] is posting 0 beds. [phone] Per call @8:29am, can call in about 40 min after charge meeting    [facility] is posting 0 beds. Negative covid required.    [facility] is posting 0 beds. Neg covid. No high school/Myrna-psych. [phone]    [facility] is posting 0 beds. [phone]    [facility] is posting 0 beds. [phone]    [facility] is posting 0 beds. Negative covid. Per call @7:55am, no child or adolescent beds.    [facility] is posting 0 beds [phone].    [facility] is posting 5 beds. LOW acuity. Ages 12+. Neg covid. [phone]    [facility] has 1 bed posted. No aggression. Negative Covid. Low acuity.    [facility] is posting 0 beds. Low acuity only. Neg covid.  [phone]    [facility] is posting 1 bed. Low acuity. No current aggression.     [facility] is posting 0 beds. Negative covid. [phone]    [facility] ([place] is posting 2 beds available. Negative covid.  [phone].       [facility] is posting 1 bed. Low acuity. 72 HH hold preferred. Myrna unit. Negative covid required. [phone]    [facility] ([place]) is posting 0 beds. Low acuity only. Neg covid.  [phone]    [facility] [place] is posting 4 beds.  Negative covid required.   Vol only, No history of aggression, violence, or assault. No sexual offenders. No 72 HH holds. [phone]         [facility] is posting 0 beds. Negative covid required.  (Must have the cognitive ability to do programming. No aggressive or violent behavior or recent HX in the last 2 yrs. MH must be primary.) Always low  acuity.    [facility] has 0 beds posted. Negative covid required.  Low acuity only. Violence and aggression capped.  [phone]    [facility] is posting 7 beds. Low acuity, Negative covid required. [phone]    [facility]. Negative covid required.  [phone]    [facility] [place] is posting 0 beds. Negative covid. LOW acuity. (No lines, drains, or tubes, oxygen, CPAP, IV, etc.) Must Have a Ride Home. [phone] Per call @8:14am, at cap d/t staffing.    [facility] is posting 3 beds. Negative covid. (No. lines, drains, or tubes, oxygen, CPAP, IV, etc.) [phone] Per call, capped for high acuity.      Pt remains on the work list pending appropriate bed availability.

## 2025-03-30 NOTE — MEDICATION SCRIBE - ADMISSION MEDICATION HISTORY
Medication Scribe Admission Medication History    Admission medication history is complete. The information provided in this note is only as accurate as the sources available at the time of the update.    Information Source(s): Patient and CareEverywhere/SureScripts via in-person    Pertinent Information:     Patient reports taking Zyprexa 10 mg at bedtime and 5 mg BID during the day. Per chart review,Patient has been taking 5 mg BID PRN in the past but last dispense of 5 mg Zyprexa was on 01/16/25 for 30 day supply. AVS from [facility] on 03/26/25 reflects 10 mg Zyprexa at bedtime only. 10 mg last dispensed on 02/08/25 for 30 day supply. 5 mg Zyprexa not added to PTA per dispense report.     Changes made to PTA medication list:    Added: None  Deleted: None  Changed: None    Allergies reviewed with patient and updates made in EHR: yes    Medication History Completed By: Chang Lakhani 3/30/2025 2:20 PM    PTA Med List   Medication Sig Last Dose/Taking    OLANZapine (ZYPREXA) 10 MG tablet Take 1 tablet (10 mg) by mouth at bedtime. Past Week

## 2025-03-30 NOTE — TELEPHONE ENCOUNTER
"S: [facility] Marilyn , DEC  Sabrina  calling at 12:56 AM about a 39 year old/Male presenting with Paranoia and severe AH.     B: Pt arrived via Friend. Presenting problem, stressors: Pt was with a friend earlier and pt became increasingly paranoid and has reported the AH have gotten loud to the point that he cannot concentrate on those around him. Pt stated wants to commit suicide because \"I don't know what to do about the voices/can't live like this\"    Pt affect in ED: Flat and Irritable   Pt Dx: Schizoaffective Disorder  Previous IPMH hx? Yes: [facility] in Feb and March 2025  Pt endorses SI, no plan   Hx of suicide attempt? No  Pt denies SIB  Pt denies HI   Pt endorses auditory hallucinations .   Pt RARS Score: 3    Hx of aggression/violence, sexual offenses, legal concerns, Epic care plan? describe: Verbal altercations  Current concerns for aggression this visit? No  Does pt have a history of Civil Commitment? Yes, most recent commitment 2023  Is Pt their own guardian? Yes    Pt is prescribed medication. Is patient medication compliant? No  Pt endorses OP services:   CD concerns: Actively using/consuming Meth  Acute or chronic medical concerns: None  Does Pt present with specific needs, assistive devices, or exclusionary criteria? None      Pt is ambulatory  Pt is able to perform ADLs independently      A: Pt to be reviewed for [facility] admission. Pt is Voluntary  Preferred placement: Statewide    COVID Symptoms: No  If yes, COVID test required   Utox: Ordered, not yet collected   CMP: Ordered, not yet collected   CBC: Ordered, not yet collected   HCG: N/A    R: Patient cleared and ready for behavioral bed placement: Yes  Pt placed on [facility] worklist? Yes    Does Patient need a Transfer Center request created? No, Pt is located within [facility] ED, [facility] ED, or [facility] ED    "

## 2025-03-30 NOTE — ED NOTES
"Attempted to draw Trop lab- pt was suddenly upset asking \"why do you need more blood from me?\" \"Yesterday, you guys got blood\" \"why are you trying to upset me?\" Nurse informed pt of needed repeat labs but pt appears to be getting more upset. Pt left alone, lab/ blood work was not done. Pt given requested meds, now is on his mobile device/ occupied with something else.  "

## 2025-03-31 ENCOUNTER — TELEPHONE (OUTPATIENT)
Dept: BEHAVIORAL HEALTH | Facility: CLINIC | Age: 40
End: 2025-03-31
Payer: COMMERCIAL

## 2025-03-31 NOTE — TELEPHONE ENCOUNTER
Triage and Transition Services- Patient Follow Up Call  Service Line Making Phone Call: Extended Care    Who did Writer Talk to: Patient    Details of Call: left message to return my call on answering machine    Heidi Colin 3/31/2025 9:01 AM

## 2025-04-14 ENCOUNTER — HOSPITAL ENCOUNTER (EMERGENCY)
Facility: HOSPITAL | Age: 40
Discharge: HOME | End: 2025-04-14
Attending: FAMILY MEDICINE | Admitting: FAMILY MEDICINE
Payer: COMMERCIAL

## 2025-04-14 VITALS
HEIGHT: 63 IN | OXYGEN SATURATION: 98 % | RESPIRATION RATE: 16 BRPM | WEIGHT: 170 LBS | DIASTOLIC BLOOD PRESSURE: 104 MMHG | SYSTOLIC BLOOD PRESSURE: 142 MMHG | HEART RATE: 116 BPM | BODY MASS INDEX: 30.12 KG/M2 | TEMPERATURE: 98 F

## 2025-04-14 DIAGNOSIS — L73.9 FOLLICULITIS OF NOSE: ICD-10-CM

## 2025-04-14 DIAGNOSIS — J34.0 NOSE CELLULITIS: ICD-10-CM

## 2025-04-14 PROCEDURE — 99284 EMERGENCY DEPT VISIT MOD MDM: CPT

## 2025-04-14 PROCEDURE — 250N000013 HC RX MED GY IP 250 OP 250 PS 637: Performed by: FAMILY MEDICINE

## 2025-04-14 RX ORDER — PIPERACILLIN SODIUM, TAZOBACTAM SODIUM 3; .375 G/15ML; G/15ML
3.38 INJECTION, POWDER, LYOPHILIZED, FOR SOLUTION INTRAVENOUS ONCE
Status: DISCONTINUED | OUTPATIENT
Start: 2025-04-14 | End: 2025-04-14

## 2025-04-14 RX ORDER — DOXYCYCLINE 100 MG/1
100 CAPSULE ORAL 2 TIMES DAILY
Qty: 14 CAPSULE | Refills: 0 | Status: SHIPPED | OUTPATIENT
Start: 2025-04-14

## 2025-04-14 RX ORDER — DOXYCYCLINE 100 MG/1
100 CAPSULE ORAL ONCE
Status: COMPLETED | OUTPATIENT
Start: 2025-04-14 | End: 2025-04-14

## 2025-04-14 RX ADMIN — DOXYCYCLINE 100 MG: 100 CAPSULE ORAL at 21:39

## 2025-04-14 RX ADMIN — AMOXICILLIN AND CLAVULANATE POTASSIUM 1 TABLET: 875; 125 TABLET, FILM COATED ORAL at 21:39

## 2025-04-14 ASSESSMENT — ENCOUNTER SYMPTOMS
CHILLS: 0
EYE PAIN: 0
FEVER: 0

## 2025-04-15 NOTE — ED TRIAGE NOTES
Patient reports he felt like he was getting an ingrown hair on his nose yesterday. Today he was picking at it and was able to squeeze out some pus. Now the tip of his nose is red and has some swelling.      Triage Assessment (Adult)       Row Name 04/14/25 2038          Triage Assessment    Airway WDL WDL        Respiratory WDL    Respiratory WDL WDL        Skin Circulation/Temperature WDL    Skin Circulation/Temperature WDL X        Cardiac WDL    Cardiac WDL WDL;X        Peripheral/Neurovascular WDL    Peripheral Neurovascular WDL WDL        Cognitive/Neuro/Behavioral WDL    Cognitive/Neuro/Behavioral WDL WDL

## 2025-04-15 NOTE — DISCHARGE INSTRUCTIONS
Wash gently with soap and water, do not pick or squeeze the nose or any pimples that you see.    Take antibiotics as prescribed    Apply warm packs such as warm washcloth 15 to 20 minutes at a time 2-3 times a day    Follow-up in clinic in 2 to 3 days if not improving
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
## 2025-05-05 NOTE — PLAN OF CARE
BEH IP Unit Acuity Rating Score (UARS)  Patient is given one point for every criteria they meet.    CRITERIA SCORING   On a 72 hour hold, court hold, committed, stay of commitment, or revocation. 0    Patient LOS on BEH unit exceeds 20 days. 0  LOS: 2   Patient under guardianship, 55+, otherwise medically complex, or under age 11. 0   Suicide ideation without relief of precipitating factors. 0   Current plan for suicide. 0   Current plan for homicide. 0   Imminent risk or actual attempt to seriously harm another without relief of factors precipitating the attempt. 0   Severe dysfunction in daily living (ex: complete neglect for self care, extreme disruption in vegetative function, extreme deterioration in social interactions). 1   Recent (last 7 days) or current physical aggression in the ED or on unit. 0   Restraints or seclusion episode in past 72 hours. 0   Recent (last 7 days) or current verbal aggression, agitation, yelling, etc., while in the ED or unit. 1    Active psychosis. 0   Need for constant or near constant redirection (from leaving, from others, etc).  0   Intrusive or disruptive behaviors. 0   Patient requires 3 or more hours of individualized nursing care per 8-hour shift (i.e. for ADLs, meds, therapeutic interventions). 0   TOTAL 2          Sandy Mr. Begum.     It was greatvisiting with you recently at the [facility] Assessment visit at the [facility].     I have reviewed the labs drawn in the office during your visit.   These include:  Blood panel (CBC), Chemistry panel (CMP), Hemoglobin A1c (HbA1c), Lipid panel and Prostate Specific Antigen (PSA)      The following labs are abnormal and may need further evaluation:  None.    The following labs are normal and require no furtherworkup:  CBC  CMP  Hemoglobin A1c (HbA1c)  Lipid panel  Prostate Specific Antigen (PSA)    A CBC, or complete blood count, is a blood test that checks the different types of cells in your blood, like red cells, white cells, and platelets. It helps your doctor find out if you have conditions like anemia, infections, or problems with clotting. It's a common test to get a general picture of your overall health.   A CMP, or comprehensive metabolic panel, is a blood test that checks several important things like your blood sugar, kidney and liver function, and levels of salts and minerals. It gives your doctor a broad look at how your body is working and can help find issues like diabetes, liver problems, or imbalances.  The HbA1c test shows your average blood sugar levels over the past 2 to 3 months. It helps your doctor see how well your body is managing sugar and can be used to check for diabetes or how well it's being controlled.   A lipid panel is a blood test that checks the levels of fats in your blood, like cholesterol and triglycerides. It helps your doctor see if you're at risk for heart disease or problems with your blood vessels. High levels of certain fats can raise your risk of heart attacks or strokes.   The PSA (prostate-specific antigen) test is a blood test that checks for signs of prostate cancer or other prostate issues. Higher PSA levels can mean there's a problem, but it doesn't always mean  cancer-further testing may be needed.      Thank you once again for participating in this year's health assessment and for your commitment to making positive changes in support of your health and well-being -- we hope to see you join us again next year as you continue your wellness journey.    All the best,     Dr. [place]isol [place]

## 2025-05-07 ENCOUNTER — HOSPITAL ENCOUNTER (EMERGENCY)
Facility: HOSPITAL | Age: 40
Discharge: HOME | End: 2025-05-07
Attending: EMERGENCY MEDICINE | Admitting: EMERGENCY MEDICINE
Payer: COMMERCIAL

## 2025-05-07 VITALS
OXYGEN SATURATION: 100 % | WEIGHT: 168 LBS | HEIGHT: 63 IN | BODY MASS INDEX: 29.77 KG/M2 | SYSTOLIC BLOOD PRESSURE: 140 MMHG | TEMPERATURE: 98.3 F | RESPIRATION RATE: 16 BRPM | DIASTOLIC BLOOD PRESSURE: 89 MMHG | HEART RATE: 95 BPM

## 2025-05-07 DIAGNOSIS — F15.10 METHAMPHETAMINE ABUSE (H): ICD-10-CM

## 2025-05-07 DIAGNOSIS — F19.951: ICD-10-CM

## 2025-05-07 DIAGNOSIS — R44.1 VISUAL HALLUCINATIONS: ICD-10-CM

## 2025-05-07 PROCEDURE — 250N000013 HC RX MED GY IP 250 OP 250 PS 637: Performed by: EMERGENCY MEDICINE

## 2025-05-07 PROCEDURE — 99282 EMERGENCY DEPT VISIT SF MDM: CPT

## 2025-05-07 PROCEDURE — 99283 EMERGENCY DEPT VISIT LOW MDM: CPT

## 2025-05-07 RX ORDER — OLANZAPINE 5 MG/1
10 TABLET, ORALLY DISINTEGRATING ORAL ONCE
Status: COMPLETED | OUTPATIENT
Start: 2025-05-07 | End: 2025-05-07

## 2025-05-07 RX ORDER — OLANZAPINE 5 MG/1
15 TABLET, ORALLY DISINTEGRATING ORAL ONCE
Status: DISCONTINUED | OUTPATIENT
Start: 2025-05-07 | End: 2025-05-07

## 2025-05-07 RX ORDER — OLANZAPINE 5 MG/1
10 TABLET, FILM COATED ORAL AT BEDTIME
Qty: 60 TABLET | Refills: 0 | Status: SHIPPED | OUTPATIENT
Start: 2025-05-07

## 2025-05-07 RX ADMIN — OLANZAPINE 10 MG: 5 TABLET, ORALLY DISINTEGRATING ORAL at 21:13

## 2025-05-07 ASSESSMENT — COLUMBIA-SUICIDE SEVERITY RATING SCALE - C-SSRS
6. HAVE YOU EVER DONE ANYTHING, STARTED TO DO ANYTHING, OR PREPARED TO DO ANYTHING TO END YOUR LIFE?: YES
5. HAVE YOU STARTED TO WORK OUT OR WORKED OUT THE DETAILS OF HOW TO KILL YOURSELF? DO YOU INTEND TO CARRY OUT THIS PLAN?: NO
6. HAVE YOU EVER DONE ANYTHING, STARTED TO DO ANYTHING, OR PREPARED TO DO ANYTHING TO END YOUR LIFE?: NO
1. IN THE PAST MONTH, HAVE YOU WISHED YOU WERE DEAD OR WISHED YOU COULD GO TO SLEEP AND NOT WAKE UP?: YES
2. HAVE YOU ACTUALLY HAD ANY THOUGHTS OF KILLING YOURSELF IN THE PAST MONTH?: YES
1. IN THE PAST MONTH, HAVE YOU WISHED YOU WERE DEAD OR WISHED YOU COULD GO TO SLEEP AND NOT WAKE UP?: NO
3. HAVE YOU BEEN THINKING ABOUT HOW YOU MIGHT KILL YOURSELF?: YES
2. HAVE YOU ACTUALLY HAD ANY THOUGHTS OF KILLING YOURSELF IN THE PAST MONTH?: NO
4. HAVE YOU HAD THESE THOUGHTS AND HAD SOME INTENTION OF ACTING ON THEM?: NO

## 2025-05-08 ENCOUNTER — HOSPITAL ENCOUNTER (EMERGENCY)
Facility: HOSPITAL | Age: 40
Discharge: HOME | End: 2025-05-08
Attending: EMERGENCY MEDICINE
Payer: COMMERCIAL

## 2025-05-08 VITALS
DIASTOLIC BLOOD PRESSURE: 73 MMHG | HEIGHT: 63 IN | SYSTOLIC BLOOD PRESSURE: 120 MMHG | RESPIRATION RATE: 16 BRPM | BODY MASS INDEX: 29.77 KG/M2 | OXYGEN SATURATION: 98 % | WEIGHT: 168 LBS | TEMPERATURE: 97.9 F | HEART RATE: 90 BPM

## 2025-05-08 DIAGNOSIS — F19.959 DRUG-INDUCED PSYCHOTIC DISORDER WITH COMPLICATION (H): ICD-10-CM

## 2025-05-08 DIAGNOSIS — F15.10 METHAMPHETAMINE ABUSE (H): ICD-10-CM

## 2025-05-08 NOTE — ED NOTES
"Dr Gray spoke with patient who states he is not suicidal but has \"no where to go until his ride takes him to [facility] facility tomorrow\". Per MD, patient does not require 1:1 and is not holdable. Will stay in ED pending ride in AM. Patient states he has transportation in AM arranged for treatment center.  "
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
## 2025-05-08 NOTE — ED TRIAGE NOTES
Patient reports he has psychosis and has not taken his Zyprexa for 2 days. He report auditory and visual disturbances. He is also diaphoretic in triage. He uses meth but has not had any for the past 24 hours. He reports there is a treatment facility in [place] that will take him tomorrow from mental health and addiction treatment.      Triage Assessment (Adult)       Row Name 05/07/25 1949          Triage Assessment    Airway WDL WDL        Respiratory WDL    Respiratory WDL WDL        Skin Circulation/Temperature WDL    Skin Circulation/Temperature WDL X        Cardiac WDL    Cardiac WDL WDL        Peripheral/Neurovascular WDL    Peripheral Neurovascular WDL WDL        Cognitive/Neuro/Behavioral WDL    Cognitive/Neuro/Behavioral WDL X     Level of Consciousness alert        Clint Coma Scale    Best Eye Response 4-->(E4) spontaneous     Best Motor Response 6-->(M6) obeys commands     Best Verbal Response 5-->(V5) oriented     Clint Coma Scale Score 15

## 2025-05-08 NOTE — ED TRIAGE NOTES
Patient reports he was waiting for ride and security told him he had to leave. Patient states he is now feeling suicidal and has thoughts to walk out into traffic and that he is better off dead.     Triage Assessment (Adult)       Row Name 05/07/25 8589          Triage Assessment    Airway WDL WDL        Respiratory WDL    Respiratory WDL WDL        Cardiac WDL    Cardiac WDL WDL
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
## 2025-05-08 NOTE — ED NOTES
Pt has discharge papers and scripts. He will let  know if he has a safe placed to got tonight by 2200. Let Leslie GARNICA in wr know

## 2025-05-29 ENCOUNTER — HOSPITAL ENCOUNTER (EMERGENCY)
Facility: HOSPITAL | Age: 40
Discharge: HOME | End: 2025-05-29
Attending: EMERGENCY MEDICINE
Payer: COMMERCIAL

## 2025-05-29 VITALS
HEART RATE: 98 BPM | DIASTOLIC BLOOD PRESSURE: 74 MMHG | OXYGEN SATURATION: 98 % | WEIGHT: 168 LBS | SYSTOLIC BLOOD PRESSURE: 126 MMHG | BODY MASS INDEX: 29.77 KG/M2 | HEIGHT: 63 IN | RESPIRATION RATE: 14 BRPM | TEMPERATURE: 98.6 F

## 2025-05-29 DIAGNOSIS — L03.90 CELLULITIS, UNSPECIFIED CELLULITIS SITE: ICD-10-CM

## 2025-05-29 DIAGNOSIS — L02.91 ABSCESS: ICD-10-CM

## 2025-05-29 LAB — GLUCOSE BLDC GLUCOMTR-MCNC: 205 MG/DL (ref 70–99)

## 2025-05-29 PROCEDURE — 250N000013 HC RX MED GY IP 250 OP 250 PS 637: Performed by: EMERGENCY MEDICINE

## 2025-05-29 PROCEDURE — 82962 GLUCOSE BLOOD TEST: CPT

## 2025-05-29 PROCEDURE — 99284 EMERGENCY DEPT VISIT MOD MDM: CPT

## 2025-05-29 PROCEDURE — 10060 I&D ABSCESS SIMPLE/SINGLE: CPT

## 2025-05-29 RX ORDER — SULFAMETHOXAZOLE AND TRIMETHOPRIM 800; 160 MG/1; MG/1
1 TABLET ORAL ONCE
Status: COMPLETED | OUTPATIENT
Start: 2025-05-29 | End: 2025-05-29

## 2025-05-29 RX ORDER — CEPHALEXIN 500 MG/1
500 CAPSULE ORAL ONCE
Status: COMPLETED | OUTPATIENT
Start: 2025-05-29 | End: 2025-05-29

## 2025-05-29 RX ORDER — LIDOCAINE 40 MG/G
CREAM TOPICAL
Status: DISCONTINUED | OUTPATIENT
Start: 2025-05-29 | End: 2025-05-29

## 2025-05-29 RX ORDER — CEPHALEXIN 500 MG/1
500 CAPSULE ORAL 4 TIMES DAILY
Qty: 40 CAPSULE | Refills: 0 | Status: SHIPPED | OUTPATIENT
Start: 2025-05-29 | End: 2025-06-08

## 2025-05-29 RX ORDER — SULFAMETHOXAZOLE AND TRIMETHOPRIM 800; 160 MG/1; MG/1
1 TABLET ORAL 2 TIMES DAILY
Qty: 20 TABLET | Refills: 0 | Status: SHIPPED | OUTPATIENT
Start: 2025-05-29 | End: 2025-06-08

## 2025-05-29 RX ADMIN — CEPHALEXIN 500 MG: 500 CAPSULE ORAL at 10:37

## 2025-05-29 RX ADMIN — SULFAMETHOXAZOLE AND TRIMETHOPRIM 1 TABLET: 800; 160 TABLET ORAL at 10:37

## 2025-05-29 ASSESSMENT — ACTIVITIES OF DAILY LIVING (ADL): ADLS_ACUITY_SCORE: 52

## 2025-05-29 NOTE — ED TRIAGE NOTES
"Patient comes with c/o \"spider\" bite on upper leg, no fevers or chills, first noted few days ago     Triage Assessment (Adult)       Row Name 05/29/25 0932          Triage Assessment    Airway WDL WDL        Respiratory WDL    Respiratory WDL WDL        Skin Circulation/Temperature WDL    Skin Circulation/Temperature WDL WDL        Cardiac WDL    Cardiac WDL WDL        Peripheral/Neurovascular WDL    Peripheral Neurovascular WDL WDL        Cognitive/Neuro/Behavioral WDL    Cognitive/Neuro/Behavioral WDL WDL                     "

## 2025-05-29 NOTE — DISCHARGE INSTRUCTIONS
Thank you for choosing [facility] for your care. It was a pleasure taking care of you today in our Emergency Department.       Instructions from your doctor today:  Emergency Department (ED) testing is focused on the potential causes of your symptoms that are the most dangerous possibilities, and cannot cover every possibility. Based on the evaluation, it was deemed sufficiently safe to discharge and continue management through the clinics. Thus, follow-up is very important to assess for improvement/worsening, potential further testing, and potential treatment adjustments. If you were given opioid pain medications or other medications that can make you drowsy while in the ED, you should not drive for at least several hours and not until you feel completely back to normal.     Please make an appointment to follow up with:  - Your Primary Care Provider in 2-4 days if symptoms aren't improving  - If you do not have a primary care provider, you can be seen in follow-up and establish care by calling any of the clinics below:     - [facility] (phone: [phone])     - Primary Care / [facility] (phone: [phone])   - Have your clinic provider review the results from today's visit with you again, including any potential follow-up or additional testing that may be needed based on the results. Occasionally, incidental findings are found on later review by radiologists that may need follow-up.       If you have continued worsening symptoms, please return to the emergency department.
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
## 2025-06-09 ENCOUNTER — HOSPITAL ENCOUNTER (INPATIENT)
Facility: CLINIC | Age: 40
End: 2025-06-09
Attending: FAMILY MEDICINE | Admitting: PSYCHIATRY & NEUROLOGY
Payer: COMMERCIAL

## 2025-06-09 DIAGNOSIS — R45.851 SUICIDAL IDEATION: ICD-10-CM

## 2025-06-09 DIAGNOSIS — F20.0 PARANOID SCHIZOPHRENIA (H): ICD-10-CM

## 2025-06-09 DIAGNOSIS — R44.0 AUDITORY HALLUCINATIONS: ICD-10-CM

## 2025-06-09 PROCEDURE — 250N000013 HC RX MED GY IP 250 OP 250 PS 637: Performed by: FAMILY MEDICINE

## 2025-06-09 RX ORDER — OLANZAPINE 10 MG/1
10 TABLET, ORALLY DISINTEGRATING ORAL ONCE
Status: COMPLETED | OUTPATIENT
Start: 2025-06-09 | End: 2025-06-09

## 2025-06-09 RX ADMIN — OLANZAPINE 10 MG: 10 TABLET, ORALLY DISINTEGRATING ORAL at 21:18

## 2025-06-09 RX ADMIN — OLANZAPINE 10 MG: 10 TABLET, ORALLY DISINTEGRATING ORAL at 22:35

## 2025-06-09 ASSESSMENT — ACTIVITIES OF DAILY LIVING (ADL)
ADLS_ACUITY_SCORE: 52

## 2025-06-10 PROBLEM — F29 PSYCHOSIS, UNSPECIFIED PSYCHOSIS TYPE (H): Status: ACTIVE | Noted: 2025-06-10

## 2025-06-10 PROCEDURE — 250N000013 HC RX MED GY IP 250 OP 250 PS 637: Performed by: FAMILY MEDICINE

## 2025-06-10 PROCEDURE — 80307 DRUG TEST PRSMV CHEM ANLYZR: CPT | Performed by: FAMILY MEDICINE

## 2025-06-10 RX ORDER — OLANZAPINE 10 MG/1
10 TABLET, FILM COATED ORAL AT BEDTIME
Status: DISCONTINUED | OUTPATIENT
Start: 2025-06-11 | End: 2025-06-11

## 2025-06-10 RX ORDER — OLANZAPINE 10 MG/1
10 TABLET, ORALLY DISINTEGRATING ORAL ONCE
Status: COMPLETED | OUTPATIENT
Start: 2025-06-10 | End: 2025-06-10

## 2025-06-10 RX ADMIN — OLANZAPINE 10 MG: 10 TABLET, ORALLY DISINTEGRATING ORAL at 17:06

## 2025-06-10 ASSESSMENT — ACTIVITIES OF DAILY LIVING (ADL)
ADLS_ACUITY_SCORE: 52
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
## 2025-06-10 NOTE — ED NOTES
This writer visited pt for a DEC assessment. Pt reported being unable to complete it at this time due to feeling very somnolent following medications received. Nursing can contact DEC line to notify assessors when pt is ready to try again: [phone].

## 2025-06-10 NOTE — ED NOTES
Clinician attempted to wake pt for assessment interview.  Pt is snoring loudly and does not easily awaken. Please contact DEC when pt is ready for assessment.    Updated Dr Delatorre.

## 2025-06-10 NOTE — ED PROVIDER NOTES
"Emergency Department I-PASS Sign-out      Illness Severity: \"Watcher\"    Patient Summary:  39 year old male with pertinent PMH of schizophrenia who presented with increased auditory hallucinations and suicidal ideation    ED Course/treatment plan: Full evaluation with DEC  awaiting evaluation will be under observation until that time    Clinical Impression:  (F20.0) Paranoid schizophrenia (H)    (R45.851) Suicidal ideation    (R44.0) Auditory hallucinations      Edited by: Pernell Hartman MD at 6/10/2025 0052    Action List:  Tests to Follow-up:  None    Medications Reconciled/Ordered:  Yes    ED Mental Health Boarding Order Set Used for Diet/PRNs/Other:  Yes    DEC, Extended Care, Psych Consult Orders:  DEC assessment ordered and pending.     Situational Awareness & Contingency Plannin Hour Hold Status:  Voluntary, would reassess if wanting to leave.  Active Orders  N/A    Psychiatric Emergency:  A psychiatric emergency is not active    Disposition:  Mental health admit.  Voluntary.  Would reassess if patient asks to leave.    Boarding subsequent shift/day updates:      Edited by: Pernell Hartman MD at 6/10/2025 0052    Synthesis & Events after sign-out:  Patient presented almost 24 hours ago with a history of schizophrenia and reporting auditory hallucinations and some suicidal thoughts.  Apparently was somewhat agitated and so received a large dose of Zyprexa, and per documentation has been non-interviewable until this time.  He is signed out to me at shift change as the third provider on this case and he has not yet been assessed.  I spoke with the patient, he is sleepy but arousable.  He tells me he is still experiencing auditory command hallucinations and is still feeling suicidal.  In the past the symptoms are strongly correlated with his use of substances.  The patient was also seen by the DEC , please refer to their extensive note/evaluation which was reviewed " with me and is documented in EPIC on 6/10/2025 for further details.  Due to his symptoms the ability of the DEC  to fully evaluate is limited, however he continues to complain of auditory command hallucinations to kill himself and suicidal thoughts and cannot contract for safety.  At this time we will transpiration him to inpatient care he appears to have failed observation care.            Damian Meehan MD   Emergency Medicine     Damian Meehan MD  06/10/25 8812       Damian Meehan MD  06/10/25 6875

## 2025-06-10 NOTE — ED NOTES
"Pt woke up, used bathroom. Per sitter, forgot to ask for sample. Notified pt that sample is still needed. VSS. Asked pt if he was ready for , per pt \"I will let you know when I am ready but not now\". Dnies SI/HI, hallucinations currently.  "

## 2025-06-10 NOTE — ED TRIAGE NOTES
Pt presents tot he ED for evaluation of psychosis. Pt endorses auditory and visual hallucinations, patient is unsure if he feels safe and endorses SI with no plan. Pt denies HI and endorses hx of schizophrenia.      Triage Assessment (Adult)       Row Name 06/09/25 2034          Triage Assessment    Airway WDL WDL        Respiratory WDL    Respiratory WDL WDL        Skin Circulation/Temperature WDL    Skin Circulation/Temperature WDL WDL        Cardiac WDL    Cardiac WDL WDL        Peripheral/Neurovascular WDL    Peripheral Neurovascular WDL WDL        Cognitive/Neuro/Behavioral WDL    Cognitive/Neuro/Behavioral WDL X     Mood/Behavior anxious

## 2025-06-10 NOTE — PHARMACY-ADMISSION MEDICATION HISTORY
Pharmacist Admission Medication History    Admission medication history is complete. The information provided in this note is only as accurate as the sources available at the time of the update.    Information Source(s): Patient and CareEverywhere/SureScripts via in-person    Pertinent Information: Hamzah reported he is supposed to be taking antibiotics for cellulitis and an abscess but he has not been. It is unclear how much of the course of each antibiotic was taken. He also reported he is supposed to be taking Zyprexa 25 mg per day but the most recent prescription is for Zyprexa 10 mg per day.    Cephalexin 500 mg QID for 10 days prescribed 5/29/25-06/10/25  Bactrim DS (800-160 mg) 1 tablet BID for 10 days prescribed 5/29/25-06/10/25    Changes made to PTA medication list:  Added: None  Deleted: Augmentin, doxycycline  Changed: None    Allergies reviewed with patient and updates made in EHR: yes    Medication History Completed By: Sabrina Swanson RPH 6/10/2025 5:23 PM    PTA Med List   Medication Sig Last Dose/Taking    cephALEXin (KEFLEX) 500 MG capsule Take 1 capsule (500 mg) by mouth 4 times daily for 10 days. Unknown    OLANZapine (ZYPREXA) 5 MG tablet Take 2 tablets (10 mg) by mouth at bedtime. 6/9/2025    sulfamethoxazole-trimethoprim (BACTRIM DS) 800-160 MG tablet Take 1 tablet by mouth 2 times daily for 10 days. Unknown

## 2025-06-10 NOTE — ED NOTES
IP MH Referral Acuity Rating Score (RARS)    LMHP complete at referral to IP MH, with DEC; and, daily while awaiting IP MH placement. Call score to PPS.  CRITERIA SCORING   New 74 HH and Involuntary for IP MH (not adolescent) 0/3   Boarding over 24 hours 1/1   Vulnerable adult at least 55+ with multiple co morbidities; or, Patient age 11 or under 0/1   Suicide ideation without relief of precipitating factors 1/1   Current plan for suicide 0/1   Current plan for homicide 0/1   Imminent risk or actual attempt to seriously harm another without relief of factors precipitating the attempt 0/1   Severe dysfunction in daily living (ex: complete neglect for self care, extreme disruption in vegetative function, extreme deterioration in social interactions) 1/1   Recent (last 2 weeks) or current physical aggression in the ED 0/1   Restraints or seclusion episode in ED 0/1   Verbal aggression, agitation, yelling, etc., while in the ED 0/1   Active psychosis with psychomotor agitation or catatonia 1/1   Need for constant or near constant redirection (from leaving, from others, etc).  0/1   Intrusive or disruptive behaviors 0/1   TOTAL 4      Marta ANTHONY

## 2025-06-11 ENCOUNTER — TELEPHONE (OUTPATIENT)
Dept: BEHAVIORAL HEALTH | Facility: CLINIC | Age: 40
End: 2025-06-11
Payer: COMMERCIAL

## 2025-06-11 PROCEDURE — 250N000013 HC RX MED GY IP 250 OP 250 PS 637: Performed by: EMERGENCY MEDICINE

## 2025-06-11 RX ORDER — OLANZAPINE 5 MG/1
5 TABLET, ORALLY DISINTEGRATING ORAL ONCE
Status: COMPLETED | OUTPATIENT
Start: 2025-06-11 | End: 2025-06-11

## 2025-06-11 RX ORDER — CALCIUM CARBONATE 500 MG/1
500 TABLET, CHEWABLE ORAL ONCE
Status: COMPLETED | OUTPATIENT
Start: 2025-06-11 | End: 2025-06-11

## 2025-06-11 RX ORDER — OLANZAPINE 10 MG/1
10 TABLET, ORALLY DISINTEGRATING ORAL AT BEDTIME
Status: DISPENSED | OUTPATIENT
Start: 2025-06-11

## 2025-06-11 RX ADMIN — OLANZAPINE 10 MG: 10 TABLET, ORALLY DISINTEGRATING ORAL at 22:46

## 2025-06-11 RX ADMIN — CALCIUM CARBONATE (ANTACID) CHEW TAB 500 MG 500 MG: 500 CHEW TAB at 02:53

## 2025-06-11 RX ADMIN — OLANZAPINE 5 MG: 5 TABLET, ORALLY DISINTEGRATING ORAL at 12:13

## 2025-06-11 ASSESSMENT — ACTIVITIES OF DAILY LIVING (ADL)
ADLS_ACUITY_SCORE: 52

## 2025-06-11 NOTE — ED NOTES
Patient has been sleeping for most the shift. Patient wakes up for meals and when he needs to use the bathroom. Patient denies any hallucinations and denies SI. Patient boarding the ED and waiting for impatient  bed.

## 2025-06-11 NOTE — ED NOTES
Staff states pts vape fell on floor when pt was repositioning in bed, staff confiscated vape and placed with pts personal belongings.

## 2025-06-11 NOTE — CONSULTS
Pt is awaiting IP Bed. SW consulted due to Elevated Readmission/RISC Score. This SW will sign off for now. Please re-consult if there are any medical Social Work Needs.   Philip Calvert, [facility]  10 ICU & [place] [facility]   Ph: [phone]

## 2025-06-11 NOTE — TELEPHONE ENCOUNTER
S:  Pt is voluntary and wants [facility].    P: [place]  Access Inpatient Bed Call Log 6/11/25 : Intake has called facilities that have not updated the bed status within the last 12 hours                              [facility] is at capacity.           [facility] is posting 0 beds. [phone] Per call @8:37am, no beds available today  [facility] is posting 0 beds. Negative covid required.  [facility] is posting 0 beds. Neg covid. No high school/[facility]-psych. [phone]  [facility] is posting 0 beds. [phone]  [facility] is posting 0 beds. [phone]  [facility] is posting 1 bed. Negative covid. [phone] Per call @8:21am, no child or no abe beds. Couple of single occ beds.   Per call , reviewing other pts.  [facility] is posting 0 beds [phone].    Continue to seek placement

## 2025-06-11 NOTE — CONSULTS
Diagnostic Evaluation Consultation  Crisis Assessment    Patient Name: Hamzah Roberts  Age:  39 year old  Legal Sex: male  Gender Identity: male  Pronouns:      Race: White  Ethnicity: Not  or   Language: English      Patient was assessed: In person   Crisis Assessment Start Date: 06/10/25  Crisis Assessment Start Time: 1750  Crisis Assessment Stop Time: 1752  Patient location: [facility] Emergency Department                             [facility]-    Referral Data and Chief Complaint  Hamzah Roberts presents to the ED by  self. Patient is presenting to the ED for the following concerns: Substance use, Paranoia, Suicidal ideation. Factors that make the mental health crisis life threatening or complex are: Pt initially presented to the ED roughly 24 hours ago with complaints of auditory and visual hallucinations that have worsened over the past two to three weeks. Pt also endorsed SI to attending provider, though he denied having a plan. Pt has been somnolent in the ED since last evening and has not been able to engage in DEC assessment despite several attempts.  Writer attempted to meet with pt. Pt had his eyes closed and appeared agitated. Pt was unable to giver verbal responses to writer's assessment questions, thus unable to assess for SI, HI, AH/VH, NURIS and SIB. Pt has endorsed SI, AH, and VH verbally to the attending provider today.      Informed Consent and Assessment Methods  Explained the crisis assessment process, including applicable information disclosures and limits to confidentiality, assessed understanding of the process, and obtained consent to proceed with the assessment.  Assessment methods included conducting a formal interview with patient, review of medical records, collaboration with medical staff, and obtaining relevant collateral information from family and community providers when available.  : done     History of the Crisis   Hamzah carries previous diagnoses of  Schizoaffective disorder bipolar type, anxiety, depression, substance-induced psychosis, and polysubstance use. 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 Pt has hx of several [facility] admissions, most recently from 3/24 - 3/26/25 at [facility] where he discharged AMA. Per collateral from pt's sister, he receives targeted mental health case management services from [facility]. Pt was most recently seen in the ED from 5/7 - 5/8/25 at [facility] for substance induced psychosis and improved overnight after a dose of Zyprexa and sleep. Pt has hx of homelessness; It is unclear if he is compliant with medications or has additional outpatient providers. Pt stated in recent ED visits that he was planning on attending residential NURIS treatment in [place] in April of 2025; It is unclear if pt attended treatment. Pt has hx of MICD civil commitments through [facility] (2021, 2022, and 2023). Per chart review, pt has one prior suicide attempt via intentional overdose and one prior self aborted attempt by spitting pills from his mouth in January of 2025.    Brief Psychosocial History  Family:  Single, Children yes  Support System:  Sibling(s)  Employment Status:  unemployed  Source of Income:  unable to assess  Financial Environmental Concerns:  unable to afford food, unable to afford medication(s), unable to afford rent/mortgage, unemployed  Current Hobbies:  music, television/movies/videos  Barriers in Personal Life:  mental health concerns    Significant Clinical History  Current Anxiety Symptoms:     Current Depression/Trauma:  thoughts of death/suicide, irritable  Current Somatic Symptoms:  sweating, flushing, shaking  Current Psychosis/Thought Disturbance:  auditory hallucinations, visual hallucinations  Current Eating Symptoms:   (unable to assess)  Chemical Use History:  Alcohol:  (unable to assess)  Benzodiazepines:  (unable to assess)  Opiates:  (unable to assess)  Cocaine:  (unable to assess)  Marijuana:   (unable to assess)  Other Use: Methamphetamines  Last Use::  (unable to assess last use)  Withdrawal Symptoms:  (Hx of hallucinations from meth use.)   Past diagnosis:  Schizophrenia, Substance Use Disorder, Bipolar Disorder, Anxiety Disorder, Depression  Family history:  No known history of mental health or chemical health concerns  Past treatment:  Case management, Psychiatric Medication Management, Residential Treatment, Inpatient Hospitalization  Details of most recent treatment:  [facility].  Other relevant history:  Pt has attended [facility] tx several times before.    Have there been any medication changes in the past two weeks:  no       Is the patient compliant with medications:  no  Per chart     Collateral Information  Is there collateral information: Yes, obtained by colleague on 6/9.     Collateral information name, relationship, phone number:  [facility] -[phone]    What happened today: Angie said she has not seen her brother recently. She said his mental health is bad and if he is in the hospital, then he needs help. She said he needs to be admitted.     What is different about patient's functioning: Angie has not seen him recently and was not able to answer this question. She said he needs to be seen by a doctor and the hospital can look at his medical chart to get more information about him. She said she was half asleep, could not provide additional information about him, and hung up the phone.     What do you think the patient needs: Unknown    Has patient made comments about wanting to kill themselves/others:  (Unknown, Angie hung up before this writer could ask this question.)    If d/c is recommended, can they take part in safety/aftercare planning:   (Unknown)       Risk Assessment  [place] Suicide Severity Rating Scale Full Clinical Version:  Suicidal Ideation  Q1 Wish to be Dead (Lifetime): Yes  Q2 Non-Specific Active Suicidal  Thoughts (Lifetime): Yes  3. Active Suicidal Ideation with any Methods (Not Plan) Without Intent to Act (Lifetime): Yes  4. Active Suicidal Ideation with Some Intent to Act, Without Specific Plan (Lifetime): Yes  5. Active Suicidal Ideation with Specific Plan and Intent (Lifetime): Yes  Q6 Suicide Behavior (Lifetime): yes  Intensity of Ideation (Lifetime)  Most Severe Ideation Rating (Lifetime): 3  Description of Most Severe Ideation (Lifetime): Per chart, pt has hx of SI in context of psychosis  Frequency (Lifetime):  (PRIYANK)  Duration (Lifetime):  (PRIYANK)  Controllability (Lifetime):  (PRIYANK)  Deterrents (Lifetime):  (PRIYANK)  Reasons for Ideation (Lifetime):  (PRIYANK)  Suicidal Behavior (Lifetime)  Actual Attempt (Lifetime): Yes  Total Number of Actual Attempts (Lifetime): 1  Actual Attempt Description (Lifetime): Per chart, pt attempted via intentional overdose  Has subject engaged in non-suicidal self-injurious behavior? (Lifetime): No  Interrupted Attempts (Lifetime): No  Aborted or Self-Interrupted Attempt (Lifetime): Yes  Total Number of Aborted or Self-Interrupted Attempts (Lifetime): 1  Aborted or Self-Interrupted Attempt Description (Lifetime): Per chart, pt put several pills in his mouth with plan to overdose, but spit them out (January 2025)  Preparatory Acts or Behavior (Lifetime):  (PRIYANK)    [place] Suicide Severity Rating Scale Recent:   Suicidal Ideation (Recent)  Q1 Wished to be Dead (Past Month): yes  Q2 Suicidal Thoughts (Past Month): yes  Q3 Suicidal Thought Method: no  Q4 Suicidal Intent without Specific Plan: yes  Q5 Suicide Intent with Specific Plan: no  If yes to Q6, within past 3 months?: yes  Level of Risk per Screen: high risk  Intensity of Ideation (Recent)  Most Severe Ideation Rating (Past 1 Month): 3  Description of Most Severe Ideation (Past 1 Month): Unable to assess due to sedation; SI typically in context of psychosis  Frequency (Past 1 Month):  (PRIYANK)  Duration (Past 1 Month):   (PRIYANK)  Controllability (Past 1 Month):  (PRIYANK)  Deterrents (Past 1 Month):  (PRIYANK)  Reasons for Ideation (Past 1 Month):  (PRIYANK)  Suicidal Behavior (Recent)  Actual Attempt (Past 3 Months): No  Has subject engaged in non-suicidal self-injurious behavior? (Past 3 Months): No  Interrupted Attempts (Past 3 Months): No  Aborted or Self-Interrupted Attempt (Past 3 Months): No  Preparatory Acts or Behavior (Past 3 Months): No    Environmental or Psychosocial Events: legal issues such as DWI, DUI, lawsuit, CPS involvement, etc., unemployment/underemployment, helplessness/hopelessness, unstable housing, homelessness, ongoing abuse of substances, neither working nor attending school  Protective Factors: Protective Factors: help seeking    Does the patient have thoughts of harming others? Feels Like Hurting Others: no  Previous Attempt to Hurt Others: no  Current presentation:  (somnolent)  Is the patient engaging in sexually inappropriate behavior?: no  Does Patient have a known history of aggressive behavior: No  Has aggression occurred as a result of MH concerns/diagnosis: Not to this writer's knowledge  Does patient have history of aggression in hospital: Not to this writer's knowledge    Is the patient engaging in sexually inappropriate behavior?  no        Mental Status Exam   Affect: Blunted  Appearance: Disheveled  Attention Span/Concentration:  (unable to assess)  Eye Contact: Avoidant    Fund of Knowledge:  (unable to assess)   Language /Speech Content:  (unable to assess)  Language /Speech Volume:  (unable to assess)  Language /Speech Rate/Productions: Minimally Responsive  Recent Memory:  (unable to assess)  Remote Memory:  (unable to assess)  Mood: Anxious  Orientation to Person: Yes   Orientation to Place:  (unable to assess)  Orientation to Time of Day:  (unable to assess)  Orientation to Date:  (unable to assess)     Situation (Do they understand why they are here?): Yes  Psychomotor Behavior:  Underactive  Thought Content: Suicidal, Hallucinations  Thought Form: Paranoia        Medication  Psychotropic medications:   Medication Orders - Psychiatric (From admission, onward)      Start     Dose/Rate Route Frequency Ordered Stop    06/11/25 2200  OLANZapine (zyPREXA) tablet 10 mg         10 mg Oral AT BEDTIME 06/10/25 1807               Current Care Team  Patient Care Team:  No Ref-Primary, Physician as PCP - Brandon Gunn as Nursing Assistant  Cynthia Bueno as     Diagnosis  Patient Active Problem List   Diagnosis Code    Suicidal ideation R45.851    Polysubstance abuse (H) F19.10    Paranoid schizophrenia (H) F20.0    Paranoia (H) F22    Methamphetamine abuse (H) F15.10    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    Amphetamine use disorder, severe, 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    Anxiety F41.9    Episodic mood disorder F39    Gastroesophageal reflux disease K21.9    Left ankle pain, unspecified chronicity M25.572    Psychosis, atypical (H) F29    Recurrent genital herpes A60.00    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    Hallucinations R44.3    Encounter for medication refill Z76.0    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    Methamphetamine use disorder, moderate, in sustained remission, in controlled environment, dependence (H) F15.21    Methamphetamine use disorder, severe (H) F15.20    Schizoaffective disorder, bipolar type (H) F25.0    Schizoaffective disorder, chronic condition with acute exacerbation (H) F25.9    Schizoaffective disorder, depressive type (H) F25.1    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.30, S09.90XS    Auditory hallucinations R44.0    Substance-induced psychotic disorder with hallucinations (H) F19.951    Altered mental status, unspecified altered mental status type R41.82    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 Pt also endorsed SI to attending provider, though he denied having a plan. Pt has been somnolent in the ED since last evening and has not been able to engage in DEC assessment despite several attempts. Pt is still unresponsive to this writer's assessment questions, though attending provider reports that pt endorsed SI, AH, and VH this evening. Pt's urine sample was positive for methamphetamines and cannabis. Writer unable to assess for SIB, NURIS, or HI. Pt does have hx of Schizoaffective bipolar type and well documented pattern of methamphetamine induced psychosis. Pt's presentation has not improved over the past 24 hours, and due to mental status, [facility] is not able to engage in safety planning with pt. In agreement with the attending provider, this writer recommends that pt admit to IP. While awaiting IP placement, [facility] will meet with pt in the ED to monitor symptom improvement and reassess as appropriate.  Pt should remain in IP until deemed safe to return to the community and engage in OP MH supports.    Goals for crisis stabilization:  Symptom management, Decrease psychosis, Decrease SI, Ability to engage in safety planning and treatment plan    Next steps for Care Team:  Pt will be followed by Extended Care as he remains on the IP worklist, per provider's request. Extended Care will monitor pt's symptoms and recommend discharge if pt returns to his  baseline.    Treatment Objectives Addressed:  assessing safety    Therapeutic Interventions:   (None)    Has a specific means been identified for suicidal/homicide actions: No       Patient coping skills attempted to reduce the crisis:  Pt voluntarily brought himself to the ED.    Disposition  Recommended referrals:  ([facility])        Reviewed case and recommendations with attending provider. Attending Name: Dr. Meehan       Attending concurs with disposition: yes       Patient and/or validated legal guardian concurs with disposition:   yes       Final disposition:  inpatient mental health         Imminent risk of harm: Suicidal Behavior  Severe psychiatric, behavioral or other comorbid conditions are appropriate for management at inpatient mental health as indicated by at least one of the following: Impaired impulse control, judgement, or insight, Psychiatric Symptoms, Comorbid substance use disorder, Symptoms of impact to function  Severe dysfunction in daily living is present as indicated by at least one of the following: Extreme deterioration in social interactions, Other evidence of severe dysfunction  Situation and expectations are appropriate for inpatient care: Biopsychosocial stresses potentially contributing to clinical presentation (co morbidities) have been assessed and are absent or manageable at proposed level of care  Inpatient mental health services are necessary to meet patient needs and at least one of the following: Specific condition related to admission diagnosis is present and judged likely to deteriorate in absence of treatment at proposed level of care      Legal status: Voluntary/Patient has signed consent for treatment                                                                                                                                 Reviewed court records: yes       Assessment Details   Total duration spent with the patient: 2 min     CPT code(s) utilized: Non-Billable    Marta  GABRIELLE Mccullough, Psychotherapist  DEC - Triage & Transition Services  Callback: [phone]

## 2025-06-11 NOTE — PLAN OF CARE
Hamzah Roberts  Delaney 10, 2025  Plan of Care Hand-off Note     Patient Recommended Care Path: inpatient mental health    Clinical Substantiation:  Hamzah initially presented to the ED by himself roughly 24 hours ago with complaints of auditory and visual hallucinations that have worsened over the past two to three weeks. Pt also endorsed SI to attending provider, though he denied having a plan. Pt has been somnolent in the ED since last evening and has not been able to engage in DEC assessment despite several attempts. Pt is still unresponsive to this writer's assessment questions, though attending provider reports that pt endorsed SI, AH, and VH this evening. Pt's urine sample was positive for methamphetamines and cannabis. Writer unable to assess for SIB, NURIS, or HI. Pt does have hx of Schizoaffective bipolar type and well documented pattern of methamphetamine induced psychosis. Pt's presentation has not improved over the past 24 hours, and due to mental status, LMHP is not able to engage in safety planning with pt. In agreement with the attending provider, this writer recommends that pt admit to IP. While awaiting IP placement, [facility] will meet with pt in the ED to monitor symptom improvement and reassess as appropriate.  Pt should remain in IP until deemed safe to return to the community and engage in OP MH supports.    Goals for crisis stabilization:  Symptom management, Decrease psychosis, Decrease SI, Ability to engage in safety planning and treatment plan    Next steps for Care Team:  Pt will be followed by Extended Care as he remains on the IPMH worklist, per provider's request. Extended Care will monitor pt's symptoms and recommend discharge if pt returns to his baseline.    Treatment Objectives Addressed:  assessing safety    Therapeutic Interventions:   (None)    Has a specific means been identified for suicidal.homicide actions: No    Patient coping skills attempted to reduce the crisis:  Pt  voluntarily brought himself to the ED.       Imminent risk of harm: Suicidal Behavior  Severe psychiatric, behavioral or other comorbid conditions are appropriate for management at inpatient mental health as indicated by at least one of the following: Impaired impulse control, judgement, or insight, Psychiatric Symptoms, Comorbid substance use disorder, Symptoms of impact to function  Severe dysfunction in daily living is present as indicated by at least one of the following: Extreme deterioration in social interactions, Other evidence of severe dysfunction  Situation and expectations are appropriate for inpatient care: Biopsychosocial stresses potentially contributing to clinical presentation (co morbidities) have been assessed and are absent or manageable at proposed level of care  Inpatient mental health services are necessary to meet patient needs and at least one of the following: Specific condition related to admission diagnosis is present and judged likely to deteriorate in absence of treatment at proposed level of care      Collateral contact information:  Angie Muro -[phone]    Legal Status: Voluntary/Patient has signed consent for treatment                                                                                                                                 Reviewed court records: yes     Psychiatry Consult: Not ordered yet    GABRIELLE Pruitt

## 2025-06-11 NOTE — ED PROVIDER NOTES
"Emergency Department I-PASS Sign-out      Illness Severity: \"Watcher\"    Patient Summary:  39 year old male with pertinent PMH of schizophrenia who presented with increased auditory hallucinations and suicidal ideation    ED Course/treatment plan: Full evaluation with DEC  awaiting evaluation will be under observation until that time    Seen by DEC.  Continues to complain of suicidal ideation and auditory command hallucinations.  Transition to the inpatient list.    Clinical Impression:  (F20.0) Paranoid schizophrenia (H)    (R45.851) Suicidal ideation    (R44.0) Auditory hallucinations      Edited by: Damian Meehan MD at 6/10/2025 1811    Action List:  Tests to Follow-up:  None    Medications Reconciled/Ordered:  Yes    ED Mental Health Boarding Order Set Used for Diet/PRNs/Other:  Yes    DEC, Extended Care, Psych Consult Orders:  DEC assessment ordered and pending.     Situational Awareness & Contingency Plannin Hour Hold Status:  Voluntary, would reassess if wanting to leave.  Active Orders  N/A    Psychiatric Emergency:  A psychiatric emergency is not active    Disposition:  Seen by DEC.  Now on admit list.  Voluntary admission, would reassess if he asks to leave.    Boarding subsequent shift/day updates:  No acute changes.  Continues to wait inpatient mental health admission.    Adelina Negrete MD   Emergency Medicine       Penelope, Adelina Hough MD  [phone]    "

## 2025-06-11 NOTE — TELEPHONE ENCOUNTER
R: [place]  Access Inpatient Bed Call Log 6/11/2025 @ 6:00 PM:    Intake has called facilities that have not updated the bed status within the last 12 hours.               [facility] is posting 0 beds.    [facility] is posting 0 beds. [phone];     [facility] ([facility] is posting 0 beds. [phone];     [facility] is posting 0 beds. No high school or rod psych. [phone]; per call at 3:40 PM with Arti at [facility] ([facility]) is posting 0 beds. [phone];     [facility] () is posting 0 beds. [phone].  Per call 3:43 PM with [facility].     [facility] is posting 6 beds. Ages 18-35, labs required. No recent violence. [phone]; per call with with Martín, no child, no adult.  adolescent only.   [facility] ([facility]) is posting 0 beds. [phone];     [facility] ([facility]) is posting 0 beds. [phone];           Pt remains on work list pending appropriate bed availability.

## 2025-06-12 ENCOUNTER — TELEPHONE (OUTPATIENT)
Dept: BEHAVIORAL HEALTH | Facility: CLINIC | Age: 40
End: 2025-06-12
Payer: COMMERCIAL

## 2025-06-12 VITALS
OXYGEN SATURATION: 96 % | TEMPERATURE: 97.9 F | WEIGHT: 142.86 LBS | RESPIRATION RATE: 18 BRPM | DIASTOLIC BLOOD PRESSURE: 64 MMHG | SYSTOLIC BLOOD PRESSURE: 105 MMHG | HEIGHT: 64 IN | HEART RATE: 97 BPM | BODY MASS INDEX: 24.39 KG/M2

## 2025-06-12 PROCEDURE — 250N000013 HC RX MED GY IP 250 OP 250 PS 637: Performed by: EMERGENCY MEDICINE

## 2025-06-12 PROCEDURE — 124N000002 HC R&B MH UMMC

## 2025-06-12 RX ORDER — HYDROXYZINE HYDROCHLORIDE 25 MG/1
25 TABLET, FILM COATED ORAL EVERY 4 HOURS PRN
Status: ACTIVE | OUTPATIENT
Start: 2025-06-12

## 2025-06-12 RX ORDER — MAGNESIUM HYDROXIDE/ALUMINUM HYDROXICE/SIMETHICONE 120; 1200; 1200 MG/30ML; MG/30ML; MG/30ML
30 SUSPENSION ORAL EVERY 4 HOURS PRN
Status: ACTIVE | OUTPATIENT
Start: 2025-06-12

## 2025-06-12 RX ORDER — POLYETHYLENE GLYCOL 3350 17 G/17G
17 POWDER, FOR SOLUTION ORAL DAILY PRN
Status: ACTIVE | OUTPATIENT
Start: 2025-06-12

## 2025-06-12 RX ORDER — ACETAMINOPHEN 325 MG/1
650 TABLET ORAL EVERY 4 HOURS PRN
Status: ACTIVE | OUTPATIENT
Start: 2025-06-12

## 2025-06-12 RX ADMIN — OLANZAPINE 10 MG: 10 TABLET, ORALLY DISINTEGRATING ORAL at 21:26

## 2025-06-12 ASSESSMENT — ACTIVITIES OF DAILY LIVING (ADL)
ADLS_ACUITY_SCORE: 26
ADLS_ACUITY_SCORE: 52
ADLS_ACUITY_SCORE: 26
ADLS_ACUITY_SCORE: 52
ADLS_ACUITY_SCORE: 26
ADLS_ACUITY_SCORE: 52
ADLS_ACUITY_SCORE: 52
ADLS_ACUITY_SCORE: 26
ADLS_ACUITY_SCORE: 52
ADLS_ACUITY_SCORE: 26
ADLS_ACUITY_SCORE: 26
ADLS_ACUITY_SCORE: 52
ADLS_ACUITY_SCORE: 26
ADLS_ACUITY_SCORE: 52
ADLS_ACUITY_SCORE: 26

## 2025-06-12 ASSESSMENT — LIFESTYLE VARIABLES: SKIP TO QUESTIONS 9-10: 1

## 2025-06-12 NOTE — PLAN OF CARE
"Goal Outcome Evaluation:    Plan of Care Reviewed With: patient      Patient was Isolative and withdrawn in his room throughout the evening shift.He was observed resting,and sleeping in bed more of the shift.Patient avoids social interaction.When asked patient denies experiencing pain,anxiety,depression and auditory/visual hallucinations.Patient additionally denies SI/SIB/HI.Dinner tray was offered in the patient room,and he consumed 100% of his meal and drank adequate amount of fluids.Patient was cooperative during assessment,vital sign monitoring and medication administration.Patient is independent in identifying needs and communicate effectively.        /64 (BP Location: Left arm, Patient Position: Sitting, Cuff Size: Adult Regular)   Pulse 97   Temp 97.9  F (36.6  C) (Temporal)   Resp 18   Ht 1.626 m (5' 4\")   Wt 64.8 kg (142 lb 13.7 oz)   SpO2 96%   BMI 24.52 kg/m       "

## 2025-06-12 NOTE — TELEPHONE ENCOUNTER
2:02 AM Intake called st 12 and provided MRN to charge for review.           R: [place]  Access Inpatient Bed Call Log 6/12/2025 @ 12:00 AM:    Intake has called facilities that have not updated the bed status within the last 12 hours.  (ADULTS)    [place]:  [facility] is posting 0 beds.    [facility] is posting 0 beds. [phone];     [facility] ([facility] is posting 0 beds. [phone];     [facility] is posting 0 beds. No high school or rod psych. [phone]; per call at 12:05 AM, at capacity   [facility] ([facility]) is posting 0 beds. [phone];     [facility] () is posting 0 beds. [phone].  Per call 3:43 PM with Bryon, at capacity.     [facility] has 0 beds. Ages 18-35, labs required. No recent violence. [phone]; per call at 12:06 AM, no YA beds.  [facility] ([facility]) is posting 0 beds. [phone];     [facility] ([facility]) is posting 0 beds. [phone];           Pt remains on work list pending appropriate bed availability.

## 2025-06-12 NOTE — PLAN OF CARE
06/12/25 1425   Patient Belongings   Patient Belongings locker;sent to security per site process   Patient Belongings Put in Hospital Secure Location (Security or Locker, etc.) cash/credit card;shoes;clothing   Belongings Search Yes   Clothing Search Yes   Second Staff Aníbal SOLIS     Goal Outcome Evaluation:       In locker: Hat(Nike), Jacket(red), pants, undrwear, Tshirt (2x), belt (brown), red bag(musa), red sweatshirt, cellphone, phone , charging cord, butane lighter, [place] picture ID    In security: #[id number]  2 sliver rings, 81.00 in cash, chine visa #[id number], chine debit #[id number], visa debit gift card #[id number], visa debit gift card #[id number], [employer] merchandise card #[id number], visa debit card #[id number]    A               Admission:  I am responsible for any personal items that are not sent to the safe or pharmacy.  [facility] is not responsible for loss, theft or damage of any property in my possession.    Signature:  _________________________________ Date: _______  Time: _____                                              Staff Signature:  ____________________________ Date: ________  Time: _____      2nd Staff person, if patient is unable/unwilling to sign:    Signature: ________________________________ Date: ________  Time: _____     Discharge:  [facility] has returned all of my personal belongings:    Signature: _________________________________ Date: ________  Time: _____                                          Staff Signature:  ____________________________ Date: ________  Time: _____

## 2025-06-12 NOTE — PLAN OF CARE
Goal Outcome Evaluation:    Plan of Care Reviewed With: patient        Per chart review, Hamzah carries previous diagnoses of Schizoaffective disorder bipolar type, anxiety, depression, substance-induced psychosis, and polysubstance use. 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 Pt has history of several [facility] admissions, most recently from 3/24 - 3/26/25 at [facility] where he discharged AMA. Patient  presenting to the [facility]  ED yesterday for  Paranoia, Suicidal ideation and substance use.  While in the ED, Patient has been acutely increasing paranoia with auditory hallucinations, having passive suicidal ideation no specific plan he is not sure as to whether or not he can maintain safety. Patient states that he is feeling as though his symptoms are escalating rapidly over the last 2 to 3 weeks. There is no specific stressor that he is able to discuss and is requesting medications.   Pt admitted into unit 12 today at 1300 and pt denied all mental health psych symptoms and committed for safety in a dismissive manner. Pt cooperated with assessment but little resistance with search. Pt gave up his ring with sharp points after first resistance. Pt less engaged in assessment. Pt refused unit orientation and went straight to bed.

## 2025-06-12 NOTE — TELEPHONE ENCOUNTER
R:  Pt pending a review from Nights by the unit 12 charge for appropriateness for unit.  Awaiting determination @ 7:30am.    Writer informed unit 12 charge reviewed pt and pt is appropriate to have reviewed for unit.  However, holding off briefly as Coworker has a pt Higher on the worklist they would like to see if appropriate first for unit 12.  Will await coworkers findings @ 8:08am    Notified by Coworker @ 9:38am - not utilizing bed.  Writer paged Sofía to review pt for unit 12 @ 9:40am.     Dr Gore accepted pt for unit 12 @ 10:21am:       Pt placed in units queue @ 10:22am   Intake called 12 to inform Pt in queue @ 10:23am  [facility] Given placement @ 10:24am    Pt added to admit board.    Indicia Completed:

## 2025-06-14 LAB
ALBUMIN SERPL BCG-MCNC: 4.2 G/DL (ref 3.5–5.2)
ALP SERPL-CCNC: 74 U/L (ref 40–150)
ALT SERPL W P-5'-P-CCNC: 12 U/L (ref 0–70)
ANION GAP SERPL CALCULATED.3IONS-SCNC: 13 MMOL/L (ref 7–15)
AST SERPL W P-5'-P-CCNC: 18 U/L (ref 0–45)
BILIRUB SERPL-MCNC: 0.4 MG/DL
BUN SERPL-MCNC: 15.4 MG/DL (ref 6–20)
CALCIUM SERPL-MCNC: 9 MG/DL (ref 8.8–10.4)
CHLORIDE SERPL-SCNC: 98 MMOL/L (ref 98–107)
CREAT SERPL-MCNC: 0.97 MG/DL (ref 0.67–1.17)
EGFRCR SERPLBLD CKD-EPI 2021: >90 ML/MIN/1.73M2
GLUCOSE SERPL-MCNC: 105 MG/DL (ref 70–99)
HCO3 SERPL-SCNC: 25 MMOL/L (ref 22–29)
POTASSIUM SERPL-SCNC: 4 MMOL/L (ref 3.4–5.3)
PROT SERPL-MCNC: 6.9 G/DL (ref 6.4–8.3)
SODIUM SERPL-SCNC: 136 MMOL/L (ref 135–145)
VIT B12 SERPL-MCNC: 434 PG/ML (ref 232–1245)
VIT D+METAB SERPL-MCNC: 14 NG/ML (ref 20–50)

## 2025-06-16 ENCOUNTER — RESULTS FOLLOW-UP (OUTPATIENT)
Dept: BEHAVIORAL HEALTH | Facility: CLINIC | Age: 40
End: 2025-06-16

## 2025-06-17 ENCOUNTER — PATIENT OUTREACH (OUTPATIENT)
Dept: CARE COORDINATION | Facility: CLINIC | Age: 40
End: 2025-06-17
Payer: COMMERCIAL

## 2025-06-17 NOTE — PROGRESS NOTES
Connected Care Resource Center Contact  [facility]/Voicemail     Clinical Data: Post-Discharge Outreach     Outreach attempted x 2.  Left message on patient's voicemail, providing [facility]'s central phone number of [phone] ([phone]) for questions/concerns and/or to schedule an appt with an [facility] provider, if they do not have a PCP.      Plan:  [facility] will do no further outreaches at this time.       LIANA Brown  Connected Care Resource Center, [facility]    *Connected Care Resource Team does NOT follow patient ongoing. Referrals are identified based on internal discharge reports and the outreach is to ensure patient has an understanding of their discharge instructions.

## 2025-06-17 NOTE — PROGRESS NOTES
Clinic Care Coordination Contact  Care Team Conversations    Received a call back from patient asking for help getting SUDS assessment sent from [facility] ([place] to [facility] in [facility].    Left message at [facility] ([facility]) to please fax assessment to [facility], or, call patient back.    LIANA Parmar   Social Work Clinic Care Coordinator   [facility]  PH: [phone]  [email or web address]

## 2025-06-23 ENCOUNTER — HOSPITAL ENCOUNTER (EMERGENCY)
Facility: CLINIC | Age: 40
Discharge: HOME | End: 2025-06-23
Attending: STUDENT IN AN ORGANIZED HEALTH CARE EDUCATION/TRAINING PROGRAM | Admitting: STUDENT IN AN ORGANIZED HEALTH CARE EDUCATION/TRAINING PROGRAM
Payer: COMMERCIAL

## 2025-06-23 ENCOUNTER — TELEPHONE (OUTPATIENT)
Dept: BEHAVIORAL HEALTH | Facility: CLINIC | Age: 40
End: 2025-06-23
Payer: COMMERCIAL

## 2025-06-23 VITALS
OXYGEN SATURATION: 98 % | HEART RATE: 112 BPM | TEMPERATURE: 98.2 F | HEIGHT: 63 IN | RESPIRATION RATE: 16 BRPM | BODY MASS INDEX: 25.69 KG/M2 | SYSTOLIC BLOOD PRESSURE: 135 MMHG | DIASTOLIC BLOOD PRESSURE: 92 MMHG | WEIGHT: 145 LBS

## 2025-06-23 DIAGNOSIS — F23 ACUTE PSYCHOSIS (H): ICD-10-CM

## 2025-06-23 DIAGNOSIS — R44.0 AUDITORY HALLUCINATIONS: ICD-10-CM

## 2025-06-23 PROBLEM — F41.9 ANXIETY: Status: ACTIVE | Noted: 2022-01-03

## 2025-06-23 PROCEDURE — 99285 EMERGENCY DEPT VISIT HI MDM: CPT | Performed by: STUDENT IN AN ORGANIZED HEALTH CARE EDUCATION/TRAINING PROGRAM

## 2025-06-23 PROCEDURE — 250N000013 HC RX MED GY IP 250 OP 250 PS 637: Performed by: STUDENT IN AN ORGANIZED HEALTH CARE EDUCATION/TRAINING PROGRAM

## 2025-06-23 RX ORDER — LORAZEPAM 2 MG/1
2 TABLET ORAL ONCE
Status: COMPLETED | OUTPATIENT
Start: 2025-06-23 | End: 2025-06-23

## 2025-06-23 RX ORDER — OLANZAPINE 10 MG/1
10 TABLET, FILM COATED ORAL AT BEDTIME
Status: DISCONTINUED | OUTPATIENT
Start: 2025-06-23 | End: 2025-06-23 | Stop reason: HOSPADM

## 2025-06-23 RX ADMIN — OLANZAPINE 10 MG: 10 TABLET, FILM COATED ORAL at 03:35

## 2025-06-23 ASSESSMENT — COLUMBIA-SUICIDE SEVERITY RATING SCALE - C-SSRS
1. SINCE LAST CONTACT, HAVE YOU WISHED YOU WERE DEAD OR WISHED YOU COULD GO TO SLEEP AND NOT WAKE UP?: NO
6. HAVE YOU EVER DONE ANYTHING, STARTED TO DO ANYTHING, OR PREPARED TO DO ANYTHING TO END YOUR LIFE?: NO
2. HAVE YOU ACTUALLY HAD ANY THOUGHTS OF KILLING YOURSELF?: NO
TOTAL  NUMBER OF INTERRUPTED ATTEMPTS SINCE LAST CONTACT: NO
2. HAVE YOU ACTUALLY HAD ANY THOUGHTS OF KILLING YOURSELF IN THE PAST MONTH?: NO
ATTEMPT SINCE LAST CONTACT: NO
SUICIDE, SINCE LAST CONTACT: NO
TOTAL  NUMBER OF ABORTED OR SELF INTERRUPTED ATTEMPTS SINCE LAST CONTACT: NO
1. IN THE PAST MONTH, HAVE YOU WISHED YOU WERE DEAD OR WISHED YOU COULD GO TO SLEEP AND NOT WAKE UP?: NO
6. HAVE YOU EVER DONE ANYTHING, STARTED TO DO ANYTHING, OR PREPARED TO DO ANYTHING TO END YOUR LIFE?: NO

## 2025-06-23 ASSESSMENT — ACTIVITIES OF DAILY LIVING (ADL)
ADLS_ACUITY_SCORE: 52

## 2025-06-23 NOTE — PLAN OF CARE
Hamzah REESE Armando  June 23, 2025  Plan of Care Hand-off Note     Patient Recommended Care Path: inpatient mental health    Clinical Substantiation:  It is the recommendation of this clinician that pt admit to IP MH for safety and stabilization. Pt displays the following risk factors that support IP admission: psychosis with avh, response to internal stimuli, and disorientation. Pt is unable to engage in safety planning to mitigate risk level in a non-secure setting. Lower levels of care would not be sufficient in managing the level of risk pt is presenting with. Due to this IP is the least restrictive option of care for pt. Pt should remain in IP until deemed safe to return to the community and engage in OP MH supports. Pt will need assistance establishing OP MH services prior to discharge.  This  reviewed his history of leaving against medical advice and not following through with recommendations and encouraged him to not leave AMA and to listen and follow through with recommendations.    Goals for crisis stabilization:  Symptom management, Decrease psychosis, Ability to engage in safety planning and treatment plan    Next steps for Care Team:  Psych consult    Treatment Objectives Addressed:  assessing safety, identifying additional supports, exploring obstacles to safety in the community    Therapeutic Interventions:  Engaged in guided discovery, explored patient's perspectives and helped expand them through socratic dialogue.    Has a specific means been identified for suicidal.homicide actions: No  If yes, describe:    Explain action steps toward mitigation:    Document completion of mitigation action:    The follow up action still needed prior to discharge:      Patient coping skills attempted to reduce the crisis:  help-seeking          Severe psychiatric, behavioral or other comorbid conditions are appropriate for management at inpatient mental health as indicated by at least one of the following:  Psychiatric Symptoms, Impaired impulse control, judgement, or insight, Comorbid substance use disorder, Symptoms of impact to function  Severe dysfunction in daily living is present as indicated by at least one of the following: Complete inability to maintain any appropriate aspect of personal responsibility in any adult roles, Extreme deterioration in social interactions  Situation and expectations are appropriate for inpatient care: Patient management/treatment at lower level of care is not feasible or is inappropriate  Inpatient mental health services are necessary to meet patient needs and at least one of the following: Specific condition related to admission diagnosis is present and judged likely to deteriorate in absence of treatment at proposed level of care, Specific condition related to admission diagnosis is present and judged likely to further improve at proposed level of care      Collateral contact information:  Paula Lundberg, friend, [phone], in-person.    Legal Status: Voluntary/Patient has signed consent for treatment                                                                                                                                 Reviewed court records: yes     Psychiatry Consult:     Irma Johnson, LICSW

## 2025-06-23 NOTE — ED NOTES
Pt handed discharge paper work. Pt given back his belongings from the pt lockers. Pt declined vitals and left the emergency room with no issue.

## 2025-06-23 NOTE — ED TRIAGE NOTES
Pt states he is hearing voices. Denies substance use. Denies command hallucinations. Denies SI/HI     Triage Assessment (Adult)       Row Name 06/23/25 0152          Triage Assessment    Airway WDL WDL        Respiratory WDL    Respiratory WDL WDL        Skin Circulation/Temperature WDL    Skin Circulation/Temperature WDL WDL        Cardiac WDL    Cardiac WDL WDL        Peripheral/Neurovascular WDL    Peripheral Neurovascular WDL WDL        Cognitive/Neuro/Behavioral WDL    Cognitive/Neuro/Behavioral WDL mood/behavior     Mood/Behavior flat affect;withdrawn;cooperative

## 2025-06-23 NOTE — TELEPHONE ENCOUNTER
S: [facility] Marilyn , DEC  Maris calling at 3:39 AM about 39 year old/male presenting with Psychosis     B: Pt arrived via Friend. Presenting problem, stressors: pt was with friends and suddenly started talking to internal stimuli and reports hearing AH that mimic family members. Pt is not sleeping, not regulating daily needs.     Pt affect in ED: Calm and Cooperative   Pt Dx: Major Depressive Disorder, Generalized Anxiety Disorder, Schizoaffective Disorder, and Substance Use Disorder: Methamphetamine  Previous IPMH hx? Yes: June 9th- June 14th 2025 at [facility]  Pt denies SI   Hx of suicide attempt? No  Pt denies SIB  Pt denies HI   Pt endorses auditory hallucinations . And responding to Internal Stimuli  Pt RARS Score: 3    Hx of aggression/violence, sexual offenses, legal concerns, Epic care plan? describe: None  Current concerns for aggression this visit? No  Does pt have a history of Civil Commitment? No  Is Pt their own guardian? Yes    Pt is prescribed medication. Is patient medication compliant? No  Pt denies OP services   CD concerns: Actively using/consuming Meth, yesterday  Acute or chronic medical concerns: None  Does Pt present with specific needs, assistive devices, or exclusionary criteria? None      Pt is ambulatory  Pt is able to perform ADLs independently      A: Pt to be reviewed for [facility] admission. Pt is Voluntary  Preferred placement: [facility] ONLY    COVID Symptoms: No  If yes, COVID test required   Utox: Ordered, not yet collected   CMP: Ordered, not yet collected   CBC: Ordered, not yet collected   HCG: N/A    R: Patient cleared and ready for behavioral bed placement: Yes  Pt placed on IP worklist? Yes    Does Patient need a Transfer Center request created? No, Pt is located within [facility] ED, [facility] ED, or [place] ED

## 2025-06-23 NOTE — CONSULTS
"Diagnostic Evaluation Consultation  Crisis Assessment    Patient Name: Hamzah Roberts  Age:  39 year old  Legal Sex: male  Gender Identity: male  Pronouns:      Race: White  Ethnicity: Not  or   Language: English      Patient was assessed: In person   Crisis Assessment Start Date: 06/23/25  Crisis Assessment Start Time: 0215  Crisis Assessment Stop Time: 0245  Patient location: [facility] Emergency Department                             UREDH-D    Referral Data and Chief Complaint  Hamzah Roberts presents to the ED with family/friends. Patient is presenting to the ED for the following concerns: Substance use, Paranoia, Suicidal ideation, Anxiety. Factors that make the mental health crisis life threatening or complex are: Pt brought in by friend Paula due to psychosis symptoms.  He keeps hearing screaming.  He's responding to internal stimuli.  He's disoriented.  He denied SI, NSSI, and HI.  He had a prior suicide attempt.  He was anxious but cooperative.  He looked fearful of the voices and continously whispered, \"help, help, help.\"  He said, \"It's weird.  I don't know what's going on.\"  He was vague about visual hallucinations. He was fidgeting.  He was drawing on his arm with a pen and this  reminded him that he had a pad of paper in his hand and he could use that.  On the pad of paper, he franklin the word [place] and a picture of a dice.  When asked about stressors, patient said, \"this world.\" His insight, judgment, and impulse control were impaired..      Informed Consent and Assessment Methods  Explained the crisis assessment process, including applicable information disclosures and limits to confidentiality, assessed understanding of the process, and obtained consent to proceed with the assessment.  Assessment methods included conducting a formal interview with patient, review of medical records, collaboration with medical staff, and obtaining relevant collateral information from " family and community providers when available.  : done     History of the Crisis   Hamzah has previous diagnoses of Schizoaffective disorder bipolar type, anxiety, depression, substance-induced psychosis, and polysubstance use. 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 Pt has hx of several [facility] admissions, most recently from  6/9-6/14/25 at [facility] where he discharged AMA. Per past collateral, he receives targeted mental health case management services from [facility]. He denied that he has any services.  His friend said he's having a hard time navigating getting a section 8 voucher and his social security card.  Pt has hx of homelessness. . Pt has hx of MICD civil commitments through [facility] (2021, 2022, and 2023). Per chart review, pt has one prior suicide attempt via intentional overdose and one prior self aborted attempt by spitting pills from his mouth in January of 2025.    Brief Psychosocial History  Family:  Single, Children yes  Support System:  Friend  Employment Status:  unemployed  Source of Income:  unable to assess  Financial Environmental Concerns:  unable to afford food, unable to afford medication(s), unable to afford rent/mortgage, unemployed  Current Hobbies:  music, television/movies/videos  Barriers in Personal Life:  mental health concerns, other (see comments) (meth use)    Significant Clinical History  Current Anxiety Symptoms:  anxious, obsessions/compulsions, racing thoughts, excessive worry  Current Depression/Trauma:  impaired decision making, helplessness, hopelessness, sadness, difficulty concentrating, low self esteem, avoidance  Current Somatic Symptoms:  sweating, flushing, shaking, wandering, racing thoughts, excessive worry, shortness of breath or racing heart  Current Psychosis/Thought Disturbance:  impulsive, forgetful, inattentive, distractability, high risk behavior, auditory hallucinations  Current Eating Symptoms:   (none reported)  Chemical Use History:   "Alcohol: None  Benzodiazepines: None  Opiates: None  Cocaine: None  Marijuana: None  Other Use: Methamphetamines  Last Use:: 06/21/25   Past diagnosis:  Schizophrenia, Substance Use Disorder, Bipolar Disorder, Anxiety Disorder, Depression  Family history:  No known history of mental health or chemical health concerns  Past treatment:  Case management, Psychiatric Medication Management, Residential Treatment, Inpatient Hospitalization  Details of most recent treatment:  Pt has not been following up with outpatient and supportive services.  Other relevant history:  Pt has attended [facility] tx several times before.    Have there been any medication changes in the past two weeks:  no       Is the patient compliant with medications:  no        Collateral Information  Is there collateral information: Yes     Collateral information name, relationship, phone number:  Paula Lundberg, friend, [phone], in-person.    What happened today: Paula brought Hamzah in to the ED.  She said, \"He hears screaming and he's afraid to go to sleep.  It's his nieces, nephews, and other family members.  He's not on the right dose of meds.  He can't function or do anything to help himself.  He's having a hard time grasping reality.  I would like to see him get MI/CD treatment.\" Pt denied that he used meth to her, but he shared with DEC  that he used meth yesterday. She got him to take his meds and she said he hasn't missed any meds but he told this DEC  that he has not been taking his meds, as prescribed.     What is different about patient's functioning: Paula said pt only had intermittent contact with dad.  He doesn't have other supports. He's not stable.  She makes sure he eats and he's hydrated.  He's \"obsessed\" with being clean.  He has \"deep-seated trauma,\" she said.  He thinks he's a burden to her.  He's had her  at the \"trap house\" so many times so she told him to just stay with her.  She's known him for 3 " "years.  Last time he was IP, he discharged and went to a trap house.  She told him to just come to her house.  She would pay for his uber.  She doesn't want him to fall through the cracks.  He leaves treatment when he doesn't trust or feel safe there, she said.  He went to AA before, but he doesn't like it.     What do you think the patient needs:  \"admission, med change, and a PCA\"    Has patient made comments about wanting to kill themselves/others: no    If d/c is recommended, can they take part in safety/aftercare planning:  yes    Additional collateral information:  Paula disclosed that she's been in recovery for quite sometime and she's worked in MI/CD settings. She's a certified peer .     Risk Assessment  [place] Suicide Severity Rating Scale Full Clinical Version:  Suicidal Ideation  Q1 Wish to be Dead (Lifetime): Yes  Q2 Non-Specific Active Suicidal Thoughts (Lifetime): Yes  3. Active Suicidal Ideation with any Methods (Not Plan) Without Intent to Act (Lifetime): Yes  4. Active Suicidal Ideation with Some Intent to Act, Without Specific Plan (Lifetime): Yes  5. Active Suicidal Ideation with Specific Plan and Intent (Lifetime): Yes  Q6 Suicide Behavior (Lifetime): yes  Intensity of Ideation (Lifetime)  Most Severe Ideation Rating (Lifetime): 3  Frequency (Lifetime): Less than once a week  Duration (Lifetime): Fleeting, few seconds or minutes  Controllability (Lifetime): Does not attempt to control thoughts  Deterrents (Lifetime): Uncertain that deterrents stopped you  Suicidal Behavior (Lifetime)  Actual Attempt (Lifetime): Yes  Total Number of Actual Attempts (Lifetime): 1  Actual Attempt Description (Lifetime): Per chart, pt attempted via intentional overdose  Has subject engaged in non-suicidal self-injurious behavior? (Lifetime): No  Interrupted Attempts (Lifetime): No  Aborted or Self-Interrupted Attempt (Lifetime): Yes  Total Number of Aborted or Self-Interrupted Attempts " (Lifetime): 1  Aborted or Self-Interrupted Attempt Description (Lifetime): Per chart, pt put several pills in his mouth with plan to overdose, but spit them out (January 2025)  Preparatory Acts or Behavior (Lifetime): No    [place] Suicide Severity Rating Scale Recent:   Suicidal Ideation (Recent)  Q1 Wished to be Dead (Past Month): no  Q2 Suicidal Thoughts (Past Month): no  Level of Risk per Screen: no risks indicated  Intensity of Ideation (Recent)  Most Severe Ideation Rating (Past 1 Month): 3  Frequency (Past 1 Month): Less than once a week  Duration (Past 1 Month): Fleeting, few seconds or minutes  Controllability (Past 1 Month): Does not attempt to control thoughts  Deterrents (Past 1 Month): Uncertain that deterrents stopped you  Suicidal Behavior (Recent)  Actual Attempt (Past 3 Months): No  Has subject engaged in non-suicidal self-injurious behavior? (Past 3 Months): No  Interrupted Attempts (Past 3 Months): No  Aborted or Self-Interrupted Attempt (Past 3 Months): No  Preparatory Acts or Behavior (Past 3 Months): No    Environmental or Psychosocial Events: legal issues such as DWI, DUI, lawsuit, CPS involvement, etc., unemployment/underemployment, helplessness/hopelessness, unstable housing, homelessness, ongoing abuse of substances, neither working nor attending school, other life stressors  Protective Factors: Protective Factors: help seeking, other (see comment) (supportive friend named Paula)    Does the patient have thoughts of harming others? Feels Like Hurting Others: no  Previous Attempt to Hurt Others: no  Is the patient engaging in sexually inappropriate behavior?: no  Does Patient have a known history of aggressive behavior: No  Has aggression occurred as a result of MH concerns/diagnosis: no  Does patient have history of aggression in hospital: no    Is the patient engaging in sexually inappropriate behavior?  no        Mental Status Exam   Affect: Blunted  Appearance: Appropriate  Attention  Span/Concentration: Inattentive  Eye Contact: Avoidant    Fund of Knowledge: Appropriate   Language /Speech Content: Fluent  Language /Speech Volume: Normal  Language /Speech Rate/Productions: Minimally Responsive, Normal  Recent Memory: Variable  Remote Memory: Variable  Mood: Anxious, Sad  Orientation to Person: No   Orientation to Place: Yes  Orientation to Time of Day: Yes  Orientation to Date: Yes     Situation (Do they understand why they are here?): Yes  Psychomotor Behavior: Underactive  Thought Content: Hallucinations, Paranoia, Delusions  Thought Form: Paranoia     Medication  Psychotropic medications:  Medication Orders - Psychiatric (From admission, onward)      Start     Dose/Rate Route Frequency Ordered Stop    06/23/25 0330  OLANZapine (zyPREXA) tablet 10 mg         10 mg Oral AT BEDTIME 06/23/25 0258               Current Care Team  Patient Care Team:  No Ref-Primary, Physician as PCP - Brandon Gunn as Nursing Assistant  Cynthia Bueno as     Diagnosis  Patient Active Problem List   Diagnosis Code    Suicidal ideation R45.851    Polysubstance abuse (H) F19.10    Paranoid schizophrenia (H) F20.0    Paranoia (H) F22    Methamphetamine abuse (H) F15.10    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    Amphetamine use disorder, severe, 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    Anxiety F41.9    Episodic mood disorder F39    Gastroesophageal reflux disease K21.9    Left ankle pain, unspecified chronicity M25.572    Psychosis, atypical (H) F29    Recurrent genital herpes A60.00    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    Hallucinations R44.3    Encounter for medication refill Z76.0    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    Methamphetamine use disorder, moderate, in sustained remission, in controlled environment, dependence (H) F15.21    Methamphetamine use disorder, severe (H)  F15.20    Schizoaffective disorder, bipolar type (H) F25.0    Schizoaffective disorder, chronic condition with acute exacerbation (H) F25.9    Schizoaffective disorder, depressive type (H) F25.1    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.30, S09.90XS    Auditory hallucinations R44.0    Substance-induced psychotic disorder with hallucinations (H) F19.951    Altered mental status, unspecified altered mental status type R41.82    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 Pt displays the following risk factors that support IP admission: psychosis with avh, response to internal stimuli, and disorientation. Pt is unable to engage in safety planning to mitigate risk level in a non-secure setting. Lower levels of care would not be sufficient in managing the level of risk pt is presenting with. Due to this IP is the least restrictive option of care for pt. Pt should remain in IP until deemed safe to return to the community and engage in OP MH supports. Pt will need assistance establishing OP MH services prior to discharge.  This  reviewed his history of leaving against medical advice and not following through with recommendations and encouraged him to not leave AMA and to listen and follow through with recommendations.    Goals for crisis stabilization:  Symptom management, Decrease psychosis, Ability to engage in safety planning and treatment plan    Next steps for Care Team:  Psych consult    Treatment Objectives  Addressed:  assessing safety, identifying additional supports, exploring obstacles to safety in the community    Therapeutic Interventions:  Engaged in guided discovery, explored patient's perspectives and helped expand them through socratic dialogue.    Has a specific means been identified for suicidal/homicide actions: No    Patient coping skills attempted to reduce the crisis:  help-seeking    Disposition  Recommended referrals: Other. please comment (inpatient mental health)        Reviewed case and recommendations with attending provider. Attending Name: Donnell Small MD       Attending concurs with disposition: yes       Patient and/or validated legal guardian concurs with disposition:   yes       Final disposition:  inpatient mental health            Severe psychiatric, behavioral or other comorbid conditions are appropriate for management at inpatient mental health as indicated by at least one of the following: Psychiatric Symptoms, Impaired impulse control, judgement, or insight, Comorbid substance use disorder, Symptoms of impact to function  Severe dysfunction in daily living is present as indicated by at least one of the following: Complete inability to maintain any appropriate aspect of personal responsibility in any adult roles, Extreme deterioration in social interactions  Situation and expectations are appropriate for inpatient care: Patient management/treatment at lower level of care is not feasible or is inappropriate  Inpatient mental health services are necessary to meet patient needs and at least one of the following: Specific condition related to admission diagnosis is present and judged likely to deteriorate in absence of treatment at proposed level of care, Specific condition related to admission diagnosis is present and judged likely to further improve at proposed level of care      Legal status: Voluntary/Patient has signed consent for treatment                                                                                                                                  Reviewed court records: yes       Assessment Details   Total duration spent with the patient: 30 min     CPT code(s) utilized: 77898 - Psychotherapy for Crisis - 60 (30-74*) min    ANANDA Bender, Psychotherapist  [facility] - Triage & Transition Services  Callback: [phone]
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
## 2025-06-23 NOTE — ED NOTES
Pt was encountered by the writer in an agitated and frustrated mood. Pt verbalized the desire to leave. Pt told that the healthcare team needs a reassessment before that could be done. Pt continued to escalate. [facility] team called. [facility] team attempted redirection and provided choice to the pt like a new room or oral medication. Pt declined. Pt placed on an MARCIE until they could be reassessed by a mental health provider. Pt was then able to agree to waiting to be seen by a mental health provider.

## 2025-06-23 NOTE — ED NOTES
IP MH Referral Acuity Rating Score (RARS)    LMHP complete at referral to IP MH, with DEC; and, daily while awaiting IP MH placement. Call score to PPS.  CRITERIA SCORING   New 74 HH and Involuntary for IP MH (not adolescent) 0/3   Boarding over 24 hours 0/1   Vulnerable adult at least 55+ with multiple co morbidities; or, Patient age 11 or under 0/1   Suicide ideation without relief of precipitating factors 0/1   Current plan for suicide 0/1   Current plan for homicide 0/1   Imminent risk or actual attempt to seriously harm another without relief of factors precipitating the attempt 0/1   Severe dysfunction in daily living (ex: complete neglect for self care, extreme disruption in vegetative function, extreme deterioration in social interactions) 1/1   Recent (last 2 weeks) or current physical aggression in the ED 0/1   Restraints or seclusion episode in ED 0/1   Verbal aggression, agitation, yelling, etc., while in the ED 0/1   Active psychosis with psychomotor agitation or catatonia 1/1   Need for constant or near constant redirection (from leaving, from others, etc).  1/1   Intrusive or disruptive behaviors 0/1   TOTAL 3

## 2025-06-23 NOTE — PROGRESS NOTES
"Triage and Transition Services Extended Care Reassessment     Patient: Hamzah goes by \"Hamzah,\" uses he/him pronouns  Date of Service: June 23, 2025  Site of Service: [facility] Emergency Department                               Patient was seen yes  Mode of Assessment: In person     Reason for Reassessment: depression, substance use    History of Patient's Original Emergency Room Encounter: Hamzah has previous diagnoses of Schizoaffective disorder bipolar type, anxiety, depression, substance-induced psychosis, and polysubstance use. 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 Pt has hx of several [facility] admissions, most recently from  6/9-6/14/25 at [facility] where he discharged AMA. Per past collateral, he receives targeted mental health case management services from [facility]. He denied that he has any services.  His friend said he's having a hard time navigating getting a section 8 voucher and his social security card.  Pt has hx of homelessness. . Pt has hx of MICD civil commitments through [facility] (2021, 2022, and 2023). Per chart review, pt has one prior suicide attempt via intentional overdose and one prior self aborted attempt by spitting pills from his mouth in January of 2025.    Current Patient Presentation: Pt arrived to ER last night and was assessed for concerns of paranoia, substance use, and responding to internal stimuli. Pt was placed on [facility] list. Pt requested reassessment stating his symptoms were gone and he would like to discharge. Writer met with pt. Pt reports that the ER is making him anxious and he would be better served in the community on an outpatient basis. Pt's primary request was medication adjustments but he believes this can be done in the OP setting if [facility] can support him in finding a provider. Pt denies that he is hearing screaming any longer which he had endorsed upon arrival. Pt is alert and oriented. He has insight into his mental health concerns. Pt " "reports he has been using methamphetamine which exacerbates his symptoms. With rest and digesting the substances, pt is able to clear up. Pt denies suicidal thinking. Denies HI, NSSIB, psychosis. Writer discussed NURIS tx with pt. He reports that he is starting a program with [facility] \"soon\". He is open to a psychiatry referral. Pt not at increased risk of harming himself or others. Pt is functioning properly and has insight. Pt will discharge. His girlfriend will pick him up.    Presentation Summary: See above.    Changes Observed Since Initial Assessment: decrease in presenting symptoms    Therapeutic Interventions Provided: Engaged in safety planning, Engaged in cognitive restructuring/ reframing, looked at common cognitive distortions and challenged negative thoughts., Coached on coping techniques/relaxation skills to help improve distress tolerance and managing intense emotions., Taught the link between thoughts, feelings, and behaviors.    Current Symptoms: anxious irritable    (none observed)      Mental Status Exam   Affect: Dramatic  Appearance: Appropriate  Attention Span/Concentration: Attentive  Eye Contact: Engaged    Fund of Knowledge: Appropriate   Language /Speech Content: Fluent  Language /Speech Volume: Normal  Language /Speech Rate/Productions: Normal  Recent Memory: Variable  Remote Memory: Variable  Mood: Anxious  Orientation to Person: Yes   Orientation to Place: Yes  Orientation to Time of Day: Yes  Orientation to Date: Yes     Situation (Do they understand why they are here?): Yes  Psychomotor Behavior: Normal  Thought Content: Clear  Thought Form: Intact    Treatment Objective(s) Addressed: rapport building, processing feelings, safety planning, identifying an appropriate aftercare plan, building distress tolerance, identifying additional supports, assessing safety    Patient Response to Interventions: verbalizes understanding    Progress Towards Goals:  Patient Reports Symptoms Are: " "stable  Patient Progress Toward Goals: other  Comment: pt feels he met his goal in the ER of \"feeling better\".  Next Step to Work Toward Discharge:  (n/a, discharging now.)    Case Management: Case Management Included: completing or following up on referrals  Details on completing or following up on referrals: coordinator will refer to psychiatrist    C-SSRS Since Last Contact:   1. Wish to be Dead (Since Last Contact): No  2. Non-Specific Active Suicidal Thoughts (Since Last Contact): No     Actual Attempt (Since Last Contact): No  Has subject engaged in non-suicidal self-injurious behavior? (Since Last Contact): No  Interrupted Attempts (Since Last Contact): No  Aborted or Self-Interrupted Attempt (Since Last Contact): No  Preparatory Acts or Behavior (Since Last Contact): No  Suicide (Since Last Contact): No  Most Lethal Attempt Date:  (none, aborted attempt this year via putting pills in mouth and spitting out.)  Calculated C-SSRS Risk Score (Since Last Contact): No Risk Indicated    Plan: Final Disposition / Recommended Care Path: discharge  Plan for Care reviewed with assigned Medical Provider: yes  Plan for Care Team Review: provider  Comments: Dr. Brannon  Patient and/or validated legal guardian concurs: yes  Clinical Substantiation: pt boarded in ED overnight. Pt requested reassessment stating his symptoms were resolved. Pt no longer endorsing AH. Denies suicidal and homicidal thoughts. Pt feels that the ER is making his anxiety worse and he would like to pursue outpatient care. Pt alert and oriented. He has insight into his mental health concerns. Pt intends to follow up with [facility] re: treatment that he intented to start. Pt open to a psychiatry referral for medication management in the outpatient setting. Pt will discharge at this time, his girlfriend will pick him up.    Legal Status: Legal Status: Voluntary/Patient has signed consent for treatment    Session Status: Time session started: 0700  Time " session ended: 0720  Session Duration (minutes): 20 minutes  Session Number: 1  Anticipated number of sessions or this episode of care: 1  Date of most recent diagnostic assessment:  (none)    Session Start Time: 0700  Session Stop Time: 0720  CPT codes: 71176 - Psychotherapy (with patient) - 30 (16-37*) min  Time Spent: 20 minutes      CPT code(s) utilized: 22621 - Psychotherapy (with patient) - 30 (16-37*) min    Diagnosis:   Patient Active Problem List   Diagnosis Code    Suicidal ideation R45.851    Polysubstance abuse (H) F19.10    Paranoid schizophrenia (H) F20.0    Paranoia (H) F22    Methamphetamine abuse (H) F15.10    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    Amphetamine use disorder, severe, 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    Anxiety F41.9    Episodic mood disorder F39    Gastroesophageal reflux disease K21.9    Left ankle pain, unspecified chronicity M25.572    Psychosis, atypical (H) F29    Recurrent genital herpes A60.00    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    Hallucinations R44.3    Encounter for medication refill Z76.0    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    Methamphetamine use disorder, moderate, in sustained remission, in controlled environment, dependence (H) F15.21    Methamphetamine use disorder, severe (H) F15.20    Schizoaffective disorder, bipolar type (H) F25.0    Schizoaffective disorder, chronic condition with acute exacerbation (H) F25.9    Schizoaffective disorder, depressive type (H) F25.1    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.30, S09.90XS    Auditory hallucinations R44.0    Substance-induced psychotic disorder with hallucinations (H) F19.951    Altered mental status, unspecified altered mental status type R41.82    

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
## 2025-07-05 ENCOUNTER — HOSPITAL ENCOUNTER (EMERGENCY)
Facility: HOSPITAL | Age: 40
Discharge: HOME | End: 2025-07-05
Attending: EMERGENCY MEDICINE | Admitting: EMERGENCY MEDICINE
Payer: COMMERCIAL

## 2025-07-05 ENCOUNTER — HOSPITAL ENCOUNTER (EMERGENCY)
Facility: CLINIC | Age: 40
Discharge: HOME | End: 2025-07-05
Attending: STUDENT IN AN ORGANIZED HEALTH CARE EDUCATION/TRAINING PROGRAM | Admitting: STUDENT IN AN ORGANIZED HEALTH CARE EDUCATION/TRAINING PROGRAM
Payer: COMMERCIAL

## 2025-07-05 VITALS
DIASTOLIC BLOOD PRESSURE: 79 MMHG | RESPIRATION RATE: 18 BRPM | WEIGHT: 160.5 LBS | BODY MASS INDEX: 28.43 KG/M2 | SYSTOLIC BLOOD PRESSURE: 117 MMHG | OXYGEN SATURATION: 98 % | TEMPERATURE: 98 F | HEART RATE: 98 BPM

## 2025-07-05 VITALS
WEIGHT: 166 LBS | DIASTOLIC BLOOD PRESSURE: 87 MMHG | BODY MASS INDEX: 29.41 KG/M2 | HEART RATE: 99 BPM | HEIGHT: 63 IN | OXYGEN SATURATION: 98 % | SYSTOLIC BLOOD PRESSURE: 144 MMHG | TEMPERATURE: 97.7 F | RESPIRATION RATE: 18 BRPM

## 2025-07-05 VITALS
HEIGHT: 63 IN | OXYGEN SATURATION: 98 % | TEMPERATURE: 97 F | SYSTOLIC BLOOD PRESSURE: 139 MMHG | RESPIRATION RATE: 28 BRPM | DIASTOLIC BLOOD PRESSURE: 94 MMHG | WEIGHT: 160 LBS | BODY MASS INDEX: 28.35 KG/M2 | HEART RATE: 92 BPM

## 2025-07-05 DIAGNOSIS — R44.0 AUDITORY HALLUCINATIONS: ICD-10-CM

## 2025-07-05 DIAGNOSIS — F32.A DEPRESSION, UNSPECIFIED DEPRESSION TYPE: ICD-10-CM

## 2025-07-05 DIAGNOSIS — F15.10 METHAMPHETAMINE ABUSE (H): ICD-10-CM

## 2025-07-05 DIAGNOSIS — L03.311 CELLULITIS OF ABDOMINAL WALL: ICD-10-CM

## 2025-07-05 PROBLEM — F29 PSYCHOSIS (H): Status: ACTIVE | Noted: 2025-07-05

## 2025-07-05 PROCEDURE — 250N000013 HC RX MED GY IP 250 OP 250 PS 637: Performed by: STUDENT IN AN ORGANIZED HEALTH CARE EDUCATION/TRAINING PROGRAM

## 2025-07-05 PROCEDURE — 80307 DRUG TEST PRSMV CHEM ANLYZR: CPT | Performed by: STUDENT IN AN ORGANIZED HEALTH CARE EDUCATION/TRAINING PROGRAM

## 2025-07-05 PROCEDURE — 99282 EMERGENCY DEPT VISIT SF MDM: CPT

## 2025-07-05 PROCEDURE — 99284 EMERGENCY DEPT VISIT MOD MDM: CPT | Performed by: STUDENT IN AN ORGANIZED HEALTH CARE EDUCATION/TRAINING PROGRAM

## 2025-07-05 PROCEDURE — 99285 EMERGENCY DEPT VISIT HI MDM: CPT

## 2025-07-05 PROCEDURE — 99283 EMERGENCY DEPT VISIT LOW MDM: CPT | Performed by: STUDENT IN AN ORGANIZED HEALTH CARE EDUCATION/TRAINING PROGRAM

## 2025-07-05 RX ORDER — LORAZEPAM 1 MG/1
1 TABLET ORAL ONCE
Status: COMPLETED | OUTPATIENT
Start: 2025-07-05 | End: 2025-07-05

## 2025-07-05 RX ORDER — OLANZAPINE 10 MG/1
10 TABLET, ORALLY DISINTEGRATING ORAL ONCE
Status: COMPLETED | OUTPATIENT
Start: 2025-07-05 | End: 2025-07-05

## 2025-07-05 RX ORDER — CEPHALEXIN 500 MG/1
500 CAPSULE ORAL 4 TIMES DAILY
Qty: 40 CAPSULE | Refills: 0 | Status: SHIPPED | OUTPATIENT
Start: 2025-07-05 | End: 2025-07-15

## 2025-07-05 RX ORDER — OLANZAPINE 5 MG/1
10 TABLET, FILM COATED ORAL AT BEDTIME
Qty: 60 TABLET | Refills: 0 | Status: SHIPPED | OUTPATIENT
Start: 2025-07-05

## 2025-07-05 RX ORDER — OLANZAPINE 10 MG/1
10 TABLET, FILM COATED ORAL AT BEDTIME
Qty: 30 TABLET | Refills: 0 | Status: SHIPPED | OUTPATIENT
Start: 2025-07-05 | End: 2025-08-04

## 2025-07-05 RX ADMIN — LORAZEPAM 1 MG: 1 TABLET ORAL at 17:55

## 2025-07-05 RX ADMIN — OLANZAPINE 10 MG: 10 TABLET, ORALLY DISINTEGRATING ORAL at 17:55

## 2025-07-05 ASSESSMENT — COLUMBIA-SUICIDE SEVERITY RATING SCALE - C-SSRS
1. IN THE PAST MONTH, HAVE YOU WISHED YOU WERE DEAD OR WISHED YOU COULD GO TO SLEEP AND NOT WAKE UP?: YES
5. HAVE YOU STARTED TO WORK OUT OR WORKED OUT THE DETAILS OF HOW TO KILL YOURSELF? DO YOU INTEND TO CARRY OUT THIS PLAN?: NO
3. HAVE YOU BEEN THINKING ABOUT HOW YOU MIGHT KILL YOURSELF?: YES
2. HAVE YOU ACTUALLY HAD ANY THOUGHTS OF KILLING YOURSELF IN THE PAST MONTH?: NO
6. HAVE YOU EVER DONE ANYTHING, STARTED TO DO ANYTHING, OR PREPARED TO DO ANYTHING TO END YOUR LIFE?: YES
6. HAVE YOU EVER DONE ANYTHING, STARTED TO DO ANYTHING, OR PREPARED TO DO ANYTHING TO END YOUR LIFE?: NO
4. HAVE YOU HAD THESE THOUGHTS AND HAD SOME INTENTION OF ACTING ON THEM?: YES
1. IN THE PAST MONTH, HAVE YOU WISHED YOU WERE DEAD OR WISHED YOU COULD GO TO SLEEP AND NOT WAKE UP?: NO
6. HAVE YOU EVER DONE ANYTHING, STARTED TO DO ANYTHING, OR PREPARED TO DO ANYTHING TO END YOUR LIFE?: NO
1. IN THE PAST MONTH, HAVE YOU WISHED YOU WERE DEAD OR WISHED YOU COULD GO TO SLEEP AND NOT WAKE UP?: NO
2. HAVE YOU ACTUALLY HAD ANY THOUGHTS OF KILLING YOURSELF IN THE PAST MONTH?: YES
2. HAVE YOU ACTUALLY HAD ANY THOUGHTS OF KILLING YOURSELF IN THE PAST MONTH?: NO

## 2025-07-05 ASSESSMENT — ACTIVITIES OF DAILY LIVING (ADL)
ADLS_ACUITY_SCORE: 52

## 2025-07-05 NOTE — ED NOTES
Recommended/requested appointment scheduled at this time and added to patient discharge paperwork.    YAMILETH SOTELO ,      DEC Phone #: [phone]

## 2025-07-05 NOTE — DISCHARGE INSTRUCTIONS
Scheduled upcoming appointment(s):      Date: Wednesday, 7/9/2025 Time: 11:00 AM- 1:00 PM  Address: [address] [place] [address]   Phone: [phone]  Type: ADULT NURIS EVAL    This appointment is in a hospital-based location.  Before your visit, you may want to check with your insurance company for coverage and referral options, including cost differences between services provided in different clinic settings.  For more information visit this link on the [employer] Website:  [email or web address]         You are seen today for hallucinations.  Begin taking Zyprexa again, follow-up with outpatient resources and with chemical dependency appointment as scheduled above.  Return to the emergency department if you feel unsafe, are danger to herself or others.  Recommend abstinence from recreational drug use.

## 2025-07-05 NOTE — ED PROVIDER NOTES
"ED Provider Note  [facility] Emergency Department  [address]   History     Chief Complaint   Patient presents with    Psychiatric Evaluation     Pt states he is feeling psychotic. Pt states, \"there are a lot of cars and there is a lot of screaming.\" Pt states this is the worst his psychosis has ever been.       HPI  Hamzah Roberts is a 39 year old male, with a history of methamphetamine use, schizoaffective disorder, who presents to the emergency department for evaluation of psychosis.  He states that he ran out of his meds a couple days ago and he is hearing voices screaming for help.  He denies SI HI.  Refuses to engage in history and physical exam, questions why him questions.  States he was recently hospitalized for similar symptoms couple weeks ago.      Independent historian:  none    Chart review:  Reviewed outside emergency department note from earlier today.  Patient presented for skin redness and warmth had not completed antibiotic course noting that he was worried that it would go into his heart.  No clear abscess.  Patient did request a refill of his Zyprexa.    Physical Exam   BP: 136/86  Pulse: 93  Temp: 97.9  F (36.6  C)  Resp: 18  Weight: 72.8 kg (160 lb 8 oz)  SpO2: 99 %    Physical Exam  Vitals and nursing note reviewed.   Constitutional:       General: He is not in acute distress.     Appearance: Normal appearance. He is not ill-appearing, toxic-appearing or diaphoretic.   HENT:      Nose: Nose normal.      Mouth/Throat:      Pharynx: Oropharynx is clear.   Eyes:      Extraocular Movements: Extraocular movements intact.   Cardiovascular:      Rate and Rhythm: Normal rate.   Pulmonary:      Effort: Pulmonary effort is normal.   Musculoskeletal:         General: Normal range of motion.      Cervical back: Normal range of motion.      Right lower leg: No edema.      Left lower leg: No edema.   Skin:     General: Skin is warm and dry.   Neurological:      Mental " Status: He is alert and oriented to person, place, and time.   Psychiatric:         Mood and Affect: Mood normal.         Behavior: Behavior is uncooperative.         Thought Content: Thought content is paranoid and delusional. Thought content does not include homicidal or suicidal ideation.           ED Course, Procedures, & Data      Procedures                 Results for orders placed or performed during the hospital encounter of 07/05/25   Urine Drug Screen Panel     Status: Abnormal   Result Value Ref Range    Amphetamines Urine Screen Positive (A) Screen Negative    Barbituates Urine Screen Negative Screen Negative    Benzodiazepine Urine Screen Negative Screen Negative    Cannabinoids Urine Screen Positive (A) Screen Negative    Cocaine Urine Screen Negative Screen Negative    Fentanyl Qual Urine Screen Negative Screen Negative    Opiates Urine Screen Negative Screen Negative    PCP Urine Screen Negative Screen Negative   Urine Drug Screen     Status: Abnormal    Narrative    The following orders were created for panel order Urine Drug Screen.  Procedure                               Abnormality         Status                     ---------                               -----------         ------                     Urine Drug Screen Panel[[id number]]     Abnormal            Final result                 Please view results for these tests on the individual orders.     Medications   OLANZapine zydis (zyPREXA) ODT tab 10 mg (10 mg Oral $Given 7/5/25 1755)   LORazepam (ATIVAN) tablet 1 mg (1 mg Oral $Given 7/5/25 1755)     Labs Ordered and Resulted from Time of ED Arrival to Time of ED Departure   URINE DRUG SCREEN PANEL - Abnormal       Result Value    Amphetamines Urine Screen Positive (*)     Barbituates Urine Screen Negative      Benzodiazepine Urine Screen Negative      Cannabinoids Urine Screen Positive (*)     Cocaine Urine Screen Negative      Fentanyl Qual Urine Screen Negative      Opiates Urine Screen  Negative      PCP Urine Screen Negative       No orders to display          Independent interpretation: none    Discussion of management: none    Social determinants of health: unknown    Assessment & Plan    Hamzah Roberts is a 39 year old male who presented to the emergency department for evaluation of mental health concerns and hallucinations.  Upon presentation patient was alert, responding to internal stimuli, skin warm pink and dry, afebrile, nontoxic non-ill-appearing.  Broad differential included drug and alcohol intoxication, acute psychosis, infection.    Patient's history and physical exam most concerning for drug-induced hallucinations.  Patient was initially very apprehensive about workup, did not want to share details, had no medical complaints and so did not feel medical workup was necessary given his well appearance and stable vital signs.  A DEC assessment was completed for mental health evaluation.   did discuss crisis beds unfortunately no crisis beds were available and patient was informed of this.  Plan will be to refill his Zyprexa, paper prescription given to the patient.  Urine tox screen positive for amphetamines and cannabinoids.  I do not feel the patient is holdable nor does he have any SI or HI concerning for danger to himself or others.  Patient does not meet inpatient criteria for psychiatric hospitalization.  Per recommendations from the DEC , patient will be discharged with community resources for follow-up.  Patient has a history of verbal and physical aggressive behavior he does not get what he wants.  Patient was discharged, was escorted out by security.  He was given community resources for follow-up.  Patient was steady in gait and able to ambulate.      Critical care was not performed.     Medical Decision Making  The patient's presentation was of moderate complexity (an undiagnosed new problem with uncertain prognosis).    The patient's evaluation  involved:  review of external note(s) from 3+ sources (see separate area of note for details)  strong consideration of a test (see separate area of note for details) that was ultimately deferred    The patient's management necessitated high risk (a decision regarding hospitalization).    Discharge Medication List as of 7/5/2025  6:01 PM        START taking these medications    Details   !! OLANZapine (ZYPREXA) 10 MG tablet Take 1 tablet (10 mg) by mouth at bedtime., Disp-30 tablet, R-0, Local Print       !! - Potential duplicate medications found. Please discuss with provider.          Final diagnoses:   Auditory hallucinations       Liu Black PA-C, [facility]FF[facility]    [facility] EMERGENCY DEPARTMENT  7/5/2025     Liu Black PA-C  07/05/25 3440

## 2025-07-05 NOTE — ED TRIAGE NOTES
Patient presents here with an area of redness to his left lateral upper body area that he feels is an insect bite.      Triage Assessment (Adult)       Row Name 07/05/25 0732          Triage Assessment    Airway WDL WDL        Respiratory WDL    Respiratory WDL WDL        Skin Circulation/Temperature WDL    Skin Circulation/Temperature WDL WDL        Cardiac WDL    Cardiac WDL WDL        Peripheral/Neurovascular WDL    Peripheral Neurovascular WDL WDL        Cognitive/Neuro/Behavioral WDL    Cognitive/Neuro/Behavioral WDL WDL
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
## 2025-07-05 NOTE — CONSULTS
"Diagnostic Evaluation Consultation  Crisis Assessment    Patient Name: Hamzah Roberts  Age:  39 year old  Legal Sex: male  Gender Identity: male  Pronouns:      Race: White  Ethnicity: Not  or   Language: English      Patient was assessed: Virtual: iPad   Crisis Assessment Start Date: 07/05/25  Crisis Assessment Start Time: 1627  Crisis Assessment Stop Time: 1446  Patient location: [facility] ED Emergency Department                                 Referral Data and Chief Complaint  Hamzah Roberts presents to the ED with family/friends. Patient is presenting to the ED for the following concerns: Substance use, Intoxication, Paranoia, Other (see comment) (Patient reporting auditory hallucinations). Factors that make the mental health crisis life threatening or complex are: Patient continues to struggle with ongoing methamphetamine and cannabis use.  Patient also experiencing ongoing auditory hallucinations..      Informed Consent and Assessment Methods  Explained the crisis assessment process, including applicable information disclosures and limits to confidentiality, assessed understanding of the process, and obtained consent to proceed with the assessment.  Assessment methods included conducting a formal interview with patient, review of medical records, collaboration with medical staff, and obtaining relevant collateral information from family and community providers when available.  : done     History of the Crisis   Patient is a 39-year-old male with a history of paranoid schizophrenia and methamphetamine use disorder who comes in the hospital brought in by a friend due to concerns of auditory hallucinations.  Patient notes that has been appearing screaming throughout the day and it \"started to scare me\".  He reports that the screaming has been on and off throughout the day and at times is able to notice the sound of the voice and other times not.  Patient states that he is not sure as to " what exactly triggered these voices and when assessing for substance use he denies any active substance use at this time.  Patient also reports symptoms of hopelessness and sadness and is unable to determine what triggers this.  Patient denies any other psychosocial stressors at this time.  Patient also denies any anxiety symptoms.  Patient denies any type of suicidal ideation and no concerns for self-harm at this time.    Brief Psychosocial History  Family:  Single, Children yes  Support System:  Friend  Employment Status:  unemployed  Source of Income:  none  Financial Environmental Concerns:  unemployed  Current Hobbies:  music, outdoor activities  Barriers in Personal Life:  other (see comments), mental health concerns    Significant Clinical History  Current Anxiety Symptoms:     Current Depression/Trauma:  helplessness, difficulty concentrating, sadness, hopelessness, sense of doom  Current Somatic Symptoms:  sweating, flushing, shaking, shortness of breath or racing heart, somatic symptoms (abdominal pain, headache, tension)  Current Psychosis/Thought Disturbance:  inattentive, distractability, auditory hallucinations  Current Eating Symptoms:     Chemical Use History:  Alcohol: None  Benzodiazepines: None  Opiates: None  Cocaine: None  Marijuana: Daily  Last Use:: 07/04/25  Other Use: Methamphetamines  Last Use:: 07/03/25   Past diagnosis:  Anxiety Disorder, Bipolar Disorder, Depression, Substance Use Disorder, Schizophrenia  Family history:  No known history of mental health or chemical health concerns  Past treatment:  Case management, Psychiatric Medication Management, Inpatient Hospitalization, Residential Treatment  Details of most recent treatment:  Pt states that he is scheduled to see a therapist in the next few weeks.  Other relevant history:       Have there been any medication changes in the past two weeks:  no       Is the patient compliant with medications:  yes (pt reports that he is compliant  with medications, but uncertain which medications he takes)        Collateral Information  Is there collateral information: No (Called Adilia Roberts,  [phone], but no answer.)      Risk Assessment  [place] Suicide Severity Rating Scale Full Clinical Version:  Suicidal Ideation  Q2 Non-Specific Active Suicidal Thoughts (Lifetime): No  Q6 Suicide Behavior (Lifetime): no     Suicidal Behavior (Lifetime)  Actual Attempt (Lifetime): Yes  Total Number of Actual Attempts (Lifetime): 1  Actual Attempt Description (Lifetime): pt denies any previous attempts, but EPIC notes an overdose attempt in January 2025  Has subject engaged in non-suicidal self-injurious behavior? (Lifetime): No  Interrupted Attempts (Lifetime): No  Aborted or Self-Interrupted Attempt (Lifetime): Yes  Preparatory Acts or Behavior (Lifetime): No    [place] Suicide Severity Rating Scale Recent:   Suicidal Ideation (Recent)  Q1 Wished to be Dead (Past Month): no  Q2 Suicidal Thoughts (Past Month): no  Level of Risk per Screen: no risks indicated          Environmental or Psychosocial Events: other life stressors, legal issues such as DWI, DUI, lawsuit, CPS involvement, etc., helplessness/hopelessness, ongoing abuse of substances, unstable housing, homelessness, unemployment/underemployment, neither working nor attending school  Protective Factors: Protective Factors: other (see comment), help seeking, lives in a responsibly safe and stable environment    Does the patient have thoughts of harming others? Feels Like Hurting Others: no  Previous Attempt to Hurt Others: no  Is the patient engaging in sexually inappropriate behavior?: no  Does Patient have a known history of aggressive behavior: No    Is the patient engaging in sexually inappropriate behavior?  no        Mental Status Exam   Affect: Appropriate  Appearance: Appropriate  Attention Span/Concentration: Attentive  Eye Contact: Variable    Fund of Knowledge: Appropriate   Language /Speech  Content: Fluent  Language /Speech Volume: Normal  Language /Speech Rate/Productions: Normal  Recent Memory: Intact  Remote Memory: Intact  Mood: Anxious  Orientation to Person: Yes   Orientation to Place: Yes  Orientation to Time of Day: Yes  Orientation to Date: Yes     Situation (Do they understand why they are here?): Yes  Psychomotor Behavior: Normal  Thought Content: Clear, Hallucinations  Thought Form: Intact     Mini-Cog Assessment  Number of Words Recalled:    Clock-Drawing Test:     Three Item Recall:    Mini-Cog Total Score:       Medication  Psychotropic medications:   Medication Orders - Psychiatric (From admission, onward)      Start     Dose/Rate Route Frequency Ordered Stop    07/05/25 0000  OLANZapine (ZYPREXA) 10 MG tablet         10 mg Oral AT BEDTIME 07/05/25 1734 08/04/25 2439             Current Care Team  Patient Care Team:  No Ref-Primary, Physician as PCP - Brandon Gunn as Nursing Assistant  Cynthia Bueno as     Diagnosis  Patient Active Problem List   Diagnosis Code    Suicidal ideation R45.851    Polysubstance abuse (H) F19.10    Paranoid schizophrenia (H) F20.0    Paranoia (H) F22    Methamphetamine abuse (H) F15.10    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    Amphetamine use disorder, severe, 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    Anxiety F41.9    Episodic mood disorder F39    Gastroesophageal reflux disease K21.9    Left ankle pain, unspecified chronicity M25.572    Psychosis, atypical (H) F29    Recurrent genital herpes A60.00    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    Hallucinations R44.3    Encounter for medication refill Z76.0    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    Methamphetamine use disorder, moderate, in sustained remission, in controlled environment, dependence (H) F15.21    Methamphetamine use disorder, severe (H) F15.20    Schizoaffective  disorder, bipolar type (H) F25.0    Schizoaffective disorder, chronic condition with acute exacerbation (H) F25.9    Schizoaffective disorder, depressive type (H) F25.1    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.30, S09.90XS    Auditory hallucinations R44.0    Substance-induced psychotic disorder with hallucinations (H) F19.951    Altered mental status, unspecified altered mental status type R41.82    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 Patient has been struggling with ongoing auditory hallucinations that has been more prevalent over the course of the last 1-2 days. He denies any specific events that may triggered these sx. Upon inquiring about the patient's substance use, he initially denied any active substance use as of recent.  When informing the patient that he tested positive for amphetamines and cannabis, he did admit that he potentially may have used lately.      When suggesting the patient's recent psychosis symptoms may be in part due to his substance use, he admits that this most likely is the culprit.  Patient does express interest of having evaluation done to consider chemical dependency treatment. Patient notes he also has an upcoming therapy appointment scheduled in the next few weeks. Patient denies any concerns for his safety at this time. Discussed case with attending doctor who is in agreement  with plan for discharge.    Goals for crisis stabilization:  pt stable in ED    Next steps for Care Team:  encourage follow up with CD appointment    Treatment Objectives Addressed:  rapport building, processing feelings, safety planning    Therapeutic Interventions:  Engaged in safety planning, Engaged in guided discovery, explored patient's perspectives and helped expand them through socratic dialogue., Explored motivation for behavioral change.    Has a specific means been identified for suicidal/homicide actions: No      Patient coping skills attempted to reduce the crisis:  pt reached out to ED    Disposition  Recommended referrals: [facility] Comprehensive Assessment        Reviewed case and recommendations with attending provider. Attending Name: Donnell Small MD       Attending concurs with disposition: yes       Patient and/or validated legal guardian concurs with disposition:   yes       Final disposition:  discharge  \    Assessment Details   Total duration spent with the patient: 19 min     CPT code(s) utilized: 82201 - Psychotherapy (with patient) - 30 (16-37*) min    ANANDA Voss, Psychotherapist  [facility] - Triage & Transition Services  Callback: [phone]

## 2025-07-05 NOTE — ED TRIAGE NOTES
"Pt states he is feeling psychotic. Pt states, \"there are a lot of cars and there is a lot of screaming.\" Pt states this is the worst his psychosis has ever been. Pt denies SI. Pt very paranoid.     Triage Assessment (Adult)       Row Name 07/05/25 1515          Triage Assessment    Airway WDL WDL        Respiratory WDL    Respiratory WDL WDL        Skin Circulation/Temperature WDL    Skin Circulation/Temperature WDL WDL        Cardiac WDL    Cardiac WDL WDL        Peripheral/Neurovascular WDL    Peripheral Neurovascular WDL WDL        Cognitive/Neuro/Behavioral WDL    Cognitive/Neuro/Behavioral WDL X;mood/behavior     Level of Consciousness alert     Arousal Level opens eyes spontaneously     Orientation oriented x 4     Speech clear     Mood/Behavior flat affect;withdrawn;cooperative;restless        Pupils (CN II)    Pupil PERRLA yes     Pupil Size Left 3 mm     Pupil Size Right 3 mm        [place] Coma Scale    Best Eye Response 4-->(E4) spontaneous     Best Motor Response 6-->(M6) obeys commands     Best Verbal Response 5-->(V5) oriented     [place] Coma Scale Score 15                     "
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
## 2025-07-05 NOTE — DISCHARGE INSTRUCTIONS
Our primary care scheduling team will call you to help you to set up a follow up appointment with them in 1-2 weeks so they can see your skin infection and make sure it is improving with the antibiotics and so they can also be available for you in the future in case you have a skin infection that returns.

## 2025-07-06 NOTE — ED NOTES
[facility] called and reported that patient was assessed on [place] and determine to not be Eligible for a crisis bed, in addition there is not availability of crisis beds. An  will call back and speak to Joaquín at 2300.

## 2025-07-06 NOTE — ED TRIAGE NOTES
"Pt presents with verbalized thoughts of \"not wanting to be alive anymore.\" Pt does not have plan but reports not having family or anyone that cares about him. Pt was seen at the  earlier today. Has not been on psych meds and last used methamphetamines 2 days ago.      Triage Assessment (Adult)       Row Name 07/05/25 1388          Triage Assessment    Airway WDL WDL        Respiratory WDL    Respiratory WDL WDL        Skin Circulation/Temperature WDL    Skin Circulation/Temperature WDL WDL        Cardiac WDL    Cardiac WDL WDL        Peripheral/Neurovascular WDL    Peripheral Neurovascular WDL WDL        Cognitive/Neuro/Behavioral WDL    Cognitive/Neuro/Behavioral WDL X     Mood/Behavior anxious;excitable;restless                     "

## 2025-07-06 NOTE — ED NOTES
EMERGENCY DEPARTMENT SIGN OUT NOTE        ED COURSE AND MEDICAL DECISION MAKING  Patient was signed out to me by Dr Sabiha Berrios at 10:18 PM.   11:23 PM I spoke with [facility].   11:24 PM I updated the patient.     In brief, Hamzah Roberts is a 39 year old male who initially presented for evaluation of mental health problem and suicidal ideation. No plan in place. Has not been on psychiatric medication and last used methamphetamines 2 days ago.     At time of sign out, disposition was pending [facility] assessment.     FINAL IMPRESSION    1. Depression, unspecified depression type    2. Methamphetamine abuse (H)        ED MEDS  Medications - No data to display    LAB  Labs Ordered and Resulted from Time of ED Arrival to Time of ED Departure - No data to display    EKG  ***    RADIOLOGY    No orders to display       DISCHARGE MEDS  New Prescriptions    No medications on file       Parisa Jauregui M.D.  [facility] EMERGENCY DEPARTMENT  [facility] [facility] [id number] [phone]

## 2025-07-06 NOTE — DISCHARGE INSTRUCTIONS
You were seen in the emergency department for help in getting to a crisis bed.  Unfortunately, we do not have any crisis beds available at this time.  We can send you with some resources for homeless shelters, otherwise I would recommend that you stay with your girlfriend and use the resources that you were given by the mental health team earlier today.  Continue to take your medications as prescribed.    I would also recommend you avoid all drugs and alcohol.    Please return to the Emergency Department immediately if you experience confusion, headache, seizures, and/or if your symptoms get worse, donot improve, or you develop any new or worrisome symptoms. Otherwise, please follow up with your primary physician within the next 5-7 days for recheck and ongoing management.    Thank you for choosing [facility]. It was a pleasure taking care of you today!  - Dr. Parisa Jauregui
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
## 2025-08-03 ENCOUNTER — APPOINTMENT (OUTPATIENT)
Dept: CT IMAGING | Facility: HOSPITAL | Age: 40
End: 2025-08-03
Attending: EMERGENCY MEDICINE
Payer: COMMERCIAL

## 2025-08-03 ENCOUNTER — HOSPITAL ENCOUNTER (EMERGENCY)
Facility: HOSPITAL | Age: 40
Discharge: HOME | End: 2025-08-04
Attending: EMERGENCY MEDICINE
Payer: COMMERCIAL

## 2025-08-03 ENCOUNTER — APPOINTMENT (OUTPATIENT)
Dept: ULTRASOUND IMAGING | Facility: HOSPITAL | Age: 40
End: 2025-08-03
Attending: EMERGENCY MEDICINE
Payer: COMMERCIAL

## 2025-08-03 VITALS
HEART RATE: 88 BPM | RESPIRATION RATE: 18 BRPM | TEMPERATURE: 97.7 F | OXYGEN SATURATION: 97 % | SYSTOLIC BLOOD PRESSURE: 121 MMHG | DIASTOLIC BLOOD PRESSURE: 74 MMHG

## 2025-08-03 DIAGNOSIS — N45.1 EPIDIDYMITIS: Primary | ICD-10-CM

## 2025-08-03 LAB
ALBUMIN UR-MCNC: 30 MG/DL
AMORPH CRY #/AREA URNS HPF: ABNORMAL /HPF
ANION GAP SERPL CALCULATED.3IONS-SCNC: 10 MMOL/L (ref 7–15)
ANION GAP SERPL CALCULATED.3IONS-SCNC: 11 MMOL/L (ref 7–15)
APPEARANCE UR: CLEAR
BASOPHILS # BLD AUTO: 0 10E3/UL (ref 0–0.2)
BASOPHILS NFR BLD AUTO: 0 %
BILIRUB UR QL STRIP: NEGATIVE
BUN SERPL-MCNC: 11.4 MG/DL (ref 6–20)
BUN SERPL-MCNC: 11.4 MG/DL (ref 6–20)
CALCIUM SERPL-MCNC: 10 MG/DL (ref 8.8–10.4)
CALCIUM SERPL-MCNC: 9.8 MG/DL (ref 8.8–10.4)
CHLORIDE SERPL-SCNC: 101 MMOL/L (ref 98–107)
CHLORIDE SERPL-SCNC: 102 MMOL/L (ref 98–107)
COLOR UR AUTO: YELLOW
CREAT SERPL-MCNC: 0.97 MG/DL (ref 0.67–1.17)
CREAT SERPL-MCNC: 0.97 MG/DL (ref 0.67–1.17)
EGFRCR SERPLBLD CKD-EPI 2021: >90 ML/MIN/1.73M2
EGFRCR SERPLBLD CKD-EPI 2021: >90 ML/MIN/1.73M2
EOSINOPHIL # BLD AUTO: 0.1 10E3/UL (ref 0–0.7)
EOSINOPHIL NFR BLD AUTO: 2 %
ERYTHROCYTE [DISTWIDTH] IN BLOOD BY AUTOMATED COUNT: 12.6 % (ref 10–15)
GLUCOSE SERPL-MCNC: 118 MG/DL (ref 70–99)
GLUCOSE SERPL-MCNC: 131 MG/DL (ref 70–99)
GLUCOSE UR STRIP-MCNC: >1000 MG/DL
HCO3 SERPL-SCNC: 26 MMOL/L (ref 22–29)
HCO3 SERPL-SCNC: 26 MMOL/L (ref 22–29)
HCT VFR BLD AUTO: 45.4 % (ref 40–53)
HGB BLD-MCNC: 16 G/DL (ref 13.3–17.7)
HGB UR QL STRIP: NEGATIVE
HOLD SPECIMEN: NORMAL
IMM GRANULOCYTES # BLD: 0 10E3/UL
IMM GRANULOCYTES NFR BLD: 1 %
KETONES UR STRIP-MCNC: 10 MG/DL
LEUKOCYTE ESTERASE UR QL STRIP: NEGATIVE
LYMPHOCYTES # BLD AUTO: 2.2 10E3/UL (ref 0.8–5.3)
LYMPHOCYTES NFR BLD AUTO: 30 %
MCH RBC QN AUTO: 31.9 PG (ref 26.5–33)
MCHC RBC AUTO-ENTMCNC: 35.2 G/DL (ref 31.5–36.5)
MCV RBC AUTO: 90 FL (ref 78–100)
MCV RBC AUTO: 91 FL (ref 78–100)
MONOCYTES # BLD AUTO: 0.6 10E3/UL (ref 0–1.3)
MONOCYTES NFR BLD AUTO: 8 %
MUCOUS THREADS #/AREA URNS LPF: PRESENT /LPF
NEUTROPHILS # BLD AUTO: 4.3 10E3/UL (ref 1.6–8.3)
NEUTROPHILS NFR BLD AUTO: 59 %
NITRATE UR QL: NEGATIVE
NRBC # BLD AUTO: 0 10E3/UL
NRBC BLD AUTO-RTO: 0 /100
PH UR STRIP: 6.5 [PH] (ref 5–7)
PLATELET # BLD AUTO: 313 10E3/UL (ref 150–450)
POTASSIUM SERPL-SCNC: 4.2 MMOL/L (ref 3.4–5.3)
POTASSIUM SERPL-SCNC: 4.5 MMOL/L (ref 3.4–5.3)
RBC # BLD AUTO: 5.02 10E6/UL (ref 4.4–5.9)
RBC URINE: 0 /HPF
SODIUM SERPL-SCNC: 138 MMOL/L (ref 135–145)
SODIUM SERPL-SCNC: 138 MMOL/L (ref 135–145)
SP GR UR STRIP: 1.02 (ref 1–1.03)
UROBILINOGEN UR STRIP-MCNC: NORMAL MG/DL
WBC # BLD AUTO: 7.4 10E3/UL (ref 4–11)
WBC # BLD AUTO: 7.5 10E3/UL (ref 4–11)
WBC URINE: 5 /HPF

## 2025-08-03 PROCEDURE — 76870 US EXAM SCROTUM: CPT

## 2025-08-03 PROCEDURE — 36415 COLL VENOUS BLD VENIPUNCTURE: CPT | Performed by: EMERGENCY MEDICINE

## 2025-08-03 PROCEDURE — 250N000011 HC RX IP 250 OP 636: Performed by: EMERGENCY MEDICINE

## 2025-08-03 PROCEDURE — 74177 CT ABD & PELVIS W/CONTRAST: CPT

## 2025-08-03 PROCEDURE — 80048 BASIC METABOLIC PNL TOTAL CA: CPT | Performed by: EMERGENCY MEDICINE

## 2025-08-03 PROCEDURE — 96365 THER/PROPH/DIAG IV INF INIT: CPT | Mod: 59

## 2025-08-03 PROCEDURE — 99285 EMERGENCY DEPT VISIT HI MDM: CPT | Mod: 25 | Performed by: EMERGENCY MEDICINE

## 2025-08-03 PROCEDURE — 250N000013 HC RX MED GY IP 250 OP 250 PS 637: Performed by: EMERGENCY MEDICINE

## 2025-08-03 PROCEDURE — 85025 COMPLETE CBC W/AUTO DIFF WBC: CPT | Performed by: EMERGENCY MEDICINE

## 2025-08-03 PROCEDURE — 96375 TX/PRO/DX INJ NEW DRUG ADDON: CPT

## 2025-08-03 PROCEDURE — 87491 CHLMYD TRACH DNA AMP PROBE: CPT | Performed by: EMERGENCY MEDICINE

## 2025-08-03 PROCEDURE — 81001 URINALYSIS AUTO W/SCOPE: CPT | Performed by: EMERGENCY MEDICINE

## 2025-08-03 RX ORDER — DOXYCYCLINE 100 MG/1
100 CAPSULE ORAL ONCE
Status: COMPLETED | OUTPATIENT
Start: 2025-08-03 | End: 2025-08-03

## 2025-08-03 RX ORDER — IOPAMIDOL 755 MG/ML
90 INJECTION, SOLUTION INTRAVASCULAR ONCE
Status: COMPLETED | OUTPATIENT
Start: 2025-08-03 | End: 2025-08-03

## 2025-08-03 RX ORDER — CEFTRIAXONE 1 G/1
1 INJECTION, POWDER, FOR SOLUTION INTRAMUSCULAR; INTRAVENOUS ONCE
Status: COMPLETED | OUTPATIENT
Start: 2025-08-03 | End: 2025-08-04

## 2025-08-03 RX ORDER — DOXYCYCLINE 100 MG/1
100 CAPSULE ORAL 2 TIMES DAILY
Qty: 20 CAPSULE | Refills: 0 | Status: SHIPPED | OUTPATIENT
Start: 2025-08-03 | End: 2025-08-06

## 2025-08-03 RX ADMIN — IOPAMIDOL 90 ML: 755 INJECTION, SOLUTION INTRAVENOUS at 22:13

## 2025-08-03 RX ADMIN — CEFTRIAXONE SODIUM 1 G: 1 INJECTION, POWDER, FOR SOLUTION INTRAMUSCULAR; INTRAVENOUS at 23:42

## 2025-08-03 RX ADMIN — HYDROMORPHONE HYDROCHLORIDE 1 MG: 1 INJECTION, SOLUTION INTRAMUSCULAR; INTRAVENOUS; SUBCUTANEOUS at 21:42

## 2025-08-03 RX ADMIN — DOXYCYCLINE 100 MG: 100 CAPSULE ORAL at 23:42

## 2025-08-03 ASSESSMENT — ACTIVITIES OF DAILY LIVING (ADL)
ADLS_ACUITY_SCORE: 52

## 2025-08-04 LAB
C TRACH DNA SPEC QL PROBE+SIG AMP: NEGATIVE
N GONORRHOEA DNA SPEC QL NAA+PROBE: NEGATIVE
SPECIMEN TYPE: NORMAL

## 2025-08-06 RX ORDER — DOXYCYCLINE 100 MG/1
100 CAPSULE ORAL 2 TIMES DAILY
Qty: 20 CAPSULE | Refills: 0 | Status: SHIPPED | OUTPATIENT
Start: 2025-08-06 | End: 2025-08-17

## 2025-08-10 ENCOUNTER — APPOINTMENT (OUTPATIENT)
Dept: ULTRASOUND IMAGING | Facility: HOSPITAL | Age: 40
End: 2025-08-10
Attending: EMERGENCY MEDICINE
Payer: COMMERCIAL

## 2025-08-10 ENCOUNTER — HOSPITAL ENCOUNTER (EMERGENCY)
Facility: HOSPITAL | Age: 40
Discharge: HOME | End: 2025-08-10
Attending: EMERGENCY MEDICINE
Payer: COMMERCIAL

## 2025-08-10 VITALS
WEIGHT: 158.29 LBS | HEART RATE: 110 BPM | SYSTOLIC BLOOD PRESSURE: 139 MMHG | TEMPERATURE: 98.5 F | HEIGHT: 63 IN | RESPIRATION RATE: 18 BRPM | BODY MASS INDEX: 28.05 KG/M2 | OXYGEN SATURATION: 97 % | DIASTOLIC BLOOD PRESSURE: 83 MMHG

## 2025-08-10 DIAGNOSIS — N50.812 PAIN IN BOTH TESTICLES: Primary | ICD-10-CM

## 2025-08-10 DIAGNOSIS — N50.811 PAIN IN BOTH TESTICLES: Primary | ICD-10-CM

## 2025-08-10 DIAGNOSIS — N50.89 SPERM GRANULOMA: ICD-10-CM

## 2025-08-10 LAB
ALBUMIN UR-MCNC: 50 MG/DL
AMORPH CRY #/AREA URNS HPF: ABNORMAL /HPF
APPEARANCE UR: ABNORMAL
BILIRUB UR QL STRIP: NEGATIVE
C TRACH DNA SPEC QL PROBE+SIG AMP: NEGATIVE
COLOR UR AUTO: YELLOW
GLUCOSE UR STRIP-MCNC: 300 MG/DL
HGB UR QL STRIP: NEGATIVE
KETONES UR STRIP-MCNC: ABNORMAL MG/DL
LEUKOCYTE ESTERASE UR QL STRIP: NEGATIVE
MUCOUS THREADS #/AREA URNS LPF: PRESENT /LPF
N GONORRHOEA DNA SPEC QL NAA+PROBE: NEGATIVE
NITRATE UR QL: NEGATIVE
PH UR STRIP: 5.5 [PH] (ref 5–7)
RBC URINE: 0 /HPF
SP GR UR STRIP: 1.03 (ref 1–1.03)
SPECIMEN TYPE: NORMAL
UROBILINOGEN UR STRIP-MCNC: NORMAL MG/DL
WBC URINE: 3 /HPF

## 2025-08-10 PROCEDURE — 99284 EMERGENCY DEPT VISIT MOD MDM: CPT | Mod: 25 | Performed by: EMERGENCY MEDICINE

## 2025-08-10 PROCEDURE — 93976 VASCULAR STUDY: CPT

## 2025-08-10 PROCEDURE — 250N000013 HC RX MED GY IP 250 OP 250 PS 637: Performed by: EMERGENCY MEDICINE

## 2025-08-10 PROCEDURE — 81003 URINALYSIS AUTO W/O SCOPE: CPT | Performed by: EMERGENCY MEDICINE

## 2025-08-10 PROCEDURE — 87491 CHLMYD TRACH DNA AMP PROBE: CPT | Performed by: EMERGENCY MEDICINE

## 2025-08-10 RX ORDER — IBUPROFEN 600 MG/1
600 TABLET, FILM COATED ORAL ONCE
Status: COMPLETED | OUTPATIENT
Start: 2025-08-10 | End: 2025-08-10

## 2025-08-10 RX ORDER — ACETAMINOPHEN 325 MG/1
975 TABLET ORAL ONCE
Status: COMPLETED | OUTPATIENT
Start: 2025-08-10 | End: 2025-08-10

## 2025-08-10 RX ADMIN — ACETAMINOPHEN 975 MG: 325 TABLET ORAL at 02:54

## 2025-08-10 RX ADMIN — IBUPROFEN 600 MG: 600 TABLET ORAL at 02:54

## 2025-08-10 ASSESSMENT — ACTIVITIES OF DAILY LIVING (ADL): ADLS_ACUITY_SCORE: 52

## 2025-08-17 ENCOUNTER — HOSPITAL ENCOUNTER (EMERGENCY)
Facility: HOSPITAL | Age: 40
Discharge: HOME | End: 2025-08-17
Attending: EMERGENCY MEDICINE | Admitting: EMERGENCY MEDICINE
Payer: COMMERCIAL

## 2025-08-17 VITALS
DIASTOLIC BLOOD PRESSURE: 79 MMHG | BODY MASS INDEX: 28.7 KG/M2 | HEART RATE: 99 BPM | TEMPERATURE: 98 F | SYSTOLIC BLOOD PRESSURE: 133 MMHG | WEIGHT: 162 LBS | RESPIRATION RATE: 18 BRPM | OXYGEN SATURATION: 97 %

## 2025-08-17 VITALS
HEIGHT: 63 IN | HEART RATE: 91 BPM | TEMPERATURE: 97.9 F | OXYGEN SATURATION: 97 % | SYSTOLIC BLOOD PRESSURE: 125 MMHG | BODY MASS INDEX: 28.03 KG/M2 | WEIGHT: 158.2 LBS | RESPIRATION RATE: 18 BRPM | DIASTOLIC BLOOD PRESSURE: 74 MMHG

## 2025-08-17 DIAGNOSIS — Z91.148 NONCOMPLIANCE WITH MEDICATION REGIMEN: ICD-10-CM

## 2025-08-17 DIAGNOSIS — F15.10 METHAMPHETAMINE ABUSE (H): ICD-10-CM

## 2025-08-17 DIAGNOSIS — F20.0 PARANOID SCHIZOPHRENIA (H): Primary | ICD-10-CM

## 2025-08-17 PROBLEM — F43.20 ADJUSTMENT DISORDER: Status: ACTIVE | Noted: 2025-08-17

## 2025-08-17 PROCEDURE — 99285 EMERGENCY DEPT VISIT HI MDM: CPT | Performed by: EMERGENCY MEDICINE

## 2025-08-17 PROCEDURE — 250N000013 HC RX MED GY IP 250 OP 250 PS 637: Performed by: EMERGENCY MEDICINE

## 2025-08-17 PROCEDURE — 99282 EMERGENCY DEPT VISIT SF MDM: CPT

## 2025-08-17 PROCEDURE — 99281 EMR DPT VST MAYX REQ PHY/QHP: CPT

## 2025-08-17 RX ORDER — LORAZEPAM 0.5 MG/1
1 TABLET ORAL ONCE
Status: COMPLETED | OUTPATIENT
Start: 2025-08-17 | End: 2025-08-17

## 2025-08-17 RX ORDER — HYDROXYZINE HYDROCHLORIDE 25 MG/1
25 TABLET, FILM COATED ORAL
Status: DISCONTINUED | OUTPATIENT
Start: 2025-08-17 | End: 2025-08-17 | Stop reason: HOSPADM

## 2025-08-17 RX ORDER — HYDROXYZINE HYDROCHLORIDE 50 MG/1
50 TABLET, FILM COATED ORAL EVERY 4 HOURS PRN
Status: DISCONTINUED | OUTPATIENT
Start: 2025-08-17 | End: 2025-08-17 | Stop reason: HOSPADM

## 2025-08-17 RX ORDER — ACETAMINOPHEN 325 MG/1
975 TABLET ORAL ONCE
Status: COMPLETED | OUTPATIENT
Start: 2025-08-17 | End: 2025-08-18

## 2025-08-17 RX ORDER — OLANZAPINE 10 MG/1
10 TABLET, ORALLY DISINTEGRATING ORAL ONCE
Status: COMPLETED | OUTPATIENT
Start: 2025-08-17 | End: 2025-08-17

## 2025-08-17 RX ORDER — OLANZAPINE 10 MG/1
10 TABLET, ORALLY DISINTEGRATING ORAL 3 TIMES DAILY PRN
Status: DISCONTINUED | OUTPATIENT
Start: 2025-08-17 | End: 2025-08-17 | Stop reason: HOSPADM

## 2025-08-17 RX ORDER — IBUPROFEN 600 MG/1
600 TABLET, FILM COATED ORAL EVERY 6 HOURS PRN
Status: DISCONTINUED | OUTPATIENT
Start: 2025-08-17 | End: 2025-08-17 | Stop reason: HOSPADM

## 2025-08-17 RX ORDER — ACETAMINOPHEN 500 MG
500-1000 TABLET ORAL 3 TIMES DAILY
Qty: 30 TABLET | Refills: 0 | Status: SHIPPED | OUTPATIENT
Start: 2025-08-17

## 2025-08-17 RX ORDER — ACETAMINOPHEN 325 MG/1
650 TABLET ORAL EVERY 4 HOURS PRN
Status: DISCONTINUED | OUTPATIENT
Start: 2025-08-17 | End: 2025-08-17 | Stop reason: HOSPADM

## 2025-08-17 RX ORDER — NICOTINE 21 MG/24HR
1 PATCH, TRANSDERMAL 24 HOURS TRANSDERMAL DAILY PRN
Status: DISCONTINUED | OUTPATIENT
Start: 2025-08-17 | End: 2025-08-17 | Stop reason: HOSPADM

## 2025-08-17 RX ORDER — IBUPROFEN 800 MG/1
800 TABLET, FILM COATED ORAL EVERY 8 HOURS PRN
Qty: 30 TABLET | Refills: 0 | Status: SHIPPED | OUTPATIENT
Start: 2025-08-17

## 2025-08-17 RX ADMIN — OLANZAPINE 10 MG: 10 TABLET, ORALLY DISINTEGRATING ORAL at 05:15

## 2025-08-17 RX ADMIN — LORAZEPAM 1 MG: 0.5 TABLET ORAL at 05:15

## 2025-08-17 ASSESSMENT — COLUMBIA-SUICIDE SEVERITY RATING SCALE - C-SSRS
6. HAVE YOU EVER DONE ANYTHING, STARTED TO DO ANYTHING, OR PREPARED TO DO ANYTHING TO END YOUR LIFE?: NO
2. HAVE YOU ACTUALLY HAD ANY THOUGHTS OF KILLING YOURSELF IN THE PAST MONTH?: NO
6. HAVE YOU EVER DONE ANYTHING, STARTED TO DO ANYTHING, OR PREPARED TO DO ANYTHING TO END YOUR LIFE?: YES
2. HAVE YOU ACTUALLY HAD ANY THOUGHTS OF KILLING YOURSELF IN THE PAST MONTH?: NO
1. IN THE PAST MONTH, HAVE YOU WISHED YOU WERE DEAD OR WISHED YOU COULD GO TO SLEEP AND NOT WAKE UP?: NO
1. IN THE PAST MONTH, HAVE YOU WISHED YOU WERE DEAD OR WISHED YOU COULD GO TO SLEEP AND NOT WAKE UP?: NO

## 2025-08-17 ASSESSMENT — ACTIVITIES OF DAILY LIVING (ADL)
ADLS_ACUITY_SCORE: 52

## 2025-08-18 ENCOUNTER — HOSPITAL ENCOUNTER (EMERGENCY)
Facility: HOSPITAL | Age: 40
Discharge: LEFT BEFORE DISCHARGE | End: 2025-08-18
Admitting: PHYSICIAN ASSISTANT
Payer: COMMERCIAL

## 2025-08-18 DIAGNOSIS — K08.89 PAIN, DENTAL: Primary | ICD-10-CM

## 2025-08-18 ASSESSMENT — ACTIVITIES OF DAILY LIVING (ADL): ADLS_ACUITY_SCORE: 52
